# Patient Record
Sex: FEMALE | Race: WHITE | NOT HISPANIC OR LATINO | Employment: OTHER | URBAN - METROPOLITAN AREA
[De-identification: names, ages, dates, MRNs, and addresses within clinical notes are randomized per-mention and may not be internally consistent; named-entity substitution may affect disease eponyms.]

---

## 2019-07-03 ENCOUNTER — LAB (OUTPATIENT)
Dept: LAB | Facility: HOSPITAL | Age: 44
End: 2019-07-03

## 2019-07-03 ENCOUNTER — OFFICE VISIT (OUTPATIENT)
Dept: GASTROENTEROLOGY | Facility: CLINIC | Age: 44
End: 2019-07-03

## 2019-07-03 VITALS
WEIGHT: 220.2 LBS | DIASTOLIC BLOOD PRESSURE: 81 MMHG | SYSTOLIC BLOOD PRESSURE: 142 MMHG | BODY MASS INDEX: 39.02 KG/M2 | HEIGHT: 63 IN | HEART RATE: 95 BPM

## 2019-07-03 DIAGNOSIS — R74.8 ABNORMAL LIVER ENZYMES: ICD-10-CM

## 2019-07-03 DIAGNOSIS — R60.9 PERIPHERAL EDEMA: ICD-10-CM

## 2019-07-03 DIAGNOSIS — F10.10 ALCOHOL ABUSE: ICD-10-CM

## 2019-07-03 DIAGNOSIS — R74.8 ABNORMAL LIVER ENZYMES: Primary | ICD-10-CM

## 2019-07-03 LAB
ALBUMIN SERPL-MCNC: 3.7 G/DL (ref 3.5–5.2)
ALBUMIN/GLOB SERPL: 1.3 G/DL
ALP SERPL-CCNC: 96 U/L (ref 39–117)
ALT SERPL W P-5'-P-CCNC: 17 U/L (ref 1–33)
ANION GAP SERPL CALCULATED.3IONS-SCNC: 12.4 MMOL/L (ref 5–15)
AST SERPL-CCNC: 28 U/L (ref 1–32)
BASOPHILS # BLD AUTO: 0.04 10*3/MM3 (ref 0–0.2)
BASOPHILS NFR BLD AUTO: 0.5 % (ref 0–1.5)
BILIRUB SERPL-MCNC: 0.5 MG/DL (ref 0.2–1.2)
BUN BLD-MCNC: 14 MG/DL (ref 6–20)
BUN/CREAT SERPL: 14.1 (ref 7–25)
CALCIUM SPEC-SCNC: 9.6 MG/DL (ref 8.6–10.5)
CHLORIDE SERPL-SCNC: 102 MMOL/L (ref 98–107)
CO2 SERPL-SCNC: 24.6 MMOL/L (ref 22–29)
CREAT BLD-MCNC: 0.99 MG/DL (ref 0.57–1)
DEPRECATED RDW RBC AUTO: 41.8 FL (ref 37–54)
EOSINOPHIL # BLD AUTO: 0.27 10*3/MM3 (ref 0–0.4)
EOSINOPHIL NFR BLD AUTO: 3.7 % (ref 0.3–6.2)
ERYTHROCYTE [DISTWIDTH] IN BLOOD BY AUTOMATED COUNT: 13.3 % (ref 12.3–15.4)
GFR SERPL CREATININE-BSD FRML MDRD: 61 ML/MIN/1.73
GLOBULIN UR ELPH-MCNC: 2.9 GM/DL
GLUCOSE BLD-MCNC: 85 MG/DL (ref 65–99)
HCT VFR BLD AUTO: 37.2 % (ref 34–46.6)
HGB BLD-MCNC: 11.9 G/DL (ref 12–15.9)
IMM GRANULOCYTES # BLD AUTO: 0.02 10*3/MM3 (ref 0–0.05)
IMM GRANULOCYTES NFR BLD AUTO: 0.3 % (ref 0–0.5)
INR PPP: 1.16 (ref 0.85–1.16)
LYMPHOCYTES # BLD AUTO: 1.82 10*3/MM3 (ref 0.7–3.1)
LYMPHOCYTES NFR BLD AUTO: 24.9 % (ref 19.6–45.3)
MCH RBC QN AUTO: 27.2 PG (ref 26.6–33)
MCHC RBC AUTO-ENTMCNC: 32 G/DL (ref 31.5–35.7)
MCV RBC AUTO: 84.9 FL (ref 79–97)
MONOCYTES # BLD AUTO: 1.13 10*3/MM3 (ref 0.1–0.9)
MONOCYTES NFR BLD AUTO: 15.5 % (ref 5–12)
NEUTROPHILS # BLD AUTO: 4.03 10*3/MM3 (ref 1.7–7)
NEUTROPHILS NFR BLD AUTO: 55.1 % (ref 42.7–76)
NRBC BLD AUTO-RTO: 0 /100 WBC (ref 0–0.2)
PLATELET # BLD AUTO: 183 10*3/MM3 (ref 140–450)
PMV BLD AUTO: 10.6 FL (ref 6–12)
POTASSIUM BLD-SCNC: 4 MMOL/L (ref 3.5–5.2)
PROT SERPL-MCNC: 6.6 G/DL (ref 6–8.5)
PROTHROMBIN TIME: 14.2 SECONDS (ref 11.2–14.3)
RBC # BLD AUTO: 4.38 10*6/MM3 (ref 3.77–5.28)
SODIUM BLD-SCNC: 139 MMOL/L (ref 136–145)
WBC NRBC COR # BLD: 7.31 10*3/MM3 (ref 3.4–10.8)

## 2019-07-03 PROCEDURE — 80053 COMPREHEN METABOLIC PANEL: CPT | Performed by: NURSE PRACTITIONER

## 2019-07-03 PROCEDURE — 99204 OFFICE O/P NEW MOD 45 MIN: CPT | Performed by: NURSE PRACTITIONER

## 2019-07-03 PROCEDURE — 85025 COMPLETE CBC W/AUTO DIFF WBC: CPT | Performed by: NURSE PRACTITIONER

## 2019-07-03 PROCEDURE — 85610 PROTHROMBIN TIME: CPT

## 2019-07-03 PROCEDURE — 36415 COLL VENOUS BLD VENIPUNCTURE: CPT | Performed by: NURSE PRACTITIONER

## 2019-07-03 RX ORDER — BUPRENORPHINE HYDROCHLORIDE AND NALOXONE HYDROCHLORIDE DIHYDRATE 8; 2 MG/1; MG/1
TABLET SUBLINGUAL
Refills: 0 | Status: ON HOLD | COMMUNITY
Start: 2019-06-25 | End: 2020-04-02

## 2019-07-03 RX ORDER — OMEPRAZOLE 20 MG/1
20 CAPSULE, DELAYED RELEASE ORAL DAILY
Refills: 0 | Status: ON HOLD | COMMUNITY
Start: 2019-05-24 | End: 2021-06-24

## 2019-07-03 RX ORDER — CITALOPRAM 20 MG/1
20 TABLET ORAL DAILY
Refills: 0 | Status: ON HOLD | COMMUNITY
Start: 2019-05-10 | End: 2021-06-24

## 2019-07-03 RX ORDER — PROPRANOLOL HYDROCHLORIDE 10 MG/1
10 TABLET ORAL
Refills: 0 | Status: ON HOLD | COMMUNITY
Start: 2019-04-30 | End: 2021-06-24

## 2019-07-03 RX ORDER — HYDROXYZINE PAMOATE 25 MG/1
25 CAPSULE ORAL 3 TIMES DAILY PRN
Status: ON HOLD | COMMUNITY
End: 2020-04-02

## 2019-07-03 RX ORDER — BUMETANIDE 1 MG/1
1 TABLET ORAL DAILY
COMMUNITY
End: 2019-07-03 | Stop reason: SDUPTHER

## 2019-07-03 RX ORDER — BUMETANIDE 1 MG/1
2 TABLET ORAL DAILY
Qty: 60 TABLET | Refills: 1 | Status: SHIPPED | OUTPATIENT
Start: 2019-07-03 | End: 2019-08-19

## 2019-07-03 RX ORDER — METHOCARBAMOL 750 MG/1
TABLET, FILM COATED ORAL
Refills: 0 | Status: ON HOLD | COMMUNITY
Start: 2019-04-30 | End: 2020-04-02

## 2019-07-03 NOTE — PROGRESS NOTES
GASTROENTEROLOGY OFFICE NOTE  Timothy Guzman  [id number]  1975    CARE TEAM  Patient Care Team:  Toshia Mitchell APRN as PCP - General (Family Medicine)    Referring Provider: Toshia Mitchell APRN    Chief Complaint   Patient presents with   • Edema     all over        HISTORY OF PRESENT ILLNESS:  Ms. Guzman is a 43-year-old female with a history of alcohol abuse.  She reports that she has been drinking since her early 20s described as binge drinking for months at a time drinking 2-3/5 daily.  Her last drink was 2019.  She is doing very well with her sobriety and is committed to remaining sober.  She was noted to have elevated liver enzymes in 2018: AST 76/ALT 45/.  Repeat enzymes in 2019 after complete abstinence from alcohol for 5 months showed normalization of her enzymes: AST 21/ ALT 17/ ALP 88. Serologic workup for hepatitides was done by her primary care provider that was significant for a positive mitochondrial antibody and positive MAGDALENO that showed the homogeneous pattern, not consistent with primary biliary cirrhosis.  Serologic work-up from 2019 also shows a normal bilirubin 0.2, and slightly low platelets 147.    She presents today with complaints of lower extremity edema.  She has been treated with Lasix that caused hypokalemia and thus has been switched to Bumex 1 mg daily and most recently added Spironolactone 100 mg daily.  Her edema continues and is worsening.  She denies signs of hepatic encephalopathy, such as confusion or increased sleepiness, denies GI bleeding.  She denies symptoms of PBC; fatigue, pruritus, or abdominal pain.    PAST MEDICAL HISTORY  Past Medical History:   Diagnosis Date   • Alcoholism (CMS/HCC)         PAST SURGICAL HISTORY  Past Surgical History:   Procedure Laterality Date   • APPENDECTOMY     •  SECTION     • GALLBLADDER SURGERY     • REPLACEMENT TOTAL HIP LATERAL POSITION Left    • TOTAL KNEE ARTHROPLASTY  "Left         MEDICATIONS:    Current Outpatient Medications:   •  bumetanide (BUMEX) 1 MG tablet, Take 2 tablets by mouth Daily., Disp: 60 tablet, Rfl: 1  •  hydrOXYzine pamoate (VISTARIL) 25 MG capsule, Take 25 mg by mouth 3 (Three) Times a Day As Needed for Itching., Disp: , Rfl:   •  buprenorphine-naloxone (SUBOXONE) 8-2 MG per SL tablet, , Disp: , Rfl: 0  •  citalopram (CeleXA) 10 MG tablet, , Disp: , Rfl: 0  •  methocarbamol (ROBAXIN) 750 MG tablet, , Disp: , Rfl: 0  •  omeprazole (priLOSEC) 20 MG capsule, Take 20 mg by mouth Daily., Disp: , Rfl: 0  •  propranolol (INDERAL) 10 MG tablet, , Disp: , Rfl: 0    ALLERGIES  No Known Allergies    FAMILY HISTORY:  Family History   Problem Relation Age of Onset   • Colon cancer Neg Hx    • Colon polyps Neg Hx        SOCIAL HISTORY  Social History     Socioeconomic History   • Marital status:      Spouse name: Not on file   • Number of children: Not on file   • Years of education: Not on file   • Highest education level: Not on file   Tobacco Use   • Smoking status: Current Every Day Smoker   • Smokeless tobacco: Never Used   • Tobacco comment: 2 cigarettes a day    Substance and Sexual Activity   • Alcohol use: No     Frequency: Never   • Drug use: No   • Sexual activity: Defer       REVIEW OF SYSTEMS  Review of Systems   Constitutional: Positive for unexpected weight gain.   HENT: Negative.    Eyes: Negative.    Respiratory: Negative.  Negative for shortness of breath.    Cardiovascular: Positive for leg swelling.   Gastrointestinal: Negative.    Genitourinary: Negative.    Musculoskeletal: Positive for arthralgias.   Skin: Negative.    Neurological: Negative.    Hematological: Negative.    Psychiatric/Behavioral: Negative.          PHYSICAL EXAM   /81   Pulse 95   Ht 160 cm (63\")   Wt 99.9 kg (220 lb 3.2 oz)   BMI 39.01 kg/m²   Physical Exam   Constitutional: She is oriented to person, place, and time. She appears well-developed and well-nourished. "   HENT:   Head: Normocephalic.   Mouth/Throat: Oropharynx is clear and moist.   Eyes: EOM are normal. Pupils are equal, round, and reactive to light.   Neck: Normal range of motion. Neck supple.   Cardiovascular: Normal rate and regular rhythm.   Pulmonary/Chest: Effort normal and breath sounds normal. She has no wheezes. She has no rales.   Abdominal: Soft. Bowel sounds are normal. She exhibits no mass. There is no tenderness. There is no rebound and no guarding. No hernia.   Musculoskeletal: Normal range of motion.        Right ankle: She exhibits swelling.        Left ankle: She exhibits swelling.        Right foot: There is swelling.        Left foot: There is swelling.   Neurological: She is alert and oriented to person, place, and time. No cranial nerve deficit.   Skin: Skin is warm and dry.   Erythema to BLE   Psychiatric: She has a normal mood and affect. Her behavior is normal. Judgment normal.   Nursing note and vitals reviewed.    Results Review:  [facility] records reviewed and discussed with patient.     ASSESSMENT / PLAN  1.  Elevated liver enzymes-now normalized  - AMA (+)  The diagnosis of PBC is generally based on the presence of at least two of the following criteria:  a) Biochemical evidence of cholestasis with elevation of ALP activity.  b) Presence of AMA.  c) Histopathologic evidence of nonsuppurative cholangitis and destruction of small or medium-sized bile ducts if a biopsy is performed  -AST 21/ALP17/ALP 88  With the high disease specificity of a positive AMA test, the role of liver biopsy in diagnosing PBC is questionable when ALP activity is at least 1.5 times the normal and aspartate aminotransferase values are less than 5 times the normal.   Consider liver biopsy as diagnosis of PBC is uncertain  2. ETOH abuse  -Given her history of alcohol abuse and current complaint of peripheral edema as well as thrombocytopenia I have suspicion for cirrhosis  - EGD to assess for varices and portal  hypertensive gastropathy  - Ultrasound liver with elastography  3. Lower extremity edema  -Increase Bumex to 2 mg daily  -CMP, CBC, PT/INR, Ig profile today  -BMP in 5 days    Return in about 1 month (around 8/3/2019).    I discussed the patients findings and my recommendations with patient    Nelson Yip, APRN

## 2019-07-13 ENCOUNTER — APPOINTMENT (OUTPATIENT)
Dept: ULTRASOUND IMAGING | Facility: HOSPITAL | Age: 44
End: 2019-07-13

## 2019-07-20 ENCOUNTER — HOSPITAL ENCOUNTER (OUTPATIENT)
Dept: ULTRASOUND IMAGING | Facility: HOSPITAL | Age: 44
Discharge: HOME | End: 2019-07-20
Admitting: NURSE PRACTITIONER

## 2019-07-20 DIAGNOSIS — R74.8 ABNORMAL LIVER ENZYMES: ICD-10-CM

## 2019-07-20 PROCEDURE — 76981 USE PARENCHYMA: CPT

## 2019-07-20 PROCEDURE — 76705 ECHO EXAM OF ABDOMEN: CPT

## 2019-07-31 ENCOUNTER — OFFICE VISIT (OUTPATIENT)
Dept: GASTROENTEROLOGY | Facility: CLINIC | Age: 44
End: 2019-07-31

## 2019-07-31 ENCOUNTER — LAB (OUTPATIENT)
Dept: LAB | Facility: HOSPITAL | Age: 44
End: 2019-07-31

## 2019-07-31 VITALS
DIASTOLIC BLOOD PRESSURE: 89 MMHG | WEIGHT: 212.2 LBS | SYSTOLIC BLOOD PRESSURE: 141 MMHG | HEART RATE: 103 BPM | HEIGHT: 63 IN | BODY MASS INDEX: 37.6 KG/M2

## 2019-07-31 DIAGNOSIS — R74.8 ABNORMAL LIVER ENZYMES: ICD-10-CM

## 2019-07-31 DIAGNOSIS — K31.84 GASTROPARESIS: ICD-10-CM

## 2019-07-31 DIAGNOSIS — K59.00 CONSTIPATION, UNSPECIFIED CONSTIPATION TYPE: ICD-10-CM

## 2019-07-31 DIAGNOSIS — K70.30 ALCOHOLIC CIRRHOSIS OF LIVER WITHOUT ASCITES (HCC): Primary | ICD-10-CM

## 2019-07-31 DIAGNOSIS — R60.0 PEDAL EDEMA: ICD-10-CM

## 2019-07-31 DIAGNOSIS — K70.30 ALCOHOLIC CIRRHOSIS OF LIVER WITHOUT ASCITES (HCC): ICD-10-CM

## 2019-07-31 DIAGNOSIS — F10.10 ALCOHOL ABUSE: ICD-10-CM

## 2019-07-31 LAB
AMMONIA BLD-SCNC: 47 UMOL/L (ref 11–51)
ANION GAP SERPL CALCULATED.3IONS-SCNC: 17.2 MMOL/L (ref 5–15)
BUN BLD-MCNC: 14 MG/DL (ref 6–20)
BUN/CREAT SERPL: 9.7 (ref 7–25)
CALCIUM SPEC-SCNC: 10 MG/DL (ref 8.6–10.5)
CHLORIDE SERPL-SCNC: 90 MMOL/L (ref 98–107)
CO2 SERPL-SCNC: 28.8 MMOL/L (ref 22–29)
CREAT BLD-MCNC: 1.44 MG/DL (ref 0.57–1)
GFR SERPL CREATININE-BSD FRML MDRD: 40 ML/MIN/1.73
GLUCOSE BLD-MCNC: 96 MG/DL (ref 65–99)
INR PPP: 1.09 (ref 0.85–1.16)
POTASSIUM BLD-SCNC: 2.9 MMOL/L (ref 3.5–5.2)
PROTHROMBIN TIME: 13.5 SECONDS (ref 11.2–14.3)
SODIUM BLD-SCNC: 136 MMOL/L (ref 136–145)

## 2019-07-31 PROCEDURE — 99214 OFFICE O/P EST MOD 30 MIN: CPT | Performed by: NURSE PRACTITIONER

## 2019-07-31 PROCEDURE — 82140 ASSAY OF AMMONIA: CPT

## 2019-07-31 PROCEDURE — 85610 PROTHROMBIN TIME: CPT

## 2019-07-31 PROCEDURE — 80048 BASIC METABOLIC PNL TOTAL CA: CPT

## 2019-07-31 PROCEDURE — 36415 COLL VENOUS BLD VENIPUNCTURE: CPT

## 2019-07-31 RX ORDER — DULOXETIN HYDROCHLORIDE 30 MG/1
30 CAPSULE, DELAYED RELEASE ORAL DAILY
Refills: 0 | Status: ON HOLD | COMMUNITY
Start: 2019-04-30 | End: 2021-06-24

## 2019-07-31 RX ORDER — MIRTAZAPINE 15 MG/1
TABLET, FILM COATED ORAL
Refills: 0 | COMMUNITY
Start: 2019-04-30 | End: 2019-10-09

## 2019-07-31 RX ORDER — DOCUSATE SODIUM 250 MG/1
500 CAPSULE, LIQUID FILLED ORAL DAILY PRN
Refills: 0 | Status: ON HOLD | COMMUNITY
Start: 2019-05-01 | End: 2021-06-24

## 2019-07-31 RX ORDER — SPIRONOLACTONE 50 MG/1
TABLET, FILM COATED ORAL
Qty: 90 TABLET | Refills: 1 | Status: SHIPPED | OUTPATIENT
Start: 2019-07-31 | End: 2019-10-09 | Stop reason: SDUPTHER

## 2019-07-31 RX ORDER — LACTULOSE 10 G/15ML
SOLUTION ORAL
Refills: 1 | COMMUNITY
Start: 2019-05-08 | End: 2019-12-30 | Stop reason: SDUPTHER

## 2019-08-01 ENCOUNTER — TELEPHONE (OUTPATIENT)
Dept: GASTROENTEROLOGY | Facility: CLINIC | Age: 44
End: 2019-08-01

## 2019-08-01 DIAGNOSIS — Z86.39 HISTORY OF LOW POTASSIUM: ICD-10-CM

## 2019-08-01 DIAGNOSIS — R74.8 ABNORMAL LIVER ENZYMES: ICD-10-CM

## 2019-08-01 DIAGNOSIS — K70.30 ALCOHOLIC CIRRHOSIS OF LIVER WITHOUT ASCITES (HCC): Primary | ICD-10-CM

## 2019-08-19 RX ORDER — FUROSEMIDE 40 MG/1
40 TABLET ORAL DAILY
Qty: 30 TABLET | Refills: 5 | Status: SHIPPED | OUTPATIENT
Start: 2019-08-19 | End: 2019-09-06 | Stop reason: DRUGHIGH

## 2019-09-02 ENCOUNTER — APPOINTMENT (OUTPATIENT)
Dept: GENERAL RADIOLOGY | Facility: HOSPITAL | Age: 44
End: 2019-09-02

## 2019-09-02 ENCOUNTER — HOSPITAL ENCOUNTER (EMERGENCY)
Facility: HOSPITAL | Age: 44
Discharge: HOME | End: 2019-09-02
Attending: EMERGENCY MEDICINE | Admitting: EMERGENCY MEDICINE

## 2019-09-02 VITALS
WEIGHT: 220 LBS | DIASTOLIC BLOOD PRESSURE: 64 MMHG | HEART RATE: 84 BPM | TEMPERATURE: 97.4 F | HEIGHT: 63 IN | BODY MASS INDEX: 38.98 KG/M2 | RESPIRATION RATE: 16 BRPM | OXYGEN SATURATION: 97 % | SYSTOLIC BLOOD PRESSURE: 121 MMHG

## 2019-09-02 DIAGNOSIS — I87.2 VENOUS INSUFFICIENCY: ICD-10-CM

## 2019-09-02 DIAGNOSIS — R60.9 PERIPHERAL EDEMA: Primary | ICD-10-CM

## 2019-09-02 LAB
ALBUMIN SERPL-MCNC: 4.1 G/DL (ref 3.5–5.2)
ALBUMIN/GLOB SERPL: 1.3 G/DL
ALP SERPL-CCNC: 114 U/L (ref 39–117)
ALT SERPL W P-5'-P-CCNC: 12 U/L (ref 1–33)
ANION GAP SERPL CALCULATED.3IONS-SCNC: 10 MMOL/L (ref 5–15)
AST SERPL-CCNC: 23 U/L (ref 1–32)
BASOPHILS # BLD AUTO: 0.03 10*3/MM3 (ref 0–0.2)
BASOPHILS NFR BLD AUTO: 0.6 % (ref 0–1.5)
BILIRUB SERPL-MCNC: 0.2 MG/DL (ref 0.2–1.2)
BUN BLD-MCNC: 18 MG/DL (ref 6–20)
BUN/CREAT SERPL: 15.3 (ref 7–25)
CALCIUM SPEC-SCNC: 9.4 MG/DL (ref 8.6–10.5)
CHLORIDE SERPL-SCNC: 99 MMOL/L (ref 98–107)
CO2 SERPL-SCNC: 28 MMOL/L (ref 22–29)
CREAT BLD-MCNC: 1.18 MG/DL (ref 0.57–1)
DEPRECATED RDW RBC AUTO: 43 FL (ref 37–54)
EOSINOPHIL # BLD AUTO: 0.29 10*3/MM3 (ref 0–0.4)
EOSINOPHIL NFR BLD AUTO: 5.7 % (ref 0.3–6.2)
ERYTHROCYTE [DISTWIDTH] IN BLOOD BY AUTOMATED COUNT: 14.2 % (ref 12.3–15.4)
GFR SERPL CREATININE-BSD FRML MDRD: 50 ML/MIN/1.73
GLOBULIN UR ELPH-MCNC: 3.1 GM/DL
GLUCOSE BLD-MCNC: 95 MG/DL (ref 65–99)
HCT VFR BLD AUTO: 35.5 % (ref 34–46.6)
HGB BLD-MCNC: 11.4 G/DL (ref 12–15.9)
IMM GRANULOCYTES # BLD AUTO: 0.01 10*3/MM3 (ref 0–0.05)
IMM GRANULOCYTES NFR BLD AUTO: 0.2 % (ref 0–0.5)
INR PPP: 1.14 (ref 0.85–1.16)
LYMPHOCYTES # BLD AUTO: 1.65 10*3/MM3 (ref 0.7–3.1)
LYMPHOCYTES NFR BLD AUTO: 32.4 % (ref 19.6–45.3)
MCH RBC QN AUTO: 26.6 PG (ref 26.6–33)
MCHC RBC AUTO-ENTMCNC: 32.1 G/DL (ref 31.5–35.7)
MCV RBC AUTO: 82.9 FL (ref 79–97)
MONOCYTES # BLD AUTO: 0.65 10*3/MM3 (ref 0.1–0.9)
MONOCYTES NFR BLD AUTO: 12.7 % (ref 5–12)
NEUTROPHILS # BLD AUTO: 2.47 10*3/MM3 (ref 1.7–7)
NEUTROPHILS NFR BLD AUTO: 48.4 % (ref 42.7–76)
NRBC BLD AUTO-RTO: 0 /100 WBC (ref 0–0.2)
NT-PROBNP SERPL-MCNC: 25.8 PG/ML (ref 5–450)
PLATELET # BLD AUTO: 142 10*3/MM3 (ref 140–450)
PMV BLD AUTO: 9.8 FL (ref 6–12)
POTASSIUM BLD-SCNC: 3.9 MMOL/L (ref 3.5–5.2)
PROT SERPL-MCNC: 7.2 G/DL (ref 6–8.5)
PROTHROMBIN TIME: 14.1 SECONDS (ref 11.2–14.3)
RBC # BLD AUTO: 4.28 10*6/MM3 (ref 3.77–5.28)
SODIUM BLD-SCNC: 137 MMOL/L (ref 136–145)
WBC NRBC COR # BLD: 5.1 10*3/MM3 (ref 3.4–10.8)

## 2019-09-02 PROCEDURE — 80053 COMPREHEN METABOLIC PANEL: CPT | Performed by: PHYSICIAN ASSISTANT

## 2019-09-02 PROCEDURE — 93005 ELECTROCARDIOGRAM TRACING: CPT | Performed by: PHYSICIAN ASSISTANT

## 2019-09-02 PROCEDURE — 85025 COMPLETE CBC W/AUTO DIFF WBC: CPT | Performed by: PHYSICIAN ASSISTANT

## 2019-09-02 PROCEDURE — 83880 ASSAY OF NATRIURETIC PEPTIDE: CPT | Performed by: PHYSICIAN ASSISTANT

## 2019-09-02 PROCEDURE — 71046 X-RAY EXAM CHEST 2 VIEWS: CPT

## 2019-09-02 PROCEDURE — 99284 EMERGENCY DEPT VISIT MOD MDM: CPT

## 2019-09-02 PROCEDURE — 85610 PROTHROMBIN TIME: CPT | Performed by: PHYSICIAN ASSISTANT

## 2019-09-02 RX ORDER — SODIUM CHLORIDE 0.9 % (FLUSH) 0.9 %
10 SYRINGE (ML) INJECTION AS NEEDED
Status: DISCONTINUED | OUTPATIENT
Start: 2019-09-02 | End: 2019-09-02 | Stop reason: HOSPADM

## 2019-09-02 RX ORDER — ONDANSETRON 4 MG/1
4 TABLET, FILM COATED ORAL ONCE
Status: COMPLETED | OUTPATIENT
Start: 2019-09-02 | End: 2019-09-02

## 2019-09-02 RX ORDER — HYDROCODONE BITARTRATE AND ACETAMINOPHEN 5; 325 MG/1; MG/1
1 TABLET ORAL ONCE
Status: COMPLETED | OUTPATIENT
Start: 2019-09-02 | End: 2019-09-02

## 2019-09-02 RX ADMIN — HYDROCODONE BITARTRATE AND ACETAMINOPHEN 1 TABLET: 5; 325 TABLET ORAL at 17:57

## 2019-09-02 RX ADMIN — ONDANSETRON HYDROCHLORIDE 4 MG: 4 TABLET, FILM COATED ORAL at 17:57

## 2019-09-02 NOTE — ED PROVIDER NOTES
Subjective   Ms. Guzman is a 43-year-old female that comes to the emergency today with bilateral lower leg edema.  This been going on for several months.  She is recent been diagnosed with alcoholic cirrhosis.  States her liver functions have been maintained normal.  Patient reports that she also has had recurrent cellulitis.  Patient reports she just finished antibiotics.  She is mainly just because the legs are so edematous.  Patient reports that that she has a primary care doctor and a gastroenterologist.  Leg pain down seem to exacerbate the swelling and pain.  She denies any numbness or tingling.  She had no trauma.  Patient reports that this recurrent cellulitis usually is accompanied with a large blisters but she does not have at this point.        History provided by:  Patient   used: No    Leg Swelling   Location:  Bilateral lower extremities  Quality:  Burning pressure  Severity:  Moderate  Onset quality:  Gradual  Timing:  Constant  Progression:  Waxing and waning  Chronicity:  New  Context:  Recent diagnosis of alcoholic cirrhosis.  Relieved by:  Elevation  Worsened by:  Standing walking  Associated symptoms: no abdominal pain, no congestion, no cough, no diarrhea, no fever, no headaches, no rash, no rhinorrhea, no sore throat, no vomiting and no wheezing        Review of Systems   Constitutional: Negative for chills and fever.   HENT: Negative for congestion, rhinorrhea and sore throat.    Respiratory: Negative for cough, chest tightness and wheezing.    Gastrointestinal: Negative for abdominal pain, diarrhea and vomiting.   Genitourinary: Negative for dysuria, frequency and urgency.   Musculoskeletal: Negative for back pain and neck pain.   Skin: Negative for pallor and rash.   Neurological: Negative for headaches.   Psychiatric/Behavioral: Negative.    All other systems reviewed and are negative.      Past Medical History:   Diagnosis Date   • Alcoholism (CMS/HCC)    • Cirrhosis  (CMS/HCC)        No Known Allergies    Past Surgical History:   Procedure Laterality Date   • APPENDECTOMY     •  SECTION     • ENDOSCOPY     • GALLBLADDER SURGERY     • REPLACEMENT TOTAL HIP LATERAL POSITION Left    • TOTAL KNEE ARTHROPLASTY Left        Family History   Problem Relation Age of Onset   • Colon cancer Neg Hx    • Colon polyps Neg Hx        Social History     Socioeconomic History   • Marital status:      Spouse name: Not on file   • Number of children: Not on file   • Years of education: Not on file   • Highest education level: Not on file   Tobacco Use   • Smoking status: Current Every Day Smoker     Types: Electronic Cigarette, Cigarettes   • Smokeless tobacco: Never Used   • Tobacco comment: 2 cigarettes a day    Substance and Sexual Activity   • Alcohol use: No     Frequency: Never   • Drug use: No   • Sexual activity: Defer           Objective   Physical Exam   Constitutional: She is oriented to person, place, and time. She appears well-developed and well-nourished.   HENT:   Head: Normocephalic and atraumatic.   Right Ear: External ear normal.   Left Ear: External ear normal.   Nose: Nose normal.   Mouth/Throat: Oropharynx is clear and moist.   Eyes: Conjunctivae and EOM are normal. Pupils are equal, round, and reactive to light. No scleral icterus.   Neck: Normal range of motion. No thyromegaly present.   Cardiovascular: Normal rate, regular rhythm and normal heart sounds.   Bilateral lower extremity edema +2 pitting.  No redness no induration.  No open wounds no blistering.  Peripheral pulses are strong and equal   Pulmonary/Chest: Effort normal and breath sounds normal. No respiratory distress. She has no wheezes. She has no rales. She exhibits no tenderness.   Abdominal: Soft. Bowel sounds are normal. She exhibits no distension. There is no tenderness.   Musculoskeletal: Normal range of motion.   Lymphadenopathy:     She has no cervical adenopathy.   Neurological: She is  alert and oriented to person, place, and time. She has normal reflexes. She displays normal reflexes. No cranial nerve deficit. Coordination normal.   Skin: Skin is warm and dry. Capillary refill takes less than 2 seconds.   Psychiatric: She has a normal mood and affect. Her behavior is normal. Judgment and thought content normal.   Nursing note and vitals reviewed.      Procedures           ED Course  Final Diagnosis: as of Sep 05 1316   Peripheral edema   Venous insufficiency                  MDM  Number of Diagnoses or Management Options  Peripheral edema: new and requires workup  Venous insufficiency: new and requires workup     Amount and/or Complexity of Data Reviewed  Clinical lab tests: reviewed and ordered  Tests in the radiology section of CPT®: reviewed and ordered  Discuss the patient with other providers: yes    Patient Progress  Patient progress: stable        Final diagnoses:   Peripheral edema   Venous insufficiency            Nitesh Trivedi PA  09/05/19 [id number]

## 2019-09-06 ENCOUNTER — TELEPHONE (OUTPATIENT)
Dept: GASTROENTEROLOGY | Facility: CLINIC | Age: 44
End: 2019-09-06

## 2019-09-06 RX ORDER — FUROSEMIDE 20 MG/1
TABLET ORAL
Qty: 90 TABLET | Refills: 2 | Status: ON HOLD | OUTPATIENT
Start: 2019-09-06 | End: 2020-04-02 | Stop reason: ALTCHOICE

## 2019-09-06 NOTE — TELEPHONE ENCOUNTER
Return pt call in regards to Lasix. Pt says that she was told that she would have additional medication called in to pharmacy at 60mg for the Lasix. Was told to call pharmacy to check for refills. If not then to call office back to have rx sent to pharmacy. Rescheduled 9/30 appt for next week due to pain and fluid concerns.

## 2019-09-11 ENCOUNTER — OFFICE VISIT (OUTPATIENT)
Dept: GASTROENTEROLOGY | Facility: CLINIC | Age: 44
End: 2019-09-11

## 2019-09-11 ENCOUNTER — LAB (OUTPATIENT)
Dept: LAB | Facility: HOSPITAL | Age: 44
End: 2019-09-11

## 2019-09-11 VITALS
WEIGHT: 225.8 LBS | BODY MASS INDEX: 40.01 KG/M2 | DIASTOLIC BLOOD PRESSURE: 76 MMHG | SYSTOLIC BLOOD PRESSURE: 127 MMHG | HEART RATE: 97 BPM | HEIGHT: 63 IN

## 2019-09-11 DIAGNOSIS — R60.9 PERIPHERAL EDEMA: ICD-10-CM

## 2019-09-11 DIAGNOSIS — K70.31 ALCOHOLIC CIRRHOSIS OF LIVER WITH ASCITES (HCC): ICD-10-CM

## 2019-09-11 DIAGNOSIS — K59.00 CONSTIPATION, UNSPECIFIED CONSTIPATION TYPE: ICD-10-CM

## 2019-09-11 DIAGNOSIS — K31.84 GASTROPARESIS: ICD-10-CM

## 2019-09-11 DIAGNOSIS — K70.31 ALCOHOLIC CIRRHOSIS OF LIVER WITH ASCITES (HCC): Primary | ICD-10-CM

## 2019-09-11 LAB
ALBUMIN SERPL-MCNC: 4.3 G/DL (ref 3.5–5.2)
ALBUMIN/GLOB SERPL: 1.2 G/DL
ALP SERPL-CCNC: 131 U/L (ref 39–117)
ALT SERPL W P-5'-P-CCNC: 11 U/L (ref 1–33)
ANION GAP SERPL CALCULATED.3IONS-SCNC: 14.5 MMOL/L (ref 5–15)
AST SERPL-CCNC: 23 U/L (ref 1–32)
BASOPHILS # BLD AUTO: 0.04 10*3/MM3 (ref 0–0.2)
BASOPHILS NFR BLD AUTO: 0.5 % (ref 0–1.5)
BILIRUB SERPL-MCNC: 0.4 MG/DL (ref 0.2–1.2)
BUN BLD-MCNC: 10 MG/DL (ref 6–20)
BUN/CREAT SERPL: 9.7 (ref 7–25)
CALCIUM SPEC-SCNC: 10 MG/DL (ref 8.6–10.5)
CHLORIDE SERPL-SCNC: 94 MMOL/L (ref 98–107)
CO2 SERPL-SCNC: 28.5 MMOL/L (ref 22–29)
CREAT BLD-MCNC: 1.03 MG/DL (ref 0.57–1)
DEPRECATED RDW RBC AUTO: 47 FL (ref 37–54)
EOSINOPHIL # BLD AUTO: 0.39 10*3/MM3 (ref 0–0.4)
EOSINOPHIL NFR BLD AUTO: 4.7 % (ref 0.3–6.2)
ERYTHROCYTE [DISTWIDTH] IN BLOOD BY AUTOMATED COUNT: 15.1 % (ref 12.3–15.4)
GFR SERPL CREATININE-BSD FRML MDRD: 58 ML/MIN/1.73
GLOBULIN UR ELPH-MCNC: 3.5 GM/DL
GLUCOSE BLD-MCNC: 90 MG/DL (ref 65–99)
HCT VFR BLD AUTO: 39.9 % (ref 34–46.6)
HGB BLD-MCNC: 12.3 G/DL (ref 12–15.9)
IMM GRANULOCYTES # BLD AUTO: 0.04 10*3/MM3 (ref 0–0.05)
IMM GRANULOCYTES NFR BLD AUTO: 0.5 % (ref 0–0.5)
INR PPP: 1.07 (ref 0.85–1.16)
LYMPHOCYTES # BLD AUTO: 1.86 10*3/MM3 (ref 0.7–3.1)
LYMPHOCYTES NFR BLD AUTO: 22.3 % (ref 19.6–45.3)
MCH RBC QN AUTO: 26.6 PG (ref 26.6–33)
MCHC RBC AUTO-ENTMCNC: 30.8 G/DL (ref 31.5–35.7)
MCV RBC AUTO: 86.4 FL (ref 79–97)
MONOCYTES # BLD AUTO: 1.07 10*3/MM3 (ref 0.1–0.9)
MONOCYTES NFR BLD AUTO: 12.8 % (ref 5–12)
NEUTROPHILS # BLD AUTO: 4.93 10*3/MM3 (ref 1.7–7)
NEUTROPHILS NFR BLD AUTO: 59.2 % (ref 42.7–76)
NRBC BLD AUTO-RTO: 0 /100 WBC (ref 0–0.2)
PLATELET # BLD AUTO: 187 10*3/MM3 (ref 140–450)
PMV BLD AUTO: 10.6 FL (ref 6–12)
POTASSIUM BLD-SCNC: 4.6 MMOL/L (ref 3.5–5.2)
PROT SERPL-MCNC: 7.8 G/DL (ref 6–8.5)
PROTHROMBIN TIME: 13.4 SECONDS (ref 11.2–14.3)
RBC # BLD AUTO: 4.62 10*6/MM3 (ref 3.77–5.28)
SODIUM BLD-SCNC: 137 MMOL/L (ref 136–145)
WBC NRBC COR # BLD: 8.33 10*3/MM3 (ref 3.4–10.8)

## 2019-09-11 PROCEDURE — 36415 COLL VENOUS BLD VENIPUNCTURE: CPT | Performed by: NURSE PRACTITIONER

## 2019-09-11 PROCEDURE — 85025 COMPLETE CBC W/AUTO DIFF WBC: CPT | Performed by: NURSE PRACTITIONER

## 2019-09-11 PROCEDURE — 80053 COMPREHEN METABOLIC PANEL: CPT | Performed by: NURSE PRACTITIONER

## 2019-09-11 PROCEDURE — 99215 OFFICE O/P EST HI 40 MIN: CPT | Performed by: NURSE PRACTITIONER

## 2019-09-11 PROCEDURE — 85610 PROTHROMBIN TIME: CPT

## 2019-09-11 RX ORDER — METOCLOPRAMIDE 10 MG/1
10 TABLET ORAL 4 TIMES DAILY
Qty: 120 TABLET | Refills: 1 | Status: ON HOLD | OUTPATIENT
Start: 2019-09-11 | End: 2021-06-24

## 2019-09-11 RX ORDER — SPIRONOLACTONE 100 MG/1
100 TABLET, FILM COATED ORAL 2 TIMES DAILY
COMMUNITY
Start: 2019-07-18 | End: 2019-11-08

## 2019-09-11 RX ORDER — HYDROXYZINE 50 MG/1
1 TABLET, FILM COATED ORAL AS NEEDED
COMMUNITY
Start: 2019-07-29 | End: 2019-10-09

## 2019-09-11 RX ORDER — POTASSIUM CHLORIDE 20 MEQ/1
1 TABLET, EXTENDED RELEASE ORAL DAILY
COMMUNITY
Start: 2019-06-21

## 2019-09-11 NOTE — PROGRESS NOTES
GASTROENTEROLOGY OFFICE NOTE  Timothy Guzman  [id number]  1975    CARE TEAM  Patient Care Team:  Toshia Mitchell APRN as PCP - General (Family Medicine)    Referring Provider: Toshia Mitchell APRN    Chief Complaint   Patient presents with   • Edema        HISTORY OF PRESENT ILLNESS:  Ms. Guzman presents in follow-up with alcoholic cirrhosis.  She antonio abstinent from any alcohol since January 2019.  Ultrasound of liver in July confirms cirrhosis, with increased echogenicity and coarsened echotexture of the liver, no lesions noted.  EGD in July showed retained food in the stomach suggestive of gastroparesis, no esophageal varices or gastric varices.  She has had no symptoms of hepatic encephalopathy although she is taking lactulose 30 ml daily that was initiated at one of her hospital stays, denies any evidence of GI bleeding, and no history of ascites.    She continues to have troubles with peripheral edema.  She has tense edema from her feet to her hips bilaterally.  She has been on Bumex 2 mg daily and spironolactone 100 mg daily but Bumex was denied coverage for refills and thus she was changed to Lasix 80 mg daily and Spironolactone was increased to 150 mg daily at her last visit in July.  She had elevated creatinine after the change to Lasix 80 mg and thus it was decreased to 60 mg.  Most recently she was seen in the emergency department on 9/6/2019 with complaints of worsening peripheral edema.  Her legs are very painful and she has little range of motion due to the tense edema.  She has been very vigilant in following a 2 g or less sodium diet daily.    She is having new troubles now with constipation.  She is going multiple days without a bowel movement and has associated abdominal cramping and pain.  Recently diagnosed with gastroparesis and continues to have very frequent nausea.  She has been taking lactulose as prescribed, twice daily and still is not having bowel movements  regularly.      PAST MEDICAL HISTORY  Past Medical History:   Diagnosis Date   • Alcoholism (CMS/HCC)    • Cirrhosis (CMS/HCC)         PAST SURGICAL HISTORY  Past Surgical History:   Procedure Laterality Date   • APPENDECTOMY     •  SECTION     • COLONOSCOPY     • ENDOSCOPY     • GALLBLADDER SURGERY     • REPLACEMENT TOTAL HIP LATERAL POSITION Left    • TOTAL KNEE ARTHROPLASTY Left         MEDICATIONS:    Current Outpatient Medications:   •  buprenorphine-naloxone (SUBOXONE) 8-2 MG per SL tablet, , Disp: , Rfl: 0  •  citalopram (CeleXA) 10 MG tablet, , Disp: , Rfl: 0  •  CONSTULOSE 10 GM/15ML solution, , Disp: , Rfl: 1  •   MG capsule, , Disp: , Rfl: 0  •  DULoxetine (CYMBALTA) 30 MG capsule, , Disp: , Rfl: 0  •  furosemide (LASIX) 20 MG tablet, Take 3 tablets by mouth daily, Disp: 90 tablet, Rfl: 2  •  hydrOXYzine (ATARAX) 50 MG tablet, Take 1 mg by mouth As Needed., Disp: , Rfl:   •  hydrOXYzine pamoate (VISTARIL) 25 MG capsule, Take 25 mg by mouth 3 (Three) Times a Day As Needed for Itching., Disp: , Rfl:   •  magnesium oxide (MAGOX) 400 (241.3 Mg) MG tablet tablet, Take 1 mg by mouth 2 (Two) Times a Day., Disp: , Rfl:   •  methocarbamol (ROBAXIN) 750 MG tablet, , Disp: , Rfl: 0  •  metoclopramide (REGLAN) 10 MG tablet, Take 1 tablet by mouth 4 (Four) Times a Day., Disp: 120 tablet, Rfl: 1  •  mirtazapine (REMERON) 15 MG tablet, , Disp: , Rfl: 0  •  omeprazole (priLOSEC) 20 MG capsule, Take 20 mg by mouth Daily., Disp: , Rfl: 0  •  potassium chloride (K-DUR,KLOR-CON) 20 MEQ CR tablet, Take 1 tablet by mouth Daily., Disp: , Rfl:   •  potassium chloride (KLOR-CON) 20 MEQ packet, Take 20 mEq by mouth 2 (Two) Times a Day., Disp: , Rfl:   •  propranolol (INDERAL) 10 MG tablet, , Disp: , Rfl: 0  •  spironolactone (ALDACTONE) 100 MG tablet, Take 3.5 mg by mouth Daily. 3 tablets every morning., Disp: , Rfl:   •  spironolactone (ALDACTONE) 50 MG tablet, Take 3 tabs daily, Disp: 90 tablet, Rfl:  "1    ALLERGIES  No Known Allergies    FAMILY HISTORY:  Family History   Problem Relation Age of Onset   • Colon cancer Neg Hx    • Colon polyps Neg Hx        SOCIAL HISTORY  Social History     Socioeconomic History   • Marital status:      Spouse name: Not on file   • Number of children: Not on file   • Years of education: Not on file   • Highest education level: Not on file   Tobacco Use   • Smoking status: Current Every Day Smoker     Types: Electronic Cigarette, Cigarettes   • Smokeless tobacco: Never Used   • Tobacco comment: 2 cigarettes a day    Substance and Sexual Activity   • Alcohol use: No     Frequency: Never   • Drug use: No   • Sexual activity: Defer       REVIEW OF SYSTEMS  Review of Systems   Constitutional: Positive for unexpected weight loss. Negative for activity change, appetite change, chills, diaphoresis, fatigue, fever and unexpected weight gain.   HENT: Negative for trouble swallowing and voice change.    Cardiovascular: Positive for leg swelling.   Gastrointestinal: Positive for abdominal distention, abdominal pain, constipation and nausea. Negative for anal bleeding, blood in stool, diarrhea, rectal pain, vomiting, GERD and indigestion.         PHYSICAL EXAM   /76 (BP Location: Left arm, Patient Position: Sitting, Cuff Size: Large Adult)   Pulse 97   Ht 160 cm (62.99\")   Wt 102 kg (225 lb 12.8 oz)   BMI 40.01 kg/m²   Physical Exam   Constitutional: She is oriented to person, place, and time. She appears well-developed and well-nourished.   HENT:   Head: Normocephalic.   Mouth/Throat: Oropharynx is clear and moist.   Eyes: EOM are normal. Pupils are equal, round, and reactive to light.   Neck: Normal range of motion. Neck supple.   Cardiovascular: Normal rate and regular rhythm.   Pulmonary/Chest: Effort normal and breath sounds normal. She has no wheezes. She has no rales.   Abdominal: Soft. Bowel sounds are normal. She exhibits distension. She exhibits no mass. There is no " tenderness. There is no rebound and no guarding. No hernia.   Musculoskeletal: Normal range of motion.   Tense edema noted bilaterally from hips to feet   Neurological: She is alert and oriented to person, place, and time. No cranial nerve deficit.   Skin: Skin is warm and dry.   Psychiatric: She has a normal mood and affect. Her behavior is normal. Judgment normal.   Nursing note and vitals reviewed.    Results Review:  I have reviewed the patient's new clinical results.    ASSESSMENT / PLAN  1) Cirrhosis of Liver, compensated  -cirrhosis secondary to ETOH  -MELDNa 10  -No hepatic encephalopathy, no ascites, no GI bleeding  - CMP, CBC, PT/INR today     # Surveillance for varices  -Repeat EGD every 2-3 years if not on prophylaxis  -Last EGD July 2019 - Result: No varices, no PHG, retained food in stomach  -Next EGD due Jan 2022     # Hepatocellular carcinoma surveillance  -Abdominal imaging every six months  -Last US abdomen July 2019 - Results: increased echogenicity and coarsened echotexture of the liver, no lesions noted.  -Next imaging due Jan 2020     # Nutrition  -High protein diet  -Low sodium diet, 2 gm/day  -Avoid alcohol, NSAIDS     2) Pedal edema secondary to cirrhosis  - Restart Bumex 2 mg daily (ins. approval received)  - Spironolactone to 150 mg daily, BMP in 5 days--If Cr, K+, and Na stable will plan to increase Bumex to 4 mg daily  - Strict low sodium diet (as above)     3) Gastroparesis  - Reglan 10 mg 30 minutes before meals and at bedtime as needed.  Patient educated on side effects of this medication and to take as little of this medication for a short of time as needed.  This is meant for short-term use.    4) Constipation  -Continue lactulose to 30 mL twice daily  -Begin Motegrity 2 mg daily    Return in about 4 weeks (around 10/9/2019).    I discussed the patients findings and my recommendations with patient and family    ISSAC Garcia

## 2019-10-09 ENCOUNTER — OFFICE VISIT (OUTPATIENT)
Dept: GASTROENTEROLOGY | Facility: CLINIC | Age: 44
End: 2019-10-09

## 2019-10-09 VITALS
DIASTOLIC BLOOD PRESSURE: 80 MMHG | BODY MASS INDEX: 39.51 KG/M2 | WEIGHT: 223 LBS | HEART RATE: 92 BPM | HEIGHT: 63 IN | SYSTOLIC BLOOD PRESSURE: 142 MMHG

## 2019-10-09 DIAGNOSIS — K59.00 CONSTIPATION, UNSPECIFIED CONSTIPATION TYPE: ICD-10-CM

## 2019-10-09 DIAGNOSIS — K70.30 ALCOHOLIC CIRRHOSIS OF LIVER WITHOUT ASCITES (HCC): Primary | ICD-10-CM

## 2019-10-09 DIAGNOSIS — K31.84 GASTROPARESIS: ICD-10-CM

## 2019-10-09 PROCEDURE — 99214 OFFICE O/P EST MOD 30 MIN: CPT | Performed by: NURSE PRACTITIONER

## 2019-10-09 RX ORDER — SPIRONOLACTONE 50 MG/1
50 TABLET, FILM COATED ORAL DAILY
Qty: 30 TABLET | Refills: 3 | Status: SHIPPED | OUTPATIENT
Start: 2019-10-09 | End: 2019-11-08

## 2019-10-09 NOTE — PROGRESS NOTES
GASTROENTEROLOGY OFFICE NOTE  Timothy Guzman  [id number]  1975    CARE TEAM  Patient Care Team:  Toshia Mitchell APRN as PCP - General (Family Medicine)    Referring Provider: Toshia Mitchell APRN    Chief Complaint   Patient presents with   • Results     bloodwork        HISTORY OF PRESENT ILLNESS:  Ms. Guzman presents in follow-up with alcoholic cirrhosis.  She antonio abstinent from any alcohol since January 2019.  Ultrasound of liver in July confirms cirrhosis, with increased echogenicity and coarsened echotexture of the liver, no lesions noted.  EGD in July showed retained food in the stomach suggestive of gastroparesis, no esophageal varices or gastric varices.  She has had no symptoms of hepatic encephalopathy although she is taking lactulose 30 ml daily that was initiated at one of her hospital stays, denies any evidence of GI bleeding, and no history of ascites.    She continues to have problems with peripheral edema.  She is currently taking Spironolactone 200 mg + Bumex 4 mg.  She has had some problems with her kidney function however her creatinine currently is within normal limits.    She has been having problems with constipation. Currently taking lactulose 30 mL twice daily, Linzess 290 mcg daily and recently added Motegrity 2 mg daily.  With the addition of Motegrity she has had good results, having a bowel movement most every day sometimes every other day.  Unfortunately, Motegrity is not covered under her insurance plan.    She continues to have pain in her legs that she attributes to the edema.  However, she has chronic hip problems and admits that her pain may be related to this rather than the edema.  She is requesting pain medications for her legs.  She is currently on Suboxone for pain as well as tramadol.      PAST MEDICAL HISTORY  Past Medical History:   Diagnosis Date   • Alcoholism (CMS/HCC)    • Cirrhosis (CMS/HCC)         PAST SURGICAL HISTORY  Past Surgical History:    Procedure Laterality Date   • APPENDECTOMY     •  SECTION     • COLONOSCOPY     • ENDOSCOPY     • GALLBLADDER SURGERY     • REPLACEMENT TOTAL HIP LATERAL POSITION Left    • TOTAL KNEE ARTHROPLASTY Left         MEDICATIONS:    Current Outpatient Medications:   •  buprenorphine-naloxone (SUBOXONE) 8-2 MG per SL tablet, , Disp: , Rfl: 0  •  citalopram (CeleXA) 10 MG tablet, , Disp: , Rfl: 0  •  CONSTULOSE 10 GM/15ML solution, , Disp: , Rfl: 1  •   MG capsule, , Disp: , Rfl: 0  •  DULoxetine (CYMBALTA) 30 MG capsule, , Disp: , Rfl: 0  •  furosemide (LASIX) 20 MG tablet, Take 3 tablets by mouth daily, Disp: 90 tablet, Rfl: 2  •  hydrOXYzine pamoate (VISTARIL) 25 MG capsule, Take 25 mg by mouth 3 (Three) Times a Day As Needed for Itching., Disp: , Rfl:   •  linaclotide (LINZESS) 290 MCG capsule capsule, Take 1 capsule by mouth Every Morning Before Breakfast., Disp: 30 capsule, Rfl: 6  •  magnesium oxide (MAGOX) 400 (241.3 Mg) MG tablet tablet, Take 1 mg by mouth 2 (Two) Times a Day., Disp: , Rfl:   •  methocarbamol (ROBAXIN) 750 MG tablet, , Disp: , Rfl: 0  •  metoclopramide (REGLAN) 10 MG tablet, Take 1 tablet by mouth 4 (Four) Times a Day., Disp: 120 tablet, Rfl: 1  •  omeprazole (priLOSEC) 20 MG capsule, Take 20 mg by mouth Daily., Disp: , Rfl: 0  •  potassium chloride (K-DUR,KLOR-CON) 20 MEQ CR tablet, Take 1 tablet by mouth Daily., Disp: , Rfl:   •  potassium chloride (KLOR-CON) 20 MEQ packet, Take 20 mEq by mouth 2 (Two) Times a Day., Disp: , Rfl:   •  propranolol (INDERAL) 10 MG tablet, , Disp: , Rfl: 0  •  Prucalopride Succinate (MOTEGRITY) 2 MG tablet, Take 1 tablet by mouth Daily., Disp: 30 tablet, Rfl: 6  •  spironolactone (ALDACTONE) 100 MG tablet, Take 100 mg by mouth 2 (Two) Times a Day., Disp: , Rfl:   •  spironolactone (ALDACTONE) 50 MG tablet, Take 1 tablet by mouth Daily. (+2 100 mg tabs for total of 250 mg daily), Disp: 30 tablet, Rfl: 3    ALLERGIES  No Known Allergies    FAMILY  "HISTORY:  Family History   Problem Relation Age of Onset   • Colon cancer Neg Hx    • Colon polyps Neg Hx        SOCIAL HISTORY  Social History     Socioeconomic History   • Marital status:      Spouse name: Not on file   • Number of children: Not on file   • Years of education: Not on file   • Highest education level: Not on file   Tobacco Use   • Smoking status: Current Every Day Smoker     Types: Electronic Cigarette, Cigarettes   • Smokeless tobacco: Never Used   • Tobacco comment: 2 cigarettes a day    Substance and Sexual Activity   • Alcohol use: No     Frequency: Never   • Drug use: No   • Sexual activity: Defer       REVIEW OF SYSTEMS  Review of Systems   Constitutional: Negative for activity change, appetite change, chills, diaphoresis, fatigue, fever, unexpected weight gain and unexpected weight loss.   HENT: Negative for trouble swallowing and voice change.    Gastrointestinal: Positive for constipation and nausea. Negative for abdominal distention, abdominal pain, anal bleeding, blood in stool, diarrhea, rectal pain, vomiting, GERD and indigestion.   Musculoskeletal:        Leg and hip pain     PHYSICAL EXAM   /80 (BP Location: Right arm, Patient Position: Sitting, Cuff Size: Adult)   Pulse 92   Ht 160 cm (62.99\")   Wt 101 kg (223 lb)   BMI 39.51 kg/m²   Physical Exam   Constitutional: She is oriented to person, place, and time. She appears well-developed and well-nourished.   HENT:   Head: Normocephalic.   Mouth/Throat: Oropharynx is clear and moist.   Eyes: EOM are normal. Pupils are equal, round, and reactive to light.   Neck: Normal range of motion. Neck supple.   Cardiovascular: Normal rate and regular rhythm.   Pulmonary/Chest: Effort normal and breath sounds normal. She has no wheezes. She has no rales.   Abdominal: Soft. Bowel sounds are normal. She exhibits no mass. There is no tenderness. There is no rebound and no guarding. No hernia.   Musculoskeletal: Normal range of " motion.   1+ edema bilaterally from hips to ankles   Neurological: She is alert and oriented to person, place, and time. No cranial nerve deficit.   Skin: Skin is warm and dry.   Psychiatric: She has a normal mood and affect. Her behavior is normal. Judgment normal.   Nursing note and vitals reviewed.    Results Review:  I have reviewed the patient's new clinical results.    ASSESSMENT / PLAN  1) Cirrhosis of Liver, compensated  -cirrhosis secondary to ETOH  -MELDNa 10 (Sept labs)  -No hepatic encephalopathy, no ascites, no GI bleeding  - CMP, CBC, PT/INR today     # Surveillance for varices  -Repeat EGD every 2-3 years if not on prophylaxis  -Last EGD July 2019 - Result: No varices, no PHG, retained food in stomach  -Next EGD due Jan 2022     # Hepatocellular carcinoma surveillance  -Abdominal imaging every six months  -Last US abdomen July 2019 - Results: increased echogenicity and coarsened echotexture of the liver, no lesions noted.  -Next imaging due Jan 2020     # Nutrition  -High protein diet  -Low sodium diet, 2 gm/day  -Avoid alcohol, NSAIDS     2) Pedal edema secondary to cirrhosis  - Incrrease Bumex 6 mg daily and Spironolactone to 200 mg daily, BMP in 5 days  - Strict low sodium diet (as above)     3) Gastroparesis  - Continue Reglan 10 mg 30 minutes before meals and at bedtime as needed.  Patient educated on side effects of this medication and to take as little of this medication for a short of time as needed.  This is meant for short-term use.     4) Constipation  -Continue lactulose to 30 mL twice daily  -Continue Motegrity 2 mg daily, samples given    5) Leg pain  - Etiology likely from chronic hip problems.  Less likely from edema.  Follow-up with primary care doctor for pain control.    Return in about 4 weeks (around 11/6/2019).    I discussed the patients findings and my recommendations with patient    ISSAC Garcia

## 2019-10-25 ENCOUNTER — TELEPHONE (OUTPATIENT)
Dept: GASTROENTEROLOGY | Facility: CLINIC | Age: 44
End: 2019-10-25

## 2019-10-25 RX ORDER — ONDANSETRON 8 MG/1
8 TABLET, ORALLY DISINTEGRATING ORAL EVERY 8 HOURS PRN
Qty: 60 TABLET | Refills: 0 | Status: ON HOLD | OUTPATIENT
Start: 2019-10-25 | End: 2021-06-24

## 2019-10-25 RX ORDER — BUMETANIDE 2 MG/1
2 TABLET ORAL DAILY
Qty: 180 TABLET | Refills: 1 | Status: ON HOLD | OUTPATIENT
Start: 2019-10-25 | End: 2021-06-23

## 2019-10-25 NOTE — TELEPHONE ENCOUNTER
Was unable to reach patient left voicemail in regards to lab work being normal and medication staying the same.    negative...

## 2019-11-08 ENCOUNTER — OFFICE VISIT (OUTPATIENT)
Dept: GASTROENTEROLOGY | Facility: CLINIC | Age: 44
End: 2019-11-08

## 2019-11-08 ENCOUNTER — LAB (OUTPATIENT)
Dept: LAB | Facility: HOSPITAL | Age: 44
End: 2019-11-08

## 2019-11-08 VITALS
DIASTOLIC BLOOD PRESSURE: 88 MMHG | SYSTOLIC BLOOD PRESSURE: 153 MMHG | WEIGHT: 220.6 LBS | HEART RATE: 105 BPM | HEIGHT: 63 IN | BODY MASS INDEX: 39.09 KG/M2

## 2019-11-08 DIAGNOSIS — K70.30 ALCOHOLIC CIRRHOSIS OF LIVER WITHOUT ASCITES (HCC): ICD-10-CM

## 2019-11-08 DIAGNOSIS — M25.559 CHRONIC HIP PAIN, UNSPECIFIED LATERALITY: ICD-10-CM

## 2019-11-08 DIAGNOSIS — K76.82 HEPATIC ENCEPHALOPATHY (HCC): ICD-10-CM

## 2019-11-08 DIAGNOSIS — K70.30 ALCOHOLIC CIRRHOSIS OF LIVER WITHOUT ASCITES (HCC): Primary | ICD-10-CM

## 2019-11-08 DIAGNOSIS — G89.29 CHRONIC HIP PAIN, UNSPECIFIED LATERALITY: ICD-10-CM

## 2019-11-08 LAB
ALBUMIN SERPL-MCNC: 4.2 G/DL (ref 3.5–5.2)
ALBUMIN/GLOB SERPL: 1.2 G/DL
ALP SERPL-CCNC: 165 U/L (ref 39–117)
ALT SERPL W P-5'-P-CCNC: 16 U/L (ref 1–33)
ANION GAP SERPL CALCULATED.3IONS-SCNC: 12.4 MMOL/L (ref 5–15)
AST SERPL-CCNC: 19 U/L (ref 1–32)
BASOPHILS # BLD AUTO: 0.06 10*3/MM3 (ref 0–0.2)
BASOPHILS NFR BLD AUTO: 0.7 % (ref 0–1.5)
BILIRUB SERPL-MCNC: 0.4 MG/DL (ref 0.2–1.2)
BUN BLD-MCNC: 14 MG/DL (ref 6–20)
BUN/CREAT SERPL: 14 (ref 7–25)
CALCIUM SPEC-SCNC: 9.9 MG/DL (ref 8.6–10.5)
CHLORIDE SERPL-SCNC: 100 MMOL/L (ref 98–107)
CO2 SERPL-SCNC: 24.6 MMOL/L (ref 22–29)
CREAT BLD-MCNC: 1 MG/DL (ref 0.57–1)
DEPRECATED RDW RBC AUTO: 44.6 FL (ref 37–54)
EOSINOPHIL # BLD AUTO: 0.32 10*3/MM3 (ref 0–0.4)
EOSINOPHIL NFR BLD AUTO: 3.9 % (ref 0.3–6.2)
ERYTHROCYTE [DISTWIDTH] IN BLOOD BY AUTOMATED COUNT: 14.9 % (ref 12.3–15.4)
GFR SERPL CREATININE-BSD FRML MDRD: 60 ML/MIN/1.73
GLOBULIN UR ELPH-MCNC: 3.4 GM/DL
GLUCOSE BLD-MCNC: 96 MG/DL (ref 65–99)
HCT VFR BLD AUTO: 38.9 % (ref 34–46.6)
HGB BLD-MCNC: 12.8 G/DL (ref 12–15.9)
IMM GRANULOCYTES # BLD AUTO: 0.02 10*3/MM3 (ref 0–0.05)
IMM GRANULOCYTES NFR BLD AUTO: 0.2 % (ref 0–0.5)
INR PPP: 1.17 (ref 0.85–1.16)
LYMPHOCYTES # BLD AUTO: 2.31 10*3/MM3 (ref 0.7–3.1)
LYMPHOCYTES NFR BLD AUTO: 27.8 % (ref 19.6–45.3)
MCH RBC QN AUTO: 27.2 PG (ref 26.6–33)
MCHC RBC AUTO-ENTMCNC: 32.9 G/DL (ref 31.5–35.7)
MCV RBC AUTO: 82.8 FL (ref 79–97)
MONOCYTES # BLD AUTO: 0.86 10*3/MM3 (ref 0.1–0.9)
MONOCYTES NFR BLD AUTO: 10.4 % (ref 5–12)
NEUTROPHILS # BLD AUTO: 4.73 10*3/MM3 (ref 1.7–7)
NEUTROPHILS NFR BLD AUTO: 57 % (ref 42.7–76)
NRBC BLD AUTO-RTO: 0 /100 WBC (ref 0–0.2)
PLATELET # BLD AUTO: 166 10*3/MM3 (ref 140–450)
PMV BLD AUTO: 10.6 FL (ref 6–12)
POTASSIUM BLD-SCNC: 4.3 MMOL/L (ref 3.5–5.2)
PROT SERPL-MCNC: 7.6 G/DL (ref 6–8.5)
PROTHROMBIN TIME: 14.3 SECONDS (ref 11.2–14.3)
RBC # BLD AUTO: 4.7 10*6/MM3 (ref 3.77–5.28)
SODIUM BLD-SCNC: 137 MMOL/L (ref 136–145)
WBC NRBC COR # BLD: 8.3 10*3/MM3 (ref 3.4–10.8)

## 2019-11-08 PROCEDURE — 85610 PROTHROMBIN TIME: CPT

## 2019-11-08 PROCEDURE — 36415 COLL VENOUS BLD VENIPUNCTURE: CPT | Performed by: NURSE PRACTITIONER

## 2019-11-08 PROCEDURE — 85025 COMPLETE CBC W/AUTO DIFF WBC: CPT | Performed by: NURSE PRACTITIONER

## 2019-11-08 PROCEDURE — 80053 COMPREHEN METABOLIC PANEL: CPT | Performed by: NURSE PRACTITIONER

## 2019-11-08 PROCEDURE — 99214 OFFICE O/P EST MOD 30 MIN: CPT | Performed by: NURSE PRACTITIONER

## 2019-11-08 RX ORDER — SPIRONOLACTONE 100 MG/1
TABLET, FILM COATED ORAL
Qty: 90 TABLET | Refills: 5 | Status: SHIPPED | OUTPATIENT
Start: 2019-11-08 | End: 2021-07-02 | Stop reason: HOSPADM

## 2019-11-08 NOTE — PROGRESS NOTES
GASTROENTEROLOGY OFFICE NOTE  Timothy Guzman  [id number]  1975    CARE TEAM  Patient Care Team:  Toshia Mitchell APRN as PCP - General (Family Medicine)  Nelson Yip APRN as Nurse Practitioner (Nurse Practitioner)    Referring Provider: Toshia Mitchell APRN    Chief Complaint   Patient presents with   • Fluid Retention        HISTORY OF PRESENT ILLNESS:  The patient returns in follow-up with alcohol cirrhosis.  She remains abstinent from alcohol since January 22, 2019.    She is doing much better in regards to her pedal edema.  Currently taking spironolactone 300 mg daily and Bumex 6 mg daily, her swelling has resolved.    Over the past months she reports that she has had several episodes of confusion.  She is currently taking lactulose 10 g twice daily, this is more for her complaint of constipation rather than any previous concerns for hepatic encephalopathy.    She has had no evidence of GI bleeding.  Her last EGD was in July 2019 and no varices were noted.  She should have a surveillance EGD again in January 2020.    She has complaints of fatigue and and overall, generally feeling bad.  She wonders if there is anything more that should be being done for her cirrhosis that might make her overall state of health better.  She requests to be evaluated by liver transplant.    She continues to have problems with constipation despite taking lactulose 10 g twice daily, Linzess 290 mcg daily and Motegrity 2 mg daily.  She does report that adding Motegrity has helped significantly but she still goes several days without having a bowel movement.    She has chronic complaints of hip pain and now with radiating leg pain apparently associated with her hip pain.  She is taking Suboxone as prescribed by her alcohol addiction specialist for the pain but reports that it is not helping and requests a referral to pain management for evaluation.    PAST MEDICAL HISTORY  Past Medical History:   Diagnosis  Date   • Alcoholism (CMS/HCC)    • Cirrhosis (CMS/HCC)         PAST SURGICAL HISTORY  Past Surgical History:   Procedure Laterality Date   • APPENDECTOMY     •  SECTION     • COLONOSCOPY     • ENDOSCOPY     • GALLBLADDER SURGERY     • REPLACEMENT TOTAL HIP LATERAL POSITION Left    • TOTAL KNEE ARTHROPLASTY Left         MEDICATIONS:    Current Outpatient Medications:   •  bumetanide (BUMEX) 2 MG tablet, Take 1 tablet by mouth Daily. Take 3 tablets by mouth once daily., Disp: 180 tablet, Rfl: 1  •  buprenorphine-naloxone (SUBOXONE) 8-2 MG per SL tablet, , Disp: , Rfl: 0  •  citalopram (CeleXA) 10 MG tablet, , Disp: , Rfl: 0  •  CONSTULOSE 10 GM/15ML solution, , Disp: , Rfl: 1  •   MG capsule, , Disp: , Rfl: 0  •  DULoxetine (CYMBALTA) 30 MG capsule, , Disp: , Rfl: 0  •  furosemide (LASIX) 20 MG tablet, Take 3 tablets by mouth daily, Disp: 90 tablet, Rfl: 2  •  hydrOXYzine pamoate (VISTARIL) 25 MG capsule, Take 25 mg by mouth 3 (Three) Times a Day As Needed for Itching., Disp: , Rfl:   •  linaclotide (LINZESS) 290 MCG capsule capsule, Take 1 capsule by mouth Every Morning Before Breakfast., Disp: 30 capsule, Rfl: 6  •  magnesium oxide (MAGOX) 400 (241.3 Mg) MG tablet tablet, Take 1 mg by mouth 2 (Two) Times a Day., Disp: , Rfl:   •  methocarbamol (ROBAXIN) 750 MG tablet, , Disp: , Rfl: 0  •  metoclopramide (REGLAN) 10 MG tablet, Take 1 tablet by mouth 4 (Four) Times a Day., Disp: 120 tablet, Rfl: 1  •  omeprazole (priLOSEC) 20 MG capsule, Take 20 mg by mouth Daily., Disp: , Rfl: 0  •  ondansetron ODT (ZOFRAN ODT) 8 MG disintegrating tablet, Take 1 tablet by mouth Every 8 (Eight) Hours As Needed for Nausea or Vomiting., Disp: 60 tablet, Rfl: 0  •  potassium chloride (K-DUR,KLOR-CON) 20 MEQ CR tablet, Take 1 tablet by mouth Daily., Disp: , Rfl:   •  potassium chloride (KLOR-CON) 20 MEQ packet, Take 20 mEq by mouth 2 (Two) Times a Day., Disp: , Rfl:   •  propranolol (INDERAL) 10 MG tablet, , Disp: , Rfl:  "0  •  Prucalopride Succinate (MOTEGRITY) 2 MG tablet, Take 1 tablet by mouth Daily., Disp: 30 tablet, Rfl: 6  •  rifaximin (XIFAXAN) 550 MG tablet, Take 1 tablet by mouth Every 12 (Twelve) Hours., Disp: 60 tablet, Rfl: 11  •  spironolactone (ALDACTONE) 100 MG tablet, TAKE 3 TABS DAILY, Disp: 90 tablet, Rfl: 5    ALLERGIES  No Known Allergies    FAMILY HISTORY:  Family History   Problem Relation Age of Onset   • Colon cancer Neg Hx    • Colon polyps Neg Hx        SOCIAL HISTORY  Social History     Socioeconomic History   • Marital status:      Spouse name: Not on file   • Number of children: Not on file   • Years of education: Not on file   • Highest education level: Not on file   Tobacco Use   • Smoking status: Current Every Day Smoker     Types: Electronic Cigarette, Cigarettes   • Smokeless tobacco: Never Used   • Tobacco comment: 2 cigarettes a day    Substance and Sexual Activity   • Alcohol use: No     Frequency: Never   • Drug use: No   • Sexual activity: Defer       REVIEW OF SYSTEMS  Review of Systems   Constitutional: Positive for fatigue. Negative for activity change, appetite change, chills, diaphoresis, fever, unexpected weight gain and unexpected weight loss.   HENT: Negative for trouble swallowing and voice change.    Gastrointestinal: Positive for constipation. Negative for abdominal distention, abdominal pain, anal bleeding, blood in stool, diarrhea, nausea, rectal pain, vomiting, GERD and indigestion.   Musculoskeletal: Positive for arthralgias.   Neurological: Positive for confusion.     PHYSICAL EXAM   /88 (BP Location: Right arm, Patient Position: Sitting, Cuff Size: Adult)   Pulse 105   Ht 160 cm (62.99\")   Wt 100 kg (220 lb 9.6 oz)   BMI 39.09 kg/m²   Physical Exam   Constitutional: She is oriented to person, place, and time. She appears well-developed and well-nourished.   HENT:   Head: Normocephalic.   Mouth/Throat: Oropharynx is clear and moist.   Eyes: EOM are normal. " Pupils are equal, round, and reactive to light.   Neck: Normal range of motion. Neck supple.   Cardiovascular: Normal rate and regular rhythm.   Pulmonary/Chest: Effort normal and breath sounds normal. She has no wheezes. She has no rales.   Abdominal: Soft. Bowel sounds are normal. She exhibits no mass. There is no tenderness. There is no rebound and no guarding. No hernia.   Musculoskeletal: Normal range of motion.   Neurological: She is alert and oriented to person, place, and time. No cranial nerve deficit.   Skin: Skin is warm and dry.   Psychiatric: She has a normal mood and affect. Her behavior is normal. Judgment normal.   Nursing note and vitals reviewed.    Results Review:  I have reviewed the patient's new clinical results.    ASSESSMENT / PLAN  1) Cirrhosis of Liver, compensated  -cirrhosis secondary to ETOH  -MELDNa 10 (Sept labs)  -No ascites, no GI bleeding  -? Hepatic encephalopathy, recent confusion - begin Xifaxin 550 mg BID  - CMP, CBC, PT/INR today  - Referral to [facility] liver transplant per patient request.     # Surveillance for varices  -Repeat EGD every 2-3 years if not on prophylaxis  -Last EGD July 2019 - Result: No varices, no PHG, retained food in stomach  -Next EGD due Jan 2022     # Hepatocellular carcinoma surveillance  -Abdominal imaging every six months  -Last US abdomen July 2019 - Results: increased echogenicity and coarsened echotexture of the liver, no lesions noted.  -Next imaging due Jan 2020     # Nutrition  -High protein diet  -Low sodium diet, 2 gm/day  -Avoid alcohol, NSAIDS     2) Pedal edema secondary to cirrhosis  - Continue Bumex 6 mg daily and Spironolactone to 300 mg daily  - Strict low sodium diet (as above)     3) Constipation  -Continue lactulose to 30 mL twice daily  -Continue Motegrity 2 mg daily, samples given     4) Chronic Hip/Leg pain  - Pain management referral     No Follow-up on file.    I discussed the patients findings and my recommendations with  patient    Nelson Yip, APRN

## 2019-11-19 DIAGNOSIS — M25.559 CHRONIC HIP PAIN, UNSPECIFIED LATERALITY: Primary | ICD-10-CM

## 2019-11-19 DIAGNOSIS — G89.29 CHRONIC HIP PAIN, UNSPECIFIED LATERALITY: Primary | ICD-10-CM

## 2019-12-17 DIAGNOSIS — K70.30 ALCOHOLIC CIRRHOSIS OF LIVER WITHOUT ASCITES (HCC): Primary | ICD-10-CM

## 2019-12-30 ENCOUNTER — TELEPHONE (OUTPATIENT)
Dept: GASTROENTEROLOGY | Facility: CLINIC | Age: 44
End: 2019-12-30

## 2019-12-30 RX ORDER — LACTULOSE 10 G/15ML
10 SOLUTION ORAL 2 TIMES DAILY
Qty: 900 ML | Refills: 5 | Status: ON HOLD | OUTPATIENT
Start: 2019-12-30 | End: 2021-06-24

## 2019-12-30 NOTE — TELEPHONE ENCOUNTER
Pt states that she needs more lactulose called in to pharmacy. Can we call it in for her at [facility] on file?

## 2020-01-17 ENCOUNTER — TELEPHONE (OUTPATIENT)
Dept: GASTROENTEROLOGY | Facility: CLINIC | Age: 45
End: 2020-01-17

## 2020-01-17 NOTE — TELEPHONE ENCOUNTER
Pt called in and states that she saw transplant team at . Was told that they wanted to do another scope due to gastroparesis and check on ascities but they could not get her in until march. Wanted to know if we would be able to see her sooner than that so they could move forward. Can we go forward with scheduling her or would you like to see her first?

## 2020-01-29 ENCOUNTER — OUTSIDE FACILITY SERVICE (OUTPATIENT)
Dept: GASTROENTEROLOGY | Facility: CLINIC | Age: 45
End: 2020-01-29

## 2020-01-29 ENCOUNTER — LAB REQUISITION (OUTPATIENT)
Dept: LAB | Facility: HOSPITAL | Age: 45
End: 2020-01-29

## 2020-01-29 DIAGNOSIS — K74.60 UNSPECIFIED CIRRHOSIS OF LIVER (HCC): ICD-10-CM

## 2020-01-29 PROCEDURE — 43239 EGD BIOPSY SINGLE/MULTIPLE: CPT | Performed by: INTERNAL MEDICINE

## 2020-01-29 PROCEDURE — 88305 TISSUE EXAM BY PATHOLOGIST: CPT | Performed by: INTERNAL MEDICINE

## 2020-01-30 LAB
CYTO UR: NORMAL
LAB AP CASE REPORT: NORMAL
LAB AP CLINICAL INFORMATION: NORMAL
PATH REPORT.FINAL DX SPEC: NORMAL
PATH REPORT.GROSS SPEC: NORMAL

## 2020-03-30 ENCOUNTER — HOSPITAL ENCOUNTER (OUTPATIENT)
Facility: HOSPITAL | Age: 45
Setting detail: HOSPITAL OUTPATIENT SURGERY
End: 2020-03-30
Attending: INTERNAL MEDICINE | Admitting: INTERNAL MEDICINE

## 2020-03-30 ENCOUNTER — PREP FOR SURGERY (OUTPATIENT)
Dept: OTHER | Facility: HOSPITAL | Age: 45
End: 2020-03-30

## 2020-03-30 ENCOUNTER — HOSPITAL ENCOUNTER (OUTPATIENT)
Facility: HOSPITAL | Age: 45
Discharge: HOME | End: 2020-04-02
Attending: FAMILY MEDICINE | Admitting: INTERNAL MEDICINE

## 2020-03-30 ENCOUNTER — APPOINTMENT (OUTPATIENT)
Dept: CT IMAGING | Facility: HOSPITAL | Age: 45
End: 2020-03-30

## 2020-03-30 ENCOUNTER — TELEPHONE (OUTPATIENT)
Dept: GASTROENTEROLOGY | Facility: CLINIC | Age: 45
End: 2020-03-30

## 2020-03-30 ENCOUNTER — DOCUMENTATION (OUTPATIENT)
Dept: GASTROENTEROLOGY | Facility: CLINIC | Age: 45
End: 2020-03-30

## 2020-03-30 DIAGNOSIS — K62.5 RECTAL BLEEDING: Primary | ICD-10-CM

## 2020-03-30 DIAGNOSIS — R10.84 GENERALIZED ABDOMINAL PAIN: Primary | ICD-10-CM

## 2020-03-30 DIAGNOSIS — K62.5 RECTAL BLEEDING: ICD-10-CM

## 2020-03-30 PROBLEM — E87.6 HYPOKALEMIA: Status: ACTIVE | Noted: 2020-03-30

## 2020-03-30 PROBLEM — K92.2 GI BLEED: Status: ACTIVE | Noted: 2020-03-30

## 2020-03-30 PROBLEM — N39.0 ACUTE UTI (URINARY TRACT INFECTION): Status: ACTIVE | Noted: 2020-03-30

## 2020-03-30 PROBLEM — A41.9 SEPSIS (HCC): Status: ACTIVE | Noted: 2020-03-30

## 2020-03-30 LAB
ALBUMIN SERPL-MCNC: 3.8 G/DL (ref 3.5–5.2)
ALBUMIN/GLOB SERPL: 1.7 G/DL
ALP SERPL-CCNC: 171 U/L (ref 39–117)
ALT SERPL W P-5'-P-CCNC: 52 U/L (ref 1–33)
ANION GAP SERPL CALCULATED.3IONS-SCNC: 12 MMOL/L (ref 5–15)
AST SERPL-CCNC: 84 U/L (ref 1–32)
BILIRUB SERPL-MCNC: 0.6 MG/DL (ref 0.2–1.2)
BUN BLD-MCNC: 9 MG/DL (ref 6–20)
BUN/CREAT SERPL: 11 (ref 7–25)
CALCIUM SPEC-SCNC: 8.5 MG/DL (ref 8.6–10.5)
CHLORIDE SERPL-SCNC: 106 MMOL/L (ref 98–107)
CO2 SERPL-SCNC: 20 MMOL/L (ref 22–29)
CREAT BLD-MCNC: 0.82 MG/DL (ref 0.57–1)
D-LACTATE SERPL-SCNC: 3 MMOL/L (ref 0.5–2)
GFR SERPL CREATININE-BSD FRML MDRD: 75 ML/MIN/1.73
GLOBULIN UR ELPH-MCNC: 2.2 GM/DL
GLUCOSE BLD-MCNC: 184 MG/DL (ref 65–99)
HCT VFR BLD AUTO: 38.3 % (ref 34–46.6)
HGB BLD-MCNC: 12.1 G/DL (ref 12–15.9)
LIPASE SERPL-CCNC: 333 U/L (ref 13–60)
MAGNESIUM SERPL-MCNC: 1.7 MG/DL (ref 1.6–2.6)
POTASSIUM BLD-SCNC: 3.3 MMOL/L (ref 3.5–5.2)
PROT SERPL-MCNC: 6 G/DL (ref 6–8.5)
SODIUM BLD-SCNC: 138 MMOL/L (ref 136–145)

## 2020-03-30 PROCEDURE — G0378 HOSPITAL OBSERVATION PER HR: HCPCS

## 2020-03-30 PROCEDURE — 85018 HEMOGLOBIN: CPT | Performed by: FAMILY MEDICINE

## 2020-03-30 PROCEDURE — 99220 PR INITIAL OBSERVATION CARE/DAY 70 MINUTES: CPT | Performed by: FAMILY MEDICINE

## 2020-03-30 PROCEDURE — 83690 ASSAY OF LIPASE: CPT | Performed by: FAMILY MEDICINE

## 2020-03-30 PROCEDURE — 25010000002 HYDROMORPHONE PER 4 MG: Performed by: NURSE PRACTITIONER

## 2020-03-30 PROCEDURE — 87040 BLOOD CULTURE FOR BACTERIA: CPT | Performed by: FAMILY MEDICINE

## 2020-03-30 PROCEDURE — 96375 TX/PRO/DX INJ NEW DRUG ADDON: CPT

## 2020-03-30 PROCEDURE — 25010000002 ONDANSETRON PER 1 MG: Performed by: NURSE PRACTITIONER

## 2020-03-30 PROCEDURE — 25010000002 MORPHINE PER 10 MG: Performed by: NURSE PRACTITIONER

## 2020-03-30 PROCEDURE — 96374 THER/PROPH/DIAG INJ IV PUSH: CPT

## 2020-03-30 PROCEDURE — 80053 COMPREHEN METABOLIC PANEL: CPT | Performed by: FAMILY MEDICINE

## 2020-03-30 PROCEDURE — 83735 ASSAY OF MAGNESIUM: CPT | Performed by: FAMILY MEDICINE

## 2020-03-30 PROCEDURE — 85014 HEMATOCRIT: CPT | Performed by: FAMILY MEDICINE

## 2020-03-30 PROCEDURE — 83605 ASSAY OF LACTIC ACID: CPT | Performed by: FAMILY MEDICINE

## 2020-03-30 PROCEDURE — G0379 DIRECT REFER HOSPITAL OBSERV: HCPCS

## 2020-03-30 PROCEDURE — 74176 CT ABD & PELVIS W/O CONTRAST: CPT

## 2020-03-30 RX ORDER — ONDANSETRON 2 MG/ML
4 INJECTION INTRAMUSCULAR; INTRAVENOUS EVERY 6 HOURS PRN
Status: DISCONTINUED | OUTPATIENT
Start: 2020-03-30 | End: 2020-04-02 | Stop reason: HOSPADM

## 2020-03-30 RX ORDER — SPIRONOLACTONE 50 MG/1
300 TABLET, FILM COATED ORAL DAILY
Status: DISCONTINUED | OUTPATIENT
Start: 2020-03-31 | End: 2020-04-02 | Stop reason: HOSPADM

## 2020-03-30 RX ORDER — DEXTROSE, SODIUM CHLORIDE, SODIUM LACTATE, POTASSIUM CHLORIDE, AND CALCIUM CHLORIDE 5; .6; .31; .03; .02 G/100ML; G/100ML; G/100ML; G/100ML; G/100ML
100 INJECTION, SOLUTION INTRAVENOUS CONTINUOUS
Status: CANCELLED | OUTPATIENT
Start: 2020-03-30

## 2020-03-30 RX ORDER — MORPHINE SULFATE 2 MG/ML
1 INJECTION, SOLUTION INTRAMUSCULAR; INTRAVENOUS
Status: DISCONTINUED | OUTPATIENT
Start: 2020-03-30 | End: 2020-03-30

## 2020-03-30 RX ORDER — BUMETANIDE 2 MG/1
6 TABLET ORAL DAILY
Status: DISCONTINUED | OUTPATIENT
Start: 2020-03-31 | End: 2020-04-02 | Stop reason: HOSPADM

## 2020-03-30 RX ORDER — METOCLOPRAMIDE 10 MG/1
10 TABLET ORAL 4 TIMES DAILY
Status: DISCONTINUED | OUTPATIENT
Start: 2020-03-30 | End: 2020-04-02 | Stop reason: HOSPADM

## 2020-03-30 RX ORDER — CITALOPRAM 10 MG/1
10 TABLET ORAL DAILY
Status: DISCONTINUED | OUTPATIENT
Start: 2020-03-31 | End: 2020-04-02 | Stop reason: HOSPADM

## 2020-03-30 RX ORDER — SODIUM CHLORIDE 0.9 % (FLUSH) 0.9 %
10 SYRINGE (ML) INJECTION AS NEEDED
Status: DISCONTINUED | OUTPATIENT
Start: 2020-03-30 | End: 2020-04-02 | Stop reason: HOSPADM

## 2020-03-30 RX ORDER — HYDROMORPHONE HYDROCHLORIDE 1 MG/ML
0.5 INJECTION, SOLUTION INTRAMUSCULAR; INTRAVENOUS; SUBCUTANEOUS
Status: DISCONTINUED | OUTPATIENT
Start: 2020-03-30 | End: 2020-04-02

## 2020-03-30 RX ORDER — DULOXETIN HYDROCHLORIDE 30 MG/1
30 CAPSULE, DELAYED RELEASE ORAL DAILY
Status: DISCONTINUED | OUTPATIENT
Start: 2020-03-31 | End: 2020-04-02 | Stop reason: HOSPADM

## 2020-03-30 RX ORDER — LACTULOSE 10 G/15ML
10 SOLUTION ORAL 2 TIMES DAILY
Status: DISCONTINUED | OUTPATIENT
Start: 2020-03-30 | End: 2020-04-02 | Stop reason: HOSPADM

## 2020-03-30 RX ORDER — SODIUM CHLORIDE 0.9 % (FLUSH) 0.9 %
10 SYRINGE (ML) INJECTION EVERY 12 HOURS SCHEDULED
Status: DISCONTINUED | OUTPATIENT
Start: 2020-03-30 | End: 2020-04-02 | Stop reason: HOSPADM

## 2020-03-30 RX ORDER — PANTOPRAZOLE SODIUM 40 MG/10ML
40 INJECTION, POWDER, LYOPHILIZED, FOR SOLUTION INTRAVENOUS EVERY 12 HOURS SCHEDULED
Status: DISCONTINUED | OUTPATIENT
Start: 2020-03-30 | End: 2020-04-01

## 2020-03-30 RX ORDER — NALOXONE HCL 0.4 MG/ML
0.4 VIAL (ML) INJECTION
Status: DISCONTINUED | OUTPATIENT
Start: 2020-03-30 | End: 2020-04-02 | Stop reason: HOSPADM

## 2020-03-30 RX ORDER — SODIUM CHLORIDE 0.9 % (FLUSH) 0.9 %
3 SYRINGE (ML) INJECTION EVERY 12 HOURS SCHEDULED
Status: CANCELLED | OUTPATIENT
Start: 2020-03-30

## 2020-03-30 RX ORDER — SODIUM CHLORIDE 0.9 % (FLUSH) 0.9 %
10 SYRINGE (ML) INJECTION AS NEEDED
Status: CANCELLED | OUTPATIENT
Start: 2020-03-30

## 2020-03-30 RX ORDER — NICOTINE 21 MG/24HR
1 PATCH, TRANSDERMAL 24 HOURS TRANSDERMAL
Status: DISCONTINUED | OUTPATIENT
Start: 2020-03-30 | End: 2020-04-02 | Stop reason: HOSPADM

## 2020-03-30 RX ORDER — ONDANSETRON 4 MG/1
4 TABLET, FILM COATED ORAL EVERY 6 HOURS PRN
Status: DISCONTINUED | OUTPATIENT
Start: 2020-03-30 | End: 2020-04-02 | Stop reason: HOSPADM

## 2020-03-30 RX ADMIN — ONDANSETRON 4 MG: 2 INJECTION INTRAMUSCULAR; INTRAVENOUS at 20:43

## 2020-03-30 RX ADMIN — NICOTINE 1 PATCH: 14 PATCH, EXTENDED RELEASE TRANSDERMAL at 22:06

## 2020-03-30 RX ADMIN — PANTOPRAZOLE SODIUM 40 MG: 40 INJECTION, POWDER, FOR SOLUTION INTRAVENOUS at 20:43

## 2020-03-30 RX ADMIN — LACTULOSE 10 G: 10 SOLUTION ORAL at 22:06

## 2020-03-30 RX ADMIN — METOCLOPRAMIDE 10 MG: 10 TABLET ORAL at 20:43

## 2020-03-30 RX ADMIN — MORPHINE SULFATE 1 MG: 2 INJECTION, SOLUTION INTRAMUSCULAR; INTRAVENOUS at 20:11

## 2020-03-30 RX ADMIN — HYDROMORPHONE HYDROCHLORIDE 0.5 MG: 1 INJECTION, SOLUTION INTRAMUSCULAR; INTRAVENOUS; SUBCUTANEOUS at 22:08

## 2020-03-30 RX ADMIN — RIFAXIMIN 550 MG: 550 TABLET ORAL at 22:08

## 2020-03-30 RX ADMIN — SODIUM CHLORIDE, PRESERVATIVE FREE 10 ML: 5 INJECTION INTRAVENOUS at 20:43

## 2020-03-30 NOTE — TELEPHONE ENCOUNTER
Patient states that she went to the bathroom and had a toilet bowel full of bright red blood. Patient states that she does have cirrhosis and history of hemorrhoids. When she wiped seemed like a menstrual period but she is not currently on her menstrual. What would your recommendations be?

## 2020-03-31 ENCOUNTER — ANESTHESIA EVENT (OUTPATIENT)
Dept: GASTROENTEROLOGY | Facility: HOSPITAL | Age: 45
End: 2020-03-31

## 2020-03-31 ENCOUNTER — ANESTHESIA (OUTPATIENT)
Dept: GASTROENTEROLOGY | Facility: HOSPITAL | Age: 45
End: 2020-03-31

## 2020-03-31 PROBLEM — R10.84 GENERALIZED ABDOMINAL PAIN: Status: ACTIVE | Noted: 2020-03-30

## 2020-03-31 LAB
BILIRUB UR QL STRIP: ABNORMAL
CLARITY UR: CLEAR
COLOR UR: ABNORMAL
D-LACTATE SERPL-SCNC: 1.1 MMOL/L (ref 0.5–2)
GLUCOSE UR STRIP-MCNC: NEGATIVE MG/DL
HCT VFR BLD AUTO: 35.8 % (ref 34–46.6)
HCT VFR BLD AUTO: 38.9 % (ref 34–46.6)
HGB BLD-MCNC: 11.5 G/DL (ref 12–15.9)
HGB BLD-MCNC: 12.3 G/DL (ref 12–15.9)
HGB UR QL STRIP.AUTO: NEGATIVE
KETONES UR QL STRIP: NEGATIVE
LACTATE HOLD SPECIMEN: NORMAL
LEUKOCYTE ESTERASE UR QL STRIP.AUTO: NEGATIVE
NITRITE UR QL STRIP: NEGATIVE
PH UR STRIP.AUTO: <=5 [PH] (ref 5–8)
PROT UR QL STRIP: NEGATIVE
SP GR UR STRIP: 1.03 (ref 1–1.03)
UROBILINOGEN UR QL STRIP: ABNORMAL

## 2020-03-31 PROCEDURE — 88305 TISSUE EXAM BY PATHOLOGIST: CPT | Performed by: INTERNAL MEDICINE

## 2020-03-31 PROCEDURE — 25010000003 POTASSIUM CHLORIDE 10 MEQ/100ML SOLUTION: Performed by: PHYSICIAN ASSISTANT

## 2020-03-31 PROCEDURE — 25010000002 HYDROMORPHONE PER 4 MG: Performed by: NURSE PRACTITIONER

## 2020-03-31 PROCEDURE — P9047 ALBUMIN (HUMAN), 25%, 50ML: HCPCS | Performed by: PHYSICIAN ASSISTANT

## 2020-03-31 PROCEDURE — 85018 HEMOGLOBIN: CPT | Performed by: FAMILY MEDICINE

## 2020-03-31 PROCEDURE — 45385 COLONOSCOPY W/LESION REMOVAL: CPT | Performed by: INTERNAL MEDICINE

## 2020-03-31 PROCEDURE — G0378 HOSPITAL OBSERVATION PER HR: HCPCS

## 2020-03-31 PROCEDURE — 83605 ASSAY OF LACTIC ACID: CPT | Performed by: FAMILY MEDICINE

## 2020-03-31 PROCEDURE — 96376 TX/PRO/DX INJ SAME DRUG ADON: CPT

## 2020-03-31 PROCEDURE — 85014 HEMATOCRIT: CPT | Performed by: FAMILY MEDICINE

## 2020-03-31 PROCEDURE — 25010000002 ONDANSETRON PER 1 MG: Performed by: NURSE PRACTITIONER

## 2020-03-31 PROCEDURE — 25010000002 PROPOFOL 10 MG/ML EMULSION: Performed by: NURSE ANESTHETIST, CERTIFIED REGISTERED

## 2020-03-31 PROCEDURE — 81003 URINALYSIS AUTO W/O SCOPE: CPT | Performed by: NURSE PRACTITIONER

## 2020-03-31 PROCEDURE — 25010000002 ALBUMIN HUMAN 25% PER 50 ML: Performed by: PHYSICIAN ASSISTANT

## 2020-03-31 PROCEDURE — 99225 PR SBSQ OBSERVATION CARE/DAY 25 MINUTES: CPT | Performed by: INTERNAL MEDICINE

## 2020-03-31 PROCEDURE — 99254 IP/OBS CNSLTJ NEW/EST MOD 60: CPT | Performed by: INTERNAL MEDICINE

## 2020-03-31 RX ORDER — IPRATROPIUM BROMIDE AND ALBUTEROL SULFATE 2.5; .5 MG/3ML; MG/3ML
3 SOLUTION RESPIRATORY (INHALATION) ONCE AS NEEDED
Status: DISCONTINUED | OUTPATIENT
Start: 2020-03-31 | End: 2020-03-31 | Stop reason: HOSPADM

## 2020-03-31 RX ORDER — DEXTROSE, SODIUM CHLORIDE, SODIUM LACTATE, POTASSIUM CHLORIDE, AND CALCIUM CHLORIDE 5; .6; .31; .03; .02 G/100ML; G/100ML; G/100ML; G/100ML; G/100ML
100 INJECTION, SOLUTION INTRAVENOUS CONTINUOUS
Status: DISCONTINUED | OUTPATIENT
Start: 2020-03-31 | End: 2020-04-02 | Stop reason: HOSPADM

## 2020-03-31 RX ORDER — PEG-3350, SODIUM SULFATE, SODIUM CHLORIDE, POTASSIUM CHLORIDE, SODIUM ASCORBATE AND ASCORBIC ACID 7.5-2.691G
2000 KIT ORAL
Status: COMPLETED | OUTPATIENT
Start: 2020-03-31 | End: 2020-03-31

## 2020-03-31 RX ORDER — SODIUM CHLORIDE, SODIUM LACTATE, POTASSIUM CHLORIDE, CALCIUM CHLORIDE 600; 310; 30; 20 MG/100ML; MG/100ML; MG/100ML; MG/100ML
20 INJECTION, SOLUTION INTRAVENOUS CONTINUOUS
Status: CANCELLED | OUTPATIENT
Start: 2020-03-31

## 2020-03-31 RX ORDER — POTASSIUM CHLORIDE 750 MG/1
40 CAPSULE, EXTENDED RELEASE ORAL AS NEEDED
Status: DISCONTINUED | OUTPATIENT
Start: 2020-03-31 | End: 2020-04-02 | Stop reason: HOSPADM

## 2020-03-31 RX ORDER — ALBUMIN (HUMAN) 12.5 G/50ML
25 SOLUTION INTRAVENOUS ONCE
Status: COMPLETED | OUTPATIENT
Start: 2020-03-31 | End: 2020-03-31

## 2020-03-31 RX ORDER — PROMETHAZINE HYDROCHLORIDE 25 MG/1
25 SUPPOSITORY RECTAL ONCE AS NEEDED
Status: DISCONTINUED | OUTPATIENT
Start: 2020-03-31 | End: 2020-03-31 | Stop reason: HOSPADM

## 2020-03-31 RX ORDER — MAGNESIUM SULFATE HEPTAHYDRATE 40 MG/ML
2 INJECTION, SOLUTION INTRAVENOUS AS NEEDED
Status: DISCONTINUED | OUTPATIENT
Start: 2020-03-31 | End: 2020-04-02 | Stop reason: HOSPADM

## 2020-03-31 RX ORDER — PROMETHAZINE HYDROCHLORIDE 25 MG/1
25 TABLET ORAL ONCE AS NEEDED
Status: DISCONTINUED | OUTPATIENT
Start: 2020-03-31 | End: 2020-03-31 | Stop reason: HOSPADM

## 2020-03-31 RX ORDER — MAGNESIUM SULFATE HEPTAHYDRATE 40 MG/ML
4 INJECTION, SOLUTION INTRAVENOUS AS NEEDED
Status: DISCONTINUED | OUTPATIENT
Start: 2020-03-31 | End: 2020-04-02 | Stop reason: HOSPADM

## 2020-03-31 RX ORDER — ONDANSETRON 2 MG/ML
4 INJECTION INTRAMUSCULAR; INTRAVENOUS ONCE AS NEEDED
Status: DISCONTINUED | OUTPATIENT
Start: 2020-03-31 | End: 2020-03-31 | Stop reason: HOSPADM

## 2020-03-31 RX ORDER — OXYCODONE AND ACETAMINOPHEN 7.5; 325 MG/1; MG/1
1 TABLET ORAL EVERY 4 HOURS PRN
Status: DISCONTINUED | OUTPATIENT
Start: 2020-03-31 | End: 2020-04-02

## 2020-03-31 RX ORDER — PROMETHAZINE HYDROCHLORIDE 25 MG/ML
6.25 INJECTION, SOLUTION INTRAMUSCULAR; INTRAVENOUS ONCE AS NEEDED
Status: DISCONTINUED | OUTPATIENT
Start: 2020-03-31 | End: 2020-03-31 | Stop reason: HOSPADM

## 2020-03-31 RX ORDER — POTASSIUM CHLORIDE 7.45 MG/ML
10 INJECTION INTRAVENOUS
Status: DISCONTINUED | OUTPATIENT
Start: 2020-03-31 | End: 2020-04-02 | Stop reason: HOSPADM

## 2020-03-31 RX ORDER — SODIUM CHLORIDE 0.9 % (FLUSH) 0.9 %
10 SYRINGE (ML) INJECTION AS NEEDED
Status: DISCONTINUED | OUTPATIENT
Start: 2020-03-31 | End: 2020-03-31 | Stop reason: HOSPADM

## 2020-03-31 RX ORDER — PROPOFOL 10 MG/ML
VIAL (ML) INTRAVENOUS AS NEEDED
Status: DISCONTINUED | OUTPATIENT
Start: 2020-03-31 | End: 2020-03-31 | Stop reason: SURG

## 2020-03-31 RX ORDER — LIDOCAINE HYDROCHLORIDE 10 MG/ML
INJECTION, SOLUTION EPIDURAL; INFILTRATION; INTRACAUDAL; PERINEURAL AS NEEDED
Status: DISCONTINUED | OUTPATIENT
Start: 2020-03-31 | End: 2020-03-31 | Stop reason: SURG

## 2020-03-31 RX ORDER — FAMOTIDINE 20 MG/1
20 TABLET, FILM COATED ORAL EVERY 12 HOURS SCHEDULED
Status: DISCONTINUED | OUTPATIENT
Start: 2020-03-31 | End: 2020-03-31 | Stop reason: HOSPADM

## 2020-03-31 RX ORDER — POTASSIUM CHLORIDE 1.5 G/1.77G
40 POWDER, FOR SOLUTION ORAL AS NEEDED
Status: DISCONTINUED | OUTPATIENT
Start: 2020-03-31 | End: 2020-04-02 | Stop reason: HOSPADM

## 2020-03-31 RX ORDER — SODIUM CHLORIDE 0.9 % (FLUSH) 0.9 %
3 SYRINGE (ML) INJECTION EVERY 12 HOURS SCHEDULED
Status: DISCONTINUED | OUTPATIENT
Start: 2020-03-31 | End: 2020-03-31 | Stop reason: HOSPADM

## 2020-03-31 RX ORDER — AMITRIPTYLINE HYDROCHLORIDE 150 MG/1
150 TABLET, FILM COATED ORAL NIGHTLY
Status: ON HOLD | COMMUNITY
End: 2021-06-24

## 2020-03-31 RX ORDER — SODIUM CHLORIDE, SODIUM LACTATE, POTASSIUM CHLORIDE, AND CALCIUM CHLORIDE .6; .31; .03; .02 G/100ML; G/100ML; G/100ML; G/100ML
20 INJECTION, SOLUTION INTRAVENOUS CONTINUOUS
Status: DISCONTINUED | OUTPATIENT
Start: 2020-03-31 | End: 2020-03-31

## 2020-03-31 RX ORDER — ACETAMINOPHEN 650 MG/1
650 SUPPOSITORY RECTAL ONCE AS NEEDED
Status: DISCONTINUED | OUTPATIENT
Start: 2020-03-31 | End: 2020-03-31 | Stop reason: HOSPADM

## 2020-03-31 RX ORDER — FAMOTIDINE 10 MG/ML
20 INJECTION, SOLUTION INTRAVENOUS EVERY 12 HOURS SCHEDULED
Status: DISCONTINUED | OUTPATIENT
Start: 2020-03-31 | End: 2020-03-31 | Stop reason: HOSPADM

## 2020-03-31 RX ORDER — ACETAMINOPHEN 325 MG/1
650 TABLET ORAL ONCE AS NEEDED
Status: DISCONTINUED | OUTPATIENT
Start: 2020-03-31 | End: 2020-03-31 | Stop reason: HOSPADM

## 2020-03-31 RX ADMIN — HYDROMORPHONE HYDROCHLORIDE 0.5 MG: 1 INJECTION, SOLUTION INTRAMUSCULAR; INTRAVENOUS; SUBCUTANEOUS at 01:09

## 2020-03-31 RX ADMIN — LIDOCAINE HYDROCHLORIDE 50 MG: 10 INJECTION, SOLUTION EPIDURAL; INFILTRATION; INTRACAUDAL; PERINEURAL at 15:24

## 2020-03-31 RX ADMIN — PROPOFOL 100 MG: 10 INJECTION, EMULSION INTRAVENOUS at 15:24

## 2020-03-31 RX ADMIN — PROPOFOL 160 MCG/KG/MIN: 10 INJECTION, EMULSION INTRAVENOUS at 15:24

## 2020-03-31 RX ADMIN — POLYETHYLENE GLYCOL 3350, SODIUM SULFATE, SODIUM CHLORIDE, POTASSIUM CHLORIDE, ASCORBIC ACID, SODIUM ASCORBATE 2000 ML: KIT at 09:46

## 2020-03-31 RX ADMIN — HYDROMORPHONE HYDROCHLORIDE 0.5 MG: 1 INJECTION, SOLUTION INTRAMUSCULAR; INTRAVENOUS; SUBCUTANEOUS at 06:48

## 2020-03-31 RX ADMIN — CITALOPRAM HYDROBROMIDE 10 MG: 10 TABLET ORAL at 16:34

## 2020-03-31 RX ADMIN — DULOXETINE HYDROCHLORIDE 30 MG: 30 CAPSULE, DELAYED RELEASE ORAL at 16:34

## 2020-03-31 RX ADMIN — HYDROMORPHONE HYDROCHLORIDE 0.5 MG: 1 INJECTION, SOLUTION INTRAMUSCULAR; INTRAVENOUS; SUBCUTANEOUS at 09:46

## 2020-03-31 RX ADMIN — SODIUM CHLORIDE, SODIUM LACTATE, POTASSIUM CHLORIDE, CALCIUM CHLORIDE AND DEXTROSE MONOHYDRATE 100 ML/HR: 5; 600; 310; 30; 20 INJECTION, SOLUTION INTRAVENOUS at 16:33

## 2020-03-31 RX ADMIN — METOCLOPRAMIDE 10 MG: 10 TABLET ORAL at 20:33

## 2020-03-31 RX ADMIN — SODIUM CHLORIDE, POTASSIUM CHLORIDE, SODIUM LACTATE AND CALCIUM CHLORIDE 20 ML/HR: 600; 310; 30; 20 INJECTION, SOLUTION INTRAVENOUS at 15:14

## 2020-03-31 RX ADMIN — SPIRONOLACTONE 300 MG: 50 TABLET ORAL at 16:33

## 2020-03-31 RX ADMIN — HYDROMORPHONE HYDROCHLORIDE 0.5 MG: 1 INJECTION, SOLUTION INTRAMUSCULAR; INTRAVENOUS; SUBCUTANEOUS at 13:41

## 2020-03-31 RX ADMIN — ONDANSETRON 4 MG: 2 INJECTION INTRAMUSCULAR; INTRAVENOUS at 09:46

## 2020-03-31 RX ADMIN — ALBUMIN HUMAN 25 G: 0.25 SOLUTION INTRAVENOUS at 02:15

## 2020-03-31 RX ADMIN — NICOTINE 1 PATCH: 14 PATCH, EXTENDED RELEASE TRANSDERMAL at 16:35

## 2020-03-31 RX ADMIN — SODIUM CHLORIDE, PRESERVATIVE FREE 10 ML: 5 INJECTION INTRAVENOUS at 20:34

## 2020-03-31 RX ADMIN — HYDROMORPHONE HYDROCHLORIDE 0.5 MG: 1 INJECTION, SOLUTION INTRAMUSCULAR; INTRAVENOUS; SUBCUTANEOUS at 03:47

## 2020-03-31 RX ADMIN — ONDANSETRON 4 MG: 2 INJECTION INTRAMUSCULAR; INTRAVENOUS at 03:13

## 2020-03-31 RX ADMIN — OXYCODONE HYDROCHLORIDE AND ACETAMINOPHEN 1 TABLET: 7.5; 325 TABLET ORAL at 20:34

## 2020-03-31 RX ADMIN — PANTOPRAZOLE SODIUM 40 MG: 40 INJECTION, POWDER, FOR SOLUTION INTRAVENOUS at 09:00

## 2020-03-31 RX ADMIN — POTASSIUM CHLORIDE 10 MEQ: 7.46 INJECTION, SOLUTION INTRAVENOUS at 03:07

## 2020-03-31 RX ADMIN — PANTOPRAZOLE SODIUM 40 MG: 40 INJECTION, POWDER, FOR SOLUTION INTRAVENOUS at 20:35

## 2020-03-31 RX ADMIN — POTASSIUM CHLORIDE 40 MEQ: 750 CAPSULE, EXTENDED RELEASE ORAL at 16:55

## 2020-03-31 RX ADMIN — RIFAXIMIN 550 MG: 550 TABLET ORAL at 16:54

## 2020-03-31 RX ADMIN — OXYCODONE HYDROCHLORIDE AND ACETAMINOPHEN 1 TABLET: 7.5; 325 TABLET ORAL at 16:34

## 2020-03-31 RX ADMIN — POTASSIUM CHLORIDE 10 MEQ: 7.46 INJECTION, SOLUTION INTRAVENOUS at 06:52

## 2020-03-31 RX ADMIN — BUMETANIDE 6 MG: 2 TABLET ORAL at 16:35

## 2020-03-31 RX ADMIN — RIFAXIMIN 550 MG: 550 TABLET ORAL at 20:33

## 2020-04-01 LAB
ALBUMIN SERPL-MCNC: 4.6 G/DL (ref 3.5–5.2)
ALBUMIN/GLOB SERPL: 1.7 G/DL
ALP SERPL-CCNC: 185 U/L (ref 39–117)
ALT SERPL W P-5'-P-CCNC: 45 U/L (ref 1–33)
ANION GAP SERPL CALCULATED.3IONS-SCNC: 14 MMOL/L (ref 5–15)
AST SERPL-CCNC: 30 U/L (ref 1–32)
BASOPHILS # BLD AUTO: 0.03 10*3/MM3 (ref 0–0.2)
BASOPHILS NFR BLD AUTO: 0.4 % (ref 0–1.5)
BILIRUB SERPL-MCNC: 0.3 MG/DL (ref 0.2–1.2)
BUN BLD-MCNC: 12 MG/DL (ref 6–20)
BUN/CREAT SERPL: 13.2 (ref 7–25)
CALCIUM SPEC-SCNC: 9.2 MG/DL (ref 8.6–10.5)
CHLORIDE SERPL-SCNC: 99 MMOL/L (ref 98–107)
CO2 SERPL-SCNC: 27 MMOL/L (ref 22–29)
CREAT BLD-MCNC: 0.91 MG/DL (ref 0.57–1)
DEPRECATED RDW RBC AUTO: 47.8 FL (ref 37–54)
EOSINOPHIL # BLD AUTO: 0.21 10*3/MM3 (ref 0–0.4)
EOSINOPHIL NFR BLD AUTO: 2.9 % (ref 0.3–6.2)
ERYTHROCYTE [DISTWIDTH] IN BLOOD BY AUTOMATED COUNT: 15 % (ref 12.3–15.4)
GFR SERPL CREATININE-BSD FRML MDRD: 67 ML/MIN/1.73
GLOBULIN UR ELPH-MCNC: 2.7 GM/DL
GLUCOSE BLD-MCNC: 108 MG/DL (ref 65–99)
HCT VFR BLD AUTO: 41.3 % (ref 34–46.6)
HCT VFR BLD AUTO: 41.3 % (ref 34–46.6)
HCT VFR BLD AUTO: 42 % (ref 34–46.6)
HCT VFR BLD AUTO: 47.1 % (ref 34–46.6)
HGB BLD-MCNC: 13.4 G/DL (ref 12–15.9)
HGB BLD-MCNC: 13.5 G/DL (ref 12–15.9)
HGB BLD-MCNC: 13.5 G/DL (ref 12–15.9)
HGB BLD-MCNC: 15.2 G/DL (ref 12–15.9)
IMM GRANULOCYTES # BLD AUTO: 0.02 10*3/MM3 (ref 0–0.05)
IMM GRANULOCYTES NFR BLD AUTO: 0.3 % (ref 0–0.5)
LYMPHOCYTES # BLD AUTO: 2.34 10*3/MM3 (ref 0.7–3.1)
LYMPHOCYTES NFR BLD AUTO: 32.8 % (ref 19.6–45.3)
MCH RBC QN AUTO: 28.2 PG (ref 26.6–33)
MCHC RBC AUTO-ENTMCNC: 32.7 G/DL (ref 31.5–35.7)
MCV RBC AUTO: 86.4 FL (ref 79–97)
MONOCYTES # BLD AUTO: 0.72 10*3/MM3 (ref 0.1–0.9)
MONOCYTES NFR BLD AUTO: 10.1 % (ref 5–12)
NEUTROPHILS # BLD AUTO: 3.82 10*3/MM3 (ref 1.7–7)
NEUTROPHILS NFR BLD AUTO: 53.5 % (ref 42.7–76)
NRBC BLD AUTO-RTO: 0 /100 WBC (ref 0–0.2)
PLATELET # BLD AUTO: 150 10*3/MM3 (ref 140–450)
PMV BLD AUTO: 11.6 FL (ref 6–12)
POTASSIUM BLD-SCNC: 4 MMOL/L (ref 3.5–5.2)
PROT SERPL-MCNC: 7.3 G/DL (ref 6–8.5)
RBC # BLD AUTO: 4.78 10*6/MM3 (ref 3.77–5.28)
SODIUM BLD-SCNC: 140 MMOL/L (ref 136–145)
WBC NRBC COR # BLD: 7.14 10*3/MM3 (ref 3.4–10.8)

## 2020-04-01 PROCEDURE — 63710000001 ONDANSETRON PER 8 MG: Performed by: NURSE PRACTITIONER

## 2020-04-01 PROCEDURE — 85025 COMPLETE CBC W/AUTO DIFF WBC: CPT | Performed by: INTERNAL MEDICINE

## 2020-04-01 PROCEDURE — 85014 HEMATOCRIT: CPT | Performed by: FAMILY MEDICINE

## 2020-04-01 PROCEDURE — 80053 COMPREHEN METABOLIC PANEL: CPT | Performed by: INTERNAL MEDICINE

## 2020-04-01 PROCEDURE — 99212 OFFICE O/P EST SF 10 MIN: CPT | Performed by: INTERNAL MEDICINE

## 2020-04-01 PROCEDURE — 85018 HEMOGLOBIN: CPT | Performed by: FAMILY MEDICINE

## 2020-04-01 PROCEDURE — 99225 PR SBSQ OBSERVATION CARE/DAY 25 MINUTES: CPT | Performed by: INTERNAL MEDICINE

## 2020-04-01 PROCEDURE — G0378 HOSPITAL OBSERVATION PER HR: HCPCS

## 2020-04-01 RX ORDER — SODIUM, POTASSIUM,MAG SULFATES 17.5-3.13G
2 SOLUTION, RECONSTITUTED, ORAL ORAL TAKE AS DIRECTED
Qty: 354 ML | Refills: 0 | Status: SHIPPED | OUTPATIENT
Start: 2020-04-01 | End: 2020-04-02

## 2020-04-01 RX ORDER — HYDROCORTISONE ACETATE 25 MG/1
25 SUPPOSITORY RECTAL 2 TIMES DAILY
Status: DISCONTINUED | OUTPATIENT
Start: 2020-04-01 | End: 2020-04-02 | Stop reason: HOSPADM

## 2020-04-01 RX ORDER — PANTOPRAZOLE SODIUM 40 MG/1
40 TABLET, DELAYED RELEASE ORAL
Status: DISCONTINUED | OUTPATIENT
Start: 2020-04-02 | End: 2020-04-02 | Stop reason: HOSPADM

## 2020-04-01 RX ORDER — SUCRALFATE 1 G/1
1 TABLET ORAL
Status: DISCONTINUED | OUTPATIENT
Start: 2020-04-01 | End: 2020-04-02 | Stop reason: HOSPADM

## 2020-04-01 RX ADMIN — BUMETANIDE 6 MG: 2 TABLET ORAL at 09:06

## 2020-04-01 RX ADMIN — NICOTINE 1 PATCH: 14 PATCH, EXTENDED RELEASE TRANSDERMAL at 09:07

## 2020-04-01 RX ADMIN — METOCLOPRAMIDE 10 MG: 10 TABLET ORAL at 21:11

## 2020-04-01 RX ADMIN — ONDANSETRON HYDROCHLORIDE 4 MG: 4 TABLET, FILM COATED ORAL at 09:18

## 2020-04-01 RX ADMIN — LACTULOSE 10 G: 10 SOLUTION ORAL at 10:45

## 2020-04-01 RX ADMIN — OXYCODONE HYDROCHLORIDE AND ACETAMINOPHEN 1 TABLET: 7.5; 325 TABLET ORAL at 01:48

## 2020-04-01 RX ADMIN — LACTULOSE 10 G: 10 SOLUTION ORAL at 21:12

## 2020-04-01 RX ADMIN — SODIUM CHLORIDE, SODIUM LACTATE, POTASSIUM CHLORIDE, CALCIUM CHLORIDE AND DEXTROSE MONOHYDRATE 100 ML/HR: 5; 600; 310; 30; 20 INJECTION, SOLUTION INTRAVENOUS at 21:19

## 2020-04-01 RX ADMIN — SODIUM CHLORIDE, SODIUM LACTATE, POTASSIUM CHLORIDE, CALCIUM CHLORIDE AND DEXTROSE MONOHYDRATE 100 ML/HR: 5; 600; 310; 30; 20 INJECTION, SOLUTION INTRAVENOUS at 01:50

## 2020-04-01 RX ADMIN — CITALOPRAM HYDROBROMIDE 10 MG: 10 TABLET ORAL at 09:07

## 2020-04-01 RX ADMIN — SUCRALFATE 1 G: 1 TABLET ORAL at 21:11

## 2020-04-01 RX ADMIN — OXYCODONE HYDROCHLORIDE AND ACETAMINOPHEN 1 TABLET: 7.5; 325 TABLET ORAL at 06:46

## 2020-04-01 RX ADMIN — OXYCODONE HYDROCHLORIDE AND ACETAMINOPHEN 1 TABLET: 7.5; 325 TABLET ORAL at 11:42

## 2020-04-01 RX ADMIN — METOCLOPRAMIDE 10 MG: 10 TABLET ORAL at 11:42

## 2020-04-01 RX ADMIN — DULOXETINE HYDROCHLORIDE 30 MG: 30 CAPSULE, DELAYED RELEASE ORAL at 09:07

## 2020-04-01 RX ADMIN — PANTOPRAZOLE SODIUM 40 MG: 40 INJECTION, POWDER, FOR SOLUTION INTRAVENOUS at 09:11

## 2020-04-01 RX ADMIN — OXYCODONE HYDROCHLORIDE AND ACETAMINOPHEN 1 TABLET: 7.5; 325 TABLET ORAL at 21:13

## 2020-04-01 RX ADMIN — METOCLOPRAMIDE 10 MG: 10 TABLET ORAL at 17:00

## 2020-04-01 RX ADMIN — RIFAXIMIN 550 MG: 550 TABLET ORAL at 21:11

## 2020-04-01 RX ADMIN — OXYCODONE HYDROCHLORIDE AND ACETAMINOPHEN 1 TABLET: 7.5; 325 TABLET ORAL at 16:59

## 2020-04-01 RX ADMIN — SUCRALFATE 1 G: 1 TABLET ORAL at 17:00

## 2020-04-01 RX ADMIN — Medication: at 15:01

## 2020-04-01 RX ADMIN — SPIRONOLACTONE 300 MG: 50 TABLET ORAL at 09:07

## 2020-04-01 RX ADMIN — RIFAXIMIN 550 MG: 550 TABLET ORAL at 09:07

## 2020-04-01 RX ADMIN — METOCLOPRAMIDE 10 MG: 10 TABLET ORAL at 09:06

## 2020-04-01 NOTE — PROGRESS NOTES
[facility] Medicine Services  PROGRESS NOTE    Patient Name: Timothy Guzman  : 1975  MRN: [id number]    Date of Admission: 3/30/2020  Primary Care Physician: Diony Rocha III, MD    Subjective   Subjective     CC:  abd pain, rectal bleeding     HPI:  No acute events. Still having some pain in left upper quadrant. Tolerating some food.     Review of Systems  Gen- No fevers, chills  CV- No chest pain, palpitations  Resp- No cough, dyspnea  GI- No N/V/D, + abd pain    Objective   Objective     Vital Signs:   Temp:  [98.2 °F (36.8 °C)-98.6 °F (37 °C)] 98.6 °F (37 °C)  Heart Rate:  [80-88] 88  Resp:  [16-18] 18  BP: (109-124)/(78-80) 109/78        Physical Exam:  Constitutional: No acute distress, awake, alert  HENT: NCAT, mucous membranes moist  Respiratory: Clear to auscultation bilaterally, respiratory effort normal   Cardiovascular: RRR, no murmurs, rubs, or gallops, palpable pedal pulses bilaterally  Gastrointestinal: Positive bowel sounds, soft, LUQ tenderness   Musculoskeletal: No bilateral ankle edema  Psychiatric: Appropriate affect, cooperative  Neurologic: Oriented x 3, strength symmetric in all extremities, Cranial Nerves grossly intact to confrontation, speech clear  Skin: No rashes    Results Reviewed:  Results from last 7 days   Lab Units 20  2359 20  1633   WBC 10*3/mm3 7.14  --   --    HEMOGLOBIN g/dL 13.5  13.5 13.4 12.3   HEMATOCRIT % 41.3  41.3 42.0 38.9   PLATELETS 10*3/mm3 150  --   --      Results from last 7 days   Lab Units 20  04220  2215   SODIUM mmol/L 140 138   POTASSIUM mmol/L 4.0 3.3*   CHLORIDE mmol/L 99 106   CO2 mmol/L 27.0 20.0*   BUN mg/dL 12 9   CREATININE mg/dL 0.91 0.82   GLUCOSE mg/dL 108* 184*   CALCIUM mg/dL 9.2 8.5*   ALT (SGPT) U/L 45* 52*   AST (SGOT) U/L 30 84*     Estimated Creatinine Clearance: 83.3 mL/min (by C-G formula based on SCr of 0.91 mg/dL).    Microbiology Results Abnormal      Procedure Component Value - Date/Time    Blood Culture - Blood, Hand, Right [[id number]] Collected:  03/30/20 2215    Lab Status:  Preliminary result Specimen:  Blood from Hand, Right Updated:  04/01/20 0045     Blood Culture No growth at 24 hours    Blood Culture - Blood, Arm, Right [[id number]] Collected:  03/30/20 2215    Lab Status:  Preliminary result Specimen:  Blood from Arm, Right Updated:  04/01/20 0045     Blood Culture No growth at 24 hours          Imaging Results (Last 24 Hours)     ** No results found for the last 24 hours. **               I have reviewed the medications:  Scheduled Meds:  bumetanide 6 mg Oral Daily   citalopram 10 mg Oral Daily   DULoxetine 30 mg Oral Daily   lactulose 10 g Oral BID   metoclopramide 10 mg Oral 4x Daily   nicotine 1 patch Transdermal Q24H   pantoprazole 40 mg Intravenous Q12H   riFAXIMin 550 mg Oral Q12H   sodium chloride 10 mL Intravenous Q12H   spironolactone 300 mg Oral Daily   sucralfate 1 g Oral 4x Daily AC & at Bedtime     Continuous Infusions:  Pharmacy Consult     dextrose 5 % and lactated Ringer's 100 mL/hr Last Rate: 100 mL/hr (04/01/20 1501)     PRN Meds:.HYDROmorphone  •  magnesium sulfate **OR** magnesium sulfate **OR** magnesium sulfate  •  [DISCONTINUED] Morphine **AND** naloxone  •  ondansetron **OR** ondansetron  •  oxyCODONE-acetaminophen  •  potassium chloride **OR** potassium chloride **OR** potassium chloride  •  sodium chloride    Assessment/Plan   Assessment & Plan     Active Hospital Problems    Diagnosis  POA   • **GI bleed [K92.2]  Yes   • Acute UTI (urinary tract infection) [N39.0]  Yes   • Sepsis (CMS/HCC) [A41.9]  Yes   • Hypokalemia [E87.6]  Yes   • Rectal bleeding [K62.5]  Unknown   • Generalized abdominal pain [R10.84]  Unknown   • Alcoholic cirrhosis of liver without ascites (CMS/HCC) [K70.30]  Yes      Resolved Hospital Problems   No resolved problems to display.        Brief Hospital Course to date:  Acute GI bleed  --consult GI;  c-scope 3/31 with polyp removed and internal hemorrhoids   - start anusol suppositories   --Protonix and carafate   --hold routine Linzess and Motegrity   --ct abdomen pelvis reviewed      Sepsis - resolved   - Likely related to dehydration/bleeding   --pt met sepsis criteria based on tachycardia (- sinus tach @ OSH), lactic acidosis (2.1 @ OSH) and UA results   - Repeat UA with no evidence of infection; BCx NGTD   - Received 1 dose of antibiotics at OSH - will hold on additional     Dysuria   - Obtain UA      Hypokalemia   --replacement protocol ordered     History of alcoholic cirrhosis of the liver  -pt has been evaluated by [facility] transplant   -ammonia level @ OSH WNL; <10  -pt on routine Bumex (6mg daily), lactulose, aldactone     GERD  --routine Reglan and Prilosec continued      Tobacco abuse  --cessation education  --nicotine patch      3 mm left lower lobe lung nodule  - outpatient follow up with PCP -- tobacco risk factor         DVT prophylaxis:  Mechanical      Disposition: I expect the patient to be discharged home in 1-3 days .    CODE STATUS:   Code Status and Medical Interventions:   Ordered at: 03/30/20 1952     Level Of Support Discussed With:    Patient     Code Status:    CPR     Medical Interventions (Level of Support Prior to Arrest):    Full         Electronically signed by Liliana Cueva DO, 04/01/20, 16:08.

## 2020-04-01 NOTE — PROGRESS NOTES
"GI Daily Progress Note  Subjective:    Chief Complaint: Follow-up rectal bleeding    Nausea and vomiting have resolved.  Patient is eating well.  Complains of persistent abdominal pain, which seems to wander throughout the abdomen.  No further blood per rectum.    Objective:    /78 (BP Location: Right arm, Patient Position: Lying)   Pulse 88   Temp 98.6 °F (37 °C) (Oral)   Resp 18   Ht 160 cm (63\")   Wt 90.3 kg (199 lb)   SpO2 (!) 85%   BMI 35.25 kg/m²     Physical Exam   Constitutional: She is oriented to person, place, and time. She appears well-developed and well-nourished. No distress.   HENT:   Head: Normocephalic.   Mouth/Throat: No oropharyngeal exudate.   Eyes: Conjunctivae are normal. No scleral icterus.   Neck: Normal range of motion.   Cardiovascular: Normal rate and regular rhythm.   Pulmonary/Chest: Effort normal and breath sounds normal. No stridor. No respiratory distress. She has no wheezes. She has no rales.   Abdominal: Soft. Bowel sounds are normal. She exhibits no distension and no mass. There is no tenderness. There is no guarding.   Genitourinary:   Genitourinary Comments: Deferred   Musculoskeletal: Normal range of motion. She exhibits no edema.   Neurological: She is alert and oriented to person, place, and time.   Skin: Skin is warm and dry. Capillary refill takes less than 2 seconds. No rash noted. No pallor.   Psychiatric: She has a normal mood and affect. Her behavior is normal.   Nursing note and vitals reviewed.      Lab  Lab Results   Component Value Date    WBC 7.14 04/01/2020    HGB 15.2 04/01/2020    HGB 13.5 04/01/2020    HGB 13.5 04/01/2020    MCV 86.4 04/01/2020     04/01/2020    INR 1.17 (H) 11/08/2019    INR 1.07 09/11/2019    INR 1.14 09/02/2019    INR 1.09 07/31/2019    INR 1.16 07/03/2019       Lab Results   Component Value Date    GLUCOSE 108 (H) 04/01/2020    BUN 12 04/01/2020    CREATININE 0.91 04/01/2020    EGFRIFNONA 67 04/01/2020    BCR 13.2 " 04/01/2020     04/01/2020    K 4.0 04/01/2020    CO2 27.0 04/01/2020    CALCIUM 9.2 04/01/2020    ALBUMIN 4.60 04/01/2020    ALKPHOS 185 (H) 04/01/2020    BILITOT 0.3 04/01/2020    ALT 45 (H) 04/01/2020    AST 30 04/01/2020       Assessment:    Rectal bleeding, resolved.  Apparent hemorrhoid source.  Nausea, resolved.  Has history of gastroparesis.  Abdominal pain.  Etiology not clear.  Suspect element of visceral hypersensitivity.  Alcohol cirrhosis without decompensation.    Plan:    Recommend Anusol HC suppositories twice daily for 1 week.    The patient appears stable for discharge from GI standpoint.    Follow-up with ISSAC Leonardo as previously scheduled on May 11, 2020.    Will sign off.  Please call with questions or concerns.    Mark I. Brunner, MD  04/01/20  16:53

## 2020-04-01 NOTE — PROGRESS NOTES
Discharge Planning Assessment  [facility]     Patient Name: Timothy Guzman  MRN: [id number]  Today's Date: 4/1/2020    Admit Date: 3/30/2020    Discharge Needs Assessment     Row Name 04/01/20 1058       Living Environment    Lives With  parent(s)    Name(s) of Who Lives With Patient  Alana Guzman (Mother) [phone]    Current Living Arrangements  home/apartment/condo    Primary Care Provided by  self    Provides Primary Care For  no one    Family Caregiver if Needed  parent(s)    Family Caregiver Names  Alana Guzman (Mother) [phone]    Quality of Family Relationships  helpful;involved    Able to Return to Prior Arrangements  yes       Resource/Environmental Concerns    Transportation Concerns  car, none       Transition Planning    Patient/Family Anticipates Transition to  home with family    Transportation Anticipated  family or friend will provide Alana Guzman (Mother) [phone]       Discharge Needs Assessment    Readmission Within the Last 30 Days  no previous admission in last 30 days    Equipment Currently Used at Home  none    Provided Post Acute Provider List?  N/A    N/A Provider List Comment  as needed        Discharge Plan     Row Name 04/01/20 1101       Plan    Plan  Home     Plan Comments  Met with patient at bedside to discuss discharge planning. She live in [facility] with her parents. Reports functioning independently with ADL's. Reports no DME or HH. Reports her parents will transport her home Alana Guzman (Mother) [phone].     Final Discharge Disposition Code  01 - home or self-care        Destination      Coordination has not been started for this encounter.      Durable Medical Equipment      Coordination has not been started for this encounter.      Dialysis/Infusion      Coordination has not been started for this encounter.      Home Medical Care      Coordination has not been started for this encounter.      Therapy      Coordination has not been started for  this encounter.      Community Resources      Coordination has not been started for this encounter.          Demographic Summary     Row Name 04/01/20 1054       General Information    Admission Type  observation    Arrived From  emergency department    Referral Source  admission list    Reason for Consult  discharge planning        Functional Status     Row Name 04/01/20 1051       Functional Status    Usual Activity Tolerance  good    Current Activity Tolerance  good       Functional Status, IADL    Medications  independent    Meal Preparation  independent    Housekeeping  independent    Laundry  independent    Shopping  independent       Employment/    Employment/ Comments  Patient has Medicaid insurance and can afford her medications.        Psychosocial    No documentation.       Abuse/Neglect    No documentation.       Legal    No documentation.       Substance Abuse    No documentation.       Patient Forms    No documentation.           Lupe Shen) ELISE Jean

## 2020-04-02 VITALS
HEART RATE: 84 BPM | TEMPERATURE: 98.1 F | DIASTOLIC BLOOD PRESSURE: 74 MMHG | HEIGHT: 63 IN | SYSTOLIC BLOOD PRESSURE: 120 MMHG | RESPIRATION RATE: 18 BRPM | OXYGEN SATURATION: 95 % | WEIGHT: 199 LBS | BODY MASS INDEX: 35.26 KG/M2

## 2020-04-02 PROBLEM — E87.6 HYPOKALEMIA: Status: RESOLVED | Noted: 2020-03-30 | Resolved: 2020-04-02

## 2020-04-02 PROBLEM — K62.5 RECTAL BLEEDING: Status: RESOLVED | Noted: 2020-03-30 | Resolved: 2020-04-02

## 2020-04-02 PROBLEM — A41.9 SEPSIS (HCC): Status: RESOLVED | Noted: 2020-03-30 | Resolved: 2020-04-02

## 2020-04-02 PROBLEM — N39.0 ACUTE UTI (URINARY TRACT INFECTION): Status: RESOLVED | Noted: 2020-03-30 | Resolved: 2020-04-02

## 2020-04-02 LAB
ANION GAP SERPL CALCULATED.3IONS-SCNC: 10 MMOL/L (ref 5–15)
BUN BLD-MCNC: 11 MG/DL (ref 6–20)
BUN/CREAT SERPL: 11.2 (ref 7–25)
CALCIUM SPEC-SCNC: 9.1 MG/DL (ref 8.6–10.5)
CHLORIDE SERPL-SCNC: 97 MMOL/L (ref 98–107)
CO2 SERPL-SCNC: 30 MMOL/L (ref 22–29)
CREAT BLD-MCNC: 0.98 MG/DL (ref 0.57–1)
CYTO UR: NORMAL
GFR SERPL CREATININE-BSD FRML MDRD: 61 ML/MIN/1.73
GLUCOSE BLD-MCNC: 114 MG/DL (ref 65–99)
HCT VFR BLD AUTO: 43.1 % (ref 34–46.6)
HCT VFR BLD AUTO: 44.1 % (ref 34–46.6)
HGB BLD-MCNC: 14.1 G/DL (ref 12–15.9)
HGB BLD-MCNC: 14.4 G/DL (ref 12–15.9)
LAB AP CASE REPORT: NORMAL
LAB AP CLINICAL INFORMATION: NORMAL
LIPASE SERPL-CCNC: 42 U/L (ref 13–60)
PATH REPORT.FINAL DX SPEC: NORMAL
PATH REPORT.GROSS SPEC: NORMAL
POTASSIUM BLD-SCNC: 3.9 MMOL/L (ref 3.5–5.2)
SODIUM BLD-SCNC: 137 MMOL/L (ref 136–145)

## 2020-04-02 PROCEDURE — 99217 PR OBSERVATION CARE DISCHARGE MANAGEMENT: CPT | Performed by: NURSE PRACTITIONER

## 2020-04-02 PROCEDURE — 85014 HEMATOCRIT: CPT | Performed by: FAMILY MEDICINE

## 2020-04-02 PROCEDURE — G0378 HOSPITAL OBSERVATION PER HR: HCPCS

## 2020-04-02 PROCEDURE — 83690 ASSAY OF LIPASE: CPT | Performed by: INTERNAL MEDICINE

## 2020-04-02 PROCEDURE — 85018 HEMOGLOBIN: CPT | Performed by: FAMILY MEDICINE

## 2020-04-02 PROCEDURE — 80048 BASIC METABOLIC PNL TOTAL CA: CPT | Performed by: INTERNAL MEDICINE

## 2020-04-02 RX ORDER — SUCRALFATE 1 G/1
1 TABLET ORAL
Qty: 28 TABLET | Refills: 0 | Status: SHIPPED | OUTPATIENT
Start: 2020-04-02 | End: 2020-04-09

## 2020-04-02 RX ORDER — ALBUTEROL SULFATE 90 UG/1
2 AEROSOL, METERED RESPIRATORY (INHALATION) EVERY 4 HOURS PRN
Status: ON HOLD | COMMUNITY
End: 2021-06-24

## 2020-04-02 RX ORDER — HYDROCORTISONE ACETATE 25 MG/1
25 SUPPOSITORY RECTAL 2 TIMES DAILY
Qty: 14 SUPPOSITORY | Refills: 0 | Status: SHIPPED | OUTPATIENT
Start: 2020-04-02 | End: 2020-04-09

## 2020-04-02 RX ORDER — POTASSIUM CHLORIDE 1500 MG/1
1 TABLET, FILM COATED, EXTENDED RELEASE ORAL DAILY
Status: ON HOLD | COMMUNITY
Start: 2020-01-02 | End: 2020-04-02 | Stop reason: SDUPTHER

## 2020-04-02 RX ADMIN — CITALOPRAM HYDROBROMIDE 10 MG: 10 TABLET ORAL at 08:34

## 2020-04-02 RX ADMIN — BUMETANIDE 6 MG: 2 TABLET ORAL at 08:33

## 2020-04-02 RX ADMIN — METOCLOPRAMIDE 10 MG: 10 TABLET ORAL at 12:01

## 2020-04-02 RX ADMIN — SUCRALFATE 1 G: 1 TABLET ORAL at 12:01

## 2020-04-02 RX ADMIN — OXYCODONE HYDROCHLORIDE AND ACETAMINOPHEN 1 TABLET: 7.5; 325 TABLET ORAL at 08:34

## 2020-04-02 RX ADMIN — SPIRONOLACTONE 300 MG: 50 TABLET ORAL at 08:33

## 2020-04-02 RX ADMIN — PANTOPRAZOLE SODIUM 40 MG: 40 TABLET, DELAYED RELEASE ORAL at 05:45

## 2020-04-02 RX ADMIN — LACTULOSE 10 G: 10 SOLUTION ORAL at 08:34

## 2020-04-02 RX ADMIN — RIFAXIMIN 550 MG: 550 TABLET ORAL at 08:38

## 2020-04-02 RX ADMIN — METOCLOPRAMIDE 10 MG: 10 TABLET ORAL at 08:33

## 2020-04-02 RX ADMIN — NICOTINE 1 PATCH: 14 PATCH, EXTENDED RELEASE TRANSDERMAL at 08:37

## 2020-04-02 RX ADMIN — HYDROCORTISONE ACETATE 25 MG: 25 SUPPOSITORY RECTAL at 08:34

## 2020-04-02 RX ADMIN — OXYCODONE HYDROCHLORIDE AND ACETAMINOPHEN 1 TABLET: 7.5; 325 TABLET ORAL at 02:10

## 2020-04-02 RX ADMIN — SODIUM CHLORIDE, PRESERVATIVE FREE 10 ML: 5 INJECTION INTRAVENOUS at 08:38

## 2020-04-02 RX ADMIN — DULOXETINE HYDROCHLORIDE 30 MG: 30 CAPSULE, DELAYED RELEASE ORAL at 08:34

## 2020-04-02 RX ADMIN — SUCRALFATE 1 G: 1 TABLET ORAL at 08:34

## 2020-04-02 NOTE — DISCHARGE SUMMARY
"    [facility] Medicine Services  DISCHARGE SUMMARY    Patient Name: Timothy Guzman  : 1975  MRN: [id number]    Date of Admission: 3/30/2020  7:14 PM  Date of Discharge:  2020  Primary Care Physician: Diony Rocha III, MD    Consults     Date and Time Order Name Status Description    3/31/2020 0030 Inpatient Gastroenterology Consult Completed           Hospital Course     Presenting Problem:   GI bleed [K92.2]  GI bleed [K92.2]  Generalized abdominal pain [R10.84]    Active Hospital Problems    Diagnosis  POA   • **GI bleed [K92.2]  Yes   • Generalized abdominal pain [R10.84]  Unknown   • Alcoholic cirrhosis of liver without ascites (CMS/HCC) [K70.30]  Yes      Resolved Hospital Problems    Diagnosis Date Resolved POA   • Acute UTI (urinary tract infection) [N39.0] 2020 Yes   • Sepsis (CMS/HCC) [A41.9] 2020 Yes   • Hypokalemia [E87.6] 2020 Yes   • Rectal bleeding [K62.5] 2020 Unknown          Hospital Course:  Timothy Guzman is a 44 y.o. female w/ a hx of cirrhosis (on liver transplant list @ [facility]), gastroparesis, GERD and ongoing tobacco abuse who was transferred from [facility] for rectal bleeding.   Pt presented to the OSH ED w/ c/o rectal bleeding. Pt reports that she developed abdominal pain yesterday and today she noted rectal bleeding.   Pt describes her abdominal pain as \"sharp and stabbing\" w/ radiation to her LUQ to her left back.Pt reports that  she has had 3 bright red blood BMS w/ \"clots\" prompting her ED visit. Today her abdominal pain became significantly worse. Patient was admitted to hospital medicine for further evaluation and GI was consulted.     Acute GI bleed  Abd pain   --consult GI; c-scope 3/31 with polyp removed and internal hemorrhoids apparent source of bleed  --  HGB stable and has not required any transfusions   - start anusol suppositories bid for one week   --cont PPI and will send home with week of " Carafate    --ct abdomen pelvis reviewed   -- abd pain etiology unclear suspect element of visceral hypersensitivity, narcotics stopped      Sepsis - resolved   - Likely related to dehydration/bleeding   --pt met sepsis criteria based on tachycardia (- sinus tach @ OSH), lactic acidosis (2.1 @ OSH) and UA results   - Repeat UA with no evidence of infection; BCx NGTD   - Received 1 dose of antibiotics at OSH - will hold on additional   -- blood cultures NGTD     Dysuria   -  Repeat UA was neg      Hypokalemia   --resolved      History of alcoholic cirrhosis of the liver  -pt has been evaluated by [facility] transplant   -ammonia level @ OSH WNL; <10  -pt on routine Bumex (6mg daily), lactulose, aldactone     GERD  --routine Reglan and Prilosec continued      Tobacco abuse  --cessation education  --nicotine patch      3 mm left lower lobe lung nodule  - outpatient follow up with PCP -- tobacco risk factor     Patient has remained clinically stable and will be discharged home today. Patient was instructed to slowly increase her diet. Patient will need to follow up with PCP one week. Follow up with GI on May 11th.          Discharge Follow Up Recommendations for outpatient labs/diagnostics:   PCP one week   Follow up with Nelson DAVENPORT on may 11th     Day of Discharge     HPI:   Patient sitting up in bed in NAD. Moving around in room. Per patient eating small amounts but 100% charted for lunch and dinner on 4/1. Still having some mild abd pain but no worse. No acute events overnight per nursing     Review of Systems  Gen- No fevers, chills  CV- No chest pain, palpitations  Resp- No cough, dyspnea  GI- No V/D, mild  abd pain, mild nausea         Vital Signs:   Temp:  [98.1 °F (36.7 °C)-98.5 °F (36.9 °C)] 98.1 °F (36.7 °C)  Heart Rate:  [] 84  Resp:  [18] 18  BP: (120-121)/(73-74) 120/74     Physical Exam:  Constitutional: No acute distress, awake, alert  HENT: NCAT, mucous membranes moist  Respiratory: Clear  to auscultation bilaterally, respiratory effort normal room air   Cardiovascular: RRR, no murmurs, rubs, or gallops, palpable pedal pulses bilaterally  Gastrointestinal: Positive bowel sounds, soft, mild LUQ tenderness, nondistended  Musculoskeletal: No bilateral ankle edema  Psychiatric: Appropriate affect, cooperative  Neurologic: Oriented x 3, strength symmetric in all extremities, Cranial Nerves grossly intact to confrontation, speech clear  Skin: No rashes      Pertinent  and/or Most Recent Results     Results from last 7 days   Lab Units 04/02/20  0741 04/01/20  2342 04/01/20  1542 04/01/20  0429 03/31/20  2359 03/31/20  1633 03/31/20  0737 03/30/20  2215   WBC 10*3/mm3  --   --   --  7.14  --   --   --   --    HEMOGLOBIN g/dL 14.1 14.4 15.2 13.5  13.5 13.4 12.3 11.5* 12.1   HEMATOCRIT % 43.1 44.1 47.1* 41.3  41.3 42.0 38.9 35.8 38.3   PLATELETS 10*3/mm3  --   --   --  150  --   --   --   --    SODIUM mmol/L 137  --   --  140  --   --   --  138   POTASSIUM mmol/L 3.9  --   --  4.0  --   --   --  3.3*   CHLORIDE mmol/L 97*  --   --  99  --   --   --  106   CO2 mmol/L 30.0*  --   --  27.0  --   --   --  20.0*   BUN mg/dL 11  --   --  12  --   --   --  9   CREATININE mg/dL 0.98  --   --  0.91  --   --   --  0.82   GLUCOSE mg/dL 114*  --   --  108*  --   --   --  184*   CALCIUM mg/dL 9.1  --   --  9.2  --   --   --  8.5*     Results from last 7 days   Lab Units 04/01/20  0429 03/30/20  2215   BILIRUBIN mg/dL 0.3 0.6   ALK PHOS U/L 185* 171*   ALT (SGPT) U/L 45* 52*   AST (SGOT) U/L 30 84*           Invalid input(s): TG, LDLCALC, LDLREALC  Results from last 7 days   Lab Units 03/31/20  0737 03/30/20  2215   LACTATE mmol/L 1.1 3.0*       Brief Urine Lab Results  (Last result in the past 365 days)      Color   Clarity   Blood   Leuk Est   Nitrite   Protein   CREAT   Urine HCG        03/31/20 0944 Dark Yellow Clear Negative Negative Negative Negative               Microbiology Results Abnormal     Procedure  Component Value - Date/Time    Blood Culture - Blood, Arm, Right [[id number]] Collected:  03/30/20 2215    Lab Status:  Preliminary result Specimen:  Blood from Arm, Right Updated:  04/02/20 0045     Blood Culture No growth at 2 days    Blood Culture - Blood, Hand, Right [[id number]] Collected:  03/30/20 2215    Lab Status:  Preliminary result Specimen:  Blood from Hand, Right Updated:  04/02/20 0045     Blood Culture No growth at 2 days          Imaging Results (All)     Procedure Component Value Units Date/Time    CT Abdomen Pelvis Without Contrast [[id number]] Collected:  03/30/20 2109     Updated:  03/30/20 2111    Narrative:       CT Abdomen Pelvis WO 3/30/2020    INDICATION:   Left upper quadrant abdominal pain with nausea and rectal bleeding today. History of pancreatitis and cirrhosis.    TECHNIQUE:   CT of the abdomen and pelvis without IV contrast. Coronal and sagittal reconstructions were obtained.  Radiation dose reduction techniques included automated exposure control or exposure modulation based on body size. Count of known CT and cardiac nuc  med studies performed in previous 12 months: 0.     COMPARISON:   None available.    FINDINGS:  Abdomen: Liver demonstrates subtle nodular contour which could reflect cirrhosis. Clinical correlation is recommended. The spleen, pancreas and adrenal glands are normal. The gallbladder is surgically absent. The kidneys are normal.    Pelvis: The gut, mesenteric and rashi structures are normal. There is no free fluid in the abdomen or pelvis. Images of the pelvis are somewhat degraded by artifact from the patient's left hip prosthesis. There is a 5 mm noncalcified nodule in the  peripheral left lung base.      Impression:         1. Subtle surface nodularity to the liver which could reflect hepatic cirrhosis. Correlate clinically.  2. Surgical absence of the gallbladder.  3. 3 mm nodule left lower lobe. Management recommendation: Based on current published guidelines,  uncomplicated pulmonary nodules less than 6 mm do not require CT follow-up in low risk patients (Fleischner Society guidelines, 2017).          Signer Name: Teodoro Marquez MD   Signed: 3/30/2020 9:09 PM   Workstation Name: [email or web address]    [facility]                         Plan for Follow-up of Pending Labs/Results:    Order Current Status    Blood Culture - Blood, Arm, Right Preliminary result    Blood Culture - Blood, Hand, Right Preliminary result        Discharge Details        Discharge Medications      New Medications      Instructions Start Date   hydrocortisone 25 MG suppository  Commonly known as:  ANUSOL-HC   25 mg, Rectal, 2 Times Daily      sucralfate 1 g tablet  Commonly known as:  CARAFATE   1 g, Oral, 4 Times Daily Before Meals & Nightly         Continue These Medications      Instructions Start Date   albuterol sulfate  (90 Base) MCG/ACT inhaler  Commonly known as:  PROVENTIL HFA;VENTOLIN HFA;PROAIR HFA   2 puffs, Inhalation, Every 4 Hours PRN      amitriptyline 150 MG tablet  Commonly known as:  ELAVIL   150 mg, Oral, Nightly      bumetanide 2 MG tablet  Commonly known as:  BUMEX   2 mg, Oral, Daily, Take 3 tablets by mouth once daily.      citalopram 20 MG tablet  Commonly known as:  CeleXA   20 mg, Oral, Daily      Constulose 10 GM/15ML solution  Generic drug:  lactulose   10 g, Oral, 2 Times Daily       MG capsule  Generic drug:  docusate sodium   500 mg, Oral, Daily PRN      DULoxetine 30 MG capsule  Commonly known as:  CYMBALTA   30 mg, Oral, Daily      linaclotide 290 MCG capsule capsule  Commonly known as:  Linzess   290 mcg, Oral, Every Morning Before Breakfast      magnesium oxide 400 (241.3 Mg) MG tablet tablet  Commonly known as:  MAGOX   1 mg, Oral, 2 Times Daily      metoclopramide 10 MG tablet  Commonly known as:  REGLAN   10 mg, Oral, 4 Times Daily      omeprazole 20 MG capsule  Commonly known as:  priLOSEC   20 mg, Oral, Daily      ondansetron ODT 8 MG  disintegrating tablet  Commonly known as:  Zofran ODT   8 mg, Oral, Every 8 Hours PRN      Plecanatide 3 MG tablet   3 mg, Oral, Daily      potassium chloride 20 MEQ CR tablet  Commonly known as:  K-DUR,KLOR-CON   1 tablet, Oral, Daily      propranolol 10 MG tablet  Commonly known as:  INDERAL   10 mg, Oral, Every Night at Bedtime      Prucalopride Succinate 2 MG tablet  Commonly known as:  Motegrity   1 tablet, Oral, Daily      riFAXIMin 550 MG tablet  Commonly known as:  Xifaxan   550 mg, Oral, Every 12 Hours Scheduled      spironolactone 100 MG tablet  Commonly known as:  Aldactone   TAKE 3 TABS DAILY             No Known Allergies      Discharge Disposition:  Home or Self Care    Diet:  Hospital:  Diet Order   Procedures   • Diet Regular; GI Soft       Activity:  Activity Instructions     Activity as Tolerated      Measure Blood Pressure      Measure Weight            Restrictions or Other Recommendations:         CODE STATUS:    Code Status and Medical Interventions:   Ordered at: 03/30/20 1952     Level Of Support Discussed With:    Patient     Code Status:    CPR     Medical Interventions (Level of Support Prior to Arrest):    Full       Future Appointments   Date Time Provider Department Center   5/11/2020  9:00 AM Nelson Yip APRN [facility] None       Additional Instructions for the Follow-ups that You Need to Schedule     Discharge Follow-up with PCP   As directed       Currently Documented PCP:    Diony Rocha III, MD    PCP Phone Number:    [phone]     Follow Up Details:  pcp one week               Time Spent on Discharge:  35 minutes    Electronically signed by ISSAC Nunes, 04/02/20, 12:33 PM.

## 2020-04-05 LAB
BACTERIA SPEC AEROBE CULT: NORMAL
BACTERIA SPEC AEROBE CULT: NORMAL

## 2020-04-15 ENCOUNTER — TELEPHONE (OUTPATIENT)
Dept: GASTROENTEROLOGY | Facility: CLINIC | Age: 45
End: 2020-04-15

## 2020-04-15 NOTE — TELEPHONE ENCOUNTER
"Our office received a call from patients mother Vicki Guzman. Patients mother called and gave us patients name and . Vicki Guzman noted that she knows we are not allowed to share any patient information with her but just wanted to relay some information. We did not confirm if this was or was not a patient and did not share any patient information with the mother Vicki Guzman.     Vicki Guzman notified us that  her duaghter is still addicted to alcohol and some pain medications. She has quit taking her cirrhosis medications  but has not been taking them correctly for a while.   The patient has become \"Unbearable to live with .  She is Belligerent or in a fog\" They are going to find some place for the patient to go and have given her options . One of the options is they may take the patient to the Emergency Department.      "

## 2020-05-29 ENCOUNTER — OFFICE VISIT (OUTPATIENT)
Dept: GASTROENTEROLOGY | Facility: CLINIC | Age: 45
End: 2020-05-29

## 2020-05-29 VITALS
WEIGHT: 214.8 LBS | SYSTOLIC BLOOD PRESSURE: 130 MMHG | DIASTOLIC BLOOD PRESSURE: 85 MMHG | TEMPERATURE: 98.2 F | BODY MASS INDEX: 38.05 KG/M2 | HEART RATE: 98 BPM

## 2020-05-29 DIAGNOSIS — K70.30 ALCOHOLIC CIRRHOSIS OF LIVER WITHOUT ASCITES (HCC): Primary | ICD-10-CM

## 2020-05-29 DIAGNOSIS — K76.82 HEPATIC ENCEPHALOPATHY (HCC): ICD-10-CM

## 2020-05-29 DIAGNOSIS — F10.10 ETOH ABUSE: ICD-10-CM

## 2020-05-29 DIAGNOSIS — K74.3 PRIMARY BILIARY CHOLANGITIS (HCC): ICD-10-CM

## 2020-05-29 DIAGNOSIS — K59.04 CHRONIC IDIOPATHIC CONSTIPATION: ICD-10-CM

## 2020-05-29 DIAGNOSIS — R60.9 PERIPHERAL EDEMA: ICD-10-CM

## 2020-05-29 DIAGNOSIS — R91.1 LUNG NODULE: ICD-10-CM

## 2020-05-29 PROCEDURE — 99215 OFFICE O/P EST HI 40 MIN: CPT | Performed by: NURSE PRACTITIONER

## 2020-05-29 RX ORDER — FLUOXETINE HYDROCHLORIDE 40 MG/1
40 CAPSULE ORAL DAILY
Status: ON HOLD | COMMUNITY
End: 2021-07-20 | Stop reason: SDUPTHER

## 2020-05-29 RX ORDER — IBUPROFEN 800 MG/1
TABLET ORAL
COMMUNITY
Start: 2020-05-08 | End: 2021-05-30

## 2020-05-29 RX ORDER — NICOTINE 21 MG/24HR
PATCH, TRANSDERMAL 24 HOURS TRANSDERMAL
Status: ON HOLD | COMMUNITY
Start: 2020-05-01 | End: 2021-06-24

## 2020-05-29 RX ORDER — URSODIOL 250 MG/1
750 TABLET, FILM COATED ORAL 2 TIMES DAILY
Qty: 180 TABLET | Refills: 11 | Status: ON HOLD | OUTPATIENT
Start: 2020-05-29 | End: 2021-06-24

## 2020-05-29 RX ORDER — FOLIC ACID 1 MG/1
1 TABLET ORAL DAILY
Status: ON HOLD | COMMUNITY
Start: 2020-05-05 | End: 2021-06-24

## 2020-05-29 RX ORDER — OLANZAPINE 10 MG/1
10 TABLET ORAL NIGHTLY
COMMUNITY
End: 2021-07-02 | Stop reason: HOSPADM

## 2020-05-29 RX ORDER — ACETAMINOPHEN 325 MG/1
TABLET ORAL
Status: ON HOLD | COMMUNITY
Start: 2020-05-05 | End: 2021-06-24

## 2021-05-30 ENCOUNTER — APPOINTMENT (OUTPATIENT)
Dept: CT IMAGING | Facility: HOSPITAL | Age: 46
End: 2021-05-30

## 2021-05-30 ENCOUNTER — HOSPITAL ENCOUNTER (EMERGENCY)
Facility: HOSPITAL | Age: 46
Discharge: HOME | End: 2021-05-30
Attending: EMERGENCY MEDICINE | Admitting: EMERGENCY MEDICINE

## 2021-05-30 VITALS
TEMPERATURE: 98.3 F | HEIGHT: 63 IN | HEART RATE: 74 BPM | RESPIRATION RATE: 20 BRPM | WEIGHT: 198 LBS | DIASTOLIC BLOOD PRESSURE: 93 MMHG | SYSTOLIC BLOOD PRESSURE: 121 MMHG | BODY MASS INDEX: 35.08 KG/M2 | OXYGEN SATURATION: 99 %

## 2021-05-30 DIAGNOSIS — G89.4 CHRONIC PAIN SYNDROME: ICD-10-CM

## 2021-05-30 DIAGNOSIS — Z87.19 HISTORY OF CIRRHOSIS OF LIVER: ICD-10-CM

## 2021-05-30 DIAGNOSIS — Z87.19 HISTORY OF PANCREATITIS: ICD-10-CM

## 2021-05-30 DIAGNOSIS — R10.12 LEFT UPPER QUADRANT ABDOMINAL PAIN: Primary | ICD-10-CM

## 2021-05-30 LAB
ALBUMIN SERPL-MCNC: 4.7 G/DL (ref 3.5–5.2)
ALBUMIN/GLOB SERPL: 1.8 G/DL
ALP SERPL-CCNC: 167 U/L (ref 39–117)
ALT SERPL W P-5'-P-CCNC: 24 U/L (ref 1–33)
ANION GAP SERPL CALCULATED.3IONS-SCNC: 11 MMOL/L (ref 5–15)
AST SERPL-CCNC: 27 U/L (ref 1–32)
B-HCG UR QL: NEGATIVE
BACTERIA UR QL AUTO: ABNORMAL /HPF
BASOPHILS # BLD AUTO: 0.03 10*3/MM3 (ref 0–0.2)
BASOPHILS NFR BLD AUTO: 0.4 % (ref 0–1.5)
BILIRUB SERPL-MCNC: 0.4 MG/DL (ref 0–1.2)
BILIRUB UR QL STRIP: NEGATIVE
BUN SERPL-MCNC: 12 MG/DL (ref 6–20)
BUN/CREAT SERPL: 12.1 (ref 7–25)
CALCIUM SPEC-SCNC: 10 MG/DL (ref 8.6–10.5)
CHLORIDE SERPL-SCNC: 100 MMOL/L (ref 98–107)
CLARITY UR: CLEAR
CO2 SERPL-SCNC: 27 MMOL/L (ref 22–29)
COLOR UR: YELLOW
CREAT SERPL-MCNC: 0.99 MG/DL (ref 0.57–1)
DEPRECATED RDW RBC AUTO: 42.2 FL (ref 37–54)
EOSINOPHIL # BLD AUTO: 0.09 10*3/MM3 (ref 0–0.4)
EOSINOPHIL NFR BLD AUTO: 1.2 % (ref 0.3–6.2)
ERYTHROCYTE [DISTWIDTH] IN BLOOD BY AUTOMATED COUNT: 12.9 % (ref 12.3–15.4)
GFR SERPL CREATININE-BSD FRML MDRD: 60 ML/MIN/1.73
GLOBULIN UR ELPH-MCNC: 2.6 GM/DL
GLUCOSE SERPL-MCNC: 135 MG/DL (ref 65–99)
GLUCOSE UR STRIP-MCNC: NEGATIVE MG/DL
HCG INTACT+B SERPL-ACNC: 1.82 MIU/ML
HCT VFR BLD AUTO: 45.8 % (ref 34–46.6)
HGB BLD-MCNC: 15.4 G/DL (ref 12–15.9)
HGB UR QL STRIP.AUTO: NEGATIVE
HOLD SPECIMEN: NORMAL
HYALINE CASTS UR QL AUTO: ABNORMAL /LPF
IMM GRANULOCYTES # BLD AUTO: 0.01 10*3/MM3 (ref 0–0.05)
IMM GRANULOCYTES NFR BLD AUTO: 0.1 % (ref 0–0.5)
INTERNAL NEGATIVE CONTROL: NEGATIVE
INTERNAL POSITIVE CONTROL: POSITIVE
KETONES UR QL STRIP: NEGATIVE
LEUKOCYTE ESTERASE UR QL STRIP.AUTO: ABNORMAL
LIPASE SERPL-CCNC: 38 U/L (ref 13–60)
LYMPHOCYTES # BLD AUTO: 1.61 10*3/MM3 (ref 0.7–3.1)
LYMPHOCYTES NFR BLD AUTO: 21.8 % (ref 19.6–45.3)
Lab: 0
MCH RBC QN AUTO: 30.3 PG (ref 26.6–33)
MCHC RBC AUTO-ENTMCNC: 33.6 G/DL (ref 31.5–35.7)
MCV RBC AUTO: 90 FL (ref 79–97)
MONOCYTES # BLD AUTO: 0.53 10*3/MM3 (ref 0.1–0.9)
MONOCYTES NFR BLD AUTO: 7.2 % (ref 5–12)
NEUTROPHILS NFR BLD AUTO: 5.13 10*3/MM3 (ref 1.7–7)
NEUTROPHILS NFR BLD AUTO: 69.3 % (ref 42.7–76)
NITRITE UR QL STRIP: NEGATIVE
NRBC BLD AUTO-RTO: 0 /100 WBC (ref 0–0.2)
PH UR STRIP.AUTO: 6 [PH] (ref 5–8)
PLATELET # BLD AUTO: 153 10*3/MM3 (ref 140–450)
PMV BLD AUTO: 10.3 FL (ref 6–12)
POTASSIUM SERPL-SCNC: 3.4 MMOL/L (ref 3.5–5.2)
PROT SERPL-MCNC: 7.3 G/DL (ref 6–8.5)
PROT UR QL STRIP: NEGATIVE
RBC # BLD AUTO: 5.09 10*6/MM3 (ref 3.77–5.28)
RBC # UR: ABNORMAL /HPF
REF LAB TEST METHOD: ABNORMAL
SODIUM SERPL-SCNC: 138 MMOL/L (ref 136–145)
SP GR UR STRIP: 1.01 (ref 1–1.03)
SQUAMOUS #/AREA URNS HPF: ABNORMAL /HPF
UROBILINOGEN UR QL STRIP: ABNORMAL
WBC # BLD AUTO: 7.4 10*3/MM3 (ref 3.4–10.8)
WBC UR QL AUTO: ABNORMAL /HPF
WHOLE BLOOD HOLD SPECIMEN: NORMAL

## 2021-05-30 PROCEDURE — 96374 THER/PROPH/DIAG INJ IV PUSH: CPT

## 2021-05-30 PROCEDURE — 81025 URINE PREGNANCY TEST: CPT | Performed by: EMERGENCY MEDICINE

## 2021-05-30 PROCEDURE — 80053 COMPREHEN METABOLIC PANEL: CPT | Performed by: EMERGENCY MEDICINE

## 2021-05-30 PROCEDURE — 85025 COMPLETE CBC W/AUTO DIFF WBC: CPT | Performed by: EMERGENCY MEDICINE

## 2021-05-30 PROCEDURE — 25010000002 DICYCLOMINE PER 20 MG: Performed by: EMERGENCY MEDICINE

## 2021-05-30 PROCEDURE — 84702 CHORIONIC GONADOTROPIN TEST: CPT | Performed by: EMERGENCY MEDICINE

## 2021-05-30 PROCEDURE — 99283 EMERGENCY DEPT VISIT LOW MDM: CPT

## 2021-05-30 PROCEDURE — 96372 THER/PROPH/DIAG INJ SC/IM: CPT

## 2021-05-30 PROCEDURE — 83690 ASSAY OF LIPASE: CPT | Performed by: EMERGENCY MEDICINE

## 2021-05-30 PROCEDURE — 96376 TX/PRO/DX INJ SAME DRUG ADON: CPT

## 2021-05-30 PROCEDURE — 74176 CT ABD & PELVIS W/O CONTRAST: CPT

## 2021-05-30 PROCEDURE — 81001 URINALYSIS AUTO W/SCOPE: CPT | Performed by: EMERGENCY MEDICINE

## 2021-05-30 PROCEDURE — 96375 TX/PRO/DX INJ NEW DRUG ADDON: CPT

## 2021-05-30 PROCEDURE — 25010000002 DROPERIDOL PER 5 MG: Performed by: EMERGENCY MEDICINE

## 2021-05-30 PROCEDURE — 25010000002 FENTANYL CITRATE (PF) 50 MCG/ML SOLUTION: Performed by: EMERGENCY MEDICINE

## 2021-05-30 RX ORDER — DROPERIDOL 2.5 MG/ML
2.5 INJECTION, SOLUTION INTRAMUSCULAR; INTRAVENOUS ONCE
Status: COMPLETED | OUTPATIENT
Start: 2021-05-30 | End: 2021-05-30

## 2021-05-30 RX ORDER — FAMOTIDINE 10 MG/ML
20 INJECTION, SOLUTION INTRAVENOUS ONCE
Status: COMPLETED | OUTPATIENT
Start: 2021-05-30 | End: 2021-05-30

## 2021-05-30 RX ORDER — DICYCLOMINE HYDROCHLORIDE 10 MG/ML
20 INJECTION INTRAMUSCULAR ONCE
Status: COMPLETED | OUTPATIENT
Start: 2021-05-30 | End: 2021-05-30

## 2021-05-30 RX ORDER — SODIUM CHLORIDE 9 MG/ML
10 INJECTION INTRAVENOUS AS NEEDED
Status: DISCONTINUED | OUTPATIENT
Start: 2021-05-30 | End: 2021-05-30 | Stop reason: HOSPADM

## 2021-05-30 RX ORDER — FENTANYL CITRATE 50 UG/ML
50 INJECTION, SOLUTION INTRAMUSCULAR; INTRAVENOUS
Status: DISCONTINUED | OUTPATIENT
Start: 2021-05-30 | End: 2021-05-30 | Stop reason: HOSPADM

## 2021-05-30 RX ORDER — FAMOTIDINE 20 MG/1
20 TABLET, FILM COATED ORAL 2 TIMES DAILY
Qty: 60 TABLET | Refills: 0 | Status: SHIPPED | OUTPATIENT
Start: 2021-05-30 | End: 2021-07-20 | Stop reason: HOSPADM

## 2021-05-30 RX ADMIN — FENTANYL CITRATE 50 MCG: 0.05 INJECTION, SOLUTION INTRAMUSCULAR; INTRAVENOUS at 16:59

## 2021-05-30 RX ADMIN — FAMOTIDINE 20 MG: 10 INJECTION, SOLUTION INTRAVENOUS at 16:01

## 2021-05-30 RX ADMIN — DROPERIDOL 2.5 MG: 2.5 INJECTION, SOLUTION INTRAMUSCULAR; INTRAVENOUS at 15:46

## 2021-05-30 RX ADMIN — FENTANYL CITRATE 50 MCG: 0.05 INJECTION, SOLUTION INTRAMUSCULAR; INTRAVENOUS at 15:46

## 2021-05-30 RX ADMIN — DICYCLOMINE HYDROCHLORIDE 20 MG: 20 INJECTION INTRAMUSCULAR at 16:01

## 2021-05-30 RX ADMIN — SODIUM CHLORIDE, POTASSIUM CHLORIDE, SODIUM LACTATE AND CALCIUM CHLORIDE 500 ML: 600; 310; 30; 20 INJECTION, SOLUTION INTRAVENOUS at 15:45

## 2021-06-18 ENCOUNTER — APPOINTMENT (OUTPATIENT)
Dept: CT IMAGING | Facility: HOSPITAL | Age: 46
End: 2021-06-18

## 2021-06-18 ENCOUNTER — APPOINTMENT (OUTPATIENT)
Dept: GENERAL RADIOLOGY | Facility: HOSPITAL | Age: 46
End: 2021-06-18

## 2021-06-18 ENCOUNTER — HOSPITAL ENCOUNTER (INPATIENT)
Facility: HOSPITAL | Age: 46
LOS: 14 days | Discharge: NURSING, REHAB OR LONG-TERM CARE | End: 2021-07-02
Attending: EMERGENCY MEDICINE | Admitting: INTERNAL MEDICINE

## 2021-06-18 DIAGNOSIS — K29.70 GASTRITIS: ICD-10-CM

## 2021-06-18 DIAGNOSIS — I21.02 STEMI INVOLVING LEFT ANTERIOR DESCENDING CORONARY ARTERY (HCC): ICD-10-CM

## 2021-06-18 DIAGNOSIS — F41.9 ANXIETY DISORDER, UNSPECIFIED TYPE: ICD-10-CM

## 2021-06-18 DIAGNOSIS — I46.9 CARDIAC ARREST (HCC): Primary | ICD-10-CM

## 2021-06-18 DIAGNOSIS — G89.29 OTHER CHRONIC PAIN: ICD-10-CM

## 2021-06-18 DIAGNOSIS — R13.12 OROPHARYNGEAL DYSPHAGIA: ICD-10-CM

## 2021-06-18 PROBLEM — F32.A DEPRESSION: Status: ACTIVE | Noted: 2021-06-18

## 2021-06-18 PROBLEM — I47.10 SVT (SUPRAVENTRICULAR TACHYCARDIA): Status: ACTIVE | Noted: 2021-06-18

## 2021-06-18 PROBLEM — I47.1 SVT (SUPRAVENTRICULAR TACHYCARDIA): Status: ACTIVE | Noted: 2021-06-18

## 2021-06-18 LAB
ACT BLD: 313 SECONDS (ref 82–152)
ALBUMIN SERPL-MCNC: 4.4 G/DL (ref 3.5–5.2)
ALBUMIN/GLOB SERPL: 1.6 G/DL
ALP SERPL-CCNC: 158 U/L (ref 39–117)
ALT SERPL W P-5'-P-CCNC: 23 U/L (ref 1–33)
AMPHET+METHAMPHET UR QL: NEGATIVE
AMPHETAMINES UR QL: NEGATIVE
ANION GAP SERPL CALCULATED.3IONS-SCNC: 14 MMOL/L (ref 5–15)
ARTERIAL PATENCY WRIST A: ABNORMAL
AST SERPL-CCNC: 33 U/L (ref 1–32)
ATMOSPHERIC PRESS: ABNORMAL MM[HG]
ATMOSPHERIC PRESS: ABNORMAL MM[HG]
BARBITURATES UR QL SCN: NEGATIVE
BASE EXCESS BLDA CALC-SCNC: -9.6 MMOL/L (ref 0–2)
BASE EXCESS BLDC CALC-SCNC: -12.4 MMOL/L (ref 0–2)
BASOPHILS # BLD AUTO: 0.05 10*3/MM3 (ref 0–0.2)
BASOPHILS NFR BLD AUTO: 0.5 % (ref 0–1.5)
BDY SITE: ABNORMAL
BDY SITE: ABNORMAL
BENZODIAZ UR QL SCN: NEGATIVE
BILIRUB SERPL-MCNC: 0.3 MG/DL (ref 0–1.2)
BILIRUB UR QL STRIP: NEGATIVE
BODY TEMPERATURE: 37 C
BODY TEMPERATURE: 37 C
BUN SERPL-MCNC: 9 MG/DL (ref 6–20)
BUN/CREAT SERPL: 9.3 (ref 7–25)
BUPRENORPHINE SERPL-MCNC: NEGATIVE NG/ML
CALCIUM SPEC-SCNC: 9.8 MG/DL (ref 8.6–10.5)
CANNABINOIDS SERPL QL: NEGATIVE
CHLORIDE SERPL-SCNC: 99 MMOL/L (ref 98–107)
CLARITY UR: CLEAR
CO2 BLDA-SCNC: 19.6 MMOL/L (ref 22–33)
CO2 BLDA-SCNC: 20.7 MMOL/L (ref 22–33)
CO2 SERPL-SCNC: 27 MMOL/L (ref 22–29)
COCAINE UR QL: NEGATIVE
COHGB MFR BLD: 0.7 % (ref 0–2)
COLOR UR: YELLOW
CREAT SERPL-MCNC: 0.97 MG/DL (ref 0.57–1)
D-LACTATE SERPL-SCNC: 0.6 MMOL/L (ref 0.5–2)
D-LACTATE SERPL-SCNC: 2.3 MMOL/L (ref 0.5–2)
DEPRECATED RDW RBC AUTO: 43.3 FL (ref 37–54)
EOSINOPHIL # BLD AUTO: 0.17 10*3/MM3 (ref 0–0.4)
EOSINOPHIL NFR BLD AUTO: 1.6 % (ref 0.3–6.2)
EPAP: 0
ERYTHROCYTE [DISTWIDTH] IN BLOOD BY AUTOMATED COUNT: 13.2 % (ref 12.3–15.4)
ETHANOL BLD-MCNC: <10 MG/DL (ref 0–10)
GFR SERPL CREATININE-BSD FRML MDRD: 62 ML/MIN/1.73
GLOBULIN UR ELPH-MCNC: 2.8 GM/DL
GLUCOSE SERPL-MCNC: 112 MG/DL (ref 65–99)
GLUCOSE UR STRIP-MCNC: NEGATIVE MG/DL
HCO3 BLDA-SCNC: 18.2 MMOL/L (ref 20–26)
HCO3 BLDC-SCNC: 18.8 MMOL/L (ref 20–26)
HCT VFR BLD AUTO: 46.9 % (ref 34–46.6)
HCT VFR BLD CALC: 61.4 %
HGB BLD-MCNC: 15.9 G/DL (ref 12–15.9)
HGB BLDA-MCNC: 20 G/DL (ref 14–18)
HGB BLDA-MCNC: 20.1 G/DL (ref 14–18)
HGB UR QL STRIP.AUTO: NEGATIVE
HOLD SPECIMEN: NORMAL
IMM GRANULOCYTES # BLD AUTO: 0.04 10*3/MM3 (ref 0–0.05)
IMM GRANULOCYTES NFR BLD AUTO: 0.4 % (ref 0–0.5)
INHALED O2 CONCENTRATION: 100 %
INHALED O2 CONCENTRATION: 80 %
IPAP: 0
KETONES UR QL STRIP: ABNORMAL
LEUKOCYTE ESTERASE UR QL STRIP.AUTO: NEGATIVE
LIPASE SERPL-CCNC: 34 U/L (ref 13–60)
LYMPHOCYTES # BLD AUTO: 2.26 10*3/MM3 (ref 0.7–3.1)
LYMPHOCYTES NFR BLD AUTO: 21.6 % (ref 19.6–45.3)
Lab: ABNORMAL
Lab: ABNORMAL
MCH RBC QN AUTO: 30.3 PG (ref 26.6–33)
MCHC RBC AUTO-ENTMCNC: 33.9 G/DL (ref 31.5–35.7)
MCV RBC AUTO: 89.3 FL (ref 79–97)
METHADONE UR QL SCN: NEGATIVE
METHGB BLD QL: 0.3 % (ref 0–1.5)
MODALITY: ABNORMAL
MODALITY: ABNORMAL
MONOCYTES # BLD AUTO: 0.75 10*3/MM3 (ref 0.1–0.9)
MONOCYTES NFR BLD AUTO: 7.2 % (ref 5–12)
NEUTROPHILS NFR BLD AUTO: 68.7 % (ref 42.7–76)
NEUTROPHILS NFR BLD AUTO: 7.2 10*3/MM3 (ref 1.7–7)
NITRITE UR QL STRIP: NEGATIVE
NOTE: ABNORMAL
NOTE: ABNORMAL
NOTIFIED BY: ABNORMAL
NOTIFIED BY: ABNORMAL
NOTIFIED WHO: ABNORMAL
NOTIFIED WHO: ABNORMAL
NRBC BLD AUTO-RTO: 0 /100 WBC (ref 0–0.2)
OPIATES UR QL: NEGATIVE
OXYCODONE UR QL SCN: NEGATIVE
OXYHGB MFR BLDV: 97.6 % (ref 94–99)
PAW @ PEAK INSP FLOW SETTING VENT: 0 CMH2O
PCO2 BLDA: 45.2 MM HG (ref 35–45)
PCO2 BLDC: 61.7 MM HG (ref 35–45)
PCO2 TEMP ADJ BLD: 45.2 MM HG (ref 35–45)
PCP UR QL SCN: NEGATIVE
PEEP RESPIRATORY: 8 CM[H2O]
PH BLDA: 7.21 PH UNITS (ref 7.35–7.45)
PH BLDC: 7.09 PH UNITS (ref 7.35–7.45)
PH UR STRIP.AUTO: 5.5 [PH] (ref 5–8)
PH, TEMP CORRECTED: 7.21 PH UNITS
PLATELET # BLD AUTO: 136 10*3/MM3 (ref 140–450)
PMV BLD AUTO: 10.9 FL (ref 6–12)
PO2 BLDA: 162 MM HG (ref 83–108)
PO2 BLDC: 177 MM HG
PO2 TEMP ADJ BLD: 162 MM HG (ref 83–108)
POTASSIUM SERPL-SCNC: 3.7 MMOL/L (ref 3.5–5.2)
PROCALCITONIN SERPL-MCNC: 0.09 NG/ML (ref 0–0.25)
PROPOXYPH UR QL: NEGATIVE
PROT SERPL-MCNC: 7.2 G/DL (ref 6–8.5)
PROT UR QL STRIP: NEGATIVE
QT INTERVAL: 402 MS
QTC INTERVAL: 544 MS
RBC # BLD AUTO: 5.25 10*6/MM3 (ref 3.77–5.28)
SAO2 % BLDC FROM PO2: 98.3 % (ref 92–96)
SARS-COV-2 RDRP RESP QL NAA+PROBE: NORMAL
SODIUM SERPL-SCNC: 140 MMOL/L (ref 136–145)
SP GR UR STRIP: 1.02 (ref 1–1.03)
TOTAL RATE: 0 BREATHS/MINUTE
TRICYCLICS UR QL SCN: NEGATIVE
UROBILINOGEN UR QL STRIP: ABNORMAL
VENTILATOR MODE: ABNORMAL
VENTILATOR MODE: ABNORMAL
WBC # BLD AUTO: 10.47 10*3/MM3 (ref 3.4–10.8)
WHOLE BLOOD HOLD SPECIMEN: NORMAL

## 2021-06-18 PROCEDURE — 25010000002 HEPARIN (PORCINE) PER 1000 UNITS: Performed by: INTERNAL MEDICINE

## 2021-06-18 PROCEDURE — 83605 ASSAY OF LACTIC ACID: CPT | Performed by: INTERNAL MEDICINE

## 2021-06-18 PROCEDURE — 25010000002 HYDROMORPHONE PER 4 MG: Performed by: EMERGENCY MEDICINE

## 2021-06-18 PROCEDURE — 82805 BLOOD GASES W/O2 SATURATION: CPT

## 2021-06-18 PROCEDURE — 84145 PROCALCITONIN (PCT): CPT | Performed by: PHYSICIAN ASSISTANT

## 2021-06-18 PROCEDURE — 25010000002 MIDAZOLAM PER 1 MG: Performed by: EMERGENCY MEDICINE

## 2021-06-18 PROCEDURE — 71250 CT THORAX DX C-: CPT

## 2021-06-18 PROCEDURE — 94799 UNLISTED PULMONARY SVC/PX: CPT

## 2021-06-18 PROCEDURE — 92978 ENDOLUMINL IVUS OCT C 1ST: CPT | Performed by: INTERNAL MEDICINE

## 2021-06-18 PROCEDURE — 25010000002 ADENOSINE PER 6 MG: Performed by: EMERGENCY MEDICINE

## 2021-06-18 PROCEDURE — 80053 COMPREHEN METABOLIC PANEL: CPT | Performed by: PHYSICIAN ASSISTANT

## 2021-06-18 PROCEDURE — 74177 CT ABD & PELVIS W/CONTRAST: CPT

## 2021-06-18 PROCEDURE — 81003 URINALYSIS AUTO W/O SCOPE: CPT | Performed by: PHYSICIAN ASSISTANT

## 2021-06-18 PROCEDURE — 85018 HEMOGLOBIN: CPT | Performed by: INTERNAL MEDICINE

## 2021-06-18 PROCEDURE — 25010000003 LIDOCAINE 1 % SOLUTION: Performed by: INTERNAL MEDICINE

## 2021-06-18 PROCEDURE — C9460 INJECTION, CANGRELOR: HCPCS | Performed by: INTERNAL MEDICINE

## 2021-06-18 PROCEDURE — 25010000002 METHYLPREDNISOLONE PER 40 MG: Performed by: INTERNAL MEDICINE

## 2021-06-18 PROCEDURE — C1725 CATH, TRANSLUMIN NON-LASER: HCPCS | Performed by: INTERNAL MEDICINE

## 2021-06-18 PROCEDURE — C1894 INTRO/SHEATH, NON-LASER: HCPCS | Performed by: INTERNAL MEDICINE

## 2021-06-18 PROCEDURE — C1769 GUIDE WIRE: HCPCS | Performed by: INTERNAL MEDICINE

## 2021-06-18 PROCEDURE — 25010000002 IOPAMIDOL 61 % SOLUTION: Performed by: EMERGENCY MEDICINE

## 2021-06-18 PROCEDURE — C1753 CATH, INTRAVAS ULTRASOUND: HCPCS | Performed by: INTERNAL MEDICINE

## 2021-06-18 PROCEDURE — 99291 CRITICAL CARE FIRST HOUR: CPT | Performed by: INTERNAL MEDICINE

## 2021-06-18 PROCEDURE — B221Z2Z COMPUTERIZED TOMOGRAPHY (CT SCAN) OF MULTIPLE CORONARY ARTERIES USING INTRAVASCULAR OPTICAL COHERENCE: ICD-10-PCS | Performed by: INTERNAL MEDICINE

## 2021-06-18 PROCEDURE — 99255 IP/OBS CONSLTJ NEW/EST HI 80: CPT | Performed by: INTERNAL MEDICINE

## 2021-06-18 PROCEDURE — C1760 CLOSURE DEV, VASC: HCPCS | Performed by: INTERNAL MEDICINE

## 2021-06-18 PROCEDURE — 93458 L HRT ARTERY/VENTRICLE ANGIO: CPT | Performed by: INTERNAL MEDICINE

## 2021-06-18 PROCEDURE — C9606 PERC D-E COR REVASC W AMI S: HCPCS | Performed by: INTERNAL MEDICINE

## 2021-06-18 PROCEDURE — 25010000002 CANGRELOR TETRASODIUM 50 MG RECONSTITUTED SOLUTION 1 EACH VIAL: Performed by: INTERNAL MEDICINE

## 2021-06-18 PROCEDURE — 92941 PRQ TRLML REVSC TOT OCCL AMI: CPT | Performed by: INTERNAL MEDICINE

## 2021-06-18 PROCEDURE — B548ZZA ULTRASONOGRAPHY OF SUPERIOR VENA CAVA, GUIDANCE: ICD-10-PCS | Performed by: INTERNAL MEDICINE

## 2021-06-18 PROCEDURE — 25010000002 EPINEPHRINE 30 MG/30ML SOLUTION: Performed by: INTERNAL MEDICINE

## 2021-06-18 PROCEDURE — 80306 DRUG TEST PRSMV INSTRMNT: CPT | Performed by: PHYSICIAN ASSISTANT

## 2021-06-18 PROCEDURE — 25010000002 SUCCINYLCHOLINE PER 20 MG: Performed by: EMERGENCY MEDICINE

## 2021-06-18 PROCEDURE — 85347 COAGULATION TIME ACTIVATED: CPT

## 2021-06-18 PROCEDURE — 82077 ASSAY SPEC XCP UR&BREATH IA: CPT | Performed by: PHYSICIAN ASSISTANT

## 2021-06-18 PROCEDURE — 87635 SARS-COV-2 COVID-19 AMP PRB: CPT | Performed by: EMERGENCY MEDICINE

## 2021-06-18 PROCEDURE — 25010000002 METHYLPREDNISOLONE PER 40 MG: Performed by: PHYSICIAN ASSISTANT

## 2021-06-18 PROCEDURE — 92950 HEART/LUNG RESUSCITATION CPR: CPT

## 2021-06-18 PROCEDURE — 71045 X-RAY EXAM CHEST 1 VIEW: CPT

## 2021-06-18 PROCEDURE — 25010000002 EPINEPHRINE PER 0.1 MG: Performed by: EMERGENCY MEDICINE

## 2021-06-18 PROCEDURE — 99284 EMERGENCY DEPT VISIT MOD MDM: CPT

## 2021-06-18 PROCEDURE — 94640 AIRWAY INHALATION TREATMENT: CPT

## 2021-06-18 PROCEDURE — C1887 CATHETER, GUIDING: HCPCS | Performed by: INTERNAL MEDICINE

## 2021-06-18 PROCEDURE — 25010000002 EPINEPHRINE 1 MG/10ML SOLUTION PREFILLED SYRINGE: Performed by: EMERGENCY MEDICINE

## 2021-06-18 PROCEDURE — 82140 ASSAY OF AMMONIA: CPT | Performed by: INTERNAL MEDICINE

## 2021-06-18 PROCEDURE — 93005 ELECTROCARDIOGRAM TRACING: CPT | Performed by: EMERGENCY MEDICINE

## 2021-06-18 PROCEDURE — 36556 INSERT NON-TUNNEL CV CATH: CPT | Performed by: NURSE PRACTITIONER

## 2021-06-18 PROCEDURE — 5A1955Z RESPIRATORY VENTILATION, GREATER THAN 96 CONSECUTIVE HOURS: ICD-10-PCS | Performed by: INTERNAL MEDICINE

## 2021-06-18 PROCEDURE — 25010000002 ONDANSETRON PER 1 MG: Performed by: PHYSICIAN ASSISTANT

## 2021-06-18 PROCEDURE — 85014 HEMATOCRIT: CPT | Performed by: INTERNAL MEDICINE

## 2021-06-18 PROCEDURE — 4A023N7 MEASUREMENT OF CARDIAC SAMPLING AND PRESSURE, LEFT HEART, PERCUTANEOUS APPROACH: ICD-10-PCS | Performed by: INTERNAL MEDICINE

## 2021-06-18 PROCEDURE — B2151ZZ FLUOROSCOPY OF LEFT HEART USING LOW OSMOLAR CONTRAST: ICD-10-PCS | Performed by: INTERNAL MEDICINE

## 2021-06-18 PROCEDURE — 0BH17EZ INSERTION OF ENDOTRACHEAL AIRWAY INTO TRACHEA, VIA NATURAL OR ARTIFICIAL OPENING: ICD-10-PCS | Performed by: EMERGENCY MEDICINE

## 2021-06-18 PROCEDURE — 0 IOPAMIDOL PER 1 ML: Performed by: INTERNAL MEDICINE

## 2021-06-18 PROCEDURE — 83690 ASSAY OF LIPASE: CPT | Performed by: PHYSICIAN ASSISTANT

## 2021-06-18 PROCEDURE — 25010000002 OCTREOTIDE PER 25 MCG: Performed by: INTERNAL MEDICINE

## 2021-06-18 PROCEDURE — 02HV33Z INSERTION OF INFUSION DEVICE INTO SUPERIOR VENA CAVA, PERCUTANEOUS APPROACH: ICD-10-PCS | Performed by: INTERNAL MEDICINE

## 2021-06-18 PROCEDURE — 25010000002 DIPHENHYDRAMINE PER 50 MG: Performed by: PHYSICIAN ASSISTANT

## 2021-06-18 PROCEDURE — 83605 ASSAY OF LACTIC ACID: CPT | Performed by: PHYSICIAN ASSISTANT

## 2021-06-18 PROCEDURE — 85025 COMPLETE CBC W/AUTO DIFF WBC: CPT | Performed by: PHYSICIAN ASSISTANT

## 2021-06-18 PROCEDURE — 25010000002 AMIODARONE PER 30 MG: Performed by: EMERGENCY MEDICINE

## 2021-06-18 PROCEDURE — 31500 INSERT EMERGENCY AIRWAY: CPT

## 2021-06-18 PROCEDURE — B2111ZZ FLUOROSCOPY OF MULTIPLE CORONARY ARTERIES USING LOW OSMOLAR CONTRAST: ICD-10-PCS | Performed by: INTERNAL MEDICINE

## 2021-06-18 PROCEDURE — 83050 HGB METHEMOGLOBIN QUAN: CPT

## 2021-06-18 PROCEDURE — 027034Z DILATION OF CORONARY ARTERY, ONE ARTERY WITH DRUG-ELUTING INTRALUMINAL DEVICE, PERCUTANEOUS APPROACH: ICD-10-PCS | Performed by: INTERNAL MEDICINE

## 2021-06-18 PROCEDURE — 25010000002 MIDAZOLAM PER 1 MG: Performed by: INTERNAL MEDICINE

## 2021-06-18 PROCEDURE — 25010000002 AMIODARONE IN DEXTROSE 5% 360-4.14 MG/200ML-% SOLUTION: Performed by: EMERGENCY MEDICINE

## 2021-06-18 PROCEDURE — C1874 STENT, COATED/COV W/DEL SYS: HCPCS | Performed by: INTERNAL MEDICINE

## 2021-06-18 PROCEDURE — 25010000002 FENTANYL CITRATE (PF) 50 MCG/ML SOLUTION: Performed by: INTERNAL MEDICINE

## 2021-06-18 PROCEDURE — 82375 ASSAY CARBOXYHB QUANT: CPT

## 2021-06-18 DEVICE — XIENCE SIERRA™ EVEROLIMUS ELUTING CORONARY STENT SYSTEM 3.00 MM X 28 MM / RAPID-EXCHANGE
Type: IMPLANTABLE DEVICE | Status: FUNCTIONAL
Brand: XIENCE SIERRA™

## 2021-06-18 RX ORDER — FENTANYL CITRATE 50 UG/ML
INJECTION, SOLUTION INTRAMUSCULAR; INTRAVENOUS AS NEEDED
Status: DISCONTINUED | OUTPATIENT
Start: 2021-06-18 | End: 2021-06-18 | Stop reason: HOSPADM

## 2021-06-18 RX ORDER — METHYLPREDNISOLONE SODIUM SUCCINATE 40 MG/ML
40 INJECTION, POWDER, LYOPHILIZED, FOR SOLUTION INTRAMUSCULAR; INTRAVENOUS EVERY 24 HOURS
Status: COMPLETED | OUTPATIENT
Start: 2021-06-19 | End: 2021-06-23

## 2021-06-18 RX ORDER — SODIUM CHLORIDE 0.9 % (FLUSH) 0.9 %
10 SYRINGE (ML) INJECTION AS NEEDED
Status: DISCONTINUED | OUTPATIENT
Start: 2021-06-18 | End: 2021-06-18

## 2021-06-18 RX ORDER — MIDAZOLAM HYDROCHLORIDE 1 MG/ML
2 INJECTION INTRAMUSCULAR; INTRAVENOUS ONCE
Status: COMPLETED | OUTPATIENT
Start: 2021-06-18 | End: 2021-06-18

## 2021-06-18 RX ORDER — MIDAZOLAM IN NACL,ISO-OSMOT/PF 50 MG/50ML
INFUSION BOTTLE (ML) INTRAVENOUS
Status: COMPLETED
Start: 2021-06-18 | End: 2021-06-18

## 2021-06-18 RX ORDER — FOLIC ACID 1 MG/1
1 TABLET ORAL DAILY
Status: DISCONTINUED | OUTPATIENT
Start: 2021-06-19 | End: 2021-06-28

## 2021-06-18 RX ORDER — SODIUM CHLORIDE 0.9 % (FLUSH) 0.9 %
10 SYRINGE (ML) INJECTION EVERY 12 HOURS SCHEDULED
Status: DISCONTINUED | OUTPATIENT
Start: 2021-06-18 | End: 2021-07-02 | Stop reason: HOSPADM

## 2021-06-18 RX ORDER — MIDAZOLAM IN NACL,ISO-OSMOT/PF 50 MG/50ML
1-10 INFUSION BOTTLE (ML) INTRAVENOUS
Status: DISCONTINUED | OUTPATIENT
Start: 2021-06-18 | End: 2021-06-23

## 2021-06-18 RX ORDER — HEPARIN SODIUM 1000 [USP'U]/ML
INJECTION, SOLUTION INTRAVENOUS; SUBCUTANEOUS AS NEEDED
Status: DISCONTINUED | OUTPATIENT
Start: 2021-06-18 | End: 2021-06-18 | Stop reason: HOSPADM

## 2021-06-18 RX ORDER — EPINEPHRINE 1 MG/ML
1 INJECTION, SOLUTION, CONCENTRATE INTRAVENOUS ONCE
Status: COMPLETED | OUTPATIENT
Start: 2021-06-18 | End: 2021-06-18

## 2021-06-18 RX ORDER — METHYLPREDNISOLONE SODIUM SUCCINATE 40 MG/ML
40 INJECTION, POWDER, LYOPHILIZED, FOR SOLUTION INTRAMUSCULAR; INTRAVENOUS ONCE
Status: COMPLETED | OUTPATIENT
Start: 2021-06-18 | End: 2021-06-18

## 2021-06-18 RX ORDER — METHYLPREDNISOLONE SODIUM SUCCINATE 125 MG/2ML
125 INJECTION, POWDER, LYOPHILIZED, FOR SOLUTION INTRAMUSCULAR; INTRAVENOUS ONCE
Status: DISCONTINUED | OUTPATIENT
Start: 2021-06-18 | End: 2021-06-18

## 2021-06-18 RX ORDER — ASPIRIN 81 MG/1
81 TABLET, CHEWABLE ORAL DAILY
Status: DISCONTINUED | OUTPATIENT
Start: 2021-06-19 | End: 2021-06-28

## 2021-06-18 RX ORDER — IPRATROPIUM BROMIDE AND ALBUTEROL SULFATE 2.5; .5 MG/3ML; MG/3ML
3 SOLUTION RESPIRATORY (INHALATION) ONCE
Status: COMPLETED | OUTPATIENT
Start: 2021-06-19 | End: 2021-06-18

## 2021-06-18 RX ORDER — ROSUVASTATIN CALCIUM 20 MG/1
20 TABLET, COATED ORAL NIGHTLY
Status: DISCONTINUED | OUTPATIENT
Start: 2021-06-19 | End: 2021-06-21

## 2021-06-18 RX ORDER — PHENYLEPHRINE HCL IN 0.9% NACL 0.5 MG/5ML
.5-3 SYRINGE (ML) INTRAVENOUS
Status: DISCONTINUED | OUTPATIENT
Start: 2021-06-19 | End: 2021-06-24

## 2021-06-18 RX ORDER — ADENOSINE 3 MG/ML
12 INJECTION, SOLUTION INTRAVENOUS ONCE
Status: COMPLETED | OUTPATIENT
Start: 2021-06-18 | End: 2021-06-18

## 2021-06-18 RX ORDER — LACTULOSE 10 G/15ML
10 SOLUTION ORAL 2 TIMES DAILY
Status: DISCONTINUED | OUTPATIENT
Start: 2021-06-19 | End: 2021-06-28

## 2021-06-18 RX ORDER — MIDAZOLAM HYDROCHLORIDE 1 MG/ML
INJECTION INTRAMUSCULAR; INTRAVENOUS AS NEEDED
Status: DISCONTINUED | OUTPATIENT
Start: 2021-06-18 | End: 2021-06-18 | Stop reason: HOSPADM

## 2021-06-18 RX ORDER — SODIUM CHLORIDE 0.9 % (FLUSH) 0.9 %
10 SYRINGE (ML) INJECTION AS NEEDED
Status: DISCONTINUED | OUTPATIENT
Start: 2021-06-18 | End: 2021-07-02 | Stop reason: HOSPADM

## 2021-06-18 RX ORDER — FLUOXETINE HYDROCHLORIDE 20 MG/1
20 CAPSULE ORAL DAILY
Status: DISCONTINUED | OUTPATIENT
Start: 2021-06-19 | End: 2021-06-18

## 2021-06-18 RX ORDER — OCTREOTIDE ACETATE 50 UG/ML
50 INJECTION, SOLUTION INTRAVENOUS; SUBCUTANEOUS ONCE
Status: COMPLETED | OUTPATIENT
Start: 2021-06-18 | End: 2021-06-19

## 2021-06-18 RX ORDER — ALBUTEROL SULFATE 2.5 MG/3ML
2.5 SOLUTION RESPIRATORY (INHALATION) EVERY 6 HOURS PRN
Status: DISCONTINUED | OUTPATIENT
Start: 2021-06-18 | End: 2021-07-02 | Stop reason: HOSPADM

## 2021-06-18 RX ORDER — IPRATROPIUM BROMIDE AND ALBUTEROL SULFATE 2.5; .5 MG/3ML; MG/3ML
3 SOLUTION RESPIRATORY (INHALATION) EVERY 4 HOURS PRN
Status: DISCONTINUED | OUTPATIENT
Start: 2021-06-18 | End: 2021-06-29 | Stop reason: SDUPTHER

## 2021-06-18 RX ORDER — DIPHENHYDRAMINE HYDROCHLORIDE 50 MG/ML
50 INJECTION INTRAMUSCULAR; INTRAVENOUS ONCE
Status: COMPLETED | OUTPATIENT
Start: 2021-06-18 | End: 2021-06-18

## 2021-06-18 RX ORDER — CLOPIDOGREL BISULFATE 75 MG/1
75 TABLET ORAL DAILY
Status: DISCONTINUED | OUTPATIENT
Start: 2021-06-20 | End: 2021-06-19

## 2021-06-18 RX ORDER — IPRATROPIUM BROMIDE AND ALBUTEROL SULFATE 2.5; .5 MG/3ML; MG/3ML
3 SOLUTION RESPIRATORY (INHALATION)
Status: DISCONTINUED | OUTPATIENT
Start: 2021-06-19 | End: 2021-06-29

## 2021-06-18 RX ORDER — ROSUVASTATIN CALCIUM 20 MG/1
20 TABLET, COATED ORAL NIGHTLY
Status: DISCONTINUED | OUTPATIENT
Start: 2021-06-19 | End: 2021-06-18

## 2021-06-18 RX ORDER — FLUOXETINE HYDROCHLORIDE 20 MG/1
20 CAPSULE ORAL DAILY
Status: DISCONTINUED | OUTPATIENT
Start: 2021-06-19 | End: 2021-06-26

## 2021-06-18 RX ORDER — HYDROMORPHONE HYDROCHLORIDE 1 MG/ML
0.5 INJECTION, SOLUTION INTRAMUSCULAR; INTRAVENOUS; SUBCUTANEOUS
Status: DISCONTINUED | OUTPATIENT
Start: 2021-06-18 | End: 2021-06-18

## 2021-06-18 RX ORDER — FOLIC ACID 1 MG/1
1 TABLET ORAL DAILY
Status: DISCONTINUED | OUTPATIENT
Start: 2021-06-19 | End: 2021-06-18

## 2021-06-18 RX ORDER — PANTOPRAZOLE SODIUM 40 MG/10ML
80 INJECTION, POWDER, LYOPHILIZED, FOR SOLUTION INTRAVENOUS ONCE
Status: COMPLETED | OUTPATIENT
Start: 2021-06-18 | End: 2021-06-18

## 2021-06-18 RX ORDER — AMIODARONE HYDROCHLORIDE 50 MG/ML
INJECTION, SOLUTION INTRAVENOUS
Status: COMPLETED | OUTPATIENT
Start: 2021-06-18 | End: 2021-06-18

## 2021-06-18 RX ORDER — METHYLPREDNISOLONE SODIUM SUCCINATE 40 MG/ML
80 INJECTION, POWDER, LYOPHILIZED, FOR SOLUTION INTRAMUSCULAR; INTRAVENOUS ONCE
Status: COMPLETED | OUTPATIENT
Start: 2021-06-18 | End: 2021-06-18

## 2021-06-18 RX ORDER — SODIUM CHLORIDE, SODIUM LACTATE, POTASSIUM CHLORIDE, CALCIUM CHLORIDE 600; 310; 30; 20 MG/100ML; MG/100ML; MG/100ML; MG/100ML
75 INJECTION, SOLUTION INTRAVENOUS CONTINUOUS
Status: DISCONTINUED | OUTPATIENT
Start: 2021-06-18 | End: 2021-06-21

## 2021-06-18 RX ORDER — ONDANSETRON 2 MG/ML
4 INJECTION INTRAMUSCULAR; INTRAVENOUS ONCE
Status: COMPLETED | OUTPATIENT
Start: 2021-06-18 | End: 2021-06-18

## 2021-06-18 RX ORDER — CLOPIDOGREL BISULFATE 75 MG/1
600 TABLET ORAL ONCE
Status: DISCONTINUED | OUTPATIENT
Start: 2021-06-19 | End: 2021-06-19

## 2021-06-18 RX ORDER — IPRATROPIUM BROMIDE AND ALBUTEROL SULFATE 2.5; .5 MG/3ML; MG/3ML
3 SOLUTION RESPIRATORY (INHALATION)
Status: DISCONTINUED | OUTPATIENT
Start: 2021-06-19 | End: 2021-06-18

## 2021-06-18 RX ORDER — CHLORHEXIDINE GLUCONATE 0.12 MG/ML
15 RINSE ORAL EVERY 12 HOURS SCHEDULED
Status: DISCONTINUED | OUTPATIENT
Start: 2021-06-18 | End: 2021-06-26

## 2021-06-18 RX ORDER — ADENOSINE 3 MG/ML
6 INJECTION, SOLUTION INTRAVENOUS ONCE
Status: COMPLETED | OUTPATIENT
Start: 2021-06-18 | End: 2021-06-18

## 2021-06-18 RX ORDER — ASPIRIN 81 MG/1
81 TABLET ORAL DAILY
Status: DISCONTINUED | OUTPATIENT
Start: 2021-06-19 | End: 2021-06-18

## 2021-06-18 RX ORDER — LIDOCAINE HYDROCHLORIDE 10 MG/ML
INJECTION, SOLUTION INFILTRATION; PERINEURAL AS NEEDED
Status: DISCONTINUED | OUTPATIENT
Start: 2021-06-18 | End: 2021-06-18 | Stop reason: HOSPADM

## 2021-06-18 RX ORDER — SODIUM CHLORIDE 0.9 % (FLUSH) 0.9 %
10 SYRINGE (ML) INJECTION AS NEEDED
Status: DISCONTINUED | OUTPATIENT
Start: 2021-06-18 | End: 2021-06-20

## 2021-06-18 RX ORDER — SUCRALFATE 1 G/1
1 TABLET ORAL 4 TIMES DAILY
Status: ON HOLD | COMMUNITY
End: 2021-06-24

## 2021-06-18 RX ADMIN — MIDAZOLAM 2 MG: 1 INJECTION INTRAMUSCULAR; INTRAVENOUS at 21:05

## 2021-06-18 RX ADMIN — ADENOSINE 12 MG: 3 INJECTION INTRAVENOUS at 22:19

## 2021-06-18 RX ADMIN — Medication 100 MG: at 20:24

## 2021-06-18 RX ADMIN — METHYLPREDNISOLONE SODIUM SUCCINATE 80 MG: 40 INJECTION, POWDER, FOR SOLUTION INTRAMUSCULAR; INTRAVENOUS at 18:59

## 2021-06-18 RX ADMIN — IPRATROPIUM BROMIDE AND ALBUTEROL SULFATE 3 ML: 2.5; .5 SOLUTION RESPIRATORY (INHALATION) at 23:45

## 2021-06-18 RX ADMIN — Medication 1 MG/HR: at 21:09

## 2021-06-18 RX ADMIN — HYDROMORPHONE HYDROCHLORIDE 0.5 MG: 1 INJECTION, SOLUTION INTRAMUSCULAR; INTRAVENOUS; SUBCUTANEOUS at 19:00

## 2021-06-18 RX ADMIN — ADENOSINE 6 MG: 3 INJECTION INTRAVENOUS at 21:15

## 2021-06-18 RX ADMIN — EPINEPHRINE 1 MG: 0.1 INJECTION, SOLUTION ENDOTRACHEAL; INTRACARDIAC; INTRAVENOUS at 20:24

## 2021-06-18 RX ADMIN — SODIUM CHLORIDE, POTASSIUM CHLORIDE, SODIUM LACTATE AND CALCIUM CHLORIDE 75 ML/HR: 600; 310; 30; 20 INJECTION, SOLUTION INTRAVENOUS at 23:49

## 2021-06-18 RX ADMIN — SODIUM BICARBONATE 50 MEQ: 84 INJECTION, SOLUTION INTRAVENOUS at 21:17

## 2021-06-18 RX ADMIN — Medication 2 MG/HR: at 21:58

## 2021-06-18 RX ADMIN — SODIUM BICARBONATE 50 MEQ: 84 INJECTION, SOLUTION INTRAVENOUS at 20:53

## 2021-06-18 RX ADMIN — EPINEPHRINE 1 MG: 1 INJECTION, SOLUTION INTRAMUSCULAR; SUBCUTANEOUS at 21:11

## 2021-06-18 RX ADMIN — AMIODARONE HYDROCHLORIDE 1 MG/MIN: 1.8 INJECTION, SOLUTION INTRAVENOUS at 21:16

## 2021-06-18 RX ADMIN — DIPHENHYDRAMINE HYDROCHLORIDE 50 MG: 50 INJECTION INTRAMUSCULAR; INTRAVENOUS at 18:59

## 2021-06-18 RX ADMIN — EPINEPHRINE 1 MG: 0.1 INJECTION, SOLUTION ENDOTRACHEAL; INTRACARDIAC; INTRAVENOUS at 20:31

## 2021-06-18 RX ADMIN — SODIUM BICARBONATE 50 MEQ: 84 INJECTION, SOLUTION INTRAVENOUS at 20:31

## 2021-06-18 RX ADMIN — ETOMIDATE 20 MG: 2 INJECTION, SOLUTION INTRAVENOUS at 20:25

## 2021-06-18 RX ADMIN — MIDAZOLAM 2 MG: 1 INJECTION INTRAMUSCULAR; INTRAVENOUS at 21:21

## 2021-06-18 RX ADMIN — Medication 0.02 MCG/KG/MIN: at 21:11

## 2021-06-18 RX ADMIN — EPINEPHRINE 1 MG: 0.1 INJECTION, SOLUTION ENDOTRACHEAL; INTRACARDIAC; INTRAVENOUS at 20:26

## 2021-06-18 RX ADMIN — SODIUM CHLORIDE 1572 ML: 9 INJECTION, SOLUTION INTRAVENOUS at 19:02

## 2021-06-18 RX ADMIN — PANTOPRAZOLE SODIUM 80 MG: 40 INJECTION, POWDER, FOR SOLUTION INTRAVENOUS at 19:00

## 2021-06-18 RX ADMIN — OCTREOTIDE ACETATE 25 MCG/HR: 500 INJECTION, SOLUTION INTRAVENOUS; SUBCUTANEOUS at 23:57

## 2021-06-18 RX ADMIN — IOPAMIDOL 85 ML: 612 INJECTION, SOLUTION INTRAVENOUS at 20:07

## 2021-06-18 RX ADMIN — ONDANSETRON 4 MG: 2 INJECTION INTRAMUSCULAR; INTRAVENOUS at 18:58

## 2021-06-18 RX ADMIN — METHYLPREDNISOLONE SODIUM SUCCINATE 40 MG: 40 INJECTION, POWDER, FOR SOLUTION INTRAMUSCULAR; INTRAVENOUS at 23:47

## 2021-06-18 RX ADMIN — AMIODARONE HYDROCHLORIDE 150 MG: 50 INJECTION, SOLUTION INTRAVENOUS at 21:12

## 2021-06-18 RX ADMIN — EPINEPHRINE 1 MG: 1 INJECTION, SOLUTION INTRAMUSCULAR; SUBCUTANEOUS at 20:48

## 2021-06-19 ENCOUNTER — APPOINTMENT (OUTPATIENT)
Dept: GENERAL RADIOLOGY | Facility: HOSPITAL | Age: 46
End: 2021-06-19

## 2021-06-19 ENCOUNTER — APPOINTMENT (OUTPATIENT)
Dept: CARDIOLOGY | Facility: HOSPITAL | Age: 46
End: 2021-06-19

## 2021-06-19 LAB
ABO GROUP BLD: NORMAL
ALBUMIN SERPL-MCNC: 3 G/DL (ref 3.5–5.2)
ALBUMIN/GLOB SERPL: 1.4 G/DL
ALP SERPL-CCNC: 150 U/L (ref 39–117)
ALT SERPL W P-5'-P-CCNC: 195 U/L (ref 1–33)
AMMONIA BLD-SCNC: 64 UMOL/L (ref 11–51)
ANION GAP SERPL CALCULATED.3IONS-SCNC: 19 MMOL/L (ref 5–15)
ARTERIAL PATENCY WRIST A: ABNORMAL
AST SERPL-CCNC: 387 U/L (ref 1–32)
ATMOSPHERIC PRESS: ABNORMAL MM[HG]
BASE EXCESS BLDA CALC-SCNC: -9.1 MMOL/L (ref 0–2)
BASOPHILS # BLD MANUAL: 0 10*3/MM3 (ref 0–0.2)
BASOPHILS NFR BLD AUTO: 0 % (ref 0–1.5)
BDY SITE: ABNORMAL
BH CV ECHO MEAS - AO MAX PG (FULL): 1.6 MMHG
BH CV ECHO MEAS - AO MAX PG: 3.1 MMHG
BH CV ECHO MEAS - AO ROOT AREA (BSA CORRECTED): 1.4
BH CV ECHO MEAS - AO ROOT AREA: 5.8 CM^2
BH CV ECHO MEAS - AO ROOT DIAM: 2.7 CM
BH CV ECHO MEAS - AO V2 MAX: 88.4 CM/SEC
BH CV ECHO MEAS - AVA(V,A): 1.9 CM^2
BH CV ECHO MEAS - AVA(V,D): 1.9 CM^2
BH CV ECHO MEAS - BSA(HAYCOCK): 2.2 M^2
BH CV ECHO MEAS - BSA: 2 M^2
BH CV ECHO MEAS - BZI_BMI: 39 KILOGRAMS/M^2
BH CV ECHO MEAS - BZI_METRIC_HEIGHT: 160 CM
BH CV ECHO MEAS - BZI_METRIC_WEIGHT: 99.8 KG
BH CV ECHO MEAS - EDV(CUBED): 57.9 ML
BH CV ECHO MEAS - EDV(MOD-SP2): 28 ML
BH CV ECHO MEAS - EDV(MOD-SP4): 34 ML
BH CV ECHO MEAS - EDV(TEICH): 64.7 ML
BH CV ECHO MEAS - EF(CUBED): 52.2 %
BH CV ECHO MEAS - EF(MOD-BP): 53 %
BH CV ECHO MEAS - EF(MOD-SP2): 64.3 %
BH CV ECHO MEAS - EF(MOD-SP4): 44.1 %
BH CV ECHO MEAS - EF(TEICH): 44.8 %
BH CV ECHO MEAS - ESV(CUBED): 27.7 ML
BH CV ECHO MEAS - ESV(MOD-SP2): 10 ML
BH CV ECHO MEAS - ESV(MOD-SP4): 19 ML
BH CV ECHO MEAS - ESV(TEICH): 35.7 ML
BH CV ECHO MEAS - FS: 21.8 %
BH CV ECHO MEAS - IVS/LVPW: 0.99
BH CV ECHO MEAS - IVSD: 0.77 CM
BH CV ECHO MEAS - LA DIMENSION: 2.3 CM
BH CV ECHO MEAS - LA/AO: 0.84
BH CV ECHO MEAS - LAT PEAK E' VEL: 6.1 CM/SEC
BH CV ECHO MEAS - LATERAL E/E' RATIO: 7.3
BH CV ECHO MEAS - LV DIASTOLIC VOL/BSA (35-75): 16.9 ML/M^2
BH CV ECHO MEAS - LV MASS(C)D: 85.4 GRAMS
BH CV ECHO MEAS - LV MASS(C)DI: 42.4 GRAMS/M^2
BH CV ECHO MEAS - LV MAX PG: 1.5 MMHG
BH CV ECHO MEAS - LV MEAN PG: 1.2 MMHG
BH CV ECHO MEAS - LV SYSTOLIC VOL/BSA (12-30): 9.4 ML/M^2
BH CV ECHO MEAS - LV V1 MAX: 61.7 CM/SEC
BH CV ECHO MEAS - LV V1 MEAN: 54.9 CM/SEC
BH CV ECHO MEAS - LV V1 VTI: 9.9 CM
BH CV ECHO MEAS - LVIDD: 3.9 CM
BH CV ECHO MEAS - LVIDS: 3 CM
BH CV ECHO MEAS - LVLD AP2: 5.4 CM
BH CV ECHO MEAS - LVLD AP4: 5.7 CM
BH CV ECHO MEAS - LVLS AP2: 4.2 CM
BH CV ECHO MEAS - LVLS AP4: 4.9 CM
BH CV ECHO MEAS - LVOT AREA (M): 2.8 CM^2
BH CV ECHO MEAS - LVOT AREA: 2.7 CM^2
BH CV ECHO MEAS - LVOT DIAM: 1.9 CM
BH CV ECHO MEAS - LVPWD: 0.78 CM
BH CV ECHO MEAS - MED PEAK E' VEL: 3.4 CM/SEC
BH CV ECHO MEAS - MEDIAL E/E' RATIO: 13.1
BH CV ECHO MEAS - MV A MAX VEL: 49.4 CM/SEC
BH CV ECHO MEAS - MV DEC TIME: 0.15 SEC
BH CV ECHO MEAS - MV E MAX VEL: 46.2 CM/SEC
BH CV ECHO MEAS - MV E/A: 0.8
BH CV ECHO MEAS - SI(CUBED): 15 ML/M^2
BH CV ECHO MEAS - SI(LVOT): 13.4 ML/M^2
BH CV ECHO MEAS - SI(MOD-SP2): 8.9 ML/M^2
BH CV ECHO MEAS - SI(MOD-SP4): 7.4 ML/M^2
BH CV ECHO MEAS - SI(TEICH): 14.4 ML/M^2
BH CV ECHO MEAS - SV(CUBED): 30.3 ML
BH CV ECHO MEAS - SV(LVOT): 27 ML
BH CV ECHO MEAS - SV(MOD-SP2): 18 ML
BH CV ECHO MEAS - SV(MOD-SP4): 15 ML
BH CV ECHO MEAS - SV(TEICH): 29 ML
BH CV ECHO MEASUREMENTS AVERAGE E/E' RATIO: 9.73
BH CV VAS BP LEFT ARM: NORMAL MMHG
BH CV XLRA - RV BASE: 2.2 CM
BH CV XLRA - RV LENGTH: 6.5 CM
BH CV XLRA - RV MID: 2.3 CM
BILIRUB SERPL-MCNC: 0.4 MG/DL (ref 0–1.2)
BLD GP AB SCN SERPL QL: NEGATIVE
BODY TEMPERATURE: 37 C
BUN SERPL-MCNC: 20 MG/DL (ref 6–20)
BUN/CREAT SERPL: 12.9 (ref 7–25)
CA-I SERPL ISE-MCNC: 1.04 MMOL/L (ref 1.12–1.32)
CALCIUM SPEC-SCNC: 7.4 MG/DL (ref 8.6–10.5)
CHLORIDE SERPL-SCNC: 105 MMOL/L (ref 98–107)
CHOLEST SERPL-MCNC: 156 MG/DL (ref 0–200)
CO2 BLDA-SCNC: 19.3 MMOL/L (ref 22–33)
CO2 SERPL-SCNC: 17 MMOL/L (ref 22–29)
COHGB MFR BLD: ABNORMAL %
CREAT SERPL-MCNC: 1.55 MG/DL (ref 0.57–1)
D-LACTATE SERPL-SCNC: 3.7 MMOL/L (ref 0.5–2)
D-LACTATE SERPL-SCNC: 4.2 MMOL/L (ref 0.5–2)
D-LACTATE SERPL-SCNC: 4.9 MMOL/L (ref 0.5–2)
D-LACTATE SERPL-SCNC: 5.7 MMOL/L (ref 0.5–2)
D-LACTATE SERPL-SCNC: 5.8 MMOL/L (ref 0.5–2)
DEPRECATED RDW RBC AUTO: 47.2 FL (ref 37–54)
EOSINOPHIL # BLD MANUAL: 0 10*3/MM3 (ref 0–0.4)
EOSINOPHIL NFR BLD MANUAL: 0 % (ref 0.3–6.2)
EPAP: 0
ERYTHROCYTE [DISTWIDTH] IN BLOOD BY AUTOMATED COUNT: 14.6 % (ref 12.3–15.4)
GFR SERPL CREATININE-BSD FRML MDRD: 36 ML/MIN/1.73
GLOBULIN UR ELPH-MCNC: 2.1 GM/DL
GLUCOSE SERPL-MCNC: 189 MG/DL (ref 65–99)
HBA1C MFR BLD: 5.2 % (ref 4.8–5.6)
HCO3 BLDA-SCNC: 18.1 MMOL/L (ref 20–26)
HCT VFR BLD AUTO: 45.8 % (ref 34–46.6)
HCT VFR BLD AUTO: 49.9 % (ref 34–46.6)
HCT VFR BLD AUTO: 53.4 % (ref 34–46.6)
HCT VFR BLD AUTO: 55.2 % (ref 34–46.6)
HCT VFR BLD AUTO: 58.2 % (ref 34–46.6)
HCT VFR BLD AUTO: 59.5 % (ref 34–46.6)
HCT VFR BLD CALC: 60.4 %
HDLC SERPL-MCNC: 30 MG/DL (ref 40–60)
HGB BLD-MCNC: 14.8 G/DL (ref 12–15.9)
HGB BLD-MCNC: 16.2 G/DL (ref 12–15.9)
HGB BLD-MCNC: 17.1 G/DL (ref 12–15.9)
HGB BLD-MCNC: 18.1 G/DL (ref 12–15.9)
HGB BLD-MCNC: 19.1 G/DL (ref 12–15.9)
HGB BLD-MCNC: 19.3 G/DL (ref 12–15.9)
HGB BLDA-MCNC: 19.7 G/DL (ref 14–18)
INHALED O2 CONCENTRATION: 80 %
INR PPP: 1.52 (ref 0.85–1.16)
IPAP: 0
LDLC SERPL CALC-MCNC: 107 MG/DL (ref 0–100)
LDLC/HDLC SERPL: 3.52 {RATIO}
LYMPHOCYTES # BLD MANUAL: 0.48 10*3/MM3 (ref 0.7–3.1)
LYMPHOCYTES NFR BLD MANUAL: 1 % (ref 19.6–45.3)
LYMPHOCYTES NFR BLD MANUAL: 3 % (ref 5–12)
MAGNESIUM SERPL-MCNC: 1.9 MG/DL (ref 1.6–2.6)
MAXIMAL PREDICTED HEART RATE: 174 BPM
MCH RBC QN AUTO: 30.2 PG (ref 26.6–33)
MCHC RBC AUTO-ENTMCNC: 32.8 G/DL (ref 31.5–35.7)
MCV RBC AUTO: 92 FL (ref 79–97)
METHGB BLD QL: 0.5 % (ref 0–1.5)
MODALITY: ABNORMAL
MONOCYTES # BLD AUTO: 1.43 10*3/MM3 (ref 0.1–0.9)
MYELOCYTES NFR BLD MANUAL: 1 % (ref 0–0)
NEUTROPHILS # BLD AUTO: 45.18 10*3/MM3 (ref 1.7–7)
NEUTROPHILS NFR BLD MANUAL: 90 % (ref 42.7–76)
NEUTS BAND NFR BLD MANUAL: 5 % (ref 0–5)
NOTE: ABNORMAL
OXYHGB MFR BLDV: 98.9 % (ref 94–99)
PAW @ PEAK INSP FLOW SETTING VENT: 0 CMH2O
PCO2 BLDA: 42.4 MM HG (ref 35–45)
PCO2 TEMP ADJ BLD: 42.4 MM HG (ref 35–45)
PH BLDA: 7.24 PH UNITS (ref 7.35–7.45)
PH, TEMP CORRECTED: 7.24 PH UNITS
PHOSPHATE SERPL-MCNC: 2.7 MG/DL (ref 2.5–4.5)
PLAT MORPH BLD: NORMAL
PLATELET # BLD AUTO: 154 10*3/MM3 (ref 140–450)
PMV BLD AUTO: 11 FL (ref 6–12)
PO2 BLDA: 306 MM HG (ref 83–108)
PO2 TEMP ADJ BLD: 306 MM HG (ref 83–108)
POTASSIUM SERPL-SCNC: 3.6 MMOL/L (ref 3.5–5.2)
PROT SERPL-MCNC: 5.1 G/DL (ref 6–8.5)
PROTHROMBIN TIME: 17.7 SECONDS (ref 11.4–14.4)
QT INTERVAL: 350 MS
QTC INTERVAL: 476 MS
RBC # BLD AUTO: 6 10*6/MM3 (ref 3.77–5.28)
RBC MORPH BLD: NORMAL
RH BLD: POSITIVE
SODIUM SERPL-SCNC: 141 MMOL/L (ref 136–145)
STRESS TARGET HR: 148 BPM
T&S EXPIRATION DATE: NORMAL
TOTAL RATE: 0 BREATHS/MINUTE
TRIGL SERPL-MCNC: 102 MG/DL (ref 0–150)
TROPONIN T SERPL-MCNC: 5.13 NG/ML (ref 0–0.03)
TROPONIN T SERPL-MCNC: 5.99 NG/ML (ref 0–0.03)
VLDLC SERPL-MCNC: 19 MG/DL (ref 5–40)
WBC # BLD AUTO: 47.56 10*3/MM3 (ref 3.4–10.8)
WBC MORPH BLD: NORMAL

## 2021-06-19 PROCEDURE — 93005 ELECTROCARDIOGRAM TRACING: CPT | Performed by: INTERNAL MEDICINE

## 2021-06-19 PROCEDURE — 94003 VENT MGMT INPAT SUBQ DAY: CPT

## 2021-06-19 PROCEDURE — 94799 UNLISTED PULMONARY SVC/PX: CPT

## 2021-06-19 PROCEDURE — 86900 BLOOD TYPING SEROLOGIC ABO: CPT | Performed by: NURSE PRACTITIONER

## 2021-06-19 PROCEDURE — 25010000002 METHYLPREDNISOLONE PER 40 MG: Performed by: INTERNAL MEDICINE

## 2021-06-19 PROCEDURE — 82375 ASSAY CARBOXYHB QUANT: CPT

## 2021-06-19 PROCEDURE — 83735 ASSAY OF MAGNESIUM: CPT | Performed by: INTERNAL MEDICINE

## 2021-06-19 PROCEDURE — 43239 EGD BIOPSY SINGLE/MULTIPLE: CPT | Performed by: INTERNAL MEDICINE

## 2021-06-19 PROCEDURE — 88305 TISSUE EXAM BY PATHOLOGIST: CPT | Performed by: INTERNAL MEDICINE

## 2021-06-19 PROCEDURE — 93306 TTE W/DOPPLER COMPLETE: CPT | Performed by: INTERNAL MEDICINE

## 2021-06-19 PROCEDURE — 25010000002 SULFUR HEXAFLUORIDE MICROSPH 60.7-25 MG RECONSTITUTED SUSPENSION: Performed by: INTERNAL MEDICINE

## 2021-06-19 PROCEDURE — 99291 CRITICAL CARE FIRST HOUR: CPT | Performed by: INTERNAL MEDICINE

## 2021-06-19 PROCEDURE — 99222 1ST HOSP IP/OBS MODERATE 55: CPT | Performed by: NURSE PRACTITIONER

## 2021-06-19 PROCEDURE — 85014 HEMATOCRIT: CPT | Performed by: INTERNAL MEDICINE

## 2021-06-19 PROCEDURE — 25010000002 THIAMINE PER 100 MG: Performed by: NURSE PRACTITIONER

## 2021-06-19 PROCEDURE — 85025 COMPLETE CBC W/AUTO DIFF WBC: CPT | Performed by: INTERNAL MEDICINE

## 2021-06-19 PROCEDURE — 25010000002 AMIODARONE IN DEXTROSE 5% 360-4.14 MG/200ML-% SOLUTION: Performed by: INTERNAL MEDICINE

## 2021-06-19 PROCEDURE — 80053 COMPREHEN METABOLIC PANEL: CPT | Performed by: INTERNAL MEDICINE

## 2021-06-19 PROCEDURE — 74018 RADEX ABDOMEN 1 VIEW: CPT

## 2021-06-19 PROCEDURE — C9460 INJECTION, CANGRELOR: HCPCS | Performed by: INTERNAL MEDICINE

## 2021-06-19 PROCEDURE — 86850 RBC ANTIBODY SCREEN: CPT | Performed by: NURSE PRACTITIONER

## 2021-06-19 PROCEDURE — 85018 HEMOGLOBIN: CPT | Performed by: INTERNAL MEDICINE

## 2021-06-19 PROCEDURE — 25010000002 OCTREOTIDE PER 25 MCG: Performed by: INTERNAL MEDICINE

## 2021-06-19 PROCEDURE — 82805 BLOOD GASES W/O2 SATURATION: CPT

## 2021-06-19 PROCEDURE — 93306 TTE W/DOPPLER COMPLETE: CPT

## 2021-06-19 PROCEDURE — 84100 ASSAY OF PHOSPHORUS: CPT | Performed by: INTERNAL MEDICINE

## 2021-06-19 PROCEDURE — 86901 BLOOD TYPING SEROLOGIC RH(D): CPT | Performed by: NURSE PRACTITIONER

## 2021-06-19 PROCEDURE — 82330 ASSAY OF CALCIUM: CPT | Performed by: INTERNAL MEDICINE

## 2021-06-19 PROCEDURE — 99232 SBSQ HOSP IP/OBS MODERATE 35: CPT | Performed by: INTERNAL MEDICINE

## 2021-06-19 PROCEDURE — 83050 HGB METHEMOGLOBIN QUAN: CPT

## 2021-06-19 PROCEDURE — 80061 LIPID PANEL: CPT | Performed by: INTERNAL MEDICINE

## 2021-06-19 PROCEDURE — 25010000002 PHENYLEPHRINE 10 MG/ML SOLUTION: Performed by: INTERNAL MEDICINE

## 2021-06-19 PROCEDURE — 71045 X-RAY EXAM CHEST 1 VIEW: CPT

## 2021-06-19 PROCEDURE — 0DB68ZX EXCISION OF STOMACH, VIA NATURAL OR ARTIFICIAL OPENING ENDOSCOPIC, DIAGNOSTIC: ICD-10-PCS | Performed by: INTERNAL MEDICINE

## 2021-06-19 PROCEDURE — 85610 PROTHROMBIN TIME: CPT | Performed by: NURSE PRACTITIONER

## 2021-06-19 PROCEDURE — 83605 ASSAY OF LACTIC ACID: CPT | Performed by: INTERNAL MEDICINE

## 2021-06-19 PROCEDURE — 25010000002 PIPERACILLIN SOD-TAZOBACTAM PER 1 G: Performed by: NURSE PRACTITIONER

## 2021-06-19 PROCEDURE — 25010000002 FENTANYL CITRATE (PF) 2500 MCG/50ML SOLUTION: Performed by: INTERNAL MEDICINE

## 2021-06-19 PROCEDURE — 25010000002 CANGRELOR TETRASODIUM 50 MG RECONSTITUTED SOLUTION 1 EACH VIAL: Performed by: INTERNAL MEDICINE

## 2021-06-19 PROCEDURE — 93010 ELECTROCARDIOGRAM REPORT: CPT | Performed by: INTERNAL MEDICINE

## 2021-06-19 PROCEDURE — 84484 ASSAY OF TROPONIN QUANT: CPT | Performed by: INTERNAL MEDICINE

## 2021-06-19 PROCEDURE — 83036 HEMOGLOBIN GLYCOSYLATED A1C: CPT | Performed by: INTERNAL MEDICINE

## 2021-06-19 RX ORDER — SUCCINYLCHOLINE CHLORIDE 20 MG/ML
INJECTION INTRAMUSCULAR; INTRAVENOUS
Status: COMPLETED | OUTPATIENT
Start: 2021-06-18 | End: 2021-06-18

## 2021-06-19 RX ORDER — CLOPIDOGREL BISULFATE 75 MG/1
75 TABLET ORAL DAILY
Status: DISCONTINUED | OUTPATIENT
Start: 2021-06-20 | End: 2021-06-28

## 2021-06-19 RX ORDER — PANTOPRAZOLE SODIUM 40 MG/10ML
40 INJECTION, POWDER, LYOPHILIZED, FOR SOLUTION INTRAVENOUS EVERY 12 HOURS SCHEDULED
Status: DISCONTINUED | OUTPATIENT
Start: 2021-06-19 | End: 2021-06-28

## 2021-06-19 RX ORDER — EPINEPHRINE 0.1 MG/ML
SYRINGE (ML) INJECTION
Status: COMPLETED | OUTPATIENT
Start: 2021-06-18 | End: 2021-06-18

## 2021-06-19 RX ORDER — CLOPIDOGREL BISULFATE 75 MG/1
600 TABLET ORAL ONCE
Status: COMPLETED | OUTPATIENT
Start: 2021-06-19 | End: 2021-06-19

## 2021-06-19 RX ORDER — ETOMIDATE 2 MG/ML
INJECTION INTRAVENOUS
Status: COMPLETED | OUTPATIENT
Start: 2021-06-18 | End: 2021-06-18

## 2021-06-19 RX ADMIN — AMIODARONE HYDROCHLORIDE 1 MG/MIN: 1.8 INJECTION, SOLUTION INTRAVENOUS at 02:00

## 2021-06-19 RX ADMIN — AMIODARONE HYDROCHLORIDE 0.5 MG/MIN: 1.8 INJECTION, SOLUTION INTRAVENOUS at 10:09

## 2021-06-19 RX ADMIN — Medication 5 MG/HR: at 12:11

## 2021-06-19 RX ADMIN — PHENYLEPHRINE HYDROCHLORIDE 0.5 MCG/KG/MIN: 10 INJECTION INTRAVENOUS at 11:22

## 2021-06-19 RX ADMIN — SODIUM CHLORIDE, POTASSIUM CHLORIDE, SODIUM LACTATE AND CALCIUM CHLORIDE 1000 ML: 600; 310; 30; 20 INJECTION, SOLUTION INTRAVENOUS at 04:07

## 2021-06-19 RX ADMIN — IPRATROPIUM BROMIDE AND ALBUTEROL SULFATE 3 ML: 2.5; .5 SOLUTION RESPIRATORY (INHALATION) at 19:12

## 2021-06-19 RX ADMIN — RIFAXIMIN 550 MG: 550 TABLET ORAL at 14:48

## 2021-06-19 RX ADMIN — FOLIC ACID 1 MG: 1 TABLET ORAL at 14:48

## 2021-06-19 RX ADMIN — PANTOPRAZOLE SODIUM 8 MG/HR: 40 INJECTION, POWDER, FOR SOLUTION INTRAVENOUS at 12:08

## 2021-06-19 RX ADMIN — SODIUM CHLORIDE, PRESERVATIVE FREE 10 ML: 5 INJECTION INTRAVENOUS at 21:52

## 2021-06-19 RX ADMIN — FENTANYL CITRATE 50 MCG/HR: 50 INJECTION INTRAVENOUS at 02:01

## 2021-06-19 RX ADMIN — PANTOPRAZOLE SODIUM 8 MG/HR: 40 INJECTION, POWDER, FOR SOLUTION INTRAVENOUS at 07:27

## 2021-06-19 RX ADMIN — SODIUM CHLORIDE, PRESERVATIVE FREE 10 ML: 5 INJECTION INTRAVENOUS at 12:01

## 2021-06-19 RX ADMIN — SODIUM CHLORIDE, POTASSIUM CHLORIDE, SODIUM LACTATE AND CALCIUM CHLORIDE 75 ML/HR: 600; 310; 30; 20 INJECTION, SOLUTION INTRAVENOUS at 20:34

## 2021-06-19 RX ADMIN — TAZOBACTAM SODIUM AND PIPERACILLIN SODIUM 3.38 G: 375; 3 INJECTION, SOLUTION INTRAVENOUS at 14:46

## 2021-06-19 RX ADMIN — IPRATROPIUM BROMIDE AND ALBUTEROL SULFATE 3 ML: 2.5; .5 SOLUTION RESPIRATORY (INHALATION) at 08:07

## 2021-06-19 RX ADMIN — IPRATROPIUM BROMIDE AND ALBUTEROL SULFATE 3 ML: 2.5; .5 SOLUTION RESPIRATORY (INHALATION) at 13:36

## 2021-06-19 RX ADMIN — METHYLPREDNISOLONE SODIUM SUCCINATE 40 MG: 40 INJECTION, POWDER, FOR SOLUTION INTRAMUSCULAR; INTRAVENOUS at 21:52

## 2021-06-19 RX ADMIN — AMIODARONE HYDROCHLORIDE 0.5 MG/MIN: 1.8 INJECTION, SOLUTION INTRAVENOUS at 21:53

## 2021-06-19 RX ADMIN — THIAMINE HYDROCHLORIDE 500 MG: 100 INJECTION, SOLUTION INTRAMUSCULAR; INTRAVENOUS at 01:57

## 2021-06-19 RX ADMIN — ROSUVASTATIN CALCIUM 20 MG: 20 TABLET ORAL at 21:51

## 2021-06-19 RX ADMIN — OCTREOTIDE ACETATE 25 MCG/HR: 500 INJECTION, SOLUTION INTRAVENOUS; SUBCUTANEOUS at 12:08

## 2021-06-19 RX ADMIN — OLANZAPINE 7.5 MG: 5 TABLET, FILM COATED ORAL at 21:51

## 2021-06-19 RX ADMIN — AMIODARONE HYDROCHLORIDE 0.5 MG/MIN: 1.8 INJECTION, SOLUTION INTRAVENOUS at 05:47

## 2021-06-19 RX ADMIN — TAZOBACTAM SODIUM AND PIPERACILLIN SODIUM 3.38 G: 375; 3 INJECTION, SOLUTION INTRAVENOUS at 08:50

## 2021-06-19 RX ADMIN — FENTANYL CITRATE 300 MCG/HR: 50 INJECTION INTRAVENOUS at 18:25

## 2021-06-19 RX ADMIN — SODIUM CHLORIDE, POTASSIUM CHLORIDE, SODIUM LACTATE AND CALCIUM CHLORIDE 75 ML/HR: 600; 310; 30; 20 INJECTION, SOLUTION INTRAVENOUS at 05:48

## 2021-06-19 RX ADMIN — TAZOBACTAM SODIUM AND PIPERACILLIN SODIUM 3.38 G: 375; 3 INJECTION, SOLUTION INTRAVENOUS at 21:52

## 2021-06-19 RX ADMIN — CHLORHEXIDINE GLUCONATE 0.12% ORAL RINSE 15 ML: 1.2 LIQUID ORAL at 00:16

## 2021-06-19 RX ADMIN — CHLORHEXIDINE GLUCONATE 0.12% ORAL RINSE 15 ML: 1.2 LIQUID ORAL at 08:50

## 2021-06-19 RX ADMIN — PANTOPRAZOLE SODIUM 8 MG/HR: 40 INJECTION, POWDER, FOR SOLUTION INTRAVENOUS at 01:58

## 2021-06-19 RX ADMIN — LACTULOSE 10 G: 20 SOLUTION ORAL at 21:51

## 2021-06-19 RX ADMIN — CANGRELOR 0.75 MCG/KG/MIN: 50 INJECTION, POWDER, LYOPHILIZED, FOR SOLUTION INTRAVENOUS at 01:58

## 2021-06-19 RX ADMIN — ASPIRIN 81 MG CHEWABLE TABLET 81 MG: 81 TABLET CHEWABLE at 14:46

## 2021-06-19 RX ADMIN — CHLORHEXIDINE GLUCONATE 0.12% ORAL RINSE 15 ML: 1.2 LIQUID ORAL at 21:52

## 2021-06-19 RX ADMIN — FLUOXETINE HYDROCHLORIDE 20 MG: 20 CAPSULE ORAL at 14:46

## 2021-06-19 RX ADMIN — PANTOPRAZOLE SODIUM 40 MG: 40 INJECTION, POWDER, FOR SOLUTION INTRAVENOUS at 21:52

## 2021-06-19 RX ADMIN — CLOPIDOGREL BISULFATE 600 MG: 75 TABLET ORAL at 14:46

## 2021-06-19 RX ADMIN — SULFUR HEXAFLUORIDE 4 ML: KIT at 10:20

## 2021-06-19 RX ADMIN — OCTREOTIDE ACETATE 50 MCG: 50 INJECTION, SOLUTION INTRAVENOUS; SUBCUTANEOUS at 00:48

## 2021-06-19 RX ADMIN — RIFAXIMIN 550 MG: 550 TABLET ORAL at 21:51

## 2021-06-19 RX ADMIN — SODIUM CHLORIDE, POTASSIUM CHLORIDE, SODIUM LACTATE AND CALCIUM CHLORIDE 500 ML: 600; 310; 30; 20 INJECTION, SOLUTION INTRAVENOUS at 00:23

## 2021-06-19 RX ADMIN — SODIUM CHLORIDE, PRESERVATIVE FREE 10 ML: 5 INJECTION INTRAVENOUS at 00:22

## 2021-06-19 RX ADMIN — LACTULOSE 10 G: 20 SOLUTION ORAL at 14:46

## 2021-06-19 RX ADMIN — THIAMINE HYDROCHLORIDE 500 MG: 100 INJECTION, SOLUTION INTRAMUSCULAR; INTRAVENOUS at 12:08

## 2021-06-19 RX ADMIN — PHENYLEPHRINE HYDROCHLORIDE 0.8 MCG/KG/MIN: 10 INJECTION INTRAVENOUS at 18:28

## 2021-06-19 RX ADMIN — PANTOPRAZOLE SODIUM 8 MG/HR: 40 INJECTION, POWDER, FOR SOLUTION INTRAVENOUS at 00:26

## 2021-06-20 ENCOUNTER — APPOINTMENT (OUTPATIENT)
Dept: GENERAL RADIOLOGY | Facility: HOSPITAL | Age: 46
End: 2021-06-20

## 2021-06-20 LAB
ALBUMIN SERPL-MCNC: 2.7 G/DL (ref 3.5–5.2)
ALBUMIN/GLOB SERPL: 1.4 G/DL
ALP SERPL-CCNC: 84 U/L (ref 39–117)
ALT SERPL W P-5'-P-CCNC: 1361 U/L (ref 1–33)
AMMONIA BLD-SCNC: 50 UMOL/L (ref 11–51)
ANION GAP SERPL CALCULATED.3IONS-SCNC: 10 MMOL/L (ref 5–15)
ARTERIAL PATENCY WRIST A: ABNORMAL
AST SERPL-CCNC: 1493 U/L (ref 1–32)
ATMOSPHERIC PRESS: ABNORMAL MM[HG]
BASE EXCESS BLDA CALC-SCNC: -3 MMOL/L (ref 0–2)
BDY SITE: ABNORMAL
BILIRUB SERPL-MCNC: 0.3 MG/DL (ref 0–1.2)
BODY TEMPERATURE: 37 C
BUN SERPL-MCNC: 29 MG/DL (ref 6–20)
BUN/CREAT SERPL: 27.1 (ref 7–25)
CA-I SERPL ISE-MCNC: 1.03 MMOL/L (ref 1.12–1.32)
CALCIUM SPEC-SCNC: 7.3 MG/DL (ref 8.6–10.5)
CHLORIDE SERPL-SCNC: 111 MMOL/L (ref 98–107)
CO2 BLDA-SCNC: 24.2 MMOL/L (ref 22–33)
CO2 SERPL-SCNC: 21 MMOL/L (ref 22–29)
COHGB MFR BLD: 0.3 % (ref 0–2)
CREAT SERPL-MCNC: 1.07 MG/DL (ref 0.57–1)
D-LACTATE SERPL-SCNC: 2.1 MMOL/L (ref 0.5–2)
D-LACTATE SERPL-SCNC: 2.4 MMOL/L (ref 0.5–2)
D-LACTATE SERPL-SCNC: 3.1 MMOL/L (ref 0.5–2)
DEPRECATED RDW RBC AUTO: 50.6 FL (ref 37–54)
EPAP: 0
ERYTHROCYTE [DISTWIDTH] IN BLOOD BY AUTOMATED COUNT: 14.5 % (ref 12.3–15.4)
GFR SERPL CREATININE-BSD FRML MDRD: 55 ML/MIN/1.73
GLOBULIN UR ELPH-MCNC: 1.9 GM/DL
GLUCOSE SERPL-MCNC: 121 MG/DL (ref 65–99)
HCO3 BLDA-SCNC: 22.8 MMOL/L (ref 20–26)
HCT VFR BLD AUTO: 40.2 % (ref 34–46.6)
HCT VFR BLD AUTO: 44 % (ref 34–46.6)
HCT VFR BLD CALC: 43.5 %
HGB BLD-MCNC: 12.6 G/DL (ref 12–15.9)
HGB BLD-MCNC: 13.9 G/DL (ref 12–15.9)
HGB BLDA-MCNC: 14.2 G/DL (ref 14–18)
INHALED O2 CONCENTRATION: 35 %
INR PPP: 1.57 (ref 0.85–1.16)
IPAP: 0
MAGNESIUM SERPL-MCNC: 1.6 MG/DL (ref 1.6–2.6)
MAGNESIUM SERPL-MCNC: 1.7 MG/DL (ref 1.6–2.6)
MCH RBC QN AUTO: 30.3 PG (ref 26.6–33)
MCHC RBC AUTO-ENTMCNC: 31.6 G/DL (ref 31.5–35.7)
MCV RBC AUTO: 96.1 FL (ref 79–97)
METHGB BLD QL: 0.6 % (ref 0–1.5)
MODALITY: ABNORMAL
NOTE: ABNORMAL
OXYHGB MFR BLDV: 93.7 % (ref 94–99)
PAW @ PEAK INSP FLOW SETTING VENT: 0 CMH2O
PCO2 BLDA: 42.8 MM HG (ref 35–45)
PCO2 TEMP ADJ BLD: 42.8 MM HG (ref 35–45)
PH BLDA: 7.33 PH UNITS (ref 7.35–7.45)
PH, TEMP CORRECTED: 7.33 PH UNITS
PHOSPHATE SERPL-MCNC: 3.2 MG/DL (ref 2.5–4.5)
PLATELET # BLD AUTO: 87 10*3/MM3 (ref 140–450)
PMV BLD AUTO: 11.5 FL (ref 6–12)
PO2 BLDA: 75.7 MM HG (ref 83–108)
PO2 TEMP ADJ BLD: 75.7 MM HG (ref 83–108)
POTASSIUM SERPL-SCNC: 4.1 MMOL/L (ref 3.5–5.2)
PROT SERPL-MCNC: 4.6 G/DL (ref 6–8.5)
PROTHROMBIN TIME: 18.2 SECONDS (ref 11.4–14.4)
QT INTERVAL: 268 MS
QT INTERVAL: 454 MS
QTC INTERVAL: 397 MS
QTC INTERVAL: 497 MS
RBC # BLD AUTO: 4.58 10*6/MM3 (ref 3.77–5.28)
SODIUM SERPL-SCNC: 142 MMOL/L (ref 136–145)
TOTAL RATE: 0 BREATHS/MINUTE
TSH SERPL DL<=0.05 MIU/L-ACNC: 0.16 UIU/ML (ref 0.27–4.2)
WBC # BLD AUTO: 29.27 10*3/MM3 (ref 3.4–10.8)

## 2021-06-20 PROCEDURE — 80053 COMPREHEN METABOLIC PANEL: CPT | Performed by: INTERNAL MEDICINE

## 2021-06-20 PROCEDURE — 94799 UNLISTED PULMONARY SVC/PX: CPT

## 2021-06-20 PROCEDURE — 3E0G76Z INTRODUCTION OF NUTRITIONAL SUBSTANCE INTO UPPER GI, VIA NATURAL OR ARTIFICIAL OPENING: ICD-10-PCS | Performed by: INTERNAL MEDICINE

## 2021-06-20 PROCEDURE — 85610 PROTHROMBIN TIME: CPT | Performed by: NURSE PRACTITIONER

## 2021-06-20 PROCEDURE — 71045 X-RAY EXAM CHEST 1 VIEW: CPT

## 2021-06-20 PROCEDURE — 83050 HGB METHEMOGLOBIN QUAN: CPT

## 2021-06-20 PROCEDURE — 25010000002 METHYLPREDNISOLONE PER 40 MG: Performed by: INTERNAL MEDICINE

## 2021-06-20 PROCEDURE — 99232 SBSQ HOSP IP/OBS MODERATE 35: CPT | Performed by: INTERNAL MEDICINE

## 2021-06-20 PROCEDURE — 84100 ASSAY OF PHOSPHORUS: CPT | Performed by: INTERNAL MEDICINE

## 2021-06-20 PROCEDURE — 93010 ELECTROCARDIOGRAM REPORT: CPT | Performed by: INTERNAL MEDICINE

## 2021-06-20 PROCEDURE — 36600 WITHDRAWAL OF ARTERIAL BLOOD: CPT

## 2021-06-20 PROCEDURE — 99291 CRITICAL CARE FIRST HOUR: CPT | Performed by: INTERNAL MEDICINE

## 2021-06-20 PROCEDURE — 25010000002 PHENYLEPHRINE 10 MG/ML SOLUTION: Performed by: INTERNAL MEDICINE

## 2021-06-20 PROCEDURE — 25010000003 MAGNESIUM SULFATE 4 GM/100ML SOLUTION: Performed by: NURSE PRACTITIONER

## 2021-06-20 PROCEDURE — 85014 HEMATOCRIT: CPT | Performed by: INTERNAL MEDICINE

## 2021-06-20 PROCEDURE — 82140 ASSAY OF AMMONIA: CPT | Performed by: INTERNAL MEDICINE

## 2021-06-20 PROCEDURE — 25010000002 PIPERACILLIN SOD-TAZOBACTAM PER 1 G: Performed by: NURSE PRACTITIONER

## 2021-06-20 PROCEDURE — 83605 ASSAY OF LACTIC ACID: CPT | Performed by: INTERNAL MEDICINE

## 2021-06-20 PROCEDURE — 83735 ASSAY OF MAGNESIUM: CPT | Performed by: NURSE PRACTITIONER

## 2021-06-20 PROCEDURE — 85018 HEMOGLOBIN: CPT | Performed by: INTERNAL MEDICINE

## 2021-06-20 PROCEDURE — 94003 VENT MGMT INPAT SUBQ DAY: CPT

## 2021-06-20 PROCEDURE — 85027 COMPLETE CBC AUTOMATED: CPT | Performed by: INTERNAL MEDICINE

## 2021-06-20 PROCEDURE — 25010000002 THIAMINE PER 100 MG: Performed by: NURSE PRACTITIONER

## 2021-06-20 PROCEDURE — 82805 BLOOD GASES W/O2 SATURATION: CPT

## 2021-06-20 PROCEDURE — 25010000002 AMIODARONE IN DEXTROSE 5% 360-4.14 MG/200ML-% SOLUTION: Performed by: INTERNAL MEDICINE

## 2021-06-20 PROCEDURE — 84443 ASSAY THYROID STIM HORMONE: CPT | Performed by: INTERNAL MEDICINE

## 2021-06-20 PROCEDURE — 82330 ASSAY OF CALCIUM: CPT | Performed by: INTERNAL MEDICINE

## 2021-06-20 PROCEDURE — 25010000002 FENTANYL CITRATE (PF) 2500 MCG/50ML SOLUTION: Performed by: INTERNAL MEDICINE

## 2021-06-20 PROCEDURE — 82375 ASSAY CARBOXYHB QUANT: CPT

## 2021-06-20 PROCEDURE — 93005 ELECTROCARDIOGRAM TRACING: CPT | Performed by: INTERNAL MEDICINE

## 2021-06-20 PROCEDURE — 83735 ASSAY OF MAGNESIUM: CPT | Performed by: INTERNAL MEDICINE

## 2021-06-20 RX ORDER — METHOCARBAMOL 750 MG/1
750 TABLET, FILM COATED ORAL 3 TIMES DAILY PRN
COMMUNITY
End: 2021-07-20 | Stop reason: HOSPADM

## 2021-06-20 RX ORDER — POTASSIUM CHLORIDE 1500 MG/1
20 TABLET, FILM COATED, EXTENDED RELEASE ORAL DAILY
Status: ON HOLD | COMMUNITY
End: 2021-06-23

## 2021-06-20 RX ORDER — MAGNESIUM SULFATE HEPTAHYDRATE 40 MG/ML
2 INJECTION, SOLUTION INTRAVENOUS AS NEEDED
Status: DISCONTINUED | OUTPATIENT
Start: 2021-06-20 | End: 2021-07-02 | Stop reason: HOSPADM

## 2021-06-20 RX ORDER — GABAPENTIN 300 MG/1
300 CAPSULE ORAL DAILY
Status: ON HOLD | COMMUNITY
End: 2021-06-24

## 2021-06-20 RX ORDER — HYDROCODONE BITARTRATE AND ACETAMINOPHEN 5; 325 MG/1; MG/1
1 TABLET ORAL EVERY 6 HOURS PRN
Status: ON HOLD | COMMUNITY
End: 2021-06-24

## 2021-06-20 RX ORDER — AMINO ACIDS/PROTEIN HYDROLYS 15G-100/30
30 LIQUID (ML) ORAL DAILY
Status: DISCONTINUED | OUTPATIENT
Start: 2021-06-20 | End: 2021-06-22

## 2021-06-20 RX ORDER — DIAZEPAM 5 MG/1
10 TABLET ORAL EVERY 8 HOURS
Status: DISCONTINUED | OUTPATIENT
Start: 2021-06-20 | End: 2021-06-22

## 2021-06-20 RX ORDER — MAGNESIUM SULFATE HEPTAHYDRATE 40 MG/ML
4 INJECTION, SOLUTION INTRAVENOUS AS NEEDED
Status: DISCONTINUED | OUTPATIENT
Start: 2021-06-20 | End: 2021-07-02 | Stop reason: HOSPADM

## 2021-06-20 RX ADMIN — PANTOPRAZOLE SODIUM 40 MG: 40 INJECTION, POWDER, FOR SOLUTION INTRAVENOUS at 20:10

## 2021-06-20 RX ADMIN — IPRATROPIUM BROMIDE AND ALBUTEROL SULFATE 3 ML: 2.5; .5 SOLUTION RESPIRATORY (INHALATION) at 07:07

## 2021-06-20 RX ADMIN — Medication 6 MG/HR: at 06:04

## 2021-06-20 RX ADMIN — RIFAXIMIN 550 MG: 550 TABLET ORAL at 08:47

## 2021-06-20 RX ADMIN — IPRATROPIUM BROMIDE AND ALBUTEROL SULFATE 3 ML: 2.5; .5 SOLUTION RESPIRATORY (INHALATION) at 14:49

## 2021-06-20 RX ADMIN — CHLORHEXIDINE GLUCONATE 0.12% ORAL RINSE 15 ML: 1.2 LIQUID ORAL at 08:47

## 2021-06-20 RX ADMIN — LACTULOSE 10 G: 20 SOLUTION ORAL at 20:10

## 2021-06-20 RX ADMIN — FENTANYL CITRATE 250 MCG/HR: 50 INJECTION INTRAVENOUS at 02:04

## 2021-06-20 RX ADMIN — ROSUVASTATIN CALCIUM 20 MG: 20 TABLET ORAL at 20:10

## 2021-06-20 RX ADMIN — OLANZAPINE 7.5 MG: 5 TABLET, FILM COATED ORAL at 20:10

## 2021-06-20 RX ADMIN — SODIUM CHLORIDE, POTASSIUM CHLORIDE, SODIUM LACTATE AND CALCIUM CHLORIDE 75 ML/HR: 600; 310; 30; 20 INJECTION, SOLUTION INTRAVENOUS at 09:22

## 2021-06-20 RX ADMIN — MAGNESIUM SULFATE HEPTAHYDRATE 4 G: 40 INJECTION, SOLUTION INTRAVENOUS at 22:01

## 2021-06-20 RX ADMIN — ASPIRIN 81 MG CHEWABLE TABLET 81 MG: 81 TABLET CHEWABLE at 08:47

## 2021-06-20 RX ADMIN — SODIUM CHLORIDE, PRESERVATIVE FREE 10 ML: 5 INJECTION INTRAVENOUS at 08:48

## 2021-06-20 RX ADMIN — Medication 30 ML: at 17:43

## 2021-06-20 RX ADMIN — PHENYLEPHRINE HYDROCHLORIDE 0.6 MCG/KG/MIN: 10 INJECTION INTRAVENOUS at 09:10

## 2021-06-20 RX ADMIN — CLOPIDOGREL BISULFATE 75 MG: 75 TABLET ORAL at 08:47

## 2021-06-20 RX ADMIN — DIAZEPAM 10 MG: 5 TABLET ORAL at 11:58

## 2021-06-20 RX ADMIN — FOLIC ACID 1 MG: 1 TABLET ORAL at 08:47

## 2021-06-20 RX ADMIN — THIAMINE HYDROCHLORIDE 500 MG: 100 INJECTION, SOLUTION INTRAMUSCULAR; INTRAVENOUS at 02:04

## 2021-06-20 RX ADMIN — FENTANYL CITRATE 250 MCG/HR: 50 INJECTION INTRAVENOUS at 20:11

## 2021-06-20 RX ADMIN — SODIUM CHLORIDE, PRESERVATIVE FREE 10 ML: 5 INJECTION INTRAVENOUS at 20:10

## 2021-06-20 RX ADMIN — LACTULOSE 10 G: 20 SOLUTION ORAL at 08:48

## 2021-06-20 RX ADMIN — TAZOBACTAM SODIUM AND PIPERACILLIN SODIUM 3.38 G: 375; 3 INJECTION, SOLUTION INTRAVENOUS at 06:05

## 2021-06-20 RX ADMIN — THIAMINE HYDROCHLORIDE 500 MG: 100 INJECTION, SOLUTION INTRAMUSCULAR; INTRAVENOUS at 11:58

## 2021-06-20 RX ADMIN — METHYLPREDNISOLONE SODIUM SUCCINATE 40 MG: 40 INJECTION, POWDER, FOR SOLUTION INTRAMUSCULAR; INTRAVENOUS at 20:10

## 2021-06-20 RX ADMIN — IPRATROPIUM BROMIDE AND ALBUTEROL SULFATE 3 ML: 2.5; .5 SOLUTION RESPIRATORY (INHALATION) at 19:39

## 2021-06-20 RX ADMIN — RIFAXIMIN 550 MG: 550 TABLET ORAL at 20:10

## 2021-06-20 RX ADMIN — FLUOXETINE HYDROCHLORIDE 20 MG: 20 CAPSULE ORAL at 08:47

## 2021-06-20 RX ADMIN — IPRATROPIUM BROMIDE AND ALBUTEROL SULFATE 3 ML: 2.5; .5 SOLUTION RESPIRATORY (INHALATION) at 01:14

## 2021-06-20 RX ADMIN — SODIUM CHLORIDE, POTASSIUM CHLORIDE, SODIUM LACTATE AND CALCIUM CHLORIDE 75 ML/HR: 600; 310; 30; 20 INJECTION, SOLUTION INTRAVENOUS at 22:01

## 2021-06-20 RX ADMIN — PANTOPRAZOLE SODIUM 40 MG: 40 INJECTION, POWDER, FOR SOLUTION INTRAVENOUS at 08:48

## 2021-06-20 RX ADMIN — CHLORHEXIDINE GLUCONATE 0.12% ORAL RINSE 15 ML: 1.2 LIQUID ORAL at 20:10

## 2021-06-20 RX ADMIN — DIAZEPAM 10 MG: 5 TABLET ORAL at 20:10

## 2021-06-20 RX ADMIN — FENTANYL CITRATE 300 MCG/HR: 50 INJECTION INTRAVENOUS at 12:09

## 2021-06-20 RX ADMIN — AMIODARONE HYDROCHLORIDE 0.5 MG/MIN: 1.8 INJECTION, SOLUTION INTRAVENOUS at 09:10

## 2021-06-21 ENCOUNTER — APPOINTMENT (OUTPATIENT)
Dept: GENERAL RADIOLOGY | Facility: HOSPITAL | Age: 46
End: 2021-06-21

## 2021-06-21 LAB
ALBUMIN SERPL-MCNC: 2.7 G/DL (ref 3.5–5.2)
ALP SERPL-CCNC: 79 U/L (ref 39–117)
ALT SERPL W P-5'-P-CCNC: 1422 U/L (ref 1–33)
AMMONIA BLD-SCNC: 44 UMOL/L (ref 11–51)
ANION GAP SERPL CALCULATED.3IONS-SCNC: 7 MMOL/L (ref 5–15)
ARTERIAL PATENCY WRIST A: ABNORMAL
AST SERPL-CCNC: 1157 U/L (ref 1–32)
ATMOSPHERIC PRESS: ABNORMAL MM[HG]
BASE EXCESS BLDA CALC-SCNC: -0.9 MMOL/L (ref 0–2)
BDY SITE: ABNORMAL
BILIRUB SERPL-MCNC: 0.3 MG/DL (ref 0–1.2)
BODY TEMPERATURE: 37 C
BUN SERPL-MCNC: 26 MG/DL (ref 6–20)
CA-I SERPL ISE-MCNC: 1.19 MMOL/L (ref 1.12–1.32)
CALCIUM SPEC-SCNC: 8 MG/DL (ref 8.6–10.5)
CHLORIDE SERPL-SCNC: 113 MMOL/L (ref 98–107)
CHOLEST SERPL-MCNC: 136 MG/DL (ref 0–200)
CO2 BLDA-SCNC: 25.3 MMOL/L (ref 22–33)
CO2 SERPL-SCNC: 22 MMOL/L (ref 22–29)
COHGB MFR BLD: 0.6 % (ref 0–2)
CREAT SERPL-MCNC: 0.89 MG/DL (ref 0.57–1)
CRP SERPL-MCNC: 4.12 MG/DL (ref 0–0.5)
DEPRECATED RDW RBC AUTO: 57.4 FL (ref 37–54)
EPAP: 0
ERYTHROCYTE [DISTWIDTH] IN BLOOD BY AUTOMATED COUNT: 14.8 % (ref 12.3–15.4)
GLUCOSE SERPL-MCNC: 148 MG/DL (ref 65–99)
HCO3 BLDA-SCNC: 24.1 MMOL/L (ref 20–26)
HCT VFR BLD AUTO: 32 % (ref 34–46.6)
HCT VFR BLD AUTO: 39.8 % (ref 34–46.6)
HCT VFR BLD CALC: 35.1 %
HGB BLD-MCNC: 10.5 G/DL (ref 12–15.9)
HGB BLD-MCNC: 11.6 G/DL (ref 12–15.9)
HGB BLDA-MCNC: 11.5 G/DL (ref 14–18)
INHALED O2 CONCENTRATION: 40 %
IPAP: 0
MAGNESIUM SERPL-MCNC: 2.7 MG/DL (ref 1.6–2.6)
MCH RBC QN AUTO: 30.4 PG (ref 26.6–33)
MCHC RBC AUTO-ENTMCNC: 29.1 G/DL (ref 31.5–35.7)
MCV RBC AUTO: 104.2 FL (ref 79–97)
METHGB BLD QL: 0.2 % (ref 0–1.5)
MODALITY: ABNORMAL
NOTE: ABNORMAL
OXYHGB MFR BLDV: 93.2 % (ref 94–99)
PAW @ PEAK INSP FLOW SETTING VENT: 0 CMH2O
PCO2 BLDA: 40.6 MM HG (ref 35–45)
PCO2 TEMP ADJ BLD: 40.6 MM HG (ref 35–45)
PH BLDA: 7.38 PH UNITS (ref 7.35–7.45)
PH, TEMP CORRECTED: 7.38 PH UNITS
PHOSPHATE SERPL-MCNC: 1.8 MG/DL (ref 2.5–4.5)
PLATELET # BLD AUTO: 65 10*3/MM3 (ref 140–450)
PMV BLD AUTO: 11.9 FL (ref 6–12)
PO2 BLDA: 69.2 MM HG (ref 83–108)
PO2 TEMP ADJ BLD: 69.2 MM HG (ref 83–108)
POTASSIUM SERPL-SCNC: 3.7 MMOL/L (ref 3.5–5.2)
PREALB SERPL-MCNC: 13.6 MG/DL (ref 20–40)
PROT SERPL-MCNC: 4.6 G/DL (ref 6–8.5)
QT INTERVAL: 414 MS
QTC INTERVAL: 440 MS
RBC # BLD AUTO: 3.82 10*6/MM3 (ref 3.77–5.28)
SODIUM SERPL-SCNC: 142 MMOL/L (ref 136–145)
TOTAL RATE: 0 BREATHS/MINUTE
TRIGL SERPL-MCNC: 165 MG/DL (ref 0–150)
WBC # BLD AUTO: 12.8 10*3/MM3 (ref 3.4–10.8)

## 2021-06-21 PROCEDURE — 83735 ASSAY OF MAGNESIUM: CPT

## 2021-06-21 PROCEDURE — 99233 SBSQ HOSP IP/OBS HIGH 50: CPT | Performed by: INTERNAL MEDICINE

## 2021-06-21 PROCEDURE — 25010000002 FENTANYL CITRATE (PF) 2500 MCG/50ML SOLUTION: Performed by: INTERNAL MEDICINE

## 2021-06-21 PROCEDURE — 82375 ASSAY CARBOXYHB QUANT: CPT

## 2021-06-21 PROCEDURE — 84134 ASSAY OF PREALBUMIN: CPT

## 2021-06-21 PROCEDURE — 94003 VENT MGMT INPAT SUBQ DAY: CPT

## 2021-06-21 PROCEDURE — 94799 UNLISTED PULMONARY SVC/PX: CPT

## 2021-06-21 PROCEDURE — 85018 HEMOGLOBIN: CPT | Performed by: INTERNAL MEDICINE

## 2021-06-21 PROCEDURE — 25010000002 THIAMINE PER 100 MG: Performed by: NURSE PRACTITIONER

## 2021-06-21 PROCEDURE — 82140 ASSAY OF AMMONIA: CPT | Performed by: INTERNAL MEDICINE

## 2021-06-21 PROCEDURE — P9035 PLATELET PHERES LEUKOREDUCED: HCPCS

## 2021-06-21 PROCEDURE — 93005 ELECTROCARDIOGRAM TRACING: CPT | Performed by: INTERNAL MEDICINE

## 2021-06-21 PROCEDURE — 82465 ASSAY BLD/SERUM CHOLESTEROL: CPT

## 2021-06-21 PROCEDURE — 93010 ELECTROCARDIOGRAM REPORT: CPT | Performed by: INTERNAL MEDICINE

## 2021-06-21 PROCEDURE — 80053 COMPREHEN METABOLIC PANEL: CPT

## 2021-06-21 PROCEDURE — 25010000002 METHYLPREDNISOLONE PER 40 MG: Performed by: INTERNAL MEDICINE

## 2021-06-21 PROCEDURE — 84478 ASSAY OF TRIGLYCERIDES: CPT

## 2021-06-21 PROCEDURE — 99232 SBSQ HOSP IP/OBS MODERATE 35: CPT | Performed by: INTERNAL MEDICINE

## 2021-06-21 PROCEDURE — 82330 ASSAY OF CALCIUM: CPT | Performed by: INTERNAL MEDICINE

## 2021-06-21 PROCEDURE — 36600 WITHDRAWAL OF ARTERIAL BLOOD: CPT

## 2021-06-21 PROCEDURE — 86140 C-REACTIVE PROTEIN: CPT

## 2021-06-21 PROCEDURE — 85027 COMPLETE CBC AUTOMATED: CPT | Performed by: INTERNAL MEDICINE

## 2021-06-21 PROCEDURE — 71045 X-RAY EXAM CHEST 1 VIEW: CPT

## 2021-06-21 PROCEDURE — 83050 HGB METHEMOGLOBIN QUAN: CPT

## 2021-06-21 PROCEDURE — 85014 HEMATOCRIT: CPT | Performed by: INTERNAL MEDICINE

## 2021-06-21 PROCEDURE — 36430 TRANSFUSION BLD/BLD COMPNT: CPT

## 2021-06-21 PROCEDURE — 82805 BLOOD GASES W/O2 SATURATION: CPT

## 2021-06-21 PROCEDURE — 84100 ASSAY OF PHOSPHORUS: CPT

## 2021-06-21 RX ORDER — BUMETANIDE 0.25 MG/ML
1 INJECTION INTRAMUSCULAR; INTRAVENOUS ONCE
Status: COMPLETED | OUTPATIENT
Start: 2021-06-21 | End: 2021-06-21

## 2021-06-21 RX ORDER — ROSUVASTATIN CALCIUM 20 MG/1
20 TABLET, COATED ORAL NIGHTLY
Status: DISCONTINUED | OUTPATIENT
Start: 2021-06-22 | End: 2021-06-22

## 2021-06-21 RX ADMIN — LACTULOSE 10 G: 20 SOLUTION ORAL at 20:43

## 2021-06-21 RX ADMIN — IPRATROPIUM BROMIDE AND ALBUTEROL SULFATE 3 ML: 2.5; .5 SOLUTION RESPIRATORY (INHALATION) at 07:20

## 2021-06-21 RX ADMIN — FENTANYL CITRATE 250 MCG/HR: 50 INJECTION INTRAVENOUS at 07:17

## 2021-06-21 RX ADMIN — LACTULOSE 10 G: 20 SOLUTION ORAL at 08:35

## 2021-06-21 RX ADMIN — OLANZAPINE 7.5 MG: 5 TABLET, FILM COATED ORAL at 20:44

## 2021-06-21 RX ADMIN — PANTOPRAZOLE SODIUM 40 MG: 40 INJECTION, POWDER, FOR SOLUTION INTRAVENOUS at 20:43

## 2021-06-21 RX ADMIN — DIAZEPAM 10 MG: 5 TABLET ORAL at 12:22

## 2021-06-21 RX ADMIN — Medication 30 ML: at 08:35

## 2021-06-21 RX ADMIN — THIAMINE HYDROCHLORIDE 500 MG: 100 INJECTION, SOLUTION INTRAMUSCULAR; INTRAVENOUS at 02:17

## 2021-06-21 RX ADMIN — DIAZEPAM 10 MG: 5 TABLET ORAL at 20:43

## 2021-06-21 RX ADMIN — SODIUM CHLORIDE, PRESERVATIVE FREE 10 ML: 5 INJECTION INTRAVENOUS at 08:35

## 2021-06-21 RX ADMIN — PANTOPRAZOLE SODIUM 40 MG: 40 INJECTION, POWDER, FOR SOLUTION INTRAVENOUS at 08:35

## 2021-06-21 RX ADMIN — ASPIRIN 81 MG CHEWABLE TABLET 81 MG: 81 TABLET CHEWABLE at 10:12

## 2021-06-21 RX ADMIN — CLOPIDOGREL BISULFATE 75 MG: 75 TABLET ORAL at 10:13

## 2021-06-21 RX ADMIN — FLUOXETINE HYDROCHLORIDE 20 MG: 20 CAPSULE ORAL at 08:35

## 2021-06-21 RX ADMIN — BUMETANIDE 1 MG: 0.25 INJECTION, SOLUTION INTRAMUSCULAR; INTRAVENOUS at 10:18

## 2021-06-21 RX ADMIN — FENTANYL CITRATE 200 MCG/HR: 50 INJECTION INTRAVENOUS at 20:59

## 2021-06-21 RX ADMIN — CHLORHEXIDINE GLUCONATE 0.12% ORAL RINSE 15 ML: 1.2 LIQUID ORAL at 20:43

## 2021-06-21 RX ADMIN — RIFAXIMIN 550 MG: 550 TABLET ORAL at 20:43

## 2021-06-21 RX ADMIN — FOLIC ACID 1 MG: 1 TABLET ORAL at 08:35

## 2021-06-21 RX ADMIN — IPRATROPIUM BROMIDE AND ALBUTEROL SULFATE 3 ML: 2.5; .5 SOLUTION RESPIRATORY (INHALATION) at 01:08

## 2021-06-21 RX ADMIN — RIFAXIMIN 550 MG: 550 TABLET ORAL at 08:35

## 2021-06-21 RX ADMIN — CHLORHEXIDINE GLUCONATE 0.12% ORAL RINSE 15 ML: 1.2 LIQUID ORAL at 08:35

## 2021-06-21 RX ADMIN — METHYLPREDNISOLONE SODIUM SUCCINATE 40 MG: 40 INJECTION, POWDER, FOR SOLUTION INTRAMUSCULAR; INTRAVENOUS at 20:44

## 2021-06-21 RX ADMIN — POTASSIUM PHOSPHATE, MONOBASIC AND POTASSIUM PHOSPHATE, DIBASIC 15 MMOL: 224; 236 INJECTION, SOLUTION, CONCENTRATE INTRAVENOUS at 11:14

## 2021-06-21 RX ADMIN — DIAZEPAM 10 MG: 5 TABLET ORAL at 04:18

## 2021-06-21 RX ADMIN — THIAMINE HYDROCHLORIDE 500 MG: 100 INJECTION, SOLUTION INTRAMUSCULAR; INTRAVENOUS at 12:41

## 2021-06-21 RX ADMIN — IPRATROPIUM BROMIDE AND ALBUTEROL SULFATE 3 ML: 2.5; .5 SOLUTION RESPIRATORY (INHALATION) at 19:25

## 2021-06-21 RX ADMIN — SODIUM CHLORIDE, PRESERVATIVE FREE 10 ML: 5 INJECTION INTRAVENOUS at 20:46

## 2021-06-22 LAB
ACT BLD: 912 SECONDS (ref 82–152)
ALBUMIN SERPL-MCNC: 3.3 G/DL (ref 3.5–5.2)
ALP SERPL-CCNC: 81 U/L (ref 39–117)
ALT SERPL W P-5'-P-CCNC: 1022 U/L (ref 1–33)
ANION GAP SERPL CALCULATED.3IONS-SCNC: 8 MMOL/L (ref 5–15)
AST SERPL-CCNC: 516 U/L (ref 1–32)
BASOPHILS # BLD AUTO: 0 10*3/MM3 (ref 0–0.2)
BASOPHILS NFR BLD AUTO: 0 % (ref 0–1.5)
BH BB BLOOD EXPIRATION DATE: NORMAL
BH BB BLOOD EXPIRATION DATE: NORMAL
BH BB BLOOD TYPE BARCODE: 7300
BH BB BLOOD TYPE BARCODE: 8400
BH BB DISPENSE STATUS: NORMAL
BH BB DISPENSE STATUS: NORMAL
BH BB PRODUCT CODE: NORMAL
BH BB PRODUCT CODE: NORMAL
BH BB UNIT NUMBER: NORMAL
BH BB UNIT NUMBER: NORMAL
BILIRUB CONJ SERPL-MCNC: 0.2 MG/DL (ref 0–0.3)
BILIRUB INDIRECT SERPL-MCNC: 0.1 MG/DL
BILIRUB SERPL-MCNC: 0.3 MG/DL (ref 0–1.2)
BUN SERPL-MCNC: 25 MG/DL (ref 6–20)
BUN/CREAT SERPL: 28.1 (ref 7–25)
CALCIUM SPEC-SCNC: 8.5 MG/DL (ref 8.6–10.5)
CHLORIDE SERPL-SCNC: 114 MMOL/L (ref 98–107)
CO2 SERPL-SCNC: 23 MMOL/L (ref 22–29)
CREAT SERPL-MCNC: 0.89 MG/DL (ref 0.57–1)
CYTO UR: NORMAL
DEPRECATED RDW RBC AUTO: 51.3 FL (ref 37–54)
EOSINOPHIL # BLD AUTO: 0 10*3/MM3 (ref 0–0.4)
EOSINOPHIL NFR BLD AUTO: 0 % (ref 0.3–6.2)
ERYTHROCYTE [DISTWIDTH] IN BLOOD BY AUTOMATED COUNT: 14.6 % (ref 12.3–15.4)
GFR SERPL CREATININE-BSD FRML MDRD: 68 ML/MIN/1.73
GLUCOSE SERPL-MCNC: 155 MG/DL (ref 65–99)
HCT VFR BLD AUTO: 32.7 % (ref 34–46.6)
HGB BLD-MCNC: 10.3 G/DL (ref 12–15.9)
IMM GRANULOCYTES # BLD AUTO: 0.09 10*3/MM3 (ref 0–0.05)
IMM GRANULOCYTES NFR BLD AUTO: 1.4 % (ref 0–0.5)
LAB AP CASE REPORT: NORMAL
LAB AP CLINICAL INFORMATION: NORMAL
LYMPHOCYTES # BLD AUTO: 0.54 10*3/MM3 (ref 0.7–3.1)
LYMPHOCYTES NFR BLD AUTO: 8.5 % (ref 19.6–45.3)
MAGNESIUM SERPL-MCNC: 1.9 MG/DL (ref 1.6–2.6)
MCH RBC QN AUTO: 30.2 PG (ref 26.6–33)
MCHC RBC AUTO-ENTMCNC: 31.5 G/DL (ref 31.5–35.7)
MCV RBC AUTO: 95.9 FL (ref 79–97)
MONOCYTES # BLD AUTO: 0.23 10*3/MM3 (ref 0.1–0.9)
MONOCYTES NFR BLD AUTO: 3.6 % (ref 5–12)
NEUTROPHILS NFR BLD AUTO: 5.53 10*3/MM3 (ref 1.7–7)
NEUTROPHILS NFR BLD AUTO: 86.5 % (ref 42.7–76)
NRBC BLD AUTO-RTO: 0 /100 WBC (ref 0–0.2)
NT-PROBNP SERPL-MCNC: 1668 PG/ML (ref 0–450)
PATH REPORT.FINAL DX SPEC: NORMAL
PATH REPORT.GROSS SPEC: NORMAL
PHOSPHATE SERPL-MCNC: 1.9 MG/DL (ref 2.5–4.5)
PHOSPHATE SERPL-MCNC: 2 MG/DL (ref 2.5–4.5)
PLAT MORPH BLD: NORMAL
PLATELET # BLD AUTO: 88 10*3/MM3 (ref 140–450)
PMV BLD AUTO: 10.6 FL (ref 6–12)
POTASSIUM SERPL-SCNC: 3.5 MMOL/L (ref 3.5–5.2)
POTASSIUM SERPL-SCNC: 3.9 MMOL/L (ref 3.5–5.2)
PROT SERPL-MCNC: 5.1 G/DL (ref 6–8.5)
RBC # BLD AUTO: 3.41 10*6/MM3 (ref 3.77–5.28)
RBC MORPH BLD: NORMAL
SODIUM SERPL-SCNC: 145 MMOL/L (ref 136–145)
UNIT  ABO: NORMAL
UNIT  ABO: NORMAL
UNIT  RH: NORMAL
UNIT  RH: NORMAL
WBC # BLD AUTO: 6.39 10*3/MM3 (ref 3.4–10.8)
WBC MORPH BLD: NORMAL

## 2021-06-22 PROCEDURE — 84132 ASSAY OF SERUM POTASSIUM: CPT | Performed by: NURSE PRACTITIONER

## 2021-06-22 PROCEDURE — 99231 SBSQ HOSP IP/OBS SF/LOW 25: CPT | Performed by: INTERNAL MEDICINE

## 2021-06-22 PROCEDURE — 25010000002 FENTANYL CITRATE (PF) 2500 MCG/50ML SOLUTION: Performed by: INTERNAL MEDICINE

## 2021-06-22 PROCEDURE — 94799 UNLISTED PULMONARY SVC/PX: CPT

## 2021-06-22 PROCEDURE — 83880 ASSAY OF NATRIURETIC PEPTIDE: CPT | Performed by: INTERNAL MEDICINE

## 2021-06-22 PROCEDURE — 94003 VENT MGMT INPAT SUBQ DAY: CPT

## 2021-06-22 PROCEDURE — 85025 COMPLETE CBC W/AUTO DIFF WBC: CPT | Performed by: INTERNAL MEDICINE

## 2021-06-22 PROCEDURE — 80076 HEPATIC FUNCTION PANEL: CPT

## 2021-06-22 PROCEDURE — 25010000002 METHYLPREDNISOLONE PER 40 MG: Performed by: INTERNAL MEDICINE

## 2021-06-22 PROCEDURE — 84100 ASSAY OF PHOSPHORUS: CPT | Performed by: NURSE PRACTITIONER

## 2021-06-22 PROCEDURE — 85007 BL SMEAR W/DIFF WBC COUNT: CPT | Performed by: INTERNAL MEDICINE

## 2021-06-22 PROCEDURE — 80048 BASIC METABOLIC PNL TOTAL CA: CPT | Performed by: INTERNAL MEDICINE

## 2021-06-22 PROCEDURE — 83735 ASSAY OF MAGNESIUM: CPT | Performed by: INTERNAL MEDICINE

## 2021-06-22 PROCEDURE — 84100 ASSAY OF PHOSPHORUS: CPT

## 2021-06-22 PROCEDURE — 99233 SBSQ HOSP IP/OBS HIGH 50: CPT | Performed by: INTERNAL MEDICINE

## 2021-06-22 PROCEDURE — 25010000003 MAGNESIUM SULFATE 4 GM/100ML SOLUTION: Performed by: NURSE PRACTITIONER

## 2021-06-22 RX ORDER — DIAZEPAM 5 MG/1
15 TABLET ORAL EVERY 8 HOURS
Status: DISCONTINUED | OUTPATIENT
Start: 2021-06-22 | End: 2021-06-23

## 2021-06-22 RX ORDER — POTASSIUM CHLORIDE 1.5 G/1.77G
40 POWDER, FOR SOLUTION ORAL AS NEEDED
Status: DISCONTINUED | OUTPATIENT
Start: 2021-06-22 | End: 2021-06-26

## 2021-06-22 RX ORDER — AMINO ACIDS/PROTEIN HYDROLYS 15G-100/30
30 LIQUID (ML) ORAL 2 TIMES DAILY
Status: DISCONTINUED | OUTPATIENT
Start: 2021-06-22 | End: 2021-06-27

## 2021-06-22 RX ORDER — BUMETANIDE 0.25 MG/ML
2 INJECTION INTRAMUSCULAR; INTRAVENOUS ONCE
Status: COMPLETED | OUTPATIENT
Start: 2021-06-22 | End: 2021-06-22

## 2021-06-22 RX ORDER — POTASSIUM CHLORIDE 750 MG/1
40 CAPSULE, EXTENDED RELEASE ORAL AS NEEDED
Status: DISCONTINUED | OUTPATIENT
Start: 2021-06-22 | End: 2021-06-26

## 2021-06-22 RX ORDER — ROSUVASTATIN CALCIUM 20 MG/1
20 TABLET, COATED ORAL NIGHTLY
Status: DISCONTINUED | OUTPATIENT
Start: 2021-06-23 | End: 2021-06-28

## 2021-06-22 RX ADMIN — MAGNESIUM SULFATE HEPTAHYDRATE 4 G: 40 INJECTION, SOLUTION INTRAVENOUS at 05:43

## 2021-06-22 RX ADMIN — LACTULOSE 10 G: 20 SOLUTION ORAL at 09:13

## 2021-06-22 RX ADMIN — PANTOPRAZOLE SODIUM 40 MG: 40 INJECTION, POWDER, FOR SOLUTION INTRAVENOUS at 20:03

## 2021-06-22 RX ADMIN — IPRATROPIUM BROMIDE AND ALBUTEROL SULFATE 3 ML: 2.5; .5 SOLUTION RESPIRATORY (INHALATION) at 07:02

## 2021-06-22 RX ADMIN — Medication 30 ML: at 10:52

## 2021-06-22 RX ADMIN — LACTULOSE 10 G: 20 SOLUTION ORAL at 20:02

## 2021-06-22 RX ADMIN — PANTOPRAZOLE SODIUM 40 MG: 40 INJECTION, POWDER, FOR SOLUTION INTRAVENOUS at 09:14

## 2021-06-22 RX ADMIN — METHYLPREDNISOLONE SODIUM SUCCINATE 40 MG: 40 INJECTION, POWDER, FOR SOLUTION INTRAMUSCULAR; INTRAVENOUS at 20:03

## 2021-06-22 RX ADMIN — POTASSIUM PHOSPHATE, MONOBASIC AND POTASSIUM PHOSPHATE, DIBASIC 15 MMOL: 224; 236 INJECTION, SOLUTION, CONCENTRATE INTRAVENOUS at 09:13

## 2021-06-22 RX ADMIN — FOLIC ACID 1 MG: 1 TABLET ORAL at 09:13

## 2021-06-22 RX ADMIN — CHLORHEXIDINE GLUCONATE 0.12% ORAL RINSE 15 ML: 1.2 LIQUID ORAL at 09:12

## 2021-06-22 RX ADMIN — FLUOXETINE HYDROCHLORIDE 20 MG: 20 CAPSULE ORAL at 09:13

## 2021-06-22 RX ADMIN — Medication 30 ML: at 21:24

## 2021-06-22 RX ADMIN — BUMETANIDE 2 MG: 0.25 INJECTION, SOLUTION INTRAMUSCULAR; INTRAVENOUS at 10:50

## 2021-06-22 RX ADMIN — FENTANYL CITRATE 250 MCG/HR: 50 INJECTION INTRAVENOUS at 18:02

## 2021-06-22 RX ADMIN — SODIUM CHLORIDE, PRESERVATIVE FREE 10 ML: 5 INJECTION INTRAVENOUS at 09:13

## 2021-06-22 RX ADMIN — IPRATROPIUM BROMIDE AND ALBUTEROL SULFATE 3 ML: 2.5; .5 SOLUTION RESPIRATORY (INHALATION) at 13:17

## 2021-06-22 RX ADMIN — FENTANYL CITRATE 200 MCG/HR: 50 INJECTION INTRAVENOUS at 09:14

## 2021-06-22 RX ADMIN — ASPIRIN 81 MG CHEWABLE TABLET 81 MG: 81 TABLET CHEWABLE at 09:13

## 2021-06-22 RX ADMIN — CLOPIDOGREL BISULFATE 75 MG: 75 TABLET ORAL at 09:13

## 2021-06-22 RX ADMIN — RIFAXIMIN 550 MG: 550 TABLET ORAL at 09:13

## 2021-06-22 RX ADMIN — POTASSIUM CHLORIDE 40 MEQ: 1.5 POWDER, FOR SOLUTION ORAL at 05:43

## 2021-06-22 RX ADMIN — RIFAXIMIN 550 MG: 550 TABLET ORAL at 20:02

## 2021-06-22 RX ADMIN — DIAZEPAM 10 MG: 5 TABLET ORAL at 11:32

## 2021-06-22 RX ADMIN — SODIUM CHLORIDE, PRESERVATIVE FREE 10 ML: 5 INJECTION INTRAVENOUS at 20:04

## 2021-06-22 RX ADMIN — OLANZAPINE 7.5 MG: 5 TABLET, FILM COATED ORAL at 20:04

## 2021-06-22 RX ADMIN — IPRATROPIUM BROMIDE AND ALBUTEROL SULFATE 3 ML: 2.5; .5 SOLUTION RESPIRATORY (INHALATION) at 01:04

## 2021-06-22 RX ADMIN — POTASSIUM PHOSPHATE, MONOBASIC AND POTASSIUM PHOSPHATE, DIBASIC 15 MMOL: 224; 236 INJECTION, SOLUTION, CONCENTRATE INTRAVENOUS at 23:27

## 2021-06-22 RX ADMIN — IPRATROPIUM BROMIDE AND ALBUTEROL SULFATE 3 ML: 2.5; .5 SOLUTION RESPIRATORY (INHALATION) at 18:54

## 2021-06-22 RX ADMIN — DIAZEPAM 15 MG: 5 TABLET ORAL at 20:02

## 2021-06-22 RX ADMIN — CHLORHEXIDINE GLUCONATE 0.12% ORAL RINSE 15 ML: 1.2 LIQUID ORAL at 20:02

## 2021-06-22 RX ADMIN — DIAZEPAM 10 MG: 5 TABLET ORAL at 04:00

## 2021-06-23 LAB
ALBUMIN SERPL-MCNC: 3.5 G/DL (ref 3.5–5.2)
ALP SERPL-CCNC: 83 U/L (ref 39–117)
ALT SERPL W P-5'-P-CCNC: 674 U/L (ref 1–33)
ANION GAP SERPL CALCULATED.3IONS-SCNC: 9 MMOL/L (ref 5–15)
AST SERPL-CCNC: 202 U/L (ref 1–32)
BASOPHILS # BLD AUTO: 0.01 10*3/MM3 (ref 0–0.2)
BASOPHILS NFR BLD AUTO: 0.1 % (ref 0–1.5)
BILIRUB CONJ SERPL-MCNC: 0.2 MG/DL (ref 0–0.3)
BILIRUB INDIRECT SERPL-MCNC: 0.2 MG/DL
BILIRUB SERPL-MCNC: 0.4 MG/DL (ref 0–1.2)
BUN SERPL-MCNC: 24 MG/DL (ref 6–20)
BUN/CREAT SERPL: 30.4 (ref 7–25)
CALCIUM SPEC-SCNC: 8.8 MG/DL (ref 8.6–10.5)
CHLORIDE SERPL-SCNC: 110 MMOL/L (ref 98–107)
CO2 SERPL-SCNC: 23 MMOL/L (ref 22–29)
CREAT SERPL-MCNC: 0.79 MG/DL (ref 0.57–1)
DEPRECATED RDW RBC AUTO: 51.5 FL (ref 37–54)
EOSINOPHIL # BLD AUTO: 0 10*3/MM3 (ref 0–0.4)
EOSINOPHIL NFR BLD AUTO: 0 % (ref 0.3–6.2)
ERYTHROCYTE [DISTWIDTH] IN BLOOD BY AUTOMATED COUNT: 14.8 % (ref 12.3–15.4)
GFR SERPL CREATININE-BSD FRML MDRD: 78 ML/MIN/1.73
GLUCOSE BLDC GLUCOMTR-MCNC: 108 MG/DL (ref 70–130)
GLUCOSE BLDC GLUCOMTR-MCNC: 109 MG/DL (ref 70–130)
GLUCOSE BLDC GLUCOMTR-MCNC: 131 MG/DL (ref 70–130)
GLUCOSE BLDC GLUCOMTR-MCNC: 131 MG/DL (ref 70–130)
GLUCOSE SERPL-MCNC: 144 MG/DL (ref 65–99)
HCT VFR BLD AUTO: 32.7 % (ref 34–46.6)
HGB BLD-MCNC: 10.5 G/DL (ref 12–15.9)
IMM GRANULOCYTES # BLD AUTO: 0.11 10*3/MM3 (ref 0–0.05)
IMM GRANULOCYTES NFR BLD AUTO: 1.3 % (ref 0–0.5)
LYMPHOCYTES # BLD AUTO: 1.07 10*3/MM3 (ref 0.7–3.1)
LYMPHOCYTES NFR BLD AUTO: 13.1 % (ref 19.6–45.3)
MAGNESIUM SERPL-MCNC: 1.9 MG/DL (ref 1.6–2.6)
MCH RBC QN AUTO: 30.3 PG (ref 26.6–33)
MCHC RBC AUTO-ENTMCNC: 32.1 G/DL (ref 31.5–35.7)
MCV RBC AUTO: 94.2 FL (ref 79–97)
MONOCYTES # BLD AUTO: 0.67 10*3/MM3 (ref 0.1–0.9)
MONOCYTES NFR BLD AUTO: 8.2 % (ref 5–12)
NEUTROPHILS NFR BLD AUTO: 6.3 10*3/MM3 (ref 1.7–7)
NEUTROPHILS NFR BLD AUTO: 77.3 % (ref 42.7–76)
NRBC BLD AUTO-RTO: 0.4 /100 WBC (ref 0–0.2)
PHOSPHATE SERPL-MCNC: 2.7 MG/DL (ref 2.5–4.5)
PHOSPHATE SERPL-MCNC: 3.3 MG/DL (ref 2.5–4.5)
PLATELET # BLD AUTO: 88 10*3/MM3 (ref 140–450)
PMV BLD AUTO: 11.3 FL (ref 6–12)
POTASSIUM SERPL-SCNC: 3.8 MMOL/L (ref 3.5–5.2)
PROT SERPL-MCNC: 5.5 G/DL (ref 6–8.5)
RBC # BLD AUTO: 3.47 10*6/MM3 (ref 3.77–5.28)
SODIUM SERPL-SCNC: 142 MMOL/L (ref 136–145)
WBC # BLD AUTO: 8.16 10*3/MM3 (ref 3.4–10.8)

## 2021-06-23 PROCEDURE — 94799 UNLISTED PULMONARY SVC/PX: CPT

## 2021-06-23 PROCEDURE — 82962 GLUCOSE BLOOD TEST: CPT

## 2021-06-23 PROCEDURE — 25010000003 MAGNESIUM SULFATE 4 GM/100ML SOLUTION: Performed by: NURSE PRACTITIONER

## 2021-06-23 PROCEDURE — 25010000002 METHYLPREDNISOLONE PER 40 MG: Performed by: INTERNAL MEDICINE

## 2021-06-23 PROCEDURE — 80076 HEPATIC FUNCTION PANEL: CPT

## 2021-06-23 PROCEDURE — 99291 CRITICAL CARE FIRST HOUR: CPT | Performed by: INTERNAL MEDICINE

## 2021-06-23 PROCEDURE — 83735 ASSAY OF MAGNESIUM: CPT | Performed by: INTERNAL MEDICINE

## 2021-06-23 PROCEDURE — 85025 COMPLETE CBC W/AUTO DIFF WBC: CPT | Performed by: INTERNAL MEDICINE

## 2021-06-23 PROCEDURE — 80048 BASIC METABOLIC PNL TOTAL CA: CPT | Performed by: INTERNAL MEDICINE

## 2021-06-23 PROCEDURE — 94003 VENT MGMT INPAT SUBQ DAY: CPT

## 2021-06-23 PROCEDURE — 25010000002 FENTANYL CITRATE (PF) 2500 MCG/50ML SOLUTION: Performed by: INTERNAL MEDICINE

## 2021-06-23 PROCEDURE — 84100 ASSAY OF PHOSPHORUS: CPT | Performed by: INTERNAL MEDICINE

## 2021-06-23 RX ORDER — DIAZEPAM 5 MG/1
15 TABLET ORAL EVERY 8 HOURS
Status: DISCONTINUED | OUTPATIENT
Start: 2021-06-23 | End: 2021-06-24

## 2021-06-23 RX ORDER — BUMETANIDE 0.25 MG/ML
2 INJECTION INTRAMUSCULAR; INTRAVENOUS EVERY 12 HOURS
Status: COMPLETED | OUTPATIENT
Start: 2021-06-23 | End: 2021-06-23

## 2021-06-23 RX ADMIN — DIAZEPAM 15 MG: 5 TABLET ORAL at 21:23

## 2021-06-23 RX ADMIN — CLOPIDOGREL BISULFATE 75 MG: 75 TABLET ORAL at 09:16

## 2021-06-23 RX ADMIN — FLUOXETINE HYDROCHLORIDE 20 MG: 20 CAPSULE ORAL at 09:16

## 2021-06-23 RX ADMIN — LACTULOSE 10 G: 20 SOLUTION ORAL at 21:22

## 2021-06-23 RX ADMIN — ASPIRIN 81 MG CHEWABLE TABLET 81 MG: 81 TABLET CHEWABLE at 09:16

## 2021-06-23 RX ADMIN — IPRATROPIUM BROMIDE AND ALBUTEROL SULFATE 3 ML: 2.5; .5 SOLUTION RESPIRATORY (INHALATION) at 19:06

## 2021-06-23 RX ADMIN — MAGNESIUM SULFATE HEPTAHYDRATE 4 G: 40 INJECTION, SOLUTION INTRAVENOUS at 09:16

## 2021-06-23 RX ADMIN — RIFAXIMIN 550 MG: 550 TABLET ORAL at 21:23

## 2021-06-23 RX ADMIN — PANTOPRAZOLE SODIUM 40 MG: 40 INJECTION, POWDER, FOR SOLUTION INTRAVENOUS at 21:22

## 2021-06-23 RX ADMIN — IPRATROPIUM BROMIDE AND ALBUTEROL SULFATE 3 ML: 2.5; .5 SOLUTION RESPIRATORY (INHALATION) at 07:37

## 2021-06-23 RX ADMIN — FENTANYL CITRATE 250 MCG/HR: 50 INJECTION INTRAVENOUS at 03:24

## 2021-06-23 RX ADMIN — RIFAXIMIN 550 MG: 550 TABLET ORAL at 09:17

## 2021-06-23 RX ADMIN — FOLIC ACID 1 MG: 1 TABLET ORAL at 09:17

## 2021-06-23 RX ADMIN — Medication 30 ML: at 09:50

## 2021-06-23 RX ADMIN — SODIUM CHLORIDE, PRESERVATIVE FREE 10 ML: 5 INJECTION INTRAVENOUS at 09:18

## 2021-06-23 RX ADMIN — IPRATROPIUM BROMIDE AND ALBUTEROL SULFATE 3 ML: 2.5; .5 SOLUTION RESPIRATORY (INHALATION) at 14:18

## 2021-06-23 RX ADMIN — IPRATROPIUM BROMIDE AND ALBUTEROL SULFATE 3 ML: 2.5; .5 SOLUTION RESPIRATORY (INHALATION) at 01:13

## 2021-06-23 RX ADMIN — BUMETANIDE 2 MG: 0.25 INJECTION INTRAMUSCULAR; INTRAVENOUS at 11:22

## 2021-06-23 RX ADMIN — DIAZEPAM 15 MG: 5 TABLET ORAL at 11:41

## 2021-06-23 RX ADMIN — Medication 30 ML: at 21:27

## 2021-06-23 RX ADMIN — BUMETANIDE 2 MG: 0.25 INJECTION INTRAMUSCULAR; INTRAVENOUS at 21:35

## 2021-06-23 RX ADMIN — DIAZEPAM 15 MG: 5 TABLET ORAL at 03:23

## 2021-06-23 RX ADMIN — LACTULOSE 10 G: 20 SOLUTION ORAL at 09:16

## 2021-06-23 RX ADMIN — OLANZAPINE 7.5 MG: 5 TABLET, FILM COATED ORAL at 21:23

## 2021-06-23 RX ADMIN — CHLORHEXIDINE GLUCONATE 0.12% ORAL RINSE 15 ML: 1.2 LIQUID ORAL at 09:16

## 2021-06-23 RX ADMIN — ROSUVASTATIN CALCIUM 20 MG: 20 TABLET, COATED ORAL at 21:23

## 2021-06-23 RX ADMIN — CHLORHEXIDINE GLUCONATE 0.12% ORAL RINSE 15 ML: 1.2 LIQUID ORAL at 21:22

## 2021-06-23 RX ADMIN — SODIUM CHLORIDE, PRESERVATIVE FREE 10 ML: 5 INJECTION INTRAVENOUS at 21:24

## 2021-06-23 RX ADMIN — PANTOPRAZOLE SODIUM 40 MG: 40 INJECTION, POWDER, FOR SOLUTION INTRAVENOUS at 09:16

## 2021-06-23 RX ADMIN — METHYLPREDNISOLONE SODIUM SUCCINATE 40 MG: 40 INJECTION, POWDER, FOR SOLUTION INTRAMUSCULAR; INTRAVENOUS at 21:22

## 2021-06-24 ENCOUNTER — APPOINTMENT (OUTPATIENT)
Dept: GENERAL RADIOLOGY | Facility: HOSPITAL | Age: 46
End: 2021-06-24

## 2021-06-24 LAB
ANION GAP SERPL CALCULATED.3IONS-SCNC: 9 MMOL/L (ref 5–15)
BASOPHILS # BLD AUTO: 0.01 10*3/MM3 (ref 0–0.2)
BASOPHILS NFR BLD AUTO: 0.1 % (ref 0–1.5)
BUN SERPL-MCNC: 28 MG/DL (ref 6–20)
BUN/CREAT SERPL: 35.9 (ref 7–25)
CALCIUM SPEC-SCNC: 9.1 MG/DL (ref 8.6–10.5)
CHLORIDE SERPL-SCNC: 105 MMOL/L (ref 98–107)
CO2 SERPL-SCNC: 29 MMOL/L (ref 22–29)
CREAT SERPL-MCNC: 0.78 MG/DL (ref 0.57–1)
DEPRECATED RDW RBC AUTO: 48.6 FL (ref 37–54)
EOSINOPHIL # BLD AUTO: 0.01 10*3/MM3 (ref 0–0.4)
EOSINOPHIL NFR BLD AUTO: 0.1 % (ref 0.3–6.2)
ERYTHROCYTE [DISTWIDTH] IN BLOOD BY AUTOMATED COUNT: 14.6 % (ref 12.3–15.4)
GFR SERPL CREATININE-BSD FRML MDRD: 80 ML/MIN/1.73
GLUCOSE BLDC GLUCOMTR-MCNC: 101 MG/DL (ref 70–130)
GLUCOSE BLDC GLUCOMTR-MCNC: 108 MG/DL (ref 70–130)
GLUCOSE BLDC GLUCOMTR-MCNC: 136 MG/DL (ref 70–130)
GLUCOSE BLDC GLUCOMTR-MCNC: 165 MG/DL (ref 70–130)
GLUCOSE BLDC GLUCOMTR-MCNC: 86 MG/DL (ref 70–130)
GLUCOSE SERPL-MCNC: 183 MG/DL (ref 65–99)
HCT VFR BLD AUTO: 33.4 % (ref 34–46.6)
HGB BLD-MCNC: 10.8 G/DL (ref 12–15.9)
IMM GRANULOCYTES # BLD AUTO: 0.12 10*3/MM3 (ref 0–0.05)
IMM GRANULOCYTES NFR BLD AUTO: 1.5 % (ref 0–0.5)
LYMPHOCYTES # BLD AUTO: 0.65 10*3/MM3 (ref 0.7–3.1)
LYMPHOCYTES NFR BLD AUTO: 7.9 % (ref 19.6–45.3)
MAGNESIUM SERPL-MCNC: 1.8 MG/DL (ref 1.6–2.6)
MCH RBC QN AUTO: 30 PG (ref 26.6–33)
MCHC RBC AUTO-ENTMCNC: 32.3 G/DL (ref 31.5–35.7)
MCV RBC AUTO: 92.8 FL (ref 79–97)
MONOCYTES # BLD AUTO: 0.39 10*3/MM3 (ref 0.1–0.9)
MONOCYTES NFR BLD AUTO: 4.8 % (ref 5–12)
NEUTROPHILS NFR BLD AUTO: 7.02 10*3/MM3 (ref 1.7–7)
NEUTROPHILS NFR BLD AUTO: 85.6 % (ref 42.7–76)
NRBC BLD AUTO-RTO: 0 /100 WBC (ref 0–0.2)
PLATELET # BLD AUTO: 91 10*3/MM3 (ref 140–450)
PMV BLD AUTO: 12 FL (ref 6–12)
POTASSIUM SERPL-SCNC: 4.6 MMOL/L (ref 3.5–5.2)
RBC # BLD AUTO: 3.6 10*6/MM3 (ref 3.77–5.28)
SODIUM SERPL-SCNC: 143 MMOL/L (ref 136–145)
WBC # BLD AUTO: 8.2 10*3/MM3 (ref 3.4–10.8)

## 2021-06-24 PROCEDURE — 82962 GLUCOSE BLOOD TEST: CPT

## 2021-06-24 PROCEDURE — 80048 BASIC METABOLIC PNL TOTAL CA: CPT | Performed by: INTERNAL MEDICINE

## 2021-06-24 PROCEDURE — 71045 X-RAY EXAM CHEST 1 VIEW: CPT

## 2021-06-24 PROCEDURE — 85025 COMPLETE CBC W/AUTO DIFF WBC: CPT | Performed by: INTERNAL MEDICINE

## 2021-06-24 PROCEDURE — 25010000002 FENTANYL CITRATE (PF) 2500 MCG/50ML SOLUTION: Performed by: INTERNAL MEDICINE

## 2021-06-24 PROCEDURE — 94799 UNLISTED PULMONARY SVC/PX: CPT

## 2021-06-24 PROCEDURE — 99233 SBSQ HOSP IP/OBS HIGH 50: CPT | Performed by: INTERNAL MEDICINE

## 2021-06-24 PROCEDURE — 25010000003 MAGNESIUM SULFATE 4 GM/100ML SOLUTION: Performed by: NURSE PRACTITIONER

## 2021-06-24 PROCEDURE — 99232 SBSQ HOSP IP/OBS MODERATE 35: CPT | Performed by: INTERNAL MEDICINE

## 2021-06-24 PROCEDURE — 83735 ASSAY OF MAGNESIUM: CPT | Performed by: INTERNAL MEDICINE

## 2021-06-24 RX ORDER — BISACODYL 5 MG/1
10 TABLET, DELAYED RELEASE ORAL DAILY PRN
Status: DISCONTINUED | OUTPATIENT
Start: 2021-06-24 | End: 2021-06-26

## 2021-06-24 RX ORDER — RANITIDINE 150 MG/1
150 TABLET ORAL 2 TIMES DAILY PRN
COMMUNITY
End: 2021-07-02 | Stop reason: HOSPADM

## 2021-06-24 RX ORDER — VITAMIN B COMPLEX
1 CAPSULE ORAL DAILY
COMMUNITY

## 2021-06-24 RX ORDER — DIPHENHYDRAMINE HYDROCHLORIDE 25 MG/1
25 CAPSULE ORAL DAILY
COMMUNITY

## 2021-06-24 RX ORDER — ASPIRIN 81 MG/1
81 TABLET ORAL DAILY
COMMUNITY

## 2021-06-24 RX ORDER — ROPINIROLE 0.5 MG/1
0.5 TABLET, FILM COATED ORAL NIGHTLY
COMMUNITY
End: 2021-07-20 | Stop reason: HOSPADM

## 2021-06-24 RX ORDER — TRAZODONE HYDROCHLORIDE 50 MG/1
50 TABLET ORAL NIGHTLY
Status: ON HOLD | COMMUNITY
End: 2021-07-20 | Stop reason: SDUPTHER

## 2021-06-24 RX ORDER — DOCUSATE SODIUM 100 MG/1
100 CAPSULE, LIQUID FILLED ORAL 2 TIMES DAILY PRN
COMMUNITY
End: 2022-01-12

## 2021-06-24 RX ORDER — MULTIPLE VITAMINS W/ MINERALS TAB 9MG-400MCG
1 TAB ORAL DAILY
COMMUNITY
End: 2021-08-16

## 2021-06-24 RX ORDER — BUMETANIDE 2 MG/1
6 TABLET ORAL DAILY PRN
COMMUNITY
End: 2021-07-02 | Stop reason: HOSPADM

## 2021-06-24 RX ORDER — HYDROCORTISONE 1 %
1 OINTMENT (GRAM) TOPICAL DAILY
COMMUNITY
End: 2021-08-16

## 2021-06-24 RX ORDER — PHENOL 1.4 %
1 AEROSOL, SPRAY (ML) MUCOUS MEMBRANE NIGHTLY
COMMUNITY

## 2021-06-24 RX ORDER — TAMSULOSIN HYDROCHLORIDE 0.4 MG/1
1 CAPSULE ORAL DAILY
Status: ON HOLD | COMMUNITY
End: 2021-07-20 | Stop reason: SDUPTHER

## 2021-06-24 RX ORDER — DIAZEPAM 2 MG/1
8 TABLET ORAL EVERY 8 HOURS SCHEDULED
Status: DISCONTINUED | OUTPATIENT
Start: 2021-06-24 | End: 2021-06-25

## 2021-06-24 RX ORDER — MULTIVIT WITH MINERALS/LUTEIN
1000 TABLET ORAL DAILY
COMMUNITY
End: 2021-08-16

## 2021-06-24 RX ADMIN — FENTANYL CITRATE 25 MCG/HR: 50 INJECTION INTRAVENOUS at 21:20

## 2021-06-24 RX ADMIN — SODIUM CHLORIDE, PRESERVATIVE FREE 10 ML: 5 INJECTION INTRAVENOUS at 08:21

## 2021-06-24 RX ADMIN — FOLIC ACID 1 MG: 1 TABLET ORAL at 08:21

## 2021-06-24 RX ADMIN — IPRATROPIUM BROMIDE AND ALBUTEROL SULFATE 3 ML: 2.5; .5 SOLUTION RESPIRATORY (INHALATION) at 18:56

## 2021-06-24 RX ADMIN — FLUOXETINE HYDROCHLORIDE 20 MG: 20 CAPSULE ORAL at 08:20

## 2021-06-24 RX ADMIN — IPRATROPIUM BROMIDE AND ALBUTEROL SULFATE 3 ML: 2.5; .5 SOLUTION RESPIRATORY (INHALATION) at 13:50

## 2021-06-24 RX ADMIN — DIAZEPAM 8 MG: 2 TABLET ORAL at 21:17

## 2021-06-24 RX ADMIN — CLOPIDOGREL BISULFATE 75 MG: 75 TABLET ORAL at 08:21

## 2021-06-24 RX ADMIN — CHLORHEXIDINE GLUCONATE 0.12% ORAL RINSE 15 ML: 1.2 LIQUID ORAL at 21:08

## 2021-06-24 RX ADMIN — DIAZEPAM 8 MG: 2 TABLET ORAL at 15:49

## 2021-06-24 RX ADMIN — LACTULOSE 10 G: 20 SOLUTION ORAL at 08:20

## 2021-06-24 RX ADMIN — RIFAXIMIN 550 MG: 550 TABLET ORAL at 08:20

## 2021-06-24 RX ADMIN — CHLORHEXIDINE GLUCONATE 0.12% ORAL RINSE 15 ML: 1.2 LIQUID ORAL at 08:20

## 2021-06-24 RX ADMIN — SODIUM CHLORIDE, PRESERVATIVE FREE 10 ML: 5 INJECTION INTRAVENOUS at 21:08

## 2021-06-24 RX ADMIN — RIFAXIMIN 550 MG: 550 TABLET ORAL at 21:08

## 2021-06-24 RX ADMIN — DIAZEPAM 15 MG: 5 TABLET ORAL at 05:01

## 2021-06-24 RX ADMIN — LACTULOSE 10 G: 20 SOLUTION ORAL at 21:09

## 2021-06-24 RX ADMIN — MAGNESIUM SULFATE HEPTAHYDRATE 4 G: 40 INJECTION, SOLUTION INTRAVENOUS at 11:10

## 2021-06-24 RX ADMIN — ROSUVASTATIN CALCIUM 20 MG: 20 TABLET, COATED ORAL at 21:09

## 2021-06-24 RX ADMIN — IPRATROPIUM BROMIDE AND ALBUTEROL SULFATE 3 ML: 2.5; .5 SOLUTION RESPIRATORY (INHALATION) at 01:37

## 2021-06-24 RX ADMIN — IPRATROPIUM BROMIDE AND ALBUTEROL SULFATE 3 ML: 2.5; .5 SOLUTION RESPIRATORY (INHALATION) at 07:34

## 2021-06-24 RX ADMIN — PANTOPRAZOLE SODIUM 40 MG: 40 INJECTION, POWDER, FOR SOLUTION INTRAVENOUS at 21:09

## 2021-06-24 RX ADMIN — PANTOPRAZOLE SODIUM 40 MG: 40 INJECTION, POWDER, FOR SOLUTION INTRAVENOUS at 08:20

## 2021-06-24 RX ADMIN — Medication 30 ML: at 08:22

## 2021-06-24 RX ADMIN — ASPIRIN 81 MG CHEWABLE TABLET 81 MG: 81 TABLET CHEWABLE at 08:20

## 2021-06-24 RX ADMIN — Medication 30 ML: at 21:08

## 2021-06-24 RX ADMIN — OLANZAPINE 7.5 MG: 5 TABLET, FILM COATED ORAL at 21:09

## 2021-06-25 LAB
ARTERIAL PATENCY WRIST A: ABNORMAL
ATMOSPHERIC PRESS: ABNORMAL MM[HG]
BASE EXCESS BLDA CALC-SCNC: 6.1 MMOL/L (ref 0–2)
BDY SITE: ABNORMAL
BODY TEMPERATURE: 37 C
CO2 BLDA-SCNC: 31.6 MMOL/L (ref 22–33)
COHGB MFR BLD: 0.7 % (ref 0–2)
EPAP: 0
GLUCOSE BLDC GLUCOMTR-MCNC: 111 MG/DL (ref 70–130)
GLUCOSE BLDC GLUCOMTR-MCNC: 80 MG/DL (ref 70–130)
GLUCOSE BLDC GLUCOMTR-MCNC: 86 MG/DL (ref 70–130)
GLUCOSE BLDC GLUCOMTR-MCNC: 86 MG/DL (ref 70–130)
HCO3 BLDA-SCNC: 30.3 MMOL/L (ref 20–26)
HCT VFR BLD CALC: 35 %
HGB BLDA-MCNC: 11.4 G/DL (ref 14–18)
INHALED O2 CONCENTRATION: 35 %
IPAP: 0
METHGB BLD QL: 0.7 % (ref 0–1.5)
MODALITY: ABNORMAL
NOTE: ABNORMAL
OXYHGB MFR BLDV: 95.1 % (ref 94–99)
PAW @ PEAK INSP FLOW SETTING VENT: 0 CMH2O
PCO2 BLDA: 41.5 MM HG (ref 35–45)
PCO2 TEMP ADJ BLD: 41.5 MM HG (ref 35–45)
PEEP RESPIRATORY: 5 CM[H2O]
PH BLDA: 7.47 PH UNITS (ref 7.35–7.45)
PH, TEMP CORRECTED: 7.47 PH UNITS
PHOSPHATE SERPL-MCNC: 2.9 MG/DL (ref 2.5–4.5)
PO2 BLDA: 81.9 MM HG (ref 83–108)
PO2 TEMP ADJ BLD: 81.9 MM HG (ref 83–108)
PSV: 10 CMH2O
TOTAL RATE: 0 BREATHS/MINUTE
VENTILATOR MODE: ABNORMAL

## 2021-06-25 PROCEDURE — 25010000002 HYDROMORPHONE PER 4 MG: Performed by: NURSE PRACTITIONER

## 2021-06-25 PROCEDURE — 82962 GLUCOSE BLOOD TEST: CPT

## 2021-06-25 PROCEDURE — 94003 VENT MGMT INPAT SUBQ DAY: CPT

## 2021-06-25 PROCEDURE — 99233 SBSQ HOSP IP/OBS HIGH 50: CPT | Performed by: INTERNAL MEDICINE

## 2021-06-25 PROCEDURE — 25010000002 KETOROLAC TROMETHAMINE PER 15 MG: Performed by: NURSE PRACTITIONER

## 2021-06-25 PROCEDURE — 25010000002 METHYLPREDNISOLONE PER 40 MG: Performed by: NURSE PRACTITIONER

## 2021-06-25 PROCEDURE — 94799 UNLISTED PULMONARY SVC/PX: CPT

## 2021-06-25 PROCEDURE — 82375 ASSAY CARBOXYHB QUANT: CPT

## 2021-06-25 PROCEDURE — 83050 HGB METHEMOGLOBIN QUAN: CPT

## 2021-06-25 PROCEDURE — 25010000002 KETOROLAC TROMETHAMINE PER 15 MG: Performed by: INTERNAL MEDICINE

## 2021-06-25 PROCEDURE — 84100 ASSAY OF PHOSPHORUS: CPT

## 2021-06-25 PROCEDURE — 25010000002 HYDROMORPHONE PER 4 MG: Performed by: INTERNAL MEDICINE

## 2021-06-25 PROCEDURE — 82805 BLOOD GASES W/O2 SATURATION: CPT

## 2021-06-25 PROCEDURE — 36600 WITHDRAWAL OF ARTERIAL BLOOD: CPT

## 2021-06-25 RX ORDER — OXYCODONE HYDROCHLORIDE 5 MG/1
5 TABLET ORAL EVERY 8 HOURS PRN
Status: DISCONTINUED | OUTPATIENT
Start: 2021-06-25 | End: 2021-06-27

## 2021-06-25 RX ORDER — OXYCODONE HYDROCHLORIDE 5 MG/1
5 TABLET ORAL EVERY 8 HOURS
Status: DISCONTINUED | OUTPATIENT
Start: 2021-06-25 | End: 2021-06-25

## 2021-06-25 RX ORDER — HYDROMORPHONE HYDROCHLORIDE 1 MG/ML
0.5 INJECTION, SOLUTION INTRAMUSCULAR; INTRAVENOUS; SUBCUTANEOUS ONCE
Status: COMPLETED | OUTPATIENT
Start: 2021-06-25 | End: 2021-06-25

## 2021-06-25 RX ORDER — OXYCODONE HYDROCHLORIDE 5 MG/1
5 TABLET ORAL EVERY 8 HOURS SCHEDULED
Status: DISCONTINUED | OUTPATIENT
Start: 2021-06-25 | End: 2021-06-25

## 2021-06-25 RX ORDER — DIAZEPAM 5 MG/1
5 TABLET ORAL EVERY 8 HOURS SCHEDULED
Status: DISPENSED | OUTPATIENT
Start: 2021-06-25 | End: 2021-06-27

## 2021-06-25 RX ORDER — METHYLPREDNISOLONE SODIUM SUCCINATE 40 MG/ML
20 INJECTION, POWDER, LYOPHILIZED, FOR SOLUTION INTRAMUSCULAR; INTRAVENOUS EVERY 4 HOURS
Status: COMPLETED | OUTPATIENT
Start: 2021-06-25 | End: 2021-06-26

## 2021-06-25 RX ORDER — KETOROLAC TROMETHAMINE 30 MG/ML
30 INJECTION, SOLUTION INTRAMUSCULAR; INTRAVENOUS ONCE
Status: COMPLETED | OUTPATIENT
Start: 2021-06-25 | End: 2021-06-25

## 2021-06-25 RX ORDER — KETOROLAC TROMETHAMINE 30 MG/ML
30 INJECTION, SOLUTION INTRAMUSCULAR; INTRAVENOUS EVERY 6 HOURS PRN
Status: DISPENSED | OUTPATIENT
Start: 2021-06-25 | End: 2021-06-30

## 2021-06-25 RX ADMIN — HYDROMORPHONE HYDROCHLORIDE 0.5 MG: 1 INJECTION, SOLUTION INTRAMUSCULAR; INTRAVENOUS; SUBCUTANEOUS at 04:19

## 2021-06-25 RX ADMIN — CHLORHEXIDINE GLUCONATE 0.12% ORAL RINSE 15 ML: 1.2 LIQUID ORAL at 08:12

## 2021-06-25 RX ADMIN — RACEPINEPHRINE HYDROCHLORIDE 0.5 ML: 11.25 SOLUTION RESPIRATORY (INHALATION) at 19:47

## 2021-06-25 RX ADMIN — RIFAXIMIN 550 MG: 550 TABLET ORAL at 08:11

## 2021-06-25 RX ADMIN — KETOROLAC TROMETHAMINE 30 MG: 30 INJECTION, SOLUTION INTRAMUSCULAR; INTRAVENOUS at 10:31

## 2021-06-25 RX ADMIN — DIAZEPAM 8 MG: 2 TABLET ORAL at 05:55

## 2021-06-25 RX ADMIN — CLOPIDOGREL BISULFATE 75 MG: 75 TABLET ORAL at 08:11

## 2021-06-25 RX ADMIN — SODIUM CHLORIDE, PRESERVATIVE FREE 10 ML: 5 INJECTION INTRAVENOUS at 20:21

## 2021-06-25 RX ADMIN — FLUOXETINE HYDROCHLORIDE 20 MG: 20 CAPSULE ORAL at 08:11

## 2021-06-25 RX ADMIN — CHLORHEXIDINE GLUCONATE 0.12% ORAL RINSE 15 ML: 1.2 LIQUID ORAL at 22:35

## 2021-06-25 RX ADMIN — RACEPINEPHRINE HYDROCHLORIDE 0.5 ML: 11.25 SOLUTION RESPIRATORY (INHALATION) at 22:15

## 2021-06-25 RX ADMIN — ASPIRIN 81 MG CHEWABLE TABLET 81 MG: 81 TABLET CHEWABLE at 08:11

## 2021-06-25 RX ADMIN — IPRATROPIUM BROMIDE AND ALBUTEROL SULFATE 3 ML: 2.5; .5 SOLUTION RESPIRATORY (INHALATION) at 07:41

## 2021-06-25 RX ADMIN — PANTOPRAZOLE SODIUM 40 MG: 40 INJECTION, POWDER, FOR SOLUTION INTRAVENOUS at 08:11

## 2021-06-25 RX ADMIN — Medication 30 ML: at 08:12

## 2021-06-25 RX ADMIN — HYDROMORPHONE HYDROCHLORIDE 0.5 MG: 1 INJECTION, SOLUTION INTRAMUSCULAR; INTRAVENOUS; SUBCUTANEOUS at 16:21

## 2021-06-25 RX ADMIN — IPRATROPIUM BROMIDE AND ALBUTEROL SULFATE 3 ML: 2.5; .5 SOLUTION RESPIRATORY (INHALATION) at 01:21

## 2021-06-25 RX ADMIN — LACTULOSE 10 G: 20 SOLUTION ORAL at 08:11

## 2021-06-25 RX ADMIN — KETOROLAC TROMETHAMINE 30 MG: 30 INJECTION, SOLUTION INTRAMUSCULAR; INTRAVENOUS at 23:01

## 2021-06-25 RX ADMIN — IPRATROPIUM BROMIDE AND ALBUTEROL SULFATE 3 ML: 2.5; .5 SOLUTION RESPIRATORY (INHALATION) at 13:47

## 2021-06-25 RX ADMIN — SODIUM CHLORIDE, PRESERVATIVE FREE 10 ML: 5 INJECTION INTRAVENOUS at 08:11

## 2021-06-25 RX ADMIN — OXYCODONE 5 MG: 5 TABLET ORAL at 10:32

## 2021-06-25 RX ADMIN — FOLIC ACID 1 MG: 1 TABLET ORAL at 08:11

## 2021-06-25 RX ADMIN — METHYLPREDNISOLONE SODIUM SUCCINATE 20 MG: 40 INJECTION, POWDER, FOR SOLUTION INTRAMUSCULAR; INTRAVENOUS at 22:39

## 2021-06-25 RX ADMIN — PANTOPRAZOLE SODIUM 40 MG: 40 INJECTION, POWDER, FOR SOLUTION INTRAVENOUS at 20:21

## 2021-06-25 RX ADMIN — IPRATROPIUM BROMIDE AND ALBUTEROL SULFATE 3 ML: 2.5; .5 SOLUTION RESPIRATORY (INHALATION) at 19:04

## 2021-06-26 ENCOUNTER — APPOINTMENT (OUTPATIENT)
Dept: GENERAL RADIOLOGY | Facility: HOSPITAL | Age: 46
End: 2021-06-26

## 2021-06-26 LAB
ANION GAP SERPL CALCULATED.3IONS-SCNC: 10 MMOL/L (ref 5–15)
BASOPHILS # BLD AUTO: 0.01 10*3/MM3 (ref 0–0.2)
BASOPHILS NFR BLD AUTO: 0.1 % (ref 0–1.5)
BUN SERPL-MCNC: 23 MG/DL (ref 6–20)
BUN/CREAT SERPL: 32.4 (ref 7–25)
CALCIUM SPEC-SCNC: 8.4 MG/DL (ref 8.6–10.5)
CHLORIDE SERPL-SCNC: 103 MMOL/L (ref 98–107)
CO2 SERPL-SCNC: 25 MMOL/L (ref 22–29)
CREAT SERPL-MCNC: 0.71 MG/DL (ref 0.57–1)
DEPRECATED RDW RBC AUTO: 48.4 FL (ref 37–54)
EOSINOPHIL # BLD AUTO: 0.02 10*3/MM3 (ref 0–0.4)
EOSINOPHIL NFR BLD AUTO: 0.2 % (ref 0.3–6.2)
ERYTHROCYTE [DISTWIDTH] IN BLOOD BY AUTOMATED COUNT: 14.4 % (ref 12.3–15.4)
GFR SERPL CREATININE-BSD FRML MDRD: 89 ML/MIN/1.73
GLUCOSE BLDC GLUCOMTR-MCNC: 108 MG/DL (ref 70–130)
GLUCOSE BLDC GLUCOMTR-MCNC: 123 MG/DL (ref 70–130)
GLUCOSE BLDC GLUCOMTR-MCNC: 127 MG/DL (ref 70–130)
GLUCOSE BLDC GLUCOMTR-MCNC: 150 MG/DL (ref 70–130)
GLUCOSE SERPL-MCNC: 107 MG/DL (ref 65–99)
HCT VFR BLD AUTO: 34.8 % (ref 34–46.6)
HGB BLD-MCNC: 11.2 G/DL (ref 12–15.9)
IMM GRANULOCYTES # BLD AUTO: 0.15 10*3/MM3 (ref 0–0.05)
IMM GRANULOCYTES NFR BLD AUTO: 1.7 % (ref 0–0.5)
LYMPHOCYTES # BLD AUTO: 0.53 10*3/MM3 (ref 0.7–3.1)
LYMPHOCYTES NFR BLD AUTO: 6 % (ref 19.6–45.3)
MAGNESIUM SERPL-MCNC: 2 MG/DL (ref 1.6–2.6)
MCH RBC QN AUTO: 29.7 PG (ref 26.6–33)
MCHC RBC AUTO-ENTMCNC: 32.2 G/DL (ref 31.5–35.7)
MCV RBC AUTO: 92.3 FL (ref 79–97)
MONOCYTES # BLD AUTO: 0.19 10*3/MM3 (ref 0.1–0.9)
MONOCYTES NFR BLD AUTO: 2.2 % (ref 5–12)
NEUTROPHILS NFR BLD AUTO: 7.93 10*3/MM3 (ref 1.7–7)
NEUTROPHILS NFR BLD AUTO: 89.8 % (ref 42.7–76)
NRBC BLD AUTO-RTO: 0 /100 WBC (ref 0–0.2)
PLATELET # BLD AUTO: 88 10*3/MM3 (ref 140–450)
PMV BLD AUTO: 12 FL (ref 6–12)
POTASSIUM SERPL-SCNC: 4.6 MMOL/L (ref 3.5–5.2)
RBC # BLD AUTO: 3.77 10*6/MM3 (ref 3.77–5.28)
SODIUM SERPL-SCNC: 138 MMOL/L (ref 136–145)
WBC # BLD AUTO: 8.83 10*3/MM3 (ref 3.4–10.8)

## 2021-06-26 PROCEDURE — 92610 EVALUATE SWALLOWING FUNCTION: CPT

## 2021-06-26 PROCEDURE — 93005 ELECTROCARDIOGRAM TRACING: CPT | Performed by: NURSE PRACTITIONER

## 2021-06-26 PROCEDURE — 25010000002 MORPHINE PER 10 MG: Performed by: NURSE PRACTITIONER

## 2021-06-26 PROCEDURE — 85025 COMPLETE CBC W/AUTO DIFF WBC: CPT | Performed by: INTERNAL MEDICINE

## 2021-06-26 PROCEDURE — 83735 ASSAY OF MAGNESIUM: CPT | Performed by: INTERNAL MEDICINE

## 2021-06-26 PROCEDURE — 25010000002 KETOROLAC TROMETHAMINE PER 15 MG: Performed by: NURSE PRACTITIONER

## 2021-06-26 PROCEDURE — 94799 UNLISTED PULMONARY SVC/PX: CPT

## 2021-06-26 PROCEDURE — 99232 SBSQ HOSP IP/OBS MODERATE 35: CPT | Performed by: INTERNAL MEDICINE

## 2021-06-26 PROCEDURE — 80048 BASIC METABOLIC PNL TOTAL CA: CPT | Performed by: INTERNAL MEDICINE

## 2021-06-26 PROCEDURE — 25010000002 METHYLPREDNISOLONE PER 40 MG: Performed by: NURSE PRACTITIONER

## 2021-06-26 PROCEDURE — 92612 ENDOSCOPY SWALLOW (FEES) VID: CPT

## 2021-06-26 PROCEDURE — 82962 GLUCOSE BLOOD TEST: CPT

## 2021-06-26 PROCEDURE — 25010000002 DIAZEPAM PER 5 MG: Performed by: NURSE PRACTITIONER

## 2021-06-26 PROCEDURE — 71045 X-RAY EXAM CHEST 1 VIEW: CPT

## 2021-06-26 PROCEDURE — 93010 ELECTROCARDIOGRAM REPORT: CPT | Performed by: INTERNAL MEDICINE

## 2021-06-26 RX ORDER — MORPHINE SULFATE 2 MG/ML
2 INJECTION, SOLUTION INTRAMUSCULAR; INTRAVENOUS EVERY 4 HOURS PRN
Status: DISPENSED | OUTPATIENT
Start: 2021-06-26 | End: 2021-06-27

## 2021-06-26 RX ORDER — FLUOXETINE HYDROCHLORIDE 20 MG/1
40 CAPSULE ORAL DAILY
Status: DISCONTINUED | OUTPATIENT
Start: 2021-06-27 | End: 2021-06-28

## 2021-06-26 RX ORDER — POTASSIUM CHLORIDE 1.5 G/1.77G
40 POWDER, FOR SOLUTION ORAL AS NEEDED
Status: DISCONTINUED | OUTPATIENT
Start: 2021-06-26 | End: 2021-07-02 | Stop reason: HOSPADM

## 2021-06-26 RX ORDER — DIAZEPAM 5 MG/ML
5 INJECTION, SOLUTION INTRAMUSCULAR; INTRAVENOUS EVERY 8 HOURS PRN
Status: DISPENSED | OUTPATIENT
Start: 2021-06-26 | End: 2021-06-27

## 2021-06-26 RX ADMIN — SODIUM CHLORIDE, PRESERVATIVE FREE 10 ML: 5 INJECTION INTRAVENOUS at 09:58

## 2021-06-26 RX ADMIN — METHYLPREDNISOLONE SODIUM SUCCINATE 20 MG: 40 INJECTION, POWDER, FOR SOLUTION INTRAMUSCULAR; INTRAVENOUS at 09:44

## 2021-06-26 RX ADMIN — OXYCODONE 5 MG: 5 TABLET ORAL at 19:51

## 2021-06-26 RX ADMIN — FLUOXETINE HYDROCHLORIDE 20 MG: 20 CAPSULE ORAL at 09:40

## 2021-06-26 RX ADMIN — FOLIC ACID 1 MG: 1 TABLET ORAL at 09:40

## 2021-06-26 RX ADMIN — IPRATROPIUM BROMIDE AND ALBUTEROL SULFATE 3 ML: 2.5; .5 SOLUTION RESPIRATORY (INHALATION) at 07:03

## 2021-06-26 RX ADMIN — KETOROLAC TROMETHAMINE 30 MG: 30 INJECTION, SOLUTION INTRAMUSCULAR; INTRAVENOUS at 05:20

## 2021-06-26 RX ADMIN — KETOROLAC TROMETHAMINE 30 MG: 30 INJECTION, SOLUTION INTRAMUSCULAR; INTRAVENOUS at 11:33

## 2021-06-26 RX ADMIN — OXYCODONE 5 MG: 5 TABLET ORAL at 10:07

## 2021-06-26 RX ADMIN — SODIUM CHLORIDE, PRESERVATIVE FREE 10 ML: 5 INJECTION INTRAVENOUS at 21:05

## 2021-06-26 RX ADMIN — MORPHINE SULFATE 2 MG: 2 INJECTION, SOLUTION INTRAMUSCULAR; INTRAVENOUS at 22:40

## 2021-06-26 RX ADMIN — ASPIRIN 81 MG CHEWABLE TABLET 81 MG: 81 TABLET CHEWABLE at 09:40

## 2021-06-26 RX ADMIN — RIFAXIMIN 550 MG: 550 TABLET ORAL at 09:40

## 2021-06-26 RX ADMIN — IPRATROPIUM BROMIDE AND ALBUTEROL SULFATE 3 ML: 2.5; .5 SOLUTION RESPIRATORY (INHALATION) at 19:05

## 2021-06-26 RX ADMIN — DIAZEPAM 5 MG: 5 INJECTION, SOLUTION INTRAMUSCULAR; INTRAVENOUS at 23:21

## 2021-06-26 RX ADMIN — PANTOPRAZOLE SODIUM 40 MG: 40 INJECTION, POWDER, FOR SOLUTION INTRAVENOUS at 20:50

## 2021-06-26 RX ADMIN — IPRATROPIUM BROMIDE AND ALBUTEROL SULFATE 3 ML: 2.5; .5 SOLUTION RESPIRATORY (INHALATION) at 00:58

## 2021-06-26 RX ADMIN — METHYLPREDNISOLONE SODIUM SUCCINATE 20 MG: 40 INJECTION, POWDER, FOR SOLUTION INTRAMUSCULAR; INTRAVENOUS at 03:58

## 2021-06-26 RX ADMIN — CLOPIDOGREL BISULFATE 75 MG: 75 TABLET ORAL at 09:40

## 2021-06-26 RX ADMIN — IPRATROPIUM BROMIDE AND ALBUTEROL SULFATE 3 ML: 2.5; .5 SOLUTION RESPIRATORY (INHALATION) at 13:31

## 2021-06-26 RX ADMIN — METHYLPREDNISOLONE SODIUM SUCCINATE 20 MG: 40 INJECTION, POWDER, FOR SOLUTION INTRAMUSCULAR; INTRAVENOUS at 14:38

## 2021-06-26 RX ADMIN — KETOROLAC TROMETHAMINE 30 MG: 30 INJECTION, SOLUTION INTRAMUSCULAR; INTRAVENOUS at 19:51

## 2021-06-26 RX ADMIN — PANTOPRAZOLE SODIUM 40 MG: 40 INJECTION, POWDER, FOR SOLUTION INTRAVENOUS at 09:44

## 2021-06-26 RX ADMIN — DIAZEPAM 5 MG: 5 TABLET ORAL at 14:37

## 2021-06-27 LAB
ANION GAP SERPL CALCULATED.3IONS-SCNC: 7 MMOL/L (ref 5–15)
BUN SERPL-MCNC: 25 MG/DL (ref 6–20)
BUN/CREAT SERPL: 40.3 (ref 7–25)
CALCIUM SPEC-SCNC: 8.7 MG/DL (ref 8.6–10.5)
CHLORIDE SERPL-SCNC: 104 MMOL/L (ref 98–107)
CO2 SERPL-SCNC: 26 MMOL/L (ref 22–29)
CREAT SERPL-MCNC: 0.62 MG/DL (ref 0.57–1)
DEPRECATED RDW RBC AUTO: 46.4 FL (ref 37–54)
ERYTHROCYTE [DISTWIDTH] IN BLOOD BY AUTOMATED COUNT: 13.9 % (ref 12.3–15.4)
GFR SERPL CREATININE-BSD FRML MDRD: 104 ML/MIN/1.73
GLUCOSE BLDC GLUCOMTR-MCNC: 131 MG/DL (ref 70–130)
GLUCOSE BLDC GLUCOMTR-MCNC: 134 MG/DL (ref 70–130)
GLUCOSE BLDC GLUCOMTR-MCNC: 90 MG/DL (ref 70–130)
GLUCOSE SERPL-MCNC: 119 MG/DL (ref 65–99)
HCT VFR BLD AUTO: 33.5 % (ref 34–46.6)
HGB BLD-MCNC: 10.7 G/DL (ref 12–15.9)
MAGNESIUM SERPL-MCNC: 2.7 MG/DL (ref 1.6–2.6)
MCH RBC QN AUTO: 29.8 PG (ref 26.6–33)
MCHC RBC AUTO-ENTMCNC: 31.9 G/DL (ref 31.5–35.7)
MCV RBC AUTO: 93.3 FL (ref 79–97)
PHOSPHATE SERPL-MCNC: 3 MG/DL (ref 2.5–4.5)
PLATELET # BLD AUTO: 105 10*3/MM3 (ref 140–450)
PMV BLD AUTO: 11.9 FL (ref 6–12)
POTASSIUM SERPL-SCNC: 4.1 MMOL/L (ref 3.5–5.2)
RBC # BLD AUTO: 3.59 10*6/MM3 (ref 3.77–5.28)
SODIUM SERPL-SCNC: 137 MMOL/L (ref 136–145)
WBC # BLD AUTO: 12.39 10*3/MM3 (ref 3.4–10.8)

## 2021-06-27 PROCEDURE — 82962 GLUCOSE BLOOD TEST: CPT

## 2021-06-27 PROCEDURE — 99232 SBSQ HOSP IP/OBS MODERATE 35: CPT | Performed by: INTERNAL MEDICINE

## 2021-06-27 PROCEDURE — 83735 ASSAY OF MAGNESIUM: CPT | Performed by: INTERNAL MEDICINE

## 2021-06-27 PROCEDURE — 25010000002 MORPHINE PER 10 MG: Performed by: NURSE PRACTITIONER

## 2021-06-27 PROCEDURE — 94799 UNLISTED PULMONARY SVC/PX: CPT

## 2021-06-27 PROCEDURE — 97165 OT EVAL LOW COMPLEX 30 MIN: CPT

## 2021-06-27 PROCEDURE — 97161 PT EVAL LOW COMPLEX 20 MIN: CPT

## 2021-06-27 PROCEDURE — 92612 ENDOSCOPY SWALLOW (FEES) VID: CPT

## 2021-06-27 PROCEDURE — 85027 COMPLETE CBC AUTOMATED: CPT | Performed by: INTERNAL MEDICINE

## 2021-06-27 PROCEDURE — 97116 GAIT TRAINING THERAPY: CPT

## 2021-06-27 PROCEDURE — 80048 BASIC METABOLIC PNL TOTAL CA: CPT | Performed by: INTERNAL MEDICINE

## 2021-06-27 PROCEDURE — 25010000002 KETOROLAC TROMETHAMINE PER 15 MG: Performed by: NURSE PRACTITIONER

## 2021-06-27 PROCEDURE — 97530 THERAPEUTIC ACTIVITIES: CPT

## 2021-06-27 PROCEDURE — 84100 ASSAY OF PHOSPHORUS: CPT | Performed by: INTERNAL MEDICINE

## 2021-06-27 RX ORDER — OXYCODONE HYDROCHLORIDE 5 MG/1
5 TABLET ORAL EVERY 6 HOURS PRN
Status: DISCONTINUED | OUTPATIENT
Start: 2021-06-27 | End: 2021-06-28

## 2021-06-27 RX ORDER — DIAZEPAM 5 MG/1
5 TABLET ORAL EVERY 8 HOURS PRN
Status: DISCONTINUED | OUTPATIENT
Start: 2021-06-27 | End: 2021-06-30

## 2021-06-27 RX ADMIN — SODIUM CHLORIDE, PRESERVATIVE FREE 10 ML: 5 INJECTION INTRAVENOUS at 21:28

## 2021-06-27 RX ADMIN — OLANZAPINE 7.5 MG: 5 TABLET, FILM COATED ORAL at 21:27

## 2021-06-27 RX ADMIN — KETOROLAC TROMETHAMINE 30 MG: 30 INJECTION, SOLUTION INTRAMUSCULAR; INTRAVENOUS at 06:33

## 2021-06-27 RX ADMIN — IPRATROPIUM BROMIDE AND ALBUTEROL SULFATE 3 ML: 2.5; .5 SOLUTION RESPIRATORY (INHALATION) at 13:06

## 2021-06-27 RX ADMIN — IPRATROPIUM BROMIDE AND ALBUTEROL SULFATE 3 ML: 2.5; .5 SOLUTION RESPIRATORY (INHALATION) at 19:27

## 2021-06-27 RX ADMIN — IPRATROPIUM BROMIDE AND ALBUTEROL SULFATE 3 ML: 2.5; .5 SOLUTION RESPIRATORY (INHALATION) at 01:16

## 2021-06-27 RX ADMIN — SODIUM CHLORIDE, PRESERVATIVE FREE 10 ML: 5 INJECTION INTRAVENOUS at 08:18

## 2021-06-27 RX ADMIN — IPRATROPIUM BROMIDE AND ALBUTEROL SULFATE 3 ML: 2.5; .5 SOLUTION RESPIRATORY (INHALATION) at 07:56

## 2021-06-27 RX ADMIN — PANTOPRAZOLE SODIUM 40 MG: 40 INJECTION, POWDER, FOR SOLUTION INTRAVENOUS at 08:18

## 2021-06-27 RX ADMIN — LACTULOSE 10 G: 20 SOLUTION ORAL at 21:26

## 2021-06-27 RX ADMIN — FOLIC ACID 1 MG: 1 TABLET ORAL at 12:46

## 2021-06-27 RX ADMIN — OXYCODONE 5 MG: 5 TABLET ORAL at 12:47

## 2021-06-27 RX ADMIN — MORPHINE SULFATE 2 MG: 2 INJECTION, SOLUTION INTRAMUSCULAR; INTRAVENOUS at 03:17

## 2021-06-27 RX ADMIN — ASPIRIN 81 MG CHEWABLE TABLET 81 MG: 81 TABLET CHEWABLE at 12:46

## 2021-06-27 RX ADMIN — OXYCODONE 5 MG: 5 TABLET ORAL at 18:11

## 2021-06-27 RX ADMIN — FLUOXETINE HYDROCHLORIDE 40 MG: 20 CAPSULE ORAL at 12:46

## 2021-06-27 RX ADMIN — PANTOPRAZOLE SODIUM 40 MG: 40 INJECTION, POWDER, FOR SOLUTION INTRAVENOUS at 21:26

## 2021-06-27 RX ADMIN — KETOROLAC TROMETHAMINE 30 MG: 30 INJECTION, SOLUTION INTRAMUSCULAR; INTRAVENOUS at 21:26

## 2021-06-27 RX ADMIN — RIFAXIMIN 550 MG: 550 TABLET ORAL at 21:27

## 2021-06-27 RX ADMIN — CLOPIDOGREL BISULFATE 75 MG: 75 TABLET ORAL at 12:47

## 2021-06-27 RX ADMIN — MORPHINE SULFATE 2 MG: 2 INJECTION, SOLUTION INTRAMUSCULAR; INTRAVENOUS at 08:19

## 2021-06-27 RX ADMIN — RIFAXIMIN 550 MG: 550 TABLET ORAL at 12:46

## 2021-06-27 RX ADMIN — OXYCODONE 5 MG: 5 TABLET ORAL at 23:53

## 2021-06-27 RX ADMIN — DIAZEPAM 5 MG: 5 TABLET ORAL at 23:53

## 2021-06-27 RX ADMIN — ROSUVASTATIN CALCIUM 20 MG: 20 TABLET, COATED ORAL at 21:27

## 2021-06-27 RX ADMIN — DIAZEPAM 5 MG: 5 TABLET ORAL at 16:19

## 2021-06-28 LAB
ALBUMIN SERPL-MCNC: 2.8 G/DL (ref 3.5–5.2)
ALP SERPL-CCNC: 79 U/L (ref 39–117)
ALT SERPL W P-5'-P-CCNC: 96 U/L (ref 1–33)
ANION GAP SERPL CALCULATED.3IONS-SCNC: 7 MMOL/L (ref 5–15)
AST SERPL-CCNC: 26 U/L (ref 1–32)
BILIRUB SERPL-MCNC: 0.5 MG/DL (ref 0–1.2)
BUN SERPL-MCNC: 21 MG/DL (ref 6–20)
CALCIUM SPEC-SCNC: 8.5 MG/DL (ref 8.6–10.5)
CHLORIDE SERPL-SCNC: 104 MMOL/L (ref 98–107)
CHOLEST SERPL-MCNC: 136 MG/DL (ref 0–200)
CO2 SERPL-SCNC: 24 MMOL/L (ref 22–29)
CREAT SERPL-MCNC: 0.94 MG/DL (ref 0.57–1)
CRP SERPL-MCNC: 1.9 MG/DL (ref 0–0.5)
DEPRECATED RDW RBC AUTO: 49.4 FL (ref 37–54)
ERYTHROCYTE [DISTWIDTH] IN BLOOD BY AUTOMATED COUNT: 14.6 % (ref 12.3–15.4)
GLUCOSE SERPL-MCNC: 131 MG/DL (ref 65–99)
HCT VFR BLD AUTO: 33.4 % (ref 34–46.6)
HGB BLD-MCNC: 10.7 G/DL (ref 12–15.9)
MAGNESIUM SERPL-MCNC: 1.8 MG/DL (ref 1.6–2.6)
MAGNESIUM SERPL-MCNC: 1.8 MG/DL (ref 1.6–2.6)
MCH RBC QN AUTO: 30.3 PG (ref 26.6–33)
MCHC RBC AUTO-ENTMCNC: 32 G/DL (ref 31.5–35.7)
MCV RBC AUTO: 94.6 FL (ref 79–97)
PHOSPHATE SERPL-MCNC: 3.3 MG/DL (ref 2.5–4.5)
PHOSPHATE SERPL-MCNC: 3.3 MG/DL (ref 2.5–4.5)
PLATELET # BLD AUTO: 96 10*3/MM3 (ref 140–450)
PMV BLD AUTO: 11.4 FL (ref 6–12)
POTASSIUM SERPL-SCNC: 3.7 MMOL/L (ref 3.5–5.2)
PREALB SERPL-MCNC: 15.8 MG/DL (ref 20–40)
PROT SERPL-MCNC: 5.1 G/DL (ref 6–8.5)
QT INTERVAL: 426 MS
QTC INTERVAL: 450 MS
RBC # BLD AUTO: 3.53 10*6/MM3 (ref 3.77–5.28)
SODIUM SERPL-SCNC: 135 MMOL/L (ref 136–145)
TRIGL SERPL-MCNC: 130 MG/DL (ref 0–150)
WBC # BLD AUTO: 5.35 10*3/MM3 (ref 3.4–10.8)

## 2021-06-28 PROCEDURE — 25010000003 MAGNESIUM SULFATE 4 GM/100ML SOLUTION: Performed by: NURSE PRACTITIONER

## 2021-06-28 PROCEDURE — 86140 C-REACTIVE PROTEIN: CPT

## 2021-06-28 PROCEDURE — 84100 ASSAY OF PHOSPHORUS: CPT | Performed by: INTERNAL MEDICINE

## 2021-06-28 PROCEDURE — 82465 ASSAY BLD/SERUM CHOLESTEROL: CPT

## 2021-06-28 PROCEDURE — 94799 UNLISTED PULMONARY SVC/PX: CPT

## 2021-06-28 PROCEDURE — 25010000002 KETOROLAC TROMETHAMINE PER 15 MG: Performed by: NURSE PRACTITIONER

## 2021-06-28 PROCEDURE — 83735 ASSAY OF MAGNESIUM: CPT

## 2021-06-28 PROCEDURE — 97530 THERAPEUTIC ACTIVITIES: CPT

## 2021-06-28 PROCEDURE — 99232 SBSQ HOSP IP/OBS MODERATE 35: CPT | Performed by: INTERNAL MEDICINE

## 2021-06-28 PROCEDURE — 25010000002 ONDANSETRON PER 1 MG: Performed by: NURSE PRACTITIONER

## 2021-06-28 PROCEDURE — 84134 ASSAY OF PREALBUMIN: CPT

## 2021-06-28 PROCEDURE — 84478 ASSAY OF TRIGLYCERIDES: CPT

## 2021-06-28 PROCEDURE — 80053 COMPREHEN METABOLIC PANEL: CPT

## 2021-06-28 PROCEDURE — 97116 GAIT TRAINING THERAPY: CPT

## 2021-06-28 PROCEDURE — 83735 ASSAY OF MAGNESIUM: CPT | Performed by: INTERNAL MEDICINE

## 2021-06-28 PROCEDURE — 84100 ASSAY OF PHOSPHORUS: CPT

## 2021-06-28 PROCEDURE — 85027 COMPLETE CBC AUTOMATED: CPT | Performed by: INTERNAL MEDICINE

## 2021-06-28 RX ORDER — CLOPIDOGREL BISULFATE 75 MG/1
75 TABLET ORAL DAILY
Status: DISCONTINUED | OUTPATIENT
Start: 2021-06-29 | End: 2021-07-02 | Stop reason: HOSPADM

## 2021-06-28 RX ORDER — PANTOPRAZOLE SODIUM 40 MG/1
40 TABLET, DELAYED RELEASE ORAL
Status: DISCONTINUED | OUTPATIENT
Start: 2021-06-28 | End: 2021-07-02 | Stop reason: HOSPADM

## 2021-06-28 RX ORDER — FOLIC ACID 1 MG/1
1 TABLET ORAL DAILY
Status: DISCONTINUED | OUTPATIENT
Start: 2021-06-29 | End: 2021-07-02 | Stop reason: HOSPADM

## 2021-06-28 RX ORDER — OXYCODONE HYDROCHLORIDE 5 MG/1
5 TABLET ORAL EVERY 4 HOURS PRN
Status: DISCONTINUED | OUTPATIENT
Start: 2021-06-28 | End: 2021-07-02 | Stop reason: HOSPADM

## 2021-06-28 RX ORDER — ASPIRIN 81 MG/1
81 TABLET, CHEWABLE ORAL DAILY
Status: DISCONTINUED | OUTPATIENT
Start: 2021-06-29 | End: 2021-07-02 | Stop reason: HOSPADM

## 2021-06-28 RX ORDER — LACTULOSE 10 G/15ML
10 SOLUTION ORAL 2 TIMES DAILY
Status: DISCONTINUED | OUTPATIENT
Start: 2021-06-28 | End: 2021-07-02 | Stop reason: HOSPADM

## 2021-06-28 RX ORDER — FLUOXETINE HYDROCHLORIDE 20 MG/1
40 CAPSULE ORAL DAILY
Status: DISCONTINUED | OUTPATIENT
Start: 2021-06-29 | End: 2021-07-02 | Stop reason: HOSPADM

## 2021-06-28 RX ORDER — OLANZAPINE 5 MG/1
7.5 TABLET ORAL NIGHTLY
Status: DISCONTINUED | OUTPATIENT
Start: 2021-06-28 | End: 2021-07-02 | Stop reason: HOSPADM

## 2021-06-28 RX ORDER — ROSUVASTATIN CALCIUM 20 MG/1
20 TABLET, COATED ORAL NIGHTLY
Status: DISCONTINUED | OUTPATIENT
Start: 2021-06-28 | End: 2021-07-02 | Stop reason: HOSPADM

## 2021-06-28 RX ORDER — ONDANSETRON 2 MG/ML
4 INJECTION INTRAMUSCULAR; INTRAVENOUS EVERY 6 HOURS PRN
Status: DISCONTINUED | OUTPATIENT
Start: 2021-06-28 | End: 2021-07-02 | Stop reason: HOSPADM

## 2021-06-28 RX ADMIN — SODIUM CHLORIDE, PRESERVATIVE FREE 10 ML: 5 INJECTION INTRAVENOUS at 08:58

## 2021-06-28 RX ADMIN — PANTOPRAZOLE SODIUM 40 MG: 40 INJECTION, POWDER, FOR SOLUTION INTRAVENOUS at 08:59

## 2021-06-28 RX ADMIN — DIAZEPAM 5 MG: 5 TABLET ORAL at 16:03

## 2021-06-28 RX ADMIN — OXYCODONE 5 MG: 5 TABLET ORAL at 13:11

## 2021-06-28 RX ADMIN — OLANZAPINE 7.5 MG: 5 TABLET, FILM COATED ORAL at 20:49

## 2021-06-28 RX ADMIN — FOLIC ACID 1 MG: 1 TABLET ORAL at 08:58

## 2021-06-28 RX ADMIN — RIFAXIMIN 550 MG: 550 TABLET ORAL at 20:49

## 2021-06-28 RX ADMIN — IPRATROPIUM BROMIDE AND ALBUTEROL SULFATE 3 ML: 2.5; .5 SOLUTION RESPIRATORY (INHALATION) at 18:46

## 2021-06-28 RX ADMIN — RIFAXIMIN 550 MG: 550 TABLET ORAL at 08:58

## 2021-06-28 RX ADMIN — MAGNESIUM SULFATE HEPTAHYDRATE 4 G: 40 INJECTION, SOLUTION INTRAVENOUS at 05:05

## 2021-06-28 RX ADMIN — ASPIRIN 81 MG CHEWABLE TABLET 81 MG: 81 TABLET CHEWABLE at 08:58

## 2021-06-28 RX ADMIN — IPRATROPIUM BROMIDE AND ALBUTEROL SULFATE 3 ML: 2.5; .5 SOLUTION RESPIRATORY (INHALATION) at 01:38

## 2021-06-28 RX ADMIN — CLOPIDOGREL BISULFATE 75 MG: 75 TABLET ORAL at 08:58

## 2021-06-28 RX ADMIN — OXYCODONE 5 MG: 5 TABLET ORAL at 19:34

## 2021-06-28 RX ADMIN — IPRATROPIUM BROMIDE AND ALBUTEROL SULFATE 3 ML: 2.5; .5 SOLUTION RESPIRATORY (INHALATION) at 12:53

## 2021-06-28 RX ADMIN — ROSUVASTATIN CALCIUM 20 MG: 20 TABLET, COATED ORAL at 20:49

## 2021-06-28 RX ADMIN — KETOROLAC TROMETHAMINE 30 MG: 30 INJECTION, SOLUTION INTRAMUSCULAR; INTRAVENOUS at 09:59

## 2021-06-28 RX ADMIN — IPRATROPIUM BROMIDE AND ALBUTEROL SULFATE 3 ML: 2.5; .5 SOLUTION RESPIRATORY (INHALATION) at 08:30

## 2021-06-28 RX ADMIN — ONDANSETRON 4 MG: 2 INJECTION INTRAMUSCULAR; INTRAVENOUS at 09:59

## 2021-06-28 RX ADMIN — ONDANSETRON 4 MG: 2 INJECTION INTRAMUSCULAR; INTRAVENOUS at 17:17

## 2021-06-28 RX ADMIN — LACTULOSE 10 G: 20 SOLUTION ORAL at 08:59

## 2021-06-28 RX ADMIN — FLUOXETINE HYDROCHLORIDE 40 MG: 20 CAPSULE ORAL at 08:58

## 2021-06-28 RX ADMIN — KETOROLAC TROMETHAMINE 30 MG: 30 INJECTION, SOLUTION INTRAMUSCULAR; INTRAVENOUS at 17:38

## 2021-06-28 RX ADMIN — DIAZEPAM 5 MG: 5 TABLET ORAL at 23:29

## 2021-06-28 RX ADMIN — KETOROLAC TROMETHAMINE 30 MG: 30 INJECTION, SOLUTION INTRAMUSCULAR; INTRAVENOUS at 23:29

## 2021-06-28 RX ADMIN — SODIUM CHLORIDE, PRESERVATIVE FREE 10 ML: 5 INJECTION INTRAVENOUS at 20:50

## 2021-06-28 RX ADMIN — LACTULOSE 10 G: 10 SOLUTION ORAL at 20:49

## 2021-06-28 RX ADMIN — OXYCODONE 5 MG: 5 TABLET ORAL at 05:46

## 2021-06-28 RX ADMIN — OXYCODONE 5 MG: 5 TABLET ORAL at 23:29

## 2021-06-29 LAB
ANION GAP SERPL CALCULATED.3IONS-SCNC: 8 MMOL/L (ref 5–15)
BUN SERPL-MCNC: 17 MG/DL (ref 6–20)
BUN/CREAT SERPL: 20.7 (ref 7–25)
CALCIUM SPEC-SCNC: 8.1 MG/DL (ref 8.6–10.5)
CHLORIDE SERPL-SCNC: 109 MMOL/L (ref 98–107)
CO2 SERPL-SCNC: 24 MMOL/L (ref 22–29)
CREAT SERPL-MCNC: 0.82 MG/DL (ref 0.57–1)
DEPRECATED RDW RBC AUTO: 49.2 FL (ref 37–54)
ERYTHROCYTE [DISTWIDTH] IN BLOOD BY AUTOMATED COUNT: 14.5 % (ref 12.3–15.4)
GFR SERPL CREATININE-BSD FRML MDRD: 75 ML/MIN/1.73
GLUCOSE SERPL-MCNC: 98 MG/DL (ref 65–99)
HCT VFR BLD AUTO: 33.1 % (ref 34–46.6)
HGB BLD-MCNC: 10.5 G/DL (ref 12–15.9)
MAGNESIUM SERPL-MCNC: 1.9 MG/DL (ref 1.6–2.6)
MCH RBC QN AUTO: 29.9 PG (ref 26.6–33)
MCHC RBC AUTO-ENTMCNC: 31.7 G/DL (ref 31.5–35.7)
MCV RBC AUTO: 94.3 FL (ref 79–97)
PHOSPHATE SERPL-MCNC: 4 MG/DL (ref 2.5–4.5)
PLATELET # BLD AUTO: 96 10*3/MM3 (ref 140–450)
PMV BLD AUTO: 11.2 FL (ref 6–12)
POTASSIUM SERPL-SCNC: 3.9 MMOL/L (ref 3.5–5.2)
RBC # BLD AUTO: 3.51 10*6/MM3 (ref 3.77–5.28)
SODIUM SERPL-SCNC: 141 MMOL/L (ref 136–145)
WBC # BLD AUTO: 5.99 10*3/MM3 (ref 3.4–10.8)

## 2021-06-29 PROCEDURE — 25010000003 MAGNESIUM SULFATE 4 GM/100ML SOLUTION: Performed by: NURSE PRACTITIONER

## 2021-06-29 PROCEDURE — 84100 ASSAY OF PHOSPHORUS: CPT | Performed by: INTERNAL MEDICINE

## 2021-06-29 PROCEDURE — 97116 GAIT TRAINING THERAPY: CPT

## 2021-06-29 PROCEDURE — 25010000002 KETOROLAC TROMETHAMINE PER 15 MG: Performed by: INTERNAL MEDICINE

## 2021-06-29 PROCEDURE — 85027 COMPLETE CBC AUTOMATED: CPT | Performed by: INTERNAL MEDICINE

## 2021-06-29 PROCEDURE — 99232 SBSQ HOSP IP/OBS MODERATE 35: CPT | Performed by: INTERNAL MEDICINE

## 2021-06-29 PROCEDURE — 83735 ASSAY OF MAGNESIUM: CPT | Performed by: INTERNAL MEDICINE

## 2021-06-29 PROCEDURE — 80048 BASIC METABOLIC PNL TOTAL CA: CPT | Performed by: INTERNAL MEDICINE

## 2021-06-29 PROCEDURE — 92526 ORAL FUNCTION THERAPY: CPT

## 2021-06-29 PROCEDURE — 94799 UNLISTED PULMONARY SVC/PX: CPT

## 2021-06-29 PROCEDURE — 97530 THERAPEUTIC ACTIVITIES: CPT

## 2021-06-29 RX ORDER — IPRATROPIUM BROMIDE AND ALBUTEROL SULFATE 2.5; .5 MG/3ML; MG/3ML
3 SOLUTION RESPIRATORY (INHALATION) EVERY 6 HOURS PRN
Status: DISCONTINUED | OUTPATIENT
Start: 2021-06-29 | End: 2021-07-02 | Stop reason: HOSPADM

## 2021-06-29 RX ADMIN — CLOPIDOGREL BISULFATE 75 MG: 75 TABLET ORAL at 08:43

## 2021-06-29 RX ADMIN — FOLIC ACID 1 MG: 1 TABLET ORAL at 08:43

## 2021-06-29 RX ADMIN — SODIUM CHLORIDE, PRESERVATIVE FREE 10 ML: 5 INJECTION INTRAVENOUS at 20:09

## 2021-06-29 RX ADMIN — IPRATROPIUM BROMIDE AND ALBUTEROL SULFATE 3 ML: 2.5; .5 SOLUTION RESPIRATORY (INHALATION) at 07:42

## 2021-06-29 RX ADMIN — ASPIRIN 81 MG: 81 TABLET, CHEWABLE ORAL at 08:43

## 2021-06-29 RX ADMIN — RIFAXIMIN 550 MG: 550 TABLET ORAL at 08:43

## 2021-06-29 RX ADMIN — IPRATROPIUM BROMIDE AND ALBUTEROL SULFATE 3 ML: 2.5; .5 SOLUTION RESPIRATORY (INHALATION) at 01:27

## 2021-06-29 RX ADMIN — OXYCODONE 5 MG: 5 TABLET ORAL at 16:03

## 2021-06-29 RX ADMIN — LACTULOSE 10 G: 10 SOLUTION ORAL at 20:07

## 2021-06-29 RX ADMIN — MAGNESIUM SULFATE HEPTAHYDRATE 4 G: 40 INJECTION, SOLUTION INTRAVENOUS at 06:58

## 2021-06-29 RX ADMIN — PANTOPRAZOLE SODIUM 40 MG: 40 TABLET, DELAYED RELEASE ORAL at 06:59

## 2021-06-29 RX ADMIN — OXYCODONE 5 MG: 5 TABLET ORAL at 06:11

## 2021-06-29 RX ADMIN — KETOROLAC TROMETHAMINE 30 MG: 30 INJECTION, SOLUTION INTRAMUSCULAR; INTRAVENOUS at 20:16

## 2021-06-29 RX ADMIN — DIAZEPAM 5 MG: 5 TABLET ORAL at 08:43

## 2021-06-29 RX ADMIN — OXYCODONE 5 MG: 5 TABLET ORAL at 20:07

## 2021-06-29 RX ADMIN — LACTULOSE 10 G: 10 SOLUTION ORAL at 08:45

## 2021-06-29 RX ADMIN — OLANZAPINE 7.5 MG: 5 TABLET, FILM COATED ORAL at 20:09

## 2021-06-29 RX ADMIN — DIAZEPAM 5 MG: 5 TABLET ORAL at 16:03

## 2021-06-29 RX ADMIN — PANTOPRAZOLE SODIUM 40 MG: 40 TABLET, DELAYED RELEASE ORAL at 17:55

## 2021-06-29 RX ADMIN — OXYCODONE 5 MG: 5 TABLET ORAL at 10:48

## 2021-06-29 RX ADMIN — FLUOXETINE HYDROCHLORIDE 40 MG: 20 CAPSULE ORAL at 08:43

## 2021-06-29 RX ADMIN — SODIUM CHLORIDE, PRESERVATIVE FREE 10 ML: 5 INJECTION INTRAVENOUS at 08:15

## 2021-06-29 RX ADMIN — ROSUVASTATIN CALCIUM 20 MG: 20 TABLET, COATED ORAL at 20:08

## 2021-06-29 RX ADMIN — RIFAXIMIN 550 MG: 550 TABLET ORAL at 20:08

## 2021-06-30 LAB
ANION GAP SERPL CALCULATED.3IONS-SCNC: 9 MMOL/L (ref 5–15)
BUN SERPL-MCNC: 9 MG/DL (ref 6–20)
BUN/CREAT SERPL: 11.8 (ref 7–25)
CALCIUM SPEC-SCNC: 8.1 MG/DL (ref 8.6–10.5)
CHLORIDE SERPL-SCNC: 106 MMOL/L (ref 98–107)
CO2 SERPL-SCNC: 22 MMOL/L (ref 22–29)
CREAT SERPL-MCNC: 0.76 MG/DL (ref 0.57–1)
GFR SERPL CREATININE-BSD FRML MDRD: 82 ML/MIN/1.73
GLUCOSE SERPL-MCNC: 111 MG/DL (ref 65–99)
MAGNESIUM SERPL-MCNC: 2.4 MG/DL (ref 1.6–2.6)
PHOSPHATE SERPL-MCNC: 3 MG/DL (ref 2.5–4.5)
POTASSIUM SERPL-SCNC: 4 MMOL/L (ref 3.5–5.2)
SODIUM SERPL-SCNC: 137 MMOL/L (ref 136–145)

## 2021-06-30 PROCEDURE — 97535 SELF CARE MNGMENT TRAINING: CPT

## 2021-06-30 PROCEDURE — 97530 THERAPEUTIC ACTIVITIES: CPT

## 2021-06-30 PROCEDURE — 25010000002 KETOROLAC TROMETHAMINE PER 15 MG: Performed by: INTERNAL MEDICINE

## 2021-06-30 PROCEDURE — 80048 BASIC METABOLIC PNL TOTAL CA: CPT | Performed by: INTERNAL MEDICINE

## 2021-06-30 PROCEDURE — 83735 ASSAY OF MAGNESIUM: CPT | Performed by: INTERNAL MEDICINE

## 2021-06-30 PROCEDURE — 84100 ASSAY OF PHOSPHORUS: CPT | Performed by: INTERNAL MEDICINE

## 2021-06-30 PROCEDURE — 99233 SBSQ HOSP IP/OBS HIGH 50: CPT | Performed by: INTERNAL MEDICINE

## 2021-06-30 RX ORDER — BUMETANIDE 1 MG/1
1 TABLET ORAL DAILY
Status: DISCONTINUED | OUTPATIENT
Start: 2021-06-30 | End: 2021-07-02 | Stop reason: HOSPADM

## 2021-06-30 RX ORDER — DIAZEPAM 5 MG/1
2.5 TABLET ORAL EVERY 8 HOURS PRN
Status: DISCONTINUED | OUTPATIENT
Start: 2021-06-30 | End: 2021-07-02 | Stop reason: HOSPADM

## 2021-06-30 RX ORDER — SPIRONOLACTONE 25 MG/1
50 TABLET ORAL DAILY
Status: DISCONTINUED | OUTPATIENT
Start: 2021-07-01 | End: 2021-07-02 | Stop reason: HOSPADM

## 2021-06-30 RX ORDER — LIDOCAINE 50 MG/G
1 PATCH TOPICAL
Status: DISCONTINUED | OUTPATIENT
Start: 2021-06-30 | End: 2021-07-02 | Stop reason: HOSPADM

## 2021-06-30 RX ADMIN — ROSUVASTATIN CALCIUM 20 MG: 20 TABLET, COATED ORAL at 21:38

## 2021-06-30 RX ADMIN — RIFAXIMIN 550 MG: 550 TABLET ORAL at 21:38

## 2021-06-30 RX ADMIN — FOLIC ACID 1 MG: 1 TABLET ORAL at 09:05

## 2021-06-30 RX ADMIN — OLANZAPINE 7.5 MG: 5 TABLET, FILM COATED ORAL at 21:38

## 2021-06-30 RX ADMIN — SODIUM CHLORIDE, PRESERVATIVE FREE 10 ML: 5 INJECTION INTRAVENOUS at 09:11

## 2021-06-30 RX ADMIN — PANTOPRAZOLE SODIUM 40 MG: 40 TABLET, DELAYED RELEASE ORAL at 16:36

## 2021-06-30 RX ADMIN — LIDOCAINE 1 PATCH: 50 PATCH CUTANEOUS at 16:36

## 2021-06-30 RX ADMIN — OXYCODONE 5 MG: 5 TABLET ORAL at 00:11

## 2021-06-30 RX ADMIN — DIAZEPAM 5 MG: 5 TABLET ORAL at 09:43

## 2021-06-30 RX ADMIN — OXYCODONE 5 MG: 5 TABLET ORAL at 04:46

## 2021-06-30 RX ADMIN — OXYCODONE 5 MG: 5 TABLET ORAL at 18:36

## 2021-06-30 RX ADMIN — RIFAXIMIN 550 MG: 550 TABLET ORAL at 09:05

## 2021-06-30 RX ADMIN — BUMETANIDE 1 MG: 1 TABLET ORAL at 16:36

## 2021-06-30 RX ADMIN — KETOROLAC TROMETHAMINE 30 MG: 30 INJECTION, SOLUTION INTRAMUSCULAR; INTRAVENOUS at 18:30

## 2021-06-30 RX ADMIN — ASPIRIN 81 MG: 81 TABLET, CHEWABLE ORAL at 09:05

## 2021-06-30 RX ADMIN — KETOROLAC TROMETHAMINE 30 MG: 30 INJECTION, SOLUTION INTRAMUSCULAR; INTRAVENOUS at 12:33

## 2021-06-30 RX ADMIN — PANTOPRAZOLE SODIUM 40 MG: 40 TABLET, DELAYED RELEASE ORAL at 09:05

## 2021-06-30 RX ADMIN — DIAZEPAM 5 MG: 5 TABLET ORAL at 00:11

## 2021-06-30 RX ADMIN — LACTULOSE 10 G: 10 SOLUTION ORAL at 09:11

## 2021-06-30 RX ADMIN — DIAZEPAM 2.5 MG: 5 TABLET ORAL at 17:51

## 2021-06-30 RX ADMIN — FLUOXETINE HYDROCHLORIDE 40 MG: 20 CAPSULE ORAL at 09:05

## 2021-06-30 RX ADMIN — OXYCODONE 5 MG: 5 TABLET ORAL at 14:38

## 2021-06-30 RX ADMIN — OXYCODONE 5 MG: 5 TABLET ORAL at 22:39

## 2021-06-30 RX ADMIN — LACTULOSE 10 G: 10 SOLUTION ORAL at 21:38

## 2021-06-30 RX ADMIN — OXYCODONE 5 MG: 5 TABLET ORAL at 09:05

## 2021-06-30 RX ADMIN — CLOPIDOGREL BISULFATE 75 MG: 75 TABLET ORAL at 09:05

## 2021-07-01 LAB
ANION GAP SERPL CALCULATED.3IONS-SCNC: 10 MMOL/L (ref 5–15)
BUN SERPL-MCNC: 8 MG/DL (ref 6–20)
BUN/CREAT SERPL: 10.1 (ref 7–25)
CALCIUM SPEC-SCNC: 8.3 MG/DL (ref 8.6–10.5)
CHLORIDE SERPL-SCNC: 110 MMOL/L (ref 98–107)
CO2 SERPL-SCNC: 25 MMOL/L (ref 22–29)
CREAT SERPL-MCNC: 0.79 MG/DL (ref 0.57–1)
DEPRECATED RDW RBC AUTO: 46.8 FL (ref 37–54)
ERYTHROCYTE [DISTWIDTH] IN BLOOD BY AUTOMATED COUNT: 14.1 % (ref 12.3–15.4)
GFR SERPL CREATININE-BSD FRML MDRD: 78 ML/MIN/1.73
GLUCOSE SERPL-MCNC: 82 MG/DL (ref 65–99)
HCT VFR BLD AUTO: 33.8 % (ref 34–46.6)
HGB BLD-MCNC: 11 G/DL (ref 12–15.9)
MAGNESIUM SERPL-MCNC: 1.5 MG/DL (ref 1.6–2.6)
MCH RBC QN AUTO: 30.1 PG (ref 26.6–33)
MCHC RBC AUTO-ENTMCNC: 32.5 G/DL (ref 31.5–35.7)
MCV RBC AUTO: 92.3 FL (ref 79–97)
PLATELET # BLD AUTO: 116 10*3/MM3 (ref 140–450)
PMV BLD AUTO: 10.8 FL (ref 6–12)
POTASSIUM SERPL-SCNC: 4.2 MMOL/L (ref 3.5–5.2)
RBC # BLD AUTO: 3.66 10*6/MM3 (ref 3.77–5.28)
SODIUM SERPL-SCNC: 145 MMOL/L (ref 136–145)
WBC # BLD AUTO: 6.25 10*3/MM3 (ref 3.4–10.8)

## 2021-07-01 PROCEDURE — 97116 GAIT TRAINING THERAPY: CPT

## 2021-07-01 PROCEDURE — 85027 COMPLETE CBC AUTOMATED: CPT | Performed by: INTERNAL MEDICINE

## 2021-07-01 PROCEDURE — 83735 ASSAY OF MAGNESIUM: CPT | Performed by: INTERNAL MEDICINE

## 2021-07-01 PROCEDURE — 80048 BASIC METABOLIC PNL TOTAL CA: CPT | Performed by: INTERNAL MEDICINE

## 2021-07-01 PROCEDURE — 99232 SBSQ HOSP IP/OBS MODERATE 35: CPT | Performed by: INTERNAL MEDICINE

## 2021-07-01 PROCEDURE — 25010000002 MAGNESIUM SULFATE 2 GM/50ML SOLUTION: Performed by: INTERNAL MEDICINE

## 2021-07-01 PROCEDURE — 92526 ORAL FUNCTION THERAPY: CPT

## 2021-07-01 PROCEDURE — 97530 THERAPEUTIC ACTIVITIES: CPT

## 2021-07-01 RX ADMIN — OXYCODONE 5 MG: 5 TABLET ORAL at 17:34

## 2021-07-01 RX ADMIN — MAGNESIUM SULFATE HEPTAHYDRATE 2 G: 2 INJECTION, SOLUTION INTRAVENOUS at 17:34

## 2021-07-01 RX ADMIN — OLANZAPINE 7.5 MG: 5 TABLET, FILM COATED ORAL at 21:55

## 2021-07-01 RX ADMIN — RIFAXIMIN 550 MG: 550 TABLET ORAL at 21:55

## 2021-07-01 RX ADMIN — PANTOPRAZOLE SODIUM 40 MG: 40 TABLET, DELAYED RELEASE ORAL at 07:42

## 2021-07-01 RX ADMIN — OXYCODONE 5 MG: 5 TABLET ORAL at 12:57

## 2021-07-01 RX ADMIN — MAGNESIUM SULFATE HEPTAHYDRATE 2 G: 2 INJECTION, SOLUTION INTRAVENOUS at 09:42

## 2021-07-01 RX ADMIN — DIAZEPAM 2.5 MG: 5 TABLET ORAL at 12:56

## 2021-07-01 RX ADMIN — OXYCODONE 5 MG: 5 TABLET ORAL at 07:42

## 2021-07-01 RX ADMIN — BUMETANIDE 1 MG: 1 TABLET ORAL at 09:36

## 2021-07-01 RX ADMIN — SPIRONOLACTONE 50 MG: 25 TABLET ORAL at 09:36

## 2021-07-01 RX ADMIN — CLOPIDOGREL BISULFATE 75 MG: 75 TABLET ORAL at 09:36

## 2021-07-01 RX ADMIN — DIAZEPAM 2.5 MG: 5 TABLET ORAL at 21:55

## 2021-07-01 RX ADMIN — OXYCODONE 5 MG: 5 TABLET ORAL at 21:57

## 2021-07-01 RX ADMIN — LACTULOSE 10 G: 10 SOLUTION ORAL at 09:36

## 2021-07-01 RX ADMIN — MAGNESIUM SULFATE HEPTAHYDRATE 2 G: 2 INJECTION, SOLUTION INTRAVENOUS at 12:58

## 2021-07-01 RX ADMIN — PANTOPRAZOLE SODIUM 40 MG: 40 TABLET, DELAYED RELEASE ORAL at 17:34

## 2021-07-01 RX ADMIN — OXYCODONE 5 MG: 5 TABLET ORAL at 02:43

## 2021-07-01 RX ADMIN — DIAZEPAM 2.5 MG: 5 TABLET ORAL at 02:43

## 2021-07-01 RX ADMIN — SODIUM CHLORIDE, PRESERVATIVE FREE 10 ML: 5 INJECTION INTRAVENOUS at 21:55

## 2021-07-01 RX ADMIN — LIDOCAINE 1 PATCH: 50 PATCH CUTANEOUS at 09:37

## 2021-07-01 RX ADMIN — FLUOXETINE HYDROCHLORIDE 40 MG: 20 CAPSULE ORAL at 09:36

## 2021-07-01 RX ADMIN — ASPIRIN 81 MG: 81 TABLET, CHEWABLE ORAL at 09:36

## 2021-07-01 RX ADMIN — FOLIC ACID 1 MG: 1 TABLET ORAL at 09:36

## 2021-07-01 RX ADMIN — RIFAXIMIN 550 MG: 550 TABLET ORAL at 09:36

## 2021-07-01 RX ADMIN — LACTULOSE 10 G: 10 SOLUTION ORAL at 21:59

## 2021-07-01 RX ADMIN — SODIUM CHLORIDE, PRESERVATIVE FREE 10 ML: 5 INJECTION INTRAVENOUS at 09:42

## 2021-07-01 RX ADMIN — ROSUVASTATIN CALCIUM 20 MG: 20 TABLET, COATED ORAL at 21:57

## 2021-07-01 NOTE — CASE MANAGEMENT/SOCIAL WORK
Continued Stay Note  [facility]     Patient Name: Timothy Guzman  MRN: [id number]  Today's Date: 7/1/2021    Admit Date: 6/18/2021    Discharge Plan     Row Name 07/01/21 1517       Plan    Plan  [facility]    Patient/Family in Agreement with Plan  yes    Plan Comments  Spoke with Vikki at [facility] and they have received insurance approval from [email or web address] van is scheduled for Friday at 14:30. Patient will probably go to the MED unit. Dr. Ly notified by [employer].  will continue to follow.    Final Discharge Disposition Code  62 - inpatient rehab facility        Discharge Codes    No documentation.       Expected Discharge Date and Time     Expected Discharge Date Expected Discharge Time    Jul 3, 2021             Keshawn Harepr RN

## 2021-07-01 NOTE — PLAN OF CARE
Goal Outcome Evaluation:  Plan of Care Reviewed With: patient        Progress: improving   SLP treatment completed. Will continue to address dysphagia. Please see note for further details and recommendations.

## 2021-07-01 NOTE — PLAN OF CARE
Goal Outcome Evaluation:         Pt pleasant and cooperative. Pain and anxiety meds given as ordered per pt req. Plan to d/c to rehab when medically ready. A&O x4. VSS on RA, will cont to monitor.

## 2021-07-01 NOTE — PROGRESS NOTES
[facility] Medicine Services  PROGRESS NOTE    Patient Name: Timothy Guzman  : 1975  MRN: [id number]    Date of Admission: 2021  Primary Care Physician: Diony Rocha III, MD    Subjective   Subjective     CC:  Pea code, stemi, gi bleed    HPI:  Pain persists chest wall but no worse. No fever. No dyspnea. No n/v    ROS:  Gen- No fevers, chills  CV- +chest wall pain  Resp- No cough, dyspnea  GI- No N/V/D, abd pain      Objective   Objective     Vital Signs:   Temp:  [97.8 °F (36.6 °C)-98.2 °F (36.8 °C)] 98.1 °F (36.7 °C)  Heart Rate:  [68-92] 92  Resp:  [18] 18  BP: (112-130)/(60-78) 130/78        Physical Exam:  Constitutional:Alert, oriented x 3, nontoxic appearing. Normal work of breathing. Sitting up in bed smiling  Psych:Normal/appropriate affect  HEENT:[place], oroph clear  Neck: neck supple, full range of motion  Neuro: Face symmetric, speech clear, equal , moves all extremities  Cardiac: Rrr; No pretibial pitting edema  Resp: Ctab, normal effort  GI: abd soft, nontender  Skin:diffuse bruising right arm  Musculoskeletal/extremities: chest wall tenderness to palpation;no cyanosis extremities; no significant ankle edema  Central line neck noted      Results Reviewed:  Results from last 7 days   Lab Units 21  0538 21  0239 21  0719   WBC 10*3/mm3 6.25 5.99 5.35   HEMOGLOBIN g/dL 11.0* 10.5* 10.7*   HEMATOCRIT % 33.8* 33.1* 33.4*   PLATELETS 10*3/mm3 116* 96* 96*     Results from last 7 days   Lab Units 21  0538 21  0611 21  0239 21  0216   SODIUM mmol/L 145 137 141 135*   POTASSIUM mmol/L 4.2 4.0 3.9 3.7   CHLORIDE mmol/L 110* 106 109* 104   CO2 mmol/L 25.0 22.0 24.0 24.0   BUN mg/dL 8 9 17 21*   CREATININE mg/dL 0.79 0.76 0.82 0.94   GLUCOSE mg/dL 82 111* 98 131*   CALCIUM mg/dL 8.3* 8.1* 8.1* 8.5*   ALT (SGPT) U/L  --   --   --  96*   AST (SGOT) U/L  --   --   --  26     Estimated Creatinine Clearance: 99.6 mL/min (by  C-G formula based on SCr of 0.79 mg/dL).    Microbiology Results Abnormal     Procedure Component Value - Date/Time    COVID PRE-OP / PRE-PROCEDURE SCREENING ORDER (NO ISOLATION) - Swab, Nasopharynx [[id number]]  (Normal) Collected: 06/18/21 2058    Lab Status: Final result Specimen: Swab from Nasopharynx Updated: 06/18/21 2124    Narrative:      The following orders were created for panel order COVID PRE-OP / PRE-PROCEDURE SCREENING ORDER (NO ISOLATION) - Swab, Nasopharynx.  Procedure                               Abnormality         Status                     ---------                               -----------         ------                     COVID-19, ABBOTT IN-HOUS...[[id number]]  Normal              Final result                 Please view results for these tests on the individual orders.    COVID-19, ABBOTT IN-HOUSE,NASAL Swab (NO TRANSPORT MEDIA) 2 HR TAT - Swab, Nasopharynx [[id number]]  (Normal) Collected: 06/18/21 2058    Lab Status: Final result Specimen: Swab from Nasopharynx Updated: 06/18/21 2124     COVID19 Presumptive Negative    Narrative:      Fact sheet for providers: https://www.fda.gov/media/867787/download     Fact sheet for patients: https://www.fda.gov/media/749529/download    Test performed by PCR.  If inconsistent with clinical signs and symptoms patient should be tested with different authorized molecular test.          Imaging Results (Last 24 Hours)     ** No results found for the last 24 hours. **          Results for orders placed during the hospital encounter of 06/18/21    Adult Transthoracic Echo Complete W/ Cont if Necessary Per Protocol    Interpretation Summary  · The quality of the study is limited due to patient positioning and patient being intubated.  · Left ventricular ejection fraction appears to be 51 - 55%. Left ventricular systolic function is normal.  · Left ventricular diastolic function is consistent with (grade Ia w/high LAP) impaired relaxation.  · Mildly reduced  right ventricular systolic function noted.      I have reviewed the medications:  Scheduled Meds:aspirin, 81 mg, Oral, Daily  bumetanide, 1 mg, Oral, Daily  clopidogrel, 75 mg, Oral, Daily  FLUoxetine, 40 mg, Oral, Daily  folic acid, 1 mg, Oral, Daily  lactulose, 10 g, Oral, BID  lidocaine, 1 patch, Transdermal, Q24H  OLANZapine, 7.5 mg, Oral, Nightly  pantoprazole, 40 mg, Oral, BID AC  pharmacy consult - MTM, , Does not apply, Daily  riFAXIMin, 550 mg, Oral, Q12H  rosuvastatin, 20 mg, Oral, Nightly  sodium chloride, 10 mL, Intravenous, Q12H  spironolactone, 50 mg, Oral, Daily      Continuous Infusions:   PRN Meds:.•  albuterol  •  diazePAM  •  ipratropium-albuterol  •  magnesium sulfate **OR** magnesium sulfate **OR** magnesium sulfate  •  ondansetron  •  oxyCODONE  •  phenol  •  potassium chloride  •  potassium phosphate infusion greater than 15 mMoles **OR** potassium phosphate infusion greater than 15 mMoles **OR** potassium phosphate infusion 15 mMol or less **OR** sodium phosphate IVPB **OR** sodium phosphate IVPB **OR** sodium phosphate IVPB  •  sodium chloride    Assessment/Plan   Assessment & Plan     Active Hospital Problems    Diagnosis  POA   • **STEMI (ST elevation myocardial infarction) (CMS/HCC) [I21.3]  Yes   • SVT (supraventricular tachycardia) (CMS/HCC) [I47.1]  Yes   • Depression [F32.9]  Yes   • History of esophageal varices [Z87.19]  Not Applicable   • GERD (gastroesophageal reflux disease) [K21.9]  Yes   • Acute respiratory failure with hypoxia (CMS/HCC) [J96.01]  Yes   • Anaphylaxis [T78.2XXA]  Yes   • Cardiac arrest (CMS/HCC) [I46.9]  Yes   • Possible GI bleed [K92.2]  Yes   • Alcoholic cirrhosis of liver without ascites (CMS/HCC) [K70.30]  Yes      Resolved Hospital Problems    Diagnosis Date Resolved POA   • STEMI involving left anterior descending coronary artery (CMS/HCC) [I21.02] 06/18/2021 Yes        Brief Hospital Course to date:  Timothy Guzman is a 45 y.o. female w/ hx ETOH induced  cirrhosis, pancreatitis, gastritis w/ varices currently on transplant list at [facility]. Patient presented to [facility] ED w/ coffee emesis on 6/18/21. During patient's initial workup patient had a PEA cardiac arrest while receiving iv contrast dye, requiring ACLS & intubation. She had a STEMI on EKG following this event and was taken for [facility] w/ Dr. Gonzales who placed stent to the circumflex. Patient did experience some SVT postprocedurally and was placed on amiodarone and was takent to icu on mechanical ventilation. Patient subsequentaly underwent EGD on 6/19/21 and was found to have grade 1 varices, and erosive gastritis and antiplatelet meds were restarted    *Inferior STEMI following PEA cardiac arrest (which occurred after given iv contrast prior to ct scan)  *s/pSVT  -s/p cards consult (Dr. Gonzales). s/p LHC & ANGELICA x 1 av groove stenosis; asa, plavix statin (holding off b-blocker due to relative bradycardia and low bp; follow up cards clinic 6 weeks (from 6/24/21 progress notenote)  -svt converted w/ adenosine 12mg  *Contrast dye allergy  -unclear if cardiac arrest was due to iv contrast given prior to ct scan), was premedicated prior to ct scan  *Upper GI bleed due to erosive gastritis; indicental grade 1 varices noted  -egd 6/19/21: grade 1 varices without stimata of bleeding; mild erosive gastritis but no ulcers (pathology negative for h.pylori). ppi therapy recommended and ok'd to take asa & plavix  -continue bid ppi x 4 total weeks, then once daily  *Cirrhosis  -on rifaxamin  -continue rifaxamin. Restarted bumex at 1mg and aldactone at 50mg daily (at lower doses than home dose). Renal function stable. At discharge would increase bumex to 2mg daily and aldactone 100mg (which is still lower than her previous baseline) w/ bmp in 2-3 days  *Hx opioid dependence/chronic pain  *Acute chest wall pain (s/p acls chest compressions this admission)  -weaned valiu-chronic oxycodone use, currently receiving prn. Recommend gradual wean  while at rehab facilitym to 2.5mg q8h prn    -lidoderm patch  Poor iv access  -keep deep line in place (unable to get peripheral access); d/c central line at prior to discharge    Am labs: cbc,bmp,mag    DVT prophylaxis:  Mechanical DVT prophylaxis orders are present.       Disposition: I expect the patient to be discharged to rehab at [facility] tomorrow, van at 2:30    CODE STATUS:   Code Status and Medical Interventions:   Ordered at: 06/18/21 8650     Level Of Support Discussed With:    Patient     Code Status:    CPR     Medical Interventions (Level of Support Prior to Arrest):    Full       Filiberto Ly MD  07/01/21

## 2021-07-01 NOTE — THERAPY TREATMENT NOTE
Patient Name: Timothy Guzman  : 1975    MRN: [id number]                              Today's Date: 2021       Admit Date: 2021    Visit Dx:     ICD-10-CM ICD-9-CM   1. Cardiac arrest (CMS/HCC)  I46.9 427.5   2. Gastritis  K29.70 535.50   3. Oropharyngeal dysphagia  R13.12 787.22     Patient Active Problem List   Diagnosis   • Hepatic encephalopathy (CMS/HCC)   • Alcoholic cirrhosis of liver without ascites (CMS/HCC)   • Possible GI bleed   • Generalized abdominal pain   • History of esophageal varices   • GERD (gastroesophageal reflux disease)   • STEMI (ST elevation myocardial infarction) (CMS/HCC)   • Acute respiratory failure with hypoxia (CMS/HCC)   • Anaphylaxis   • Cardiac arrest (CMS/HCC)   • SVT (supraventricular tachycardia) (CMS/HCC)   • Depression     Past Medical History:   Diagnosis Date   • Acute pancreatitis    • Alcoholism (CMS/HCC)    • Arthritis    • Bone necrosis (CMS/HCC)    • Cirrhosis (CMS/HCC)    • Gastroparesis    • GERD (gastroesophageal reflux disease)    • History of esophageal varices    • History of kidney stones    • Hypertension    • Pancreatitis      Past Surgical History:   Procedure Laterality Date   • APPENDECTOMY     • CARDIAC CATHETERIZATION N/A 2021    Procedure: Left Heart Cath;  Surgeon: Vikram Gonzales MD;  Location:  [facility];  Service: Cardiovascular;  Laterality: N/A;   •  SECTION     • CHOLECYSTECTOMY     • COLONOSCOPY     • COLONOSCOPY N/A 3/31/2020    Procedure: COLONOSCOPY;  Surgeon: Tirso Giles MD;  Location:  [facility];  Service: Gastroenterology;  Laterality: N/A;   • ENDOSCOPY     • ENDOSCOPY     • ENDOSCOPY N/A 2021    Procedure: ESOPHAGOGASTRODUODENOSCOPY AT BEDSIDE;  Surgeon: Tyrese Rasmussen MD;  Location:  [facility];  Service: Gastroenterology;  Laterality: N/A;   • GALLBLADDER SURGERY     • REPLACEMENT TOTAL HIP LATERAL POSITION Left    • TOTAL KNEE ARTHROPLASTY Left      General  Information     Row Name 07/01/21 1356          Physical Therapy Time and Intention    Document Type  therapy note (daily note)  -     Mode of Treatment  physical therapy  -     Row Name 07/01/21 1356          General Information    Patient Profile Reviewed  yes  -     Existing Precautions/Restrictions  fall  -     Row Name 07/01/21 1356          Cognition    Orientation Status (Cognition)  oriented x 4  -     Row Name 07/01/21 1356          Safety Issues, Functional Mobility    Impairments Affecting Function (Mobility)  balance;endurance/activity tolerance;pain;strength  -       User Key  (r) = Recorded By, (t) = Taken By, (c) = Cosigned By    Initials Name Provider Type     Elsie Braun, PT Physical Therapist        Mobility     Row Name 07/01/21 1356          Bed Mobility    Bed Mobility  supine-sit;sit-supine  -     Supine-Sit [place] (Bed Mobility)  minimum assist (75% patient effort);1 person assist;verbal cues  -     Sit-Supine [place] (Bed Mobility)  standby assist;1 person assist  -     Assistive Device (Bed Mobility)  head of bed elevated;bed rails  -     Comment (Bed Mobility)  Pt required Leila x1 for supine to sit transition with VC for sequencing and hand placement, pt reported pain in ribs with bed mobility.  -     Row Name 07/01/21 1356          Transfers    Comment (Transfers)  Pt amb with CGA for STS from EOB with FWW; VC for sequencing  -     Row Name 07/01/21 1356          Sit-Stand Transfer    Sit-Stand [place] (Transfers)  contact guard;verbal cues  -     Assistive Device (Sit-Stand Transfers)  walker, front-wheeled  -     Row Name 07/01/21 1355          Gait/Stairs (Locomotion)    [place] Level (Gait)  contact guard;1 person assist  -     Assistive Device (Gait)  walker, front-wheeled  -     Distance in Feet (Gait)  125'+20'  -     Deviations/Abnormal Patterns (Gait)  gait speed decreased;stride length decreased;kim decreased;base of  support, wide  -     Bilateral Gait Deviations  forward flexed posture;heel strike decreased  -     Comment (Gait/Stairs)  Pt amb 125' with FWW and CGA fro safety with decreased gait speed and one standing rest break. Activity limited by fatigue. Pt required VC for FWW management. No LOB noted but CGA due to mild instability.  -       User Key  (r) = Recorded By, (t) = Taken By, (c) = Cosigned By    Initials Name Provider Type    Elsie Cespedes PT Physical Therapist        Obj/Interventions     Row Name 07/01/21 1400          Motor Skills    Motor Skills  therapeutic exercise  -     Therapeutic Exercise  hip;knee;ankle  -     Row Name 07/01/21 1400          Hip (Therapeutic Exercise)    Hip (Therapeutic Exercise)  strengthening exercise  -     Hip Strengthening (Therapeutic Exercise)  bilateral;marching while seated;10 repetitions  -     Row Name 07/01/21 1400          Knee (Therapeutic Exercise)    Knee (Therapeutic Exercise)  strengthening exercise  -     Knee Strengthening (Therapeutic Exercise)  bilateral;LAQ (long arc quad);10 repetitions  -     Row Name 07/01/21 1400          Ankle (Therapeutic Exercise)    Ankle (Therapeutic Exercise)  AROM (active range of motion)  -     Ankle AROM (Therapeutic Exercise)  bilateral;dorsiflexion;plantarflexion;10 repetitions  -     Row Name 07/01/21 1400          Balance    Balance Assessment  sitting static balance;sitting dynamic balance;standing static balance;standing dynamic balance  -     Static Sitting Balance  WFL;unsupported  -     Dynamic Sitting Balance  WFL;unsupported;sitting, edge of bed  -     Static Standing Balance  WFL;supported;standing  -     Dynamic Standing Balance  mild impairment;supported;standing  -     Balance Interventions  occupation based/functional task  -       User Key  (r) = Recorded By, (t) = Taken By, (c) = Cosigned By    Initials Name Provider Type    Elsie Cespedes PT Physical Therapist         Goals/Plan    No documentation.       Clinical Impression     Row Name 07/01/21 1401          Pain    Additional Documentation  Pain Scale: Numbers Pre/Post-Treatment (Group)  -HP     Row Name 07/01/21 1401          Pain Scale: Numbers Pre/Post-Treatment    Pretreatment Pain Rating  6/10  -HP     Posttreatment Pain Rating  6/10  -HP     Pain Location - Side  Right  -HP     Pre/Posttreatment Pain Comment  Right rib pain that increased with bed mobility  -     Pain Intervention(s)  Repositioned;Ambulation/increased activity;Splinting  -     Row Name 07/01/21 1401          Plan of Care Review    Plan of Care Reviewed With  patient  -HP     Progress  improving  -HP     Outcome Summary  Pt amb 125' with FWW and CGA fro safety with decreased gait speed and one standing rest break. Activity limited by fatigue. Pt required VC for FWW management. No LOB noted but CGA due to mild instability. Continue with PT POC at this time.  -     Row Name 07/01/21 1401          Vital Signs    Pre Systolic BP Rehab  114  -HP     Pre Treatment Diastolic BP  62  -HP     Pretreatment Heart Rate (beats/min)  83  -HP     Posttreatment Heart Rate (beats/min)  89  -HP     Pre Patient Position  Supine  -HP     Intra Patient Position  Standing  -HP     Post Patient Position  Supine  -HP     Row Name 07/01/21 1401          Positioning and Restraints    Pre-Treatment Position  in bed  -HP     Post Treatment Position  bed  -HP     In Bed  notified nsg;supine;fowlers;call light within reach;encouraged to call for assist;side rails up x2 alarm unchanged  -       User Key  (r) = Recorded By, (t) = Taken By, (c) = Cosigned By    Initials Name Provider Type    HP Elsie Braun, PT Physical Therapist        Outcome Measures     Row Name 07/01/21 1407          How much help from another person do you currently need...    Turning from your back to your side while in flat bed without using bedrails?  3  -HP     Moving from lying on back to sitting  on the side of a flat bed without bedrails?  3  -HP     Moving to and from a bed to a chair (including a wheelchair)?  3  -HP     Standing up from a chair using your arms (e.g., wheelchair, bedside chair)?  3  -HP     Climbing 3-5 steps with a railing?  2  -HP     To walk in hospital room?  3  -HP     AM-PAC 6 Clicks Score (PT)  17  -     Row Name 07/01/21 1404          Functional Assessment    Outcome Measure Options  AM-PAC 6 Clicks Basic Mobility (PT)  -       User Key  (r) = Recorded By, (t) = Taken By, (c) = Cosigned By    Initials Name Provider Type     Elsie Braun PT Physical Therapist        Physical Therapy Education                 Title: PT OT SLP Therapies (In Progress)     Topic: Physical Therapy (Done)     Point: Mobility training (Done)     Learning Progress Summary           Patient Acceptance, D,E, VU,NR by  at 7/1/2021 1405    Acceptance, E, VU,DU,NR by  at 6/29/2021 1019    Acceptance, E, VU,DU,NR by  at 6/28/2021 1029    Acceptance, E,D, VU,NR by  at 6/27/2021 1150   Family Acceptance, E,D, VU,NR by  at 6/27/2021 1150                   Point: Home exercise program (Done)     Learning Progress Summary           Patient Acceptance, D,E, VU,NR by  at 7/1/2021 1405    Acceptance, E, VU,DU,NR by  at 6/29/2021 1019    Acceptance, E, VU,DU,NR by  at 6/28/2021 1029    Acceptance, E,D, VU,NR by  at 6/27/2021 1150   Family Acceptance, E,D, VU,NR by  at 6/27/2021 1150                   Point: Body mechanics (Done)     Learning Progress Summary           Patient Acceptance, D,E, VU,NR by  at 7/1/2021 1405    Acceptance, E, VU,DU,NR by  at 6/29/2021 1019    Acceptance, E, VU,DU,NR by  at 6/28/2021 1029    Acceptance, E,D, VU,NR by  at 6/27/2021 1150   Family Acceptance, E,D, VU,NR by  at 6/27/2021 1150                   Point: Precautions (Done)     Learning Progress Summary           Patient Acceptance, JAXSON JOHNS VUNR by  at 7/1/2021 1405    Acceptance, ANDRY PURI DUNR by   at 6/29/2021 1019    Acceptance, E, VU,DU,NR by  at 6/28/2021 1029    Acceptance, E,D, VU,NR by  at 6/27/2021 1150   Family Acceptance, E,D, VU,NR by  at 6/27/2021 1150                               User Key     Initials Effective Dates Name Provider Type Discipline     06/16/21 -  Jono Alejandro, PT Physical Therapist PT     05/17/21 -  Rob Demarco, PT Student PT Student PT     06/01/21 -  Elsie Braun, CHEL Physical Therapist PT              PT Recommendation and Plan     Plan of Care Reviewed With: patient  Progress: improving  Outcome Summary: Pt amb 125' with FWW and CGA fro safety with decreased gait speed and one standing rest break. Activity limited by fatigue. Pt required VC for FWW management. No LOB noted but CGA due to mild instability. Continue with PT POC at this time.     Time Calculation:   PT Charges     Row Name 07/01/21 1324             Time Calculation    Start Time  1324  -HP      PT Received On  07/01/21  -HP         Timed Charges    84180 - PT Therapeutic Exercise Minutes  5  -HP      69301 - Gait Training Minutes   10  -HP      96673 - PT Therapeutic Activity Minutes  8  -HP         Total Minutes    Timed Charges Total Minutes  23  -HP       Total Minutes  23  -HP        User Key  (r) = Recorded By, (t) = Taken By, (c) = Cosigned By    Initials Name Provider Type     Elsie Braun, PT Physical Therapist        Therapy Charges for Today     Code Description Service Date Service Provider Modifiers Qty    03646887544 HC GAIT TRAINING EA 15 MIN 7/1/2021 Elsie Braun, PT GP 1    89569278056 HC PT THERAPEUTIC ACT EA 15 MIN 7/1/2021 Elsie Braun, PT GP 1          PT G-Codes  Outcome Measure Options: AM-PAC 6 Clicks Basic Mobility (PT)  AM-PAC 6 Clicks Score (PT): 17  AM-PAC 6 Clicks Score (OT): 17    Elsie Braun PT  7/1/2021

## 2021-07-01 NOTE — CASE MANAGEMENT/SOCIAL WORK
Continued Stay Note  [facility]     Patient Name: Timothy Guzman  MRN: [id number]  Today's Date: 7/1/2021    Admit Date: 6/18/2021    Discharge Plan     Row Name 07/01/21 1000       Plan    Plan  [facility]    Patient/Family in Agreement with Plan  yes    Plan Comments  Spoke with patient at bedside. Plan is [facility] pending insurance precert. Spoke with Vikki and they are still waiting to hear from [employer]. CM will continue to follow.    Final Discharge Disposition Code  62 - inpatient rehab facility        Discharge Codes    No documentation.       Expected Discharge Date and Time     Expected Discharge Date Expected Discharge Time    Jul 3, 2021             Keshawn Harper RN

## 2021-07-01 NOTE — THERAPY TREATMENT NOTE
Acute Care - Speech Language Pathology   Swallow Treatment Note [facility]      Patient Name: Timothy Guzman  : 1975  MRN: [id number]  Today's Date: 2021            Admit Date: 2021  Visit Dx:     ICD-10-CM ICD-9-CM   1. Cardiac arrest (CMS/HCC)  I46.9 427.5   2. Gastritis  K29.70 535.50   3. Oropharyngeal dysphagia  R13.12 787.22     Patient Active Problem List   Diagnosis   • Hepatic encephalopathy (CMS/HCC)   • Alcoholic cirrhosis of liver without ascites (CMS/HCC)   • Possible GI bleed   • Generalized abdominal pain   • History of esophageal varices   • GERD (gastroesophageal reflux disease)   • STEMI (ST elevation myocardial infarction) (CMS/HCC)   • Acute respiratory failure with hypoxia (CMS/HCC)   • Anaphylaxis   • Cardiac arrest (CMS/HCC)   • SVT (supraventricular tachycardia) (CMS/HCC)   • Depression     Past Medical History:   Diagnosis Date   • Acute pancreatitis    • Alcoholism (CMS/HCC)    • Arthritis    • Bone necrosis (CMS/HCC)    • Cirrhosis (CMS/HCC)    • Gastroparesis    • GERD (gastroesophageal reflux disease)    • History of esophageal varices    • History of kidney stones    • Hypertension    • Pancreatitis      Past Surgical History:   Procedure Laterality Date   • APPENDECTOMY     • CARDIAC CATHETERIZATION N/A 2021    Procedure: Left Heart Cath;  Surgeon: Vikram Gonzales MD;  Location: [facility] CATH INVASIVE LOCATION;  Service: Cardiovascular;  Laterality: N/A;   •  SECTION     • CHOLECYSTECTOMY     • COLONOSCOPY     • COLONOSCOPY N/A 3/31/2020    Procedure: COLONOSCOPY;  Surgeon: Tirso Giles MD;  Location: [facility] ENDOSCOPY;  Service: Gastroenterology;  Laterality: N/A;   • ENDOSCOPY     • ENDOSCOPY     • ENDOSCOPY N/A 2021    Procedure: ESOPHAGOGASTRODUODENOSCOPY AT BEDSIDE;  Surgeon: Tyrese Rasmussen MD;  Location:  [facility];  Service: Gastroenterology;  Laterality: N/A;   • GALLBLADDER SURGERY     • REPLACEMENT TOTAL HIP LATERAL POSITION  Left    • TOTAL KNEE ARTHROPLASTY Left         SWALLOW EVALUATION (last 72 hours)      SLP Adult Swallow Evaluation     Row Name 07/01/21 0905 06/29/21 1017                Rehab Evaluation    Document Type  therapy note (daily note)  -EN  therapy note (daily note)  -KT       Subjective Information  no complaints  -EN  no complaints  -KT       Patient Observations  alert;cooperative;agree to therapy  -EN  alert;cooperative;agree to therapy  -KT       Patient/Family/Caregiver Comments/Observations  no family present   -EN  no family present  -KT       Care Plan Review  --  evaluation/treatment results reviewed;care plan/treatment goals reviewed;risks/benefits reviewed;patient/other agree to care plan  -KT       Patient Effort  good  -EN  good  -KT       Symptoms Noted During/After Treatment  none  -EN  --          General Information    Patient Profile Reviewed  yes  -EN  --          Pain    Additional Documentation  Pain Scale: FACES Pre/Post-Treatment (Group)  -EN  Pain Scale: FACES Pre/Post-Treatment (Group)  -KT          Pain Scale: FACES Pre/Post-Treatment    Pain: FACES Scale, Pretreatment  0-->no hurt  -EN  0-->no hurt  -KT       Posttreatment Pain Rating  0-->no hurt  -EN  0-->no hurt  -KT          Clinical Impression    Daily Summary of Progress (SLP)  progress toward functional goals is good  -EN  progress toward functional goals as expected  -KT       SLP Swallowing Diagnosis  mild;oral dysphagia;moderate;pharyngeal dysphagia  -EN  --       Functional Impact  risk of aspiration/pneumonia  -EN  --       Rehab Potential/Prognosis, Swallowing  good, to achieve stated therapy goals  -EN  --       Swallow Criteria for Skilled Therapeutic Interventions Met  demonstrates skilled criteria  -EN  --       Plan for Continued Treatment (SLP)  Pt w/ noted improvement. Completed exercises independently and showed no s/s of aspiration during all trials. Will order MBS for tomorrow.   -EN  Good participation with exercises  and handout left with patient. Continue with current diet of dysphagia level IV, no mixed consistencies and Honey thick liquids with the following compensatory strategies: no straws, small bites/sips. cough at the end of meals Continue dysphagia treatment.  -KT          Recommendations    Therapy Frequency (Swallow)  5 days per week  -EN  --       Predicted Duration Therapy Intervention (Days)  until discharge  -EN  --       SLP Diet Recommendation  mechanical soft with no mixed consistencies;honey thick liquids  -EN  --       Recommended Diagnostics  VFSS (MBS)  -EN  --       Recommended Precautions and Strategies  upright posture during/after eating;small bites of food and sips of liquid;no straw;other (see comments)  -EN  --       Oral Care Recommendations  Oral Care BID/PRN  -EN  --       SLP Rec. for Method of Medication Administration  meds whole;with pudding or applesauce  -EN  --       Monitor for Signs of Aspiration  yes;notify SLP if any concerns  -EN  --       Anticipated Discharge Disposition (SLP)  anticipate therapy at next level of care  -EN  anticipate therapy at next level of care  -KT         User Key  (r) = Recorded By, (t) = Taken By, (c) = Cosigned By    Initials Name Effective Dates    EN Sara Garcia MS, CFY-SLP 05/20/21 -     KT Raysa Trujillo MS CCC-SLP 06/15/21 -           EDUCATION  The patient has been educated in the following areas:   Home Exercise Program (HEP) Dysphagia (Swallowing Impairment) Modified Diet Instruction.    SLP Recommendation and Plan  SLP Swallowing Diagnosis: mild, oral dysphagia, moderate, pharyngeal dysphagia  SLP Diet Recommendation: mechanical soft with no mixed consistencies, honey thick liquids  Recommended Precautions and Strategies: upright posture during/after eating, small bites of food and sips of liquid, no straw, other (see comments)  SLP Rec. for Method of Medication Administration: meds whole, with pudding or applesauce     Monitor for Signs of  Aspiration: yes, notify SLP if any concerns  Recommended Diagnostics: VFSS (MBS)  Swallow Criteria for Skilled Therapeutic Interventions Met: demonstrates skilled criteria  Anticipated Discharge Disposition (SLP): anticipate therapy at next level of care  Rehab Potential/Prognosis, Swallowing: good, to achieve stated therapy goals  Therapy Frequency (Swallow): 5 days per week  Predicted Duration Therapy Intervention (Days): until discharge     Daily Summary of Progress (SLP): progress toward functional goals is good    Plan for Continued Treatment (SLP): Pt w/ noted improvement. Completed exercises independently and showed no s/s of aspiration during all trials. Will order MBS for tomorrow.     SLP GOALS     Row Name 07/01/21 0905 06/29/21 1017          Oral Nutrition/Hydration Goal 1 (SLP)    Oral Nutrition/Hydration Goal 1, SLP  LTG: Tolerate regular diet and thin liquids without s/s aspiration with 100% accuracy and no cues  -EN  LTG: Tolerate regular diet and thin liquids without s/s aspiration with 100% accuracy and no cues  -KT     Time Frame (Oral Nutrition/Hydration Goal 1, SLP)  by discharge  -EN  by discharge  -KT     Barriers (Oral Nutrition/Hydration Goal 1, SLP)  no s/s of aspiration in all trials of thin liquids.   -EN  --     Progress/Outcomes (Oral Nutrition/Hydration Goal 1, SLP)  continuing progress toward goal  -EN  continuing progress toward goal  -KT        Lingual Strengthening Goal 1 (SLP)    Activity (Lingual Strengthening Goal 1, SLP)  increase tongue back strength  -EN  increase tongue back strength  -KT     Increase Tongue Back Strength  lingual resistance exercises  -EN  lingual resistance exercises  -KT     [place]/Accuracy (Lingual Strengthening Goal 1, SLP)  independently (over 90% accuracy)  -EN  independently (over 90% accuracy)  -KT     Time Frame (Lingual Strengthening Goal 1, SLP)  short term goal (STG)  -EN  short term goal (STG)  -KT     Barriers (Lingual Strengthening Goal  1, SLP)  Completed x6.   -EN  Pt able to demonstrate lingual resistance exercises x20 with written handout and verbal cues.  -KT     Progress/Outcomes (Lingual Strengthening Goal 1, SLP)  continuing progress toward goal  -EN  continuing progress toward goal  -KT        Pharyngeal Strengthening Exercise Goal 1 (SLP)    Activity (Pharyngeal Strengthening Goal 1, SLP)  increase timing;increase superior movement of the hyolaryngeal complex;increase closure at entrance to airway/closure of airway at glottis  -EN  increase timing;increase superior movement of the hyolaryngeal complex;increase closure at entrance to airway/closure of airway at glottis  -KT     Increase Timing  prepping - 3 second prep or suck swallow or 3-step swallow  -EN  prepping - 3 second prep or suck swallow or 3-step swallow  -KT     Increase Superior Movement of the Hyolaryngeal Complex  Mendelsohn;hard effortful swallow  -EN  Mendelsohn;hard effortful swallow  -KT     Increase Anterior Movement of the Hyolaryngeal Complex  supraglottic swallow;hard effortful swallow;super-supraglottic swallow  -EN  supraglottic swallow;hard effortful swallow;super-supraglottic swallow  -KT     Increase Closure at Entrance to Airway/Closure of Airway at Glottis  super-supraglottic swallow  -EN  super-supraglottic swallow  -KT     Increase Squeeze/Positive Pressure Generation  supraglottic swallow;hard effortful swallow;super-supraglottic swallow  -EN  supraglottic swallow;hard effortful swallow;super-supraglottic swallow  -KT     [place]/Accuracy (Pharyngeal Strengthening Goal 1, SLP)  independently (over 90% accuracy)  -EN  independently (over 90% accuracy)  -KT     Time Frame (Pharyngeal Strengthening Goal 1, SLP)  short term goal (STG)  -EN  short term goal (STG)  -KT     Barriers (Pharyngeal Strengthening Goal 1, SLP)  Completed each exercise x3  -EN  Pt able to demonstrate 3 second hold with verbal cues, supraglottic swallow with verbal cues, and  mendelsohn with verbal cues x10. Handout provided and left in room with patient with instruction to complete exercises 3x daily, pt verbalized understanding.   -KT     Progress/Outcomes (Pharyngeal Strengthening Goal 1, SLP)  continuing progress toward goal  -EN  continuing progress toward goal  -KT       User Key  (r) = Recorded By, (t) = Taken By, (c) = Cosigned By    Initials Name Provider Type    EN Sara Garcia MS, CFY-SLP Speech and Language Pathologist    Raysa Tillman MS CCC-SLP Speech and Language Pathologist          Time Calculation:   Time Calculation- SLP     Row Name 07/01/21 1006             Time Calculation- SLP    SLP Start Time  0905  -EN      SLP Received On  07/01/21  -EN         Untimed Charges    22594-SL Treatment Swallow Minutes  38  -EN         Total Minutes    Untimed Charges Total Minutes  38  -EN       Total Minutes  38  -EN        User Key  (r) = Recorded By, (t) = Taken By, (c) = Cosigned By    Initials Name Provider Type    Sara Lanier MS, CFY-SLP Speech and Language Pathologist          Therapy Charges for Today     Code Description Service Date Service Provider Modifiers Qty    08609418489  ST TREATMENT SWALLOW 3 7/1/2021 Sara Garcia MS, CFY-SLP GN 1            Patient was not wearing a face mask and did not exhibit coughing during this therapy encounter.  Procedure performed was aerosolizing, involved close contact (within 6 feet for at least 15 minutes or longer), and did not involve contact with infectious secretions or specimens.  Therapist used appropriate personal protective equipment including gloves, standard procedure mask and eye protection.  Appropriate PPE was worn during the entire therapy session.  Hand hygiene was completed before and after therapy session.       Sara Garcia MS, CFVINCENT-SLP  7/1/2021

## 2021-07-02 ENCOUNTER — APPOINTMENT (OUTPATIENT)
Dept: GENERAL RADIOLOGY | Facility: HOSPITAL | Age: 46
End: 2021-07-02

## 2021-07-02 VITALS
SYSTOLIC BLOOD PRESSURE: 124 MMHG | OXYGEN SATURATION: 94 % | TEMPERATURE: 98.5 F | DIASTOLIC BLOOD PRESSURE: 79 MMHG | HEART RATE: 90 BPM | RESPIRATION RATE: 16 BRPM | BODY MASS INDEX: 37.37 KG/M2 | WEIGHT: 210.9 LBS | HEIGHT: 63 IN

## 2021-07-02 PROCEDURE — 92611 MOTION FLUOROSCOPY/SWALLOW: CPT

## 2021-07-02 PROCEDURE — 74230 X-RAY XM SWLNG FUNCJ C+: CPT

## 2021-07-02 PROCEDURE — 99239 HOSP IP/OBS DSCHRG MGMT >30: CPT | Performed by: NURSE PRACTITIONER

## 2021-07-02 RX ORDER — BUMETANIDE 1 MG/1
2 TABLET ORAL DAILY
Status: ON HOLD
Start: 2021-07-03 | End: 2021-07-20 | Stop reason: SDUPTHER

## 2021-07-02 RX ORDER — LACTULOSE 10 G/15ML
10 SOLUTION ORAL 2 TIMES DAILY
Start: 2021-07-02 | End: 2021-08-24 | Stop reason: DRUGHIGH

## 2021-07-02 RX ORDER — SPIRONOLACTONE 50 MG/1
100 TABLET, FILM COATED ORAL DAILY
Status: ON HOLD
Start: 2021-07-03 | End: 2021-07-20 | Stop reason: SDUPTHER

## 2021-07-02 RX ORDER — OLANZAPINE 7.5 MG/1
7.5 TABLET ORAL NIGHTLY
Start: 2021-07-02 | End: 2021-09-06 | Stop reason: HOSPADM

## 2021-07-02 RX ORDER — DIAZEPAM 5 MG/1
2.5 TABLET ORAL EVERY 12 HOURS PRN
Start: 2021-07-02 | End: 2021-07-04

## 2021-07-02 RX ORDER — OXYCODONE HYDROCHLORIDE 5 MG/1
5 TABLET ORAL EVERY 6 HOURS PRN
Start: 2021-07-02 | End: 2021-07-20 | Stop reason: HOSPADM

## 2021-07-02 RX ORDER — ROSUVASTATIN CALCIUM 20 MG/1
20 TABLET, COATED ORAL NIGHTLY
Start: 2021-07-02 | End: 2021-07-21 | Stop reason: SDUPTHER

## 2021-07-02 RX ORDER — IPRATROPIUM BROMIDE AND ALBUTEROL SULFATE 2.5; .5 MG/3ML; MG/3ML
3 SOLUTION RESPIRATORY (INHALATION) EVERY 6 HOURS PRN
Qty: 360 ML | Status: ON HOLD
Start: 2021-07-02 | End: 2021-07-20 | Stop reason: SDUPTHER

## 2021-07-02 RX ORDER — FOLIC ACID 1 MG/1
1 TABLET ORAL DAILY
Status: ON HOLD
Start: 2021-07-03 | End: 2021-07-20 | Stop reason: SDUPTHER

## 2021-07-02 RX ORDER — LIDOCAINE 50 MG/G
1 PATCH TOPICAL
Start: 2021-07-03 | End: 2021-08-16

## 2021-07-02 RX ORDER — PANTOPRAZOLE SODIUM 40 MG/1
40 TABLET, DELAYED RELEASE ORAL
Status: ON HOLD
Start: 2021-07-02 | End: 2021-07-20 | Stop reason: SDUPTHER

## 2021-07-02 RX ORDER — CLOPIDOGREL BISULFATE 75 MG/1
75 TABLET ORAL DAILY
Qty: 30 TABLET
Start: 2021-07-03 | End: 2021-07-21 | Stop reason: SDUPTHER

## 2021-07-02 RX ADMIN — CLOPIDOGREL BISULFATE 75 MG: 75 TABLET ORAL at 08:25

## 2021-07-02 RX ADMIN — BUMETANIDE 1 MG: 1 TABLET ORAL at 08:25

## 2021-07-02 RX ADMIN — SODIUM CHLORIDE, PRESERVATIVE FREE 10 ML: 5 INJECTION INTRAVENOUS at 08:24

## 2021-07-02 RX ADMIN — OXYCODONE 5 MG: 5 TABLET ORAL at 15:55

## 2021-07-02 RX ADMIN — FLUOXETINE HYDROCHLORIDE 40 MG: 20 CAPSULE ORAL at 08:24

## 2021-07-02 RX ADMIN — FOLIC ACID 1 MG: 1 TABLET ORAL at 08:25

## 2021-07-02 RX ADMIN — RIFAXIMIN 550 MG: 550 TABLET ORAL at 08:25

## 2021-07-02 RX ADMIN — DIAZEPAM 2.5 MG: 5 TABLET ORAL at 06:43

## 2021-07-02 RX ADMIN — SPIRONOLACTONE 50 MG: 25 TABLET ORAL at 08:25

## 2021-07-02 RX ADMIN — BARIUM SULFATE 100 ML: 0.81 POWDER, FOR SUSPENSION ORAL at 13:49

## 2021-07-02 RX ADMIN — LACTULOSE 10 G: 10 SOLUTION ORAL at 08:25

## 2021-07-02 RX ADMIN — OXYCODONE 5 MG: 5 TABLET ORAL at 12:07

## 2021-07-02 RX ADMIN — OXYCODONE 5 MG: 5 TABLET ORAL at 06:39

## 2021-07-02 RX ADMIN — LIDOCAINE 1 PATCH: 50 PATCH CUTANEOUS at 08:25

## 2021-07-02 RX ADMIN — BARIUM SULFATE 20 ML: 400 PASTE ORAL at 13:49

## 2021-07-02 RX ADMIN — OXYCODONE 5 MG: 5 TABLET ORAL at 01:59

## 2021-07-02 RX ADMIN — ASPIRIN 81 MG: 81 TABLET, CHEWABLE ORAL at 08:25

## 2021-07-02 RX ADMIN — PANTOPRAZOLE SODIUM 40 MG: 40 TABLET, DELAYED RELEASE ORAL at 08:25

## 2021-07-02 RX ADMIN — DIAZEPAM 2.5 MG: 5 TABLET ORAL at 15:06

## 2021-07-02 NOTE — PLAN OF CARE
Goal Outcome Evaluation:   VSS, NS on the monitor, RA. Pt requests frequent PRN pain medications. Pt up ad ken, call light within reach. Pt to be discharged to [facility] this afternoon.

## 2021-07-02 NOTE — MBS/VFSS/FEES
Acute Care - Speech Language Pathology   Swallow Re-Evaluation  Abdelrahman   Modified Barium Swallow Study (MBS)     Patient Name: Timothy Guzman  : 1975  MRN: [id number]  Today's Date: 2021               Admit Date: 2021    Visit Dx:     ICD-10-CM ICD-9-CM   1. Cardiac arrest (CMS/HCC)  I46.9 427.5   2. Gastritis  K29.70 535.50   3. Oropharyngeal dysphagia  R13.12 787.22   4. Anxiety disorder, unspecified type  F41.9 300.00   5. Other chronic pain  G89.29 338.29   6. STEMI involving left anterior descending coronary artery (CMS/HCC)  I21.02 410.10     Patient Active Problem List   Diagnosis   • Hepatic encephalopathy (CMS/HCC)   • Alcoholic cirrhosis of liver without ascites (CMS/HCC)   • Possible GI bleed   • Generalized abdominal pain   • History of esophageal varices   • GERD (gastroesophageal reflux disease)   • STEMI (ST elevation myocardial infarction) (CMS/HCC)   • Acute respiratory failure with hypoxia (CMS/HCC)   • Anaphylaxis   • Cardiac arrest (CMS/HCC)   • SVT (supraventricular tachycardia) (CMS/HCC)   • Depression     Past Medical History:   Diagnosis Date   • Acute pancreatitis    • Alcoholism (CMS/HCC)    • Arthritis    • Bone necrosis (CMS/HCC)    • Cirrhosis (CMS/HCC)    • Gastroparesis    • GERD (gastroesophageal reflux disease)    • History of esophageal varices    • History of kidney stones    • Hypertension    • Pancreatitis      Past Surgical History:   Procedure Laterality Date   • APPENDECTOMY     • CARDIAC CATHETERIZATION N/A 2021    Procedure: Left Heart Cath;  Surgeon: Vikram Gonzales MD;  Location:  [facility];  Service: Cardiovascular;  Laterality: N/A;   •  SECTION     • CHOLECYSTECTOMY     • COLONOSCOPY     • COLONOSCOPY N/A 3/31/2020    Procedure: COLONOSCOPY;  Surgeon: Tirso Giles MD;  Location:  [facility];  Service: Gastroenterology;  Laterality: N/A;   • ENDOSCOPY     • ENDOSCOPY     • ENDOSCOPY N/A 2021     Procedure: ESOPHAGOGASTRODUODENOSCOPY AT BEDSIDE;  Surgeon: Tyrese Rasmussen MD;  Location: [facility];  Service: Gastroenterology;  Laterality: N/A;   • GALLBLADDER SURGERY     • REPLACEMENT TOTAL HIP LATERAL POSITION Left    • TOTAL KNEE ARTHROPLASTY Left         SWALLOW EVALUATION (last 72 hours)      SLP Adult Swallow Evaluation     Row Name 07/02/21 1335 07/01/21 0905                Rehab Evaluation    Document Type  re-evaluation  -AC  therapy note (daily note)  -EN       Subjective Information  no complaints  -AC  no complaints  -EN       Patient Observations  alert;cooperative  -AC  alert;cooperative;agree to therapy  -EN       Patient/Family/Caregiver Comments/Observations  No family present.  -AC  no family present   -EN       Patient Effort  good  -AC  good  -EN       Symptoms Noted During/After Treatment  --  none  -EN          General Information    Patient Profile Reviewed  yes  -AC  yes  -EN       Pertinent History Of Current Problem  See previous eval.  -AC  --       Current Method of Nutrition  mechanical soft, no mixed consistencies;honey-thick liquids  -AC  --       Plans/Goals Discussed with  patient;agreed upon  -AC  --       Barriers to Rehab  medically complex  -AC  --       Patient's Goals for Discharge  return to regular diet  -AC  --          Pain    Additional Documentation  --  Pain Scale: FACES Pre/Post-Treatment (Group)  -EN          Pain Scale: FACES Pre/Post-Treatment    Pain: FACES Scale, Pretreatment  0-->no hurt  -AC  0-->no hurt  -EN       Posttreatment Pain Rating  0-->no hurt  -AC  0-->no hurt  -EN          General Eating/Swallowing Observations    Respiratory Support Currently in Use  room air  -AC  --       Eating/Swallowing Skills  fed by SLP;self-fed;appropriate self-feeding skills observed  -AC  --       Positioning During Eating  upright 90 degree;upright in chair  -AC  --          MBS/VFSS    Utensils Used  spoon;cup;straw  -AC  --       Consistencies Trialed  thin  liquids;pudding thick;regular textures  -AC  --          MBS/VFSS Interpretation    Oral Prep Phase  WFL  -AC  --       Oral Transit Phase  impaired  -AC  --       Oral Residue  WFL  -AC  --          Oral Transit Phase    Impaired Oral Transit Phase  premature spillage of liquids into pharynx  -AC  --       Premature Spillage of Liquids into Pharynx  thin liquids  -AC  --          Initiation of Pharyngeal Swallow    Initiation of Pharyngeal Swallow  bolus in pyriform sinuses  -AC  --       Pharyngeal Phase  impaired pharyngeal phase of swallowing  -AC  --       Penetration Before the Swallow  thin liquids;secondary to reduced back of tongue control;secondary to delayed swallow initiation or mistiming  -AC  --       Response to Penetration  inconsistent response  -AC  --       Rosenbek's Scale  thin:;5--->level 5;pudding/puree:;regular textures:;1--->level 1  -AC  --       Pharyngeal Residue  all consistencies tested;valleculae;secondary to reduced laryngeal elevation;secondary to reduced hyolaryngeal excursion;other (see comments) mild  -AC  --       Response to Residue  cleared residue with spontaneous subsequent swallow  -AC  --       Attempted Compensatory Maneuvers  bolus size;bolus presentation style;chin tuck;other (see comments) 3 sec prep  -AC  --       Response to Attempted Compensatory Maneuvers  reduced residue;other (see comments) amount penetrated/vestibular residue  -AC  --       Pharyngeal Phase, Comment  Improved. There was penetration to the level of the TVFs w/ initial tsp of thin liquid, as well as penetration above the level of the TVFs w/ thin via cup/straw drinks. Penetrated material did not spontaneously expel from laryngeal vestibule, putting pt @ risk for eventual aspiration. Amount penetrated was reduced and vestibular residue eliminated when pt instructed to take small cup/straw sip, tuck chin, and hold liquid in anterior portion of mouth prior to swallow. No penetration/aspiration w/  pudding or solid. Pt able to demonstrate and teach back compensation strategies following study.  -AC  --          Clinical Impression    Daily Summary of Progress (SLP)  --  progress toward functional goals is good  -EN       SLP Swallowing Diagnosis  mild;oral dysphagia;pharyngeal dysphagia  -AC  mild;oral dysphagia;moderate;pharyngeal dysphagia  -EN       Functional Impact  risk of aspiration/pneumonia  -AC  risk of aspiration/pneumonia  -EN       Rehab Potential/Prognosis, Swallowing  good, to achieve stated therapy goals  -AC  good, to achieve stated therapy goals  -EN       Swallow Criteria for Skilled Therapeutic Interventions Met  demonstrates skilled criteria  -AC  demonstrates skilled criteria  -EN       Plan for Continued Treatment (SLP)  --  Pt w/ noted improvement. Completed exercises independently and showed no s/s of aspiration during all trials. Will order MBS for tomorrow.   -EN          Recommendations    Therapy Frequency (Swallow)  5 days per week  -AC  5 days per week  -EN       Predicted Duration Therapy Intervention (Days)  until discharge  -AC  until discharge  -EN       SLP Diet Recommendation  regular textures;thin liquids;other (see comments) avoid/drain mixed consistencies  -AC  mechanical soft with no mixed consistencies;honey thick liquids  -EN       Recommended Diagnostics  --  VFSS (MBS)  -EN       Recommended Precautions and Strategies  upright posture during/after eating;small bites of food and sips of liquid;chin tuck;3 second prep;volitional throat clear;other (see comments) straws ok  -AC  upright posture during/after eating;small bites of food and sips of liquid;no straw;other (see comments)  -EN       Oral Care Recommendations  Oral Care BID/PRN  -AC  Oral Care BID/PRN  -EN       SLP Rec. for Method of Medication Administration  meds whole;with pudding or applesauce;as tolerated  -AC  meds whole;with pudding or applesauce  -EN       Monitor for Signs of Aspiration  yes;notify  SLP if any concerns  -AC  yes;notify SLP if any concerns  -EN       Anticipated Discharge Disposition (SLP)  anticipate therapy at next level of care;inpatient rehabilitation facility  -AC  anticipate therapy at next level of care  -EN         User Key  (r) = Recorded By, (t) = Taken By, (c) = Cosigned By    Initials Name Effective Dates    AC Julianne Ambriz, MS CCC-SLP 06/16/21 -     EN Sara Garcia MS, CFY-SLP 05/20/21 -           EDUCATION  The patient has been educated in the following areas:   Dysphagia (Swallowing Impairment) Oral Care/Hydration. Provided handout.    SLP Recommendation and Plan  SLP Swallowing Diagnosis: mild, oral dysphagia, pharyngeal dysphagia  SLP Diet Recommendation: regular textures, thin liquids, other (see comments) (avoid/drain mixed consistencies)  Recommended Precautions and Strategies: upright posture during/after eating, small bites of food and sips of liquid, chin tuck, 3 second prep, volitional throat clear, other (see comments) (straws ok)  SLP Rec. for Method of Medication Administration: meds whole, with pudding or applesauce, as tolerated     Monitor for Signs of Aspiration: yes, notify SLP if any concerns     Swallow Criteria for Skilled Therapeutic Interventions Met: demonstrates skilled criteria  Anticipated Discharge Disposition (SLP): anticipate therapy at next level of care, inpatient rehabilitation facility  Rehab Potential/Prognosis, Swallowing: good, to achieve stated therapy goals  Therapy Frequency (Swallow): 5 days per week  Predicted Duration Therapy Intervention (Days): until discharge         Plan of Care Reviewed With: patient  Progress: improving    SLP GOALS     Row Name 07/02/21 1335 07/01/21 0905          Oral Nutrition/Hydration Goal 1 (SLP)    Oral Nutrition/Hydration Goal 1, SLP  LTG: Tolerate regular diet and thin liquids without s/s aspiration with 100% accuracy and no cues  -AC  LTG: Tolerate regular diet and thin liquids without s/s aspiration  with 100% accuracy and no cues  -EN     Time Frame (Oral Nutrition/Hydration Goal 1, SLP)  short term goal (STG)  -AC  by discharge  -EN     Barriers (Oral Nutrition/Hydration Goal 1, SLP)  --  no s/s of aspiration in all trials of thin liquids.   -EN     Progress/Outcomes (Oral Nutrition/Hydration Goal 1, SLP)  goal ongoing  -AC  continuing progress toward goal  -EN        Oral Nutrition/Hydration Goal 2 (SLP)    Oral Nutrition/Hydration Goal 2, SLP  Pt will tolerate trials of regular solids and thin liquids using compensations of small sips/chin tuck/3 sec prep w/o s/sxs aspiration w/ 100% acc w/o cues.  -AC  --     Time Frame (Oral Nutrition/Hydration Goal 2, SLP)  short term goal (STG)  -AC  --        Lingual Strengthening Goal 1 (SLP)    Activity (Lingual Strengthening Goal 1, SLP)  --  increase tongue back strength  -EN     Increase Tongue Back Strength  lingual resistance exercises  -AC  lingual resistance exercises  -EN     [place]/Accuracy (Lingual Strengthening Goal 1, SLP)  independently (over 90% accuracy)  -AC  independently (over 90% accuracy)  -EN     Time Frame (Lingual Strengthening Goal 1, SLP)  short term goal (STG)  -AC  short term goal (STG)  -EN     Barriers (Lingual Strengthening Goal 1, SLP)  --  Completed x6.   -EN     Progress/Outcomes (Lingual Strengthening Goal 1, SLP)  goal ongoing  -AC  continuing progress toward goal  -EN        Pharyngeal Strengthening Exercise Goal 1 (SLP)    Activity (Pharyngeal Strengthening Goal 1, SLP)  --  increase timing;increase superior movement of the hyolaryngeal complex;increase closure at entrance to airway/closure of airway at glottis  -EN     Increase Timing  prepping - 3 second prep or suck swallow or 3-step swallow  -AC  prepping - 3 second prep or suck swallow or 3-step swallow  -EN     Increase Superior Movement of the Hyolaryngeal Complex  Mendelsohn;effortful pitch glide (falsetto + pharyngeal squeeze)  -AC  Mendelsohn;hard effortful swallow   -EN     Increase Anterior Movement of the Hyolaryngeal Complex  chin tuck against resistance (CTAR)  -AC  supraglottic swallow;hard effortful swallow;super-supraglottic swallow  -EN     Increase Closure at Entrance to Airway/Closure of Airway at Glottis  super-supraglottic swallow  -AC  super-supraglottic swallow  -EN     Increase Squeeze/Positive Pressure Generation  --  supraglottic swallow;hard effortful swallow;super-supraglottic swallow  -EN     [place]/Accuracy (Pharyngeal Strengthening Goal 1, SLP)  independently (over 90% accuracy)  -AC  independently (over 90% accuracy)  -EN     Time Frame (Pharyngeal Strengthening Goal 1, SLP)  short term goal (STG)  -AC  short term goal (STG)  -EN     Barriers (Pharyngeal Strengthening Goal 1, SLP)  --  Completed each exercise x3  -EN     Progress/Outcomes (Pharyngeal Strengthening Goal 1, SLP)  goal revised this date  -AC  continuing progress toward goal  -EN       User Key  (r) = Recorded By, (t) = Taken By, (c) = Cosigned By    Initials Name Provider Type    Julianne Levin MS CCC-SLP Speech and Language Pathologist    EN Sara Garcia MS, CFY-SLP Speech and Language Pathologist             Time Calculation:   Time Calculation- SLP     Row Name 07/02/21 1449             Time Calculation- SLP    SLP Start Time  1335  -      SLP Received On  07/02/21  -         Untimed Charges    52769-EA Motion Fluoro Eval Swallow Minutes  54  -AC         Total Minutes    Untimed Charges Total Minutes  54  -AC       Total Minutes  54  -AC        User Key  (r) = Recorded By, (t) = Taken By, (c) = Cosigned By    Initials Name Provider Type    Julianne Levin MS CCC-SLP Speech and Language Pathologist          Therapy Charges for Today     Code Description Service Date Service Provider Modifiers Qty    [id number] HC ST MOTION FLUORO EVAL SWALLOW 4 7/2/2021 Julianne Ambriz MS CCC-SLP GN 1        Patient was not wearing a face mask and did exhibit coughing during this  therapy encounter.  Procedure performed was aerosolizing, involved close contact (within 6 feet for at least 15 minutes or longer), and did not involve contact with infectious secretions or specimens.  Therapist used appropriate personal protective equipment including gloves, standard procedure mask and eye protection.  Appropriate PPE was worn during the entire therapy session.  Hand hygiene was completed before and after therapy session.          Julianne Ambriz MS CCC-SLP  7/2/2021

## 2021-07-02 NOTE — CASE MANAGEMENT/SOCIAL WORK
Case Management Discharge Note      Final Note: Plan is for pt. to dc to the MED Unit at [facility]. Nurse can call report to [phone]. The DC summary will be obtained from [facility]. Have arranged for transport with [facility] at 1430pm. Please have pt. at the maternity entrance by 1420pm.         Selected Continued Care - Admitted Since 6/18/2021     Destination Coordination complete    Service Provider Selected Services Address Phone Fax Patient Preferred    [facility]  Inpatient Rehabilitation [address] [address] [phone] --          Durable Medical Equipment    No services have been selected for the patient.              Dialysis/Infusion    No services have been selected for the patient.              Home Medical Care    No services have been selected for the patient.              Therapy    No services have been selected for the patient.              Community Resources    No services have been selected for the patient.              Community & DME    No services have been selected for the patient.                       Final Discharge Disposition Code: 62 - inpatient rehab facility

## 2021-07-02 NOTE — PAYOR COMM NOTE
"Ref # [id number]  So Ortega RN, BSN  Phone # [phone]  Fax # [phone]  Bipin Guzman (45 y.o. Female)     Date of Birth Social Security Number Address Home Phone MRN    1975  [id number]  [facility] [address] [phone] [id number]    Tenriism Marital Status          Yazdanism        Admission Date Admission Type Admitting Provider Attending Provider Department, Room/Bed    6/18/21 Emergency Filiberto Ly MD West, Christopher R, MD [facility] 6A, N613/1    Discharge Date Discharge Disposition Discharge Destination                       Attending Provider: Filiberto Ly MD    Allergies: Contrast Dye, Nsaids, Tylenol [Acetaminophen]    Isolation: None   Infection: None   Code Status: CPR    Ht: 160 cm (62.99\")   Wt: 95.7 kg (210 lb 14.4 oz)    Admission Cmt: None   Principal Problem: STEMI (ST elevation myocardial infarction) (CMS/[facility]) [I21.3]                 Active Insurance as of 6/18/2021     Primary Coverage     Payor Plan Insurance Group Employer/Plan Group    [facility] BY [facility]IE [id number]     Payor Plan Address Payor Plan Phone Number Payor Plan Fax Number Effective Dates    [address]   1/1/2021 - None Entered    [facility] [address]       Subscriber Name Subscriber Birth Date Member ID       BIPIN GUZMAN 1975 [id number]                   Current Facility-Administered Medications   Medication Dose Route Frequency Provider Last Rate Last Admin   • albuterol (PROVENTIL) nebulizer solution 0.083% 2.5 mg/3mL  2.5 mg Nebulization Q6H PRN Case, Corie V., DO       • aspirin chewable tablet 81 mg  81 mg Oral Daily Case, Corie V., DO   81 mg at 07/02/21 0825   • bumetanide (BUMEX) tablet 1 mg  1 mg Oral Daily Filiberto Ly MD   1 mg at 07/02/21 0825   • clopidogrel (PLAVIX) tablet 75 mg  75 mg Oral Daily Case, Corie V., DO   75 mg at 07/02/21 0825   • diazePAM (VALIUM) tablet 2.5 mg  2.5 mg Oral Q8H PRN " Filiberto Ly MD   2.5 mg at 07/02/21 0643   • FLUoxetine (PROzac) capsule 40 mg  40 mg Oral Daily Case, Corie V., DO   40 mg at 07/02/21 0824   • folic acid (FOLVITE) tablet 1 mg  1 mg Oral Daily Case, Corie V., DO   1 mg at 07/02/21 0825   • ipratropium-albuterol (DUO-NEB) nebulizer solution 3 mL  3 mL Nebulization Q6H PRN Filiberto Quinn, DO       • lactulose (CHRONULAC) 10 GM/15ML solution 10 g  10 g Oral BID Case, Corie V., DO   10 g at 07/02/21 0825   • lidocaine (LIDODERM) 5 % 1 patch  1 patch Transdermal Q24H Filiberto Ly MD   1 patch at 07/02/21 0825   • Magnesium Sulfate 2 gram Bolus, followed by 8 gram infusion (total Mg dose 10 grams)- Mg less than or equal to 1mg/dL  2 g Intravenous PRN Case, Corie V., DO        Or   • Magnesium Sulfate 2 gram / 50mL Infusion (GIVE X 3 BAGS TO EQUAL 6GM TOTAL DOSE) - Mg 1.1 - 1.5 mg/dl  2 g Intravenous PRN Case, Corie V., DO 25 mL/hr at 07/01/21 1734 2 g at 07/01/21 1734    Or   • Magnesium Sulfate 4 gram infusion- Mg 1.6-1.9 mg/dL  4 g Intravenous PRN Case, Corie V., DO 25 mL/hr at 06/29/21 0658 4 g at 06/29/21 0658   • OLANZapine (zyPREXA) tablet 7.5 mg  7.5 mg Oral Nightly Case, Corie V., DO   7.5 mg at 07/01/21 2155   • ondansetron (ZOFRAN) injection 4 mg  4 mg Intravenous Q6H PRN Case, Corie V., DO   4 mg at 06/28/21 1717   • oxyCODONE (ROXICODONE) immediate release tablet 5 mg  5 mg Oral Q4H PRN Case, Corie V., DO   5 mg at 07/02/21 0639   • pantoprazole (PROTONIX) EC tablet 40 mg  40 mg Oral BID AC Case, Corie V., DO   40 mg at 07/02/21 0825   • Pharmacy Consult - MTM   Does not apply Daily Case, Corie V., DO   Stopped at 06/19/21 1051   • phenol (CHLORASEPTIC) 1.4 % liquid 1 spray  1 spray Mouth/Throat Q2H PRN Case, Corie V., DO       • potassium chloride (KLOR-CON) packet 40 mEq  40 mEq Nasogastric PRN Case, Corie V., DO       • potassium phosphate 45 mmol in sodium chloride 0.9 % 250 mL infusion  45 mmol  Intravenous PRN Case, Corie V., DO        Or   • potassium phosphate 30 mmol in sodium chloride 0.9 % 100 mL infusion  30 mmol Intravenous PRN Case, Corie V., DO        Or   • potassium phosphate 15 mmol in sodium chloride 0.9 % 100 mL infusion  15 mmol Intravenous PRN Case, Corie V., DO 50 mL/hr at 21 0200 Rate Change at 21 0200    Or   • sodium phosphates 45 mmol in sodium chloride 0.9 % 250 mL IVPB  45 mmol Intravenous PRN Case, Corie V., DO        Or   • sodium phosphates 30 mmol in sodium chloride 0.9 % 100 mL IVPB  30 mmol Intravenous PRN Case, Corie V., DO        Or   • sodium phosphates 15 mmol in sodium chloride 0.9 % 100 mL IVPB  15 mmol Intravenous PRN Case, Corie V., DO       • riFAXIMin (XIFAXAN) tablet 550 mg  550 mg Oral Q12H Case, Corie V., DO   550 mg at 21 0825   • rosuvastatin (CRESTOR) tablet 20 mg  20 mg Oral Nightly Case, Corie V., DO   20 mg at 21 2157   • sodium chloride 0.9 % flush 10 mL  10 mL Intravenous Q12H Case, Corie V., DO   10 mL at 21 0824   • sodium chloride 0.9 % flush 10 mL  10 mL Intravenous PRN Case, Corie V., DO       • spironolactone (ALDACTONE) tablet 50 mg  50 mg Oral Daily Filiberto Ly MD   50 mg at 21 0825     Lab Results (last 24 hours)     ** No results found for the last 24 hours. **        Imaging Results (Last 24 Hours)     ** No results found for the last 24 hours. **        Operative/Procedure Notes (last 48 hours) (Notes from 21 1042 through 21 1042)    No notes of this type exist for this encounter.          Physician Progress Notes (last 48 hours) (Notes from 21 1043 through 21 1043)      Filiberto Ly MD at 21 1928              [facility] Medicine Services  PROGRESS NOTE    Patient Name: Timothy Guzman  : 1975  MRN: [id number]    Date of Admission: 2021  Primary Care Physician: Diony Rocha III, MD    Subjective      Subjective     CC:  Pea code, stemi, gi bleed    HPI:  Pain persists chest wall but no worse. No fever. No dyspnea. No n/v    ROS:  Gen- No fevers, chills  CV- +chest wall pain  Resp- No cough, dyspnea  GI- No N/V/D, abd pain      Objective   Objective     Vital Signs:   Temp:  [97.8 °F (36.6 °C)-98.2 °F (36.8 °C)] 98.1 °F (36.7 °C)  Heart Rate:  [68-92] 92  Resp:  [18] 18  BP: (112-130)/(60-78) 130/78        Physical Exam:  Constitutional:Alert, oriented x 3, nontoxic appearing. Normal work of breathing. Sitting up in bed smiling  Psych:Normal/appropriate affect  HEENT:Ncat, oroph clear  Neck: neck supple, full range of motion  Neuro: Face symmetric, speech clear, equal , moves all extremities  Cardiac: Rrr; No pretibial pitting edema  Resp: Ctab, normal effort  GI: abd soft, nontender  Skin:diffuse bruising right arm  Musculoskeletal/extremities: chest wall tenderness to palpation;no cyanosis extremities; no significant ankle edema  Central line neck noted      Results Reviewed:  Results from last 7 days   Lab Units 07/01/21  0538 06/29/21  0239 06/28/21  0719   WBC 10*3/mm3 6.25 5.99 5.35   HEMOGLOBIN g/dL 11.0* 10.5* 10.7*   HEMATOCRIT % 33.8* 33.1* 33.4*   PLATELETS 10*3/mm3 116* 96* 96*     Results from last 7 days   Lab Units 07/01/21  0538 06/30/21  0611 06/29/21  0239 06/28/21  0216   SODIUM mmol/L 145 137 141 135*   POTASSIUM mmol/L 4.2 4.0 3.9 3.7   CHLORIDE mmol/L 110* 106 109* 104   CO2 mmol/L 25.0 22.0 24.0 24.0   BUN mg/dL 8 9 17 21*   CREATININE mg/dL 0.79 0.76 0.82 0.94   GLUCOSE mg/dL 82 111* 98 131*   CALCIUM mg/dL 8.3* 8.1* 8.1* 8.5*   ALT (SGPT) U/L  --   --   --  96*   AST (SGOT) U/L  --   --   --  26     Estimated Creatinine Clearance: 99.6 mL/min (by C-G formula based on SCr of 0.79 mg/dL).    Microbiology Results Abnormal     Procedure Component Value - Date/Time    COVID PRE-OP / PRE-PROCEDURE SCREENING ORDER (NO ISOLATION) - Swab, Nasopharynx [[id number]]  (Normal) Collected:  06/18/21 2058    Lab Status: Final result Specimen: Swab from Nasopharynx Updated: 06/18/21 2124    Narrative:      The following orders were created for panel order COVID PRE-OP / PRE-PROCEDURE SCREENING ORDER (NO ISOLATION) - Swab, Nasopharynx.  Procedure                               Abnormality         Status                     ---------                               -----------         ------                     COVID-19, ABBOTT IN-HOUS...[[id number]]  Normal              Final result                 Please view results for these tests on the individual orders.    COVID-19, ABBOTT IN-HOUSE,NASAL Swab (NO TRANSPORT MEDIA) 2 HR TAT - Swab, Nasopharynx [[id number]]  (Normal) Collected: 06/18/21 2058    Lab Status: Final result Specimen: Swab from Nasopharynx Updated: 06/18/21 2124     COVID19 Presumptive Negative    Narrative:      Fact sheet for providers: https://www.fda.gov/media/571331/download     Fact sheet for patients: https://www.fda.gov/media/313118/download    Test performed by PCR.  If inconsistent with clinical signs and symptoms patient should be tested with different authorized molecular test.          Imaging Results (Last 24 Hours)     ** No results found for the last 24 hours. **          Results for orders placed during the hospital encounter of 06/18/21    Adult Transthoracic Echo Complete W/ Cont if Necessary Per Protocol    Interpretation Summary  · The quality of the study is limited due to patient positioning and patient being intubated.  · Left ventricular ejection fraction appears to be 51 - 55%. Left ventricular systolic function is normal.  · Left ventricular diastolic function is consistent with (grade Ia w/high LAP) impaired relaxation.  · Mildly reduced right ventricular systolic function noted.      I have reviewed the medications:  Scheduled Meds:aspirin, 81 mg, Oral, Daily  bumetanide, 1 mg, Oral, Daily  clopidogrel, 75 mg, Oral, Daily  FLUoxetine, 40 mg, Oral, Daily  folic acid, 1  mg, Oral, Daily  lactulose, 10 g, Oral, BID  lidocaine, 1 patch, Transdermal, Q24H  OLANZapine, 7.5 mg, Oral, Nightly  pantoprazole, 40 mg, Oral, BID AC  pharmacy consult - MTM, , Does not apply, Daily  riFAXIMin, 550 mg, Oral, Q12H  rosuvastatin, 20 mg, Oral, Nightly  sodium chloride, 10 mL, Intravenous, Q12H  spironolactone, 50 mg, Oral, Daily      Continuous Infusions:   PRN Meds:.•  albuterol  •  diazePAM  •  ipratropium-albuterol  •  magnesium sulfate **OR** magnesium sulfate **OR** magnesium sulfate  •  ondansetron  •  oxyCODONE  •  phenol  •  potassium chloride  •  potassium phosphate infusion greater than 15 mMoles **OR** potassium phosphate infusion greater than 15 mMoles **OR** potassium phosphate infusion 15 mMol or less **OR** sodium phosphate IVPB **OR** sodium phosphate IVPB **OR** sodium phosphate IVPB  •  sodium chloride    Assessment/Plan   Assessment & Plan     Active Hospital Problems    Diagnosis  POA   • **STEMI (ST elevation myocardial infarction) (CMS/HCC) [I21.3]  Yes   • SVT (supraventricular tachycardia) (CMS/HCC) [I47.1]  Yes   • Depression [F32.9]  Yes   • History of esophageal varices [Z87.19]  Not Applicable   • GERD (gastroesophageal reflux disease) [K21.9]  Yes   • Acute respiratory failure with hypoxia (CMS/HCC) [J96.01]  Yes   • Anaphylaxis [T78.2XXA]  Yes   • Cardiac arrest (CMS/HCC) [I46.9]  Yes   • Possible GI bleed [K92.2]  Yes   • Alcoholic cirrhosis of liver without ascites (CMS/HCC) [K70.30]  Yes      Resolved Hospital Problems    Diagnosis Date Resolved POA   • STEMI involving left anterior descending coronary artery (CMS/HCC) [I21.02] 06/18/2021 Yes        Brief Hospital Course to date:  Timothy Guzman is a 45 y.o. female w/ hx ETOH induced cirrhosis, pancreatitis, gastritis w/ varices currently on transplant list at [facility]. Patient presented to [facility] ED w/ coffee emesis on 6/18/21. During patient's initial workup patient had a PEA cardiac arrest while receiving iv contrast  dye, requiring ACLS & intubation. She had a STEMI on EKG following this event and was taken for [facility] w/ Dr. Gonzales who placed stent to the circumflex. Patient did experience some SVT postprocedurally and was placed on amiodarone and was takent to icu on mechanical ventilation. Patient subsequentaly underwent EGD on 6/19/21 and was found to have grade 1 varices, and erosive gastritis and antiplatelet meds were restarted    *Inferior STEMI following PEA cardiac arrest (which occurred after given iv contrast prior to ct scan)  *s/pSVT  -s/p cards consult (Dr. Gonzales). s/p LHC & ANGELICA x 1 av groove stenosis; asa, plavix statin (holding off b-blocker due to relative bradycardia and low bp; follow up cards clinic 6 weeks (from 6/24/21 progress notenote)  -svt converted w/ adenosine 12mg  *Contrast dye allergy  -unclear if cardiac arrest was due to iv contrast given prior to ct scan), was premedicated prior to ct scan  *Upper GI bleed due to erosive gastritis; indicental grade 1 varices noted  -egd 6/19/21: grade 1 varices without stimata of bleeding; mild erosive gastritis but no ulcers (pathology negative for h.pylori). ppi therapy recommended and ok'd to take asa & plavix  -continue bid ppi x 4 total weeks, then once daily  *Cirrhosis  -on rifaxamin  -continue rifaxamin. Restarted bumex at 1mg and aldactone at 50mg daily (at lower doses than home dose). Renal function stable. At discharge would increase bumex to 2mg daily and aldactone 100mg (which is still lower than her previous baseline) w/ bmp in 2-3 days  *Hx opioid dependence/chronic pain  *Acute chest wall pain (s/p acls chest compressions this admission)  -weaned valiu-chronic oxycodone use, currently receiving prn. Recommend gradual wean while at rehab facilitym to 2.5mg q8h prn    -lidoderm patch  Poor iv access  -keep deep line in place (unable to get peripheral access); d/c central line at prior to discharge    Am labs: cbc,bmp,mag    DVT prophylaxis:  Mechanical  DVT prophylaxis orders are present.       Disposition: I expect the patient to be discharged to rehab at [facility] tomorrow, van at 2:30    CODE STATUS:   Code Status and Medical Interventions:   Ordered at: 21 2310     Level Of Support Discussed With:    Patient     Code Status:    CPR     Medical Interventions (Level of Support Prior to Arrest):    Full       Filiberto Ly MD  21                Electronically signed by Filiberto Ly MD at 21 1935     Filiberto Ly MD at 21 1554              [facility] Medicine Services  PROGRESS NOTE    Patient Name: Timothy Guzman  : 1975  MRN: [id number]    Date of Admission: 2021  Primary Care Physician: Diony Rocha III, MD    Subjective   Subjective     CC:  Pea code, stemi, gi bleed    HPI:  Currently w/ chest wall pain. No dyspnea. Mild abd distension and leg edema    ROS:  Gen- No fevers, chills  CV- No chest pain, palpitations  Resp- No cough, dyspnea  GI- No N/V/D, abd pain      Objective   Objective     Vital Signs:   Temp:  [97.9 °F (36.6 °C)-98.6 °F (37 °C)] 98.3 °F (36.8 °C)  Heart Rate:  [70-85] 71  Resp:  [16-18] 18  BP: (113-128)/(69-77) 126/75        Physical Exam:  Constitutional:Alert, oriented x 3, nontoxic appearing, slightly drowsy easily arouses. Normal work of breathing  Psych:Normal/appropriate affect  HEENT:Ncat, oroph clear  Neck: neck supple, full range of motion  Neuro: Face symmetric, speech clear, equal , moves all extremities  Cardiac: Rrr; No pretibial pitting edema  Resp: Ctab, normal effort  GI: abd soft, nontender  Skin:diffuse bruising right arm  Musculoskeletal/extremities: chest wall tenderness to palpation;no cyanosis extremities; no significant ankle edema  Central line neck noted      Results Reviewed:  Results from last 7 days   Lab Units 21  0239 21  0719 21  0610   WBC 10*3/mm3 5.99 5.35 12.39*   HEMOGLOBIN g/dL 10.5* 10.7* 10.7*    HEMATOCRIT % 33.1* 33.4* 33.5*   PLATELETS 10*3/mm3 96* 96* 105*     Results from last 7 days   Lab Units 06/30/21  0611 06/29/21  0239 06/28/21  0216   SODIUM mmol/L 137 141 135*   POTASSIUM mmol/L 4.0 3.9 3.7   CHLORIDE mmol/L 106 109* 104   CO2 mmol/L 22.0 24.0 24.0   BUN mg/dL 9 17 21*   CREATININE mg/dL 0.76 0.82 0.94   GLUCOSE mg/dL 111* 98 131*   CALCIUM mg/dL 8.1* 8.1* 8.5*   ALT (SGPT) U/L  --   --  96*   AST (SGOT) U/L  --   --  26     Estimated Creatinine Clearance: 102.2 mL/min (by C-G formula based on SCr of 0.76 mg/dL).    Microbiology Results Abnormal     Procedure Component Value - Date/Time    COVID PRE-OP / PRE-PROCEDURE SCREENING ORDER (NO ISOLATION) - Swab, Nasopharynx [[id number]]  (Normal) Collected: 06/18/21 2058    Lab Status: Final result Specimen: Swab from Nasopharynx Updated: 06/18/21 2124    Narrative:      The following orders were created for panel order COVID PRE-OP / PRE-PROCEDURE SCREENING ORDER (NO ISOLATION) - Swab, Nasopharynx.  Procedure                               Abnormality         Status                     ---------                               -----------         ------                     COVID-19, ABBOTT IN-HOUS...[[id number]]  Normal              Final result                 Please view results for these tests on the individual orders.    COVID-19, ABBOTT IN-HOUSE,NASAL Swab (NO TRANSPORT MEDIA) 2 HR TAT - Swab, Nasopharynx [[id number]]  (Normal) Collected: 06/18/21 2058    Lab Status: Final result Specimen: Swab from Nasopharynx Updated: 06/18/21 2124     COVID19 Presumptive Negative    Narrative:      Fact sheet for providers: https://www.fda.gov/media/455753/download     Fact sheet for patients: https://www.fda.gov/media/578850/download    Test performed by PCR.  If inconsistent with clinical signs and symptoms patient should be tested with different authorized molecular test.          Imaging Results (Last 24 Hours)     ** No results found for the last 24 hours.  **          Results for orders placed during the hospital encounter of 06/18/21    Adult Transthoracic Echo Complete W/ Cont if Necessary Per Protocol    Interpretation Summary  · The quality of the study is limited due to patient positioning and patient being intubated.  · Left ventricular ejection fraction appears to be 51 - 55%. Left ventricular systolic function is normal.  · Left ventricular diastolic function is consistent with (grade Ia w/high LAP) impaired relaxation.  · Mildly reduced right ventricular systolic function noted.      I have reviewed the medications:  Scheduled Meds:aspirin, 81 mg, Oral, Daily  clopidogrel, 75 mg, Oral, Daily  FLUoxetine, 40 mg, Oral, Daily  folic acid, 1 mg, Oral, Daily  lactulose, 10 g, Oral, BID  OLANZapine, 7.5 mg, Oral, Nightly  pantoprazole, 40 mg, Oral, BID AC  pharmacy consult - MTM, , Does not apply, Daily  riFAXIMin, 550 mg, Oral, Q12H  rosuvastatin, 20 mg, Oral, Nightly  sodium chloride, 10 mL, Intravenous, Q12H      Continuous Infusions:   PRN Meds:.•  albuterol  •  diazePAM  •  ipratropium-albuterol  •  ketorolac  •  magnesium sulfate **OR** magnesium sulfate **OR** magnesium sulfate  •  ondansetron  •  oxyCODONE  •  phenol  •  potassium chloride  •  potassium phosphate infusion greater than 15 mMoles **OR** potassium phosphate infusion greater than 15 mMoles **OR** potassium phosphate infusion 15 mMol or less **OR** sodium phosphate IVPB **OR** sodium phosphate IVPB **OR** sodium phosphate IVPB  •  sodium chloride    Assessment/Plan   Assessment & Plan     Active Hospital Problems    Diagnosis  POA   • **STEMI (ST elevation myocardial infarction) (CMS/[facility]) [I21.3]  Yes   • SVT (supraventricular tachycardia) (CMS/[facility]) [I47.1]  Yes   • Depression [F32.9]  Yes   • History of esophageal varices [Z87.19]  Not Applicable   • GERD (gastroesophageal reflux disease) [K21.9]  Yes   • Acute respiratory failure with hypoxia (CMS/[facility]) [J96.01]  Yes   • Anaphylaxis [T78.2XXA]   Yes   • Cardiac arrest (CMS/HCC) [I46.9]  Yes   • Possible GI bleed [K92.2]  Yes   • Alcoholic cirrhosis of liver without ascites (CMS/HCC) [K70.30]  Yes      Resolved Hospital Problems    Diagnosis Date Resolved POA   • STEMI involving left anterior descending coronary artery (CMS/HCC) [I21.02] 06/18/2021 Yes        Brief Hospital Course to date:  Timothy Guzman is a 45 y.o. female w/ hx ETOH induced cirrhosis, pancreatitis, gastritis w/ varices currently on transplant list at [facility]. Patient presented to [facility] ED w/ coffee emesis on 6/18/21. During patient's initial workup patient had a PEA cardiac arrest while receiving iv contrast dye, requiring ACLS & intubation. She had a STEMI on EKG following this event and was taken for [facility] w/ Dr. Gonzales who placed stent to the circumflex. Patient did experience some SVT postprocedurally and was placed on amiodarone and was takent to icu on mechanical ventilation. Patient subsequentaly underwent EGD on 6/19/21 and was found to have grade 1 varices, and erosive gastritis and antiplatelet meds were restarted    *Inferior STEMI following PEA cardiac arrest (which occurred after given iv contrast prior to ct scan)  *s/pSVT  -s/p LHC & ANGELICA x 1 av groove stenosis; asa, plavix statin (holding off b-blocker due to relative bradycardia and low bp; follow up cards clinic 6 weeks (from 6/24/21 progress notenote)  -svt converted w/ adenosine 12mg  *Contrast dye allergy  -unclear if cardiac arrest was due to iv contrast given prior to ct scan), was premedicated prior to ct scan  *upper GI bleed due to erosive gastritis; indicental grade 1 varices noted  -egd 6/19/21: grade 1 varices without stimata of bleeding; mild erosive gastritis but no ulcers (pathology negative for h.pylori). ppi therapy recommended and ok'd to take asa & plavix  -continue bid ppi x 4 total weeks, then once daily  *Cirrhosis  -on rifaxamin  -restart bumex at 1mg and aldactone at 50mg daily (at lower doses) and monitor  renal function (was on aldactone 100mg and bumex 6mg daily at home per patient)  *Hx opioid dependence/chronic pain  *Acute chest wall pain (s/p acls chest compressions this admission)  -wean valium to 2.5mg q8h prn  -tomorrow consider weaning oxycodone as well (on chronically per patient)  -add lidoderm patch  Poor iv access  -keep deep line in place (unable to get peripheral access)    Am labs: cbc,bmp,mag    DVT prophylaxis:  Mechanical DVT prophylaxis orders are present.       Disposition: I expect the patient to be discharged to rehab, referrals sent     CODE STATUS:   Code Status and Medical Interventions:   Ordered at: 06/18/21 1139     Level Of Support Discussed With:    Patient     Code Status:    CPR     Medical Interventions (Level of Support Prior to Arrest):    Full       Filiberto Ly MD  06/30/21                Electronically signed by Filiberto Ly MD at 06/30/21 3882

## 2021-07-02 NOTE — DISCHARGE INSTR - DIET
MBS/VFSS Reason for Referral 7/2/2021  Patient was referred for a MBS to assess the efficiency of his/her swallow function, rule out aspiration and make recommendations regarding safe dietary consistencies, effective compensatory strategies, and safe eating environment.             Recommendations/Treatment  SLP Swallowing Diagnosis: mild, oral dysphagia, pharyngeal dysphagia  Functional Impact: risk of aspiration/pneumonia  Rehab Potential/Prognosis, Swallowing: good, to achieve stated therapy goals  Swallow Criteria for Skilled Therapeutic Interventions Met: demonstrates skilled criteria  Therapy Frequency (Swallow): 5 days per week  Predicted Duration Therapy Intervention (Days): until discharge  SLP Diet Recommendation: regular textures, thin liquids, other (see comments) (avoid/drain mixed consistencies)  Recommended Precautions and Strategies: upright posture during/after eating, small bites of food and sips of liquid, chin tuck, 3 second prep, volitional throat clear, other (see comments) (straws ok)  SLP Rec. for Method of Medication Administration: meds whole, with pudding or applesauce, as tolerated  Monitor for Signs of Aspiration: yes, notify SLP if any concerns  Anticipated Discharge Disposition (SLP): anticipate therapy at next level of care, inpatient rehabilitation facility    Instrumental Set-up  Utensils Used: spoon, cup, straw  Consistencies Trialed: thin liquids, pudding thick, regular textures    Oral Preparation/ Oral Phase  Oral Prep Phase: WFL  Oral Transit Phase: impaired  Oral Residue: WFL     Impaired Oral Transit Phase: premature spillage of liquids into pharynx  Premature Spillage of Liquids into Pharynx: thin liquids       Pharyngeal Phase  Initiation of Pharyngeal Swallow: bolus in pyriform sinuses  Pharyngeal Phase: impaired pharyngeal phase of swallowing  Penetration Before the Swallow: thin liquids, secondary to reduced back of tongue control, secondary to delayed swallow initiation  or mistiming  Response to Penetration: inconsistent response  Pharyngeal Residue: all consistencies tested, valleculae, secondary to reduced laryngeal elevation, secondary to reduced hyolaryngeal excursion, other (see comments) (mild)  Response to Residue: cleared residue with spontaneous subsequent swallow  Attempted Compensatory Maneuvers: bolus size, bolus presentation style, chin tuck, other (see comments) (3 sec prep)  Response to Attempted Compensatory Maneuvers: reduced residue, other (see comments) (amount penetrated/vestibular residue)  Pharyngeal Phase, Comment: Improved. There was penetration to the level of the TVFs w/ initial tsp of thin liquid, as well as penetration above the level of the TVFs w/ thin via cup/straw drinks. Penetrated material did not spontaneously expel from laryngeal vestibule, putting pt @ risk for eventual aspiration. Amount penetrated was reduced and vestibular residue eliminated when pt instructed to take small cup/straw sip, tuck chin, and hold liquid in anterior portion of mouth prior to swallow. No penetration/aspiration w/ pudding or solid. Pt able to demonstrate and teach back compensation strategies following study.    Cervical Esophageal Phase

## 2021-07-02 NOTE — CASE MANAGEMENT/SOCIAL WORK
Continued Stay Note  [facility]     Patient Name: Timothy Guzman  MRN: [id number]  Today's Date: 7/2/2021    Admit Date: 6/18/2021    Discharge Plan     Row Name 07/02/21 1120       Plan    Plan  AUD outreach services    Plan Comments  Pt 's dispo is [facility].  She has been provided our outreach services, her eventual plan is to return to [facility] to complete AUD treatment.    Row Name 07/02/21 0952       Plan    Plan  [facility]    Final Discharge Disposition Code  62 - inpatient rehab facility    Final Note  Plan is for pt. to dc to the MED Unit at [facility]. Nurse can call report to [phone]. The DC summary will be obtained from [facility]. Have arranged for transport with [facility] at 1430pm. Please have pt. at the maternity entrance by 1420pm.        Discharge Codes    No documentation.       Expected Discharge Date and Time     Expected Discharge Date Expected Discharge Time    Jul 2, 2021             Ines Mueller RN ,BSN   Addiction Coordinator

## 2021-07-02 NOTE — PLAN OF CARE
Goal Outcome Evaluation:  Plan of Care Reviewed With: patient        Progress: improving   SLP re-evaluation completed. Will continue to address dysphagia in tx. Please see note for further details and recommendations.

## 2021-07-02 NOTE — DISCHARGE SUMMARY
Psychiatric Medicine Services  TRANSFER SUMMARY    Patient Name: Timothy Guzman  : 1975  MRN: [id number]    Date of Admission: 2021  Date of Discharge:  21    Length of Stay: 14  Primary Care Physician: Diony Rocha III, MD    Consults     Date and Time Order Name Status Description    2021 12:35 AM Inpatient Gastroenterology Consult Completed           Hospital Course     Presenting Problem:   STEMI involving left anterior descending coronary artery (CMS/HCC) [I21.02]    Active Hospital Problems    Diagnosis  POA   • **STEMI (ST elevation myocardial infarction) (CMS/HCC) [I21.3]  Yes   • SVT (supraventricular tachycardia) (CMS/HCC) [I47.1]  Yes   • Depression [F32.9]  Yes   • History of esophageal varices [Z87.19]  Not Applicable   • GERD (gastroesophageal reflux disease) [K21.9]  Yes   • Acute respiratory failure with hypoxia (CMS/HCC) [J96.01]  Yes   • Anaphylaxis [T78.2XXA]  Yes   • Cardiac arrest (CMS/HCC) [I46.9]  Yes   • Possible GI bleed [K92.2]  Yes   • Alcoholic cirrhosis of liver without ascites (CMS/HCC) [K70.30]  Yes      Resolved Hospital Problems    Diagnosis Date Resolved POA   • STEMI involving left anterior descending coronary artery (CMS/HCC) [I21.02] 2021 Yes        Hospital Course:  Timothy Guzman is a 45 y.o. female w/ hx ETOH induced cirrhosis, pancreatitis, gastritis w/ varices currently on transplant list at [facility]. Patient presented to [facility] ED w/ coffee emesis on 21. During patient's initial workup patient had a PEA cardiac arrest while receiving iv contrast dye, requiring ACLS & intubation. She had a STEMI on EKG following this event and was taken for [facility] w/ Dr. Gonzales who placed stent to the circumflex. Patient did experience some SVT postprocedurally and was placed on amiodarone and was takent to icu on mechanical ventilation. Patient subsequentaly underwent EGD on 21 and was found to have grade 1 varices, and erosive  gastritis and antiplatelet meds were restarted. The patient was transferred to telemetry and [facility] medicine assumed care on 6/30/2021. The patient has remained stable since transferring out of ICU. Case management has arranged for inpatient rehab at [facility]. The patient will be transported today by [facility] medical transport van.     *Inferior STEMI following PEA cardiac arrest (which occurred after given iv contrast prior to ct scan)  *s/p SVT  -s/p cards consult (Dr. Gonzales). s/p LHC & ANGELICA x 1 av groove stenosis  -continue asa, plavix, statin (holding off b-blocker due to relative bradycardia and low bp; follow up cards clinic 6 weeks (from 6/24/21 progress notenote)  -svt converted w/ adenosine 12mg    *Contrast dye allergy  -unclear if cardiac arrest was due to iv contrast given prior to ct scan, was premedicated prior to ct scan    *Upper GI bleed due to erosive gastritis; indicental grade 1 varices noted  -egd 6/19/21: grade 1 varices without stimata of bleeding; mild erosive gastritis but no ulcers (pathology negative for h.pylori). ppi therapy recommended and ok'd to take asa & plavix  -continue bid ppi x 4 total weeks, then once daily    *Cirrhosis  -continue rifaxamin. Restarted bumex at 1mg and aldactone at 50mg daily on 6/30 (at lower doses than home dose). Renal function has been stable.   -At discharge will increase bumex to 2mg daily and aldactone 100mg (which is still lower than her previous baseline) w/ repeat bmp in 2-3 days    *Hx opioid dependence/chronic pain  *Acute chest wall pain (s/p acls chest compressions this admission)  -weaned valium to 2.5mg q12h prn   -chronic oxycodone use, currently receiving PRN. Recommend gradual wean while at rehab facility   -lidoderm patch    Poor iv access  -d/c central line prior to discharge    Discharge Follow Up Recommendations for outpatient labs/diagnostics:  Recheck BMP in 2-3 days d/t increase in diuretics   Follow up with  Cardiologist Dr. Gonzales in 5 weeks.     Day of Discharge     HPI:   Patient seen resting in bed in no apparent distress.  No acute events overnight per nursing.  She continues to have chest wall pain.  We discussed plan to continue to wean Valium and oxycodone to avoid tolerance and chemical dependency.  She feels ready to discharge to [facility] for rehabilitation today and is appreciative of the care she has received.  We discussed the importance of keeping all recommended follow-up appointments and taking all medications as prescribed.    Review of Systems  Gen- No fevers, chills  CV- + chest wall pain   Resp- No cough, dyspnea  GI- No N/V/D, abd pain      Vital Signs:   Temp:  [97.7 °F (36.5 °C)-98.5 °F (36.9 °C)] 98.5 °F (36.9 °C)  Heart Rate:  [89-92] 90  Resp:  [16-18] 16  BP: (114-130)/(69-79) 124/79     Physical Exam:  Constitutional: No acute distress, awake, alert, pleasant female   HENT: NCAT, mucous membranes moist  Respiratory: Clear to auscultation bilaterally, respiratory effort normal on RA   Cardiovascular: RRR, no murmurs, palpable pedal pulses bilaterally, cap refill brisk   Gastrointestinal: Positive bowel sounds, soft, nontender, nondistended  Musculoskeletal: No BLE edema, chest wall and right rib tenderness to palpation  Psychiatric: Appropriate affect, cooperative  Neurologic: Oriented x 3, moves all extremities, speech clear  Skin: warm, dry, right arm bruising d/t venipuncture    Pertinent Results     Results from last 7 days   Lab Units 07/01/21  0538 06/30/21  0611 06/29/21  0239 06/28/21  0719 06/28/21  0216 06/27/21  0610 06/26/21  0403   WBC 10*3/mm3 6.25  --  5.99 5.35  --  12.39* 8.83   HEMOGLOBIN g/dL 11.0*  --  10.5* 10.7*  --  10.7* 11.2*   HEMATOCRIT % 33.8*  --  33.1* 33.4*  --  33.5* 34.8   PLATELETS 10*3/mm3 116*  --  96* 96*  --  105* 88*   SODIUM mmol/L 145 137 141  --  135* 137 138   POTASSIUM mmol/L 4.2 4.0 3.9  --  3.7 4.1 4.6   CHLORIDE mmol/L 110* 106 109*  --  104  104 103   CO2 mmol/L 25.0 22.0 24.0  --  24.0 26.0 25.0   BUN mg/dL 8 9 17  --  21* 25* 23*   CREATININE mg/dL 0.79 0.76 0.82  --  0.94 0.62 0.71   GLUCOSE mg/dL 82 111* 98  --  131* 119* 107*   CALCIUM mg/dL 8.3* 8.1* 8.1*  --  8.5* 8.7 8.4*     Results from last 7 days   Lab Units 06/28/21  0216   BILIRUBIN mg/dL 0.5   ALK PHOS U/L 79   ALT (SGPT) U/L 96*   AST (SGOT) U/L 26     Results from last 7 days   Lab Units 06/28/21  0216   CHOLESTEROL mg/dL 136   TRIGLYCERIDES mg/dL 130         Brief Urine Lab Results  (Last result in the past 365 days)      Color   Clarity   Blood   Leuk Est   Nitrite   Protein   CREAT   Urine HCG        06/18/21 1924 Yellow Clear Negative Negative Negative Negative               Microbiology Results Abnormal     Procedure Component Value - Date/Time    COVID PRE-OP / PRE-PROCEDURE SCREENING ORDER (NO ISOLATION) - Swab, Nasopharynx [[id number]]  (Normal) Collected: 06/18/21 2058    Lab Status: Final result Specimen: Swab from Nasopharynx Updated: 06/18/21 2124    Narrative:      The following orders were created for panel order COVID PRE-OP / PRE-PROCEDURE SCREENING ORDER (NO ISOLATION) - Swab, Nasopharynx.  Procedure                               Abnormality         Status                     ---------                               -----------         ------                     COVID-19, ABBOTT IN-HOUS...[[id number]]  Normal              Final result                 Please view results for these tests on the individual orders.    COVID-19, ABBOTT IN-HOUSE,NASAL Swab (NO TRANSPORT MEDIA) 2 HR TAT - Swab, Nasopharynx [[id number]]  (Normal) Collected: 06/18/21 2058    Lab Status: Final result Specimen: Swab from Nasopharynx Updated: 06/18/21 2124     COVID19 Presumptive Negative    Narrative:      Fact sheet for providers: https://www.fda.gov/media/900876/download     Fact sheet for patients: https://www.fda.gov/media/148696/download    Test performed by PCR.  If inconsistent with clinical  signs and symptoms patient should be tested with different authorized molecular test.          Imaging Results (All)     Procedure Component Value Units Date/Time    XR Chest 1 View [[id number]] Collected: 06/26/21 0809     Updated: 06/26/21 1302    Narrative:         EXAMINATION: XR CHEST 1 VW - 06/26/2021     INDICATION: I46.9-Cardiac arrest, cause unspecified; K29.70-Gastritis,  unspecified, without bleeding. Respiratory failure.     COMPARISON: Chest x-ray 06/24/2021     FINDINGS: Interval extubation and removal of esophagogastric tube with  left internal jugular central venous catheter remaining in place.  Cardiac size borderline enlarged with perihilar pulmonary opacifications  increased in the interim. No pneumothorax or large effusion.       Impression:      Interval extubation and removal of esophagogastric tube with  left internal jugular central venous catheter remaining in place.  Cardiac size borderline enlarged with perihilar pulmonary opacifications  increased in the interim. No pneumothorax or large effusion.     DICTATED:   06/26/2021  EDITED/ls :   06/26/2021     This report was finalized on 6/26/2021 12:59 PM by Dr. Lee Valadez.       XR Chest 1 View [[id number]] Collected: 06/24/21 0847     Updated: 06/24/21 2250    Narrative:      EXAMINATION: XR CHEST 1 VW-     INDICATION: Respiratory failure with pulmonary edema; I46.9-Cardiac  arrest, cause unspecified; K29.70-Gastritis, unspecified, without  bleeding.      COMPARISON: 06/21/2021.     FINDINGS: ET tube, NG tube and left IJ catheter appear to be in good  position. The heart is normal in size. The vasculature is upper limits  of normal. There is mild remaining bibasilar atelectasis, improved on  the left. No pneumothorax is seen.       Impression:      Mild remaining bibasilar atelectasis and mild pulmonary  venous hypertension.     D:  06/24/2021  E:  06/24/2021         This report was finalized on 6/24/2021 10:47 PM by Dr. Bo Gardner MD.        XR Chest 1 View [[id number]] Collected: 06/21/21 0849     Updated: 06/21/21 0853    Narrative:      EXAMINATION: XR CHEST 1 VW-      INDICATION: Intubated Patient; I46.9-Cardiac arrest, cause unspecified;  K29.70-Gastritis, unspecified, without bleeding      COMPARISON: One day prior     FINDINGS: Support hardware projects unchanged. Minimally diminished lung  volumes with some subsegmental atelectasis and likely trace left  effusion. No new focal consolidation otherwise. No distinct  pneumothorax.       Impression:      Support hardware projects unchanged. Minimally diminished  lung volumes with some subsegmental atelectasis and likely trace left  effusion. No new focal consolidation otherwise. No distinct  pneumothorax.     This report was finalized on 6/21/2021 8:50 AM by Kannan Torres.       XR Chest 1 View [[id number]] Collected: 06/20/21 0819     Updated: 06/20/21 2229    Narrative:      EXAMINATION: XR CHEST 1 VW - 06/20/2021     INDICATION: I46.9-Cardiac arrest, cause unspecified; K29.70-Gastritis,  unspecified, without bleeding. Intubated patient.      COMPARISON: 06/19/2021     FINDINGS: ET tube, NG tube and left IJ catheter appear to be in good  position. Lungs appear normally inflated and grossly clear. No new  pulmonary parenchymal disease, edema or effusion is seen.       Impression:      No new chest disease.     DICTATED:   06/20/2021  EDITED/ls :   06/20/2021         This report was finalized on 6/20/2021 10:26 PM by Dr. Bo Gardner MD.       XR Abdomen KUB [[id number]] Collected: 06/19/21 1445     Updated: 06/19/21 2233    Narrative:      EXAMINATION: XR ABDOMEN KUB - 06/19/2021     INDICATION: I46.9-Cardiac arrest, cause unspecified; K29.70-Gastritis,  unspecified, without bleeding. Evaluate tube placement.      COMPARISON: None.     FINDINGS: NG tube is seen in the stomach. There is diffuse gaseous  distention of small bowel, whether distal obstruction or ileus. No bowel  wall edema or pneumatosis  is seen.       Impression:      1. NG tube coiled in the proximal stomach.   2. Diffuse small bowel distention is noted.     DICTATED:   06/19/2021  EDITED/ls :   06/19/2021         This report was finalized on 6/19/2021 10:30 PM by Dr. Bo Gardner MD.       XR Chest 1 View [[id number]] Collected: 06/19/21 0748     Updated: 06/19/21 2233    Narrative:      EXAMINATION: XR CHEST 1 VW - 06/19/2021     INDICATION: I46.9-Cardiac arrest, cause unspecified. Intubated patient.      COMPARISON: 06/18/2021     FINDINGS: ET tube remains above the lidia. Left IJ catheter tip is in  the distal SVC. Heart and vasculature appear normal in size. Lungs are  moderately well expanded and appear grossly clear. No pneumothorax or  effusion is seen.       Impression:      No new chest disease.     DICTATED:   06/19/2021  EDITED/ls :   06/19/2021         This report was finalized on 6/19/2021 10:30 PM by Dr. Bo Gardner MD.       XR Chest 1 View [[id number]] Collected: 06/18/21 2345     Updated: 06/18/21 2347    Narrative:      CR Chest 1 Vw    INDICATION:   Confirm endotracheal tube placement and line placement     COMPARISON:    2019    FINDINGS:  Single portable AP view(s) of the chest.    The endotracheal tube tip is 3 cm above the lidia. Left central venous catheter has its tip in the superior vena cava.    There is no pneumothorax. The heart size is normal. The lungs are clear. Bones are normal       Impression:      Endotracheal tube and central venous catheter are in good position    Signer Name: Diony Alexis MD   Signed: 6/18/2021 11:45 PM   Workstation Name: [email or web address]    [facility]    CT Abdomen Pelvis With Contrast [[id number]] Collected: 06/18/21 2107     Updated: 06/18/21 2109    Narrative:      INDICATION:   Generalized abdominal pain history of cirrhosis and pancreatitis. Postcholecystectomy and appendectomy.    TECHNIQUE:   CT of the abdomen and pelvis during intravenous contrast. contrast dose  recorded in the patient's chart. Coronal and sagittal reconstructions were obtained.  Radiation dose reduction techniques included automated exposure control or exposure modulation  based on body size. Radiation audit for number of CT and nuclear cardiology exams performed in the last year: 2.      COMPARISON:   Abdomen pelvis CT from 5/30/2021.    FINDINGS:  Lung bases: See accompanying CT chest     Abdomen: The contrast bolus is suboptimal.    The patient is postcholecystectomy. No focal liver lesion is appreciated. There is some enlargement of the left lobe and caudate lobe consistent with provided history of cirrhosis. There is dilatation of the common bile duct which is presumably  postoperative but please exclude any clinical concern for recurrent biliary obstruction given pancreatitis history. There is no CT evidence for pancreatitis at this time. The adrenal glands and spleen are unremarkable. There is no hydronephrosis or focal  renal mass is suspected.    There is dehiscence of the midline anterior abdominal wall. There our shotty nonspecific retroperitoneal lymph nodes not appreciably changed from previous. There are a few prominent loops of small bowel in the right lower quadrant but there is no  convincing evidence for bowel obstruction. There is no free air. Patient is post appendectomy by history.    Pelvis: There are bilateral hip arthroplasties that results in a large amount of metal artifact obscuring structures in the pelvis. Pelvis is grossly unremarkable. There is a transitional lumbosacral vertebral body.      Impression:        1. Subtle morphologic changes of liver consistent with provided diagnosis of cirrhosis. There is no evidence for portal hypertension or ascites. No focal liver lesion is seen.  2. Redemonstrated is nonspecific shotty retroperitoneal lymphadenopathy. This is stable There is no analy CT evidence for acute pancreatitis.  3. Post cholecystectomy with biliary ductal  dilatation likely postoperative. Please exclude any clinical concern for recurrent biliary obstruction given history.  4. There are a few prominent small bowel loops in the right lower quadrant but there is nothing to suggest bowel obstruction. The patient is post appendectomy.        Signer Name: Imelda Ferrer MD   Signed: 6/18/2021 9:07 PM   Workstation Name: [email or web address]    [facility]    CT Chest Without Contrast Diagnostic [[id number]] Collected: 06/18/21 2101     Updated: 06/18/21 2103    Narrative:      CT Chest WO    INDICATION:   Acute onset of hematemesis    TECHNIQUE:   CT of the thorax without IV contrast. Coronal and sagittal reconstructions were obtained.  Radiation dose reduction techniques included automated exposure control or exposure modulation based on body size. Count of known CT and cardiac nuc med studies  performed in previous 12 months: 1.     COMPARISON:   None available.    FINDINGS:  The lungs are free of acute infiltrates. No pleural fluid is identified. No significant pulmonary nodules. No pneumothorax.    The heart size is normal. No pericardial fluid. Minimal coronary artery calcifications. No acute aortic pathology. The great vessels have a normal CT appearance. No threshold mediastinal or hilar adenopathy. No axillary adenopathy.    Limited imaging of the soft tissues of the neck show no acute findings. Regional osseous structures demonstrate no acute or aggressive bone lesions.      Impression:      No clearly acute findings demonstrated on this study.    Signer Name: Tirso Guzman MD   Signed: 6/18/2021 9:01 PM   Workstation Name: [email or web address]    [facility]          Results for orders placed during the hospital encounter of 06/18/21    Adult Transthoracic Echo Complete W/ Cont if Necessary Per Protocol    Interpretation Summary  · The quality of the study is limited due to patient positioning and patient being intubated.  · Left  ventricular ejection fraction appears to be 51 - 55%. Left ventricular systolic function is normal.  · Left ventricular diastolic function is consistent with (grade Ia w/high LAP) impaired relaxation.  · Mildly reduced right ventricular systolic function noted.          Discharge Details        Discharge Medications      New Medications      Instructions Start Date   clopidogrel 75 MG tablet  Commonly known as: PLAVIX   75 mg, Oral, Daily   Start Date: July 3, 2021     diazePAM 5 MG tablet  Commonly known as: VALIUM   2.5 mg, Oral, Every 12 Hours PRN      ipratropium-albuterol 0.5-2.5 mg/3 ml nebulizer  Commonly known as: DUO-NEB   3 mL, Nebulization, Every 6 Hours PRN      lidocaine 5 %  Commonly known as: LIDODERM   1 patch, Transdermal, Every 24 Hours Scheduled, Remove & Discard patch within 12 hours or as directed by MD   Start Date: July 3, 2021     oxyCODONE 5 MG immediate release tablet  Commonly known as: ROXICODONE   5 mg, Oral, Every 6 Hours PRN      pantoprazole 40 MG EC tablet  Commonly known as: PROTONIX   40 mg, Oral, 2 Times Daily Before Meals      rosuvastatin 20 MG tablet  Commonly known as: CRESTOR   20 mg, Oral, Nightly         Changes to Medications      Instructions Start Date   bumetanide 1 MG tablet  Commonly known as: BUMEX  What changed:   medication strength  how much to take  when to take this  reasons to take this   2 mg, Oral, Daily   Start Date: July 3, 2021     folic acid 1 MG tablet  Commonly known as: FOLVITE  What changed: additional instructions   1 mg, Oral, Daily   Start Date: July 3, 2021     OLANZapine 7.5 MG tablet  Commonly known as: zyPREXA  What changed:   medication strength  how much to take   7.5 mg, Oral, Nightly      spironolactone 50 MG tablet  Commonly known as: ALDACTONE  What changed:   medication strength  how much to take  how to take this  when to take this  additional instructions   100 mg, Oral, Daily   Start Date: July 3, 2021        Continue These  Medications      Instructions Start Date   ascorbic acid 1000 MG tablet  Commonly known as: VITAMIN C   1,000 mg, Oral, Daily      aspirin 81 MG EC tablet   81 mg, Oral, Daily      docusate sodium 100 MG capsule  Commonly known as: COLACE   100 mg, Oral, 2 Times Daily PRN      famotidine 20 MG tablet  Commonly known as: PEPCID   20 mg, Oral, 2 Times Daily      FLUoxetine 40 MG capsule  Commonly known as: PROzac   40 mg, Oral, Daily      lactulose 10 GM/15ML solution  Commonly known as: Constulose   10 g, Oral, 2 Times Daily      linaclotide 72 MCG capsule capsule  Commonly known as: LINZESS   72 mcg, Oral, Every Morning Before Breakfast      magnesium oxide 400 (241.3 Mg) MG tablet tablet  Commonly known as: MAGOX   400 mg, Oral, Every Evening      Melatonin 10 MG tablet   1 tablet, Oral, Nightly      methocarbamol 750 MG tablet  Commonly known as: ROBAXIN   750 mg, Oral, 3 Times Daily PRN      multivitamin with minerals tablet tablet   1 tablet, Oral, Daily      potassium chloride 20 MEQ CR tablet  Commonly known as: K-DUR,KLOR-CON   1 tablet, Oral, Daily PRN      riFAXIMin 550 MG tablet  Commonly known as: Xifaxan   550 mg, Oral, Every 12 Hours Scheduled      rOPINIRole 0.5 MG tablet  Commonly known as: REQUIP   0.5 mg, Oral, Nightly, Take 1 hour before bedtime.       SM Vitamin D3 100 MCG (4000 UT) capsule  Generic drug: Cholecalciferol   1 capsule, Oral, Daily      tamsulosin 0.4 MG capsule 24 hr capsule  Commonly known as: FLOMAX   1 capsule, Oral, Daily      traZODone 50 MG tablet  Commonly known as: DESYREL   50 mg, Oral, Nightly      vitamin b complex capsule capsule   1 capsule, Oral, Daily      vitamin B-6 25 MG tablet  Commonly known as: PYRIDOXINE   25 mg, Oral, Daily         Stop These Medications    PRAZOSIN HCL PO     Prucalopride Succinate 2 MG tablet  Commonly known as: Motegrity     Zantac 150 MG tablet  Generic drug: raNITIdine            Allergies   Allergen Reactions   • Contrast Dye Anaphylaxis      Pt coded after receiving contrast for a CT scan. Was premedicated. Was having an MI at the same time. Immediately underwent heart cath with contrast without additional allergic reaction   • Nsaids GI Bleeding   • Tylenol [Acetaminophen] Other (See Comments)     CIRRHOSIS         Discharge Disposition:  Rehab Facility or Unit (DC - External)    Discharge Diet:  Diet Order   Procedures   • Diet Dysphagia; IV - Mechanical Soft No Mixed Consistencies; Honey Thick; No Straws       Discharge Activity:  Activity Instructions     Activity as Tolerated              CODE STATUS:    Code Status and Medical Interventions:   Ordered at: 06/18/21 7216     Level Of Support Discussed With:    Patient     Code Status:    CPR     Medical Interventions (Level of Support Prior to Arrest):    Full         No future appointments.    Additional Instructions for the Follow-ups that You Need to Schedule     Ambulatory Referral to Cardiac Rehab   As directed      Discharge Follow-up with PCP   As directed       Currently Documented PCP:    Diony Rocha III, MD    PCP Phone Number:    [phone]     Follow Up Details: Follow up with PCP within 1 week of discharge from rehab.         Discharge Follow-up with Specified Provider: Follow up with Dr. Gonzales in 4-6 weeks.   As directed      To: Follow up with Dr. Gonzales in 4-6 weeks.                 Time Spent on Discharge: I spent  36  minutes on this discharge activity which included: face-to-face encounter with the patient, reviewing the data in the system, coordination of the care with the nursing staff as well as consultants, documentation, and entering orders.

## 2021-07-12 ENCOUNTER — APPOINTMENT (OUTPATIENT)
Dept: GENERAL RADIOLOGY | Facility: HOSPITAL | Age: 46
End: 2021-07-12

## 2021-07-12 ENCOUNTER — HOSPITAL ENCOUNTER (INPATIENT)
Facility: HOSPITAL | Age: 46
LOS: 4 days | Discharge: HOME | End: 2021-07-20
Attending: EMERGENCY MEDICINE | Admitting: INTERNAL MEDICINE

## 2021-07-12 DIAGNOSIS — R07.9 CHEST PAIN, UNSPECIFIED TYPE: Primary | ICD-10-CM

## 2021-07-12 DIAGNOSIS — T78.2XXA ANAPHYLACTIC SHOCK: ICD-10-CM

## 2021-07-12 DIAGNOSIS — T78.2XXA ANAPHYLAXIS, INITIAL ENCOUNTER: ICD-10-CM

## 2021-07-12 LAB
ALBUMIN SERPL-MCNC: 3.7 G/DL (ref 3.5–5.2)
ALBUMIN/GLOB SERPL: 1.4 G/DL
ALP SERPL-CCNC: 200 U/L (ref 39–117)
ALT SERPL W P-5'-P-CCNC: 18 U/L (ref 1–33)
ANION GAP SERPL CALCULATED.3IONS-SCNC: 12 MMOL/L (ref 5–15)
AST SERPL-CCNC: 23 U/L (ref 1–32)
BASOPHILS # BLD AUTO: 0.03 10*3/MM3 (ref 0–0.2)
BASOPHILS NFR BLD AUTO: 0.5 % (ref 0–1.5)
BILIRUB SERPL-MCNC: 0.4 MG/DL (ref 0–1.2)
BUN SERPL-MCNC: 9 MG/DL (ref 6–20)
BUN/CREAT SERPL: 9.7 (ref 7–25)
CALCIUM SPEC-SCNC: 9.3 MG/DL (ref 8.6–10.5)
CHLORIDE SERPL-SCNC: 98 MMOL/L (ref 98–107)
CO2 SERPL-SCNC: 26 MMOL/L (ref 22–29)
CREAT SERPL-MCNC: 0.93 MG/DL (ref 0.57–1)
DEPRECATED RDW RBC AUTO: 47.9 FL (ref 37–54)
EOSINOPHIL # BLD AUTO: 0.17 10*3/MM3 (ref 0–0.4)
EOSINOPHIL NFR BLD AUTO: 3 % (ref 0.3–6.2)
ERYTHROCYTE [DISTWIDTH] IN BLOOD BY AUTOMATED COUNT: 14.5 % (ref 12.3–15.4)
GFR SERPL CREATININE-BSD FRML MDRD: 65 ML/MIN/1.73
GLOBULIN UR ELPH-MCNC: 2.7 GM/DL
GLUCOSE SERPL-MCNC: 123 MG/DL (ref 65–99)
HCT VFR BLD AUTO: 37.6 % (ref 34–46.6)
HGB BLD-MCNC: 12.6 G/DL (ref 12–15.9)
HOLD SPECIMEN: NORMAL
HOLD SPECIMEN: NORMAL
IMM GRANULOCYTES # BLD AUTO: 0.02 10*3/MM3 (ref 0–0.05)
IMM GRANULOCYTES NFR BLD AUTO: 0.4 % (ref 0–0.5)
LIPASE SERPL-CCNC: 16 U/L (ref 13–60)
LYMPHOCYTES # BLD AUTO: 1.81 10*3/MM3 (ref 0.7–3.1)
LYMPHOCYTES NFR BLD AUTO: 32 % (ref 19.6–45.3)
MCH RBC QN AUTO: 30.2 PG (ref 26.6–33)
MCHC RBC AUTO-ENTMCNC: 33.5 G/DL (ref 31.5–35.7)
MCV RBC AUTO: 90.2 FL (ref 79–97)
MONOCYTES # BLD AUTO: 0.81 10*3/MM3 (ref 0.1–0.9)
MONOCYTES NFR BLD AUTO: 14.3 % (ref 5–12)
NEUTROPHILS NFR BLD AUTO: 2.82 10*3/MM3 (ref 1.7–7)
NEUTROPHILS NFR BLD AUTO: 49.8 % (ref 42.7–76)
NRBC BLD AUTO-RTO: 0 /100 WBC (ref 0–0.2)
NT-PROBNP SERPL-MCNC: 152.1 PG/ML (ref 0–450)
PLAT MORPH BLD: NORMAL
PLATELET # BLD AUTO: 159 10*3/MM3 (ref 140–450)
PMV BLD AUTO: 10.4 FL (ref 6–12)
POTASSIUM SERPL-SCNC: 3.7 MMOL/L (ref 3.5–5.2)
PROT SERPL-MCNC: 6.4 G/DL (ref 6–8.5)
RBC # BLD AUTO: 4.17 10*6/MM3 (ref 3.77–5.28)
RBC MORPH BLD: NORMAL
SODIUM SERPL-SCNC: 136 MMOL/L (ref 136–145)
TROPONIN T SERPL-MCNC: <0.01 NG/ML (ref 0–0.03)
WBC # BLD AUTO: 5.66 10*3/MM3 (ref 3.4–10.8)
WBC MORPH BLD: NORMAL
WHOLE BLOOD HOLD SPECIMEN: NORMAL

## 2021-07-12 PROCEDURE — 25010000002 HYDROMORPHONE PER 4 MG: Performed by: EMERGENCY MEDICINE

## 2021-07-12 PROCEDURE — 99284 EMERGENCY DEPT VISIT MOD MDM: CPT

## 2021-07-12 PROCEDURE — 84484 ASSAY OF TROPONIN QUANT: CPT | Performed by: EMERGENCY MEDICINE

## 2021-07-12 PROCEDURE — 25010000002 MORPHINE PER 10 MG: Performed by: EMERGENCY MEDICINE

## 2021-07-12 PROCEDURE — 85025 COMPLETE CBC W/AUTO DIFF WBC: CPT | Performed by: EMERGENCY MEDICINE

## 2021-07-12 PROCEDURE — 80053 COMPREHEN METABOLIC PANEL: CPT | Performed by: EMERGENCY MEDICINE

## 2021-07-12 PROCEDURE — 85007 BL SMEAR W/DIFF WBC COUNT: CPT | Performed by: EMERGENCY MEDICINE

## 2021-07-12 PROCEDURE — 93005 ELECTROCARDIOGRAM TRACING: CPT | Performed by: EMERGENCY MEDICINE

## 2021-07-12 PROCEDURE — 83690 ASSAY OF LIPASE: CPT | Performed by: EMERGENCY MEDICINE

## 2021-07-12 PROCEDURE — 83880 ASSAY OF NATRIURETIC PEPTIDE: CPT | Performed by: EMERGENCY MEDICINE

## 2021-07-12 PROCEDURE — 71045 X-RAY EXAM CHEST 1 VIEW: CPT

## 2021-07-12 RX ORDER — HYDROMORPHONE HYDROCHLORIDE 1 MG/ML
0.5 INJECTION, SOLUTION INTRAMUSCULAR; INTRAVENOUS; SUBCUTANEOUS ONCE
Status: COMPLETED | OUTPATIENT
Start: 2021-07-12 | End: 2021-07-12

## 2021-07-12 RX ORDER — MORPHINE SULFATE 2 MG/ML
2 INJECTION, SOLUTION INTRAMUSCULAR; INTRAVENOUS ONCE
Status: COMPLETED | OUTPATIENT
Start: 2021-07-12 | End: 2021-07-12

## 2021-07-12 RX ORDER — SODIUM CHLORIDE 0.9 % (FLUSH) 0.9 %
10 SYRINGE (ML) INJECTION AS NEEDED
Status: DISCONTINUED | OUTPATIENT
Start: 2021-07-12 | End: 2021-07-20 | Stop reason: HOSPADM

## 2021-07-12 RX ADMIN — MORPHINE SULFATE 2 MG: 2 INJECTION, SOLUTION INTRAMUSCULAR; INTRAVENOUS at 21:53

## 2021-07-12 RX ADMIN — HYDROMORPHONE HYDROCHLORIDE 0.5 MG: 1 INJECTION, SOLUTION INTRAMUSCULAR; INTRAVENOUS; SUBCUTANEOUS at 23:28

## 2021-07-12 RX ADMIN — SODIUM CHLORIDE 500 ML: 9 INJECTION, SOLUTION INTRAVENOUS at 23:27

## 2021-07-13 ENCOUNTER — APPOINTMENT (OUTPATIENT)
Dept: CT IMAGING | Facility: HOSPITAL | Age: 46
End: 2021-07-13

## 2021-07-13 ENCOUNTER — TRANSCRIBE ORDERS (OUTPATIENT)
Dept: ADMINISTRATIVE | Facility: HOSPITAL | Age: 46
End: 2021-07-13

## 2021-07-13 DIAGNOSIS — R13.10 DYSPHAGIA, UNSPECIFIED TYPE: Primary | ICD-10-CM

## 2021-07-13 PROBLEM — R07.9 CHEST PAIN: Status: ACTIVE | Noted: 2021-07-13

## 2021-07-13 PROBLEM — I25.10 CAD (CORONARY ARTERY DISEASE): Status: ACTIVE | Noted: 2021-07-13

## 2021-07-13 PROBLEM — S22.49XA RIB FRACTURES: Status: ACTIVE | Noted: 2021-07-13

## 2021-07-13 PROBLEM — K74.60 CIRRHOSIS OF LIVER (HCC): Status: ACTIVE | Noted: 2021-07-13

## 2021-07-13 LAB
ANION GAP SERPL CALCULATED.3IONS-SCNC: 10 MMOL/L (ref 5–15)
BASOPHILS # BLD AUTO: 0.03 10*3/MM3 (ref 0–0.2)
BASOPHILS NFR BLD AUTO: 0.6 % (ref 0–1.5)
BUN SERPL-MCNC: 11 MG/DL (ref 6–20)
BUN/CREAT SERPL: 12.1 (ref 7–25)
CALCIUM SPEC-SCNC: 9.2 MG/DL (ref 8.6–10.5)
CHLORIDE SERPL-SCNC: 100 MMOL/L (ref 98–107)
CO2 SERPL-SCNC: 27 MMOL/L (ref 22–29)
CREAT SERPL-MCNC: 0.91 MG/DL (ref 0.57–1)
D DIMER PPP FEU-MCNC: 1.22 MCGFEU/ML (ref 0–0.56)
DEPRECATED RDW RBC AUTO: 50.3 FL (ref 37–54)
EOSINOPHIL # BLD AUTO: 0.23 10*3/MM3 (ref 0–0.4)
EOSINOPHIL NFR BLD AUTO: 4.8 % (ref 0.3–6.2)
ERYTHROCYTE [DISTWIDTH] IN BLOOD BY AUTOMATED COUNT: 15 % (ref 12.3–15.4)
GFR SERPL CREATININE-BSD FRML MDRD: 67 ML/MIN/1.73
GLUCOSE SERPL-MCNC: 105 MG/DL (ref 65–99)
HCT VFR BLD AUTO: 36.1 % (ref 34–46.6)
HGB BLD-MCNC: 11.8 G/DL (ref 12–15.9)
HOLD SPECIMEN: NORMAL
IMM GRANULOCYTES # BLD AUTO: 0.02 10*3/MM3 (ref 0–0.05)
IMM GRANULOCYTES NFR BLD AUTO: 0.4 % (ref 0–0.5)
LYMPHOCYTES # BLD AUTO: 1.74 10*3/MM3 (ref 0.7–3.1)
LYMPHOCYTES NFR BLD AUTO: 36.1 % (ref 19.6–45.3)
MAGNESIUM SERPL-MCNC: 1.9 MG/DL (ref 1.6–2.6)
MCH RBC QN AUTO: 29.9 PG (ref 26.6–33)
MCHC RBC AUTO-ENTMCNC: 32.7 G/DL (ref 31.5–35.7)
MCV RBC AUTO: 91.6 FL (ref 79–97)
MONOCYTES # BLD AUTO: 0.69 10*3/MM3 (ref 0.1–0.9)
MONOCYTES NFR BLD AUTO: 14.3 % (ref 5–12)
NEUTROPHILS NFR BLD AUTO: 2.11 10*3/MM3 (ref 1.7–7)
NEUTROPHILS NFR BLD AUTO: 43.8 % (ref 42.7–76)
NRBC BLD AUTO-RTO: 0 /100 WBC (ref 0–0.2)
PLAT MORPH BLD: NORMAL
PLATELET # BLD AUTO: 157 10*3/MM3 (ref 140–450)
PMV BLD AUTO: 10.6 FL (ref 6–12)
POTASSIUM SERPL-SCNC: 4 MMOL/L (ref 3.5–5.2)
RBC # BLD AUTO: 3.94 10*6/MM3 (ref 3.77–5.28)
RBC MORPH BLD: NORMAL
SARS-COV-2 RDRP RESP QL NAA+PROBE: NORMAL
SODIUM SERPL-SCNC: 137 MMOL/L (ref 136–145)
TROPONIN T SERPL-MCNC: <0.01 NG/ML (ref 0–0.03)
TROPONIN T SERPL-MCNC: <0.01 NG/ML (ref 0–0.03)
WBC # BLD AUTO: 4.82 10*3/MM3 (ref 3.4–10.8)
WBC MORPH BLD: NORMAL

## 2021-07-13 PROCEDURE — 99223 1ST HOSP IP/OBS HIGH 75: CPT | Performed by: FAMILY MEDICINE

## 2021-07-13 PROCEDURE — 25010000002 ENOXAPARIN PER 10 MG: Performed by: FAMILY MEDICINE

## 2021-07-13 PROCEDURE — 25010000002 MORPHINE PER 10 MG: Performed by: FAMILY MEDICINE

## 2021-07-13 PROCEDURE — 87635 SARS-COV-2 COVID-19 AMP PRB: CPT | Performed by: FAMILY MEDICINE

## 2021-07-13 PROCEDURE — 84484 ASSAY OF TROPONIN QUANT: CPT | Performed by: FAMILY MEDICINE

## 2021-07-13 PROCEDURE — 83735 ASSAY OF MAGNESIUM: CPT | Performed by: FAMILY MEDICINE

## 2021-07-13 PROCEDURE — 93010 ELECTROCARDIOGRAM REPORT: CPT | Performed by: INTERNAL MEDICINE

## 2021-07-13 PROCEDURE — 93005 ELECTROCARDIOGRAM TRACING: CPT | Performed by: FAMILY MEDICINE

## 2021-07-13 PROCEDURE — 85379 FIBRIN DEGRADATION QUANT: CPT | Performed by: NURSE PRACTITIONER

## 2021-07-13 PROCEDURE — G0378 HOSPITAL OBSERVATION PER HR: HCPCS

## 2021-07-13 PROCEDURE — 80048 BASIC METABOLIC PNL TOTAL CA: CPT | Performed by: FAMILY MEDICINE

## 2021-07-13 PROCEDURE — 85025 COMPLETE CBC W/AUTO DIFF WBC: CPT | Performed by: FAMILY MEDICINE

## 2021-07-13 PROCEDURE — 25010000002 HYDROMORPHONE 1 MG/ML SOLUTION: Performed by: EMERGENCY MEDICINE

## 2021-07-13 PROCEDURE — 25010000002 MORPHINE PER 10 MG: Performed by: NURSE PRACTITIONER

## 2021-07-13 PROCEDURE — 99232 SBSQ HOSP IP/OBS MODERATE 35: CPT | Performed by: INTERNAL MEDICINE

## 2021-07-13 PROCEDURE — 71250 CT THORAX DX C-: CPT

## 2021-07-13 PROCEDURE — 85007 BL SMEAR W/DIFF WBC COUNT: CPT | Performed by: FAMILY MEDICINE

## 2021-07-13 RX ORDER — CAPSAICIN 0.025 %
CREAM (GRAM) TOPICAL EVERY 12 HOURS SCHEDULED
Status: DISCONTINUED | OUTPATIENT
Start: 2021-07-13 | End: 2021-07-13

## 2021-07-13 RX ORDER — MORPHINE SULFATE 2 MG/ML
2 INJECTION, SOLUTION INTRAMUSCULAR; INTRAVENOUS EVERY 4 HOURS PRN
Status: DISCONTINUED | OUTPATIENT
Start: 2021-07-13 | End: 2021-07-14

## 2021-07-13 RX ORDER — SODIUM CHLORIDE 0.9 % (FLUSH) 0.9 %
10 SYRINGE (ML) INJECTION AS NEEDED
Status: DISCONTINUED | OUTPATIENT
Start: 2021-07-13 | End: 2021-07-20 | Stop reason: HOSPADM

## 2021-07-13 RX ORDER — DIAZEPAM 2 MG/1
2 TABLET ORAL 2 TIMES DAILY
Status: DISCONTINUED | OUTPATIENT
Start: 2021-07-13 | End: 2021-07-16

## 2021-07-13 RX ORDER — BUMETANIDE 1 MG/1
2 TABLET ORAL DAILY
Status: DISCONTINUED | OUTPATIENT
Start: 2021-07-13 | End: 2021-07-18

## 2021-07-13 RX ORDER — ASPIRIN 81 MG/1
81 TABLET ORAL DAILY
Status: DISCONTINUED | OUTPATIENT
Start: 2021-07-13 | End: 2021-07-20 | Stop reason: HOSPADM

## 2021-07-13 RX ORDER — ASCORBIC ACID 500 MG
1000 TABLET ORAL DAILY
Status: DISCONTINUED | OUTPATIENT
Start: 2021-07-13 | End: 2021-07-20 | Stop reason: HOSPADM

## 2021-07-13 RX ORDER — ROSUVASTATIN CALCIUM 20 MG/1
20 TABLET, COATED ORAL NIGHTLY
Status: DISCONTINUED | OUTPATIENT
Start: 2021-07-13 | End: 2021-07-20 | Stop reason: HOSPADM

## 2021-07-13 RX ORDER — OLANZAPINE 5 MG/1
7.5 TABLET ORAL NIGHTLY
Status: DISCONTINUED | OUTPATIENT
Start: 2021-07-13 | End: 2021-07-20 | Stop reason: HOSPADM

## 2021-07-13 RX ORDER — LACTULOSE 10 G/15ML
10 SOLUTION ORAL 2 TIMES DAILY
Status: DISCONTINUED | OUTPATIENT
Start: 2021-07-13 | End: 2021-07-20 | Stop reason: HOSPADM

## 2021-07-13 RX ORDER — FLUOXETINE HYDROCHLORIDE 20 MG/1
40 CAPSULE ORAL DAILY
Status: DISCONTINUED | OUTPATIENT
Start: 2021-07-13 | End: 2021-07-20 | Stop reason: HOSPADM

## 2021-07-13 RX ORDER — RANOLAZINE 500 MG/1
500 TABLET, EXTENDED RELEASE ORAL 2 TIMES DAILY
Status: DISCONTINUED | OUTPATIENT
Start: 2021-07-13 | End: 2021-07-17

## 2021-07-13 RX ORDER — PANTOPRAZOLE SODIUM 40 MG/1
40 TABLET, DELAYED RELEASE ORAL
Status: DISCONTINUED | OUTPATIENT
Start: 2021-07-13 | End: 2021-07-20 | Stop reason: HOSPADM

## 2021-07-13 RX ORDER — IPRATROPIUM BROMIDE AND ALBUTEROL SULFATE 2.5; .5 MG/3ML; MG/3ML
3 SOLUTION RESPIRATORY (INHALATION) EVERY 6 HOURS PRN
Status: DISCONTINUED | OUTPATIENT
Start: 2021-07-13 | End: 2021-07-20 | Stop reason: HOSPADM

## 2021-07-13 RX ORDER — CLOPIDOGREL BISULFATE 75 MG/1
75 TABLET ORAL DAILY
Status: DISCONTINUED | OUTPATIENT
Start: 2021-07-13 | End: 2021-07-20 | Stop reason: HOSPADM

## 2021-07-13 RX ORDER — TRAZODONE HYDROCHLORIDE 50 MG/1
50 TABLET ORAL NIGHTLY
Status: DISCONTINUED | OUTPATIENT
Start: 2021-07-13 | End: 2021-07-20 | Stop reason: HOSPADM

## 2021-07-13 RX ORDER — DOCUSATE SODIUM 100 MG/1
100 CAPSULE, LIQUID FILLED ORAL 2 TIMES DAILY PRN
Status: DISCONTINUED | OUTPATIENT
Start: 2021-07-13 | End: 2021-07-20 | Stop reason: HOSPADM

## 2021-07-13 RX ORDER — LIDOCAINE 50 MG/G
1 PATCH TOPICAL
Status: DISCONTINUED | OUTPATIENT
Start: 2021-07-13 | End: 2021-07-20 | Stop reason: HOSPADM

## 2021-07-13 RX ORDER — FOLIC ACID 1 MG/1
1 TABLET ORAL DAILY
Status: DISCONTINUED | OUTPATIENT
Start: 2021-07-13 | End: 2021-07-20 | Stop reason: HOSPADM

## 2021-07-13 RX ORDER — OXYCODONE HYDROCHLORIDE 5 MG/1
10 TABLET ORAL EVERY 6 HOURS PRN
Status: DISCONTINUED | OUTPATIENT
Start: 2021-07-13 | End: 2021-07-16

## 2021-07-13 RX ORDER — SODIUM CHLORIDE 0.9 % (FLUSH) 0.9 %
10 SYRINGE (ML) INJECTION EVERY 12 HOURS SCHEDULED
Status: DISCONTINUED | OUTPATIENT
Start: 2021-07-13 | End: 2021-07-20 | Stop reason: HOSPADM

## 2021-07-13 RX ORDER — MORPHINE SULFATE 2 MG/ML
2 INJECTION, SOLUTION INTRAMUSCULAR; INTRAVENOUS ONCE
Status: COMPLETED | OUTPATIENT
Start: 2021-07-13 | End: 2021-07-13

## 2021-07-13 RX ORDER — CHOLECALCIFEROL (VITAMIN D3) 125 MCG
10 CAPSULE ORAL NIGHTLY PRN
Status: DISCONTINUED | OUTPATIENT
Start: 2021-07-13 | End: 2021-07-20 | Stop reason: HOSPADM

## 2021-07-13 RX ORDER — TAMSULOSIN HYDROCHLORIDE 0.4 MG/1
0.4 CAPSULE ORAL DAILY
Status: DISCONTINUED | OUTPATIENT
Start: 2021-07-13 | End: 2021-07-20 | Stop reason: HOSPADM

## 2021-07-13 RX ORDER — SPIRONOLACTONE 50 MG/1
100 TABLET, FILM COATED ORAL DAILY
Status: DISCONTINUED | OUTPATIENT
Start: 2021-07-13 | End: 2021-07-18

## 2021-07-13 RX ORDER — HYDROCODONE BITARTRATE AND ACETAMINOPHEN 5; 325 MG/1; MG/1
1 TABLET ORAL ONCE
Status: COMPLETED | OUTPATIENT
Start: 2021-07-13 | End: 2021-07-13

## 2021-07-13 RX ORDER — LANOLIN ALCOHOL/MO/W.PET/CERES
25 CREAM (GRAM) TOPICAL DAILY
Status: DISCONTINUED | OUTPATIENT
Start: 2021-07-13 | End: 2021-07-20 | Stop reason: HOSPADM

## 2021-07-13 RX ADMIN — BUMETANIDE 2 MG: 2 TABLET ORAL at 10:03

## 2021-07-13 RX ADMIN — DIAZEPAM 2 MG: 2 TABLET ORAL at 20:38

## 2021-07-13 RX ADMIN — PYRIDOXINE HCL TAB 50 MG 25 MG: 50 TAB at 10:17

## 2021-07-13 RX ADMIN — RANOLAZINE 500 MG: 500 TABLET, EXTENDED RELEASE ORAL at 20:38

## 2021-07-13 RX ADMIN — OLANZAPINE 7.5 MG: 5 TABLET, FILM COATED ORAL at 20:37

## 2021-07-13 RX ADMIN — DIAZEPAM 2 MG: 2 TABLET ORAL at 12:24

## 2021-07-13 RX ADMIN — SODIUM CHLORIDE, PRESERVATIVE FREE 10 ML: 5 INJECTION INTRAVENOUS at 20:40

## 2021-07-13 RX ADMIN — LIDOCAINE 1 PATCH: 50 PATCH CUTANEOUS at 10:15

## 2021-07-13 RX ADMIN — CLOPIDOGREL BISULFATE 75 MG: 75 TABLET ORAL at 10:03

## 2021-07-13 RX ADMIN — OXYCODONE 10 MG: 5 TABLET ORAL at 17:11

## 2021-07-13 RX ADMIN — MORPHINE SULFATE 2 MG: 2 INJECTION, SOLUTION INTRAMUSCULAR; INTRAVENOUS at 22:23

## 2021-07-13 RX ADMIN — Medication 400 MG: at 17:11

## 2021-07-13 RX ADMIN — OXYCODONE 10 MG: 5 TABLET ORAL at 10:01

## 2021-07-13 RX ADMIN — OXYCODONE HYDROCHLORIDE AND ACETAMINOPHEN 1000 MG: 500 TABLET ORAL at 10:15

## 2021-07-13 RX ADMIN — FLUOXETINE HYDROCHLORIDE 40 MG: 20 CAPSULE ORAL at 10:07

## 2021-07-13 RX ADMIN — LACTULOSE 10 G: 20 SOLUTION ORAL at 10:03

## 2021-07-13 RX ADMIN — TRAZODONE HYDROCHLORIDE 50 MG: 50 TABLET ORAL at 20:38

## 2021-07-13 RX ADMIN — PANTOPRAZOLE SODIUM 40 MG: 40 TABLET, DELAYED RELEASE ORAL at 17:11

## 2021-07-13 RX ADMIN — RIFAXIMIN 550 MG: 550 TABLET ORAL at 10:17

## 2021-07-13 RX ADMIN — SPIRONOLACTONE 100 MG: 25 TABLET ORAL at 10:06

## 2021-07-13 RX ADMIN — RIFAXIMIN 550 MG: 550 TABLET ORAL at 20:39

## 2021-07-13 RX ADMIN — FOLIC ACID 1 MG: 1 TABLET ORAL at 10:03

## 2021-07-13 RX ADMIN — ROSUVASTATIN CALCIUM 20 MG: 20 TABLET, COATED ORAL at 20:37

## 2021-07-13 RX ADMIN — NITROGLYCERIN 1 INCH: 20 OINTMENT TOPICAL at 21:03

## 2021-07-13 RX ADMIN — ENOXAPARIN SODIUM 40 MG: 40 INJECTION SUBCUTANEOUS at 10:00

## 2021-07-13 RX ADMIN — SODIUM CHLORIDE, PRESERVATIVE FREE 10 ML: 5 INJECTION INTRAVENOUS at 10:18

## 2021-07-13 RX ADMIN — ASPIRIN 81 MG: 81 TABLET, COATED ORAL at 10:07

## 2021-07-13 RX ADMIN — CAPSAICIN: 0.25 CREAM TOPICAL at 17:11

## 2021-07-13 RX ADMIN — TAMSULOSIN HYDROCHLORIDE 0.4 MG: 0.4 CAPSULE ORAL at 10:03

## 2021-07-13 RX ADMIN — MORPHINE SULFATE 2 MG: 2 INJECTION, SOLUTION INTRAMUSCULAR; INTRAVENOUS at 14:04

## 2021-07-13 RX ADMIN — HYDROMORPHONE HYDROCHLORIDE 1 MG: 1 INJECTION, SOLUTION INTRAMUSCULAR; INTRAVENOUS; SUBCUTANEOUS at 01:25

## 2021-07-13 RX ADMIN — LACTULOSE 10 G: 20 SOLUTION ORAL at 20:39

## 2021-07-13 RX ADMIN — HYDROCODONE BITARTRATE AND ACETAMINOPHEN 1 TABLET: 5; 325 TABLET ORAL at 05:03

## 2021-07-13 RX ADMIN — MORPHINE SULFATE 2 MG: 2 INJECTION, SOLUTION INTRAMUSCULAR; INTRAVENOUS at 03:23

## 2021-07-13 NOTE — CASE MANAGEMENT/SOCIAL WORK
Continued Stay Note  [facility]     Patient Name: Timothy Guzman  MRN: [id number]  Today's Date: 7/13/2021    Admit Date: 7/12/2021    Discharge Plan     Row Name 07/13/21 1436       Plan    Plan  Home at DC    Patient/Family in Agreement with Plan  yes    Plan Comments  The pt came from [facility] acute. Per admissions, the pt would need to be back there by 8pm or they would need another PA.. I spoke with Alis at [facility] and she states the MD at [facility] had made arrangements to DC the pt from [facility] today, but she ended up in the ER here. I spoke with the pt. She plans to return to her parents home in [place] as planned by [facility]. [facility] has arranged a walker to be delivered to the pts home by Pt Aides. They have sent scripts to [facility]. I have placed a list of meds from [facility] in the chart. [facility] has arranged outpt [place] at [facility] and they will contact the pt after DC for a atart date. The pt states she [place]. Denies any other DC needs.    Final Discharge Disposition Code  01 - home or self-care        Discharge Codes    No documentation.       Expected Discharge Date and Time     Expected Discharge Date Expected Discharge Time    Jul 13, 2021             Diana Suresh RN

## 2021-07-13 NOTE — H&P
"    [facility] Medicine Services  HISTORY AND PHYSICAL    Patient Name: Timothy Guzman  : 1975  MRN: [id number]  Primary Care Physician: Toshia Mitchell APRN  Date of admission: 2021    Subjective   Subjective     Chief Complaint:  Chest pain     HPI:  Timothy Guzman is a 45 y.o. female w/ a hx of alcoholic cirrhosis, recurrent pancreatitis, gastritis w/ esophageal varices (s/p banding), CAD w/ recent inferior STEMI following PEA cardiac arrest (occurred after given IV contrast), depression who presented to the ED w/ c/o chest pain.   Pt w/ recent admit to [facility]  through . Pt initially presented to the ED w/ c/o vomiting blood. During patient's initial workup patient had a PEA cardiac arrest while receiving iv contrast dye, requiring ACLS & intubation. She had a STEMI on EKG following this event and was taken for [facility] w/ Dr. Gonzales who placed stent to the circumflex. Patient did experience some SVT postprocedurally and was placed on amiodarone and was takent to icu on mechanical ventilation. Patient subsequentaly underwent EGD on 21 and was found to have grade 1 varices, and erosive gastritis and antiplatelet meds were restarted. Pt was d/c to [facility] for cardiac rehab on .   Pt presented to the ED tonight w/ c/o chest pain. She reports that since d/c she has been doing well other than ongoing bilateral rib pain 2/2 rib fractures caused from compressions. Today she completed physical therapy and about 30 minutes later she developed heaviness and pressure in her central chest while resting in her bed. She reports that the heaviness in her chest was different than the pain she has been experiencing w/ her rib fractures. Pt reports associated mild dyspnea and nausea w/ the chest pressure onset. At worst pressure rated 8/10. Pt reports that she has noticed \"mild\" dyspnea intermittently since her discharge. Pt's chest heaviness lasted ~ 3 hours before " improving w/ Dilaudid and Morphine in the ED. At time of exam, pt's chest pressure rated 6/10.   Pt denies fever/chills, cough, V/D, abdominal pain, dysuria, edema, syncope, confusion.   Pt evaluated in the ED. CXR without acute findings. Troponin x2 WNL. EKG stable. Pt admitted to the hospital medicine service for further evaluation.     COVID Details:    Symptoms:    [] NONE [] Fever []  Cough [x] Shortness of breath [] Change in taste/smell      The patient qualifies to receive the vaccine, but they have not yet received it.    Review of Systems   Constitutional: Negative.  Negative for chills, fatigue and fever.   HENT: Negative.  Negative for congestion, rhinorrhea, sinus pressure, sinus pain, sneezing, sore throat and trouble swallowing.    Eyes: Negative.  Negative for visual disturbance.   Respiratory: Positive for shortness of breath. Negative for cough, wheezing and stridor.    Cardiovascular: Positive for chest pain. Negative for palpitations and leg swelling.   Gastrointestinal: Positive for nausea. Negative for abdominal distention, abdominal pain, blood in stool, constipation, diarrhea and vomiting.   Endocrine: Negative.    Genitourinary: Negative.  Negative for decreased urine volume, difficulty urinating, dysuria, flank pain, frequency, hematuria, pelvic pain and urgency.   Musculoskeletal: Negative for arthralgias, myalgias, neck pain and neck stiffness.        Bilateral rib pain 2/2 known rib fractures.    Skin: Negative.  Negative for wound.   Allergic/Immunologic: Negative.  Negative for immunocompromised state.   Neurological: Negative.  Negative for dizziness, tremors, seizures, syncope, facial asymmetry, speech difficulty, weakness, light-headedness, numbness and headaches.   Hematological: Negative.  Does not bruise/bleed easily.   Psychiatric/Behavioral: Negative.  Negative for confusion.   All other systems reviewed and are negative.     Personal History     Past Medical History:    Diagnosis Date   • Acute pancreatitis    • Alcoholism (CMS/HCC)    • Arthritis    • Bone necrosis (CMS/HCC)    • CAD (coronary artery disease) 2021   • Cirrhosis (CMS/HCC)    • Gastroparesis    • GERD (gastroesophageal reflux disease)    • History of esophageal varices    • History of kidney stones    • Hypertension    • Pancreatitis        Past Surgical History:   Procedure Laterality Date   • APPENDECTOMY     • CARDIAC CATHETERIZATION N/A 2021    Procedure: Left Heart Cath;  Surgeon: Vikram Gonzales MD;  Location:  [facility];  Service: Cardiovascular;  Laterality: N/A;   •  SECTION     • CHOLECYSTECTOMY     • COLONOSCOPY     • COLONOSCOPY N/A 3/31/2020    Procedure: COLONOSCOPY;  Surgeon: Tirso Giles MD;  Location:  [facility];  Service: Gastroenterology;  Laterality: N/A;   • ENDOSCOPY     • ENDOSCOPY     • ENDOSCOPY N/A 2021    Procedure: ESOPHAGOGASTRODUODENOSCOPY AT BEDSIDE;  Surgeon: Tyrese Rasmussen MD;  Location:  [facility];  Service: Gastroenterology;  Laterality: N/A;   • GALLBLADDER SURGERY     • REPLACEMENT TOTAL HIP LATERAL POSITION Left    • TOTAL KNEE ARTHROPLASTY Left        Family History: family history is not on file. Otherwise pertinent FHx was reviewed and unremarkable.     Social History:  reports that she quit smoking about 14 months ago. Her smoking use included electronic cigarette and cigarettes. She smoked 0.50 packs per day. She has never used smokeless tobacco. She reports previous alcohol use. She reports that she does not use drugs.  Social History     Social History Narrative   • Not on file       Medications:  Cholecalciferol, FLUoxetine, Melatonin, OLANZapine, ascorbic acid, aspirin, bumetanide, clopidogrel, docusate sodium, famotidine, folic acid, ipratropium-albuterol, lactulose, lidocaine, linaclotide, magnesium oxide, methocarbamol, multivitamin with minerals, oxyCODONE, pantoprazole, potassium chloride,  rOPINIRole, riFAXIMin, rosuvastatin, spironolactone, tamsulosin, traZODone, vitamin B-6, and vitamin b complex    Allergies   Allergen Reactions   • Contrast Dye Anaphylaxis     Pt coded after receiving contrast for a CT scan. Was premedicated. Was having an MI at the same time. Immediately underwent heart cath with contrast without additional allergic reaction   • Nsaids GI Bleeding   • Tylenol [Acetaminophen] Other (See Comments)     CIRRHOSIS       Objective   Objective     Vital Signs:   Temp:  [99 °F (37.2 °C)] 99 °F (37.2 °C)  Heart Rate:  [90-99] 92  Resp:  [16] 16  BP: (101-115)/(68-79) 101/70    Physical Exam     Constitutional: Awake, alert; non-toxic appearing   Eyes: PERRLA, sclerae anicteric, no conjunctival injection  HENT: NCAT, mucous membranes moist  Neck: Supple, no thyromegaly, no lymphadenopathy, trachea midline  Respiratory: Clear to auscultation bilaterally, nonlabored respirations   Cardiovascular: RRR, no murmurs, rubs, or gallops, no peripheral edema   Gastrointestinal: Positive bowel sounds, soft, nontender, nondistended  Musculoskeletal: Normal ROM bilaterally   Psychiatric: Appropriate affect, cooperative  Neurologic: Oriented x 3, strength symmetric in all extremities, Cranial Nerves grossly intact to confrontation, speech clear  Skin: No rashes, lesions or wounds     Results Reviewed:  I have personally reviewed most recent indicated data and agree with findings including:  [x]  Laboratory  [x]  Radiology  []  EKG/Telemetry  []  Pathology  []  Cardiac/Vascular Studies  []  Old records  []  Other:  Most pertinent findings include:    LAB RESULTS:      Lab 07/12/21 2208   WBC 5.66   HEMOGLOBIN 12.6   HEMATOCRIT 37.6   PLATELETS 159   NEUTROS ABS 2.82   IMMATURE GRANS (ABS) 0.02   LYMPHS ABS 1.81   MONOS ABS 0.81   EOS ABS 0.17   MCV 90.2         Lab 07/12/21 2208   SODIUM 136   POTASSIUM 3.7   CHLORIDE 98   CO2 26.0   ANION GAP 12.0   BUN 9   CREATININE 0.93   GLUCOSE 123*   CALCIUM 9.3          Lab 07/12/21  2208   TOTAL PROTEIN 6.4   ALBUMIN 3.70   GLOBULIN 2.7   ALT (SGPT) 18   AST (SGOT) 23   BILIRUBIN 0.4   ALK PHOS 200*   LIPASE 16         Lab 07/12/21  2329 07/12/21  2208   PROBNP  --  152.1   TROPONIN T <0.010 <0.010                 Brief Urine Lab Results  (Last result in the past 365 days)      Color   Clarity   Blood   Leuk Est   Nitrite   Protein   CREAT   Urine HCG        06/18/21 1924 Yellow Clear Negative Negative Negative Negative             Microbiology Results (last 10 days)     ** No results found for the last 240 hours. **          XR Chest 1 View    Result Date: 7/12/2021  CR Chest 1 Vw INDICATION: Chest pain, right-sided broken ribs COMPARISON:  None available. FINDINGS: Single portable AP view(s) of the chest.  The heart and mediastinal contours are normal. The lungs are clear. No definite large pneumothorax or pleural effusion. Patient's broken ribs are not well visualized on this x-ray.     Impression: No definite acute cardiopulmonary findings. If there is concern for subtle injury or other abnormality, consider follow-up chest CT. Signer Name: Luz Marina Pace MD  Signed: 7/12/2021 9:53 PM  Workstation Name: [id number]  [facility]      Results for orders placed during the hospital encounter of 06/18/21    Adult Transthoracic Echo Complete W/ Cont if Necessary Per Protocol    Interpretation Summary  · The quality of the study is limited due to patient positioning and patient being intubated.  · Left ventricular ejection fraction appears to be 51 - 55%. Left ventricular systolic function is normal.  · Left ventricular diastolic function is consistent with (grade Ia w/high LAP) impaired relaxation.  · Mildly reduced right ventricular systolic function noted.      Assessment/Plan   Assessment & Plan       Chest pain    History of esophageal varices    GERD (gastroesophageal reflux disease)    Depression    Cirrhosis of liver (CMS/HCC)    CAD (coronary  artery disease)    Timothy Guzman is a 45 y.o. female w/ a hx of alcoholic cirrhosis, recurrent pancreatitis, gastritis w/ esophageal varices (s/p banding), CAD w/ recent inferior STEMI following PEA cardiac arrest (occurred after given IV contrast), depression who presented to the ED w/ c/o chest pain.     **Chest pressure/heaviness  **CAD s/p stent to circumflex by Dr. Gonzales (100% AV groove stenosis status post ANGELICA x1); 6/18/2021   -troponin x 2 WNL; trend   -serial EKGs  -continue routine ASA, Plavix, Statin  -Npo for now   -s/p NS 500ml bolus; holding further IVF for now   -d.dimer pending   -CT Chest without contrast fractured ribs, no pneumothorax   -CXR without acute findings  -given IV Morphine and Dilaudid w/ minimal, brief improvement   -Cardiology consulted    **Cirrhosis of the liver; compensated   **Hx Upper GI bleed 2/2 erosive gastritis and grade 1 varices s/p banding/EGD 6/19   -continue Protonix; GI recommended BID PPI x 4 wks (from 7/2 through 7/30) then daily   -continue routine Bumex, Rifaximin, aldactone, lactulose    **Depression  -continue routine Zyprexa, Prozac     DVT prophylaxis:  Lovenox     CODE STATUS:  OBSERVATION status, however if further evaluation or treatment plans warrant, status may change.  Based upon current information, I predict patient's care encounter to be less than or equal to 2 midnights.    There are no questions and answers to display.     This note has been completed as part of a split-shared workflow.     Signature: Electronically signed by ISSAC Aldana, 07/13/21, 3:00 AM EDT.        Attending   Admission Attestation       I have seen and examined the patient, performing an independent face-to-face diagnostic evaluation with plan of care reviewed and developed with the advanced practice clinician (APC).      Brief Summary Statement:   Timothy Guzman is a 45 y.o. female with PMH of etoh cirrhosis and CAD was sent from [facility] with chest pain that she states  "occurred at rest. She described the pain as pressure and \"different from her rib\" pain. She states that her rib pain was not well controlled at [facility] either. Currently she is not short of breath and has pain intermittently in her back/ribs.     Remainder of detailed HPI is as noted by APC and has been reviewed and/or edited by me for completeness.    Attending Physical Exam:  Constitutional: Awake, alert  Eyes: PERRLA, sclerae anicteric, no conjunctival injection  HENT: NCAT, mucous membranes moist  Neck: Supple, no thyromegaly, no lymphadenopathy, trachea midline  Respiratory: Clear to auscultation bilaterally, nonlabored respirations   Cardiovascular: RRR, no murmurs, rubs, or gallops,  Gastrointestinal: Positive bowel sounds, soft, nontender, nondistended  Musculoskeletal: No bilateral ankle edema, no clubbing or cyanosis to extremities  Psychiatric: Appropriate affect, cooperative  Neurologic: Oriented x 3, strength symmetric in all extremities, Cranial Nerves grossly intact to confrontation, speech clear  Skin: No rashes      Brief Assessment/Plan :  See detailed assessment and plan developed with APC which I have reviewed and/or edited for completeness.        Admission Status: I believe that this patient meets OBSERVATION status, however if further evaluation or treatment plans warrant, status may change.  Based upon current information, I predict patient's care encounter to be less than or equal to 2 midnights.        Precious Sparks, DO  07/13/21                  "

## 2021-07-13 NOTE — PLAN OF CARE
Goal Outcome Evaluation:  Plan of Care Reviewed With: patient        Progress: no change  Outcome Summary: VSS. Pt frequently c/o pain in her right shoulder but also c/o midsternal chest pressure at times. Pt is constantly requesting more pain meds. Waiting for VQ scan and CT. Hopefully D/C home tomorrow.      Problem: Chest Pain  Goal: Resolution of Chest Pain Symptoms  Outcome: Ongoing, Progressing     Problem: Adult Inpatient Plan of Care  Goal: Plan of Care Review  Outcome: Ongoing, Progressing  Flowsheets (Taken 7/13/2021 4651)  Progress: no change  Plan of Care Reviewed With: patient  Outcome Summary: VSS. Pt frequently c/o pain in her right shoulder but also c/o midsternal chest pressure at times. Pt is constantly requesting more pain meds. Waiting for VQ scan and CT. Hopefully D/C home tomorrow.

## 2021-07-13 NOTE — CONSULTS
"Clinical Nutrition   Reason For Visit: Identified at risk by screening criteria, MST score 2+, Difficulty chewing/swallowing, Unintentional weight loss    Patient Name: Timothy Guzman  YOB: 1975  MRN: [id number]  Date of Encounter: 07/13/21 13:14 EDT  Admission date: 7/12/2021      Nutrition Assessment     Admission Problem List:  Chest pain  Rib fractures 2/2 compressions following recent cardiac arrest      Applicable PMH:  Alcoholic cirrhosis  Esophageal varices s/p banding  Recurrent pancreatitis  Gastritis  Gastroparesis  CAD  STEMI  Recent cardiac arrest s/p ACLS/intubation with rib fractures  S/p CCY      Applicable medical tests/procedures since admission:       Reported/Observed/Food/Nutrition Related History   Patient previously here at [facility] (6/18-7/2). Received EN during that admission and began eating by mouth prior to discharge: \"Pt reports she is consuming 3/4 of trays provide.\" (per RD note 6/28/21)    Patient reports she has recently lost weight from 237 lbs down to 191 lbs as a result of fluid retention/removal. RD notes highest bed scale weight previous admission was 235 lbs (6/21/21). Patient states she weighed 191 lbs on standing scale yesterday morning at rehab (prior to transferring to hospital). Reports dry UBW prior to last month's admission was ~190-196 lbs. States she has not been eating well/normal since discharging from [facility] on (7/2), eating ~50% of usual amount. Was not drinking any ONS at rehab.    Denies food allergies and difficulty chewing/swallowing. Agreeable to strawberry ONS daily during this admission to help meet increased protein needs.      Anthropometrics   Height: 63 in  Weight: 191 lbs (standing wt 7/12 at rehab per patient)  BMI: 33.8  BMI classification: Obese Class I: 30-34.9kg/m2   IBW: 115 lbs    UBW: 190-196 lbs dry UBW  Weight change: RD unable to determine if patient has had any recent dry unintentional weight loss 2/2 recent fluid status. If reported " UBW is accurate, possible dry weight loss up to 5 lbs.    Labs reviewed   Labs reviewed: Yes    Medications reviewed   Medications reviewed: Yes  Pertinent: vitamin C, vitamin B6, bumex, folic acid, lactulose, linzess, magnesium, protonix, rifaximin, spironolactone    Current Nutrition Prescription   PO: Diet Regular    Average PO intake: insufficient data    Nutrition Diagnosis     Problem Inadequate oral intake   Etiology Decreased appetite   Signs/Symptoms Pt reports eating ~50% of usual amount since discharging [facility] and going to rehab on (7/2)       Problem Increased nutrient needs (protein)   Etiology Condition associated with increased protein needs   Signs/Symptoms Cirrhosis       Intervention   Intervention: Follow treatment progress, Care plan reviewed, Interview for preferences, Encourage intake, Supplement provided    -Ordered strawberry Premier Protein 2x daily.    Goal:   General: Nutrition support treatment  PO: Increase intake    Monitoring/Evaluation:   Monitoring/Evaluation: Per protocol, PO intake, Supplement intake, Pertinent labs, Weight, Symptoms    Cassandra Skinner RD  Time Spent: 45 min

## 2021-07-13 NOTE — ED PROVIDER NOTES
Subjective   Patient is a 45-year-old female who presents emergency room today from [facility] after experiencing an onset of acute chest pain this afternoon.  Patient states that approximately 2 hours prior to arrival in the ED patient began to experience a sharp pain in her chest.  She shares that it is different than the pain that she has been experiencing during her stay in rehab at [facility].  She shares that the pain remained constant and the physician at [facility] recommended she present to the ER for further evaluation.  Patient received aspirin and nitroglycerin per EMS prior to arrival.  She states that her initial pain was approximately an 8/10 and was improved to a 6/10 following administration of aspirin and nitroglycerin.  Patient denies anything specific that makes her symptoms better or worse.  She shares no additional associated symptoms on exam.  She states that she does have some displaced ribs from CPR performed when she coded at this facility in May.  She shares having been working with her incentive spirometer to try and take deep breaths but that since the incident she has had some shortness of breath. On June 18, 2021 patient presented to the emergency department at this facility with coffee-ground emesis.  During her initial work-up she experienced a PEA cardiac arrest while receiving IV contrast dye.  Patient required ACLS and intubation.  She had a STEMI on EKG and was taken for left heart cath with Dr. Gonzales who stented her circumflex.  Following this event the patient had her EGD and was found to have grade 1 varices and erosive gastritis and antiplatelet medications were started.  She showed improvement in the hospitalization and was stable and transferred to [facility] for rehab.          Review of Systems   Constitutional: Positive for activity change. Negative for chills and fever.   HENT: Negative.    Eyes: Negative.     Respiratory: Positive for chest tightness and shortness of breath. Negative for cough.    Cardiovascular: Positive for chest pain. Negative for palpitations.   Gastrointestinal: Negative.    Genitourinary: Negative.    Musculoskeletal: Positive for arthralgias and myalgias.   Skin: Negative.    Neurological: Negative.    Psychiatric/Behavioral: Negative.    All other systems reviewed and are negative.      Past Medical History:   Diagnosis Date   • Acute pancreatitis    • Alcoholism (CMS/HCC)    • Arthritis    • Bone necrosis (CMS/HCC)    • CAD (coronary artery disease) 2021   • Cirrhosis (CMS/HCC)    • Gastroparesis    • GERD (gastroesophageal reflux disease)    • History of esophageal varices    • History of kidney stones    • Hypertension    • Pancreatitis        Allergies   Allergen Reactions   • Contrast Dye Anaphylaxis      Pt coded after receiving contrast for a CT scan. Was premedicated. Was having an MI at the same time. Immediately underwent heart cath with contrast without additional allergic reaction  7/15 Pt premedicated with prednisone/Benadryl for heart cath. Still with anaphylactic-like reaction with drop in BP and hypoxia. Treated 95% stenosis with minimal dye and supported with epinephrine, solumedrol, NRB   • Nsaids GI Bleeding   • Tylenol [Acetaminophen] Other (See Comments)     CIRRHOSIS       Past Surgical History:   Procedure Laterality Date   • APPENDECTOMY     • CARDIAC CATHETERIZATION N/A 2021    Procedure: Left Heart Cath;  Surgeon: Vikram Gonzales MD;  Location:  [facility];  Service: Cardiovascular;  Laterality: N/A;   • CARDIAC CATHETERIZATION N/A 7/15/2021    Procedure: LEFT HEART CATH;  Surgeon: Vikram Gonzales MD;  Location:  [facility];  Service: Cardiology;  Laterality: N/A;   •  SECTION     • CHOLECYSTECTOMY     • COLONOSCOPY     • COLONOSCOPY N/A 3/31/2020    Procedure: COLONOSCOPY;  Surgeon: Tirso Giles MD;   Location:  [facility];  Service: Gastroenterology;  Laterality: N/A;   • ENDOSCOPY     • ENDOSCOPY     • ENDOSCOPY N/A 2021    Procedure: ESOPHAGOGASTRODUODENOSCOPY AT BEDSIDE;  Surgeon: Tyrese Rasmussen MD;  Location:  [facility];  Service: Gastroenterology;  Laterality: N/A;   • GALLBLADDER SURGERY     • REPLACEMENT TOTAL HIP LATERAL POSITION Left    • TOTAL KNEE ARTHROPLASTY Left        Family History   Problem Relation Age of Onset   • Colon cancer Neg Hx    • Colon polyps Neg Hx        Social History     Socioeconomic History   • Marital status:      Spouse name: Not on file   • Number of children: Not on file   • Years of education: Not on file   • Highest education level: Not on file   Tobacco Use   • Smoking status: Former Smoker     Packs/day: 0.50     Types: Electronic Cigarette, Cigarettes     Quit date: 2020     Years since quittin.2   • Smokeless tobacco: Never Used   • Tobacco comment:  ppd    Substance and Sexual Activity   • Alcohol use: Not Currently   • Drug use: No   • Sexual activity: Defer           Objective   Physical Exam  Vitals and nursing note reviewed.   Constitutional:       General: She is not in acute distress.     Appearance: She is well-developed. She is not ill-appearing, toxic-appearing or diaphoretic.   HENT:      Head: Normocephalic and atraumatic.   Eyes:      Extraocular Movements: Extraocular movements intact.      Conjunctiva/sclera: Conjunctivae normal.   Cardiovascular:      Rate and Rhythm: Normal rate and regular rhythm.      Heart sounds: Normal heart sounds.   Pulmonary:      Effort: Pulmonary effort is normal. No respiratory distress.      Breath sounds: Normal breath sounds.   Chest:      Chest wall: Tenderness present.   Abdominal:      General: There is no distension.      Palpations: Abdomen is soft.      Tenderness: There is no abdominal tenderness.   Musculoskeletal:         General: Normal range of motion.      Cervical back:  Normal range of motion and neck supple.   Skin:     General: Skin is warm and dry.   Neurological:      General: No focal deficit present.      Mental Status: She is alert.   Psychiatric:         Behavior: Behavior normal.         Thought Content: Thought content normal.         Judgment: Judgment normal.         Procedures           ED Course  ED Course as of Jul 17 1621   Tue Jul 13, 2021   0001 On reassessment patient continues to complain of pain in her chest.    [JG]   Sat Jul 17, 2021   1614 Patient with recent cardiac arrest, heart cath with stenting presents to ED with complaints of chest pain. Patient has had pain from rib fractures as a result of her recent chest compressions but reported a different pain in her chest to her care team at rehab. Cardiac workup in ED without acute or emergent findings. Patient continued to have pain in her chest on numerous reassessments without resolution after multiple medication interventions. Heart score of 5 and based on recent history of cardiac arrest with stents hospitalist was paged for admission and agreeable to accept patient. Patient agreeable to plan of care and hospital admission for further evaluation and treatment of her chest pain.     [JG]      ED Course User Index  [JG] Mango Madera PA                                         HEART Score (for prediction of 6-week risk of major adverse cardiac event) reviewed and/or performed as part of the patient evaluation and treatment planning process.  The result associated with this review/performance is: 5       MDM  Number of Diagnoses or Management Options  Chest pain, unspecified type: established and worsening     Amount and/or Complexity of Data Reviewed  Clinical lab tests: reviewed  Tests in the radiology section of CPT®: reviewed  Tests in the medicine section of CPT®: reviewed  Decide to obtain previous medical records or to obtain history from someone other than the patient: yes    Risk of Complications,  Morbidity, and/or Mortality  Presenting problems: high  Diagnostic procedures: moderate  Management options: moderate    Patient Progress  Patient progress: stable      Final diagnoses:   Chest pain, unspecified type       ED Disposition  ED Disposition     ED Disposition Condition Comment    Decision to Admit  Level of Care: Telemetry [5]   Diagnosis: Chest pain, unspecified type [[id number]]   Admitting Physician: DARIAN BOYCE [[id number]]   Attending Physician: DARIAN BOYCE [[id number]]   Bed Request Comments: tele            No follow-up provider specified.       Medication List      No changes were made to your prescriptions during this visit.          Mango Madera PA  07/17/21 1340

## 2021-07-14 ENCOUNTER — APPOINTMENT (OUTPATIENT)
Dept: GENERAL RADIOLOGY | Facility: HOSPITAL | Age: 46
End: 2021-07-14

## 2021-07-14 ENCOUNTER — APPOINTMENT (OUTPATIENT)
Dept: CARDIOLOGY | Facility: HOSPITAL | Age: 46
End: 2021-07-14

## 2021-07-14 ENCOUNTER — APPOINTMENT (OUTPATIENT)
Dept: NUCLEAR MEDICINE | Facility: HOSPITAL | Age: 46
End: 2021-07-14

## 2021-07-14 LAB
ANION GAP SERPL CALCULATED.3IONS-SCNC: 11 MMOL/L (ref 5–15)
BH CV LOWER VASCULAR LEFT COMMON FEMORAL AUGMENT: NORMAL
BH CV LOWER VASCULAR LEFT COMMON FEMORAL COMPRESS: NORMAL
BH CV LOWER VASCULAR LEFT COMMON FEMORAL PHASIC: NORMAL
BH CV LOWER VASCULAR LEFT COMMON FEMORAL SPONT: NORMAL
BH CV LOWER VASCULAR LEFT DISTAL FEMORAL AUGMENT: NORMAL
BH CV LOWER VASCULAR LEFT DISTAL FEMORAL COMPRESS: NORMAL
BH CV LOWER VASCULAR LEFT DISTAL FEMORAL PHASIC: NORMAL
BH CV LOWER VASCULAR LEFT DISTAL FEMORAL SPONT: NORMAL
BH CV LOWER VASCULAR LEFT GASTRONEMIUS COMPRESS: NORMAL
BH CV LOWER VASCULAR LEFT GREATER SAPH AK COMPRESS: NORMAL
BH CV LOWER VASCULAR LEFT GREATER SAPH BK COMPRESS: NORMAL
BH CV LOWER VASCULAR LEFT LESSER SAPH COMPRESS: NORMAL
BH CV LOWER VASCULAR LEFT MID FEMORAL AUGMENT: NORMAL
BH CV LOWER VASCULAR LEFT MID FEMORAL COMPRESS: NORMAL
BH CV LOWER VASCULAR LEFT MID FEMORAL PHASIC: NORMAL
BH CV LOWER VASCULAR LEFT MID FEMORAL SPONT: NORMAL
BH CV LOWER VASCULAR LEFT PERONEAL COMPRESS: NORMAL
BH CV LOWER VASCULAR LEFT POPLITEAL AUGMENT: NORMAL
BH CV LOWER VASCULAR LEFT POPLITEAL COMPRESS: NORMAL
BH CV LOWER VASCULAR LEFT POPLITEAL PHASIC: NORMAL
BH CV LOWER VASCULAR LEFT POPLITEAL SPONT: NORMAL
BH CV LOWER VASCULAR LEFT POSTERIOR TIBIAL COMPRESS: NORMAL
BH CV LOWER VASCULAR LEFT PROFUNDA FEMORAL AUGMENT: NORMAL
BH CV LOWER VASCULAR LEFT PROFUNDA FEMORAL COMPRESS: NORMAL
BH CV LOWER VASCULAR LEFT PROFUNDA FEMORAL PHASIC: NORMAL
BH CV LOWER VASCULAR LEFT PROFUNDA FEMORAL SPONT: NORMAL
BH CV LOWER VASCULAR LEFT PROXIMAL FEMORAL AUGMENT: NORMAL
BH CV LOWER VASCULAR LEFT PROXIMAL FEMORAL COMPRESS: NORMAL
BH CV LOWER VASCULAR LEFT PROXIMAL FEMORAL PHASIC: NORMAL
BH CV LOWER VASCULAR LEFT PROXIMAL FEMORAL SPONT: NORMAL
BH CV LOWER VASCULAR LEFT SAPHENOFEMORAL JUNCTION AUGMENT: NORMAL
BH CV LOWER VASCULAR LEFT SAPHENOFEMORAL JUNCTION COMPRESS: NORMAL
BH CV LOWER VASCULAR LEFT SAPHENOFEMORAL JUNCTION PHASIC: NORMAL
BH CV LOWER VASCULAR LEFT SAPHENOFEMORAL JUNCTION SPONT: NORMAL
BH CV LOWER VASCULAR LEFT SOLEAL COMPRESS: NORMAL
BH CV LOWER VASCULAR RIGHT COMMON FEMORAL AUGMENT: NORMAL
BH CV LOWER VASCULAR RIGHT COMMON FEMORAL COMPRESS: NORMAL
BH CV LOWER VASCULAR RIGHT COMMON FEMORAL PHASIC: NORMAL
BH CV LOWER VASCULAR RIGHT COMMON FEMORAL SPONT: NORMAL
BH CV LOWER VASCULAR RIGHT DISTAL FEMORAL AUGMENT: NORMAL
BH CV LOWER VASCULAR RIGHT DISTAL FEMORAL COMPRESS: NORMAL
BH CV LOWER VASCULAR RIGHT DISTAL FEMORAL PHASIC: NORMAL
BH CV LOWER VASCULAR RIGHT DISTAL FEMORAL SPONT: NORMAL
BH CV LOWER VASCULAR RIGHT GASTRONEMIUS COMPRESS: NORMAL
BH CV LOWER VASCULAR RIGHT GREATER SAPH AK COMPRESS: NORMAL
BH CV LOWER VASCULAR RIGHT GREATER SAPH BK COMPRESS: NORMAL
BH CV LOWER VASCULAR RIGHT LESSER SAPH COMPRESS: NORMAL
BH CV LOWER VASCULAR RIGHT MID FEMORAL AUGMENT: NORMAL
BH CV LOWER VASCULAR RIGHT MID FEMORAL COMPRESS: NORMAL
BH CV LOWER VASCULAR RIGHT MID FEMORAL PHASIC: NORMAL
BH CV LOWER VASCULAR RIGHT MID FEMORAL SPONT: NORMAL
BH CV LOWER VASCULAR RIGHT PERONEAL COMPRESS: NORMAL
BH CV LOWER VASCULAR RIGHT POPLITEAL AUGMENT: NORMAL
BH CV LOWER VASCULAR RIGHT POPLITEAL COMPRESS: NORMAL
BH CV LOWER VASCULAR RIGHT POPLITEAL PHASIC: NORMAL
BH CV LOWER VASCULAR RIGHT POPLITEAL SPONT: NORMAL
BH CV LOWER VASCULAR RIGHT POSTERIOR TIBIAL COMPRESS: NORMAL
BH CV LOWER VASCULAR RIGHT PROFUNDA FEMORAL AUGMENT: NORMAL
BH CV LOWER VASCULAR RIGHT PROFUNDA FEMORAL COMPRESS: NORMAL
BH CV LOWER VASCULAR RIGHT PROFUNDA FEMORAL PHASIC: NORMAL
BH CV LOWER VASCULAR RIGHT PROFUNDA FEMORAL SPONT: NORMAL
BH CV LOWER VASCULAR RIGHT PROXIMAL FEMORAL AUGMENT: NORMAL
BH CV LOWER VASCULAR RIGHT PROXIMAL FEMORAL COMPRESS: NORMAL
BH CV LOWER VASCULAR RIGHT PROXIMAL FEMORAL PHASIC: NORMAL
BH CV LOWER VASCULAR RIGHT PROXIMAL FEMORAL SPONT: NORMAL
BH CV LOWER VASCULAR RIGHT SAPHENOFEMORAL JUNCTION AUGMENT: NORMAL
BH CV LOWER VASCULAR RIGHT SAPHENOFEMORAL JUNCTION COMPRESS: NORMAL
BH CV LOWER VASCULAR RIGHT SAPHENOFEMORAL JUNCTION PHASIC: NORMAL
BH CV LOWER VASCULAR RIGHT SAPHENOFEMORAL JUNCTION SPONT: NORMAL
BH CV LOWER VASCULAR RIGHT SOLEAL COMPRESS: NORMAL
BUN SERPL-MCNC: 11 MG/DL (ref 6–20)
BUN/CREAT SERPL: 12.1 (ref 7–25)
CALCIUM SPEC-SCNC: 8.8 MG/DL (ref 8.6–10.5)
CHLORIDE SERPL-SCNC: 102 MMOL/L (ref 98–107)
CO2 SERPL-SCNC: 25 MMOL/L (ref 22–29)
CREAT SERPL-MCNC: 0.91 MG/DL (ref 0.57–1)
GFR SERPL CREATININE-BSD FRML MDRD: 67 ML/MIN/1.73
GLUCOSE SERPL-MCNC: 104 MG/DL (ref 65–99)
MAGNESIUM SERPL-MCNC: 1.9 MG/DL (ref 1.6–2.6)
MAXIMAL PREDICTED HEART RATE: 174 BPM
POTASSIUM SERPL-SCNC: 3.8 MMOL/L (ref 3.5–5.2)
QT INTERVAL: 368 MS
QT INTERVAL: 408 MS
QTC INTERVAL: 469 MS
QTC INTERVAL: 479 MS
SODIUM SERPL-SCNC: 138 MMOL/L (ref 136–145)
STRESS TARGET HR: 148 BPM

## 2021-07-14 PROCEDURE — 80048 BASIC METABOLIC PNL TOTAL CA: CPT | Performed by: INTERNAL MEDICINE

## 2021-07-14 PROCEDURE — 93970 EXTREMITY STUDY: CPT

## 2021-07-14 PROCEDURE — 83735 ASSAY OF MAGNESIUM: CPT | Performed by: INTERNAL MEDICINE

## 2021-07-14 PROCEDURE — 71045 X-RAY EXAM CHEST 1 VIEW: CPT

## 2021-07-14 PROCEDURE — 25010000002 ENOXAPARIN PER 10 MG: Performed by: FAMILY MEDICINE

## 2021-07-14 PROCEDURE — 25010000002 MORPHINE PER 10 MG: Performed by: NURSE PRACTITIONER

## 2021-07-14 PROCEDURE — A9540 TC99M MAA: HCPCS | Performed by: INTERNAL MEDICINE

## 2021-07-14 PROCEDURE — 0 TECHNETIUM ALBUMIN AGGREGATED: Performed by: INTERNAL MEDICINE

## 2021-07-14 PROCEDURE — 78580 LUNG PERFUSION IMAGING: CPT

## 2021-07-14 PROCEDURE — G0378 HOSPITAL OBSERVATION PER HR: HCPCS

## 2021-07-14 PROCEDURE — 25010000002 MAGNESIUM SULFATE 2 GM/50ML SOLUTION: Performed by: INTERNAL MEDICINE

## 2021-07-14 PROCEDURE — 99233 SBSQ HOSP IP/OBS HIGH 50: CPT | Performed by: INTERNAL MEDICINE

## 2021-07-14 PROCEDURE — 63710000001 PREDNISONE PER 1 MG: Performed by: INTERNAL MEDICINE

## 2021-07-14 PROCEDURE — 99232 SBSQ HOSP IP/OBS MODERATE 35: CPT | Performed by: INTERNAL MEDICINE

## 2021-07-14 PROCEDURE — 93970 EXTREMITY STUDY: CPT | Performed by: INTERNAL MEDICINE

## 2021-07-14 RX ORDER — MAGNESIUM SULFATE 1 G/100ML
1 INJECTION INTRAVENOUS AS NEEDED
Status: DISCONTINUED | OUTPATIENT
Start: 2021-07-14 | End: 2021-07-20 | Stop reason: HOSPADM

## 2021-07-14 RX ORDER — PREDNISONE 20 MG/1
20 TABLET ORAL
Status: DISCONTINUED | OUTPATIENT
Start: 2021-07-14 | End: 2021-07-14

## 2021-07-14 RX ORDER — ACETAMINOPHEN 325 MG/1
650 TABLET ORAL ONCE
Status: COMPLETED | OUTPATIENT
Start: 2021-07-15 | End: 2021-07-14

## 2021-07-14 RX ORDER — DIPHENHYDRAMINE HYDROCHLORIDE 50 MG/ML
50 INJECTION INTRAMUSCULAR; INTRAVENOUS ONCE
Status: COMPLETED | OUTPATIENT
Start: 2021-07-15 | End: 2021-07-15

## 2021-07-14 RX ORDER — MAGNESIUM SULFATE HEPTAHYDRATE 40 MG/ML
4 INJECTION, SOLUTION INTRAVENOUS AS NEEDED
Status: DISCONTINUED | OUTPATIENT
Start: 2021-07-14 | End: 2021-07-20 | Stop reason: HOSPADM

## 2021-07-14 RX ORDER — MAGNESIUM SULFATE HEPTAHYDRATE 40 MG/ML
2 INJECTION, SOLUTION INTRAVENOUS AS NEEDED
Status: DISCONTINUED | OUTPATIENT
Start: 2021-07-14 | End: 2021-07-20 | Stop reason: HOSPADM

## 2021-07-14 RX ADMIN — FLUOXETINE HYDROCHLORIDE 40 MG: 20 CAPSULE ORAL at 08:19

## 2021-07-14 RX ADMIN — ACETAMINOPHEN 650 MG: 325 TABLET ORAL at 23:20

## 2021-07-14 RX ADMIN — MORPHINE SULFATE 2 MG: 2 INJECTION, SOLUTION INTRAMUSCULAR; INTRAVENOUS at 04:41

## 2021-07-14 RX ADMIN — LACTULOSE 10 G: 20 SOLUTION ORAL at 20:10

## 2021-07-14 RX ADMIN — ASPIRIN 81 MG: 81 TABLET, COATED ORAL at 08:20

## 2021-07-14 RX ADMIN — Medication 400 MG: at 17:15

## 2021-07-14 RX ADMIN — OXYCODONE 10 MG: 5 TABLET ORAL at 14:22

## 2021-07-14 RX ADMIN — PREDNISONE 50 MG: 5 TABLET ORAL at 23:20

## 2021-07-14 RX ADMIN — SODIUM CHLORIDE, PRESERVATIVE FREE 10 ML: 5 INJECTION INTRAVENOUS at 20:10

## 2021-07-14 RX ADMIN — CLOPIDOGREL BISULFATE 75 MG: 75 TABLET ORAL at 08:20

## 2021-07-14 RX ADMIN — BUMETANIDE 2 MG: 2 TABLET ORAL at 08:19

## 2021-07-14 RX ADMIN — PANTOPRAZOLE SODIUM 40 MG: 40 TABLET, DELAYED RELEASE ORAL at 17:15

## 2021-07-14 RX ADMIN — MAGNESIUM SULFATE HEPTAHYDRATE 2 G: 2 INJECTION, SOLUTION INTRAVENOUS at 17:17

## 2021-07-14 RX ADMIN — KIT FOR THE PREPARATION OF TECHNETIUM TC 99M ALBUMIN AGGREGATED 1 DOSE: 2.5 INJECTION, POWDER, FOR SOLUTION INTRAVENOUS at 15:37

## 2021-07-14 RX ADMIN — LIDOCAINE 1 PATCH: 50 PATCH CUTANEOUS at 08:21

## 2021-07-14 RX ADMIN — ENOXAPARIN SODIUM 40 MG: 40 INJECTION SUBCUTANEOUS at 08:22

## 2021-07-14 RX ADMIN — SODIUM CHLORIDE, PRESERVATIVE FREE 10 ML: 5 INJECTION INTRAVENOUS at 08:23

## 2021-07-14 RX ADMIN — PYRIDOXINE HCL TAB 50 MG 25 MG: 50 TAB at 08:20

## 2021-07-14 RX ADMIN — TAMSULOSIN HYDROCHLORIDE 0.4 MG: 0.4 CAPSULE ORAL at 08:20

## 2021-07-14 RX ADMIN — OLANZAPINE 7.5 MG: 5 TABLET, FILM COATED ORAL at 20:10

## 2021-07-14 RX ADMIN — ROSUVASTATIN CALCIUM 20 MG: 20 TABLET, COATED ORAL at 20:09

## 2021-07-14 RX ADMIN — LACTULOSE 10 G: 20 SOLUTION ORAL at 08:21

## 2021-07-14 RX ADMIN — PANTOPRAZOLE SODIUM 40 MG: 40 TABLET, DELAYED RELEASE ORAL at 08:20

## 2021-07-14 RX ADMIN — PREDNISONE 20 MG: 20 TABLET ORAL at 13:10

## 2021-07-14 RX ADMIN — MORPHINE SULFATE 2 MG: 2 INJECTION, SOLUTION INTRAMUSCULAR; INTRAVENOUS at 09:28

## 2021-07-14 RX ADMIN — RIFAXIMIN 550 MG: 550 TABLET ORAL at 08:21

## 2021-07-14 RX ADMIN — TRAZODONE HYDROCHLORIDE 50 MG: 50 TABLET ORAL at 20:09

## 2021-07-14 RX ADMIN — DIAZEPAM 2 MG: 2 TABLET ORAL at 08:20

## 2021-07-14 RX ADMIN — RANOLAZINE 500 MG: 500 TABLET, EXTENDED RELEASE ORAL at 08:18

## 2021-07-14 RX ADMIN — RANOLAZINE 500 MG: 500 TABLET, EXTENDED RELEASE ORAL at 20:10

## 2021-07-14 RX ADMIN — SPIRONOLACTONE 100 MG: 25 TABLET ORAL at 08:19

## 2021-07-14 RX ADMIN — RIFAXIMIN 550 MG: 550 TABLET ORAL at 20:17

## 2021-07-14 RX ADMIN — OXYCODONE HYDROCHLORIDE AND ACETAMINOPHEN 1000 MG: 500 TABLET ORAL at 08:20

## 2021-07-14 RX ADMIN — DIAZEPAM 2 MG: 2 TABLET ORAL at 20:10

## 2021-07-14 RX ADMIN — OXYCODONE 10 MG: 5 TABLET ORAL at 20:10

## 2021-07-14 RX ADMIN — FOLIC ACID 1 MG: 1 TABLET ORAL at 08:21

## 2021-07-14 RX ADMIN — OXYCODONE 10 MG: 5 TABLET ORAL at 08:19

## 2021-07-14 NOTE — PROGRESS NOTES
Cardiology update:    -Discussed today's studies and events with Dr. Ly.    -No current evidence pointing to a pulmonary embolus causing the patient's chest discomfort.    -Given that the patient continues to have chest pain along with the patient's most recent cardiac issues, we will proceed with a heart catheterization tomorrow.    -Premedication for possible contrast dye allergy with prednisone and Benadryl ordered with specific timing for the medications to be given.    -N.p.o. after midnight.    -Will discuss further with the patient in the morning.    -Timing of heart catheterization likely early afternoon.

## 2021-07-14 NOTE — PLAN OF CARE
Goal Outcome Evaluation:  Plan of Care Reviewed With: patient      VSS. Pt c/o of severe right shoulder pain most of the day. Refraining from using IV pain meds. Pt has not c/o freddie chest pain today. Duplex and VQ scan completed. NPO after midnight for possible heart cath tomorrow.     Problem: Chest Pain  Goal: Resolution of Chest Pain Symptoms  Outcome: Ongoing, Progressing

## 2021-07-14 NOTE — PROGRESS NOTES
[facility] Medicine Services  PROGRESS NOTE    Patient Name: Timothy Guzman  : 1975  MRN: [id number]    Date of Admission: 2021  Primary Care Physician: Toshia Mitchell APRN    Subjective   Subjective     CC:  Chest pain    HPI:  Still w/ some occasional chest pressure. Not related to exertion. Also w/ rib pain.     ROS:  No headache  No fever. No dysuria. Tolerating po    Objective   Objective     Vital Signs:   Temp:  [98 °F (36.7 °C)-98.8 °F (37.1 °C)] 98.1 °F (36.7 °C)  Heart Rate:  [] 85  Resp:  [16-18] 16  BP: (104-127)/(65-78) 112/71     Physical Exam:  Constitutional:Alert, oriented x 3, nontoxic appearing, no overt distress. Normal work of breathing  Psych:Normal/appropriate affect  HEENT:Ncat, oroph clear  Neck: neck supple, full range of motion  Neuro: Face symmetric, speech clear, equal , moves all extremities  Cardiac: Rrr; No pretibial pitting edema  Resp: Ctab, normal effort  GI: abd soft, nontender, obese  Skin: No extremity rash  Musculoskeletal/extremities: reproducable anterior bilateral chest           Results Reviewed:  LAB RESULTS:      Lab 21  0607 21  0245 21  2208   WBC 4.82  --  5.66   HEMOGLOBIN 11.8*  --  12.6   HEMATOCRIT 36.1  --  37.6   PLATELETS 157  --  159   NEUTROS ABS 2.11  --  2.82   IMMATURE GRANS (ABS) 0.02  --  0.02   LYMPHS ABS 1.74  --  1.81   MONOS ABS 0.69  --  0.81   EOS ABS 0.23  --  0.17   MCV 91.6  --  90.2   D DIMER QUANT  --  1.22*  --          Lab 21  0602 21  0436 21  2208   SODIUM 138 137 136   POTASSIUM 3.8 4.0 3.7   CHLORIDE 102 100 98   CO2 25.0 27.0 26.0   ANION GAP 11.0 10.0 12.0   BUN 11 11 9   CREATININE 0.91 0.91 0.93   GLUCOSE 104* 105* 123*   CALCIUM 8.8 9.2 9.3   MAGNESIUM 1.9 1.9  --          Lab 21  2208   TOTAL PROTEIN 6.4   ALBUMIN 3.70   GLOBULIN 2.7   ALT (SGPT) 18   AST (SGOT) 23   BILIRUBIN 0.4   ALK PHOS 200*   LIPASE 16         Lab  07/13/21  0436 07/12/21  2329 07/12/21  2208   PROBNP  --   --  152.1   TROPONIN T <0.010 <0.010 <0.010                 Brief Urine Lab Results  (Last result in the past 365 days)      Color   Clarity   Blood   Leuk Est   Nitrite   Protein   CREAT   Urine HCG        06/18/21 1924 Yellow Clear Negative Negative Negative Negative               Microbiology Results Abnormal     Procedure Component Value - Date/Time    COVID PRE-OP / PRE-PROCEDURE SCREENING ORDER (NO ISOLATION) - Swab, Nasopharynx [[id number]]  (Normal) Collected: 07/13/21 1440    Lab Status: Final result Specimen: Swab from Nasopharynx Updated: 07/13/21 1512    Narrative:      The following orders were created for panel order COVID PRE-OP / PRE-PROCEDURE SCREENING ORDER (NO ISOLATION) - Swab, Nasopharynx.  Procedure                               Abnormality         Status                     ---------                               -----------         ------                     COVID-19, ABBOTT IN-HOUS...[[id number]]  Normal              Final result                 Please view results for these tests on the individual orders.    COVID-19, ABBOTT IN-HOUSE,NASAL Swab (NO TRANSPORT MEDIA) 2 HR TAT - Swab, Nasopharynx [[id number]]  (Normal) Collected: 07/13/21 1440    Lab Status: Final result Specimen: Swab from Nasopharynx Updated: 07/13/21 1512     COVID19 Presumptive Negative    Narrative:      Fact sheet for providers: https://www.fda.gov/media/697738/download     Fact sheet for patients: https://www.fda.gov/media/177987/download    Test performed by PCR.  If inconsistent with clinical signs and symptoms patient should be tested with different authorized molecular test.          CT Chest Without Contrast Diagnostic    Result Date: 7/13/2021  CT Chest WO INDICATION: Chest pain with known rib fracture. CPR 3 weeks ago. TECHNIQUE: CT of the thorax without IV contrast. Coronal and sagittal reconstructions were obtained.  Radiation dose reduction techniques  included automated exposure control or exposure modulation based on body size. Count of known CT and cardiac nuc med studies performed in previous 12 months: 3. COMPARISON: 6/18/2021 FINDINGS: Heart size is normal. Coronary stents are present. No pleural or pericardial effusion is seen. There is no adenopathy. Visualized thyroid gland is homogeneous. Upper abdomen shows changes of cholecystectomy. Lung windows demonstrate atelectasis in the lower lobes and lingula. No CT findings present to indicate pneumonia. Note: CT may be negative in the earliest stages of COVID-19. There is no pneumothorax. There are subacute right anterolateral rib fractures numbers 2 through 8. There are subacute left anterolateral rib fractures numbers 2 through 7.     Impression: 1. Bilateral subacute rib fractures numbers 2 through 8 on the right and 2 through 7 on the left. No pneumothorax. 2. Atelectatic changes in the lungs with no evidence of pneumonia. Signer Name: Allen Schulte MD  Signed: 7/13/2021 3:19 AM  Workstation Name: [id number]  [facility]    XR Chest 1 View    Result Date: 7/12/2021  CR Chest 1 Vw INDICATION: Chest pain, right-sided broken ribs COMPARISON:  None available. FINDINGS: Single portable AP view(s) of the chest.  The heart and mediastinal contours are normal. The lungs are clear. No definite large pneumothorax or pleural effusion. Patient's broken ribs are not well visualized on this x-ray.     Impression: No definite acute cardiopulmonary findings. If there is concern for subtle injury or other abnormality, consider follow-up chest CT. Signer Name: Luz Marina Pace MD  Signed: 7/12/2021 9:53 PM  Workstation Name: [id number]  [facility]      Results for orders placed during the hospital encounter of 06/18/21    Adult Transthoracic Echo Complete W/ Cont if Necessary Per Protocol    Interpretation Summary  · The quality of the study is limited due to patient positioning  and patient being intubated.  · Left ventricular ejection fraction appears to be 51 - 55%. Left ventricular systolic function is normal.  · Left ventricular diastolic function is consistent with (grade Ia w/high LAP) impaired relaxation.  · Mildly reduced right ventricular systolic function noted.      I have reviewed the medications:  Scheduled Meds:ascorbic acid, 1,000 mg, Oral, Daily  aspirin, 81 mg, Oral, Daily  bumetanide, 2 mg, Oral, Daily  clopidogrel, 75 mg, Oral, Daily  diazePAM, 2 mg, Oral, BID  enoxaparin, 40 mg, Subcutaneous, Q24H  FLUoxetine, 40 mg, Oral, Daily  folic acid, 1 mg, Oral, Daily  lactulose, 10 g, Oral, BID  lidocaine, 1 patch, Transdermal, Q24H  linaclotide, 72 mcg, Oral, QAM AC  magnesium oxide, 400 mg, Oral, Q PM  OLANZapine, 7.5 mg, Oral, Nightly  pantoprazole, 40 mg, Oral, BID AC  predniSONE, 20 mg, Oral, Daily With Breakfast  ranolazine, 500 mg, Oral, BID  riFAXIMin, 550 mg, Oral, Q12H  rosuvastatin, 20 mg, Oral, Nightly  sodium chloride, 10 mL, Intravenous, Q12H  spironolactone, 100 mg, Oral, Daily  tamsulosin, 0.4 mg, Oral, Daily  traZODone, 50 mg, Oral, Nightly  vitamin B-6, 25 mg, Oral, Daily      Continuous Infusions:   PRN Meds:.docusate sodium  •  ipratropium-albuterol  •  magnesium sulfate **OR** magnesium sulfate in D5W 1g/100mL (PREMIX) **OR** magnesium sulfate  •  melatonin  •  oxyCODONE  •  sodium chloride  •  sodium chloride    Assessment/Plan   Assessment & Plan     Active Hospital Problems    Diagnosis  POA   • **Chest pain [R07.9]  Yes   • Cirrhosis of liver (CMS/HCC) [K74.60]  Yes   • CAD (coronary artery disease) [I25.10]  Yes   • Rib fractures [S22.49XA]  Yes   • Depression [F32.9]  Yes   • History of esophageal varices [Z87.19]  Not Applicable   • GERD (gastroesophageal reflux disease) [K21.9]  Yes      Resolved Hospital Problems   No resolved problems to display.        Brief Hospital Course to date:  Timothy Guzman is a 45 y.o. female w/ hx cirrhosis ,  "pancreatitis, gastritis w/ varices currently on transplant list at [facility]. Patient recently admitted from 6/18/21-7/2/21 after presenting w/ coffee emesis on 6/18/21. At that time initial workup in ED had PEA cardiac arrest while receiving iv contrast dye requiring ACLS and intubation. She had a STEMI on EKG following this event and was taken for [facility] w/ Dr. Storm who placed stent to the circumflex. Patient did experience some SVT postprocedurally and was placed on amiodarone and was takent to icu on mechanical ventilation. Patient subsequentaly underwent EGD on 6/19/21 and was found to have grade 1 varices, and erosive gastritis and antiplatelet meds were restarted and kept on bid protonix. Patient was discharged to rehab.  Patient was sent back to [facility] ED for chest pain described as different from her recent rib pain. Troponin negative. CT chest without contrast showed bilateral subacute rib fractures #2-8 on righ and 2-7 on left (no pneumothorax, no pneumonia). Cards consulted. D-dimer elevated at 1.22.     Chest Pain  *Recent rib fractures (from cpr received during PEA code last admission)  Elevated d-dimer  -unclear if musculoskeletal due to rib fractures, \"cardiac\", or thromboembolic at this point. Atypical and typical features  - CT chest without contrast showed bilateral subacute rib fractures #2-8 on righ and 2-7 on left (no pneumothorax, no pneumonia)  -ekg unchanged, troponin ok  -d-dimer elevated at 1.22, signicance following recent stemi and chest compressions unclear  -Dr. storm / cards following; BLE venous duplex prelim negative for dvt on 7/14/21  -ordering VQ scan (if positive then anticoagulate; if negative consider ischemic eval)  Hx of recent inferior STEMI following PEA arrest (which occurred after given iv contrast prior to ct scan)  *Hx SVT during recent admission (converted w/ adenosine previous admission)  *Possible Contrast dye allergy  -patient coded (PEA during workup in ED last admission " after receiving iv contrast for ct scan (was premedicated) and was found to have MI same time. Subsequently underwent heart cath w/ contrast without additional allergic reaction  -Cards following  -s/p ANGELICA x 1 av groove stenosis; asa, plavix, statin, empiric ranexa; holding b-blocker due to previous relative bradycardia and borderline low bp relative   *Hx recent GI bleed due to erosive gastritis (recent hospitalization  *Indicental grade 1 varices noted during EGD (recent hospitalization)  *Hx Cirrhosis  -egd 6/19/21: grade 1 varices without stimata of bleeding; mild erosive gastritis but no ulcers (pathology negative for h.pylori). ppi therapy recommended and ok'd to take asa & plavix; was discharged on bid ppi x 4 weeks, then once daily  -continue rifaxamin, aldactone 100mg, bumex 2mg daily  *Hx opioid dependence/chronic pain   -chronic oxycodone use; recommend weaning oxycodone at d/c and follow up pain physician  -lidoderm patch      Plan:  -prelim venous duplex negative for dvt. Ordering VQ scan  -if VQ scan negative then cards might consider further ischemic workup. In meantime, continue dapt, statin, ranexa for possible angina  -continue lidoderm patch  -oxycodone (wean to 5mg soon as tolerates)  -continue asa, plavix, statin, ranexa (no b-blocker due to borderline hypotension per cards)  -continue ppi bid, aldactone, bumex 2mg    Am labs: bmp,mag    DVT prophylaxis:  Medical DVT prophylaxis orders are present.       Disposition: I expect the patient to be discharged home once chest pain workup complete and ok w/ cards    CODE STATUS:   Code Status and Medical Interventions:   Ordered at: 07/13/21 0258     Code Status:    CPR     Medical Interventions (Level of Support Prior to Arrest):    Full       Filiberto Ly MD  07/14/21

## 2021-07-14 NOTE — PROGRESS NOTES
[facility] Cardiology   [address], [address]  [place], [place], [address] [phone]  WWW.[facility]           INPATIENT PROGRESS NOTE    Chief complaint:   Chief Complaint   Patient presents with   • Chest Pain            Timothy Guzman is a 45 y.o. female.    Subjective:  Rib fracture pain remains the most prominent.  Intermittent chest pain overnight.  Also with radiation to the right arm.  Has not had recurrent calf pain.  Some associated dyspnea    Review of Systems:  Positive for arm and chest pressure/pain, dyspnea at rest    PFSH:  Patient Active Problem List   Diagnosis   • Hepatic encephalopathy (CMS/HCC)   • Alcoholic cirrhosis of liver without ascites (CMS/HCC)   • Possible GI bleed   • Generalized abdominal pain   • History of esophageal varices   • GERD (gastroesophageal reflux disease)   • STEMI (ST elevation myocardial infarction) (CMS/HCC)   • Acute respiratory failure with hypoxia (CMS/HCC)   • Anaphylaxis   • Cardiac arrest (CMS/HCC)   • SVT (supraventricular tachycardia) (CMS/HCC)   • Depression   • Chest pain   • Cirrhosis of liver (CMS/HCC)   • CAD (coronary artery disease)   • Rib fractures       No current facility-administered medications on file prior to encounter.     Current Outpatient Medications on File Prior to Encounter   Medication Sig Dispense Refill   • ascorbic acid (VITAMIN C) 1000 MG tablet Take 1,000 mg by mouth Daily.     • aspirin 81 MG EC tablet Take 81 mg by mouth Daily.     • B Complex Vitamins (vitamin b complex) capsule capsule Take 1 capsule by mouth Daily.     • bumetanide (BUMEX) 1 MG tablet Take 2 tablets by mouth Daily.     • Cholecalciferol (SM Vitamin D3) 100 MCG (4000 UT) capsule Take 1 capsule by mouth Daily.     • clopidogrel (PLAVIX) 75 MG tablet Take 1 tablet by mouth Daily. 30 tablet    • docusate sodium (COLACE) 100 MG capsule Take 100 mg by mouth 2 (Two) Times a Day As Needed for Constipation.     • famotidine  (PEPCID) 20 MG tablet Take 1 tablet by mouth 2 (Two) Times a Day. 60 tablet 0   • FLUoxetine (PROzac) 40 MG capsule Take 40 mg by mouth Daily.     • folic acid (FOLVITE) 1 MG tablet Take 1 tablet by mouth Daily.     • ipratropium-albuterol (DUO-NEB) 0.5-2.5 mg/3 ml nebulizer Take 3 mL by nebulization Every 6 (Six) Hours As Needed for Wheezing or Shortness of Air. 360 mL    • lactulose (Constulose) 10 GM/15ML solution Take 15 mL by mouth 2 (Two) Times a Day.     • lidocaine (LIDODERM) 5 % Place 1 patch on the skin as directed by provider Daily. Remove & Discard patch within 12 hours or as directed by MD     • magnesium oxide (MAGOX) 400 (241.3 Mg) MG tablet tablet Take 400 mg by mouth Every Evening.     • Melatonin 10 MG tablet Take 1 tablet by mouth Every Night.     • methocarbamol (ROBAXIN) 750 MG tablet Take 750 mg by mouth 3 (Three) Times a Day As Needed.     • multivitamin with minerals (MULTIVITAMIN ADULT PO) Take 1 tablet by mouth Daily.     • OLANZapine (zyPREXA) 7.5 MG tablet Take 1 tablet by mouth Every Night.     • oxyCODONE (ROXICODONE) 5 MG immediate release tablet Take 1 tablet by mouth Every 6 (Six) Hours As Needed for Moderate Pain .     • pantoprazole (PROTONIX) 40 MG EC tablet Take 1 tablet by mouth 2 (Two) Times a Day Before Meals.     • potassium chloride (K-DUR,KLOR-CON) 20 MEQ CR tablet Take 1 tablet by mouth Daily As Needed.     • rifaximin (XIFAXAN) 550 MG tablet Take 1 tablet by mouth Every 12 (Twelve) Hours. 60 tablet 11   • rOPINIRole (REQUIP) 0.5 MG tablet Take 0.5 mg by mouth Every Night. Take 1 hour before bedtime.     • spironolactone (ALDACTONE) 50 MG tablet Take 2 tablets by mouth Daily.     • tamsulosin (FLOMAX) 0.4 MG capsule 24 hr capsule Take 1 capsule by mouth Daily.     • traZODone (DESYREL) 50 MG tablet Take 50 mg by mouth Every Night.     • vitamin B-6 (PYRIDOXINE) 25 MG tablet Take 25 mg by mouth Daily.     • linaclotide (LINZESS) 72 MCG capsule capsule Take 72 mcg by mouth  Every Morning Before Breakfast.     • rosuvastatin (CRESTOR) 20 MG tablet Take 1 tablet by mouth Every Night.       Allergies   Allergen Reactions   • Contrast Dye Anaphylaxis     Pt coded after receiving contrast for a CT scan. Was premedicated. Was having an MI at the same time. Immediately underwent heart cath with contrast without additional allergic reaction   • Nsaids GI Bleeding   • Tylenol [Acetaminophen] Other (See Comments)     CIRRHOSIS       Social History     Socioeconomic History   • Marital status:      Spouse name: Not on file   • Number of children: Not on file   • Years of education: Not on file   • Highest education level: Not on file   Tobacco Use   • Smoking status: Former Smoker     Packs/day: 0.50     Types: Electronic Cigarette, Cigarettes     Quit date: 2020     Years since quittin.2   • Smokeless tobacco: Never Used   • Tobacco comment:  ppd    Substance and Sexual Activity   • Alcohol use: Not Currently   • Drug use: No   • Sexual activity: Defer     Family History   Problem Relation Age of Onset   • Colon cancer Neg Hx    • Colon polyps Neg Hx             Objective:     Vital Sign Min/Max for last 24 hours  Temp  Min: 97.9 °F (36.6 °C)  Max: 98.8 °F (37.1 °C)   BP  Min: 104/67  Max: 127/78   Pulse  Min: 78  Max: 103   Resp  Min: 16  Max: 18   SpO2  Min: 90 %  Max: 98 %   No data recorded      Intake/Output Summary (Last 24 hours) at 2021 0906  Last data filed at 2021 0452  Gross per 24 hour   Intake --   Output 2100 ml   Net -2100 ml           Vitals:    21 0900   BP:    Pulse: 82   Resp:    Temp:    SpO2: 93%       CONSTITUTIONAL: Well-nourished. In no acute distress.     Today's labs and radiologic results reviewed by myself    Diagnostic Data:    EKG: Normal sinus rhythm, T wave abnormality    Results for orders placed during the hospital encounter of 21    Adult Transthoracic Echo Complete W/ Cont if Necessary Per Protocol    Interpretation  Summary  · The quality of the study is limited due to patient positioning and patient being intubated.  · Left ventricular ejection fraction appears to be 51 - 55%. Left ventricular systolic function is normal.  · Left ventricular diastolic function is consistent with (grade Ia w/high LAP) impaired relaxation.  · Mildly reduced right ventricular systolic function noted.      Tele: Normal sinus rhythm         Assessment and Plan:   ASSESSMENT:  1. CAD  a. Status post ANGELICA to the circumflex 6/18/2021 at time of STEMI.  b. Troponin x3 are negative, EKG without acute ischemic changes  c. Chest pain somewhat responsive to nitroglycerin but overall do not seem entirely consistent with angina.  2. Recent right calf pain   a. In the setting of recent limited mobility, elevated D-dimer  3. Rib fractures  4. Cirrhosis of the liver  5. Possible contrast dye allergy  a. First event recently at [facility] where the patient had acute onset dyspnea for an abdominal CT scan with contrast dye   b. Second event prior to CT scan at [facility], but was occurring at the same time of a cardiac arrest/STEMI  i. Given Solu-Medrol IV and then tolerated heart catheterization with contrast without difficulty     PLAN:  1. Lower extremity venous duplex to evaluate for DVT.    a. Discussed with vascular technician, will take place this morning  b. If abnormal will presume the patient had a PE and start anticoagulation.    c. If unremarkable, would consider a VQ scan versus CT per PE protocol with premedication for possible contrast dye allergy  2. Continue Ranexa 500 mg p.o. twice daily for possible angina.  3. Continue DAPT, statin  4. Holding off on a beta-blocker due to borderline hypotension  5. If with persistent unexplainable chest pain after work-up as noted above, will consider a repeat heart catheterization      Vikram Gonzales MD, MSc, FACC, Psychiatric  Interventional Cardiology  [facility]

## 2021-07-14 NOTE — PLAN OF CARE
Goal Outcome Evaluation:  Patient c/o continued chest pressure at beginning of shift vital signs taken and 12 lead EKG performed, on call cardiologist notified and 1 inch nitropaste ordered. Pt also suffering from rib pain, on call hospitalist notified and PRN morphine added.      Sinus rhythm per telemetry. VSS.

## 2021-07-15 ENCOUNTER — APPOINTMENT (OUTPATIENT)
Dept: GENERAL RADIOLOGY | Facility: HOSPITAL | Age: 46
End: 2021-07-15

## 2021-07-15 PROBLEM — T78.2XXA ANAPHYLACTIC SHOCK: Status: ACTIVE | Noted: 2021-07-15

## 2021-07-15 LAB
ACT BLD: 120 SECONDS (ref 82–152)
ANION GAP SERPL CALCULATED.3IONS-SCNC: 11 MMOL/L (ref 5–15)
B-HCG UR QL: NEGATIVE
BUN SERPL-MCNC: 10 MG/DL (ref 6–20)
BUN/CREAT SERPL: 11.9 (ref 7–25)
CALCIUM SPEC-SCNC: 9.1 MG/DL (ref 8.6–10.5)
CHLORIDE SERPL-SCNC: 105 MMOL/L (ref 98–107)
CO2 SERPL-SCNC: 24 MMOL/L (ref 22–29)
CREAT SERPL-MCNC: 0.84 MG/DL (ref 0.57–1)
GFR SERPL CREATININE-BSD FRML MDRD: 73 ML/MIN/1.73
GLUCOSE SERPL-MCNC: 138 MG/DL (ref 65–99)
MAGNESIUM SERPL-MCNC: 2.1 MG/DL (ref 1.6–2.6)
POTASSIUM SERPL-SCNC: 4.3 MMOL/L (ref 3.5–5.2)
SODIUM SERPL-SCNC: 140 MMOL/L (ref 136–145)
TROPONIN T SERPL-MCNC: <0.01 NG/ML (ref 0–0.03)

## 2021-07-15 PROCEDURE — 25010000002 HEPARIN (PORCINE) PER 1000 UNITS: Performed by: INTERNAL MEDICINE

## 2021-07-15 PROCEDURE — 84484 ASSAY OF TROPONIN QUANT: CPT | Performed by: NURSE PRACTITIONER

## 2021-07-15 PROCEDURE — 93005 ELECTROCARDIOGRAM TRACING: CPT | Performed by: INTERNAL MEDICINE

## 2021-07-15 PROCEDURE — C1887 CATHETER, GUIDING: HCPCS | Performed by: INTERNAL MEDICINE

## 2021-07-15 PROCEDURE — 25010000002 EPINEPHRINE PER 0.1 MG: Performed by: INTERNAL MEDICINE

## 2021-07-15 PROCEDURE — 027034Z DILATION OF CORONARY ARTERY, ONE ARTERY WITH DRUG-ELUTING INTRALUMINAL DEVICE, PERCUTANEOUS APPROACH: ICD-10-PCS | Performed by: INTERNAL MEDICINE

## 2021-07-15 PROCEDURE — 25010000002 EPINEPHRINE 1 MG/ML SOLUTION 30 ML VIAL: Performed by: INTERNAL MEDICINE

## 2021-07-15 PROCEDURE — 99153 MOD SED SAME PHYS/QHP EA: CPT | Performed by: INTERNAL MEDICINE

## 2021-07-15 PROCEDURE — C1894 INTRO/SHEATH, NON-LASER: HCPCS | Performed by: INTERNAL MEDICINE

## 2021-07-15 PROCEDURE — 83735 ASSAY OF MAGNESIUM: CPT | Performed by: INTERNAL MEDICINE

## 2021-07-15 PROCEDURE — G0378 HOSPITAL OBSERVATION PER HR: HCPCS

## 2021-07-15 PROCEDURE — S0260 H&P FOR SURGERY: HCPCS | Performed by: NURSE PRACTITIONER

## 2021-07-15 PROCEDURE — C9600 PERC DRUG-EL COR STENT SING: HCPCS | Performed by: INTERNAL MEDICINE

## 2021-07-15 PROCEDURE — 25010000002 ONDANSETRON PER 1 MG: Performed by: INTERNAL MEDICINE

## 2021-07-15 PROCEDURE — 63710000001 PREDNISONE PER 5 MG: Performed by: INTERNAL MEDICINE

## 2021-07-15 PROCEDURE — 80048 BASIC METABOLIC PNL TOTAL CA: CPT | Performed by: INTERNAL MEDICINE

## 2021-07-15 PROCEDURE — 71045 X-RAY EXAM CHEST 1 VIEW: CPT

## 2021-07-15 PROCEDURE — 25010000002 DIPHENHYDRAMINE PER 50 MG: Performed by: INTERNAL MEDICINE

## 2021-07-15 PROCEDURE — 85347 COAGULATION TIME ACTIVATED: CPT

## 2021-07-15 PROCEDURE — 25010000002 METHYLPREDNISOLONE PER 125 MG: Performed by: INTERNAL MEDICINE

## 2021-07-15 PROCEDURE — 92928 PRQ TCAT PLMT NTRAC ST 1 LES: CPT | Performed by: INTERNAL MEDICINE

## 2021-07-15 PROCEDURE — 25010000002 MIDAZOLAM PER 1 MG: Performed by: INTERNAL MEDICINE

## 2021-07-15 PROCEDURE — 63710000001 PREDNISONE PER 1 MG: Performed by: INTERNAL MEDICINE

## 2021-07-15 PROCEDURE — 93454 CORONARY ARTERY ANGIO S&I: CPT | Performed by: INTERNAL MEDICINE

## 2021-07-15 PROCEDURE — 99232 SBSQ HOSP IP/OBS MODERATE 35: CPT | Performed by: INTERNAL MEDICINE

## 2021-07-15 PROCEDURE — C1874 STENT, COATED/COV W/DEL SYS: HCPCS | Performed by: INTERNAL MEDICINE

## 2021-07-15 PROCEDURE — B2111ZZ FLUOROSCOPY OF MULTIPLE CORONARY ARTERIES USING LOW OSMOLAR CONTRAST: ICD-10-PCS | Performed by: INTERNAL MEDICINE

## 2021-07-15 PROCEDURE — 0 IOPAMIDOL PER 1 ML: Performed by: INTERNAL MEDICINE

## 2021-07-15 PROCEDURE — C1769 GUIDE WIRE: HCPCS | Performed by: INTERNAL MEDICINE

## 2021-07-15 PROCEDURE — 99152 MOD SED SAME PHYS/QHP 5/>YRS: CPT | Performed by: INTERNAL MEDICINE

## 2021-07-15 PROCEDURE — 99291 CRITICAL CARE FIRST HOUR: CPT | Performed by: INTERNAL MEDICINE

## 2021-07-15 PROCEDURE — 81025 URINE PREGNANCY TEST: CPT | Performed by: INTERNAL MEDICINE

## 2021-07-15 PROCEDURE — 25010000002 FENTANYL CITRATE (PF) 50 MCG/ML SOLUTION: Performed by: INTERNAL MEDICINE

## 2021-07-15 DEVICE — XIENCE SIERRA™ EVEROLIMUS ELUTING CORONARY STENT SYSTEM 3.00 MM X 18 MM / RAPID-EXCHANGE
Type: IMPLANTABLE DEVICE | Status: FUNCTIONAL
Brand: XIENCE SIERRA™

## 2021-07-15 RX ORDER — HEPARIN SODIUM 1000 [USP'U]/ML
INJECTION, SOLUTION INTRAVENOUS; SUBCUTANEOUS AS NEEDED
Status: DISCONTINUED | OUTPATIENT
Start: 2021-07-15 | End: 2021-07-15 | Stop reason: HOSPADM

## 2021-07-15 RX ORDER — LIDOCAINE HYDROCHLORIDE 10 MG/ML
INJECTION, SOLUTION EPIDURAL; INFILTRATION; INTRACAUDAL; PERINEURAL AS NEEDED
Status: DISCONTINUED | OUTPATIENT
Start: 2021-07-15 | End: 2021-07-15 | Stop reason: HOSPADM

## 2021-07-15 RX ORDER — EPINEPHRINE 1 MG/ML
INJECTION, SOLUTION, CONCENTRATE INTRAVENOUS AS NEEDED
Status: DISCONTINUED | OUTPATIENT
Start: 2021-07-15 | End: 2021-07-15 | Stop reason: HOSPADM

## 2021-07-15 RX ORDER — ONDANSETRON 2 MG/ML
INJECTION INTRAMUSCULAR; INTRAVENOUS AS NEEDED
Status: DISCONTINUED | OUTPATIENT
Start: 2021-07-15 | End: 2021-07-15 | Stop reason: HOSPADM

## 2021-07-15 RX ORDER — MIDAZOLAM HYDROCHLORIDE 1 MG/ML
INJECTION INTRAMUSCULAR; INTRAVENOUS AS NEEDED
Status: DISCONTINUED | OUTPATIENT
Start: 2021-07-15 | End: 2021-07-15 | Stop reason: HOSPADM

## 2021-07-15 RX ORDER — SODIUM CHLORIDE 9 MG/ML
INJECTION, SOLUTION INTRAVENOUS CONTINUOUS PRN
Status: COMPLETED | OUTPATIENT
Start: 2021-07-15 | End: 2021-07-15

## 2021-07-15 RX ORDER — FENTANYL CITRATE 50 UG/ML
INJECTION, SOLUTION INTRAMUSCULAR; INTRAVENOUS AS NEEDED
Status: DISCONTINUED | OUTPATIENT
Start: 2021-07-15 | End: 2021-07-15 | Stop reason: HOSPADM

## 2021-07-15 RX ORDER — METHYLPREDNISOLONE SODIUM SUCCINATE 125 MG/2ML
INJECTION, POWDER, LYOPHILIZED, FOR SOLUTION INTRAMUSCULAR; INTRAVENOUS AS NEEDED
Status: DISCONTINUED | OUTPATIENT
Start: 2021-07-15 | End: 2021-07-15 | Stop reason: HOSPADM

## 2021-07-15 RX ORDER — METHYLPREDNISOLONE SODIUM SUCCINATE 125 MG/2ML
60 INJECTION, POWDER, LYOPHILIZED, FOR SOLUTION INTRAMUSCULAR; INTRAVENOUS EVERY 8 HOURS SCHEDULED
Status: COMPLETED | OUTPATIENT
Start: 2021-07-15 | End: 2021-07-16

## 2021-07-15 RX ORDER — SODIUM CHLORIDE 9 MG/ML
250 INJECTION, SOLUTION INTRAVENOUS CONTINUOUS
Status: ACTIVE | OUTPATIENT
Start: 2021-07-15 | End: 2021-07-15

## 2021-07-15 RX ADMIN — FOLIC ACID 1 MG: 1 TABLET ORAL at 09:19

## 2021-07-15 RX ADMIN — DIPHENHYDRAMINE HYDROCHLORIDE 50 MG: 50 INJECTION INTRAMUSCULAR; INTRAVENOUS at 12:28

## 2021-07-15 RX ADMIN — PYRIDOXINE HCL TAB 50 MG 25 MG: 50 TAB at 09:26

## 2021-07-15 RX ADMIN — OXYCODONE 10 MG: 5 TABLET ORAL at 09:17

## 2021-07-15 RX ADMIN — TAMSULOSIN HYDROCHLORIDE 0.4 MG: 0.4 CAPSULE ORAL at 09:17

## 2021-07-15 RX ADMIN — RANOLAZINE 500 MG: 500 TABLET, EXTENDED RELEASE ORAL at 09:27

## 2021-07-15 RX ADMIN — FLUOXETINE HYDROCHLORIDE 40 MG: 20 CAPSULE ORAL at 09:17

## 2021-07-15 RX ADMIN — TRAZODONE HYDROCHLORIDE 50 MG: 50 TABLET ORAL at 20:54

## 2021-07-15 RX ADMIN — CLOPIDOGREL BISULFATE 75 MG: 75 TABLET ORAL at 09:22

## 2021-07-15 RX ADMIN — OLANZAPINE 7.5 MG: 5 TABLET, FILM COATED ORAL at 20:55

## 2021-07-15 RX ADMIN — LACTULOSE 10 G: 20 SOLUTION ORAL at 09:27

## 2021-07-15 RX ADMIN — METHYLPREDNISOLONE SODIUM SUCCINATE 60 MG: 125 INJECTION, POWDER, FOR SOLUTION INTRAMUSCULAR; INTRAVENOUS at 18:19

## 2021-07-15 RX ADMIN — LACTULOSE 10 G: 20 SOLUTION ORAL at 20:53

## 2021-07-15 RX ADMIN — SODIUM CHLORIDE, PRESERVATIVE FREE 10 ML: 5 INJECTION INTRAVENOUS at 09:22

## 2021-07-15 RX ADMIN — LIDOCAINE 1 PATCH: 50 PATCH CUTANEOUS at 09:28

## 2021-07-15 RX ADMIN — OXYCODONE 10 MG: 5 TABLET ORAL at 17:24

## 2021-07-15 RX ADMIN — DIAZEPAM 2 MG: 2 TABLET ORAL at 20:56

## 2021-07-15 RX ADMIN — OXYCODONE HYDROCHLORIDE AND ACETAMINOPHEN 1000 MG: 500 TABLET ORAL at 09:15

## 2021-07-15 RX ADMIN — ROSUVASTATIN CALCIUM 20 MG: 20 TABLET, COATED ORAL at 20:54

## 2021-07-15 RX ADMIN — Medication 10 MG: at 20:55

## 2021-07-15 RX ADMIN — SODIUM CHLORIDE 250 ML/HR: 9 INJECTION, SOLUTION INTRAVENOUS at 17:08

## 2021-07-15 RX ADMIN — PREDNISONE 50 MG: 5 TABLET ORAL at 05:23

## 2021-07-15 RX ADMIN — RANOLAZINE 500 MG: 500 TABLET, EXTENDED RELEASE ORAL at 20:55

## 2021-07-15 RX ADMIN — RIFAXIMIN 550 MG: 550 TABLET ORAL at 20:54

## 2021-07-15 RX ADMIN — PANTOPRAZOLE SODIUM 40 MG: 40 TABLET, DELAYED RELEASE ORAL at 09:22

## 2021-07-15 RX ADMIN — RIFAXIMIN 550 MG: 550 TABLET ORAL at 09:28

## 2021-07-15 RX ADMIN — SPIRONOLACTONE 100 MG: 25 TABLET ORAL at 09:23

## 2021-07-15 RX ADMIN — BUMETANIDE 2 MG: 2 TABLET ORAL at 09:20

## 2021-07-15 RX ADMIN — PANTOPRAZOLE SODIUM 40 MG: 40 TABLET, DELAYED RELEASE ORAL at 18:19

## 2021-07-15 RX ADMIN — OXYCODONE 10 MG: 5 TABLET ORAL at 02:30

## 2021-07-15 RX ADMIN — PREDNISONE 50 MG: 5 TABLET ORAL at 12:28

## 2021-07-15 RX ADMIN — DIAZEPAM 2 MG: 2 TABLET ORAL at 09:20

## 2021-07-15 RX ADMIN — ASPIRIN 81 MG: 81 TABLET, COATED ORAL at 09:21

## 2021-07-15 NOTE — PLAN OF CARE
Problem: Adult Inpatient Plan of Care  Goal: Plan of Care Review  Outcome: Ongoing, Progressing   Goal Outcome Evaluation:   VSS on RA. Pt c/o 8/10 bilateral side pain, PRN med's given with some relief. Pt has been NPO since MN for heart cath today.

## 2021-07-15 NOTE — PROGRESS NOTES
[facility] Cardiology    Inpatient Progress Note      Chief Complaint/Reason for service:    · Follow-up chest pain         Subjective:       Patient complaining of chest pressure this AM.  Also with pain from her broken ribs.  Requesting IV pain medication.  Denies dyspnea or palpitations.    Past medical, surgical, social and family history reviewed in the patient's electronic medical record.    Review of Systems:   Positive for chest pain  Negative for dyspnea with exertion, lower extremity edema, palpitations    Problem List  Active Hospital Problems    Diagnosis  POA   • **Chest pain [R07.9]  Yes   • Cirrhosis of liver (CMS/HCC) [K74.60]  Yes   • CAD (coronary artery disease) [I25.10]  Yes   • Rib fractures [S22.49XA]  Yes   • Depression [F32.9]  Yes   • History of esophageal varices [Z87.19]  Not Applicable   • GERD (gastroesophageal reflux disease) [K21.9]  Yes      Resolved Hospital Problems   No resolved problems to display.            Objective:      Current Facility-Administered Medications:   •  ascorbic acid (VITAMIN C) tablet 1,000 mg, 1,000 mg, Oral, Daily, Keira Adams DO, 1,000 mg at 07/14/21 0820  •  aspirin EC tablet 81 mg, 81 mg, Oral, Daily, Keira Adams DO, 81 mg at 07/14/21 0820  •  bumetanide (BUMEX) tablet 2 mg, 2 mg, Oral, Daily, Keira Adams DO, 2 mg at 07/14/21 0819  •  clopidogrel (PLAVIX) tablet 75 mg, 75 mg, Oral, Daily, Keira Adams DO, 75 mg at 07/14/21 0820  •  diazePAM (VALIUM) tablet 2 mg, 2 mg, Oral, BID, Precious Sparks DO, 2 mg at 07/14/21 2010  •  diphenhydrAMINE (BENADRYL) injection 50 mg, 50 mg, Intravenous, Once, Vikram Gonzales MD  •  docusate sodium (COLACE) capsule 100 mg, 100 mg, Oral, BID PRN, Keira Adams DO  •  enoxaparin (LOVENOX) syringe 40 mg, 40 mg, Subcutaneous, Q24H, Keira dAams DO, 40 mg at 07/14/21 0822  •  FLUoxetine (PROzac) capsule 40 mg, 40 mg, Oral, Daily, Keira Adams, , 40 mg at 07/14/21 0819  •  folic acid  (FOLVITE) tablet 1 mg, 1 mg, Oral, Daily, Keira Adams, , 1 mg at 07/14/21 0821  •  ipratropium-albuterol (DUO-NEB) nebulizer solution 3 mL, 3 mL, Nebulization, Q6H PRN, Keira Adams DO  •  lactulose (CHRONULAC) 10 GM/15ML solution 10 g, 10 g, Oral, BID, Keira Adams, DO, 10 g at 07/14/21 2010  •  lidocaine (LIDODERM) 5 % 1 patch, 1 patch, Transdermal, Q24H, Keira Adams, , 1 patch at 07/14/21 0821  •  linaclotide (LINZESS) capsule 72 mcg - Patient Supplied, 72 mcg, Oral, QAM AC, Keira Adams DO  •  magnesium oxide (MAG-OX) tablet 400 mg, 400 mg, Oral, Q PM, Keira Adams DO, 400 mg at 07/14/21 1715  •  magnesium sulfate 4 gram infusion - Mg less than or equal to 1mg/dL, 4 g, Intravenous, PRN **OR** magnesium sulfate 3 gram infusion (1gm x 3) - Mg 1.1 - 1.5 mg/dL, 1 g, Intravenous, PRN **OR** Magnesium Sulfate 2 gram infusion- Mg 1.6 - 1.9 mg/dL, 2 g, Intravenous, PRN, Filiberto Ly MD, Stopped at 07/14/21 2008  •  melatonin tablet 10 mg, 10 mg, Oral, Nightly PRN, Keira Adams DO  •  OLANZapine (zyPREXA) tablet 7.5 mg, 7.5 mg, Oral, Nightly, Keira Adams DO, 7.5 mg at 07/14/21 2010  •  oxyCODONE (ROXICODONE) immediate release tablet 10 mg, 10 mg, Oral, Q6H PRN, Precious Sparks, DO, 10 mg at 07/15/21 0230  •  pantoprazole (PROTONIX) EC tablet 40 mg, 40 mg, Oral, BID AC, Keira Adams DO, 40 mg at 07/14/21 1715  •  predniSONE (DELTASONE) tablet 50 mg, 50 mg, Oral, Q6H, Vikram Gonzales MD, 50 mg at 07/15/21 0523  •  ranolazine (RANEXA) 12 hr tablet 500 mg, 500 mg, Oral, BID, Vannessa Koch, APRN, 500 mg at 07/14/21 2010  •  riFAXIMin (XIFAXAN) tablet 550 mg, 550 mg, Oral, Q12H, Keira Adams DO, 550 mg at 07/14/21 2017  •  rosuvastatin (CRESTOR) tablet 20 mg, 20 mg, Oral, Nightly, Keira Adams DO, 20 mg at 07/14/21 2009  •  sodium chloride 0.9 % flush 10 mL, 10 mL, Intravenous, PRN, Byron Collado DO  •  sodium chloride 0.9 % flush 10 mL, 10 mL, Intravenous, Q12H, Angie  Keira RITTER DO, 10 mL at 07/14/21 2010  •  sodium chloride 0.9 % flush 10 mL, 10 mL, Intravenous, PRN, Keira Adams DO  •  spironolactone (ALDACTONE) tablet 100 mg, 100 mg, Oral, Daily, Keira Adams DO, 100 mg at 07/14/21 0819  •  tamsulosin (FLOMAX) 24 hr capsule 0.4 mg, 0.4 mg, Oral, Daily, Keira Adams DO, 0.4 mg at 07/14/21 0820  •  traZODone (DESYREL) tablet 50 mg, 50 mg, Oral, Nightly, Keira Adams DO, 50 mg at 07/14/21 2009  •  vitamin B-6 (PYRIDOXINE) tablet 25 mg, 25 mg, Oral, Daily, Keira Adams DO, 25 mg at 07/14/21 0820    Vital Sign Min/Max for last 24 hours  Temp  Min: 97.8 °F (36.6 °C)  Max: 98.3 °F (36.8 °C)   BP  Min: 106/64  Max: 117/71   Pulse  Min: 62  Max: 99   Resp  Min: 16  Max: 18   SpO2  Min: 93 %  Max: 96 %   No data recorded    No intake or output data in the 24 hours ending 07/15/21 0849        CONSTITUTIONAL: No acute distress  RESPIRATORY: Normal effort. Clear to auscultation bilaterally without wheezing or rales  CARDIOVASCULAR: Regular rate and rhythm with normal S1 and S2. Without murmur, gallop or rub. Normal radial pulse.  Right radial Barbeau type A.  PSYCH: Normal affect and mood    Results reviewed by myself today including studies listed below:   Lab Results   Component Value Date    TROPONINT <0.010 07/13/2021       BUN   Date Value Ref Range Status   07/15/2021 10 6 - 20 mg/dL Final     Creatinine   Date Value Ref Range Status   07/15/2021 0.84 0.57 - 1.00 mg/dL Final     Potassium   Date Value Ref Range Status   07/15/2021 4.3 3.5 - 5.2 mmol/L Final     Comment:     Slight hemolysis detected by analyzer. Results may be affected.     ALT (SGPT)   Date Value Ref Range Status   07/12/2021 18 1 - 33 U/L Final     AST (SGOT)   Date Value Ref Range Status   07/12/2021 23 1 - 32 U/L Final       Results for orders placed during the hospital encounter of 06/18/21    Adult Transthoracic Echo Complete W/ Cont if Necessary Per Protocol    Interpretation Summary  · The quality of  the study is limited due to patient positioning and patient being intubated.  · Left ventricular ejection fraction appears to be 51 - 55%. Left ventricular systolic function is normal.  · Left ventricular diastolic function is consistent with (grade Ia w/high LAP) impaired relaxation.  · Mildly reduced right ventricular systolic function noted.      Tele: Normal sinus rhythm           Assessment/Plan:     ASSESSMENT:  1. CAD  a. Status post ANGELICA to the circumflex 6/18/2021 at time of STEMI.  b. Troponin x3 are negative, EKG without acute ischemic changes  c. Chest pain somewhat responsive to nitroglycerin but overall do not seem entirely consistent with angina.  2. Recent right calf pain   a. In the setting of recent limited mobility, elevated D-dimer  3. Rib fractures  4. Cirrhosis of the liver  5. Possible contrast dye allergy  a. First event recently at [facility] where the patient had acute onset dyspnea for an abdominal CT scan with contrast dye   b. [facility] but was occurring at the same time of a cardiac arrest/STEMI  i. Given Solu-Medrol IV and then tolerated heart catheterization with contrast without difficulty     PLAN:  1. Keep n.p.o. for cardiac catheterization today with Dr. Gonzales.  2. Contrast allergy premedications previously ordered.  3. Continue DAPT, high intensity statin, Ranexa.  4. Beta-blocker being held due to borderline hypotension.    Electronically signed by ISSAC Kumar, 07/15/21, 1:10 PM EDT.

## 2021-07-15 NOTE — PROGRESS NOTES
Brief cardiology update:  · Cath results:  · There was a 95% stenosis just distal to a previously placed stent.  The lesion is status post intervention with a Xience Melissa 3.0 x 18 mm drug-eluting stent.  · The patient had an anaphylactic-like reaction intraprocedurally due to contrast dye, despite premedication.  She was supported with intraprocedural IV Solu-Medrol, epinephrine, and a nonrebreather.  The patient will be transferred to the intensive care unit post procedurally.   · The above findings and contrast dye reaction were discussed with the hospitalist Dr. Ly, the intensivist Dr. Kendrick, and the patient's mother

## 2021-07-15 NOTE — CONSULTS
"Clinical Nutrition   Reason For Visit: Follow-up protocol    Patient Name: Timothy Guzman  YOB: 1975  MRN: [id number]  Date of Encounter: 07/15/21 14:11 EDT  Admission date: 7/12/2021      Nutrition Assessment     Admission Problem List:  Chest pain  Rib fractures 2/2 compressions following recent cardiac arrest      Applicable PMH:  Alcoholic cirrhosis  Esophageal varices s/p banding  Recurrent pancreatitis  Gastritis  Gastroparesis  CAD  STEMI  Recent cardiac arrest s/p ACLS/intubation with rib fractures  S/p CCY      Applicable medical tests/procedures since admission:  7/15: Plan for cardiac cath today; NPO      Reported/Observed/Food/Nutrition Related History     7/15: RD visited pt this afternoon. Pt endorsed good appetite and intake yesterday. Pt currently NPO awaiting procedure. Pt stated she prefers vanilla or strawberry ONS (dislikes chocolate).    7/13: Patient previously here at [facility] (6/18-7/2). Received EN during that admission and began eating by mouth prior to discharge: \"Pt reports she is consuming 3/4 of trays provide.\" (per RD note 6/28/21)    Patient reports she has recently lost weight from 237 lbs down to 191 lbs as a result of fluid retention/removal. RD notes highest bed scale weight previous admission was 235 lbs (6/21/21). Patient states she weighed 191 lbs on standing scale yesterday morning at rehab (prior to transferring to hospital). Reports dry UBW prior to last month's admission was ~190-196 lbs. States she has not been eating well/normal since discharging from [facility] on (7/2), eating ~50% of usual amount. Was not drinking any ONS at rehab.    Denies food allergies and difficulty chewing/swallowing. Agreeable to strawberry ONS daily during this admission to help meet increased protein needs.      Anthropometrics   Height: 63 in  Weight: 191 lbs (standing wt 7/12 at rehab per patient)  BMI: 33.8  BMI classification: Obese Class I: 30-34.9kg/m2   IBW: 115 lbs    UBW: 190-196 " lbs dry UBW  Weight change: RD unable to determine if patient has had any recent dry unintentional weight loss 2/2 recent fluid status. If reported UBW is accurate, possible dry weight loss up to 5 lbs.    Labs reviewed   Labs reviewed: Yes    Medications reviewed   Medications reviewed: Yes  Pertinent: vitamin C, vitamin B6, bumex, folic acid, lactulose, linzess, magnesium, protonix, rifaximin, spironolactone    Current Nutrition Prescription   PO: NPO Diet   Previously Regular (7/13)  ONS: strawberry Premier Protein 2x daily.    Average PO intake: insufficient data    Nutrition Diagnosis     7/13, 7/15  Problem Inadequate oral intake   Etiology Decreased appetite   Signs/Symptoms Pt reports eating ~50% of usual amount since discharging [facility] and going to rehab on (7/2)   Status: ongoing; insufficient PO intake data    7/13, 7/15  Problem Increased nutrient needs (protein)   Etiology Condition associated with increased protein needs   Signs/Symptoms Cirrhosis   Status: ongoing    Intervention   Intervention: Follow treatment progress, Care plan reviewed    Goal:   General: Nutrition support treatment  PO: Increase intake    Monitoring/Evaluation:   Monitoring/Evaluation: Per protocol, PO intake, Supplement intake, Pertinent labs, Weight, Symptoms    Francesco Her RD  Time Spent: 20 min

## 2021-07-15 NOTE — PROGRESS NOTES
CRITICAL CARE ADMISSION NOTE    Chief Complaint     Anaphylactic shock    History of Present Illness     45-year-old female with cirrhosis, pancreatitis, gastritis and varices who is reportedly on the transplant list at [facility].  Previously admitted between 6/18/2021 and 7/2/2021 with coffee-ground emesis and during initial ER work-up had a PEA arrest while receiving IV contrast dye requiring ACLS and intubation.  She had a STEMI at that time and underwent left heart cath and had a stent placed to the circumflex.  Postoperative SVT required amiodarone.  During that hospital stay she was able to be weaned from mechanical ventilatory support.  She underwent an EGD which revealed grade 1 varices, erosive gastritis and she eventually was discharged to rehab.    She came back to [facility] emergency room and was readmitted on 7/12/2021.  EKG and troponin were not consistent with STEMI.  CT scan of the chest revealed bilateral subacute rib fractures presumably due to her prior cardiac arrest and chest compressions.    She was seen in consultation by cardiology and taken to the cardiac catheterization lab today where despite pretreatment developed an anaphylactic-like response to contrast with profound hypotension requiring epinephrine drip.  During the heart catheterization she was found to have a stenosis of the circumflex distal to the previously placed stent and an additional drug-eluting stent was placed.    On arrival to the intensive care unit the patient is in shock requiring epinephrine drip.  She is hypoxic requiring 100% nonrebreather  She is complaining of chest pain and requesting oxycodone which has helped in the past.    Problem List, Surgical History, Family, Social History, and ROS     Patient Active Problem List   Diagnosis   • Hepatic encephalopathy (CMS/HCC)   • Alcoholic cirrhosis of liver without ascites (CMS/HCC)   • Possible GI bleed   • Generalized abdominal pain   • History of esophageal varices   • GERD  (gastroesophageal reflux disease)   • STEMI (ST elevation myocardial infarction) (CMS/HCC)   • Acute respiratory failure with hypoxia (CMS/HCC)   • Anaphylaxis   • Cardiac arrest (CMS/HCC)   • SVT (supraventricular tachycardia) (CMS/HCC)   • Depression   • Chest pain   • Cirrhosis of liver (CMS/HCC)   • CAD (coronary artery disease)   • Rib fractures   • Anaphylactic shock - Requiring vasopressor support     Past Surgical History:   Procedure Laterality Date   • APPENDECTOMY     • CARDIAC CATHETERIZATION N/A 2021    Procedure: Left Heart Cath;  Surgeon: Vikram Gonzales MD;  Location:  [facility] CATH INVASIVE LOCATION;  Service: Cardiovascular;  Laterality: N/A;   •  SECTION     • CHOLECYSTECTOMY     • COLONOSCOPY     • COLONOSCOPY N/A 3/31/2020    Procedure: COLONOSCOPY;  Surgeon: Tirso Giles MD;  Location:  [facility] ENDOSCOPY;  Service: Gastroenterology;  Laterality: N/A;   • ENDOSCOPY     • ENDOSCOPY     • ENDOSCOPY N/A 2021    Procedure: ESOPHAGOGASTRODUODENOSCOPY AT BEDSIDE;  Surgeon: Tyrese Rasmussen MD;  Location:  [facility] ENDOSCOPY;  Service: Gastroenterology;  Laterality: N/A;   • GALLBLADDER SURGERY     • REPLACEMENT TOTAL HIP LATERAL POSITION Left    • TOTAL KNEE ARTHROPLASTY Left        Allergies   Allergen Reactions   • Contrast Dye Anaphylaxis      Pt coded after receiving contrast for a CT scan. Was premedicated. Was having an MI at the same time. Immediately underwent heart cath with contrast without additional allergic reaction  7/15 Pt premedicated with prednisone/Benadryl for heart cath. Still with anaphylactic-like reaction with drop in BP and hypoxia. Treated 95% stenosis with minimal dye and supported with epinephrine, solumedrol, NRB   • Nsaids GI Bleeding   • Tylenol [Acetaminophen] Other (See Comments)     CIRRHOSIS     No current facility-administered medications on file prior to encounter.     Current Outpatient Medications on File Prior to Encounter   Medication Sig    • ascorbic acid (VITAMIN C) 1000 MG tablet Take 1,000 mg by mouth Daily.   • aspirin 81 MG EC tablet Take 81 mg by mouth Daily.   • B Complex Vitamins (vitamin b complex) capsule capsule Take 1 capsule by mouth Daily.   • bumetanide (BUMEX) 1 MG tablet Take 2 tablets by mouth Daily.   • Cholecalciferol ( Vitamin D3) 100 MCG (4000 UT) capsule Take 1 capsule by mouth Daily.   • clopidogrel (PLAVIX) 75 MG tablet Take 1 tablet by mouth Daily.   • docusate sodium (COLACE) 100 MG capsule Take 100 mg by mouth 2 (Two) Times a Day As Needed for Constipation.   • famotidine (PEPCID) 20 MG tablet Take 1 tablet by mouth 2 (Two) Times a Day.   • FLUoxetine (PROzac) 40 MG capsule Take 40 mg by mouth Daily.   • folic acid (FOLVITE) 1 MG tablet Take 1 tablet by mouth Daily.   • ipratropium-albuterol (DUO-NEB) 0.5-2.5 mg/3 ml nebulizer Take 3 mL by nebulization Every 6 (Six) Hours As Needed for Wheezing or Shortness of Air.   • lactulose (Constulose) 10 GM/15ML solution Take 15 mL by mouth 2 (Two) Times a Day.   • lidocaine (LIDODERM) 5 % Place 1 patch on the skin as directed by provider Daily. Remove & Discard patch within 12 hours or as directed by MD   • magnesium oxide (MAGOX) 400 (241.3 Mg) MG tablet tablet Take 400 mg by mouth Every Evening.   • Melatonin 10 MG tablet Take 1 tablet by mouth Every Night.   • methocarbamol (ROBAXIN) 750 MG tablet Take 750 mg by mouth 3 (Three) Times a Day As Needed.   • multivitamin with minerals (MULTIVITAMIN ADULT PO) Take 1 tablet by mouth Daily.   • OLANZapine (zyPREXA) 7.5 MG tablet Take 1 tablet by mouth Every Night.   • oxyCODONE (ROXICODONE) 5 MG immediate release tablet Take 1 tablet by mouth Every 6 (Six) Hours As Needed for Moderate Pain .   • pantoprazole (PROTONIX) 40 MG EC tablet Take 1 tablet by mouth 2 (Two) Times a Day Before Meals.   • potassium chloride (K-DUR,KLOR-CON) 20 MEQ CR tablet Take 1 tablet by mouth Daily As Needed.   • rifaximin (XIFAXAN) 550 MG tablet Take 1  "tablet by mouth Every 12 (Twelve) Hours.   • rOPINIRole (REQUIP) 0.5 MG tablet Take 0.5 mg by mouth Every Night. Take 1 hour before bedtime.   • spironolactone (ALDACTONE) 50 MG tablet Take 2 tablets by mouth Daily.   • tamsulosin (FLOMAX) 0.4 MG capsule 24 hr capsule Take 1 capsule by mouth Daily.   • traZODone (DESYREL) 50 MG tablet Take 50 mg by mouth Every Night.   • vitamin B-6 (PYRIDOXINE) 25 MG tablet Take 25 mg by mouth Daily.   • linaclotide (LINZESS) 72 MCG capsule capsule Take 72 mcg by mouth Every Morning Before Breakfast.   • rosuvastatin (CRESTOR) 20 MG tablet Take 1 tablet by mouth Every Night.     MEDICATION LIST AND ALLERGIES REVIEWED.    Family History   Problem Relation Age of Onset   • Colon cancer Neg Hx    • Colon polyps Neg Hx      Social History     Tobacco Use   • Smoking status: Former Smoker     Packs/day: 0.50     Types: Electronic Cigarette, Cigarettes     Quit date: 2020     Years since quittin.2   • Smokeless tobacco: Never Used   • Tobacco comment:  ppd    Substance Use Topics   • Alcohol use: Not Currently   • Drug use: No     Social History     Social History Narrative   • Not on file     FAMILY AND SOCIAL HISTORY REVIEWED.    Review of Systems  AS ABOVE OR ALL OTHER SYSTEMS REVIEWED AND ARE NEGATIVE.    Physical Exam and Clinical Information   /71   Pulse (!) 135   Temp 97.6 °F (36.4 °C) (Oral)   Resp 18   Ht 157.5 cm (62\")   Wt 91.5 kg (201 lb 11.2 oz)   SpO2 96%   BMI 36.89 kg/m²   Physical Exam:   GENERAL: Awake, no distress   HEENT: No adenopathy or thyromegaly   LUNGS: No wheezes.  No stridor   HEART: Regular tachycardia with distant heart sounds   GI: Soft, nontender   EXTREMITIES: No clubbing, cyanosis, or edema.  Cath site without hematoma or bleeding   NEURO/PSYCH: Awake and alert.  Appears appropriate and responds to questions with no focal deficit    Results from last 7 days   Lab Units 21  0607 21  2208   WBC 10*3/mm3 4.82 5.66 "   HEMOGLOBIN g/dL 11.8* 12.6   PLATELETS 10*3/mm3 157 159     Results from last 7 days   Lab Units 07/15/21  0249 07/14/21  0602 07/13/21  0436   SODIUM mmol/L 140 138 137   POTASSIUM mmol/L 4.3 3.8 4.0   CO2 mmol/L 24.0 25.0 27.0   BUN mg/dL 10 11 11   CREATININE mg/dL 0.84 0.91 0.91   MAGNESIUM mg/dL 2.1 1.9 1.9   GLUCOSE mg/dL 138* 104* 105*     Estimated Creatinine Clearance: 88.1 mL/min (by C-G formula based on SCr of 0.84 mg/dL).          Lab Results   Component Value Date    LACTATE 2.4 (C) 06/20/2021        IMAGES: Perfusion scan 7/14/2021 revealed no perfusion defects.  Chest x-ray from 7/14/2021 revealed no effusions infiltrates or consolidation    I reviewed the patient's results and images.     Assesment     Active Hospital Problems    Diagnosis    • **Anaphylactic shock - Requiring vasopressor support    • Chest pain    • Rib fractures    • Cirrhosis of liver (CMS/HCC)    • CAD (coronary artery disease)    • Depression    • History of esophageal varices    • GERD (gastroesophageal reflux disease)      Plan/Recommendations     Admit to the intensive care unit  Continue antihistamine and steroids  Wean epinephrine drip as able  Pain control  Continue antiplatelet agent  Serial hemoglobin/hematocrit    Critical Care time spent in direct patient care: 35 minutes (excluding procedure time, if applicable) including high complexity decision making to assess, manipulate, and support vital organ system failure in this individual who has impairment of one or more vital organ systems such that there is a high probability of imminent or life threatening deterioration in the patient’s condition.    GORDO Ortega MD  Pulmonary and Critical Care Medicine     CC: Toshia Mitchell, ISSAC

## 2021-07-15 NOTE — PROGRESS NOTES
[facility] Medicine Services  PROGRESS NOTE    Patient Name: Timothy Guzman  : 1975  MRN: [id number]    Date of Admission: 2021  Primary Care Physician: Toshia Mitchell APRN    Subjective   Subjective     CC:  Chest pain    HPI:  Seen earlier in day prior to heart cath. At that time felt fine. Had chronic rib pain but no pressure. No fever or chills    ROS:  No headache  No fever. No dysuria. Tolerating po    Objective   Objective     Vital Signs:   Temp:  [97.6 °F (36.4 °C)-98.1 °F (36.7 °C)] 97.6 °F (36.4 °C)  Heart Rate:  [] 135  Resp:  [16-18] 16  BP: (100-127)/(63-81) 100/63     Physical Exam:  Seen prior to heart cath this morning. At that time  Constitutional:Alert, oriented x 3, nontoxic appearing, no overt distress. Normal work of breathing. Was sitting up in bed  Psych:Normal/appropriate affect  HEENT:Ncat, oroph clear  Neck: neck supple, full range of motion  Neuro: Face symmetric, speech clear, equal , moves all extremities  Cardiac: Rrr; No pretibial pitting edema  Resp: Ctab, normal effort  GI: abd soft, nontender, obese  Skin: No extremity rash  Musculoskeletal/extremities: reproducable anterior bilateral chest       Results Reviewed:  LAB RESULTS:      Lab 21  0607 21  0245 218   WBC 4.82  --  5.66   HEMOGLOBIN 11.8*  --  12.6   HEMATOCRIT 36.1  --  37.6   PLATELETS 157  --  159   NEUTROS ABS 2.11  --  2.82   IMMATURE GRANS (ABS) 0.02  --  0.02   LYMPHS ABS 1.74  --  1.81   MONOS ABS 0.69  --  0.81   EOS ABS 0.23  --  0.17   MCV 91.6  --  90.2   D DIMER QUANT  --  1.22*  --          Lab 07/15/21  0249 21  0602 21  0436 21  2208   SODIUM 140 138 137 136   POTASSIUM 4.3 3.8 4.0 3.7   CHLORIDE 105 102 100 98   CO2 24.0 25.0 27.0 26.0   ANION GAP 11.0 11.0 10.0 12.0   BUN 10 11 11 9   CREATININE 0.84 0.91 0.91 0.93   GLUCOSE 138* 104* 105* 123*   CALCIUM 9.1 8.8 9.2 9.3   MAGNESIUM 2.1 1.9 1.9  --           Lab 07/12/21 2208   TOTAL PROTEIN 6.4   ALBUMIN 3.70   GLOBULIN 2.7   ALT (SGPT) 18   AST (SGOT) 23   BILIRUBIN 0.4   ALK PHOS 200*   LIPASE 16         Lab 07/13/21  0436 07/12/21  2329 07/12/21 2208   PROBNP  --   --  152.1   TROPONIN T <0.010 <0.010 <0.010                 Brief Urine Lab Results  (Last result in the past 365 days)      Color   Clarity   Blood   Leuk Est   Nitrite   Protein   CREAT   Urine HCG        07/15/21 0903               Negative           Microbiology Results Abnormal     Procedure Component Value - Date/Time    COVID PRE-OP / PRE-PROCEDURE SCREENING ORDER (NO ISOLATION) - Swab, Nasopharynx [[id number]]  (Normal) Collected: 07/13/21 1440    Lab Status: Final result Specimen: Swab from Nasopharynx Updated: 07/13/21 1512    Narrative:      The following orders were created for panel order COVID PRE-OP / PRE-PROCEDURE SCREENING ORDER (NO ISOLATION) - Swab, Nasopharynx.  Procedure                               Abnormality         Status                     ---------                               -----------         ------                     COVID-19, ABBOTT IN-HOUS...[[id number]]  Normal              Final result                 Please view results for these tests on the individual orders.    COVID-19, ABBOTT IN-HOUSE,NASAL Swab (NO TRANSPORT MEDIA) 2 HR TAT - Swab, Nasopharynx [[id number]]  (Normal) Collected: 07/13/21 1440    Lab Status: Final result Specimen: Swab from Nasopharynx Updated: 07/13/21 1512     COVID19 Presumptive Negative    Narrative:      Fact sheet for providers: https://www.fda.gov/media/094959/download     Fact sheet for patients: https://www.fda.gov/media/097512/download    Test performed by PCR.  If inconsistent with clinical signs and symptoms patient should be tested with different authorized molecular test.          NM Lung Scan Perfusion Particulate    Result Date: 7/15/2021  EXAMINATION: NM LUNG SCAN PERFUSION PARTICULATE-07/14/2021:  INDICATION: Rule out PE;  R07.9-Chest pain, unspecified.  TECHNIQUE: Radiopharmaceutical: 5.5 mCi of Technetium 99m MAA, IV.  COMPARISON: Portable chest radiograph of same date.  FINDINGS: Perfusion of the lungs is uniform and symmetric with no large or small perfusion defects or unusual heterogeneous areas of perfusion. The study is considered negative, very low probability for pulmonary embolus.      Impression: Very low probability for pulmonary embolus.  D:  07/14/2021 E:  07/14/2021    This report was finalized on 7/15/2021 10:02 AM by Dr. Bo Gardner MD.      XR Chest 1 View    Result Date: 7/14/2021  EXAMINATION: XR CHEST 1 VW-07/14/2021:  INDICATION: VQ comparison; R07.9-Chest pain, unspecified.  COMPARISON: 07/12/2021.  FINDINGS: The heart is normal in size. The vasculature appears within normal limits. There is some coarsening of the pulmonary interstitial markings similar to the prior exam and trace linear scarring in the left lung base. No edema, effusion, or pneumothorax is seen.      Impression: Mild chronic appearing interstitial lung changes and minimal left basilar lung scarring similar to prior studies. No clearly new chest disease is seen.  D:  07/14/2021 E:  07/14/2021     This report was finalized on 7/14/2021 10:17 PM by Dr. Bo Gardner MD.      Duplex Venous Lower Extremity - Bilateral CAR    Result Date: 7/14/2021  · The bilateral lower extremity venous duplex scan was negative for evidence of a DVT and SVT.        Results for orders placed during the hospital encounter of 06/18/21    Adult Transthoracic Echo Complete W/ Cont if Necessary Per Protocol    Interpretation Summary  · The quality of the study is limited due to patient positioning and patient being intubated.  · Left ventricular ejection fraction appears to be 51 - 55%. Left ventricular systolic function is normal.  · Left ventricular diastolic function is consistent with (grade Ia w/high LAP) impaired relaxation.  · Mildly reduced right ventricular systolic  function noted.      I have reviewed the medications:  Scheduled Meds:[MAR Hold] ascorbic acid, 1,000 mg, Oral, Daily  [MAR Hold] aspirin, 81 mg, Oral, Daily  [MAR Hold] bumetanide, 2 mg, Oral, Daily  [MAR Hold] clopidogrel, 75 mg, Oral, Daily  [MAR Hold] diazePAM, 2 mg, Oral, BID  [MAR Hold] enoxaparin, 40 mg, Subcutaneous, Q24H  [MAR Hold] FLUoxetine, 40 mg, Oral, Daily  [MAR Hold] folic acid, 1 mg, Oral, Daily  [MAR Hold] lactulose, 10 g, Oral, BID  [MAR Hold] lidocaine, 1 patch, Transdermal, Q24H  [MAR Hold] linaclotide, 72 mcg, Oral, QAM AC  [MAR Hold] magnesium oxide, 400 mg, Oral, Q PM  [MAR Hold] OLANZapine, 7.5 mg, Oral, Nightly  [MAR Hold] pantoprazole, 40 mg, Oral, BID AC  [MAR Hold] ranolazine, 500 mg, Oral, BID  riFAXIMin, 550 mg, Oral, Q12H  [MAR Hold] rosuvastatin, 20 mg, Oral, Nightly  [MAR Hold] sodium chloride, 10 mL, Intravenous, Q12H  [MAR Hold] spironolactone, 100 mg, Oral, Daily  [MAR Hold] tamsulosin, 0.4 mg, Oral, Daily  [MAR Hold] traZODone, 50 mg, Oral, Nightly  [MAR Hold] vitamin B-6, 25 mg, Oral, Daily      Continuous Infusions:EPINEPHrine 5 mg in 250 mL infusion, , Last Rate: 0.2 mcg/kg/min (07/15/21 1628)  sodium chloride, , Last Rate: 250 mL/hr (07/15/21 1559)      PRN Meds:.[MAR Hold] docusate sodium  •  EPINEPHrine 5 mg in 250 mL infusion  •  EPINEPHrine PF  •  fentaNYL citrate (PF)  •  heparin (porcine)  •  iopamidol  •  [MAR Hold] ipratropium-albuterol  •  lidocaine PF 1%  •  [MAR Hold] magnesium sulfate **OR** [MAR Hold] magnesium sulfate in D5W 1g/100mL (PREMIX) **OR** [MAR Hold] magnesium sulfate  •  [MAR Hold] melatonin  •  methylPREDNISolone sodium succinate  •  midazolam  •  niCARdipine + nitroglycerin + heparin radial artery injection  •  O2  •  ondansetron  •  [MAR Hold] oxyCODONE  •  [MAR Hold] sodium chloride  •  [MAR Hold] sodium chloride  •  sodium chloride    Assessment/Plan   Assessment & Plan     Active Hospital Problems    Diagnosis  POA   • **Chest pain [R07.9]   Yes   • Cirrhosis of liver (CMS/HCC) [K74.60]  Yes   • CAD (coronary artery disease) [I25.10]  Yes   • Rib fractures [S22.49XA]  Yes   • Depression [F32.9]  Yes   • History of esophageal varices [Z87.19]  Not Applicable   • GERD (gastroesophageal reflux disease) [K21.9]  Yes      Resolved Hospital Problems   No resolved problems to display.        Brief Hospital Course to date:  Timothy Guzman is a 45 y.o. female w/ hx cirrhosis , pancreatitis, gastritis w/ varices currently on transplant list at [facility]. Patient recently admitted from 6/18/21-7/2/21 after presenting w/ coffee emesis on 6/18/21. At that time initial workup in ED had PEA cardiac arrest while receiving iv contrast dye requiring ACLS and intubation. She had a STEMI on EKG following this event and was taken for [facility] w/ Dr. Gonzales who placed stent to the circumflex. Patient did experience some SVT postprocedurally and was placed on amiodarone and was takent to icu on mechanical ventilation. Patient subsequentaly underwent EGD on 6/19/21 and was found to have grade 1 varices, and erosive gastritis and antiplatelet meds were restarted and kept on bid protonix. Patient was discharged to rehab.  Patient was sent back to [facility] ED for chest pain described as different from her recent rib pain. Troponin negative. CT chest without contrast showed bilateral subacute rib fractures #2-8 on righ and 2-7 on left (no pneumothorax, no pneumonia). Cards consulted. D-dimer elevated at 1.22. venous duplex negative. vq negative. Underwent heart cath     *Unstable Angina, s/p PCI to a  95% circumflex lesion distal to previous stent  *Anaphylactic reaction to iv contrast during Left heart cath 7/15/21  *Hx of recent previous inferior STEMI following PEA arrest (which occurred after was given iv contrast prior to a ct scan during recent admission)  *Hx SVT during recent admission (converted w/ adenosine previous admission)  -previous admission patient coded (PEA during workup in ED  "last admission after receiving iv contrast for ct scan (was premedicated) and was found to have MI same time. -Subsequently underwent heart cath w/ contrast without additional allergic reaction  -this admission 7/15/21 underwent C premedicated w/ prednisone and benadryl and developed anphylactic reaction w/ hypotension & hypoxia. Full report pending, but per discussion w/ Dr. Gonzales 7/15/21 patient had 95% stenosis just distal to previous circumflex stent.   -asa, plavix, statin, empiric ranexa; holding b-blocker due to previous relative bradycardia and borderline low bp relative     *Recent rib fractures (from cpr received during PEA code last admission)  Elevated d-dimer with negative VQ scan & negative duplex  -unclear if musculoskeletal due to rib fractures, \"cardiac\", or thromboembolic at this point. Atypical and typical features  - CT chest without contrast showed bilateral subacute rib fractures #2-8 on righ and 2-7 on left (no pneumothorax, no pneumonia)  -d-dimer elevated at 1.22, signicance following recent stemi and chest compressions unclear  -BLE venous duplex negative  -VQ scan negative  *Hx recent GI bleed due to erosive gastritis (recent hospitalization  *Indicental grade 1 varices noted during EGD (recent hospitalization)  *Hx Cirrhosis  -egd 6/19/21: grade 1 varices without stimata of bleeding; mild erosive gastritis but no ulcers (pathology negative for h.pylori). ppi therapy recommended and ok'd to take asa & plavix; was discharged on bid ppi x 4 weeks, then once daily  -continue rifaxamin, aldactone 100mg, bumex 2mg daily  *Hx opioid dependence/chronic pain   -chronic oxycodone use; recommend weaning oxycodone at d/c and follow up pain physician  -lidoderm patch    Plan:  -I saw patient earlier in the day. I received a call from Dr. Gonzales post-procedurally following heart cath. Patient had anaphylactoid reaction during the procedure despite pre-medication. Patient did have a 95% lesion which Dr." Janet was intervened upon.   -patient s/p solumedrol, antihistimines, non-rebreather, and epinephrine and now going to the ICU post-op. Dr. Gonzales states he is calling the intensivist after our discussion to update    Am labs: bmp,mag    DVT prophylaxis:  Medical DVT prophylaxis orders are present.     CODE STATUS:   Code Status and Medical Interventions:   Ordered at: 07/13/21 0258     Code Status:    CPR     Medical Interventions (Level of Support Prior to Arrest):    Full       Filiberto Ly MD  07/15/21

## 2021-07-16 ENCOUNTER — APPOINTMENT (OUTPATIENT)
Dept: GENERAL RADIOLOGY | Facility: HOSPITAL | Age: 46
End: 2021-07-16

## 2021-07-16 LAB
ABO GROUP BLD: NORMAL
ALBUMIN SERPL-MCNC: 3.1 G/DL (ref 3.5–5.2)
ALBUMIN/GLOB SERPL: 1.4 G/DL
ALP SERPL-CCNC: 138 U/L (ref 39–117)
ALT SERPL W P-5'-P-CCNC: 16 U/L (ref 1–33)
AMMONIA BLD-SCNC: 40 UMOL/L (ref 11–51)
ANION GAP SERPL CALCULATED.3IONS-SCNC: 10 MMOL/L (ref 5–15)
ANION GAP SERPL CALCULATED.3IONS-SCNC: 9 MMOL/L (ref 5–15)
AST SERPL-CCNC: 17 U/L (ref 1–32)
BILIRUB SERPL-MCNC: 0.2 MG/DL (ref 0–1.2)
BLD GP AB SCN SERPL QL: NEGATIVE
BUN SERPL-MCNC: 13 MG/DL (ref 6–20)
BUN SERPL-MCNC: 16 MG/DL (ref 6–20)
BUN/CREAT SERPL: 16 (ref 7–25)
BUN/CREAT SERPL: 16.7 (ref 7–25)
CA-I SERPL ISE-MCNC: 1.32 MMOL/L (ref 1.12–1.32)
CALCIUM SPEC-SCNC: 8.6 MG/DL (ref 8.6–10.5)
CALCIUM SPEC-SCNC: 8.7 MG/DL (ref 8.6–10.5)
CHLORIDE SERPL-SCNC: 104 MMOL/L (ref 98–107)
CHLORIDE SERPL-SCNC: 105 MMOL/L (ref 98–107)
CO2 SERPL-SCNC: 21 MMOL/L (ref 22–29)
CO2 SERPL-SCNC: 22 MMOL/L (ref 22–29)
CREAT SERPL-MCNC: 0.81 MG/DL (ref 0.57–1)
CREAT SERPL-MCNC: 0.96 MG/DL (ref 0.57–1)
D-LACTATE SERPL-SCNC: 1.9 MMOL/L (ref 0.5–2)
D-LACTATE SERPL-SCNC: 2.3 MMOL/L (ref 0.5–2)
DEPRECATED RDW RBC AUTO: 47.8 FL (ref 37–54)
DEPRECATED RDW RBC AUTO: 50.4 FL (ref 37–54)
ERYTHROCYTE [DISTWIDTH] IN BLOOD BY AUTOMATED COUNT: 14.2 % (ref 12.3–15.4)
ERYTHROCYTE [DISTWIDTH] IN BLOOD BY AUTOMATED COUNT: 14.7 % (ref 12.3–15.4)
GFR SERPL CREATININE-BSD FRML MDRD: 63 ML/MIN/1.73
GFR SERPL CREATININE-BSD FRML MDRD: 76 ML/MIN/1.73
GLOBULIN UR ELPH-MCNC: 2.2 GM/DL
GLUCOSE BLDC GLUCOMTR-MCNC: 209 MG/DL (ref 70–130)
GLUCOSE SERPL-MCNC: 192 MG/DL (ref 65–99)
GLUCOSE SERPL-MCNC: 205 MG/DL (ref 65–99)
HCT VFR BLD AUTO: 35.2 % (ref 34–46.6)
HCT VFR BLD AUTO: 38.6 % (ref 34–46.6)
HGB BLD-MCNC: 11.4 G/DL (ref 12–15.9)
HGB BLD-MCNC: 12.4 G/DL (ref 12–15.9)
MAGNESIUM SERPL-MCNC: 2 MG/DL (ref 1.6–2.6)
MCH RBC QN AUTO: 29.8 PG (ref 26.6–33)
MCH RBC QN AUTO: 30.2 PG (ref 26.6–33)
MCHC RBC AUTO-ENTMCNC: 32.1 G/DL (ref 31.5–35.7)
MCHC RBC AUTO-ENTMCNC: 32.4 G/DL (ref 31.5–35.7)
MCV RBC AUTO: 92.8 FL (ref 79–97)
MCV RBC AUTO: 93.4 FL (ref 79–97)
NT-PROBNP SERPL-MCNC: 616.3 PG/ML (ref 0–450)
PHOSPHATE SERPL-MCNC: 2.6 MG/DL (ref 2.5–4.5)
PLATELET # BLD AUTO: 123 10*3/MM3 (ref 140–450)
PLATELET # BLD AUTO: 144 10*3/MM3 (ref 140–450)
PMV BLD AUTO: 10.3 FL (ref 6–12)
PMV BLD AUTO: 10.6 FL (ref 6–12)
POTASSIUM SERPL-SCNC: 3.8 MMOL/L (ref 3.5–5.2)
POTASSIUM SERPL-SCNC: 4 MMOL/L (ref 3.5–5.2)
PROT SERPL-MCNC: 5.3 G/DL (ref 6–8.5)
QT INTERVAL: 360 MS
QT INTERVAL: 386 MS
QTC INTERVAL: 467 MS
QTC INTERVAL: 540 MS
RBC # BLD AUTO: 3.77 10*6/MM3 (ref 3.77–5.28)
RBC # BLD AUTO: 4.16 10*6/MM3 (ref 3.77–5.28)
RH BLD: POSITIVE
SODIUM SERPL-SCNC: 135 MMOL/L (ref 136–145)
SODIUM SERPL-SCNC: 136 MMOL/L (ref 136–145)
T&S EXPIRATION DATE: NORMAL
TROPONIN T SERPL-MCNC: <0.01 NG/ML (ref 0–0.03)
WBC # BLD AUTO: 12.83 10*3/MM3 (ref 3.4–10.8)
WBC # BLD AUTO: 8.33 10*3/MM3 (ref 3.4–10.8)

## 2021-07-16 PROCEDURE — 71045 X-RAY EXAM CHEST 1 VIEW: CPT

## 2021-07-16 PROCEDURE — 83735 ASSAY OF MAGNESIUM: CPT | Performed by: INTERNAL MEDICINE

## 2021-07-16 PROCEDURE — 25010000002 METHYLPREDNISOLONE PER 125 MG: Performed by: INTERNAL MEDICINE

## 2021-07-16 PROCEDURE — 86850 RBC ANTIBODY SCREEN: CPT | Performed by: PHYSICIAN ASSISTANT

## 2021-07-16 PROCEDURE — 86901 BLOOD TYPING SEROLOGIC RH(D): CPT | Performed by: PHYSICIAN ASSISTANT

## 2021-07-16 PROCEDURE — 25010000002 MORPHINE PER 10 MG: Performed by: PHYSICIAN ASSISTANT

## 2021-07-16 PROCEDURE — 93010 ELECTROCARDIOGRAM REPORT: CPT | Performed by: INTERNAL MEDICINE

## 2021-07-16 PROCEDURE — 83605 ASSAY OF LACTIC ACID: CPT | Performed by: INTERNAL MEDICINE

## 2021-07-16 PROCEDURE — 85027 COMPLETE CBC AUTOMATED: CPT | Performed by: PHYSICIAN ASSISTANT

## 2021-07-16 PROCEDURE — 83880 ASSAY OF NATRIURETIC PEPTIDE: CPT | Performed by: PHYSICIAN ASSISTANT

## 2021-07-16 PROCEDURE — 93005 ELECTROCARDIOGRAM TRACING: CPT | Performed by: INTERNAL MEDICINE

## 2021-07-16 PROCEDURE — 25010000002 ENOXAPARIN PER 10 MG: Performed by: INTERNAL MEDICINE

## 2021-07-16 PROCEDURE — 99232 SBSQ HOSP IP/OBS MODERATE 35: CPT | Performed by: INTERNAL MEDICINE

## 2021-07-16 PROCEDURE — 85027 COMPLETE CBC AUTOMATED: CPT | Performed by: INTERNAL MEDICINE

## 2021-07-16 PROCEDURE — 25010000002 ONDANSETRON PER 1 MG: Performed by: NURSE PRACTITIONER

## 2021-07-16 PROCEDURE — 82330 ASSAY OF CALCIUM: CPT | Performed by: INTERNAL MEDICINE

## 2021-07-16 PROCEDURE — 99233 SBSQ HOSP IP/OBS HIGH 50: CPT | Performed by: INTERNAL MEDICINE

## 2021-07-16 PROCEDURE — 80053 COMPREHEN METABOLIC PANEL: CPT | Performed by: INTERNAL MEDICINE

## 2021-07-16 PROCEDURE — 82140 ASSAY OF AMMONIA: CPT | Performed by: INTERNAL MEDICINE

## 2021-07-16 PROCEDURE — 82962 GLUCOSE BLOOD TEST: CPT

## 2021-07-16 PROCEDURE — 86900 BLOOD TYPING SEROLOGIC ABO: CPT | Performed by: PHYSICIAN ASSISTANT

## 2021-07-16 PROCEDURE — 84484 ASSAY OF TROPONIN QUANT: CPT | Performed by: PHYSICIAN ASSISTANT

## 2021-07-16 PROCEDURE — 84100 ASSAY OF PHOSPHORUS: CPT | Performed by: INTERNAL MEDICINE

## 2021-07-16 RX ORDER — OXYCODONE HYDROCHLORIDE AND ACETAMINOPHEN 5; 325 MG/1; MG/1
1 TABLET ORAL EVERY 6 HOURS PRN
Status: DISCONTINUED | OUTPATIENT
Start: 2021-07-16 | End: 2021-07-17

## 2021-07-16 RX ORDER — HEPARIN SODIUM 5000 [USP'U]/ML
5000 INJECTION, SOLUTION INTRAVENOUS; SUBCUTANEOUS EVERY 8 HOURS SCHEDULED
Status: DISCONTINUED | OUTPATIENT
Start: 2021-07-17 | End: 2021-07-20 | Stop reason: HOSPADM

## 2021-07-16 RX ORDER — ALPRAZOLAM 0.25 MG/1
0.25 TABLET ORAL ONCE
Status: COMPLETED | OUTPATIENT
Start: 2021-07-17 | End: 2021-07-17

## 2021-07-16 RX ORDER — METHYLPREDNISOLONE SODIUM SUCCINATE 125 MG/2ML
60 INJECTION, POWDER, LYOPHILIZED, FOR SOLUTION INTRAMUSCULAR; INTRAVENOUS EVERY 8 HOURS SCHEDULED
Status: COMPLETED | OUTPATIENT
Start: 2021-07-16 | End: 2021-07-16

## 2021-07-16 RX ORDER — ONDANSETRON 2 MG/ML
4 INJECTION INTRAMUSCULAR; INTRAVENOUS EVERY 6 HOURS PRN
Status: DISCONTINUED | OUTPATIENT
Start: 2021-07-16 | End: 2021-07-20 | Stop reason: HOSPADM

## 2021-07-16 RX ORDER — MORPHINE SULFATE 2 MG/ML
0.5 INJECTION, SOLUTION INTRAMUSCULAR; INTRAVENOUS ONCE AS NEEDED
Status: COMPLETED | OUTPATIENT
Start: 2021-07-16 | End: 2021-07-16

## 2021-07-16 RX ADMIN — TRAZODONE HYDROCHLORIDE 50 MG: 50 TABLET ORAL at 21:43

## 2021-07-16 RX ADMIN — METHYLPREDNISOLONE SODIUM SUCCINATE 60 MG: 125 INJECTION, POWDER, FOR SOLUTION INTRAMUSCULAR; INTRAVENOUS at 01:48

## 2021-07-16 RX ADMIN — ONDANSETRON 4 MG: 2 INJECTION INTRAMUSCULAR; INTRAVENOUS at 18:45

## 2021-07-16 RX ADMIN — FLUOXETINE HYDROCHLORIDE 40 MG: 20 CAPSULE ORAL at 09:25

## 2021-07-16 RX ADMIN — TAMSULOSIN HYDROCHLORIDE 0.4 MG: 0.4 CAPSULE ORAL at 09:21

## 2021-07-16 RX ADMIN — SODIUM CHLORIDE, PRESERVATIVE FREE 10 ML: 5 INJECTION INTRAVENOUS at 21:44

## 2021-07-16 RX ADMIN — LACTULOSE 10 G: 20 SOLUTION ORAL at 09:26

## 2021-07-16 RX ADMIN — ENOXAPARIN SODIUM 40 MG: 40 INJECTION SUBCUTANEOUS at 09:25

## 2021-07-16 RX ADMIN — SPIRONOLACTONE 100 MG: 25 TABLET ORAL at 09:26

## 2021-07-16 RX ADMIN — OXYCODONE 10 MG: 5 TABLET ORAL at 01:48

## 2021-07-16 RX ADMIN — OXYCODONE HYDROCHLORIDE AND ACETAMINOPHEN 1 TABLET: 5; 325 TABLET ORAL at 15:36

## 2021-07-16 RX ADMIN — FOLIC ACID 1 MG: 1 TABLET ORAL at 09:20

## 2021-07-16 RX ADMIN — PANTOPRAZOLE SODIUM 40 MG: 40 TABLET, DELAYED RELEASE ORAL at 09:22

## 2021-07-16 RX ADMIN — BUMETANIDE 2 MG: 2 TABLET ORAL at 09:25

## 2021-07-16 RX ADMIN — LIDOCAINE 1 PATCH: 50 PATCH CUTANEOUS at 09:27

## 2021-07-16 RX ADMIN — METHYLPREDNISOLONE SODIUM SUCCINATE 60 MG: 125 INJECTION, POWDER, FOR SOLUTION INTRAMUSCULAR; INTRAVENOUS at 17:14

## 2021-07-16 RX ADMIN — OLANZAPINE 7.5 MG: 5 TABLET, FILM COATED ORAL at 21:43

## 2021-07-16 RX ADMIN — PYRIDOXINE HCL TAB 50 MG 25 MG: 50 TAB at 09:20

## 2021-07-16 RX ADMIN — CLOPIDOGREL BISULFATE 75 MG: 75 TABLET ORAL at 09:22

## 2021-07-16 RX ADMIN — ROSUVASTATIN CALCIUM 20 MG: 20 TABLET, COATED ORAL at 21:43

## 2021-07-16 RX ADMIN — DIAZEPAM 2 MG: 2 TABLET ORAL at 09:21

## 2021-07-16 RX ADMIN — SODIUM CHLORIDE, PRESERVATIVE FREE 10 ML: 5 INJECTION INTRAVENOUS at 09:27

## 2021-07-16 RX ADMIN — OXYCODONE 10 MG: 5 TABLET ORAL at 09:22

## 2021-07-16 RX ADMIN — OXYCODONE HYDROCHLORIDE AND ACETAMINOPHEN 1000 MG: 500 TABLET ORAL at 09:21

## 2021-07-16 RX ADMIN — METHYLPREDNISOLONE SODIUM SUCCINATE 60 MG: 125 INJECTION, POWDER, FOR SOLUTION INTRAMUSCULAR; INTRAVENOUS at 09:22

## 2021-07-16 RX ADMIN — RIFAXIMIN 550 MG: 550 TABLET ORAL at 09:20

## 2021-07-16 RX ADMIN — PANTOPRAZOLE SODIUM 40 MG: 40 TABLET, DELAYED RELEASE ORAL at 17:13

## 2021-07-16 RX ADMIN — Medication 400 MG: at 17:13

## 2021-07-16 RX ADMIN — ASPIRIN 81 MG: 81 TABLET, COATED ORAL at 09:24

## 2021-07-16 RX ADMIN — MORPHINE SULFATE 0.5 MG: 2 INJECTION, SOLUTION INTRAMUSCULAR; INTRAVENOUS at 22:19

## 2021-07-16 RX ADMIN — RIFAXIMIN 550 MG: 550 TABLET ORAL at 21:43

## 2021-07-16 RX ADMIN — RANOLAZINE 500 MG: 500 TABLET, EXTENDED RELEASE ORAL at 22:19

## 2021-07-16 RX ADMIN — RANOLAZINE 500 MG: 500 TABLET, EXTENDED RELEASE ORAL at 09:21

## 2021-07-16 NOTE — NURSING NOTE
Pt requesting pain medicine at start of shift, pt states that she was sore and has been taking more pain meds at home. Around 2200, pt stated that she had chest pain, a stat EKG was obtained which showed NSR, NP was called and labs for troponin were ordered if pain did not resolve. Pt was offered Nitro and refused due to pt claiming it gave her a headache last time she took it. Pt is requesting Morphine or Dilaudid for her chest pain, she said it's helped her in the past. Troponin results back and they are also WNLs.

## 2021-07-16 NOTE — PAYOR COMM NOTE
"Patient was admitted as OBS on 21, transferred to ICU on 7/15/21 and was changed to INPT.    Pamela Vail RN CM  Phone [phone]  Fax [phone]        Timothy Guzman (45 y.o. Female)     Date of Birth Social Security Number Address Home Phone MRN    1975  [id number]  [facility] [address] [phone] [id number]    Congregation Marital Status          Sikh        Admission Date Admission Type Admitting Provider Attending Provider Department, Room/Bed    21 Emergency Keira Adams DO Thompson, John Randall, MD [facility] 2B ICU, N237/    Discharge Date Discharge Disposition Discharge Destination                       Attending Provider: Nitesh Ortega MD    Allergies: Contrast Dye, Nsaids, Tylenol [Acetaminophen]    Isolation: None   Infection: None   Code Status: CPR    Ht: 157.5 cm (62\")   Wt: 91.5 kg (201 lb 11.2 oz)    Admission Cmt: None   Principal Problem: Anaphylactic shock - Requiring vasopressor support [T78.2XXA]                 Active Insurance as of 2021     Primary Coverage     Payor Plan Insurance Group Employer/Plan Group    [employer] [facility] [employer] [id number]     Payor Plan Address Payor Plan Phone Number Payor Plan Fax Number Effective Dates    [address]   2021 - None Entered    [facility] [id number]       Subscriber Name Subscriber Birth Date Member ID       TIMOTHY GUZMAN JEM 1975 [id number]                 Emergency Contacts      (Rel.) Home Phone Work Phone Mobile Phone    devon yanci (Mother) [phone] -- --    DEVON FREDO (Father) [phone] -- --               History & Physical      Precious Sparks DO at 21 0222              [facility] Medicine Services  HISTORY AND PHYSICAL    Patient Name: Timothy Guzman  : 1975  MRN: [id number]  Primary Care Physician: Toshia Mitchell, ISSAC  Date of admission: " "7/12/2021    Subjective   Subjective     Chief Complaint:  Chest pain     HPI:  Timothy Guzman is a 45 y.o. female w/ a hx of alcoholic cirrhosis, recurrent pancreatitis, gastritis w/ esophageal varices (s/p banding), CAD w/ recent inferior STEMI following PEA cardiac arrest (occurred after given IV contrast), depression who presented to the ED w/ c/o chest pain.   Pt w/ recent admit to [facility] 6/18 through 7/2. Pt initially presented to the ED w/ c/o vomiting blood. During patient's initial workup patient had a PEA cardiac arrest while receiving iv contrast dye, requiring ACLS & intubation. She had a STEMI on EKG following this event and was taken for [facility] w/ Dr. Gonzales who placed stent to the circumflex. Patient did experience some SVT postprocedurally and was placed on amiodarone and was takent to icu on mechanical ventilation. Patient subsequentaly underwent EGD on 6/19/21 and was found to have grade 1 varices, and erosive gastritis and antiplatelet meds were restarted. Pt was d/c to [facility] for cardiac rehab on 7/2.   Pt presented to the ED tonight w/ c/o chest pain. She reports that since d/c she has been doing well other than ongoing bilateral rib pain 2/2 rib fractures caused from compressions. Today she completed physical therapy and about 30 minutes later she developed heaviness and pressure in her central chest while resting in her bed. She reports that the heaviness in her chest was different than the pain she has been experiencing w/ her rib fractures. Pt reports associated mild dyspnea and nausea w/ the chest pressure onset. At worst pressure rated 8/10. Pt reports that she has noticed \"mild\" dyspnea intermittently since her discharge. Pt's chest heaviness lasted ~ 3 hours before improving w/ Dilaudid and Morphine in the ED. At time of exam, pt's chest pressure rated 6/10.   Pt denies fever/chills, cough, V/D, abdominal pain, dysuria, edema, syncope, confusion.   Pt evaluated in the ED. CXR without " acute findings. Troponin x2 WNL. EKG stable. Pt admitted to the hospital medicine service for further evaluation.     COVID Details:    Symptoms:    [] NONE [] Fever []  Cough [x] Shortness of breath [] Change in taste/smell      The patient qualifies to receive the vaccine, but they have not yet received it.    Review of Systems   Constitutional: Negative.  Negative for chills, fatigue and fever.   HENT: Negative.  Negative for congestion, rhinorrhea, sinus pressure, sinus pain, sneezing, sore throat and trouble swallowing.    Eyes: Negative.  Negative for visual disturbance.   Respiratory: Positive for shortness of breath. Negative for cough, wheezing and stridor.    Cardiovascular: Positive for chest pain. Negative for palpitations and leg swelling.   Gastrointestinal: Positive for nausea. Negative for abdominal distention, abdominal pain, blood in stool, constipation, diarrhea and vomiting.   Endocrine: Negative.    Genitourinary: Negative.  Negative for decreased urine volume, difficulty urinating, dysuria, flank pain, frequency, hematuria, pelvic pain and urgency.   Musculoskeletal: Negative for arthralgias, myalgias, neck pain and neck stiffness.        Bilateral rib pain 2/2 known rib fractures.    Skin: Negative.  Negative for wound.   Allergic/Immunologic: Negative.  Negative for immunocompromised state.   Neurological: Negative.  Negative for dizziness, tremors, seizures, syncope, facial asymmetry, speech difficulty, weakness, light-headedness, numbness and headaches.   Hematological: Negative.  Does not bruise/bleed easily.   Psychiatric/Behavioral: Negative.  Negative for confusion.   All other systems reviewed and are negative.     Personal History     Past Medical History:   Diagnosis Date   • Acute pancreatitis    • Alcoholism (CMS/HCC)    • Arthritis    • Bone necrosis (CMS/HCC)    • CAD (coronary artery disease) 7/13/2021   • Cirrhosis (CMS/HCC)    • Gastroparesis    • GERD (gastroesophageal reflux  disease)    • History of esophageal varices    • History of kidney stones    • Hypertension    • Pancreatitis        Past Surgical History:   Procedure Laterality Date   • APPENDECTOMY     • CARDIAC CATHETERIZATION N/A 2021    Procedure: Left Heart Cath;  Surgeon: Vikram Gonzales MD;  Location: [facility] CATH INVASIVE LOCATION;  Service: Cardiovascular;  Laterality: N/A;   •  SECTION     • CHOLECYSTECTOMY     • COLONOSCOPY     • COLONOSCOPY N/A 3/31/2020    Procedure: COLONOSCOPY;  Surgeon: Tirso Giles MD;  Location:  [facility];  Service: Gastroenterology;  Laterality: N/A;   • ENDOSCOPY     • ENDOSCOPY     • ENDOSCOPY N/A 2021    Procedure: ESOPHAGOGASTRODUODENOSCOPY AT BEDSIDE;  Surgeon: Tyrese Rasmussen MD;  Location:  [facility];  Service: Gastroenterology;  Laterality: N/A;   • GALLBLADDER SURGERY     • REPLACEMENT TOTAL HIP LATERAL POSITION Left    • TOTAL KNEE ARTHROPLASTY Left        Family History: family history is not on file. Otherwise pertinent FHx was reviewed and unremarkable.     Social History:  reports that she quit smoking about 14 months ago. Her smoking use included electronic cigarette and cigarettes. She smoked 0.50 packs per day. She has never used smokeless tobacco. She reports previous alcohol use. She reports that she does not use drugs.  Social History     Social History Narrative   • Not on file       Medications:  Cholecalciferol, FLUoxetine, Melatonin, OLANZapine, ascorbic acid, aspirin, bumetanide, clopidogrel, docusate sodium, famotidine, folic acid, ipratropium-albuterol, lactulose, lidocaine, linaclotide, magnesium oxide, methocarbamol, multivitamin with minerals, oxyCODONE, pantoprazole, potassium chloride, rOPINIRole, riFAXIMin, rosuvastatin, spironolactone, tamsulosin, traZODone, vitamin B-6, and vitamin b complex    Allergies   Allergen Reactions   • Contrast Dye Anaphylaxis     Pt coded after receiving contrast for a CT scan. Was  premedicated. Was having an MI at the same time. Immediately underwent heart cath with contrast without additional allergic reaction   • Nsaids GI Bleeding   • Tylenol [Acetaminophen] Other (See Comments)     CIRRHOSIS       Objective   Objective     Vital Signs:   Temp:  [99 °F (37.2 °C)] 99 °F (37.2 °C)  Heart Rate:  [90-99] 92  Resp:  [16] 16  BP: (101-115)/(68-79) 101/70    Physical Exam     Constitutional: Awake, alert; non-toxic appearing   Eyes: PERRLA, sclerae anicteric, no conjunctival injection  HENT: NCAT, mucous membranes moist  Neck: Supple, no thyromegaly, no lymphadenopathy, trachea midline  Respiratory: Clear to auscultation bilaterally, nonlabored respirations   Cardiovascular: RRR, no murmurs, rubs, or gallops, no peripheral edema   Gastrointestinal: Positive bowel sounds, soft, nontender, nondistended  Musculoskeletal: Normal ROM bilaterally   Psychiatric: Appropriate affect, cooperative  Neurologic: Oriented x 3, strength symmetric in all extremities, Cranial Nerves grossly intact to confrontation, speech clear  Skin: No rashes, lesions or wounds     Results Reviewed:  I have personally reviewed most recent indicated data and agree with findings including:  [x]  Laboratory  [x]  Radiology  []  EKG/Telemetry  []  Pathology  []  Cardiac/Vascular Studies  []  Old records  []  Other:  Most pertinent findings include:    LAB RESULTS:      Lab 07/12/21 2208   WBC 5.66   HEMOGLOBIN 12.6   HEMATOCRIT 37.6   PLATELETS 159   NEUTROS ABS 2.82   IMMATURE GRANS (ABS) 0.02   LYMPHS ABS 1.81   MONOS ABS 0.81   EOS ABS 0.17   MCV 90.2         Lab 07/12/21 2208   SODIUM 136   POTASSIUM 3.7   CHLORIDE 98   CO2 26.0   ANION GAP 12.0   BUN 9   CREATININE 0.93   GLUCOSE 123*   CALCIUM 9.3         Lab 07/12/21 2208   TOTAL PROTEIN 6.4   ALBUMIN 3.70   GLOBULIN 2.7   ALT (SGPT) 18   AST (SGOT) 23   BILIRUBIN 0.4   ALK PHOS 200*   LIPASE 16         Lab 07/12/21 2329 07/12/21 2208   PROBNP  --  152.1   TROPONIN T  <0.010 <0.010                 Brief Urine Lab Results  (Last result in the past 365 days)      Color   Clarity   Blood   Leuk Est   Nitrite   Protein   CREAT   Urine HCG        06/18/21 1924 Yellow Clear Negative Negative Negative Negative             Microbiology Results (last 10 days)     ** No results found for the last 240 hours. **          XR Chest 1 View    Result Date: 7/12/2021  CR Chest 1 Vw INDICATION: Chest pain, right-sided broken ribs COMPARISON:  None available. FINDINGS: Single portable AP view(s) of the chest.  The heart and mediastinal contours are normal. The lungs are clear. No definite large pneumothorax or pleural effusion. Patient's broken ribs are not well visualized on this x-ray.     Impression: No definite acute cardiopulmonary findings. If there is concern for subtle injury or other abnormality, consider follow-up chest CT. Signer Name: Luz Marina Pace MD  Signed: 7/12/2021 9:53 PM  Workstation Name: [id number]  [facility]      Results for orders placed during the hospital encounter of 06/18/21    Adult Transthoracic Echo Complete W/ Cont if Necessary Per Protocol    Interpretation Summary  · The quality of the study is limited due to patient positioning and patient being intubated.  · Left ventricular ejection fraction appears to be 51 - 55%. Left ventricular systolic function is normal.  · Left ventricular diastolic function is consistent with (grade Ia w/high LAP) impaired relaxation.  · Mildly reduced right ventricular systolic function noted.      Assessment/Plan   Assessment & Plan       Chest pain    History of esophageal varices    GERD (gastroesophageal reflux disease)    Depression    Cirrhosis of liver (CMS/HCC)    CAD (coronary artery disease)    Timothy Guzman is a 45 y.o. female w/ a hx of alcoholic cirrhosis, recurrent pancreatitis, gastritis w/ esophageal varices (s/p banding), CAD w/ recent inferior STEMI following PEA cardiac arrest (occurred after  "given IV contrast), depression who presented to the ED w/ c/o chest pain.     **Chest pressure/heaviness  **CAD s/p stent to circumflex by Dr. Gonzales (100% AV groove stenosis status post ANGELICA x1); 6/18/2021   -troponin x 2 WNL; trend   -serial EKGs  -continue routine ASA, Plavix, Statin  -Npo for now   -s/p NS 500ml bolus; holding further IVF for now   -d.dimer pending   -CT Chest without contrast fractured ribs, no pneumothorax   -CXR without acute findings  -given IV Morphine and Dilaudid w/ minimal, brief improvement   -Cardiology consulted    **Cirrhosis of the liver; compensated   **Hx Upper GI bleed 2/2 erosive gastritis and grade 1 varices s/p banding/EGD 6/19   -continue Protonix; GI recommended BID PPI x 4 wks (from 7/2 through 7/30) then daily   -continue routine Bumex, Rifaximin, aldactone, lactulose    **Depression  -continue routine Zyprexa, Prozac     DVT prophylaxis:  Lovenox     CODE STATUS:  OBSERVATION status, however if further evaluation or treatment plans warrant, status may change.  Based upon current information, I predict patient's care encounter to be less than or equal to 2 midnights.    There are no questions and answers to display.     This note has been completed as part of a split-shared workflow.     Signature: Electronically signed by ISSAC Aldana, 07/13/21, 3:00 AM EDT.        Attending   Admission Attestation       I have seen and examined the patient, performing an independent face-to-face diagnostic evaluation with plan of care reviewed and developed with the advanced practice clinician (APC).      Brief Summary Statement:   Timothy Guzman is a 45 y.o. female with PMH of etoh cirrhosis and CAD was sent from [facility] with chest pain that she states occurred at rest. She described the pain as pressure and \"different from her rib\" pain. She states that her rib pain was not well controlled at [facility] either. Currently she is not short of breath and has pain intermittently in her " back/ribs.     Remainder of detailed HPI is as noted by APC and has been reviewed and/or edited by me for completeness.    Attending Physical Exam:  Constitutional: Awake, alert  Eyes: PERRLA, sclerae anicteric, no conjunctival injection  HENT: NCAT, mucous membranes moist  Neck: Supple, no thyromegaly, no lymphadenopathy, trachea midline  Respiratory: Clear to auscultation bilaterally, nonlabored respirations   Cardiovascular: RRR, no murmurs, rubs, or gallops,  Gastrointestinal: Positive bowel sounds, soft, nontender, nondistended  Musculoskeletal: No bilateral ankle edema, no clubbing or cyanosis to extremities  Psychiatric: Appropriate affect, cooperative  Neurologic: Oriented x 3, strength symmetric in all extremities, Cranial Nerves grossly intact to confrontation, speech clear  Skin: No rashes      Brief Assessment/Plan :  See detailed assessment and plan developed with APC which I have reviewed and/or edited for completeness.        Admission Status: I believe that this patient meets OBSERVATION status, however if further evaluation or treatment plans warrant, status may change.  Based upon current information, I predict patient's care encounter to be less than or equal to 2 midnights.        Precious Sparks DO  07/13/21                    Electronically signed by Precious Sparks DO at 07/13/21 0942       Vital Signs (last day)     Date/Time   Temp   Temp src   Pulse   Resp   BP   Patient Position   SpO2    07/16/21 0600   --   --   74   --   96/56   --   97    07/16/21 0500   --   --   75   --   100/61   --   95    07/16/21 0400   98.2 (36.8)   Oral   76   18   103/59   --   96    07/16/21 0300   --   --   79   --   94/56   --   95    07/16/21 0200   --   --   85   --   104/70   --   (!) 86    07/16/21 0100   --   --   83   --   100/62   --   100    07/16/21 0000   98.2 (36.8)   Oral   86   18   103/60   --   100    07/15/21 2300   --   --   87   --   109/61   --   98    07/15/21 2200    --   --   90   --   106/67   --   94    07/15/21 2100   --   --   107   --   108/63   --   95    07/15/21 2000   98.4 (36.9)   Oral   98   18   104/71   --   95    07/15/21 1900   --   --   109   --   107/75   --   95    07/15/21 1830   --   --   116   --   127/77   --   95    07/15/21 1800   --   --   118   --   98/76   Lying   98    07/15/21 1745   --   --   (!) 121   18   104/70   Lying   95    07/15/21 1730   --   --   (!) 126   --   118/73   --   96    07/15/21 1700   --   --   (!) 135   --   109/71   --   96    07/15/21 16:53:12   --   --   84   18   107/75   --   95    07/15/21 16:34:02   --   --   (!) 135   16   100/63   --   --    07/15/21 15:25:09   --   --   91   16   127/81   --   92    07/15/21 1055   97.6 (36.4)   Oral   84   --   120/78   Lying   --    07/15/21 0704   97.8 (36.6)   Oral   62   --   112/70   Lying   --    07/15/21 0246   97.8 (36.6)   Oral   75   18   106/64   Lying   95              Oxygen Therapy (last day)     Date/Time   SpO2   Device (Oxygen Therapy)   Flow (L/min)   Oxygen Concentration (%)   ETCO2 (mmHg)    07/16/21 0600   97   nasal cannula   4   --   --    07/16/21 0500   95   --   --   --   --    07/16/21 0400   96   nasal cannula   4   --   --    07/16/21 0300   95   --   --   --   --    07/16/21 0200   (!) 86   nasal cannula   4   --   --    07/16/21 0100   100   --   --   --   --    07/16/21 0000   100   nasal cannula   4   --   --    07/15/21 2300   98   --   --   --   --    07/15/21 2200   94   nasal cannula   3   --   --    07/15/21 2100   95   --   --   --   --    07/15/21 2000   95   nasal cannula   3   --   --    07/15/21 1900   95   --   --   --   --    07/15/21 1830   95   --   --   --   --    07/15/21 1800   98   room air   --   --   --    07/15/21 1745   95   room air   --   --   --    07/15/21 1730   96   --   --   --   --    07/15/21 1700   96   nasal cannula   6   --   --    07/15/21 16:53:12   95   --   --   --   --    07/15/21 15:25:09   92   --   --   --    --    07/15/21 1400   --   room air   --   --   --    07/15/21 1200   --   room air   --   --   --    07/15/21 1000   --   room air   --   --   --    07/15/21 0800   --   room air   --   --   --    07/15/21 0625   --   room air   --   --   --    07/15/21 0420   --   room air   --   --   --    07/15/21 0246   95   --   --   --   --    07/15/21 0230   --   room air   --   --   --    07/15/21 0020   --   room air   --   --   --                Facility-Administered Medications as of 7/16/2021   Medication Dose Route Frequency Provider Last Rate Last Admin   • [COMPLETED] acetaminophen (TYLENOL) tablet 650 mg  650 mg Oral Once Luz Marina Liao APRN   650 mg at 07/14/21 2320   • ascorbic acid (VITAMIN C) tablet 1,000 mg  1,000 mg Oral Daily Vikram Gonzales MD   1,000 mg at 07/15/21 0915   • aspirin EC tablet 81 mg  81 mg Oral Daily Vikram Gonzales MD   81 mg at 07/15/21 0921   • bumetanide (BUMEX) tablet 2 mg  2 mg Oral Daily Vikram Gonzales MD   2 mg at 07/15/21 0920   • clopidogrel (PLAVIX) tablet 75 mg  75 mg Oral Daily Vikram Gonzales MD   75 mg at 07/15/21 0922   • diazePAM (VALIUM) tablet 2 mg  2 mg Oral BID Vikram Gonzales MD   2 mg at 07/15/21 2056   • [COMPLETED] diphenhydrAMINE (BENADRYL) injection 50 mg  50 mg Intravenous Once Vikram Gonzales MD   50 mg at 07/15/21 1228   • docusate sodium (COLACE) capsule 100 mg  100 mg Oral BID PRN Vikram Gonzales MD       • enoxaparin (LOVENOX) syringe 40 mg  40 mg Subcutaneous Q24H Vikram Gonzales MD   40 mg at 07/14/21 0822   • [COMPLETED] EPINEPHrine (ADRENALIN) 5,000 mcg in sodium chloride 0.9 % 250 mL infusion    Continuous PRN Vikram Gonzales MD   Stopped at 07/15/21 1800   • FLUoxetine (PROzac) capsule 40 mg  40 mg Oral Daily Vikram Gonzales MD   40 mg at 07/15/21 0917   • folic acid (FOLVITE) tablet 1 mg  1 mg Oral Daily Vikram Gonzales MD   1 mg at 07/15/21 0919   • [COMPLETED] HYDROcodone-acetaminophen (NORCO) 5-325 MG per tablet 1 tablet  1 tablet Oral Once Keira Adams, DO    1 tablet at 07/13/21 0503   • [COMPLETED] HYDROmorphone (DILAUDID) injection 0.5 mg  0.5 mg Intravenous Once Byron Collado DO   0.5 mg at 07/12/21 2328   • [COMPLETED] HYDROmorphone (DILAUDID) injection 1 mg  1 mg Intravenous Once Byron Collado DO   1 mg at 07/13/21 0125   • ipratropium-albuterol (DUO-NEB) nebulizer solution 3 mL  3 mL Nebulization Q6H PRN Vikram Gonzales MD       • lactulose (CHRONULAC) 10 GM/15ML solution 10 g  10 g Oral BID Vikram Gonzales MD   10 g at 07/15/21 2053   • lidocaine (LIDODERM) 5 % 1 patch  1 patch Transdermal Q24H Vikram Gonzales MD   1 patch at 07/15/21 0928   • linaclotide (LINZESS) capsule 72 mcg - Patient Supplied  72 mcg Oral QAM AC Vikram Gonzales MD       • magnesium oxide (MAG-OX) tablet 400 mg  400 mg Oral Q PM Vikram Gonzales MD   400 mg at 07/14/21 1715   • magnesium sulfate 4 gram infusion - Mg less than or equal to 1mg/dL  4 g Intravenous PRN Vikram Gonzales MD        Or   • magnesium sulfate 3 gram infusion (1gm x 3) - Mg 1.1 - 1.5 mg/dL  1 g Intravenous PRN Vikram Gonzales MD        Or   • Magnesium Sulfate 2 gram infusion- Mg 1.6 - 1.9 mg/dL  2 g Intravenous PRN Vikram Gonzales MD   Stopped at 07/14/21 2008   • melatonin tablet 10 mg  10 mg Oral Nightly PRN Vikram Gonzales MD   10 mg at 07/15/21 2055   • methylPREDNISolone sodium succinate (SOLU-Medrol) injection 60 mg  60 mg Intravenous Q8H Nitesh Ortega MD   60 mg at 07/16/21 0148   • [COMPLETED] morphine injection 2 mg  2 mg Intravenous Once Byron Collado DO   2 mg at 07/12/21 2153   • [COMPLETED] morphine injection 2 mg  2 mg Intravenous Once Keira Adams DO   2 mg at 07/13/21 0323   • [COMPLETED] morphine injection 2 mg  2 mg Intravenous Once Precious Sparks DO   2 mg at 07/13/21 1404   • [COMPLETED] nitroglycerin (NITROSTAT) ointment 1 inch  1 inch Topical Once Vannessa Koch APRN   1 inch at 07/13/21 2103   • OLANZapine (zyPREXA) tablet 7.5 mg  7.5 mg Oral Nightly  Vikram Gonzales MD   7.5 mg at 07/15/21 2055   • oxyCODONE (ROXICODONE) immediate release tablet 10 mg  10 mg Oral Q6H PRN Vikram Gonzales MD   10 mg at 21 0148   • pantoprazole (PROTONIX) EC tablet 40 mg  40 mg Oral BID AC Vikram Gonzales MD   40 mg at 07/15/21 1819   • [START ON 2021] Pharmacy Consult - MTM   Does not apply Daily Arcadio Cook, PharmD       • [COMPLETED] predniSONE (DELTASONE) tablet 50 mg  50 mg Oral Q6H Vikram Gonzales MD   50 mg at 07/15/21 1228   • ranolazine (RANEXA) 12 hr tablet 500 mg  500 mg Oral BID Vikram Gonzales MD   500 mg at 07/15/21 2055   • riFAXIMin (XIFAXAN) tablet 550 mg  550 mg Oral Q12H Vikram Gonzales MD   550 mg at 07/15/21 2054   • rosuvastatin (CRESTOR) tablet 20 mg  20 mg Oral Nightly Vikram Gonzales MD   20 mg at 07/15/21 2054   • [COMPLETED] sodium chloride 0.9 % bolus 500 mL  500 mL Intravenous Once Mango Madera PA   Stopped at 21 0038   • sodium chloride 0.9 % flush 10 mL  10 mL Intravenous PRN Vikram Gonzales MD       • sodium chloride 0.9 % flush 10 mL  10 mL Intravenous Q12H Vikram Gonzales MD   10 mL at 07/15/21 0922   • sodium chloride 0.9 % flush 10 mL  10 mL Intravenous PRN Vikram Gonzales MD       • [COMPLETED] sodium chloride 0.9 % infusion    Continuous PRN Vikram Gonzales MD   Stopped at 07/15/21 1900   • [] sodium chloride 0.9 % infusion  250 mL/hr Intravenous Continuous Vikram Gonzales MD   Stopped at 07/15/21 2210   • spironolactone (ALDACTONE) tablet 100 mg  100 mg Oral Daily Vikram Gonzales MD   100 mg at 07/15/21 0923   • tamsulosin (FLOMAX) 24 hr capsule 0.4 mg  0.4 mg Oral Daily Vikram Gonzales MD   0.4 mg at 07/15/21 0917   • [COMPLETED] technetium albumin aggregated (MAA) solution 1 dose  1 dose Intravenous Once in [facility] [place], Filiberto PARISH MD   1 dose at 21 1537   • traZODone (DESYREL) tablet 50 mg  50 mg Oral Nightly Vikram Gonzales MD   50 mg at 07/15/21 2054   • vitamin B-6 (PYRIDOXINE) tablet 25 mg  25 mg Oral Daily  Vikram Gonzales MD   25 mg at 07/15/21 0926     Lab Results (last 48 hours)     Procedure Component Value Units Date/Time    STAT Lactic Acid, Reflex [[id number]]  (Normal) Collected: 07/16/21 0711    Specimen: Blood Updated: 07/16/21 0814     Lactate 1.9 mmol/L      Comment: Falsely depressed results may occur on samples drawn from patients receiving N-Acetylcysteine (NAC) or Metamizole.       Calcium, Ionized [[id number]]  (Normal) Collected: 07/16/21 0538    Specimen: Blood Updated: 07/16/21 0737     Ionized Calcium 1.32 mmol/L     Comprehensive Metabolic Panel [[id number]]  (Abnormal) Collected: 07/16/21 0538    Specimen: Blood Updated: 07/16/21 0708     Glucose 192 mg/dL      BUN 13 mg/dL      Creatinine 0.81 mg/dL      Sodium 135 mmol/L      Potassium 4.0 mmol/L      Chloride 104 mmol/L      CO2 22.0 mmol/L      Calcium 8.7 mg/dL      Total Protein 5.3 g/dL      Albumin 3.10 g/dL      ALT (SGPT) 16 U/L      AST (SGOT) 17 U/L      Alkaline Phosphatase 138 U/L      Total Bilirubin 0.2 mg/dL      eGFR Non African Amer 76 mL/min/1.73      Globulin 2.2 gm/dL      A/G Ratio 1.4 g/dL      BUN/Creatinine Ratio 16.0     Anion Gap 9.0 mmol/L     Narrative:      GFR Normal >60  Chronic Kidney Disease <60  Kidney Failure <15      Phosphorus [[id number]]  (Normal) Collected: 07/16/21 0538    Specimen: Blood Updated: 07/16/21 0708     Phosphorus 2.6 mg/dL     Magnesium [[id number]]  (Normal) Collected: 07/16/21 0538    Specimen: Blood Updated: 07/16/21 0708     Magnesium 2.0 mg/dL     Ammonia [[id number]]  (Normal) Collected: 07/16/21 0538    Specimen: Blood Updated: 07/16/21 0650     Ammonia 40 umol/L     Lactic Acid, Plasma [[id number]]  (Abnormal) Collected: 07/16/21 0538    Specimen: Blood Updated: 07/16/21 0644     Lactate 2.3 mmol/L      Comment: Falsely depressed results may occur on samples drawn from patients receiving N-Acetylcysteine (NAC) or Metamizole.       CBC (No Diff) [[id number]]  (Abnormal) Collected: 07/16/21  0538    Specimen: Blood Updated: 07/16/21 0626     WBC 12.83 10*3/mm3      RBC 4.16 10*6/mm3      Hemoglobin 12.4 g/dL      Hematocrit 38.6 %      MCV 92.8 fL      MCH 29.8 pg      MCHC 32.1 g/dL      RDW 14.2 %      RDW-SD 47.8 fl      MPV 10.6 fL      Platelets 144 10*3/mm3     Troponin [[id number]]  (Normal) Collected: 07/15/21 2310    Specimen: Blood Updated: 07/15/21 2345     Troponin T <0.010 ng/mL     Narrative:      Troponin T Reference Range:  <= 0.03 ng/mL-   Negative for AMI  >0.03 ng/mL-     Abnormal for myocardial necrosis.  Clinicians would have to utilize clinical acumen, EKG, Troponin and serial changes to determine if it is an Acute Myocardial Infarction or myocardial injury due to an underlying chronic condition.       Results may be falsely decreased if patient taking Biotin.      POC Activated Clotting Time [[id number]]  (Normal) Collected: 07/15/21 1628    Specimen: Blood Updated: 07/15/21 1700     Activated Clotting Time  120 Seconds      Comment: Serial Number: [id number]:  [id number]       Pregnancy, Urine - Urine, Clean Catch [[id number]]  (Normal) Collected: 07/15/21 0903    Specimen: Urine, Clean Catch Updated: 07/15/21 0950     HCG, Urine QL Negative    Basic Metabolic Panel [[id number]]  (Abnormal) Collected: 07/15/21 0249    Specimen: Blood Updated: 07/15/21 0348     Glucose 138 mg/dL      BUN 10 mg/dL      Creatinine 0.84 mg/dL      Sodium 140 mmol/L      Potassium 4.3 mmol/L      Comment: Slight hemolysis detected by analyzer. Results may be affected.        Chloride 105 mmol/L      CO2 24.0 mmol/L      Calcium 9.1 mg/dL      eGFR Non African Amer 73 mL/min/1.73      BUN/Creatinine Ratio 11.9     Anion Gap 11.0 mmol/L     Narrative:      GFR Normal >60  Chronic Kidney Disease <60  Kidney Failure <15      Magnesium [[id number]]  (Normal) Collected: 07/15/21 0249    Specimen: Blood Updated: 07/15/21 0348     Magnesium 2.1 mg/dL           Imaging Results (Last 48 Hours)     Procedure  Component Value Units Date/Time    XR Chest 1 View [[id number]] Collected: 07/16/21 0742     Updated: 07/16/21 0820    Narrative:      EXAMINATION: XR CHEST 1 VW-      INDICATION: Respiratory distress; R07.9-Chest pain, unspecified.      COMPARISON: 07/15/2021.     FINDINGS: Portable chest reveals heart to be borderline enlarged with  ill-defined opacification seen at the left lung base. Small left pleural  effusion. Bony structures are unremarkable.           Impression:      Mild increased markings identified at the left lung base  with small left pleural effusion. The remainder of the chest is stable  and unremarkable.     D:  07/16/2021  E:  07/16/2021       XR Chest 1 View [[id number]] Collected: 07/15/21 2004     Updated: 07/15/21 2006    Narrative:      CR Chest 1 Vw    INDICATION:   Acute onset of chest pain and hypoxia     COMPARISON:    July 14, 2021    FINDINGS:  Single portable AP view(s) of the chest.    The heart and mediastinal contours are normal. The lungs demonstrate parenchymal scarring in the left lung base. No clearly acute infiltrates. No pneumothorax or pleural effusion.       Impression:      No clearly acute cardiopulmonary findings. No significant change from prior study.    Signer Name: Tirso Guzman MD   Signed: 7/15/2021 8:04 PM   Workstation Name: [id number]    [facility] Lung Scan Perfusion Particulate [[id number]] Collected: 07/14/21 1557     Updated: 07/15/21 1005    Narrative:      EXAMINATION: NM LUNG SCAN PERFUSION PARTICULATE-07/14/2021:      INDICATION: Rule out PE; R07.9-Chest pain, unspecified.     TECHNIQUE: Radiopharmaceutical: 5.5 mCi of Technetium 99m MAA, IV.     COMPARISON: Portable chest radiograph of same date.     FINDINGS: Perfusion of the lungs is uniform and symmetric with no large  or small perfusion defects or unusual heterogeneous areas of perfusion.  The study is considered negative, very low probability for pulmonary  embolus.        Impression:      Very low probability for pulmonary embolus.     D:  07/14/2021  E:  07/14/2021           This report was finalized on 7/15/2021 10:02 AM by Dr. Bo Gardner MD.       XR Chest 1 View [[id number]] Collected: 07/14/21 1522     Updated: 07/14/21 2220    Narrative:      EXAMINATION: XR CHEST 1 VW-07/14/2021:     INDICATION: VQ comparison; R07.9-Chest pain, unspecified.     COMPARISON: 07/12/2021.     FINDINGS: The heart is normal in size. The vasculature appears within  normal limits. There is some coarsening of the pulmonary interstitial  markings similar to the prior exam and trace linear scarring in the left  lung base. No edema, effusion, or pneumothorax is seen.       Impression:      Mild chronic appearing interstitial lung changes and minimal  left basilar lung scarring similar to prior studies. No clearly new  chest disease is seen.     D:  07/14/2021  E:  07/14/2021            This report was finalized on 7/14/2021 10:17 PM by Dr. Bo Gardner MD.           ECG/EMG Results (all)     Procedure Component Value Units Date/Time    ECG 12 Lead [[id number]] Collected: 07/12/21 2119     Updated: 07/13/21 0708    ECG 12 Lead [[id number]] Collected: 07/12/21 2324     Updated: 07/13/21 0711    ECG 12 Lead [[id number]] Collected: 07/13/21 2031     Updated: 07/14/21 0816     QT Interval 368 ms      QTC Interval 469 ms     Narrative:      Test Reason : chest pressure  Blood Pressure :   */*   mmHG  Vent. Rate :  98 BPM     Atrial Rate :  98 BPM     P-R Int : 144 ms          QRS Dur :  80 ms      QT Int : 368 ms       P-R-T Axes :   3 -17  -7 degrees     QTc Int : 469 ms    Normal sinus rhythm  Inferior infarct (cited on or before 19-JUN-2021)  Abnormal ECG  When compared with ECG of 13-JUL-2021 06:03, (Unconfirmed)  Serial changes of Inferior infarct present  Confirmed by MD Guzman Robert ([id number]) on 7/14/2021 8:16:28 AM    Referred By:            Confirmed By: Allen Guzman MD    ECG 12 Lead [[id number]]  Collected: 07/13/21 0603     Updated: 07/14/21 0844     QT Interval 408 ms      QTC Interval 479 ms     Narrative:      Test Reason : chest pain  Blood Pressure :   */*   mmHG  Vent. Rate :  83 BPM     Atrial Rate :  83 BPM     P-R Int : 146 ms          QRS Dur :  80 ms      QT Int : 408 ms       P-R-T Axes : -10  -8  -8 degrees     QTc Int : 479 ms    Normal sinus rhythm  Inferior infarct (cited on or before 19-JUN-2021)  Abnormal ECG  When compared with ECG of 12-JUL-2021 23:24, (Unconfirmed)  Serial changes of Inferior infarct present  Confirmed by TEO ARCINIEGA MD ([id number]) on 7/14/2021 8:43:55 AM    Referred By: CARLI           Confirmed By: TEO ARCINIEGA MD    ECG 12 Lead [[id number]] Collected: 07/15/21 1712     Updated: 07/16/21 0702     QT Interval 360 ms      QTC Interval 540 ms     Narrative:      Test Reason : Post-cath  Blood Pressure :   */*   mmHG  Vent. Rate : 135 BPM     Atrial Rate : 136 BPM     P-R Int : 120 ms          QRS Dur :  84 ms      QT Int : 360 ms       P-R-T Axes :  38 -61  32 degrees     QTc Int : 540 ms    Sinus tachycardia  Left axis deviation  Inferior infarct (cited on or before 19-JUN-2021)  Possible Anterior infarct , age undetermined  Abnormal ECG  When compared with ECG of 13-JUL-2021 20:31,  Borderline criteria for Anterior infarct are now present    Referred By:            Confirmed By:     ECG 12 Lead [[id number]] Collected: 07/15/21 2221     Updated: 07/16/21 0747     QT Interval 386 ms      QTC Interval 467 ms     Narrative:      Test Reason : c/o chest pain  Blood Pressure :   */*   mmHG  Vent. Rate :  88 BPM     Atrial Rate :  88 BPM     P-R Int : 172 ms          QRS Dur :  80 ms      QT Int : 386 ms       P-R-T Axes :  49  -3   2 degrees     QTc Int : 467 ms    Normal sinus rhythm  Low voltage QRS  Cannot rule out Inferior infarct (cited on or before 19-JUN-2021)  Abnormal ECG  When compared with ECG of 15-JUL-2021 17:12, (Unconfirmed)  Vent. rate has decreased BY  47 BPM  QRS  axis shifted right  Borderline criteria for Anterior infarct are no longer present  Questionable change in initial forces of Inferior leads  Confirmed by SHIREEN GONZALEZ MD ([id number]) on 7/16/2021 7:47:51 AM    Referred By:            Confirmed By: SHIREEN GONZALEZ MD          Operative/Procedure Notes (all)    No notes of this type exist for this encounter.            Physician Progress Notes (all)      Nitesh Ortega MD at 07/15/21 0059            CRITICAL CARE ADMISSION NOTE    Chief Complaint     Anaphylactic shock    History of Present Illness     45-year-old female with cirrhosis, pancreatitis, gastritis and varices who is reportedly on the transplant list at [facility].  Previously admitted between 6/18/2021 and 7/2/2021 with coffee-ground emesis and during initial ER work-up had a PEA arrest while receiving IV contrast dye requiring ACLS and intubation.  She had a STEMI at that time and underwent left heart cath and had a stent placed to the circumflex.  Postoperative SVT required amiodarone.  During that hospital stay she was able to be weaned from mechanical ventilatory support.  She underwent an EGD which revealed grade 1 varices, erosive gastritis and she eventually was discharged to rehab.    She came back to [facility] emergency room and was readmitted on 7/12/2021.  EKG and troponin were not consistent with STEMI.  CT scan of the chest revealed bilateral subacute rib fractures presumably due to her prior cardiac arrest and chest compressions.    She was seen in consultation by cardiology and taken to the cardiac catheterization lab today where despite pretreatment developed an anaphylactic-like response to contrast with profound hypotension requiring epinephrine drip.  During the heart catheterization she was found to have a stenosis of the circumflex distal to the previously placed stent and an additional drug-eluting stent was placed.    On arrival to the intensive care unit the patient is in shock requiring  epinephrine drip.  She is hypoxic requiring 100% nonrebreather  She is complaining of chest pain and requesting oxycodone which has helped in the past.    Problem List, Surgical History, Family, Social History, and ROS     Patient Active Problem List   Diagnosis   • Hepatic encephalopathy (CMS/HCC)   • Alcoholic cirrhosis of liver without ascites (CMS/HCC)   • Possible GI bleed   • Generalized abdominal pain   • History of esophageal varices   • GERD (gastroesophageal reflux disease)   • STEMI (ST elevation myocardial infarction) (CMS/HCC)   • Acute respiratory failure with hypoxia (CMS/HCC)   • Anaphylaxis   • Cardiac arrest (CMS/HCC)   • SVT (supraventricular tachycardia) (CMS/HCC)   • Depression   • Chest pain   • Cirrhosis of liver (CMS/HCC)   • CAD (coronary artery disease)   • Rib fractures   • Anaphylactic shock - Requiring vasopressor support     Past Surgical History:   Procedure Laterality Date   • APPENDECTOMY     • CARDIAC CATHETERIZATION N/A 2021    Procedure: Left Heart Cath;  Surgeon: Vikram Gonzales MD;  Location:  [facility];  Service: Cardiovascular;  Laterality: N/A;   •  SECTION     • CHOLECYSTECTOMY     • COLONOSCOPY     • COLONOSCOPY N/A 3/31/2020    Procedure: COLONOSCOPY;  Surgeon: Tirso Giles MD;  Location:  [facility];  Service: Gastroenterology;  Laterality: N/A;   • ENDOSCOPY     • ENDOSCOPY     • ENDOSCOPY N/A 2021    Procedure: ESOPHAGOGASTRODUODENOSCOPY AT BEDSIDE;  Surgeon: Tyrese Rasmussen MD;  Location:  [facility];  Service: Gastroenterology;  Laterality: N/A;   • GALLBLADDER SURGERY     • REPLACEMENT TOTAL HIP LATERAL POSITION Left    • TOTAL KNEE ARTHROPLASTY Left        Allergies   Allergen Reactions   • Contrast Dye Anaphylaxis      Pt coded after receiving contrast for a CT scan. Was premedicated. Was having an MI at the same time. Immediately underwent heart cath with contrast without additional allergic  reaction  7/15 Pt premedicated with prednisone/Benadryl for heart cath. Still with anaphylactic-like reaction with drop in BP and hypoxia. Treated 95% stenosis with minimal dye and supported with epinephrine, solumedrol, NRB   • Nsaids GI Bleeding   • Tylenol [Acetaminophen] Other (See Comments)     CIRRHOSIS     No current facility-administered medications on file prior to encounter.     Current Outpatient Medications on File Prior to Encounter   Medication Sig   • ascorbic acid (VITAMIN C) 1000 MG tablet Take 1,000 mg by mouth Daily.   • aspirin 81 MG EC tablet Take 81 mg by mouth Daily.   • B Complex Vitamins (vitamin b complex) capsule capsule Take 1 capsule by mouth Daily.   • bumetanide (BUMEX) 1 MG tablet Take 2 tablets by mouth Daily.   • Cholecalciferol ( Vitamin D3) 100 MCG (4000 UT) capsule Take 1 capsule by mouth Daily.   • clopidogrel (PLAVIX) 75 MG tablet Take 1 tablet by mouth Daily.   • docusate sodium (COLACE) 100 MG capsule Take 100 mg by mouth 2 (Two) Times a Day As Needed for Constipation.   • famotidine (PEPCID) 20 MG tablet Take 1 tablet by mouth 2 (Two) Times a Day.   • FLUoxetine (PROzac) 40 MG capsule Take 40 mg by mouth Daily.   • folic acid (FOLVITE) 1 MG tablet Take 1 tablet by mouth Daily.   • ipratropium-albuterol (DUO-NEB) 0.5-2.5 mg/3 ml nebulizer Take 3 mL by nebulization Every 6 (Six) Hours As Needed for Wheezing or Shortness of Air.   • lactulose (Constulose) 10 GM/15ML solution Take 15 mL by mouth 2 (Two) Times a Day.   • lidocaine (LIDODERM) 5 % Place 1 patch on the skin as directed by provider Daily. Remove & Discard patch within 12 hours or as directed by MD   • magnesium oxide (MAGOX) 400 (241.3 Mg) MG tablet tablet Take 400 mg by mouth Every Evening.   • Melatonin 10 MG tablet Take 1 tablet by mouth Every Night.   • methocarbamol (ROBAXIN) 750 MG tablet Take 750 mg by mouth 3 (Three) Times a Day As Needed.   • multivitamin with minerals (MULTIVITAMIN ADULT PO) Take 1 tablet  "by mouth Daily.   • OLANZapine (zyPREXA) 7.5 MG tablet Take 1 tablet by mouth Every Night.   • oxyCODONE (ROXICODONE) 5 MG immediate release tablet Take 1 tablet by mouth Every 6 (Six) Hours As Needed for Moderate Pain .   • pantoprazole (PROTONIX) 40 MG EC tablet Take 1 tablet by mouth 2 (Two) Times a Day Before Meals.   • potassium chloride (K-DUR,KLOR-CON) 20 MEQ CR tablet Take 1 tablet by mouth Daily As Needed.   • rifaximin (XIFAXAN) 550 MG tablet Take 1 tablet by mouth Every 12 (Twelve) Hours.   • rOPINIRole (REQUIP) 0.5 MG tablet Take 0.5 mg by mouth Every Night. Take 1 hour before bedtime.   • spironolactone (ALDACTONE) 50 MG tablet Take 2 tablets by mouth Daily.   • tamsulosin (FLOMAX) 0.4 MG capsule 24 hr capsule Take 1 capsule by mouth Daily.   • traZODone (DESYREL) 50 MG tablet Take 50 mg by mouth Every Night.   • vitamin B-6 (PYRIDOXINE) 25 MG tablet Take 25 mg by mouth Daily.   • linaclotide (LINZESS) 72 MCG capsule capsule Take 72 mcg by mouth Every Morning Before Breakfast.   • rosuvastatin (CRESTOR) 20 MG tablet Take 1 tablet by mouth Every Night.     MEDICATION LIST AND ALLERGIES REVIEWED.    Family History   Problem Relation Age of Onset   • Colon cancer Neg Hx    • Colon polyps Neg Hx      Social History     Tobacco Use   • Smoking status: Former Smoker     Packs/day: 0.50     Types: Electronic Cigarette, Cigarettes     Quit date: 2020     Years since quittin.2   • Smokeless tobacco: Never Used   • Tobacco comment: 2 ppd    Substance Use Topics   • Alcohol use: Not Currently   • Drug use: No     Social History     Social History Narrative   • Not on file     FAMILY AND SOCIAL HISTORY REVIEWED.    Review of Systems  AS ABOVE OR ALL OTHER SYSTEMS REVIEWED AND ARE NEGATIVE.    Physical Exam and Clinical Information   /71   Pulse (!) 135   Temp 97.6 °F (36.4 °C) (Oral)   Resp 18   Ht 157.5 cm (62\")   Wt 91.5 kg (201 lb 11.2 oz)   SpO2 96%   BMI 36.89 kg/m²   Physical " Exam:   GENERAL: Awake, no distress   HEENT: No adenopathy or thyromegaly   LUNGS: No wheezes.  No stridor   HEART: Regular tachycardia with distant heart sounds   GI: Soft, nontender   EXTREMITIES: No clubbing, cyanosis, or edema.  Cath site without hematoma or bleeding   NEURO/PSYCH: Awake and alert.  Appears appropriate and responds to questions with no focal deficit    Results from last 7 days   Lab Units 07/13/21  0607 07/12/21  2208   WBC 10*3/mm3 4.82 5.66   HEMOGLOBIN g/dL 11.8* 12.6   PLATELETS 10*3/mm3 157 159     Results from last 7 days   Lab Units 07/15/21  0249 07/14/21  0602 07/13/21  0436   SODIUM mmol/L 140 138 137   POTASSIUM mmol/L 4.3 3.8 4.0   CO2 mmol/L 24.0 25.0 27.0   BUN mg/dL 10 11 11   CREATININE mg/dL 0.84 0.91 0.91   MAGNESIUM mg/dL 2.1 1.9 1.9   GLUCOSE mg/dL 138* 104* 105*     Estimated Creatinine Clearance: 88.1 mL/min (by C-G formula based on SCr of 0.84 mg/dL).          Lab Results   Component Value Date    LACTATE 2.4 (C) 06/20/2021        IMAGES: Perfusion scan 7/14/2021 revealed no perfusion defects.  Chest x-ray from 7/14/2021 revealed no effusions infiltrates or consolidation    I reviewed the patient's results and images.     Assesment     Active Hospital Problems    Diagnosis    • **Anaphylactic shock - Requiring vasopressor support    • Chest pain    • Rib fractures    • Cirrhosis of liver (CMS/HCC)    • CAD (coronary artery disease)    • Depression    • History of esophageal varices    • GERD (gastroesophageal reflux disease)      Plan/Recommendations     Admit to the intensive care unit  Continue antihistamine and steroids  Wean epinephrine drip as able  Pain control  Continue antiplatelet agent  Serial hemoglobin/hematocrit    Critical Care time spent in direct patient care: 35 minutes (excluding procedure time, if applicable) including high complexity decision making to assess, manipulate, and support vital organ system failure in this individual who has impairment of  one or more vital organ systems such that there is a high probability of imminent or life threatening deterioration in the patient’s condition.    GORDO Ortega MD  Pulmonary and Critical Care Medicine     CC: Toshia Mitchell APRN    Electronically signed by Nitesh Ortega MD at 07/15/21 1803     Vikram Gonzales MD at 07/15/21 1738        Brief cardiology update:  · Cath results:  · There was a 95% stenosis just distal to a previously placed stent.  The lesion is status post intervention with a Xience Melissa 3.0 x 18 mm drug-eluting stent.  · The patient had an anaphylactic-like reaction intraprocedurally due to contrast dye, despite premedication.  She was supported with intraprocedural IV Solu-Medrol, epinephrine, and a nonrebreather.  The patient will be transferred to the intensive care unit post procedurally.   · The above findings and contrast dye reaction were discussed with the hospitalist Dr. Ly, the intensivist Dr. Kendrick, and the patient's mother      Electronically signed by Vikram Gonzales MD at 07/15/21 6280     Filiberto Ly MD at 07/15/21 1636              [facility] Medicine Services  PROGRESS NOTE    Patient Name: Timothy Guzman  : 1975  MRN: [id number]    Date of Admission: 2021  Primary Care Physician: Toshia Mitchell APRN    Subjective   Subjective     CC:  Chest pain    HPI:  Seen earlier in day prior to heart cath. At that time felt fine. Had chronic rib pain but no pressure. No fever or chills    ROS:  No headache  No fever. No dysuria. Tolerating po    Objective   Objective     Vital Signs:   Temp:  [97.6 °F (36.4 °C)-98.1 °F (36.7 °C)] 97.6 °F (36.4 °C)  Heart Rate:  [] 135  Resp:  [16-18] 16  BP: (100-127)/(63-81) 100/63     Physical Exam:  Seen prior to heart cath this morning. At that time  Constitutional:Alert, oriented x 3, nontoxic appearing, no overt distress. Normal work of breathing. Was sitting up in  bed  Psych:Normal/appropriate affect  HEENT:Ncat, oroph clear  Neck: neck supple, full range of motion  Neuro: Face symmetric, speech clear, equal , moves all extremities  Cardiac: Rrr; No pretibial pitting edema  Resp: Ctab, normal effort  GI: abd soft, nontender, obese  Skin: No extremity rash  Musculoskeletal/extremities: reproducable anterior bilateral chest       Results Reviewed:  LAB RESULTS:      Lab 07/13/21  0607 07/13/21 0245 07/12/21 2208   WBC 4.82  --  5.66   HEMOGLOBIN 11.8*  --  12.6   HEMATOCRIT 36.1  --  37.6   PLATELETS 157  --  159   NEUTROS ABS 2.11  --  2.82   IMMATURE GRANS (ABS) 0.02  --  0.02   LYMPHS ABS 1.74  --  1.81   MONOS ABS 0.69  --  0.81   EOS ABS 0.23  --  0.17   MCV 91.6  --  90.2   D DIMER QUANT  --  1.22*  --          Lab 07/15/21  0249 07/14/21  0602 07/13/21 0436 07/12/21 2208   SODIUM 140 138 137 136   POTASSIUM 4.3 3.8 4.0 3.7   CHLORIDE 105 102 100 98   CO2 24.0 25.0 27.0 26.0   ANION GAP 11.0 11.0 10.0 12.0   BUN 10 11 11 9   CREATININE 0.84 0.91 0.91 0.93   GLUCOSE 138* 104* 105* 123*   CALCIUM 9.1 8.8 9.2 9.3   MAGNESIUM 2.1 1.9 1.9  --          Lab 07/12/21 2208   TOTAL PROTEIN 6.4   ALBUMIN 3.70   GLOBULIN 2.7   ALT (SGPT) 18   AST (SGOT) 23   BILIRUBIN 0.4   ALK PHOS 200*   LIPASE 16         Lab 07/13/21  0436 07/12/21  2329 07/12/21 2208   PROBNP  --   --  152.1   TROPONIN T <0.010 <0.010 <0.010                 Brief Urine Lab Results  (Last result in the past 365 days)      Color   Clarity   Blood   Leuk Est   Nitrite   Protein   CREAT   Urine HCG        07/15/21 0903               Negative           Microbiology Results Abnormal     Procedure Component Value - Date/Time    COVID PRE-OP / PRE-PROCEDURE SCREENING ORDER (NO ISOLATION) - Swab, Nasopharynx [[id number]]  (Normal) Collected: 07/13/21 1440    Lab Status: Final result Specimen: Swab from Nasopharynx Updated: 07/13/21 1512    Narrative:      The following orders were created for panel order COVID  PRE-OP / PRE-PROCEDURE SCREENING ORDER (NO ISOLATION) - Swab, Nasopharynx.  Procedure                               Abnormality         Status                     ---------                               -----------         ------                     COVID-19, ABBOTT IN-HOUS...[[id number]]  Normal              Final result                 Please view results for these tests on the individual orders.    COVID-19, ABBOTT IN-HOUSE,NASAL Swab (NO TRANSPORT MEDIA) 2 HR TAT - Swab, Nasopharynx [[id number]]  (Normal) Collected: 07/13/21 1440    Lab Status: Final result Specimen: Swab from Nasopharynx Updated: 07/13/21 1512     COVID19 Presumptive Negative    Narrative:      Fact sheet for providers: https://www.fda.gov/media/252669/download     Fact sheet for patients: https://www.fda.gov/media/686939/download    Test performed by PCR.  If inconsistent with clinical signs and symptoms patient should be tested with different authorized molecular test.          NM Lung Scan Perfusion Particulate    Result Date: 7/15/2021  EXAMINATION: NM LUNG SCAN PERFUSION PARTICULATE-07/14/2021:  INDICATION: Rule out PE; R07.9-Chest pain, unspecified.  TECHNIQUE: Radiopharmaceutical: 5.5 mCi of Technetium 99m MAA, IV.  COMPARISON: Portable chest radiograph of same date.  FINDINGS: Perfusion of the lungs is uniform and symmetric with no large or small perfusion defects or unusual heterogeneous areas of perfusion. The study is considered negative, very low probability for pulmonary embolus.      Impression: Very low probability for pulmonary embolus.  D:  07/14/2021 E:  07/14/2021    This report was finalized on 7/15/2021 10:02 AM by Dr. Bo Gardner MD.      XR Chest 1 View    Result Date: 7/14/2021  EXAMINATION: XR CHEST 1 VW-07/14/2021:  INDICATION: VQ comparison; R07.9-Chest pain, unspecified.  COMPARISON: 07/12/2021.  FINDINGS: The heart is normal in size. The vasculature appears within normal limits. There is some coarsening of the  pulmonary interstitial markings similar to the prior exam and trace linear scarring in the left lung base. No edema, effusion, or pneumothorax is seen.      Impression: Mild chronic appearing interstitial lung changes and minimal left basilar lung scarring similar to prior studies. No clearly new chest disease is seen.  D:  07/14/2021 E:  07/14/2021     This report was finalized on 7/14/2021 10:17 PM by Dr. Bo Gardner MD.      Duplex Venous Lower Extremity - Bilateral CAR    Result Date: 7/14/2021  · The bilateral lower extremity venous duplex scan was negative for evidence of a DVT and SVT.        Results for orders placed during the hospital encounter of 06/18/21    Adult Transthoracic Echo Complete W/ Cont if Necessary Per Protocol    Interpretation Summary  · The quality of the study is limited due to patient positioning and patient being intubated.  · Left ventricular ejection fraction appears to be 51 - 55%. Left ventricular systolic function is normal.  · Left ventricular diastolic function is consistent with (grade Ia w/high LAP) impaired relaxation.  · Mildly reduced right ventricular systolic function noted.      I have reviewed the medications:  Scheduled Meds:[MAR Hold] ascorbic acid, 1,000 mg, Oral, Daily  [MAR Hold] aspirin, 81 mg, Oral, Daily  [MAR Hold] bumetanide, 2 mg, Oral, Daily  [MAR Hold] clopidogrel, 75 mg, Oral, Daily  [MAR Hold] diazePAM, 2 mg, Oral, BID  [MAR Hold] enoxaparin, 40 mg, Subcutaneous, Q24H  [MAR Hold] FLUoxetine, 40 mg, Oral, Daily  [MAR Hold] folic acid, 1 mg, Oral, Daily  [MAR Hold] lactulose, 10 g, Oral, BID  [MAR Hold] lidocaine, 1 patch, Transdermal, Q24H  [MAR Hold] linaclotide, 72 mcg, Oral, QAM AC  [MAR Hold] magnesium oxide, 400 mg, Oral, Q PM  [MAR Hold] OLANZapine, 7.5 mg, Oral, Nightly  [MAR Hold] pantoprazole, 40 mg, Oral, BID AC  [MAR Hold] ranolazine, 500 mg, Oral, BID  riFAXIMin, 550 mg, Oral, Q12H  [MAR Hold] rosuvastatin, 20 mg, Oral, Nightly  [MAR Hold]  sodium chloride, 10 mL, Intravenous, Q12H  [MAR Hold] spironolactone, 100 mg, Oral, Daily  [MAR Hold] tamsulosin, 0.4 mg, Oral, Daily  [MAR Hold] traZODone, 50 mg, Oral, Nightly  [MAR Hold] vitamin B-6, 25 mg, Oral, Daily      Continuous Infusions:EPINEPHrine 5 mg in 250 mL infusion, , Last Rate: 0.2 mcg/kg/min (07/15/21 1628)  sodium chloride, , Last Rate: 250 mL/hr (07/15/21 1559)      PRN Meds:.[MAR Hold] docusate sodium  •  EPINEPHrine 5 mg in 250 mL infusion  •  EPINEPHrine PF  •  fentaNYL citrate (PF)  •  heparin (porcine)  •  iopamidol  •  [MAR Hold] ipratropium-albuterol  •  lidocaine PF 1%  •  [MAR Hold] magnesium sulfate **OR** [MAR Hold] magnesium sulfate in D5W 1g/100mL (PREMIX) **OR** [MAR Hold] magnesium sulfate  •  [MAR Hold] melatonin  •  methylPREDNISolone sodium succinate  •  midazolam  •  niCARdipine + nitroglycerin + heparin radial artery injection  •  O2  •  ondansetron  •  [MAR Hold] oxyCODONE  •  [MAR Hold] sodium chloride  •  [MAR Hold] sodium chloride  •  sodium chloride    Assessment/Plan   Assessment & Plan     Active Hospital Problems    Diagnosis  POA   • **Chest pain [R07.9]  Yes   • Cirrhosis of liver (CMS/HCC) [K74.60]  Yes   • CAD (coronary artery disease) [I25.10]  Yes   • Rib fractures [S22.49XA]  Yes   • Depression [F32.9]  Yes   • History of esophageal varices [Z87.19]  Not Applicable   • GERD (gastroesophageal reflux disease) [K21.9]  Yes      Resolved Hospital Problems   No resolved problems to display.        Brief Hospital Course to date:  Timothy Guzman is a 45 y.o. female w/ hx cirrhosis , pancreatitis, gastritis w/ varices currently on transplant list at [facility]. Patient recently admitted from 6/18/21-7/2/21 after presenting w/ coffee emesis on 6/18/21. At that time initial workup in ED had PEA cardiac arrest while receiving iv contrast dye requiring ACLS and intubation. She had a STEMI on EKG following this event and was taken for [facility] w/ Dr. Gonzales who placed stent to the  "circumflex. Patient did experience some SVT postprocedurally and was placed on amiodarone and was takent to icu on mechanical ventilation. Patient subsequentaly underwent EGD on 6/19/21 and was found to have grade 1 varices, and erosive gastritis and antiplatelet meds were restarted and kept on bid protonix. Patient was discharged to rehab.  Patient was sent back to [facility] ED for chest pain described as different from her recent rib pain. Troponin negative. CT chest without contrast showed bilateral subacute rib fractures #2-8 on righ and 2-7 on left (no pneumothorax, no pneumonia). Cards consulted. D-dimer elevated at 1.22. venous duplex negative. vq negative. Underwent heart cath     *Unstable Angina, s/p PCI to a  95% circumflex lesion distal to previous stent  *Anaphylactic reaction to iv contrast during Left heart cath 7/15/21  *Hx of recent previous inferior STEMI following PEA arrest (which occurred after was given iv contrast prior to a ct scan during recent admission)  *Hx SVT during recent admission (converted w/ adenosine previous admission)  -previous admission patient coded (PEA during workup in ED last admission after receiving iv contrast for ct scan (was premedicated) and was found to have MI same time. -Subsequently underwent heart cath w/ contrast without additional allergic reaction  -this admission 7/15/21 underwent [facility] premedicated w/ prednisone and benadryl and developed anphylactic reaction w/ hypotension & hypoxia. Full report pending, but per discussion w/ Dr. Gonzales 7/15/21 patient had 95% stenosis just distal to previous circumflex stent.   -asa, plavix, statin, empiric ranexa; holding b-blocker due to previous relative bradycardia and borderline low bp relative     *Recent rib fractures (from cpr received during PEA code last admission)  Elevated d-dimer with negative VQ scan & negative duplex  -unclear if musculoskeletal due to rib fractures, \"cardiac\", or thromboembolic at this point. " Atypical and typical features  - CT chest without contrast showed bilateral subacute rib fractures #2-8 on righ and 2-7 on left (no pneumothorax, no pneumonia)  -d-dimer elevated at 1.22, signicance following recent stemi and chest compressions unclear  -BLE venous duplex negative  -VQ scan negative  *Hx recent GI bleed due to erosive gastritis (recent hospitalization  *Indicental grade 1 varices noted during EGD (recent hospitalization)  *Hx Cirrhosis  -egd 6/19/21: grade 1 varices without stimata of bleeding; mild erosive gastritis but no ulcers (pathology negative for h.pylori). ppi therapy recommended and ok'd to take asa & plavix; was discharged on bid ppi x 4 weeks, then once daily  -continue rifaxamin, aldactone 100mg, bumex 2mg daily  *Hx opioid dependence/chronic pain   -chronic oxycodone use; recommend weaning oxycodone at d/c and follow up pain physician  -lidoderm patch    Plan:  -I saw patient earlier in the day. I received a call from Dr. Gonzales post-procedurally following heart cath. Patient had anaphylactoid reaction during the procedure despite pre-medication. Patient did have a 95% lesion which Dr. Gonzales was intervened upon.   -patient s/p solumedrol, antihistimines, non-rebreather, and epinephrine and now going to the ICU post-op. Dr. Gonzales states he is calling the intensivist after our discussion to update    Am labs: bmp,mag    DVT prophylaxis:  Medical DVT prophylaxis orders are present.     CODE STATUS:   Code Status and Medical Interventions:   Ordered at: 07/13/21 0258     Code Status:    CPR     Medical Interventions (Level of Support Prior to Arrest):    Full       Filiberto Ly MD  07/15/21                Electronically signed by Filiberto Ly MD at 07/15/21 9650     Vannessa Koch, ISSAC at 07/15/21 0849     Attestation signed by Vikram Gonzales MD at 07/15/21 1500    I have reviewed this documentation and agree.                    [facility]  Cardiology    Inpatient Progress Note      Chief Complaint/Reason for service:    · Follow-up chest pain    Subjective     Subjective:       Patient complaining of chest pressure this AM.  Also with pain from her broken ribs.  Requesting IV pain medication.  Denies dyspnea or palpitations.    Past medical, surgical, social and family history reviewed in the patient's electronic medical record.    Review of Systems:   Positive for chest pain  Negative for dyspnea with exertion, lower extremity edema, palpitations    Problem List  Active Hospital Problems    Diagnosis  POA   • **Chest pain [R07.9]  Yes   • Cirrhosis of liver (CMS/HCC) [K74.60]  Yes   • CAD (coronary artery disease) [I25.10]  Yes   • Rib fractures [S22.49XA]  Yes   • Depression [F32.9]  Yes   • History of esophageal varices [Z87.19]  Not Applicable   • GERD (gastroesophageal reflux disease) [K21.9]  Yes      Resolved Hospital Problems   No resolved problems to display.       Objective     Objective:      Current Facility-Administered Medications:   •  ascorbic acid (VITAMIN C) tablet 1,000 mg, 1,000 mg, Oral, Daily, Keira Adams DO, 1,000 mg at 07/14/21 0820  •  aspirin EC tablet 81 mg, 81 mg, Oral, Daily, Keira Adams DO, 81 mg at 07/14/21 0820  •  bumetanide (BUMEX) tablet 2 mg, 2 mg, Oral, Daily, Keira Adams DO, 2 mg at 07/14/21 0819  •  clopidogrel (PLAVIX) tablet 75 mg, 75 mg, Oral, Daily, Keira Adams DO, 75 mg at 07/14/21 0820  •  diazePAM (VALIUM) tablet 2 mg, 2 mg, Oral, BID, Precious Sparks DO, 2 mg at 07/14/21 2010  •  diphenhydrAMINE (BENADRYL) injection 50 mg, 50 mg, Intravenous, Once, Vikram Gonzales MD  •  docusate sodium (COLACE) capsule 100 mg, 100 mg, Oral, BID PRN, Keira Adams DO  •  enoxaparin (LOVENOX) syringe 40 mg, 40 mg, Subcutaneous, Q24H, Keira Adams DO, 40 mg at 07/14/21 0822  •  FLUoxetine (PROzac) capsule 40 mg, 40 mg, Oral, Daily, Keira Adams DO, 40 mg at 07/14/21 0819  •  folic acid (FOLVITE)  tablet 1 mg, 1 mg, Oral, Daily, Keira Adams DO, 1 mg at 07/14/21 0821  •  ipratropium-albuterol (DUO-NEB) nebulizer solution 3 mL, 3 mL, Nebulization, Q6H PRN, Keira Adams DO  •  lactulose (CHRONULAC) 10 GM/15ML solution 10 g, 10 g, Oral, BID, Keira Adams DO, 10 g at 07/14/21 2010  •  lidocaine (LIDODERM) 5 % 1 patch, 1 patch, Transdermal, Q24H, Keira Adams DO, 1 patch at 07/14/21 0821  •  linaclotide (LINZESS) capsule 72 mcg - Patient Supplied, 72 mcg, Oral, QAM AC, Keira Adams DO  •  magnesium oxide (MAG-OX) tablet 400 mg, 400 mg, Oral, Q PM, Keira Adams DO, 400 mg at 07/14/21 1715  •  magnesium sulfate 4 gram infusion - Mg less than or equal to 1mg/dL, 4 g, Intravenous, PRN **OR** magnesium sulfate 3 gram infusion (1gm x 3) - Mg 1.1 - 1.5 mg/dL, 1 g, Intravenous, PRN **OR** Magnesium Sulfate 2 gram infusion- Mg 1.6 - 1.9 mg/dL, 2 g, Intravenous, PRN, Filiberto Ly MD, Stopped at 07/14/21 2008  •  melatonin tablet 10 mg, 10 mg, Oral, Nightly PRN, Keira Adams DO  •  OLANZapine (zyPREXA) tablet 7.5 mg, 7.5 mg, Oral, Nightly, Keira Adams DO, 7.5 mg at 07/14/21 2010  •  oxyCODONE (ROXICODONE) immediate release tablet 10 mg, 10 mg, Oral, Q6H PRN, Precious Sparks, , 10 mg at 07/15/21 0230  •  pantoprazole (PROTONIX) EC tablet 40 mg, 40 mg, Oral, BID AC, Keira Adams DO, 40 mg at 07/14/21 1715  •  predniSONE (DELTASONE) tablet 50 mg, 50 mg, Oral, Q6H, Vikram Gonzales MD, 50 mg at 07/15/21 0523  •  ranolazine (RANEXA) 12 hr tablet 500 mg, 500 mg, Oral, BID, Vannessa Koch APRN, 500 mg at 07/14/21 2010  •  riFAXIMin (XIFAXAN) tablet 550 mg, 550 mg, Oral, Q12H, Keira Adams DO, 550 mg at 07/14/21 2017  •  rosuvastatin (CRESTOR) tablet 20 mg, 20 mg, Oral, Nightly, Keira Adams DO, 20 mg at 07/14/21 2009  •  sodium chloride 0.9 % flush 10 mL, 10 mL, Intravenous, PRN, Byron Collado DO  •  sodium chloride 0.9 % flush 10 mL, 10 mL, Intravenous, Q12H, Keira Adams DO,  10 mL at 07/14/21 2010  •  sodium chloride 0.9 % flush 10 mL, 10 mL, Intravenous, PRN, Keira Adams DO  •  spironolactone (ALDACTONE) tablet 100 mg, 100 mg, Oral, Daily, AngieKeira schmitt, DO, 100 mg at 07/14/21 0819  •  tamsulosin (FLOMAX) 24 hr capsule 0.4 mg, 0.4 mg, Oral, Daily, Keira Adams DO, 0.4 mg at 07/14/21 0820  •  traZODone (DESYREL) tablet 50 mg, 50 mg, Oral, Nightly, Keira Adams, DO, 50 mg at 07/14/21 2009  •  vitamin B-6 (PYRIDOXINE) tablet 25 mg, 25 mg, Oral, Daily, Keira Adams, DO, 25 mg at 07/14/21 0820    Vital Sign Min/Max for last 24 hours  Temp  Min: 97.8 °F (36.6 °C)  Max: 98.3 °F (36.8 °C)   BP  Min: 106/64  Max: 117/71   Pulse  Min: 62  Max: 99   Resp  Min: 16  Max: 18   SpO2  Min: 93 %  Max: 96 %   No data recorded    No intake or output data in the 24 hours ending 07/15/21 0849        CONSTITUTIONAL: No acute distress  RESPIRATORY: Normal effort. Clear to auscultation bilaterally without wheezing or rales  CARDIOVASCULAR: Regular rate and rhythm with normal S1 and S2. Without murmur, gallop or rub. Normal radial pulse.  Right radial Barbeau type A.  PSYCH: Normal affect and mood    Results reviewed by myself today including studies listed below:   Lab Results   Component Value Date    TROPONINT <0.010 07/13/2021       BUN   Date Value Ref Range Status   07/15/2021 10 6 - 20 mg/dL Final     Creatinine   Date Value Ref Range Status   07/15/2021 0.84 0.57 - 1.00 mg/dL Final     Potassium   Date Value Ref Range Status   07/15/2021 4.3 3.5 - 5.2 mmol/L Final     Comment:     Slight hemolysis detected by analyzer. Results may be affected.     ALT (SGPT)   Date Value Ref Range Status   07/12/2021 18 1 - 33 U/L Final     AST (SGOT)   Date Value Ref Range Status   07/12/2021 23 1 - 32 U/L Final       Results for orders placed during the hospital encounter of 06/18/21    Adult Transthoracic Echo Complete W/ Cont if Necessary Per Protocol    Interpretation Summary  · The quality of the study is  limited due to patient positioning and patient being intubated.  · Left ventricular ejection fraction appears to be 51 - 55%. Left ventricular systolic function is normal.  · Left ventricular diastolic function is consistent with (grade Ia w/high LAP) impaired relaxation.  · Mildly reduced right ventricular systolic function noted.      Tele: Normal sinus rhythm      Assessment     Assessment/Plan:     ASSESSMENT:  1. CAD  a. Status post ANGELICA to the circumflex 6/18/2021 at time of STEMI.  b. Troponin x3 are negative, EKG without acute ischemic changes  c. Chest pain somewhat responsive to nitroglycerin but overall do not seem entirely consistent with angina.  2. Recent right calf pain   a. In the setting of recent limited mobility, elevated D-dimer  3. Rib fractures  4. Cirrhosis of the liver  5. Possible contrast dye allergy  a. First event recently at [facility] where the patient had acute onset dyspnea for an abdominal CT scan with contrast dye   b. Second event prior to CT scan at [facility], but was occurring at the same time of a cardiac arrest/STEMI  i. Given Solu-Medrol IV and then tolerated heart catheterization with contrast without difficulty     PLAN:  1. Keep n.p.o. for cardiac catheterization today with Dr. Gonzales.  2. Contrast allergy premedications previously ordered.  3. Continue DAPT, high intensity statin, Ranexa.  4. Beta-blocker being held due to borderline hypotension.    Electronically signed by ISSAC Kumar, 07/15/21, 1:10 PM EDT.            Electronically signed by Vikram Gonzales MD at 07/15/21 1500     Vikram Gonzales MD at 07/14/21 190        Cardiology update:    -Discussed today's studies and events with Dr. Ly.    -No current evidence pointing to a pulmonary embolus causing the patient's chest discomfort.    -Given that the patient continues to have chest pain along with the patient's most recent cardiac issues, we will proceed with a heart catheterization  tomorrow.    -Premedication for possible contrast dye allergy with prednisone and Benadryl ordered with specific timing for the medications to be given.    -N.p.o. after midnight.    -Will discuss further with the patient in the morning.    -Timing of heart catheterization likely early afternoon.    Electronically signed by Vikram Gonzales MD at 21 1911     Filiberto Ly MD at 21 1354              [facility] Medicine Services  PROGRESS NOTE    Patient Name: Timothy Guzman  : 1975  MRN: [id number]    Date of Admission: 2021  Primary Care Physician: Toshia Mitchell APRN    Subjective   Subjective     CC:  Chest pain    HPI:  Still w/ some occasional chest pressure. Not related to exertion. Also w/ rib pain.     ROS:  No headache  No fever. No dysuria. Tolerating po    Objective   Objective     Vital Signs:   Temp:  [98 °F (36.7 °C)-98.8 °F (37.1 °C)] 98.1 °F (36.7 °C)  Heart Rate:  [] 85  Resp:  [16-18] 16  BP: (104-127)/(65-78) 112/71     Physical Exam:  Constitutional:Alert, oriented x 3, nontoxic appearing, no overt distress. Normal work of breathing  Psych:Normal/appropriate affect  HEENT:[place]cat, oroph clear  Neck: neck supple, full range of motion  Neuro: Face symmetric, speech clear, equal , moves all extremities  Cardiac: Rrr; No pretibial pitting edema  Resp: Ctab, normal effort  GI: abd soft, nontender, obese  Skin: No extremity rash  Musculoskeletal/extremities: reproducable anterior bilateral chest           Results Reviewed:  LAB RESULTS:      Lab 21  0607 21  0245 21  2208   WBC 4.82  --  5.66   HEMOGLOBIN 11.8*  --  12.6   HEMATOCRIT 36.1  --  37.6   PLATELETS 157  --  159   NEUTROS ABS 2.11  --  2.82   IMMATURE GRANS (ABS) 0.02  --  0.02   LYMPHS ABS 1.74  --  1.81   MONOS ABS 0.69  --  0.81   EOS ABS 0.23  --  0.17   MCV 91.6  --  90.2   D DIMER QUANT  --  1.22*  --          Lab 21  0602 21  0436  07/12/21 2208   SODIUM 138 137 136   POTASSIUM 3.8 4.0 3.7   CHLORIDE 102 100 98   CO2 25.0 27.0 26.0   ANION GAP 11.0 10.0 12.0   BUN 11 11 9   CREATININE 0.91 0.91 0.93   GLUCOSE 104* 105* 123*   CALCIUM 8.8 9.2 9.3   MAGNESIUM 1.9 1.9  --          Lab 07/12/21 2208   TOTAL PROTEIN 6.4   ALBUMIN 3.70   GLOBULIN 2.7   ALT (SGPT) 18   AST (SGOT) 23   BILIRUBIN 0.4   ALK PHOS 200*   LIPASE 16         Lab 07/13/21  0436 07/12/21  2329 07/12/21 2208   PROBNP  --   --  152.1   TROPONIN T <0.010 <0.010 <0.010                 Brief Urine Lab Results  (Last result in the past 365 days)      Color   Clarity   Blood   Leuk Est   Nitrite   Protein   CREAT   Urine HCG        06/18/21 1924 Yellow Clear Negative Negative Negative Negative               Microbiology Results Abnormal     Procedure Component Value - Date/Time    COVID PRE-OP / PRE-PROCEDURE SCREENING ORDER (NO ISOLATION) - Swab, Nasopharynx [[id number]]  (Normal) Collected: 07/13/21 1440    Lab Status: Final result Specimen: Swab from Nasopharynx Updated: 07/13/21 1512    Narrative:      The following orders were created for panel order COVID PRE-OP / PRE-PROCEDURE SCREENING ORDER (NO ISOLATION) - Swab, Nasopharynx.  Procedure                               Abnormality         Status                     ---------                               -----------         ------                     COVID-19, ABBOTT IN-HOUS...[[id number]]  Normal              Final result                 Please view results for these tests on the individual orders.    COVID-19, ABBOTT IN-HOUSE,NASAL Swab (NO TRANSPORT MEDIA) 2 HR TAT - Swab, Nasopharynx [[id number]]  (Normal) Collected: 07/13/21 1440    Lab Status: Final result Specimen: Swab from Nasopharynx Updated: 07/13/21 1512     COVID19 Presumptive Negative    Narrative:      Fact sheet for providers: https://www.fda.gov/media/809168/download     Fact sheet for patients: https://www.fda.gov/media/488613/download    Test performed by  PCR.  If inconsistent with clinical signs and symptoms patient should be tested with different authorized molecular test.          CT Chest Without Contrast Diagnostic    Result Date: 7/13/2021  CT Chest WO INDICATION: Chest pain with known rib fracture. CPR 3 weeks ago. TECHNIQUE: CT of the thorax without IV contrast. Coronal and sagittal reconstructions were obtained.  Radiation dose reduction techniques included automated exposure control or exposure modulation based on body size. Count of known CT and cardiac nuc med studies performed in previous 12 months: 3. COMPARISON: 6/18/2021 FINDINGS: Heart size is normal. Coronary stents are present. No pleural or pericardial effusion is seen. There is no adenopathy. Visualized thyroid gland is homogeneous. Upper abdomen shows changes of cholecystectomy. Lung windows demonstrate atelectasis in the lower lobes and lingula. No CT findings present to indicate pneumonia. Note: CT may be negative in the earliest stages of COVID-19. There is no pneumothorax. There are subacute right anterolateral rib fractures numbers 2 through 8. There are subacute left anterolateral rib fractures numbers 2 through 7.     Impression: 1. Bilateral subacute rib fractures numbers 2 through 8 on the right and 2 through 7 on the left. No pneumothorax. 2. Atelectatic changes in the lungs with no evidence of pneumonia. Signer Name: Allen Schulte MD  Signed: 7/13/2021 3:19 AM  [facility]station [facility]: [facility]  [facility]    XR Chest 1 View    Result Date: 7/12/2021  CR Chest 1 Vw INDICATION: Chest pain, right-sided broken ribs COMPARISON:  None available. FINDINGS: Single portable AP view(s) of the chest.  The heart and mediastinal contours are normal. The lungs are clear. No definite large pneumothorax or pleural effusion. Patient's broken ribs are not well visualized on this x-ray.     Impression: No definite acute cardiopulmonary findings. If there is concern for subtle  injury or other abnormality, consider follow-up chest CT. Signer Name: Luz Marina Pace MD  Signed: 7/12/2021 9:53 PM  Workstation Name: [id number]  [facility]      Results for orders placed during the hospital encounter of 06/18/21    Adult Transthoracic Echo Complete W/ Cont if Necessary Per Protocol    Interpretation Summary  · The quality of the study is limited due to patient positioning and patient being intubated.  · Left ventricular ejection fraction appears to be 51 - 55%. Left ventricular systolic function is normal.  · Left ventricular diastolic function is consistent with (grade Ia w/high LAP) impaired relaxation.  · Mildly reduced right ventricular systolic function noted.      I have reviewed the medications:  Scheduled Meds:ascorbic acid, 1,000 mg, Oral, Daily  aspirin, 81 mg, Oral, Daily  bumetanide, 2 mg, Oral, Daily  clopidogrel, 75 mg, Oral, Daily  diazePAM, 2 mg, Oral, BID  enoxaparin, 40 mg, Subcutaneous, Q24H  FLUoxetine, 40 mg, Oral, Daily  folic acid, 1 mg, Oral, Daily  lactulose, 10 g, Oral, BID  lidocaine, 1 patch, Transdermal, Q24H  linaclotide, 72 mcg, Oral, QAM AC  magnesium oxide, 400 mg, Oral, Q PM  OLANZapine, 7.5 mg, Oral, Nightly  pantoprazole, 40 mg, Oral, BID AC  predniSONE, 20 mg, Oral, Daily With Breakfast  ranolazine, 500 mg, Oral, BID  riFAXIMin, 550 mg, Oral, Q12H  rosuvastatin, 20 mg, Oral, Nightly  sodium chloride, 10 mL, Intravenous, Q12H  spironolactone, 100 mg, Oral, Daily  tamsulosin, 0.4 mg, Oral, Daily  traZODone, 50 mg, Oral, Nightly  vitamin B-6, 25 mg, Oral, Daily      Continuous Infusions:   PRN Meds:.docusate sodium  •  ipratropium-albuterol  •  magnesium sulfate **OR** magnesium sulfate in D5W 1g/100mL (PREMIX) **OR** magnesium sulfate  •  melatonin  •  oxyCODONE  •  sodium chloride  •  sodium chloride    Assessment/Plan   Assessment & Plan     Active Hospital Problems    Diagnosis  POA   • **Chest pain [R07.9]  Yes   • Cirrhosis of liver  "(CMS/[facility]) [K74.60]  Yes   • CAD (coronary artery disease) [I25.10]  Yes   • Rib fractures [S22.49XA]  Yes   • Depression [F32.9]  Yes   • History of esophageal varices [Z87.19]  Not Applicable   • GERD (gastroesophageal reflux disease) [K21.9]  Yes      Resolved Hospital Problems   No resolved problems to display.        Brief Hospital Course to date:  Timothy Guzman is a 45 y.o. female w/ hx cirrhosis , pancreatitis, gastritis w/ varices currently on transplant list at [facility]. Patient recently admitted from 6/18/21-7/2/21 after presenting w/ coffee emesis on 6/18/21. At that time initial workup in ED had PEA cardiac arrest while receiving iv contrast dye requiring ACLS and intubation. She had a STEMI on EKG following this event and was taken for [facility] w/ Dr. Storm who placed stent to the circumflex. Patient did experience some SVT postprocedurally and was placed on amiodarone and was takent to icu on mechanical ventilation. Patient subsequentaly underwent EGD on 6/19/21 and was found to have grade 1 varices, and erosive gastritis and antiplatelet meds were restarted and kept on bid protonix. Patient was discharged to rehab.  Patient was sent back to [facility] ED for chest pain described as different from her recent rib pain. Troponin negative. CT chest without contrast showed bilateral subacute rib fractures #2-8 on righ and 2-7 on left (no pneumothorax, no pneumonia). Cards consulted. D-dimer elevated at 1.22.     Chest Pain  *Recent rib fractures (from cpr received during PEA code last admission)  Elevated d-dimer  -unclear if musculoskeletal due to rib fractures, \"cardiac\", or thromboembolic at this point. Atypical and typical features  - CT chest without contrast showed bilateral subacute rib fractures #2-8 on righ and 2-7 on left (no pneumothorax, no pneumonia)  -ekg unchanged, troponin ok  -d-dimer elevated at 1.22, signicance following recent stemi and chest compressions unclear  -Dr. storm / cards following; BLE venous " duplex prelim negative for dvt on 7/14/21  -ordering VQ scan (if positive then anticoagulate; if negative consider ischemic eval)  Hx of recent inferior STEMI following PEA arrest (which occurred after given iv contrast prior to ct scan)  *Hx SVT during recent admission (converted w/ adenosine previous admission)  *Possible Contrast dye allergy  -patient coded (PEA during workup in ED last admission after receiving iv contrast for ct scan (was premedicated) and was found to have MI same time. Subsequently underwent heart cath w/ contrast without additional allergic reaction  -Cards following  -s/p ANGELICA x 1 av groove stenosis; asa, plavix, statin, empiric ranexa; holding b-blocker due to previous relative bradycardia and borderline low bp relative   *Hx recent GI bleed due to erosive gastritis (recent hospitalization  *Indicental grade 1 varices noted during EGD (recent hospitalization)  *Hx Cirrhosis  -egd 6/19/21: grade 1 varices without stimata of bleeding; mild erosive gastritis but no ulcers (pathology negative for h.pylori). ppi therapy recommended and ok'd to take asa & plavix; was discharged on bid ppi x 4 weeks, then once daily  -continue rifaxamin, aldactone 100mg, bumex 2mg daily  *Hx opioid dependence/chronic pain   -chronic oxycodone use; recommend weaning oxycodone at d/c and follow up pain physician  -lidoderm patch      Plan:  -prelim venous duplex negative for dvt. Ordering VQ scan  -if VQ scan negative then cards might consider further ischemic workup. In meantime, continue dapt, statin, ranexa for possible angina  -continue lidoderm patch  -oxycodone (wean to 5mg soon as tolerates)  -continue asa, plavix, statin, ranexa (no b-blocker due to borderline hypotension per cards)  -continue ppi bid, aldactone, bumex 2mg    Am labs: bmp,mag    DVT prophylaxis:  Medical DVT prophylaxis orders are present.       Disposition: I expect the patient to be discharged home once chest pain workup complete and ok w/  cards    CODE STATUS:   Code Status and Medical Interventions:   Ordered at: 07/13/21 0258     Code Status:    CPR     Medical Interventions (Level of Support Prior to Arrest):    Full       Filiberto Ly MD  07/14/21                Electronically signed by Filiberto Ly MD at 07/14/21 1421     Vikram Gonzales MD at 07/14/21 0906           [facility]   [facility] [facility], [address]  [place], [place], [address] [phone]  WWW.[facility]           INPATIENT PROGRESS NOTE    Chief complaint:   Chief Complaint   Patient presents with   • Chest Pain       Subjective     Timothy Guzman is a 45 y.o. female.    Subjective:  Rib fracture pain remains the most prominent.  Intermittent chest pain overnight.  Also with radiation to the right arm.  Has not had recurrent calf pain.  Some associated dyspnea    Review of Systems:  Positive for arm and chest pressure/pain, dyspnea at rest    PFSH:  Patient Active Problem List   Diagnosis   • Hepatic encephalopathy (CMS/HCC)   • Alcoholic cirrhosis of liver without ascites (CMS/HCC)   • Possible GI bleed   • Generalized abdominal pain   • History of esophageal varices   • GERD (gastroesophageal reflux disease)   • STEMI (ST elevation myocardial infarction) (CMS/HCC)   • Acute respiratory failure with hypoxia (CMS/HCC)   • Anaphylaxis   • Cardiac arrest (CMS/HCC)   • SVT (supraventricular tachycardia) (CMS/HCC)   • Depression   • Chest pain   • Cirrhosis of liver (CMS/HCC)   • CAD (coronary artery disease)   • Rib fractures       No current facility-administered medications on file prior to encounter.     Current Outpatient Medications on File Prior to Encounter   Medication Sig Dispense Refill   • ascorbic acid (VITAMIN C) 1000 MG tablet Take 1,000 mg by mouth Daily.     • aspirin 81 MG EC tablet Take 81 mg by mouth Daily.     • B Complex Vitamins (vitamin b complex) capsule capsule Take 1 capsule by mouth Daily.     •  bumetanide (BUMEX) 1 MG tablet Take 2 tablets by mouth Daily.     • Cholecalciferol ( Vitamin D3) 100 MCG (4000 UT) capsule Take 1 capsule by mouth Daily.     • clopidogrel (PLAVIX) 75 MG tablet Take 1 tablet by mouth Daily. 30 tablet    • docusate sodium (COLACE) 100 MG capsule Take 100 mg by mouth 2 (Two) Times a Day As Needed for Constipation.     • famotidine (PEPCID) 20 MG tablet Take 1 tablet by mouth 2 (Two) Times a Day. 60 tablet 0   • FLUoxetine (PROzac) 40 MG capsule Take 40 mg by mouth Daily.     • folic acid (FOLVITE) 1 MG tablet Take 1 tablet by mouth Daily.     • ipratropium-albuterol (DUO-NEB) 0.5-2.5 mg/3 ml nebulizer Take 3 mL by nebulization Every 6 (Six) Hours As Needed for Wheezing or Shortness of Air. 360 mL    • lactulose (Constulose) 10 GM/15ML solution Take 15 mL by mouth 2 (Two) Times a Day.     • lidocaine (LIDODERM) 5 % Place 1 patch on the skin as directed by provider Daily. Remove & Discard patch within 12 hours or as directed by MD     • magnesium oxide (MAGOX) 400 (241.3 Mg) MG tablet tablet Take 400 mg by mouth Every Evening.     • Melatonin 10 MG tablet Take 1 tablet by mouth Every Night.     • methocarbamol (ROBAXIN) 750 MG tablet Take 750 mg by mouth 3 (Three) Times a Day As Needed.     • multivitamin with minerals (MULTIVITAMIN ADULT PO) Take 1 tablet by mouth Daily.     • OLANZapine (zyPREXA) 7.5 MG tablet Take 1 tablet by mouth Every Night.     • oxyCODONE (ROXICODONE) 5 MG immediate release tablet Take 1 tablet by mouth Every 6 (Six) Hours As Needed for Moderate Pain .     • pantoprazole (PROTONIX) 40 MG EC tablet Take 1 tablet by mouth 2 (Two) Times a Day Before Meals.     • potassium chloride (K-DUR,KLOR-CON) 20 MEQ CR tablet Take 1 tablet by mouth Daily As Needed.     • rifaximin (XIFAXAN) 550 MG tablet Take 1 tablet by mouth Every 12 (Twelve) Hours. 60 tablet 11   • rOPINIRole (REQUIP) 0.5 MG tablet Take 0.5 mg by mouth Every Night. Take 1 hour before bedtime.     •  spironolactone (ALDACTONE) 50 MG tablet Take 2 tablets by mouth Daily.     • tamsulosin (FLOMAX) 0.4 MG capsule 24 hr capsule Take 1 capsule by mouth Daily.     • traZODone (DESYREL) 50 MG tablet Take 50 mg by mouth Every Night.     • vitamin B-6 (PYRIDOXINE) 25 MG tablet Take 25 mg by mouth Daily.     • linaclotide (LINZESS) 72 MCG capsule capsule Take 72 mcg by mouth Every Morning Before Breakfast.     • rosuvastatin (CRESTOR) 20 MG tablet Take 1 tablet by mouth Every Night.       Allergies   Allergen Reactions   • Contrast Dye Anaphylaxis     Pt coded after receiving contrast for a CT scan. Was premedicated. Was having an MI at the same time. Immediately underwent heart cath with contrast without additional allergic reaction   • Nsaids GI Bleeding   • Tylenol [Acetaminophen] Other (See Comments)     CIRRHOSIS       Social History     Socioeconomic History   • Marital status:      Spouse name: Not on file   • Number of children: Not on file   • Years of education: Not on file   • Highest education level: Not on file   Tobacco Use   • Smoking status: Former Smoker     Packs/day: 0.50     Types: Electronic Cigarette, Cigarettes     Quit date: 2020     Years since quittin.2   • Smokeless tobacco: Never Used   • Tobacco comment:  ppd    Substance and Sexual Activity   • Alcohol use: Not Currently   • Drug use: No   • Sexual activity: Defer     Family History   Problem Relation Age of Onset   • Colon cancer Neg Hx    • Colon polyps Neg Hx        Objective     Objective:     Vital Sign Min/Max for last 24 hours  Temp  Min: 97.9 °F (36.6 °C)  Max: 98.8 °F (37.1 °C)   BP  Min: 104/67  Max: 127/78   Pulse  Min: 78  Max: 103   Resp  Min: 16  Max: 18   SpO2  Min: 90 %  Max: 98 %   No data recorded      Intake/Output Summary (Last 24 hours) at 2021 0906  Last data filed at 2021 0452  Gross per 24 hour   Intake --   Output 2100 ml   Net -2100 ml           Vitals:    21 0900   BP:    Pulse: 82    Resp:    Temp:    SpO2: 93%       CONSTITUTIONAL: Well-nourished. In no acute distress.     Today's labs and radiologic results reviewed by myself    Diagnostic Data:    EKG: Normal sinus rhythm, T wave abnormality    Results for orders placed during the hospital encounter of 06/18/21    Adult Transthoracic Echo Complete W/ Cont if Necessary Per Protocol    Interpretation Summary  · The quality of the study is limited due to patient positioning and patient being intubated.  · Left ventricular ejection fraction appears to be 51 - 55%. Left ventricular systolic function is normal.  · Left ventricular diastolic function is consistent with (grade Ia w/high LAP) impaired relaxation.  · Mildly reduced right ventricular systolic function noted.      Tele: Normal sinus rhythm    Assessment     Assessment and Plan:   ASSESSMENT:  5. CAD  a. Status post ANGELICA to the circumflex 6/18/2021 at time of STEMI.  b. Troponin x3 are negative, EKG without acute ischemic changes  c. Chest pain somewhat responsive to nitroglycerin but overall do not seem entirely consistent with angina.  6. Recent right calf pain   a. In the setting of recent limited mobility, elevated D-dimer  7. Rib fractures  8. Cirrhosis of the liver  9. Possible contrast dye allergy  a. First event recently at [facility] where the patient had acute onset dyspnea for an abdominal CT scan with contrast dye   b. Second event prior to CT scan at [facility], but was occurring at the same time of a cardiac arrest/STEMI  i. Given Solu-Medrol IV and then tolerated heart catheterization with contrast without difficulty     PLAN:  1. Lower extremity venous duplex to evaluate for DVT.    a. Discussed with vascular technician, will take place this morning  b. If abnormal will presume the patient had a PE and start anticoagulation.    c. If unremarkable, would consider a VQ scan versus CT per PE protocol with premedication for possible contrast dye  allergy  2. Continue Ranexa 500 mg p.o. twice daily for possible angina.  3. Continue DAPT, statin  4. Holding off on a beta-blocker due to borderline hypotension  5. If with persistent unexplainable chest pain after work-up as noted above, will consider a repeat heart catheterization      Vikram Gonzales MD, MSc, FACC, [facility]  Interventional Cardiology  [facility]            Electronically signed by Vikram Gonzales MD at 07/14/21 0909     Vikram Gonzales MD at 07/13/21 0859           [facility]   [address], [address]  [place], [place], [address] [phone]  WWW.[facility] Hospital[facility]           INPATIENT CONSULTATION NOTE    Patient Care Team:  Patient Care Team:  Toshia Mitchell APRN as PCP - General (Family Medicine)  Nelson Yip APRN as Nurse Practitioner (Nurse Practitioner)    Requesting Provider and Reason for consultation: The patient is being seen today at the request of Dr. Sparks for chest pain.     Chief complaint:   Chief Complaint   Patient presents with   • Chest Pain       Subjective     HPI:    Timothy Guzman is a 45 y.o. female.    Partial problem list, including cardiac problems:  10. CAD  a. STEMI 6/18/2021 now status post Xience Melissa 3.0 x 28 mm ANGELICA and proximal optimization up to 4 mm AV groove stenosis with Dr. Gonzales.  During that admission also had PEA cardiac arrest, A-V dissociation, and SVT.  11. SVT  a. At time of STEMI 6/2021.   12. History of GI bleed  13. Cirrhosis  14. Gastritis with esophageal varices that are status post banding  15. Depression  16. History of opioid dependence    She to [facility] ED overnight with complaints of chest pain.  She was recently admitted to [facility] in June 2021 during which time she had a PEA arrest and STEMI.  She initially presented during that admission for hematemesis.  She has been at [facility] since discharge working with PT and OT.  She reports she has only ambulating during  therapy typically and is otherwise fairly sedentary.  She reports yesterday she was at rest in her bed when she developed sudden chest pressure/heaviness.  She was given aspirin and sublingual nitroglycerin in route via EMS with some improvement in her chest heaviness.  She also reports other chest pain from rib fractures she sustained during her previous admission.  She notes that the pain from her ribs is more severe than her chest pressure/heaviness.  She has been receiving morphine and Dilaudid with some improvement in her pain.  At times she notes that her chest pressure/heaviness is resolved.  Yesterday she also experienced right calf pain.  She has had intermittent dyspnea with speaking since being intubated during her last admission.  Also with intermittent lower extremity edema, however this is not been present over the last several days.    Review of Systems:  Positive for chest pressure/pain, dyspnea with speaking, intermittent lower extremity edema  All other systems reviewed and are negative.    PFSH:  Patient Active Problem List   Diagnosis   • Hepatic encephalopathy (CMS/HCC)   • Alcoholic cirrhosis of liver without ascites (CMS/HCC)   • Possible GI bleed   • Generalized abdominal pain   • History of esophageal varices   • GERD (gastroesophageal reflux disease)   • STEMI (ST elevation myocardial infarction) (CMS/HCC)   • Acute respiratory failure with hypoxia (CMS/HCC)   • Anaphylaxis   • Cardiac arrest (CMS/HCC)   • SVT (supraventricular tachycardia) (CMS/HCC)   • Depression   • Chest pain   • Cirrhosis of liver (CMS/HCC)   • CAD (coronary artery disease)   • Rib fractures       No current facility-administered medications on file prior to encounter.     Current Outpatient Medications on File Prior to Encounter   Medication Sig Dispense Refill   • ascorbic acid (VITAMIN C) 1000 MG tablet Take 1,000 mg by mouth Daily.     • aspirin 81 MG EC tablet Take 81 mg by mouth Daily.     • B Complex Vitamins  (vitamin b complex) capsule capsule Take 1 capsule by mouth Daily.     • bumetanide (BUMEX) 1 MG tablet Take 2 tablets by mouth Daily.     • Cholecalciferol ( Vitamin D3) 100 MCG (4000 UT) capsule Take 1 capsule by mouth Daily.     • clopidogrel (PLAVIX) 75 MG tablet Take 1 tablet by mouth Daily. 30 tablet    • docusate sodium (COLACE) 100 MG capsule Take 100 mg by mouth 2 (Two) Times a Day As Needed for Constipation.     • famotidine (PEPCID) 20 MG tablet Take 1 tablet by mouth 2 (Two) Times a Day. 60 tablet 0   • FLUoxetine (PROzac) 40 MG capsule Take 40 mg by mouth Daily.     • folic acid (FOLVITE) 1 MG tablet Take 1 tablet by mouth Daily.     • ipratropium-albuterol (DUO-NEB) 0.5-2.5 mg/3 ml nebulizer Take 3 mL by nebulization Every 6 (Six) Hours As Needed for Wheezing or Shortness of Air. 360 mL    • lactulose (Constulose) 10 GM/15ML solution Take 15 mL by mouth 2 (Two) Times a Day.     • lidocaine (LIDODERM) 5 % Place 1 patch on the skin as directed by provider Daily. Remove & Discard patch within 12 hours or as directed by MD     • magnesium oxide (MAGOX) 400 (241.3 Mg) MG tablet tablet Take 400 mg by mouth Every Evening.     • Melatonin 10 MG tablet Take 1 tablet by mouth Every Night.     • methocarbamol (ROBAXIN) 750 MG tablet Take 750 mg by mouth 3 (Three) Times a Day As Needed.     • multivitamin with minerals (MULTIVITAMIN ADULT PO) Take 1 tablet by mouth Daily.     • OLANZapine (zyPREXA) 7.5 MG tablet Take 1 tablet by mouth Every Night.     • oxyCODONE (ROXICODONE) 5 MG immediate release tablet Take 1 tablet by mouth Every 6 (Six) Hours As Needed for Moderate Pain .     • pantoprazole (PROTONIX) 40 MG EC tablet Take 1 tablet by mouth 2 (Two) Times a Day Before Meals.     • potassium chloride (K-DUR,KLOR-CON) 20 MEQ CR tablet Take 1 tablet by mouth Daily As Needed.     • rifaximin (XIFAXAN) 550 MG tablet Take 1 tablet by mouth Every 12 (Twelve) Hours. 60 tablet 11   • rOPINIRole (REQUIP) 0.5 MG tablet  Take 0.5 mg by mouth Every Night. Take 1 hour before bedtime.     • spironolactone (ALDACTONE) 50 MG tablet Take 2 tablets by mouth Daily.     • tamsulosin (FLOMAX) 0.4 MG capsule 24 hr capsule Take 1 capsule by mouth Daily.     • traZODone (DESYREL) 50 MG tablet Take 50 mg by mouth Every Night.     • vitamin B-6 (PYRIDOXINE) 25 MG tablet Take 25 mg by mouth Daily.     • linaclotide (LINZESS) 72 MCG capsule capsule Take 72 mcg by mouth Every Morning Before Breakfast.     • rosuvastatin (CRESTOR) 20 MG tablet Take 1 tablet by mouth Every Night.       Allergies   Allergen Reactions   • Contrast Dye Anaphylaxis     Pt coded after receiving contrast for a CT scan. Was premedicated. Was having an MI at the same time. Immediately underwent heart cath with contrast without additional allergic reaction   • Nsaids GI Bleeding   • Tylenol [Acetaminophen] Other (See Comments)     CIRRHOSIS       Social History     Socioeconomic History   • Marital status:      Spouse name: Not on file   • Number of children: Not on file   • Years of education: Not on file   • Highest education level: Not on file   Tobacco Use   • Smoking status: Former Smoker     Packs/day: 0.50     Types: Electronic Cigarette, Cigarettes     Quit date: 2020     Years since quittin.2   • Smokeless tobacco: Never Used   • Tobacco comment: 2 ppd    Substance and Sexual Activity   • Alcohol use: Not Currently   • Drug use: No   • Sexual activity: Defer     Family History   Problem Relation Age of Onset   • Colon cancer Neg Hx    • Colon polyps Neg Hx        Objective     Objective:     Vital Sign Min/Max for last 24 hours  Temp  Min: 97.8 °F (36.6 °C)  Max: 99 °F (37.2 °C)   BP  Min: 95/60  Max: 127/85   Pulse  Min: 82  Max: 103   Resp  Min: 16  Max: 16   SpO2  Min: 91 %  Max: 99 %   No data recorded      Intake/Output Summary (Last 24 hours) at 2021 0859  Last data filed at 2021 0038  Gross per 24 hour   Intake 500 ml   Output --    Net 500 ml           Vitals:    07/13/21 0820   BP: 127/85   Pulse: 103   Resp: 16   Temp: 97.8 °F (36.6 °C)   SpO2: 96%       CONSTITUTIONAL: Well-nourished. In no acute distress.   SKIN: Warm and dry. No rashes noted  LUNGS: Normal effort. Clear to auscultation bilaterally without wheezing, rhonchi, or rales noted.   CARDIOVASCULAR: The heart has a regular rate and rhythm with a normal S1 and S2. There is no murmur, gallop, rub, or click appreciated.   PERIPHERAL VASCULAR: Radial pulses are 2+ bilaterally. Posterior tibial pulses are 2+ and symmetrical. There is no significant lower extremity edema. .  PSYCHIATRIC: Alert, orientated x 3, appropriate affect and mood    Labs, results reviewed by myself:  Lab Results   Component Value Date    TROPONINT <0.010 07/13/2021       Glucose   Date Value Ref Range Status   07/13/2021 105 (H) 65 - 99 mg/dL Final     BUN   Date Value Ref Range Status   07/13/2021 11 6 - 20 mg/dL Final     Creatinine   Date Value Ref Range Status   07/13/2021 0.91 0.57 - 1.00 mg/dL Final     Sodium   Date Value Ref Range Status   07/13/2021 137 136 - 145 mmol/L Final     Potassium   Date Value Ref Range Status   07/13/2021 4.0 3.5 - 5.2 mmol/L Final     Comment:     Slight hemolysis detected by analyzer. Results may be affected.     Chloride   Date Value Ref Range Status   07/13/2021 100 98 - 107 mmol/L Final     CO2   Date Value Ref Range Status   07/13/2021 27.0 22.0 - 29.0 mmol/L Final     Calcium   Date Value Ref Range Status   07/13/2021 9.2 8.6 - 10.5 mg/dL Final     Total Protein   Date Value Ref Range Status   07/12/2021 6.4 6.0 - 8.5 g/dL Final     Albumin   Date Value Ref Range Status   07/12/2021 3.70 3.50 - 5.20 g/dL Final     ALT (SGPT)   Date Value Ref Range Status   07/12/2021 18 1 - 33 U/L Final     AST (SGOT)   Date Value Ref Range Status   07/12/2021 23 1 - 32 U/L Final     Alkaline Phosphatase   Date Value Ref Range Status   07/12/2021 200 (H) 39 - 117 U/L Final     Total  Bilirubin   Date Value Ref Range Status   07/12/2021 0.4 0.0 - 1.2 mg/dL Final     eGFR Non  Amer   Date Value Ref Range Status   07/13/2021 67 >60 mL/min/1.73 Final     BUN/Creatinine Ratio   Date Value Ref Range Status   07/13/2021 12.1 7.0 - 25.0 Final     Anion Gap   Date Value Ref Range Status   07/13/2021 10.0 5.0 - 15.0 mmol/L Final       Lab Results   Component Value Date    CHOL 136 06/28/2021    CHOL 136 06/21/2021    CHOL 156 06/19/2021     Lab Results   Component Value Date    TRIG 130 06/28/2021    TRIG 165 (H) 06/21/2021    TRIG 102 06/19/2021     Lab Results   Component Value Date    HDL 30 (L) 06/19/2021     Lab Results   Component Value Date     (H) 06/19/2021     No components found for: LDLDIRECTC      WBC   Date Value Ref Range Status   07/13/2021 4.82 3.40 - 10.80 10*3/mm3 Final     RBC   Date Value Ref Range Status   07/13/2021 3.94 3.77 - 5.28 10*6/mm3 Final     Hemoglobin   Date Value Ref Range Status   07/13/2021 11.8 (L) 12.0 - 15.9 g/dL Final     Hematocrit   Date Value Ref Range Status   07/13/2021 36.1 34.0 - 46.6 % Final     MCV   Date Value Ref Range Status   07/13/2021 91.6 79.0 - 97.0 fL Final     MCH   Date Value Ref Range Status   07/13/2021 29.9 26.6 - 33.0 pg Final     MCHC   Date Value Ref Range Status   07/13/2021 32.7 31.5 - 35.7 g/dL Final     RDW   Date Value Ref Range Status   07/13/2021 15.0 12.3 - 15.4 % Final     RDW-SD   Date Value Ref Range Status   07/13/2021 50.3 37.0 - 54.0 fl Final     MPV   Date Value Ref Range Status   07/13/2021 10.6 6.0 - 12.0 fL Final     Platelets   Date Value Ref Range Status   07/13/2021 157 140 - 450 10*3/mm3 Final     Neutrophil %   Date Value Ref Range Status   07/13/2021 43.8 42.7 - 76.0 % Final     Lymphocyte %   Date Value Ref Range Status   07/13/2021 36.1 19.6 - 45.3 % Final     Monocyte %   Date Value Ref Range Status   07/13/2021 14.3 (H) 5.0 - 12.0 % Final     Eosinophil %   Date Value Ref Range Status   07/13/2021  4.8 0.3 - 6.2 % Final     Basophil %   Date Value Ref Range Status   07/13/2021 0.6 0.0 - 1.5 % Final     Immature Grans %   Date Value Ref Range Status   07/13/2021 0.4 0.0 - 0.5 % Final     Neutrophils, Absolute   Date Value Ref Range Status   07/13/2021 2.11 1.70 - 7.00 10*3/mm3 Final     Lymphocytes, Absolute   Date Value Ref Range Status   07/13/2021 1.74 0.70 - 3.10 10*3/mm3 Final     Monocytes, Absolute   Date Value Ref Range Status   07/13/2021 0.69 0.10 - 0.90 10*3/mm3 Final     Eosinophils, Absolute   Date Value Ref Range Status   07/13/2021 0.23 0.00 - 0.40 10*3/mm3 Final     Basophils, Absolute   Date Value Ref Range Status   07/13/2021 0.03 0.00 - 0.20 10*3/mm3 Final     Immature Grans, Absolute   Date Value Ref Range Status   07/13/2021 0.02 0.00 - 0.05 10*3/mm3 Final     nRBC   Date Value Ref Range Status   07/13/2021 0.0 0.0 - 0.2 /100 WBC Final         Diagnostic Data:    EKG: Normal sinus rhythm, T wave abnormality    Results for orders placed during the hospital encounter of 06/18/21    Adult Transthoracic Echo Complete W/ Cont if Necessary Per Protocol    Interpretation Summary  · The quality of the study is limited due to patient positioning and patient being intubated.  · Left ventricular ejection fraction appears to be 51 - 55%. Left ventricular systolic function is normal.  · Left ventricular diastolic function is consistent with (grade Ia w/high LAP) impaired relaxation.  · Mildly reduced right ventricular systolic function noted.      Tele: Normal sinus rhythm    Assessment     Assessment and Plan:   ASSESSMENT:  6. CAD  a. Status post ANGELICA to the circumflex 6/18/2021 at time of STEMI.  b. Troponin x3 are negative, EKG without acute ischemic changes  c. Chest pain somewhat responsive to nitroglycerin but overall do not seem entirely consistent with angina.  7. Recent right calf pain   a. In the setting of recent limited mobility, elevated D-dimer  8. Rib fractures  9. Cirrhosis of the  liver    PLAN:  1. Lower extremity venous duplex to evaluate for DVT.  If abnormal will presume the patient had a PE and start anticoagulation.  If unremarkable, would consider a VQ scan versus CT per PE protocol with premedication for possible contrast dye allergy  2. Begin Ranexa 500 mg p.o. twice daily for possible angina.  3. Discontinue Nitropaste due to borderline hypotension.  4. Continue DAPT, statin  5. Holding off on a beta-blocker due to borderline hypotension  6. If with persistent pain in an arm remarkable work-up as noted above, will consider a repeat heart catheterization    -Plan discussed with Dr. Sparks    Scribed for Dr. Gonzales by Vannessa Koch, APRN. 7/13/2021  13:09 EDT    I, Vikram Gonzales MD, personally performed the services as scribed by the above named individual. I have made any necessary edits and it is both accurate and complete.     Vikram Gonzales MD, MSc, FACC, [facility]  Interventional Cardiology  [facility]            Electronically signed by Vikram Gonzales MD at 07/13/21 0580       Consult Notes (all)    No notes of this type exist for this encounter.

## 2021-07-16 NOTE — PROGRESS NOTES
CRITICAL CARE ADMISSION NOTE    Chief Complaint     Anaphylactic shock    History of Present Illness     45-year-old female with cirrhosis, pancreatitis, gastritis and varices who is reportedly on the transplant list at [facility].  Previously admitted between 6/18/2021 and 7/2/2021 with coffee-ground emesis and during initial ER work-up had a PEA arrest while receiving IV contrast dye requiring ACLS and intubation.  She had a STEMI at that time and underwent left heart cath and had a stent placed to the circumflex.  Postoperative SVT required amiodarone.  During that hospital stay she was able to be weaned from mechanical ventilatory support.  She underwent an EGD which revealed grade 1 varices, erosive gastritis and she eventually was discharged to rehab.    She came back to [facility] emergency room and was readmitted on 7/12/2021.  EKG and troponin were not consistent with STEMI.  CT scan of the chest revealed bilateral subacute rib fractures presumably due to her prior cardiac arrest and chest compressions.    She was seen in consultation by cardiology and taken to the cardiac catheterization lab 7/15 where despite pretreatment developed an anaphylactic-like response to contrast with profound hypotension requiring epinephrine drip.  During the heart catheterization she was found to have a stenosis of the circumflex distal to the previously placed stent and an additional drug-eluting stent was placed.  She was brought to the ICU on an epinephrine drip.    7/16 overnight epinephrine drip was successfully weaned.  This morning her blood pressure is 101/61, heart rate 76 in sinus.  She was complaining of abdominal pain and requesting IV narcotics.  Her father stepped outside the door and said she had problems with alcohol and opioids and drug-seeking behavior.  She had previously taken Suboxone but it was last filled in February of this year.    Problem List, Surgical History, Family, Social History, and ROS     Patient  Active Problem List   Diagnosis   • Hepatic encephalopathy (CMS/HCC)   • Alcoholic cirrhosis of liver without ascites (CMS/HCC)   • Possible GI bleed   • Generalized abdominal pain   • History of esophageal varices   • GERD (gastroesophageal reflux disease)   • STEMI (ST elevation myocardial infarction) (CMS/HCC)   • Acute respiratory failure with hypoxia (CMS/HCC)   • Anaphylaxis   • Cardiac arrest (CMS/HCC)   • SVT (supraventricular tachycardia) (CMS/HCC)   • Depression   • Chest pain   • Cirrhosis of liver (CMS/HCC)   • CAD (coronary artery disease)   • Rib fractures   • Anaphylactic shock - Requiring vasopressor support     Past Surgical History:   Procedure Laterality Date   • APPENDECTOMY     • CARDIAC CATHETERIZATION N/A 2021    Procedure: Left Heart Cath;  Surgeon: Vikram Gonzales MD;  Location:  [facility];  Service: Cardiovascular;  Laterality: N/A;   •  SECTION     • CHOLECYSTECTOMY     • COLONOSCOPY     • COLONOSCOPY N/A 3/31/2020    Procedure: COLONOSCOPY;  Surgeon: Tirso Giles MD;  Location:  [facility];  Service: Gastroenterology;  Laterality: N/A;   • ENDOSCOPY     • ENDOSCOPY     • ENDOSCOPY N/A 2021    Procedure: ESOPHAGOGASTRODUODENOSCOPY AT BEDSIDE;  Surgeon: Tyrese Rasmusesn MD;  Location:  [facility];  Service: Gastroenterology;  Laterality: N/A;   • GALLBLADDER SURGERY     • REPLACEMENT TOTAL HIP LATERAL POSITION Left    • TOTAL KNEE ARTHROPLASTY Left        Allergies   Allergen Reactions   • Contrast Dye Anaphylaxis      Pt coded after receiving contrast for a CT scan. Was premedicated. Was having an MI at the same time. Immediately underwent heart cath with contrast without additional allergic reaction  7/15 Pt premedicated with prednisone/Benadryl for heart cath. Still with anaphylactic-like reaction with drop in BP and hypoxia. Treated 95% stenosis with minimal dye and supported with epinephrine, solumedrol, NRB   • Nsaids GI  Bleeding   • Tylenol [Acetaminophen] Other (See Comments)     CIRRHOSIS     No current facility-administered medications on file prior to encounter.     Current Outpatient Medications on File Prior to Encounter   Medication Sig   • ascorbic acid (VITAMIN C) 1000 MG tablet Take 1,000 mg by mouth Daily.   • aspirin 81 MG EC tablet Take 81 mg by mouth Daily.   • B Complex Vitamins (vitamin b complex) capsule capsule Take 1 capsule by mouth Daily.   • bumetanide (BUMEX) 1 MG tablet Take 2 tablets by mouth Daily.   • Cholecalciferol ( Vitamin D3) 100 MCG (4000 UT) capsule Take 1 capsule by mouth Daily.   • clopidogrel (PLAVIX) 75 MG tablet Take 1 tablet by mouth Daily.   • docusate sodium (COLACE) 100 MG capsule Take 100 mg by mouth 2 (Two) Times a Day As Needed for Constipation.   • famotidine (PEPCID) 20 MG tablet Take 1 tablet by mouth 2 (Two) Times a Day.   • FLUoxetine (PROzac) 40 MG capsule Take 40 mg by mouth Daily.   • folic acid (FOLVITE) 1 MG tablet Take 1 tablet by mouth Daily.   • ipratropium-albuterol (DUO-NEB) 0.5-2.5 mg/3 ml nebulizer Take 3 mL by nebulization Every 6 (Six) Hours As Needed for Wheezing or Shortness of Air.   • lactulose (Constulose) 10 GM/15ML solution Take 15 mL by mouth 2 (Two) Times a Day.   • lidocaine (LIDODERM) 5 % Place 1 patch on the skin as directed by provider Daily. Remove & Discard patch within 12 hours or as directed by MD   • magnesium oxide (MAGOX) 400 (241.3 Mg) MG tablet tablet Take 400 mg by mouth Every Evening.   • Melatonin 10 MG tablet Take 1 tablet by mouth Every Night.   • methocarbamol (ROBAXIN) 750 MG tablet Take 750 mg by mouth 3 (Three) Times a Day As Needed.   • multivitamin with minerals (MULTIVITAMIN ADULT PO) Take 1 tablet by mouth Daily.   • OLANZapine (zyPREXA) 7.5 MG tablet Take 1 tablet by mouth Every Night.   • oxyCODONE (ROXICODONE) 5 MG immediate release tablet Take 1 tablet by mouth Every 6 (Six) Hours As Needed for Moderate Pain .   • pantoprazole  (PROTONIX) 40 MG EC tablet Take 1 tablet by mouth 2 (Two) Times a Day Before Meals.   • potassium chloride (K-DUR,KLOR-CON) 20 MEQ CR tablet Take 1 tablet by mouth Daily As Needed.   • rifaximin (XIFAXAN) 550 MG tablet Take 1 tablet by mouth Every 12 (Twelve) Hours.   • rOPINIRole (REQUIP) 0.5 MG tablet Take 0.5 mg by mouth Every Night. Take 1 hour before bedtime.   • spironolactone (ALDACTONE) 50 MG tablet Take 2 tablets by mouth Daily.   • tamsulosin (FLOMAX) 0.4 MG capsule 24 hr capsule Take 1 capsule by mouth Daily.   • traZODone (DESYREL) 50 MG tablet Take 50 mg by mouth Every Night.   • vitamin B-6 (PYRIDOXINE) 25 MG tablet Take 25 mg by mouth Daily.   • linaclotide (LINZESS) 72 MCG capsule capsule Take 72 mcg by mouth Every Morning Before Breakfast.   • rosuvastatin (CRESTOR) 20 MG tablet Take 1 tablet by mouth Every Night.     MEDICATION LIST AND ALLERGIES REVIEWED.    Family History   Problem Relation Age of Onset   • Colon cancer Neg Hx    • Colon polyps Neg Hx      Social History     Tobacco Use   • Smoking status: Former Smoker     Packs/day: 0.50     Types: Electronic Cigarette, Cigarettes     Quit date: 2020     Years since quittin.2   • Smokeless tobacco: Never Used   • Tobacco comment:  ppd    Substance Use Topics   • Alcohol use: Not Currently   • Drug use: No     Social History     Social History Narrative   • Not on file     FAMILY AND SOCIAL HISTORY REVIEWED.    Review of Systems   Constitutional: Positive for activity change and fatigue.   Respiratory: Positive for chest tightness.    Cardiovascular: Negative for palpitations.   Gastrointestinal: Positive for abdominal pain.   Endocrine: Negative.    Genitourinary: Positive for frequency.   Musculoskeletal: Positive for arthralgias and back pain.   Skin: Negative.    Allergic/Immunologic:        Contrast allergy   Neurological: Positive for weakness and headaches.   Psychiatric/Behavioral: Positive for dysphoric mood.     AS ABOVE OR  "ALL OTHER SYSTEMS REVIEWED AND ARE NEGATIVE.    Physical Exam and Clinical Information   /61   Pulse 76   Temp 98.2 °F (36.8 °C) (Oral)   Resp 18   Ht 157.5 cm (62\")   Wt 91.5 kg (201 lb 11.2 oz)   SpO2 94%   BMI 36.89 kg/m²   Physical Exam:   GENERAL: Awake, no distress, supine in bed   HEENT: No adenopathy or thyromegaly   LUNGS: No wheezes.  No stridor   HEART: Regular tachycardia with distant heart sounds   GI: Soft, nontender, bowel sounds present   EXTREMITIES: No clubbing, cyanosis, or edema.  Cath site without hematoma or bleeding, right wrist   NEURO/PSYCH: Awake and alert.  Appears appropriate and responds to questions with no focal deficit    Results from last 7 days   Lab Units 07/16/21  0538 07/13/21  0607 07/12/21  2208   WBC 10*3/mm3 12.83* 4.82 5.66   HEMOGLOBIN g/dL 12.4 11.8* 12.6   PLATELETS 10*3/mm3 144 157 159     Results from last 7 days   Lab Units 07/16/21  0538 07/15/21  0249 07/14/21  0602   SODIUM mmol/L 135* 140 138   POTASSIUM mmol/L 4.0 4.3 3.8   CO2 mmol/L 22.0 24.0 25.0   BUN mg/dL 13 10 11   CREATININE mg/dL 0.81 0.84 0.91   MAGNESIUM mg/dL 2.0 2.1 1.9   PHOSPHORUS mg/dL 2.6  --   --    GLUCOSE mg/dL 192* 138* 104*     Estimated Creatinine Clearance: 91.4 mL/min (by C-G formula based on SCr of 0.81 mg/dL).          Lab Results   Component Value Date    LACTATE 1.9 07/16/2021        IMAGES:   Small left effusion with some mild atelectasis, no edema, chest x-ray today  I reviewed the patient's results and images.     Assesment     Active Hospital Problems    Diagnosis    • **Anaphylactic shock - Requiring vasopressor support    • Chest pain    • Cirrhosis of liver (CMS/HCC)    • CAD (coronary artery disease)    • Rib fractures    • Depression    • History of esophageal varices    • GERD (gastroesophageal reflux disease)      Plan/Recommendations     Mobilize  Continue IV steroids another 24 hours  No need for IV narcotics  Diuretics per cardiology  Aspirin, Plavix, statin, " Ranexa    Lovenox for DVT prophylaxis, given her liver disease will transition to subcutaneous heparin  Protonix    Chronulac, rifaximin, spironolactone  Add a multivitamin    Multiple psychiatric medications, we have continued her usual home medication:  Trazodone, Zyprexa, Prozac      Rosemary Ruff MD,[facility]    Pulmonary and Critical Care Medicine     TIME: 30min  CC: Toshia Mitchell APRN

## 2021-07-16 NOTE — PLAN OF CARE
Goal Outcome Evaluation:      Pt is A&O and is able to follow command. VSS with SBPs around 95-120s. EKG and labs were during night due to c/o chest pain, results were WNL. K pad is in place for comfort and to help pt's pain. TR band removed and occlusive dsg applied, area is CDI.

## 2021-07-16 NOTE — CASE MANAGEMENT/SOCIAL WORK
Continued Stay Note  [facility]     Patient Name: Timothy Guzman  MRN: [id number]  Today's Date: 7/16/2021    Admit Date: 7/12/2021    Discharge Plan     Row Name 07/16/21 1144       Plan    Plan  Home with parents and start outpatient cardiac rehab at [facility]    Patient/Family in Agreement with Plan  yes    Plan Comments  Spoke to the patient at  and she plans to return home with her family at d/c. She is walking well in room.  She has been arranged for outpatient cardiac rehab at [facility] on 8/10. Called and spoke to Stephanie at outpatient PT at [facility], pt was scheduled to see them after leaving [facility], but they will defer pt to cardiac rehab since they can monitor her cardiac function. Pt aware and will f/u with outpatient cardiac rehab on 8/10. CM will cont to follow. Rosanna ext. [phone]        Discharge Codes    No documentation.       Expected Discharge Date and Time     Expected Discharge Date Expected Discharge Time    Jul 19, 2021             Rosanna Stephens RN

## 2021-07-16 NOTE — PROGRESS NOTES
Clinical Nutrition       Patient Name: Timothy Guzman  YOB: 1975  MRN: [id number]  Date of Encounter: 07/16/21 15:32 EDT  Admission date: 7/12/2021      Reason for Visit   MDR, MST score 2+, LOS, difficulty chewing swallowing      EMR  Reviewed   Yes    Weight Weight (kg) Weight (lbs) Weight Method   7/15/2021 91.491 kg 201 lb 11.2 oz Bed scale   7/14/2021 87.998 kg 194 lb -   7/14/2021 88.406 kg 194 lb 14.4 oz -   7/13/2021 82.101 kg 181 lb Stated   7/12/2021 86.637 kg 191 lb -   7/2/2021 95.664 kg 210 lb 14.4 oz Bed scale   7/1/2021 98.748 kg 217 lb 11.2 oz Bed scale   6/30/2021 96.253 kg 212 lb 3.2 oz Bed scale   6/24/2021 98.5 kg 217 lb 2.5 oz Bed scale   6/23/2021 104.2 kg 229 lb 11.5 oz Bed scale   6/21/2021 106.4 kg 234 lb 9.1 oz Bed scale   6/20/2021 102.5 kg 225 lb 15.5 oz Bed scale   6/19/2021 99.791 kg 220 lb -   6/19/2021 99.8 kg 220 lb 0.3 oz Bed scale   6/18/2021 87.998 kg 194 lb Stated        Reported/Observed/Food/Nutrition Related - Comments    Pt reports difficulty chewing d/t fact her dentures where left at [facility], she hopes to have them later today but can still eat a hamburger.  She states wt loss was ~ 3 months ago r/t pancreatitis, didn't really mind wt loss as she need to lose weight anyway.  She reports no recent wt loss, good appetite and eating well  w/o problems.       Current Nutrition Prescription     Diet Regular; Thin; Cardiac     ONS: Boost Plus strawberry ( premier protein n/a in SBerry)    Average Intake from Charting: insufficient data      Actions     Follow treatment progress, Care plan reviewed, Advise alternate selection, Menu provided, Adjusted supplement, Encourage intake,  Also advised pt sodium intake should be limited w/ cirrhosis ; advised pt of cardiac diet modifications    Monitor Per Protocol      Randee Maldonado, MS,RD,LD,   Time Spent: 30 mins

## 2021-07-16 NOTE — PROGRESS NOTES
"[facility] Progress Note     LOS: 0 days   Patient Care Team:  Toshia Mitchell APRN as PCP - General (Family Medicine)  Nelson Yip APRN as Nurse Practitioner (Nurse Practitioner)  PCP:  Toshia Mitchell APRN    Chief Complaint: Follow-up unstable angina, anaphylactic reaction to contrast    Subjective: This morning the patient still complains of some soreness in her chest.  She is off of epinephrine.  She continues to receive steroids.  Has some angioedema.  Blood pressure low normal.  She did receive her Bumex and Aldactone this morning      Review of Systems:   All systems have been reviewed and are negative with the exception of those mentioned above.      Objective:    Vital Sign Min/Max for last 24 hours  Temp  Min: 97.6 °F (36.4 °C)  Max: 98.4 °F (36.9 °C)   BP  Min: 94/56  Max: 127/77   Pulse  Min: 72  Max: 135   Resp  Min: 16  Max: 18   SpO2  Min: 86 %  Max: 100 %   No data recorded   No data recorded     Flowsheet Rows      First Filed Value   Admission Height  160 cm (63\") Documented at 07/12/2021 2127   Admission Weight  86.6 kg (191 lb) Documented at 07/12/2021 2127          Telemetry: Sinus rhythm    EKG: Sinus rhythm with stable inferior T wave inversions      Intake/Output Summary (Last 24 hours) at 7/16/2021 0938  Last data filed at 7/16/2021 0000  Gross per 24 hour   Intake 963 ml   Output --   Net 963 ml     Intake & Output (last 3 days)       07/13 0701 - 07/14 0700 07/14 0701 - 07/15 0700 07/15 0701 - 07/16 0700 07/16 0701 - 07/17 0700    I.V. (mL/kg)   963 (10.5)     IV Piggyback        Total Intake(mL/kg)   963 (10.5)     Urine (mL/kg/hr) 2100 (1)       Total Output 2100       Net -2100  +963             Urine Unmeasured Occurrence  3 x 4 x            Physical Exam:  Constitutional:       Comments: Has some facial angioedema   Pulmonary:      Effort: Pulmonary effort is normal.      Breath sounds: No wheezing. "   Cardiovascular:      Normal rate. Regular rhythm.      Comments: Right radial access site clean dry intact  Pulses:     Intact distal pulses.   Edema:     Peripheral edema absent.   Abdominal:      Palpations: Abdomen is soft.   Neurological:      General: No focal deficit present.          LABS/DIAGNOSTIC DATA:  Results from last 7 days   Lab Units 07/16/21  0538 07/13/21  0607 07/12/21  2208   WBC 10*3/mm3 12.83* 4.82 5.66   HEMOGLOBIN g/dL 12.4 11.8* 12.6   HEMATOCRIT % 38.6 36.1 37.6   PLATELETS 10*3/mm3 144 157 159     Lab Results   Lab Value Date/Time    TROPONINT <0.010 07/15/2021 2310    TROPONINT <0.010 07/13/2021 0436    TROPONINT <0.010 07/12/2021 2329    TROPONINT <0.010 07/12/2021 2208    TROPONINT 5.990 (C) 06/19/2021 1148    TROPONINT 5.130 (C) 06/19/2021 0542         Results from last 7 days   Lab Units 07/16/21  0538 07/15/21  0249 07/14/21  0602 07/12/21  2208   SODIUM mmol/L 135* 140 138 136   POTASSIUM mmol/L 4.0 4.3 3.8 3.7   CHLORIDE mmol/L 104 105 102 98   CO2 mmol/L 22.0 24.0 25.0 26.0   BUN mg/dL 13 10 11 9   CREATININE mg/dL 0.81 0.84 0.91 0.93   CALCIUM mg/dL 8.7 9.1 8.8 9.3   BILIRUBIN mg/dL 0.2  --   --  0.4   ALK PHOS U/L 138*  --   --  200*   ALT (SGPT) U/L 16  --   --  18   AST (SGOT) U/L 17  --   --  23   GLUCOSE mg/dL 192* 138* 104* 123*                       Medication Review:   ascorbic acid, 1,000 mg, Oral, Daily  aspirin, 81 mg, Oral, Daily  bumetanide, 2 mg, Oral, Daily  clopidogrel, 75 mg, Oral, Daily  diazePAM, 2 mg, Oral, BID  enoxaparin, 40 mg, Subcutaneous, Q24H  FLUoxetine, 40 mg, Oral, Daily  folic acid, 1 mg, Oral, Daily  lactulose, 10 g, Oral, BID  lidocaine, 1 patch, Transdermal, Q24H  linaclotide, 72 mcg, Oral, QAM AC  magnesium oxide, 400 mg, Oral, Q PM  OLANZapine, 7.5 mg, Oral, Nightly  pantoprazole, 40 mg, Oral, BID AC  [START ON 7/17/2021] pharmacy consult - [place], , Does not apply, Daily  ranolazine, 500 mg, Oral, BID  riFAXIMin, 550 mg, Oral,  Q12H  rosuvastatin, 20 mg, Oral, Nightly  sodium chloride, 10 mL, Intravenous, Q12H  spironolactone, 100 mg, Oral, Daily  tamsulosin, 0.4 mg, Oral, Daily  traZODone, 50 mg, Oral, Nightly  vitamin B-6, 25 mg, Oral, Daily               Anaphylactic shock - Requiring vasopressor support    History of esophageal varices    GERD (gastroesophageal reflux disease)    Depression    Chest pain    Cirrhosis of liver (CMS/HCC)    CAD (coronary artery disease)    Rib fractures    Cardiac catheterization summary:  · There was a 95% stenosis just distal to a previously placed stent.  The lesion is status post intervention with a Xience Melissa 3.0 x 18 mm drug-eluting stent.  · The patient had an anaphylactic-like reaction to contrast dye despite premedication.  She was supported with intraprocedural IV Solu-Medrol, epinephrine, and a nonrebreather.  The patient will be transferred to the intensive care unit.       Assessment  1. CAD  a. Status post ANGELICA to the circumflex 6/18/2021 at time of STEMI.  b. Status post ANGELICA to distal circumflex 7/15/2021 for unstable angina  2. Recent right calf pain   a. In the setting of recent limited mobility, elevated D-dimer  b. Negative bilateral lower extremity duplex 7/13/2021  3. Rib fractures  4. Cirrhosis of the liver, esophageal varices  5. Contrast dye allergy  a. First event recently at [facility] where the patient had acute onset dyspnea for an abdominal CT scan with contrast dye   b. Second event prior to CT scan at [facility], but was occurring at the same time of a cardiac arrest/STEMI  i. Given Solu-Medrol IV and then tolerated heart catheterization with contrast without difficulty           C.  Anaphylactic reaction to IV contrast 7/15/2021 requiring epinephrine,  antihistamines, IV steroids    Plan:  1.  Aspirin 81 mg, Plavix 75 mg, Crestor 20 mg for coronary artery disease  2.  Can continue Ranexa for antianginal effect  3.   Improving status post treatment for  anaphylactic contrast reaction, continue steroids per primary team.  Off of epinephrine.  4.  Patient remains on her home regimen of rifaximin, Aldactone, Bumex for cirrhosis.  Monitor blood pressure which is low end of normal  5.  Holding off on beta-blockers due to low normal BP today    We will continue to follow      Akhil Melendez MD Located within [facility]  07/16/21

## 2021-07-16 NOTE — NURSING NOTE
Pt. Referred for Phase II Cardiac Rehab. Staff discussed benefits of exercise, program protocol, and educational material provided. Teach back verified. Pt. Was previously referred to [facility] and had an orientation scheduled on 8/10/21.  Permission granted from patient for staff to fax new referral information to outlying program at this time.  Staff faxed new referral info to [facility].

## 2021-07-16 NOTE — CASE MANAGEMENT/SOCIAL WORK
Discharge Planning Assessment  [facility]     Patient Name: Timothy Guzman  MRN: [id number]  Today's Date: 7/16/2021    Admit Date: 7/12/2021    Discharge Needs Assessment     Row Name 07/16/21 0814       Living Environment    Lives With  facility resident    Current Living Arrangements  other (see comments)    Living Arrangement Comments  The pt came from [facility] acute rehab. Prior to that she lived at [facility].       Discharge Needs Assessment    Equipment Currently Used at Home  none    Equipment Needed After Discharge  none    Discharge Coordination/Progress  The pt was admitted from [facility] acute. She was to be DCed from [facility] when she ended up being admitted here. DME was arranged from [facility] with Pt Aides to be delivered to the pts parents home. Outpt TX was arranged at [facility].        Discharge Plan     Row Name 07/16/21 0818       Plan    Plan  Home with parents vs return to [facility]    Patient/Family in Agreement with Plan  other (see comments)    Plan Comments  The pt had planed to [place] home with her parents. CM will follow. May need to return to [facility] [place].    Final Discharge Disposition Code  01 - home or self-care        Continued Care and Services - Admitted Since 7/12/2021     Durable Medical Equipment Coordination complete    Service Provider Request Status Selected Services Address Phone Fax Patient Preferred    PATIENT AIDS - [place]   [facility] Durable Medical [facility] [address] [facility] [address] [phone] --          Therapy Coordination complete    Service Provider Request Status Selected Services Address Phone Fax Patient Preferred    [facility]   Selected Outpatient Rehabilitation [address] [facility] [address] [phone] --            Selected Continued Care - Prior Encounters Includes selections from prior encounters from 4/13/2021 to 7/16/2021    Discharged on 7/2/2021 Admission date: 6/18/2021  - Discharge disposition: Rehab Facility or Unit (DC - External)    Destination     Service Provider Selected Services Address Phone Fax Patient Preferred    [facility]  Inpatient Rehabilitation [address] , [facility] [address] [phone] --                    Expected Discharge Date and Time     Expected Discharge Date Expected Discharge Time    Jul 13, 2021         Demographic Summary    No documentation.       Functional Status     Row Name 07/16/21 0814       Functional Status    Usual Activity Tolerance  moderate       Functional Status, IADL    Medications  other (see comments)    Meal Preparation  other (see comments)    Housekeeping  other (see comments)    Laundry  other (see comments)    Shopping  other (see comments)        Psychosocial    No documentation.       Abuse/Neglect    No documentation.       Legal    No documentation.       Substance Abuse    No documentation.       Patient Forms    No documentation.           Diana Suresh RN

## 2021-07-17 PROCEDURE — 25010000002 HEPARIN (PORCINE) PER 1000 UNITS: Performed by: INTERNAL MEDICINE

## 2021-07-17 PROCEDURE — 25010000002 HYDROMORPHONE PER 4 MG: Performed by: INTERNAL MEDICINE

## 2021-07-17 PROCEDURE — 99232 SBSQ HOSP IP/OBS MODERATE 35: CPT | Performed by: INTERNAL MEDICINE

## 2021-07-17 PROCEDURE — 99233 SBSQ HOSP IP/OBS HIGH 50: CPT | Performed by: INTERNAL MEDICINE

## 2021-07-17 RX ORDER — RANOLAZINE 500 MG/1
1000 TABLET, EXTENDED RELEASE ORAL 2 TIMES DAILY
Status: DISCONTINUED | OUTPATIENT
Start: 2021-07-17 | End: 2021-07-20 | Stop reason: HOSPADM

## 2021-07-17 RX ORDER — HYDROMORPHONE HYDROCHLORIDE 1 MG/ML
0.25 INJECTION, SOLUTION INTRAMUSCULAR; INTRAVENOUS; SUBCUTANEOUS ONCE
Status: COMPLETED | OUTPATIENT
Start: 2021-07-17 | End: 2021-07-17

## 2021-07-17 RX ORDER — OXYCODONE AND ACETAMINOPHEN 7.5; 325 MG/1; MG/1
1 TABLET ORAL EVERY 6 HOURS PRN
Status: DISCONTINUED | OUTPATIENT
Start: 2021-07-17 | End: 2021-07-20 | Stop reason: HOSPADM

## 2021-07-17 RX ADMIN — RIFAXIMIN 550 MG: 550 TABLET ORAL at 20:18

## 2021-07-17 RX ADMIN — TAMSULOSIN HYDROCHLORIDE 0.4 MG: 0.4 CAPSULE ORAL at 08:18

## 2021-07-17 RX ADMIN — HEPARIN SODIUM 5000 UNITS: 5000 INJECTION INTRAVENOUS; SUBCUTANEOUS at 13:53

## 2021-07-17 RX ADMIN — TRAZODONE HYDROCHLORIDE 50 MG: 50 TABLET ORAL at 20:21

## 2021-07-17 RX ADMIN — ASPIRIN 81 MG: 81 TABLET, COATED ORAL at 08:18

## 2021-07-17 RX ADMIN — OXYCODONE HYDROCHLORIDE AND ACETAMINOPHEN 1 TABLET: 7.5; 325 TABLET ORAL at 15:04

## 2021-07-17 RX ADMIN — PYRIDOXINE HCL TAB 50 MG 25 MG: 50 TAB at 08:19

## 2021-07-17 RX ADMIN — SODIUM CHLORIDE, PRESERVATIVE FREE 10 ML: 5 INJECTION INTRAVENOUS at 20:21

## 2021-07-17 RX ADMIN — PANTOPRAZOLE SODIUM 40 MG: 40 TABLET, DELAYED RELEASE ORAL at 17:12

## 2021-07-17 RX ADMIN — OLANZAPINE 7.5 MG: 5 TABLET, FILM COATED ORAL at 20:18

## 2021-07-17 RX ADMIN — HEPARIN SODIUM 5000 UNITS: 5000 INJECTION INTRAVENOUS; SUBCUTANEOUS at 05:52

## 2021-07-17 RX ADMIN — RIFAXIMIN 550 MG: 550 TABLET ORAL at 08:28

## 2021-07-17 RX ADMIN — Medication 400 MG: at 17:11

## 2021-07-17 RX ADMIN — LACTULOSE 10 G: 20 SOLUTION ORAL at 20:17

## 2021-07-17 RX ADMIN — SODIUM CHLORIDE, PRESERVATIVE FREE 10 ML: 5 INJECTION INTRAVENOUS at 08:19

## 2021-07-17 RX ADMIN — HYDROMORPHONE HYDROCHLORIDE 0.25 MG: 1 INJECTION, SOLUTION INTRAMUSCULAR; INTRAVENOUS; SUBCUTANEOUS at 11:03

## 2021-07-17 RX ADMIN — CLOPIDOGREL BISULFATE 75 MG: 75 TABLET ORAL at 08:18

## 2021-07-17 RX ADMIN — OXYCODONE HYDROCHLORIDE AND ACETAMINOPHEN 1000 MG: 500 TABLET ORAL at 08:18

## 2021-07-17 RX ADMIN — FOLIC ACID 1 MG: 1 TABLET ORAL at 08:18

## 2021-07-17 RX ADMIN — HEPARIN SODIUM 5000 UNITS: 5000 INJECTION INTRAVENOUS; SUBCUTANEOUS at 20:17

## 2021-07-17 RX ADMIN — RANOLAZINE 1000 MG: 500 TABLET, EXTENDED RELEASE ORAL at 20:18

## 2021-07-17 RX ADMIN — LIDOCAINE 1 PATCH: 50 PATCH CUTANEOUS at 08:18

## 2021-07-17 RX ADMIN — OXYCODONE HYDROCHLORIDE AND ACETAMINOPHEN 1 TABLET: 7.5; 325 TABLET ORAL at 21:16

## 2021-07-17 RX ADMIN — LACTULOSE 10 G: 20 SOLUTION ORAL at 08:19

## 2021-07-17 RX ADMIN — ALPRAZOLAM 0.25 MG: 0.25 TABLET ORAL at 00:11

## 2021-07-17 RX ADMIN — PANTOPRAZOLE SODIUM 40 MG: 40 TABLET, DELAYED RELEASE ORAL at 05:52

## 2021-07-17 RX ADMIN — OXYCODONE HYDROCHLORIDE AND ACETAMINOPHEN 1 TABLET: 5; 325 TABLET ORAL at 09:11

## 2021-07-17 RX ADMIN — ROSUVASTATIN CALCIUM 20 MG: 20 TABLET, COATED ORAL at 20:21

## 2021-07-17 RX ADMIN — FLUOXETINE HYDROCHLORIDE 40 MG: 20 CAPSULE ORAL at 08:18

## 2021-07-17 NOTE — SIGNIFICANT NOTE
2150 Pt complained of sharp mid chest pain. Pain was not radiating. Pt stated it was hard for her to breathe also. O2 sats in mid 90s on 2L NC. Charge was called and EKG ordered. Charge advised to call rapid because of patient's history. Intensivist APRN contacted. Cardiology also contacted. Rapid  Called.

## 2021-07-17 NOTE — PROGRESS NOTES
[facility] Medicine Services  PROGRESS NOTE    Patient Name: Timothy Guzman  : 1975  MRN: [id number]    Date of Admission: 2021  Primary Care Physician: Toshia Mitchell APRN    Subjective   Subjective     CC:  Chest pain    HPI:  Moved out of the ICU yesterday.  Continues to have rib pain, worse around the right shoulder.  Requesting pain medicines with regular frequency.  Also complaining of chest pressure this morning    ROS:  Gen- No fevers, chills  CV-yes chest pain, no palpitations  Resp- No cough, dyspnea  GI- No N/V/D, abd pain    Objective   Objective     Vital Signs:   Temp:  [96.6 °F (35.9 °C)-98.3 °F (36.8 °C)] 98.1 °F (36.7 °C)  Heart Rate:  [67-92] 67  Resp:  [16-18] 18  BP: ()/(57-71) 98/64  Flow (L/min):  [2] 2     Physical Exam:  Constitutional: No acute distress, awake, alert  HENT: NCAT, mucous membranes moist  Respiratory: Clear to auscultation bilaterally, respiratory effort normal   Cardiovascular: RRR, no murmurs, rubs, or gallops, palpable radial pulses  Gastrointestinal: Positive bowel sounds, soft, nontender, nondistended  Musculoskeletal: No bilateral ankle edema, chest wall tenderness to palpation  Psychiatric: Appropriate affect, cooperative  Neurologic: Speech clear, moving all extremities symmetrically  Skin: No rashes visualized on exposed skin    Results Reviewed:  LAB RESULTS:      Lab 21  2210 21  0711 21  0538 21  0607 21  0245 21  2208   WBC 8.33  --  12.83* 4.82  --  5.66   HEMOGLOBIN 11.4*  --  12.4 11.8*  --  12.6   HEMATOCRIT 35.2  --  38.6 36.1  --  37.6   PLATELETS 123*  --  144 157  --  159   NEUTROS ABS  --   --   --  2.11  --  2.82   IMMATURE GRANS (ABS)  --   --   --  0.02  --  0.02   LYMPHS ABS  --   --   --  1.74  --  1.81   MONOS ABS  --   --   --  0.69  --  0.81   EOS ABS  --   --   --  0.23  --  0.17   MCV 93.4  --  92.8 91.6  --  90.2   LACTATE  --  1.9 2.3*  --   --   --     D DIMER QUANT  --   --   --   --  1.22*  --          Lab 07/16/21 2209 07/16/21  0538 07/15/21  0249 07/14/21  0602 07/13/21  0436   SODIUM 136 135* 140 138 137   POTASSIUM 3.8 4.0 4.3 3.8 4.0   CHLORIDE 105 104 105 102 100   CO2 21.0* 22.0 24.0 25.0 27.0   ANION GAP 10.0 9.0 11.0 11.0 10.0   BUN 16 13 10 11 11   CREATININE 0.96 0.81 0.84 0.91 0.91   GLUCOSE 205* 192* 138* 104* 105*   CALCIUM 8.6 8.7 9.1 8.8 9.2   IONIZED CALCIUM  --  1.32  --   --   --    MAGNESIUM  --  2.0 2.1 1.9 1.9   PHOSPHORUS  --  2.6  --   --   --          Lab 07/16/21  0538 07/12/21  2208   TOTAL PROTEIN 5.3* 6.4   ALBUMIN 3.10* 3.70   GLOBULIN 2.2 2.7   ALT (SGPT) 16 18   AST (SGOT) 17 23   BILIRUBIN 0.2 0.4   ALK PHOS 138* 200*   LIPASE  --  16         Lab 07/16/21  2209 07/15/21  2310 07/13/21  0436 07/12/21  2329 07/12/21  2208   PROBNP 616.3*  --   --   --  152.1   TROPONIN T <0.010 <0.010 <0.010 <0.010 <0.010             Lab 07/16/21 2209   ABO TYPING A   RH TYPING Positive   ANTIBODY SCREEN Negative         Brief Urine Lab Results  (Last result in the past 365 days)      Color   Clarity   Blood   Leuk Est   Nitrite   Protein   CREAT   Urine HCG        07/15/21 0903               Negative           Microbiology Results Abnormal     Procedure Component Value - Date/Time    COVID PRE-OP / PRE-PROCEDURE SCREENING ORDER (NO ISOLATION) - Swab, Nasopharynx [[id number]]  (Normal) Collected: 07/13/21 1440    Lab Status: Final result Specimen: Swab from Nasopharynx Updated: 07/13/21 1512    Narrative:      The following orders were created for panel order COVID PRE-OP / PRE-PROCEDURE SCREENING ORDER (NO ISOLATION) - Swab, Nasopharynx.  Procedure                               Abnormality         Status                     ---------                               -----------         ------                     COVID-19, ABBOTT IN-HOUS...[[id number]]  Normal              Final result                 Please view results for these tests on the  individual orders.    COVID-19, ABBOTT IN-HOUSE,NASAL Swab (NO TRANSPORT MEDIA) 2 HR TAT - Swab, Nasopharynx [[id number]]  (Normal) Collected: 07/13/21 1440    Lab Status: Final result Specimen: Swab from Nasopharynx Updated: 07/13/21 1512     COVID19 Presumptive Negative    Narrative:      Fact sheet for providers: https://www.fda.gov/media/786253/download     Fact sheet for patients: https://www.fda.gov/media/549773/download    Test performed by PCR.  If inconsistent with clinical signs and symptoms patient should be tested with different authorized molecular test.          Cardiac Catheterization/Vascular Study    Result Date: 7/15/2021   [facility] Cardiology [address] [place] [address], [address] [place], [place], [address] [phone]  WWW.[facility]   CARDIAC CATHETERIZATION PROCEDURE NOTE     Impression: · There was a 95% stenosis just distal to a previously placed stent.  The lesion is status post intervention with a Xience Melissa 3.0 x 18 mm drug-eluting stent. · The patient had an anaphylactic-like reaction to contrast dye despite premedication.  She was supported with intraprocedural IV Solu-Medrol, epinephrine, and a nonrebreather.  The patient will be transferred to the intensive care unit. RECOMMENDATIONS: · The above findings and contrast dye reaction were discussed with the hospitalist Dr. Ly, the intensivist Dr. Kendrick, and the patient's mother --------------------------------------------------------------------------- ------------------------------------------- Indication(s) for this Procedure:  Unstable angina, cardiac arrest and STEMI last month status post ANGELICA to circumflex artery Procedure(s) Performed: 1. Right artery access 2. Selective coronary angiography 3. Percutaneous coronary intervention of the circumflex artery Description of the Procedure:   Informed consent was obtained with the goals, rationale, alternatives, risks and benefits of the procedure explained to the  patient.  A 6Fr Glidesheath slender sheath was placed in the right radial artery. Selective angiography of the right coronary artery was performed with a 5Fr JR4 diagnostic catheter.  Selective angiography of the left coronary arteries was performed with a 5Fr JL3.5 diagnostic catheter.  Due to a severe stenosis in the circumflex coronary artery, it was decided to proceed with intervention.  Heparin was given for anticoagulation. A 6 Surinamese FL3.0 guide catheter was inserted. A Arley blue guidewire was used to cross the stenosis. A Xience Melissa 3.0 x 18 mm drug eluting stent was placed.  Please see the description of the patient's reaction to contrast dye as noted below in the complications section. The procedure was completed and the sheath was removed. A radial hemostasis band was placed on the artery for hemostasis.  Angiographic Findings: Left dominant coronary circulation · Left main artery:   The vessel is normal sized and bifurcates into an anterior descending and circumflex artery. · Left anterior descending artery: The vessel is normal size and terminates in an apical artery. There is mild diffuse atherosclerosis.  The first diagonal branch is medium sized and is without significant disease. · Left circumflex artery: The vessel is large sized and terminates in a posterior descending artery.  There is a 95% discrete AV groove artery stenosis just distal to the previously placed stent.  The first obtuse marginal branch is large sized and without significant disease. · Right coronary artery: The vessel small sized.  There is no significant disease. Hemodynamic Findings: · Ao pressure:  66/58/61 mmHg Post-interventional Results: Lesion #1 · ACC AHA class lesion: Type A · Location:    Mid circumflex · Stenosis pre-PCI:  95% · Stenosis post-PCI:  0% · YOBANY flow pre-PCI:  1 · YOBANY flow post-PCI:  3 Estimated Blood Loss: Minimal Specimen(s): None obtained Sheath: Removed, radial hemostasis band Complications: The  patient had an anaphylactic-like reaction to contrast dye despite being premedicated with prednisone and Benadryl.  The patient was supported with IV Solu-Medrol 40 mg IV push, 1 amp of epinephrine, and started on an epinephrine drip.  Placed on a nonrebreather.  Patient was able to converse throughout the procedure.  It was decided to proceed with urgent revascularization since the patient had a flow-limiting stenosis. Vikram Gonzales MD, MSc, FACC, [facility] Interventional Cardiology [place] [facility]     XR Chest 1 View    Result Date: 7/16/2021  CR Chest 1 Vw INDICATION: Sudden onset chest pain today. COMPARISON:  Chest 7/16/2021 FINDINGS: Single portable AP view(s) of the chest. Left basilar subsegmental atelectasis again noted. No significant pleural effusions. No pneumothorax. Right lung is clear. Heart size and mediastinum are within normal limits.     Impression: Stable left basilar subsegmental atelectasis. No other acute chest findings. Signer Name: Julio Guzman MD  Signed: 7/16/2021 10:34 PM  Workstation Name: [id number]  [facility]    XR Chest 1 View    Result Date: 7/16/2021  EXAMINATION: XR CHEST 1 VW-  INDICATION: Respiratory distress; R07.9-Chest pain, unspecified.  COMPARISON: 07/15/2021.  FINDINGS: Portable chest reveals heart to be borderline enlarged with ill-defined opacification seen at the left lung base. Small left pleural effusion. Bony structures are unremarkable.         Impression: Mild increased markings identified at the left lung base with small left pleural effusion. The remainder of the chest is stable and unremarkable.  D:  07/16/2021 E:  07/16/2021  This report was finalized on 7/16/2021 5:29 PM by Dr. Elin Pretty MD.      XR Chest 1 View    Result Date: 7/15/2021  CR Chest 1 Vw INDICATION: Acute onset of chest pain and hypoxia COMPARISON:  July 14, 2021 FINDINGS: Single portable AP view(s) of the chest. The heart and  mediastinal contours are normal. The lungs demonstrate parenchymal scarring in the left lung base. No clearly acute infiltrates. No pneumothorax or pleural effusion.     Impression: No clearly acute cardiopulmonary findings. No significant change from prior study. Signer Name: Tirso Guzman MD  Signed: 7/15/2021 8:04 PM  Workstation Name: [id number]  [facility]      Results for orders placed during the hospital encounter of 06/18/21    Adult Transthoracic Echo Complete W/ Cont if Necessary Per Protocol    Interpretation Summary  · The quality of the study is limited due to patient positioning and patient being intubated.  · Left ventricular ejection fraction appears to be 51 - 55%. Left ventricular systolic function is normal.  · Left ventricular diastolic function is consistent with (grade Ia w/high LAP) impaired relaxation.  · Mildly reduced right ventricular systolic function noted.      I have reviewed the medications:  Scheduled Meds:ascorbic acid, 1,000 mg, Oral, Daily  aspirin, 81 mg, Oral, Daily  bumetanide, 2 mg, Oral, Daily  clopidogrel, 75 mg, Oral, Daily  FLUoxetine, 40 mg, Oral, Daily  folic acid, 1 mg, Oral, Daily  heparin (porcine), 5,000 Units, Subcutaneous, Q8H  lactulose, 10 g, Oral, BID  lidocaine, 1 patch, Transdermal, Q24H  magnesium oxide, 400 mg, Oral, Q PM  OLANZapine, 7.5 mg, Oral, Nightly  pantoprazole, 40 mg, Oral, BID AC  pharmacy consult - MTM, , Does not apply, Daily  ranolazine, 500 mg, Oral, BID  riFAXIMin, 550 mg, Oral, Q12H  rosuvastatin, 20 mg, Oral, Nightly  sodium chloride, 10 mL, Intravenous, Q12H  spironolactone, 100 mg, Oral, Daily  tamsulosin, 0.4 mg, Oral, Daily  traZODone, 50 mg, Oral, Nightly  vitamin B-6, 25 mg, Oral, Daily      Continuous Infusions:   PRN Meds:.docusate sodium  •  ipratropium-albuterol  •  magnesium sulfate **OR** magnesium sulfate in D5W 1g/100mL (PREMIX) **OR** magnesium sulfate  •  melatonin  •  ondansetron  •   oxyCODONE-acetaminophen  •  sodium chloride  •  sodium chloride    Assessment/Plan   Assessment & Plan     Active Hospital Problems    Diagnosis  POA   • **Anaphylactic shock - Requiring vasopressor support [T78.2XXA]  Yes   • Chest pain [R07.9]  Yes   • Cirrhosis of liver (CMS/HCC) [K74.60]  Yes   • CAD (coronary artery disease) [I25.10]  Yes   • Rib fractures [S22.49XA]  Yes   • Depression [F32.9]  Yes   • History of esophageal varices [Z87.19]  Not Applicable   • GERD (gastroesophageal reflux disease) [K21.9]  Yes      Resolved Hospital Problems   No resolved problems to display.        Brief Hospital Course to date:  Timothy Guzman is a 45 y.o. female with cirrhosis/varices, Hx pancreatitis, admitted 6/18/2021-7/2/2021 with coffee ground emesis developing PEA arrest in the ED while receiving IV contrast; subsequently developing STEMI with LHC and stent placement to circumflex, complicated with postoperative SVT.  EGD at that time showed grade 1 varices, erosive gastritis, and was eventually discharged to rehab.  She returned 7/12/2021 with chest pain, seen by cardiology and went for LHC 7/15/2021 with anaphylactic response despite pretreatment but did receive stenting to stenosis of the circumflex distal to prior stenting; she was managed in the ICU requiring epinephrine gtt and weaned off, subsequently ordered out of the ICU 7/16/2021; per prior documentation family has reported known EtOH/opioid abuse with drug-seeking behavior    The following problems new to me today    Assessment/plan    Anaphylactic shock (contrast dye)  S/p vasopressor support  -S/p x2 days IV steroids  -BP meds held this morning for borderline blood pressure  -Continue supportive care, monitoring    CAD s/p recent STEMI  -STEMI during last hospitalization; repeat LHC with ANGELICA this hospitalization  -Continue aspirin, Plavix, rosuvastatin  -Recurrent chest pain today, discussed with cardiology, they are adjusting antianginals    Rib pain  s/p recent CPR  -Per documentation has Hx opiate abuse, drug-seeking behavior  -Adjusting Norco dose today, x1 dose 0.25 mg hydromorphone, discussed with patient this will be the only IV narcotic provided  -Plan for Tylenol discharge    Hepatic cirrhosis, compensated  Grade 1 esophageal varices  GERD  -Recent admission with coffee-ground emesis  -Continue lactulose, PPI bid, rifaximin  -Bumex/Aldactone held this a.m. for borderline BP    Depression  -Continue fluoxetine, olanzapine, trazodone    Obesity  -BMI 38.45 kg/M2    DVT prophylaxis:  Medical DVT prophylaxis orders are present.       Disposition: I expect the patient to be discharged early this coming week if respiratory status and BP stable.    CODE STATUS:   Code Status and Medical Interventions:   Ordered at: 07/15/21 1705     Level Of Support Discussed With:    Patient     Code Status:    CPR     Medical Interventions (Level of Support Prior to Arrest):    Full       Haroldo Hillman, DO  07/17/21

## 2021-07-17 NOTE — PROGRESS NOTES
"[facility] Progress Note     LOS: 1 day   Patient Care Team:  Toshia Mitchell APRN as PCP - General (Family Medicine)  Nelson Yip APRN as Nurse Practitioner (Nurse Practitioner)  PCP:  Toshia Mitchell APRN    Chief Complaint: Follow-up unstable angina, anaphylactic reaction to contrast         Subjective: Chronic multiterritory pain treated with opiates and anxiolytics    Review of Systems:   All systems have been reviewed and are negative with the exception of those mentioned above.         Objective:    Vital Sign Min/Max for last 24 hours  Temp  Min: 96.6 °F (35.9 °C)  Max: 98.3 °F (36.8 °C)   BP  Min: 95/51  Max: 122/71   Pulse  Min: 67  Max: 92   Resp  Min: 16  Max: 18   SpO2  Min: 91 %  Max: 97 %   No data recorded   Weight  Min: 95.3 kg (210 lb 3.2 oz)  Max: 95.3 kg (210 lb 3.2 oz)     Flowsheet Rows      First Filed Value   Admission Height  160 cm (63\") Documented at 07/12/2021 2127   Admission Weight  86.6 kg (191 lb) Documented at 07/12/2021 2127          Telemetry: Sinus rhythm    EKG: Sinus rhythm with stable inferior T wave inversions      Intake/Output Summary (Last 24 hours) at 7/17/2021 0929  Last data filed at 7/17/2021 0559  Gross per 24 hour   Intake 871 ml   Output --   Net 871 ml     Intake & Output (last 3 days)       07/14 0701 - 07/15 0700 07/15 0701 - 07/16 0700 07/16 0701 - 07/17 0700 07/17 0701 - 07/18 0700    P.O.   871     I.V. (mL/kg)  963 (10.5)      Total Intake(mL/kg)  963 (10.5) 871 (9.1)     Urine (mL/kg/hr)        Total Output        Net  +963 +871             Urine Unmeasured Occurrence 3 x 4 x             Physical Exam:  General: In no acute distress     LABS/DIAGNOSTIC DATA:  Results from last 7 days   Lab Units 07/16/21  2210 07/16/21  0538 07/13/21  0607   WBC 10*3/mm3 8.33 12.83* 4.82   HEMOGLOBIN g/dL 11.4* 12.4 11.8*   HEMATOCRIT % 35.2 38.6 36.1   PLATELETS 10*3/mm3 123* 144 157     Lab Results "   Lab Value Date/Time    TROPONINT <0.010 07/16/2021 2209    TROPONINT <0.010 07/15/2021 2310    TROPONINT <0.010 07/13/2021 0436    TROPONINT <0.010 07/12/2021 2329    TROPONINT <0.010 07/12/2021 2208    TROPONINT 5.990 (C) 06/19/2021 1148    TROPONINT 5.130 (C) 06/19/2021 0542         Results from last 7 days   Lab Units 07/16/21 2209 07/16/21  0538 07/15/21  0249 07/12/21 2208   SODIUM mmol/L 136 135* 140 136   POTASSIUM mmol/L 3.8 4.0 4.3 3.7   CHLORIDE mmol/L 105 104 105 98   CO2 mmol/L 21.0* 22.0 24.0 26.0   BUN mg/dL 16 13 10 9   CREATININE mg/dL 0.96 0.81 0.84 0.93   CALCIUM mg/dL 8.6 8.7 9.1 9.3   BILIRUBIN mg/dL  --  0.2  --  0.4   ALK PHOS U/L  --  138*  --  200*   ALT (SGPT) U/L  --  16  --  18   AST (SGOT) U/L  --  17  --  23   GLUCOSE mg/dL 205* 192* 138* 123*                       Medication Review:   ascorbic acid, 1,000 mg, Oral, Daily  aspirin, 81 mg, Oral, Daily  bumetanide, 2 mg, Oral, Daily  clopidogrel, 75 mg, Oral, Daily  FLUoxetine, 40 mg, Oral, Daily  folic acid, 1 mg, Oral, Daily  heparin (porcine), 5,000 Units, Subcutaneous, Q8H  lactulose, 10 g, Oral, BID  lidocaine, 1 patch, Transdermal, Q24H  magnesium oxide, 400 mg, Oral, Q PM  OLANZapine, 7.5 mg, Oral, Nightly  pantoprazole, 40 mg, Oral, BID AC  pharmacy consult - MTM, , Does not apply, Daily  ranolazine, 500 mg, Oral, BID  riFAXIMin, 550 mg, Oral, Q12H  rosuvastatin, 20 mg, Oral, Nightly  sodium chloride, 10 mL, Intravenous, Q12H  spironolactone, 100 mg, Oral, Daily  tamsulosin, 0.4 mg, Oral, Daily  traZODone, 50 mg, Oral, Nightly  vitamin B-6, 25 mg, Oral, Daily               Anaphylactic shock - Requiring vasopressor support    History of esophageal varices    GERD (gastroesophageal reflux disease)    Depression    Chest pain    Cirrhosis of liver (CMS/HCC)    CAD (coronary artery disease)    Rib fractures    Cardiac catheterization summary:  · There was a 95% stenosis just distal to a previously placed stent.  The lesion is  status post intervention with a Xience Melissa 3.0 x 18 mm drug-eluting stent.  · The patient had an anaphylactic-like reaction to contrast dye despite premedication.  She was supported with intraprocedural IV Solu-Medrol, epinephrine, and a nonrebreather.  The patient will be transferred to the intensive care unit.          Assessment  1. CAD  a. Status post ANGELICA to the circumflex 6/18/2021 at time of STEMI.  b. Status post ANGELICA to distal circumflex 7/15/2021 for unstable angina  2. Recent right calf pain   a. In the setting of recent limited mobility, elevated D-dimer  b. Negative bilateral lower extremity duplex 7/13/2021  3. Rib fractures  4. Cirrhosis of the liver, esophageal varices  5. Contrast dye allergy  a. First event recently at [facility] where the patient had acute onset dyspnea for an abdominal CT scan with contrast dye   b. Second event prior to CT scan at [facility], but was occurring at the same time of a cardiac arrest/STEMI  1. Given Solu-Medrol IV and then tolerated heart catheterization with contrast without difficulty  6/2021  c. Anaphylactic reaction to IV contrast 7/15/2021 requiringepinephrine, antihistamines, IV steroids    Plan:  1. DAPT, statin  2. Increasing Ranexa to 1000mg twice daily  3. Patient remains on her home regimen of rifaximin, Aldactone, Bumex for cirrhosis.  Monitor blood pressure which is low end of normal  4. Holding off on beta-blockers due to low normal BP today  5. We will revisit on Monday unless the patient has a change in her cardiac status.  Please feel free to call with any questions or concerns      Vikram Gonzales MD [facility]  07/17/21

## 2021-07-17 NOTE — PLAN OF CARE
Goal Outcome Evaluation:              Outcome Summary: Significant note on rapid called on patient at beginning of shift. Pt was given xanax for anxiety and rested overnight. This morning she stated she feels better and in no pain. VSS.Will continue to monitor.

## 2021-07-18 PROCEDURE — 93005 ELECTROCARDIOGRAM TRACING: CPT | Performed by: INTERNAL MEDICINE

## 2021-07-18 PROCEDURE — 99232 SBSQ HOSP IP/OBS MODERATE 35: CPT | Performed by: INTERNAL MEDICINE

## 2021-07-18 PROCEDURE — 25010000002 HEPARIN (PORCINE) PER 1000 UNITS: Performed by: INTERNAL MEDICINE

## 2021-07-18 PROCEDURE — 93010 ELECTROCARDIOGRAM REPORT: CPT | Performed by: INTERNAL MEDICINE

## 2021-07-18 RX ORDER — BUMETANIDE 1 MG/1
1 TABLET ORAL DAILY
Status: DISCONTINUED | OUTPATIENT
Start: 2021-07-18 | End: 2021-07-19

## 2021-07-18 RX ORDER — SPIRONOLACTONE 50 MG/1
50 TABLET, FILM COATED ORAL DAILY
Status: DISCONTINUED | OUTPATIENT
Start: 2021-07-18 | End: 2021-07-19

## 2021-07-18 RX ADMIN — SPIRONOLACTONE 50 MG: 50 TABLET ORAL at 09:03

## 2021-07-18 RX ADMIN — SODIUM CHLORIDE, PRESERVATIVE FREE 10 ML: 5 INJECTION INTRAVENOUS at 09:06

## 2021-07-18 RX ADMIN — FOLIC ACID 1 MG: 1 TABLET ORAL at 09:03

## 2021-07-18 RX ADMIN — ROSUVASTATIN CALCIUM 20 MG: 20 TABLET, COATED ORAL at 20:01

## 2021-07-18 RX ADMIN — OXYCODONE HYDROCHLORIDE AND ACETAMINOPHEN 1 TABLET: 7.5; 325 TABLET ORAL at 13:31

## 2021-07-18 RX ADMIN — TAMSULOSIN HYDROCHLORIDE 0.4 MG: 0.4 CAPSULE ORAL at 09:02

## 2021-07-18 RX ADMIN — BUMETANIDE 1 MG: 1 TABLET ORAL at 09:02

## 2021-07-18 RX ADMIN — LACTULOSE 10 G: 20 SOLUTION ORAL at 09:02

## 2021-07-18 RX ADMIN — RIFAXIMIN 550 MG: 550 TABLET ORAL at 09:02

## 2021-07-18 RX ADMIN — SODIUM CHLORIDE, PRESERVATIVE FREE 10 ML: 5 INJECTION INTRAVENOUS at 20:02

## 2021-07-18 RX ADMIN — RANOLAZINE 1000 MG: 500 TABLET, EXTENDED RELEASE ORAL at 20:00

## 2021-07-18 RX ADMIN — CLOPIDOGREL BISULFATE 75 MG: 75 TABLET ORAL at 09:02

## 2021-07-18 RX ADMIN — PANTOPRAZOLE SODIUM 40 MG: 40 TABLET, DELAYED RELEASE ORAL at 16:39

## 2021-07-18 RX ADMIN — HEPARIN SODIUM 5000 UNITS: 5000 INJECTION INTRAVENOUS; SUBCUTANEOUS at 13:31

## 2021-07-18 RX ADMIN — Medication 400 MG: at 16:39

## 2021-07-18 RX ADMIN — LIDOCAINE 1 PATCH: 50 PATCH CUTANEOUS at 09:01

## 2021-07-18 RX ADMIN — ASPIRIN 81 MG: 81 TABLET, COATED ORAL at 09:03

## 2021-07-18 RX ADMIN — OXYCODONE HYDROCHLORIDE AND ACETAMINOPHEN 1 TABLET: 7.5; 325 TABLET ORAL at 19:52

## 2021-07-18 RX ADMIN — PYRIDOXINE HCL TAB 50 MG 25 MG: 50 TAB at 09:03

## 2021-07-18 RX ADMIN — OXYCODONE HYDROCHLORIDE AND ACETAMINOPHEN 1000 MG: 500 TABLET ORAL at 09:03

## 2021-07-18 RX ADMIN — OXYCODONE HYDROCHLORIDE AND ACETAMINOPHEN 1 TABLET: 7.5; 325 TABLET ORAL at 06:08

## 2021-07-18 RX ADMIN — FLUOXETINE HYDROCHLORIDE 40 MG: 20 CAPSULE ORAL at 09:02

## 2021-07-18 RX ADMIN — OLANZAPINE 7.5 MG: 5 TABLET, FILM COATED ORAL at 20:01

## 2021-07-18 RX ADMIN — LACTULOSE 10 G: 20 SOLUTION ORAL at 20:01

## 2021-07-18 RX ADMIN — PANTOPRAZOLE SODIUM 40 MG: 40 TABLET, DELAYED RELEASE ORAL at 06:08

## 2021-07-18 RX ADMIN — TRAZODONE HYDROCHLORIDE 50 MG: 50 TABLET ORAL at 20:01

## 2021-07-18 RX ADMIN — HEPARIN SODIUM 5000 UNITS: 5000 INJECTION INTRAVENOUS; SUBCUTANEOUS at 20:00

## 2021-07-18 RX ADMIN — RIFAXIMIN 550 MG: 550 TABLET ORAL at 20:01

## 2021-07-18 RX ADMIN — HEPARIN SODIUM 5000 UNITS: 5000 INJECTION INTRAVENOUS; SUBCUTANEOUS at 06:08

## 2021-07-18 RX ADMIN — RANOLAZINE 1000 MG: 500 TABLET, EXTENDED RELEASE ORAL at 09:02

## 2021-07-18 NOTE — PROGRESS NOTES
[facility] Medicine Services  PROGRESS NOTE    Patient Name: Timothy Guzman  : 1975  MRN: [id number]    Date of Admission: 2021  Primary Care Physician: Toshia Mitchell APRN    Subjective   Subjective     CC:  Pain    HPI:  Complained of chest pain again overnight.  Does not notice a difference with increased Ranexa. This morning states she has right-sided chest pain with occasional shortness of breath.    ROS:  Gen- No fevers, chills  CV-yes chest pain, no palpitations  Resp- No cough, yes dyspnea  GI- No N/V/D, abd pain    Objective   Objective     Vital Signs:   Temp:  [97.5 °F (36.4 °C)-98.3 °F (36.8 °C)] 97.7 °F (36.5 °C)  Heart Rate:  [69-85] 69  Resp:  [18] 18  BP: ()/(51-62) 96/56  Flow (L/min):  [2] 2     Physical Exam:  Constitutional: Awake, alert, sitting up in bed  HENT: NCAT, mucous membranes moist  Respiratory: Clear to auscultation bilaterally, respiratory effort normal   Cardiovascular: RRR, no murmurs, rubs, or gallops, palpable radial pulses  Gastrointestinal: Positive bowel sounds, soft, nontender, nondistended  Musculoskeletal: No bilateral ankle edema, chest wall tenderness to palpation  Psychiatric: Appropriate affect, cooperative  Neurologic: Speech clear, moving all extremities symmetrically    Results Reviewed:  LAB RESULTS:      Lab 21  2210 21  0711 21  0538 21  0607 21  0245 21  2208   WBC 8.33  --  12.83* 4.82  --  5.66   HEMOGLOBIN 11.4*  --  12.4 11.8*  --  12.6   HEMATOCRIT 35.2  --  38.6 36.1  --  37.6   PLATELETS 123*  --  144 157  --  159   NEUTROS ABS  --   --   --  2.11  --  2.82   IMMATURE GRANS (ABS)  --   --   --  0.02  --  0.02   LYMPHS ABS  --   --   --  1.74  --  1.81   MONOS ABS  --   --   --  0.69  --  0.81   EOS ABS  --   --   --  0.23  --  0.17   MCV 93.4  --  92.8 91.6  --  90.2   LACTATE  --  1.9 2.3*  --   --   --    D DIMER QUANT  --   --   --   --  1.22*  --          Lab  07/16/21 2209 07/16/21  0538 07/15/21  0249 07/14/21  0602 07/13/21  0436   SODIUM 136 135* 140 138 137   POTASSIUM 3.8 4.0 4.3 3.8 4.0   CHLORIDE 105 104 105 102 100   CO2 21.0* 22.0 24.0 25.0 27.0   ANION GAP 10.0 9.0 11.0 11.0 10.0   BUN 16 13 10 11 11   CREATININE 0.96 0.81 0.84 0.91 0.91   GLUCOSE 205* 192* 138* 104* 105*   CALCIUM 8.6 8.7 9.1 8.8 9.2   IONIZED CALCIUM  --  1.32  --   --   --    MAGNESIUM  --  2.0 2.1 1.9 1.9   PHOSPHORUS  --  2.6  --   --   --          Lab 07/16/21 0538 07/12/21  2208   TOTAL PROTEIN 5.3* 6.4   ALBUMIN 3.10* 3.70   GLOBULIN 2.2 2.7   ALT (SGPT) 16 18   AST (SGOT) 17 23   BILIRUBIN 0.2 0.4   ALK PHOS 138* 200*   LIPASE  --  16         Lab 07/16/21  2209 07/15/21  2310 07/13/21  0436 07/12/21  2329 07/12/21 2208   PROBNP 616.3*  --   --   --  152.1   TROPONIN T <0.010 <0.010 <0.010 <0.010 <0.010             Lab 07/16/21 2209   ABO TYPING A   RH TYPING Positive   ANTIBODY SCREEN Negative         Brief Urine Lab Results  (Last result in the past 365 days)      Color   Clarity   Blood   Leuk Est   Nitrite   Protein   CREAT   Urine HCG        07/15/21 0903               Negative           Microbiology Results Abnormal     Procedure Component Value - Date/Time    COVID PRE-OP / PRE-PROCEDURE SCREENING ORDER (NO ISOLATION) - Swab, Nasopharynx [[id number]]  (Normal) Collected: 07/13/21 1440    Lab Status: Final result Specimen: Swab from Nasopharynx Updated: 07/13/21 1512    Narrative:      The following orders were created for panel order COVID PRE-OP / PRE-PROCEDURE SCREENING ORDER (NO ISOLATION) - Swab, Nasopharynx.  Procedure                               Abnormality         Status                     ---------                               -----------         ------                     COVID-19, ABBOTT IN-HOUS...[[id number]]  Normal              Final result                 Please view results for these tests on the individual orders.    COVID-19, ABBOTT IN-HOUSE,NASAL Swab (NO  TRANSPORT MEDIA) 2 HR TAT - Swab, Nasopharynx [[id number]]  (Normal) Collected: 07/13/21 1440    Lab Status: Final result Specimen: Swab from Nasopharynx Updated: 07/13/21 1512     COVID19 Presumptive Negative    Narrative:      Fact sheet for providers: https://www.fda.gov/media/886745/download     Fact sheet for patients: https://www.fda.gov/media/835846/download    Test performed by PCR.  If inconsistent with clinical signs and symptoms patient should be tested with different authorized molecular test.          XR Chest 1 View    Result Date: 7/16/2021  CR Chest 1 Vw INDICATION: Sudden onset chest pain today. COMPARISON:  Chest 7/16/2021 FINDINGS: Single portable AP view(s) of the chest. Left basilar subsegmental atelectasis again noted. No significant pleural effusions. No pneumothorax. Right lung is clear. Heart size and mediastinum are within normal limits.     Impression: Stable left basilar subsegmental atelectasis. No other acute chest findings. Signer Name: Julio Guzman MD  Signed: 7/16/2021 10:34 PM  Workstation Name: [id number]  [facility]      Results for orders placed during the hospital encounter of 06/18/21    Adult Transthoracic Echo Complete W/ Cont if Necessary Per Protocol    Interpretation Summary  · The quality of the study is limited due to patient positioning and patient being intubated.  · Left ventricular ejection fraction appears to be 51 - 55%. Left ventricular systolic function is normal.  · Left ventricular diastolic function is consistent with (grade Ia w/high LAP) impaired relaxation.  · Mildly reduced right ventricular systolic function noted.      I have reviewed the medications:  Scheduled Meds:ascorbic acid, 1,000 mg, Oral, Daily  aspirin, 81 mg, Oral, Daily  bumetanide, 2 mg, Oral, Daily  clopidogrel, 75 mg, Oral, Daily  FLUoxetine, 40 mg, Oral, Daily  folic acid, 1 mg, Oral, Daily  heparin (porcine), 5,000 Units, Subcutaneous, Q8H  lactulose, 10 g,  Oral, BID  lidocaine, 1 patch, Transdermal, Q24H  magnesium oxide, 400 mg, Oral, Q PM  OLANZapine, 7.5 mg, Oral, Nightly  pantoprazole, 40 mg, Oral, BID AC  pharmacy consult - MTM, , Does not apply, Daily  ranolazine, 1,000 mg, Oral, BID  riFAXIMin, 550 mg, Oral, Q12H  rosuvastatin, 20 mg, Oral, Nightly  sodium chloride, 10 mL, Intravenous, Q12H  spironolactone, 100 mg, Oral, Daily  tamsulosin, 0.4 mg, Oral, Daily  traZODone, 50 mg, Oral, Nightly  vitamin B-6, 25 mg, Oral, Daily      Continuous Infusions:   PRN Meds:.docusate sodium  •  ipratropium-albuterol  •  magnesium sulfate **OR** magnesium sulfate in D5W 1g/100mL (PREMIX) **OR** magnesium sulfate  •  melatonin  •  ondansetron  •  oxyCODONE-acetaminophen  •  sodium chloride  •  sodium chloride    Assessment/Plan   Assessment & Plan     Active Hospital Problems    Diagnosis  POA   • **Anaphylactic shock - Requiring vasopressor support [T78.2XXA]  Yes   • Chest pain [R07.9]  Yes   • Cirrhosis of liver (CMS/HCC) [K74.60]  Yes   • CAD (coronary artery disease) [I25.10]  Yes   • Rib fractures [S22.49XA]  Yes   • Depression [F32.9]  Yes   • History of esophageal varices [Z87.19]  Not Applicable   • GERD (gastroesophageal reflux disease) [K21.9]  Yes      Resolved Hospital Problems   No resolved problems to display.        Brief Hospital Course to date:  Timothy Guzman is a 45 y.o. female with cirrhosis/varices, Hx pancreatitis, admitted 6/18/2021-7/2/2021 with coffee ground emesis developing PEA arrest in the ED while receiving IV contrast; subsequently developing STEMI with LHC and stent placement to circumflex, complicated with postoperative SVT.  EGD at that time showed grade 1 varices, erosive gastritis, and was eventually discharged to rehab.  She returned 7/12/2021 with chest pain, seen by cardiology and went for LHC 7/15/2021 with anaphylactic response despite pretreatment but did receive stenting to stenosis of the circumflex distal to prior stenting; she  was managed in the ICU requiring epinephrine gtt and weaned off, subsequently ordered out of the ICU 7/16/2021; per prior documentation family has reported known EtOH/opioid abuse with drug-seeking behavior    Assessment/plan    Anaphylactic shock (contrast dye)  S/p vasopressor support  -S/p x2 days IV steroids  -Continue supportive care, BP remains stable at an appropriate level for a known cirrhotic  -Plan for referral to allergist at discharge    CAD s/p recent STEMI  -STEMI during last hospitalization; repeat [facility] with ANGELICA this hospitalization  -Continue aspirin, Plavix, rosuvastatin  -Continues to call out for chest pain overnight, continue Ranexa, cardiology to follow-up tomorrow    Rib pain s/p recent CPR  -Per documentation has Hx opiate abuse, drug-seeking behavior  -Continue oral Norco while hospitalized  -Plan for Tylenol discharge    Hepatic cirrhosis, compensated  Grade 1 esophageal varices  GERD  -Recent admission with coffee-ground emesis  -Continue lactulose, PPI bid, rifaximin  -Half dose of Bumex/Aldactone and monitor BP    Depression  -Continue fluoxetine, olanzapine, trazodone    Obesity  -BMI 38.45 kg/M2    DVT prophylaxis:  Medical DVT prophylaxis orders are present.       Disposition: Hopeful to discharge in 1-2 days once seen and cleared by cardiology.  She is not interested in returning to rehab    CODE STATUS:   Code Status and Medical Interventions:   Ordered at: 07/15/21 1703     Level Of Support Discussed With:    Patient     Code Status:    CPR     Medical Interventions (Level of Support Prior to Arrest):    Full       Haroldo Hillman, DO  07/18/21

## 2021-07-18 NOTE — PLAN OF CARE
Goal Outcome Evaluation:              Outcome Summary: Pt complained of pain beginning of shift, PRN given, EKG obtained. No difference in EKG. Pt's VSS, on RA to 2L. Continue POC.

## 2021-07-18 NOTE — PLAN OF CARE
Problem: Adult Inpatient Plan of Care  Goal: Plan of Care Review  Outcome: Ongoing, Progressing  Flowsheets (Taken 7/18/2021 1825)  Outcome Summary: Pt pleasant today, minimal chest pain, only after activity or moving around. Anxious to go home, possible DC tomorrow. VSS. no complaints at this time.  Goal: Patient-Specific Goal (Individualized)  Outcome: Ongoing, Progressing  Goal: Absence of Hospital-Acquired Illness or Injury  Outcome: Ongoing, Progressing  Intervention: Identify and Manage Fall Risk  Recent Flowsheet Documentation  Taken 7/18/2021 1800 by So Baze RN  Safety Promotion/Fall Prevention:   activity supervised   assistive device/personal items within reach   clutter free environment maintained   nonskid shoes/slippers when out of bed   room organization consistent   safety round/check completed  Taken 7/18/2021 1600 by So Baez RN  Safety Promotion/Fall Prevention:   activity supervised   assistive device/personal items within reach   clutter free environment maintained   nonskid shoes/slippers when out of bed   room organization consistent   safety round/check completed  Taken 7/18/2021 1400 by So Baez, RN  Safety Promotion/Fall Prevention:   activity supervised   assistive device/personal items within reach   clutter free environment maintained   nonskid shoes/slippers when out of bed   safety round/check completed   room organization consistent  Taken 7/18/2021 1200 by So Baez, RN  Safety Promotion/Fall Prevention:   activity supervised   assistive device/personal items within reach   clutter free environment maintained   nonskid shoes/slippers when out of bed   room organization consistent   safety round/check completed  Taken 7/18/2021 1000 by So Baez, RN  Safety Promotion/Fall Prevention:   activity supervised   assistive device/personal items within reach   clutter free environment maintained   nonskid shoes/slippers when out of bed   room organization  consistent   safety round/check completed  Taken 7/18/2021 0830 by So Baez RN  Safety Promotion/Fall Prevention:   activity supervised   assistive device/personal items within reach   clutter free environment maintained   nonskid shoes/slippers when out of bed   room organization consistent   safety round/check completed  Intervention: Prevent Skin Injury  Recent Flowsheet Documentation  Taken 7/18/2021 1800 by So Baez RN  Body Position: position changed independently  Skin Protection:   adhesive use limited   skin sealant/moisture barrier applied   skin-to-device areas padded   skin-to-skin areas padded   tubing/devices free from skin contact  Taken 7/18/2021 1600 by So Baez RN  Body Position: position changed independently  Skin Protection:   adhesive use limited   skin sealant/moisture barrier applied   skin-to-device areas padded   skin-to-skin areas padded   tubing/devices free from skin contact  Taken 7/18/2021 1400 by So Baez RN  Body Position: position changed independently  Skin Protection:   adhesive use limited   skin sealant/moisture barrier applied   skin-to-device areas padded   skin-to-skin areas padded   tubing/devices free from skin contact  Taken 7/18/2021 1200 by So Baez RN  Body Position: position changed independently  Skin Protection:   adhesive use limited   skin sealant/moisture barrier applied   skin-to-device areas padded   skin-to-skin areas padded   tubing/devices free from skin contact  Taken 7/18/2021 1000 by So Baez RN  Body Position: position changed independently  Skin Protection:   adhesive use limited   skin sealant/moisture barrier applied   skin-to-device areas padded   skin-to-skin areas padded   tubing/devices free from skin contact  Taken 7/18/2021 0830 by So Baez RN  Body Position: position changed independently  Skin Protection:   adhesive use limited   skin sealant/moisture barrier applied   skin-to-device areas  padded   skin-to-skin areas padded   tubing/devices free from skin contact  Intervention: Prevent and Manage VTE (venous thromboembolism) Risk  Recent Flowsheet Documentation  Taken 7/18/2021 0830 by So Baez RN  VTE Prevention/Management:   bilateral   dorsiflexion/plantar flexion performed  Intervention: Prevent Infection  Recent Flowsheet Documentation  Taken 7/18/2021 1800 by So Baez RN  Infection Prevention:   visitors restricted/screened   single patient room provided   rest/sleep promoted   hand hygiene promoted  Taken 7/18/2021 1600 by So Baez RN  Infection Prevention:   visitors restricted/screened   single patient room provided   rest/sleep promoted   hand hygiene promoted  Taken 7/18/2021 1400 by So Baez RN  Infection Prevention:   visitors restricted/screened   single patient room provided   rest/sleep promoted   hand hygiene promoted  Taken 7/18/2021 1200 by So Baez RN  Infection Prevention:   visitors restricted/screened   single patient room provided   rest/sleep promoted   hand hygiene promoted  Taken 7/18/2021 1000 by So Baez RN  Infection Prevention:   visitors restricted/screened   single patient room provided   rest/sleep promoted   hand hygiene promoted  Taken 7/18/2021 0830 by So Baez RN  Infection Prevention:   visitors restricted/screened   single patient room provided   rest/sleep promoted   hand hygiene promoted  Goal: Optimal Comfort and Wellbeing  Outcome: Ongoing, Progressing  Intervention: Provide Person-Centered Care  Recent Flowsheet Documentation  Taken 7/18/2021 0830 by So Baez RN  Trust Relationship/Rapport:   care explained   choices provided   questions answered   reassurance provided   thoughts/feelings acknowledged  Goal: Readiness for Transition of Care  Outcome: Ongoing, Progressing     Problem: Chest Pain  Goal: Resolution of Chest Pain Symptoms  Outcome: Ongoing, Progressing     Problem: Fall Injury  Risk  Goal: Absence of Fall and Fall-Related Injury  Outcome: Ongoing, Progressing  Intervention: Identify and Manage Contributors to Fall Injury Risk  Recent Flowsheet Documentation  Taken 7/18/2021 0830 by So Baez RN  Medication Review/Management: medications reviewed  Intervention: Promote Injury-Free Environment  Recent Flowsheet Documentation  Taken 7/18/2021 1800 by So Baez RN  Safety Promotion/Fall Prevention:   activity supervised   assistive device/personal items within reach   clutter free environment maintained   nonskid shoes/slippers when out of bed   room organization consistent   safety round/check completed  Taken 7/18/2021 1600 by So Baez RN  Safety Promotion/Fall Prevention:   activity supervised   assistive device/personal items within reach   clutter free environment maintained   nonskid shoes/slippers when out of bed   room organization consistent   safety round/check completed  Taken 7/18/2021 1400 by So Baez RN  Safety Promotion/Fall Prevention:   activity supervised   assistive device/personal items within reach   clutter free environment maintained   nonskid shoes/slippers when out of bed   safety round/check completed   room organization consistent  Taken 7/18/2021 1200 by So Baez RN  Safety Promotion/Fall Prevention:   activity supervised   assistive device/personal items within reach   clutter free environment maintained   nonskid shoes/slippers when out of bed   room organization consistent   safety round/check completed  Taken 7/18/2021 1000 by So Baez RN  Safety Promotion/Fall Prevention:   activity supervised   assistive device/personal items within reach   clutter free environment maintained   nonskid shoes/slippers when out of bed   room organization consistent   safety round/check completed  Taken 7/18/2021 0830 by So Baez RN  Safety Promotion/Fall Prevention:   activity supervised   assistive device/personal items  within reach   clutter free environment maintained   nonskid shoes/slippers when out of bed   room organization consistent   safety round/check completed   Goal Outcome Evaluation:              Outcome Summary: Pt pleasant today, minimal chest pain, only after activity or moving around. Anxious to go home, possible DC tomorrow. VSS. no complaints at this time.

## 2021-07-19 ENCOUNTER — APPOINTMENT (OUTPATIENT)
Dept: PREADMISSION TESTING | Facility: HOSPITAL | Age: 46
End: 2021-07-19

## 2021-07-19 LAB
ANION GAP SERPL CALCULATED.3IONS-SCNC: 8 MMOL/L (ref 5–15)
BUN SERPL-MCNC: 18 MG/DL (ref 6–20)
BUN/CREAT SERPL: 20 (ref 7–25)
CALCIUM SPEC-SCNC: 8.3 MG/DL (ref 8.6–10.5)
CHLORIDE SERPL-SCNC: 107 MMOL/L (ref 98–107)
CO2 SERPL-SCNC: 24 MMOL/L (ref 22–29)
CREAT SERPL-MCNC: 0.9 MG/DL (ref 0.57–1)
GFR SERPL CREATININE-BSD FRML MDRD: 67 ML/MIN/1.73
GLUCOSE SERPL-MCNC: 140 MG/DL (ref 65–99)
POTASSIUM SERPL-SCNC: 3.4 MMOL/L (ref 3.5–5.2)
QT INTERVAL: 378 MS
QT INTERVAL: 380 MS
QT INTERVAL: 404 MS
QT INTERVAL: 422 MS
QTC INTERVAL: 474 MS
QTC INTERVAL: 477 MS
QTC INTERVAL: 480 MS
QTC INTERVAL: 485 MS
SODIUM SERPL-SCNC: 139 MMOL/L (ref 136–145)

## 2021-07-19 PROCEDURE — 99232 SBSQ HOSP IP/OBS MODERATE 35: CPT | Performed by: PHYSICIAN ASSISTANT

## 2021-07-19 PROCEDURE — 25010000002 HEPARIN (PORCINE) PER 1000 UNITS: Performed by: INTERNAL MEDICINE

## 2021-07-19 PROCEDURE — 80048 BASIC METABOLIC PNL TOTAL CA: CPT | Performed by: INTERNAL MEDICINE

## 2021-07-19 PROCEDURE — 97530 THERAPEUTIC ACTIVITIES: CPT

## 2021-07-19 PROCEDURE — 99232 SBSQ HOSP IP/OBS MODERATE 35: CPT | Performed by: INTERNAL MEDICINE

## 2021-07-19 PROCEDURE — 97166 OT EVAL MOD COMPLEX 45 MIN: CPT

## 2021-07-19 RX ORDER — SPIRONOLACTONE 50 MG/1
100 TABLET, FILM COATED ORAL DAILY
Status: DISCONTINUED | OUTPATIENT
Start: 2021-07-19 | End: 2021-07-20 | Stop reason: HOSPADM

## 2021-07-19 RX ORDER — BUMETANIDE 1 MG/1
2 TABLET ORAL DAILY
Status: DISCONTINUED | OUTPATIENT
Start: 2021-07-19 | End: 2021-07-20 | Stop reason: HOSPADM

## 2021-07-19 RX ADMIN — RANOLAZINE 1000 MG: 500 TABLET, EXTENDED RELEASE ORAL at 08:47

## 2021-07-19 RX ADMIN — RANOLAZINE 1000 MG: 500 TABLET, EXTENDED RELEASE ORAL at 20:46

## 2021-07-19 RX ADMIN — OXYCODONE HYDROCHLORIDE AND ACETAMINOPHEN 1 TABLET: 7.5; 325 TABLET ORAL at 02:29

## 2021-07-19 RX ADMIN — OXYCODONE HYDROCHLORIDE AND ACETAMINOPHEN 1000 MG: 500 TABLET ORAL at 08:47

## 2021-07-19 RX ADMIN — TRAZODONE HYDROCHLORIDE 50 MG: 50 TABLET ORAL at 20:46

## 2021-07-19 RX ADMIN — OXYCODONE HYDROCHLORIDE AND ACETAMINOPHEN 1 TABLET: 7.5; 325 TABLET ORAL at 22:29

## 2021-07-19 RX ADMIN — TAMSULOSIN HYDROCHLORIDE 0.4 MG: 0.4 CAPSULE ORAL at 08:47

## 2021-07-19 RX ADMIN — SPIRONOLACTONE 100 MG: 50 TABLET ORAL at 08:48

## 2021-07-19 RX ADMIN — PANTOPRAZOLE SODIUM 40 MG: 40 TABLET, DELAYED RELEASE ORAL at 06:13

## 2021-07-19 RX ADMIN — OLANZAPINE 7.5 MG: 5 TABLET, FILM COATED ORAL at 22:29

## 2021-07-19 RX ADMIN — HEPARIN SODIUM 5000 UNITS: 5000 INJECTION INTRAVENOUS; SUBCUTANEOUS at 13:47

## 2021-07-19 RX ADMIN — SODIUM CHLORIDE, PRESERVATIVE FREE 10 ML: 5 INJECTION INTRAVENOUS at 20:46

## 2021-07-19 RX ADMIN — OXYCODONE HYDROCHLORIDE AND ACETAMINOPHEN 1 TABLET: 7.5; 325 TABLET ORAL at 08:48

## 2021-07-19 RX ADMIN — PANTOPRAZOLE SODIUM 40 MG: 40 TABLET, DELAYED RELEASE ORAL at 17:11

## 2021-07-19 RX ADMIN — HEPARIN SODIUM 5000 UNITS: 5000 INJECTION INTRAVENOUS; SUBCUTANEOUS at 20:45

## 2021-07-19 RX ADMIN — LIDOCAINE 1 PATCH: 50 PATCH CUTANEOUS at 08:48

## 2021-07-19 RX ADMIN — BUMETANIDE 2 MG: 1 TABLET ORAL at 08:48

## 2021-07-19 RX ADMIN — HEPARIN SODIUM 5000 UNITS: 5000 INJECTION INTRAVENOUS; SUBCUTANEOUS at 06:12

## 2021-07-19 RX ADMIN — CLOPIDOGREL BISULFATE 75 MG: 75 TABLET ORAL at 08:47

## 2021-07-19 RX ADMIN — LACTULOSE 10 G: 20 SOLUTION ORAL at 20:46

## 2021-07-19 RX ADMIN — PYRIDOXINE HCL TAB 50 MG 25 MG: 50 TAB at 08:50

## 2021-07-19 RX ADMIN — FLUOXETINE HYDROCHLORIDE 40 MG: 20 CAPSULE ORAL at 08:47

## 2021-07-19 RX ADMIN — ROSUVASTATIN CALCIUM 20 MG: 20 TABLET, COATED ORAL at 20:46

## 2021-07-19 RX ADMIN — ASPIRIN 81 MG: 81 TABLET, COATED ORAL at 08:48

## 2021-07-19 RX ADMIN — FOLIC ACID 1 MG: 1 TABLET ORAL at 08:48

## 2021-07-19 RX ADMIN — OXYCODONE HYDROCHLORIDE AND ACETAMINOPHEN 1 TABLET: 7.5; 325 TABLET ORAL at 15:20

## 2021-07-19 RX ADMIN — LACTULOSE 10 G: 20 SOLUTION ORAL at 08:48

## 2021-07-19 RX ADMIN — Medication 400 MG: at 17:11

## 2021-07-19 RX ADMIN — RIFAXIMIN 550 MG: 550 TABLET ORAL at 08:48

## 2021-07-19 RX ADMIN — SODIUM CHLORIDE, PRESERVATIVE FREE 10 ML: 5 INJECTION INTRAVENOUS at 08:49

## 2021-07-19 RX ADMIN — RIFAXIMIN 550 MG: 550 TABLET ORAL at 20:46

## 2021-07-19 NOTE — PROGRESS NOTES
"[facility] Progress Note     LOS: 3 days   Patient Care Team:  Toshia Mitchell APRN as PCP - General (Family Medicine)  Nelson Yip APRN as Nurse Practitioner (Nurse Practitioner)  PCP:  Toshia Mitchell APRN    Chief Complaint: Follow-up unstable angina, anaphylactic reaction to contrast         Subjective:   Persistent chest pain.  Also with chronic generalized pain, treated with opiates and anxiolytics    Review of Systems:   Positive for generalized pain  Negative for dyspnea at rest, palpitations         Objective:    Vital Sign Min/Max for last 24 hours  Temp  Min: 96.5 °F (35.8 °C)  Max: 98 °F (36.7 °C)   BP  Min: 93/59  Max: 108/63   Pulse  Min: 62  Max: 82   Resp  Min: 18  Max: 20   SpO2  Min: 96 %  Max: 97 %   No data recorded   No data recorded     Flowsheet Rows      First Filed Value   Admission Height  160 cm (63\") Documented at 07/12/2021 2127   Admission Weight  86.6 kg (191 lb) Documented at 07/12/2021 2127          Telemetry: Sinus rhythm    EKG: Sinus rhythm with stable inferior T wave inversions      Intake/Output Summary (Last 24 hours) at 7/19/2021 0651  Last data filed at 7/18/2021 1700  Gross per 24 hour   Intake 3120 ml   Output --   Net 3120 ml     Intake & Output (last 3 days)       07/16 0701 - 07/17 0700 07/17 0701 - 07/18 0700 07/18 0701 - 07/19 0700    P.O. 871  3120    Total Intake(mL/kg) 871 (9.1)  3120 (32.3)    Net +871  +3120           Urine Unmeasured Occurrence  1 x 1 x           Physical Exam:  General: In no acute distress     LABS/DIAGNOSTIC DATA:  Results from last 7 days   Lab Units 07/16/21  2210 07/16/21  0538 07/13/21  0607   WBC 10*3/mm3 8.33 12.83* 4.82   HEMOGLOBIN g/dL 11.4* 12.4 11.8*   HEMATOCRIT % 35.2 38.6 36.1   PLATELETS 10*3/mm3 123* 144 157     Lab Results   Lab Value Date/Time    TROPONINT <0.010 07/16/2021 2209    TROPONINT <0.010 07/15/2021 2310    TROPONINT <0.010 07/13/2021 0436    " TROPONINT <0.010 07/12/2021 2329    TROPONINT <0.010 07/12/2021 2208    TROPONINT 5.990 (C) 06/19/2021 1148    TROPONINT 5.130 (C) 06/19/2021 0542         Results from last 7 days   Lab Units 07/16/21 2209 07/16/21  0538 07/15/21  0249 07/12/21 2208   SODIUM mmol/L 136 135* 140 136   POTASSIUM mmol/L 3.8 4.0 4.3 3.7   CHLORIDE mmol/L 105 104 105 98   CO2 mmol/L 21.0* 22.0 24.0 26.0   BUN mg/dL 16 13 10 9   CREATININE mg/dL 0.96 0.81 0.84 0.93   CALCIUM mg/dL 8.6 8.7 9.1 9.3   BILIRUBIN mg/dL  --  0.2  --  0.4   ALK PHOS U/L  --  138*  --  200*   ALT (SGPT) U/L  --  16  --  18   AST (SGOT) U/L  --  17  --  23   GLUCOSE mg/dL 205* 192* 138* 123*                       Medication Review:   ascorbic acid, 1,000 mg, Oral, Daily  aspirin, 81 mg, Oral, Daily  bumetanide, 1 mg, Oral, Daily  clopidogrel, 75 mg, Oral, Daily  FLUoxetine, 40 mg, Oral, Daily  folic acid, 1 mg, Oral, Daily  heparin (porcine), 5,000 Units, Subcutaneous, Q8H  lactulose, 10 g, Oral, BID  lidocaine, 1 patch, Transdermal, Q24H  magnesium oxide, 400 mg, Oral, Q PM  OLANZapine, 7.5 mg, Oral, Nightly  pantoprazole, 40 mg, Oral, BID AC  pharmacy consult - MTM, , Does not apply, Daily  ranolazine, 1,000 mg, Oral, BID  riFAXIMin, 550 mg, Oral, Q12H  rosuvastatin, 20 mg, Oral, Nightly  sodium chloride, 10 mL, Intravenous, Q12H  spironolactone, 50 mg, Oral, Daily  tamsulosin, 0.4 mg, Oral, Daily  traZODone, 50 mg, Oral, Nightly  vitamin B-6, 25 mg, Oral, Daily               Anaphylactic shock - Requiring vasopressor support    History of esophageal varices    GERD (gastroesophageal reflux disease)    Depression    Chest pain    Cirrhosis of liver (CMS/HCC)    CAD (coronary artery disease)    Rib fractures    Cardiac catheterization summary:  · There was a 95% stenosis just distal to a previously placed stent.  The lesion is status post intervention with a Xience Melissa 3.0 x 18 mm drug-eluting stent.  · The patient had an anaphylactic-like reaction to  contrast dye despite premedication.  She was supported with intraprocedural IV Solu-Medrol, epinephrine, and a nonrebreather.  The patient will be transferred to the intensive care unit.          Assessment  1. CAD  a. Status post ANGELICA to the circumflex 6/18/2021 at time of STEMI.  b. Status post ANGELICA to distal circumflex 7/15/2021 for unstable angina  2. Recent right calf pain   a. In the setting of recent limited mobility, elevated D-dimer  b. Negative bilateral lower extremity duplex 7/13/2021  3. Rib fractures  4. Cirrhosis of the liver, esophageal varices  5. Contrast dye allergy  a. First event recently at [facility] where the patient had acute onset dyspnea for an abdominal CT scan with contrast dye   b. Second event prior to CT scan at [facility], but was occurring at the same time of a cardiac arrest/STEMI  1. Given Solu-Medrol IV and then tolerated heart catheterization with contrast without difficulty  6/2021  c. Anaphylactic reaction to IV contrast 7/15/2021 requiringepinephrine, antihistamines, IV steroids    Plan:  1. DAPT, statin  2. Ranexa for coronary vasospasm  3. Holding off on beta-blockers/calcium channel blockers due to relative hypotension.  Significant headache with nitro in the past.    4. Consider physical therapy evaluation for short-term rehab versus home    5. We will follow peripherally for now.    6. Upon discharge, follow-up in the cardiology clinic as previously scheduled on August 16, 2021      Vikram Gonzales MD [facility]  07/19/21

## 2021-07-19 NOTE — PLAN OF CARE
Goal Outcome Evaluation:  Plan of Care Reviewed With: patient           Outcome Summary: OT eval complete. Pt presents w/ decreased activity tolerance, generalized weakness, and mild balance deficits limiting her ADL independence. Pt presents w/ increased pain in R flank limiting her ADL independence d/t inability to reach feet, discussed AE for LBD & pt interested. Pt would benefit from short-stay at [facility] at discharge to address current functional deficits and increase safety w/ return home. If pt not agreeable to STR, recommend home w/ 24/7 A & OP OT/PT.

## 2021-07-19 NOTE — THERAPY EVALUATION
Patient Name: Timothy Guzman  : 1975    MRN: [id number]                              Today's Date: 2021       Admit Date: 2021    Visit Dx:     ICD-10-CM ICD-9-CM   1. Chest pain, unspecified type  R07.9 786.50     Patient Active Problem List   Diagnosis   • Hepatic encephalopathy (CMS/HCC)   • Alcoholic cirrhosis of liver without ascites (CMS/HCC)   • Possible GI bleed   • Generalized abdominal pain   • History of esophageal varices   • GERD (gastroesophageal reflux disease)   • STEMI (ST elevation myocardial infarction) (CMS/HCC)   • Acute respiratory failure with hypoxia (CMS/HCC)   • Anaphylaxis   • Cardiac arrest (CMS/HCC)   • SVT (supraventricular tachycardia) (CMS/HCC)   • Depression   • Chest pain   • Cirrhosis of liver (CMS/HCC)   • CAD (coronary artery disease)   • Rib fractures   • Anaphylactic shock - Requiring vasopressor support     Past Medical History:   Diagnosis Date   • Acute pancreatitis    • Alcoholism (CMS/HCC)    • Arthritis    • Bone necrosis (CMS/HCC)    • CAD (coronary artery disease) 2021   • Cirrhosis (CMS/HCC)    • Gastroparesis    • GERD (gastroesophageal reflux disease)    • History of esophageal varices    • History of kidney stones    • Hypertension    • Pancreatitis      Past Surgical History:   Procedure Laterality Date   • APPENDECTOMY     • CARDIAC CATHETERIZATION N/A 2021    Procedure: Left Heart Cath;  Surgeon: Vikram Gonzales MD;  Location:  [facility];  Service: Cardiovascular;  Laterality: N/A;   • CARDIAC CATHETERIZATION N/A 7/15/2021    Procedure: LEFT HEART CATH;  Surgeon: Vikram Gonzales MD;  Location:  [facility];  Service: Cardiology;  Laterality: N/A;   •  SECTION     • CHOLECYSTECTOMY     • COLONOSCOPY     • COLONOSCOPY N/A 3/31/2020    Procedure: COLONOSCOPY;  Surgeon: Tirso Giles MD;  Location:  [facility];  Service: Gastroenterology;  Laterality: N/A;   • ENDOSCOPY     •  ENDOSCOPY     • ENDOSCOPY N/A 6/19/2021    Procedure: ESOPHAGOGASTRODUODENOSCOPY AT BEDSIDE;  Surgeon: Tyrese Rasmussen MD;  Location: [facility];  Service: Gastroenterology;  Laterality: N/A;   • GALLBLADDER SURGERY     • REPLACEMENT TOTAL HIP LATERAL POSITION Left    • TOTAL KNEE ARTHROPLASTY Left      General Information     Row Name 07/19/21 1539          OT Time and Intention    Document Type  evaluation  -MA     Mode of Treatment  occupational therapy  -MA     Row Name 07/19/21 1539          General Information    Patient Profile Reviewed  yes  -MA     Prior Level of Function  min assist:;transfer;bed mobility;max assist:;ADL's;dressing;bathing Pt reports prior to recent hospital admission, she was I w/ all ADLs, transfers & mobility w/ no AD. Pt uses RW for functional mobility and has experienced recent decline in functional status.  -MA     Existing Precautions/Restrictions  fall  -MA     Barriers to Rehab  medically complex;previous functional deficit  -MA     Row Name 07/19/21 1539          Living Environment    Lives With  parent(s)  -MA     Row Name 07/19/21 1539          Home Main Entrance    Number of Stairs, Main Entrance  five  -MA     Stair Railings, Main Entrance  railings on both sides of stairs  -MA     Row Name 07/19/21 1539          Stairs Within Home, Primary    Number of Stairs, Within Home, Primary  none  -MA     Row Name 07/19/21 1539          Cognition    Orientation Status (Cognition)  oriented x 3  -MA     Row Name 07/19/21 1539          Safety Issues, Functional Mobility    Safety Issues Affecting Function (Mobility)  awareness of need for assistance;insight into deficits/self-awareness;safety precaution awareness;safety precautions follow-through/compliance;sequencing abilities  -MA     Impairments Affecting Function (Mobility)  balance;endurance/activity tolerance;pain;strength  -MA       User Key  (r) = Recorded By, (t) = Taken By, (c) = Cosigned By    Initials Name Provider  "Type    Lynda Navarrete OT Occupational Therapist          Mobility/ADL's     Row Name 07/19/21 1544          Bed Mobility    Bed Mobility  supine-sit;sit-supine  -MA     Supine-Sit [place] (Bed Mobility)  contact guard  -MA     Sit-Supine [place] (Bed Mobility)  contact guard  -MA     Assistive Device (Bed Mobility)  bed rails;head of bed elevated  -MA     Row Name 07/19/21 1544          Transfers    Transfers  sit-stand transfer  -MA     Comment (Transfers)  Pt CGA for STS from EOB w/ RW, VC's for sequencing and hand placement  -MA     Sit-Stand [place] (Transfers)  contact guard;verbal cues  -MA     Row Name 07/19/21 1544          Sit-Stand Transfer    Assistive Device (Sit-Stand Transfers)  walker, front-wheeled  -MA     Row Name 07/19/21 1544          Functional Mobility    Functional Mobility- Ind. Level  minimum assist (75% patient effort);1 person;verbal cues required  -MA     Functional Mobility- Device  rolling walker  -MA     Functional Mobility-Distance (Feet)  13  -MA     Functional Mobility- Safety Issues  balance decreased during turns;sequencing ability decreased;step length decreased  -MA     Functional Mobility- Comment  Pt min Ax1 for 13ft functional mobility in room w/ RW. Pt c/o BLE's feeling like \"jelly\", slow w/ mobility req extended time and effort, fatigues quickly.  -MA     Row Name 07/19/21 1544          Activities of Daily Living    BADL Assessment/Intervention  lower body dressing  -MA     Row Name 07/19/21 1544          Lower Body Dressing Assessment/Training    [place] Level (Lower Body Dressing)  don;socks;dependent (less than 25% patient effort)  -MA     Comment (Lower Body Dressing)  Pt dep for LBD d/t R flank pain limiting her ability to reach feet  -MA       User Key  (r) = Recorded By, (t) = Taken By, (c) = Cosigned By    Initials Name Provider Type    Lynda Navarrete OT Occupational Therapist        Obj/Interventions     Row Name 07/19/21 1546       "    Sensory Assessment (Somatosensory)    Sensory Assessment (Somatosensory)  UE sensation intact  -[facility] Name 07/19/21 1546          Vision Assessment/Intervention    Visual Impairment/Limitations  WFL  -[facility] Name 07/19/21 1546          Range of Motion Comprehensive    General Range of Motion  bilateral upper extremity ROM WFL  -MA     Row Name 07/19/21 1546          Strength Comprehensive (MMT)    General Manual Muscle Testing (MMT) Assessment  upper extremity strength deficits identified  -MA     Comment, General Manual Muscle Testing (MMT) Assessment  BUE grossly 4/5  -[facility] Name 07/19/21 1546          Balance    Balance Assessment  sitting static balance;standing dynamic balance  -MA     Static Sitting Balance  WFL;unsupported;sitting, edge of bed  -MA     Dynamic Standing Balance  mild impairment;supported;standing  -MA     Balance Interventions  sit to stand;occupation based/functional task  -MA       User Key  (r) = Recorded By, (t) = Taken By, (c) = Cosigned By    Initials Name Provider Type    Lynda Navarrete OT Occupational Therapist        Goals/Plan     Row Name 07/19/21 5011          Bed Mobility Goal 1 (OT)    Activity/Assistive Device (Bed Mobility Goal 1, OT)  sit to supine;supine to sit  -MA     [place] Level/Cues Needed (Bed Mobility Goal 1, OT)  standby assist w/ bed flat to mimic home environment  -MA     Time Frame (Bed Mobility Goal 1, OT)  long term goal (LTG);10 days  -MA     Progress/Outcomes (Bed Mobility Goal 1, OT)  goal ongoing  -MA     Row Name 07/19/21 4108          Transfer Goal 1 (OT)    Activity/Assistive Device (Transfer Goal 1, OT)  sit-to-stand/stand-to-sit;bed-to-chair/chair-to-bed;toilet;commode;walker, rolling  -MA     [place] Level/Cues Needed (Transfer Goal 1, OT)  standby assist  -MA     Time Frame (Transfer Goal 1, OT)  long term goal (LTG);10 days  -MA     Progress/Outcome (Transfer Goal 1, OT)  goal ongoing  -MA     Row Name 07/19/21 4003           Dressing Goal 1 (OT)    Activity/Device (Dressing Goal 1, OT)  lower body dressing don/doff socks w/ AAD  -MA     [place]/Cues Needed (Dressing Goal 1, OT)  moderate assist (50-74% patient effort);verbal cues required  -MA     Time Frame (Dressing Goal 1, OT)  long term goal (LTG);10 days  -MA     Progress/Outcome (Dressing Goal 1, OT)  goal ongoing  -MA     Row Name 07/19/21 1558          Grooming Goal 1 (OT)    Activity/Device (Grooming Goal 1, OT)  hair care;oral care;wash face, hands standing sinkside  -MA     [place] (Grooming Goal 1, OT)  set-up required  -MA     Time Frame (Grooming Goal 1, OT)  long term goal (LTG);10 days  -MA     Progress/Outcome (Grooming Goal 1, OT)  goal ongoing  -MA       User Key  (r) = Recorded By, (t) = Taken By, (c) = Cosigned By    Initials Name Provider Type    Lynda Navarrete OT Occupational Therapist        Clinical Impression     Row Name 07/19/21 4361          Pain Assessment    Additional Documentation  Pain Scale: Numbers Pre/Post-Treatment (Group)  -MA     Row Name 07/19/21 9183          Pain Scale: Numbers Pre/Post-Treatment    Pretreatment Pain Rating  8/10  -MA     Posttreatment Pain Rating  8/10  -MA     Pain Location - Side  Right  -MA     Pain Location  abdomen  -MA     Pre/Posttreatment Pain Comment  RN aware & managing  -MA     Pain Intervention(s)  Repositioned;Ambulation/increased activity  -MA     Row Name 07/19/21 0821          Plan of Care Review    Plan of Care Reviewed With  patient  -MA     Outcome Summary  OT eval complete. Pt presents w/ decreased activity tolerance, generalized weakness, and mild balance deficits limiting her ADL independence. Pt presents w/ increased pain in R flank limiting her ADL independence d/t inability to reach feet, discussed AE for LBD & pt interested. Pt would benefit from short-stay at IRF at discharge to address current functional deficits and increase safety w/ return home. If pt not agreeable to STR,  recommend home w/ 24/7 A & OP OT/PT.  -MA     Row Name 07/19/21 1547          Therapy Assessment/Plan (OT)    Rehab Potential (OT)  good, to achieve stated therapy goals  -MA     Criteria for Skilled Therapeutic Interventions Met (OT)  yes;skilled treatment is necessary  -MA     Therapy Frequency (OT)  daily  -MA     Row Name 07/19/21 1547          Therapy Plan Review/Discharge Plan (OT)    Anticipated Discharge Disposition (OT)  inpatient rehabilitation facility  -MA     Row Name 07/19/21 1547          Vital Signs    Pre Systolic BP Rehab  -- VSS - RN cleared OT eval  -MA     O2 Delivery Pre Treatment  room air  -MA     O2 Delivery Intra Treatment  room air  -MA     O2 Delivery Post Treatment  room air  -MA     Pre Patient Position  Supine  -MA     Intra Patient Position  Standing  -MA     Post Patient Position  Supine  -MA     Row Name 07/19/21 1547          Positioning and Restraints    Pre-Treatment Position  in bed  -MA     Post Treatment Position  bed  -MA     In Bed  supine;call light within reach;encouraged to call for assist;exit alarm on  -MA       User Key  (r) = Recorded By, (t) = Taken By, (c) = Cosigned By    Initials Name Provider Type    Lynda Navarrete, OT Occupational Therapist        Outcome Measures     Row Name 07/19/21 6702          How much help from another is currently needed...    Putting on and taking off regular lower body clothing?  1  -MA     Bathing (including washing, rinsing, and drying)  2  -MA     Toileting (which includes using toilet bed pan or urinal)  3  -MA     Putting on and taking off regular upper body clothing  3  -MA     Taking care of personal grooming (such as brushing teeth)  4  -MA     Eating meals  4  -MA     AM-PAC 6 Clicks Score (OT)  17  -MA     Row Name 07/19/21 3291          Functional Assessment    Outcome Measure Options  AM-PAC 6 Clicks Daily Activity (OT)  -MA       User Key  (r) = Recorded By, (t) = Taken By, (c) = Cosigned By    Initials Name Provider  Type    Lynda Navarrete OT Occupational Therapist        Occupational Therapy Education                 Title: PT OT SLP Therapies (In Progress)     Topic: Occupational Therapy (In Progress)     Point: ADL training (Done)     Description:   Instruct learner(s) on proper safety adaptation and remediation techniques during self care or transfers.   Instruct in proper use of assistive devices.              Learning Progress Summary           Patient Acceptance, E, VU by MA at 7/19/2021 1522                   Point: Home exercise program (Not Started)     Description:   Instruct learner(s) on appropriate technique for monitoring, assisting and/or progressing therapeutic exercises/activities.              Learner Progress:  Not documented in this visit.          Point: Precautions (Done)     Description:   Instruct learner(s) on prescribed precautions during self-care and functional transfers.              Learning Progress Summary           Patient Acceptance, E, VU by MA at 7/19/2021 1522                   Point: Body mechanics (Done)     Description:   Instruct learner(s) on proper positioning and spine alignment during self-care, functional mobility activities and/or exercises.              Learning Progress Summary           Patient Acceptance, E, VU by MA at 7/19/2021 1522                               User Key     Initials Effective Dates Name Provider Type Discipline    MA 11/19/20 -  Lynda Dickens OT Occupational Therapist OT              OT Recommendation and Plan  Therapy Frequency (OT): daily  Plan of Care Review  Plan of Care Reviewed With: patient  Outcome Summary: OT eval complete. Pt presents w/ decreased activity tolerance, generalized weakness, and mild balance deficits limiting her ADL independence. Pt presents w/ increased pain in R flank limiting her ADL independence d/t inability to reach feet, discussed AE for LBD & pt interested. Pt would benefit from short-stay at IRF at discharge to  address current functional deficits and increase safety w/ return home. If pt not agreeable to STR, recommend home w/ 24/7 A & OP OT/PT.     Time Calculation:   Time Calculation- OT     Row Name 07/19/21 1556             Time Calculation- OT    OT Start Time  1522  -MA      OT Received On  07/19/21  -MA      OT Goal Re-Cert Due Date  07/29/21  -MA         Timed Charges    90008 - OT Therapeutic Activity Minutes  8  -MA         Untimed Charges    OT Eval/Re-eval Minutes  31  -MA         Total Minutes    Timed Charges Total Minutes  8  -MA      Untimed Charges Total Minutes  31  -MA       Total Minutes  39  -MA        User Key  (r) = Recorded By, (t) = Taken By, (c) = Cosigned By    Initials Name Provider Type    Lynda Navarrete OT Occupational Therapist        Therapy Charges for Today     Code Description Service Date Service Provider Modifiers Qty    35514762428 HC OT THERAPEUTIC ACT EA 15 MIN 7/19/2021 Lynda Dickens OT GO 1    58913247304 HC OT EVAL MOD COMPLEXITY 3 7/19/2021 Lynda Dickens OT GO 1               Lynda Dickens OT  7/19/2021

## 2021-07-19 NOTE — PROGRESS NOTES
[facility] Medicine Services  PROGRESS NOTE    Patient Name: Timothy Guzman  : 1975  MRN: [id number]    Date of Admission: 2021  Primary Care Physician: Toshia Mitchell APRN    Subjective   Subjective     CC:  F/u weakness, and chest pain    HPI:  Ms. Guzman reports she is short of breath, weak and has chest pain.  She does not feel she can go home today.  She has not walked much at all with PT.    ROS:  Gen- No fevers, chills  CV- No chest pain, palpitations  Resp- No cough, dyspnea  GI- No N/V/D, abd pain        Objective   Objective     Vital Signs:   Temp:  [96.5 °F (35.8 °C)-98 °F (36.7 °C)] 97.7 °F (36.5 °C)  Heart Rate:  [62-82] 62  Resp:  [18-20] 18  BP: ()/(59-75) 108/75  Flow (L/min):  [2] 2     Physical Exam:  Constitutional: No acute distress, awake, alert  HENT: NCAT, mucous membranes moist  Respiratory: Clear to auscultation bilaterally, respiratory effort normal   Cardiovascular: RRR, no murmurs, rubs, or gallops  Gastrointestinal: Positive bowel sounds, soft, nontender, nondistended  Musculoskeletal: No bilateral ankle edema  Psychiatric: Appropriate affect, cooperative  Neurologic: Oriented x 3  Skin: No rashes      Results Reviewed:  LAB RESULTS:      Lab 21  2210 21  0711 21  0538 21  0607 21  0245 21  2208   WBC 8.33  --  12.83* 4.82  --  5.66   HEMOGLOBIN 11.4*  --  12.4 11.8*  --  12.6   HEMATOCRIT 35.2  --  38.6 36.1  --  37.6   PLATELETS 123*  --  144 157  --  159   NEUTROS ABS  --   --   --  2.11  --  2.82   IMMATURE GRANS (ABS)  --   --   --  0.02  --  0.02   LYMPHS ABS  --   --   --  1.74  --  1.81   MONOS ABS  --   --   --  0.69  --  0.81   EOS ABS  --   --   --  0.23  --  0.17   MCV 93.4  --  92.8 91.6  --  90.2   LACTATE  --  1.9 2.3*  --   --   --    D DIMER QUANT  --   --   --   --  1.22*  --          Lab 21  0855 21  2209 21  0538 07/15/21  0249 21  0602 21  0436    SODIUM 139 136 135* 140 138 137   POTASSIUM 3.4* 3.8 4.0 4.3 3.8 4.0   CHLORIDE 107 105 104 105 102 100   CO2 24.0 21.0* 22.0 24.0 25.0 27.0   ANION GAP 8.0 10.0 9.0 11.0 11.0 10.0   BUN 18 16 13 10 11 11   CREATININE 0.90 0.96 0.81 0.84 0.91 0.91   GLUCOSE 140* 205* 192* 138* 104* 105*   CALCIUM 8.3* 8.6 8.7 9.1 8.8 9.2   IONIZED CALCIUM  --   --  1.32  --   --   --    MAGNESIUM  --   --  2.0 2.1 1.9 1.9   PHOSPHORUS  --   --  2.6  --   --   --          Lab 07/16/21  0538 07/12/21  2208   TOTAL PROTEIN 5.3* 6.4   ALBUMIN 3.10* 3.70   GLOBULIN 2.2 2.7   ALT (SGPT) 16 18   AST (SGOT) 17 23   BILIRUBIN 0.2 0.4   ALK PHOS 138* 200*   LIPASE  --  16         Lab 07/16/21  2209 07/15/21  2310 07/13/21  0436 07/12/21  2329 07/12/21  2208   PROBNP 616.3*  --   --   --  152.1   TROPONIN T <0.010 <0.010 <0.010 <0.010 <0.010             Lab 07/16/21 2209   ABO TYPING A   RH TYPING Positive   ANTIBODY SCREEN Negative         Brief Urine Lab Results  (Last result in the past 365 days)      Color   Clarity   Blood   Leuk Est   Nitrite   Protein   CREAT   Urine HCG        07/15/21 0903               Negative           Microbiology Results Abnormal     Procedure Component Value - Date/Time    COVID PRE-OP / PRE-PROCEDURE SCREENING ORDER (NO ISOLATION) - Swab, Nasopharynx [[id number]]  (Normal) Collected: 07/13/21 1440    Lab Status: Final result Specimen: Swab from Nasopharynx Updated: 07/13/21 1512    Narrative:      The following orders were created for panel order COVID PRE-OP / PRE-PROCEDURE SCREENING ORDER (NO ISOLATION) - Swab, Nasopharynx.  Procedure                               Abnormality         Status                     ---------                               -----------         ------                     COVID-19, ABBOTT IN-HOUS...[[id number]]  Normal              Final result                 Please view results for these tests on the individual orders.    COVID-19, ABBOTT IN-HOUSE,NASAL Swab (NO TRANSPORT MEDIA) 2  HR TAT - Swab, Nasopharynx [[id number]]  (Normal) Collected: 07/13/21 1440    Lab Status: Final result Specimen: Swab from Nasopharynx Updated: 07/13/21 1512     COVID19 Presumptive Negative    Narrative:      Fact sheet for providers: https://www.fda.gov/media/389379/download     Fact sheet for patients: https://www.fda.gov/media/326418/download    Test performed by PCR.  If inconsistent with clinical signs and symptoms patient should be tested with different authorized molecular test.          No radiology results from the last 24 hrs    Results for orders placed during the hospital encounter of 06/18/21    Adult Transthoracic Echo Complete W/ Cont if Necessary Per Protocol    Interpretation Summary  · The quality of the study is limited due to patient positioning and patient being intubated.  · Left ventricular ejection fraction appears to be 51 - 55%. Left ventricular systolic function is normal.  · Left ventricular diastolic function is consistent with (grade Ia w/high LAP) impaired relaxation.  · Mildly reduced right ventricular systolic function noted.      I have reviewed the medications:  Scheduled Meds:ascorbic acid, 1,000 mg, Oral, Daily  aspirin, 81 mg, Oral, Daily  bumetanide, 2 mg, Oral, Daily  clopidogrel, 75 mg, Oral, Daily  FLUoxetine, 40 mg, Oral, Daily  folic acid, 1 mg, Oral, Daily  heparin (porcine), 5,000 Units, Subcutaneous, Q8H  lactulose, 10 g, Oral, BID  lidocaine, 1 patch, Transdermal, Q24H  magnesium oxide, 400 mg, Oral, Q PM  OLANZapine, 7.5 mg, Oral, Nightly  pantoprazole, 40 mg, Oral, BID AC  pharmacy consult - MTM, , Does not apply, Daily  ranolazine, 1,000 mg, Oral, BID  riFAXIMin, 550 mg, Oral, Q12H  rosuvastatin, 20 mg, Oral, Nightly  sodium chloride, 10 mL, Intravenous, Q12H  spironolactone, 100 mg, Oral, Daily  tamsulosin, 0.4 mg, Oral, Daily  traZODone, 50 mg, Oral, Nightly  vitamin B-6, 25 mg, Oral, Daily      Continuous Infusions:   PRN Meds:.docusate sodium  •   ipratropium-albuterol  •  magnesium sulfate **OR** magnesium sulfate in D5W 1g/100mL (PREMIX) **OR** magnesium sulfate  •  melatonin  •  ondansetron  •  oxyCODONE-acetaminophen  •  sodium chloride  •  sodium chloride    Assessment/Plan   Assessment & Plan     Active Hospital Problems    Diagnosis  POA   • **Anaphylactic shock - Requiring vasopressor support [T78.2XXA]  Yes   • Chest pain [R07.9]  Yes   • Cirrhosis of liver (CMS/HCC) [K74.60]  Yes   • CAD (coronary artery disease) [I25.10]  Yes   • Rib fractures [S22.49XA]  Yes   • Depression [F32.9]  Yes   • History of esophageal varices [Z87.19]  Not Applicable   • GERD (gastroesophageal reflux disease) [K21.9]  Yes      Resolved Hospital Problems   No resolved problems to display.        Brief Hospital Course to date:  Timothy Guzman is a 45 y.o. female  with cirrhosis/varices, Hx pancreatitis, admitted 6/18/2021-7/2/2021 with coffee ground emesis developing PEA arrest in the ED while receiving IV contrast; subsequently developing STEMI with LHC and stent placement to circumflex, complicated with postoperative SVT.  EGD at that time showed grade 1 varices, erosive gastritis, and was eventually discharged to rehab.  She returned 7/12/2021 with chest pain, seen by cardiology and went for LHC 7/15/2021 with anaphylactic response despite pretreatment but did receive stenting to stenosis of the circumflex distal to prior stenting; she was managed in the ICU requiring epinephrine gtt and weaned off, subsequently ordered out of the ICU 7/16/2021; per prior documentation family has reported known ETOH/opioid abuse with drug-seeking behavior     Assessment/plan     Anaphylactic shock (contrast dye)  S/p vasopressor support  -S/p x2 days IV steroids  -Continue supportive care, BP remains stable at an appropriate level for a known cirrhotic  -Plan for referral to allergist at discharge     CAD s/p recent STEMI  -STEMI during last hospitalization; repeat LHC with ANGELICA this  hospitalization  -Continue aspirin, Plavix, rosuvastatin  -Continues to call out for chest pain overnight, continue Ranexa     Rib pain s/p recent CPR  -Per documentation has Hx opiate abuse, drug-seeking behavior  -Continue oral Norco while hospitalized  -Plan for Tylenol discharge     Hepatic cirrhosis, compensated  Grade 1 esophageal varices  GERD  -Recent admission with coffee-ground emesis  -Continue lactulose, PPI bid, rifaximin  -Half dose of Bumex/Aldactone and monitor BP     Depression  -Continue fluoxetine, olanzapine, trazodone     Obesity  -BMI 38.45 kg/M2     Needs PT and OT.  Has not walked this admission.  Feels she is too weak to leave.    DVT prophylaxis:  Medical DVT prophylaxis orders are present.       Disposition: I expect the patient to be discharged tomorrow.    CODE STATUS:   Code Status and Medical Interventions:   Ordered at: 07/15/21 8932     Level Of Support Discussed With:    Patient     Code Status:    CPR     Medical Interventions (Level of Support Prior to Arrest):    Full       Jessika Clark PA-C  07/19/21

## 2021-07-19 NOTE — PLAN OF CARE
Goal Outcome Evaluation:              Outcome Summary: Pt's VSS, RA most of shift, 2L NC for comfort. Complaints of pain addressed with PRN's. Continue POC til D/C.

## 2021-07-19 NOTE — CASE MANAGEMENT/SOCIAL WORK
Continued Stay Note  Psychiatric     Patient Name: Timothy Guzman  MRN: [id number]  Today's Date: 7/19/2021    Admit Date: 7/12/2021    Discharge Plan     Row Name 07/19/21 1353       Plan    Plan  update    Patient/Family in Agreement with Plan  yes    Plan Comments  Per MDR, potential discharge today or tomorrow.  Spoke with patient at bedside regarding discharge plan.  Patient indicates that she anticipates going home tomorrow.  No need verbalized.  CM following.  Patient plan is to discharge home via car with family to transport.    Final Discharge Disposition Code  01 - home or self-care        Discharge Codes    No documentation.       Expected Discharge Date and Time     Expected Discharge Date Expected Discharge Time    Jul 19, 2021             Lynda Winslow RN

## 2021-07-19 NOTE — PLAN OF CARE
Goal Outcome Evaluation:  Plan of Care Reviewed With: patient        Progress: improving  Outcome Summary: Pt VSS and on RA. Pt alert and oriented, up independently. Pt complained of pain throughout shift, PRN pain medication given. Pt with bruising to skin. Pt with no other complaints at this time. Will continue to monitor.

## 2021-07-19 NOTE — PAYOR COMM NOTE
"Pamela Vail RN CM  Phone [phone]  Fax [phone]      Bipin Osorio (45 y.o. Female)     Date of Birth Social Security Number Address Home Phone MRN    1975  [id number]  [facility] [phone] [id number]    Confucianist Marital Status          Gnosticism        Admission Date Admission Type Admitting Provider Attending Provider Department, Room/Bed    7/12/21 Emergency Haroldo Hillman DO Shields, Daniel Alan, DO [facility] 6B, N636/1    Discharge Date Discharge Disposition Discharge Destination                       Attending Provider: Haroldo Hillman DO    Allergies: Contrast Dye, Nsaids, Tylenol [Acetaminophen]    Isolation: None   Infection: None   Code Status: CPR    Ht: 157.5 cm (62\")   Wt: 96.5 kg (212 lb 11.2 oz)    Admission Cmt: None   Principal Problem: Anaphylactic shock - Requiring vasopressor support [T78.2XXA]                 Active Insurance as of 7/12/2021     Primary Coverage     Payor Plan Insurance Group Employer/Plan Group    [facility] [employer] [facility] [id number]     Payor Plan Address Payor Plan Phone Number Payor Plan Fax Number Effective Dates    [address]   1/1/2021 - None Entered    [place] [address]       Subscriber Name Subscriber Birth Date Member ID       BIPIN OSORIO 1975 [id number]                 Emergency Contacts      (Rel.) Home Phone Work Phone Mobile Phone    yanci osorio (Mother) [phone] -- --    FREDO OSORIO (Father) [phone] -- --            Vital Signs (last day)     Date/Time   Temp   Temp src   Pulse   Resp   BP   Patient Position   SpO2    07/19/21 1145   97.7 (36.5)   Oral   --   18   108/75   Sitting   --    07/19/21 0704   97.8 (36.6)   Oral   --   18   94/61   Sitting   --    07/19/21 0403   97.9 (36.6)   Oral   62   20   94/59   Lying   96    07/19/21 0010   96.5 (35.8)   Oral   63   20   102/60   Lying   97    07/18/21 2018   98 (36.7)   " Oral   82   20   108/63   Lying   96    07/18/21 2000   --   --   71   --   99/62   --   --    07/18/21 1611   97.3 (36.3)   Oral   --   18   93/59   Lying   --    07/18/21 1109   97.4 (36.3)   Oral   --   18   98/56   Lying   --    07/18/21 0902   --   --   75   --   --   --   --    07/18/21 0735   97.6 (36.4)   Oral   --   18 99/62   Lying   --    07/18/21 0505   --   --   69   --   --   --   --    07/18/21 0300   97.7 (36.5)   Oral   70   18   96/56   Lying   --    07/18/21 0120   --   --   71   --   --   --   --              Current Facility-Administered Medications   Medication Dose Route Frequency Provider Last Rate Last Admin   • ascorbic acid (VITAMIN C) tablet 1,000 mg  1,000 mg Oral Daily Vikram Gonzales MD   1,000 mg at 07/19/21 0847   • aspirin EC tablet 81 mg  81 mg Oral Daily Vikram Gonzales MD   81 mg at 07/19/21 0848   • bumetanide (BUMEX) tablet 2 mg  2 mg Oral Daily Haroldo Hillman DO   2 mg at 07/19/21 0848   • clopidogrel (PLAVIX) tablet 75 mg  75 mg Oral Daily Vikram Gonzales MD   75 mg at 07/19/21 0847   • docusate sodium (COLACE) capsule 100 mg  100 mg Oral BID PRN Vikram Gonzales MD       • FLUoxetine (PROzac) capsule 40 mg  40 mg Oral Daily Vikram Gonzales MD   40 mg at 07/19/21 0847   • folic acid (FOLVITE) tablet 1 mg  1 mg Oral Daily Vikram Gonzales MD   1 mg at 07/19/21 0848   • heparin (porcine) 5000 UNIT/ML injection 5,000 Units  5,000 Units Subcutaneous Q8H Rosemary Ruff MD   5,000 Units at 07/19/21 1347   • ipratropium-albuterol (DUO-NEB) nebulizer solution 3 mL  3 mL Nebulization Q6H PRN Vikram Gonzales MD       • lactulose (CHRONULAC) 10 GM/15ML solution 10 g  10 g Oral BID Vikram Gonzales MD   10 g at 07/19/21 0848   • lidocaine (LIDODERM) 5 % 1 patch  1 patch Transdermal Q24H Vikram Gonzales MD   1 patch at 07/19/21 0848   • magnesium oxide (MAG-OX) tablet 400 mg  400 mg Oral Q PM Vikram Gonzales MD   400 mg at 07/18/21 1639   • magnesium sulfate 4 gram infusion - Mg less  than or equal to 1mg/dL  4 g Intravenous PRN Vikram Gonzales MD        Or   • magnesium sulfate 3 gram infusion (1gm x 3) - Mg 1.1 - 1.5 mg/dL  1 g Intravenous PRN Vikram Gonzales MD        Or   • Magnesium Sulfate 2 gram infusion- Mg 1.6 - 1.9 mg/dL  2 g Intravenous PRN Vikram Gonzales MD   Stopped at 07/14/21 2008   • melatonin tablet 10 mg  10 mg Oral Nightly PRN Vikram Gonzales MD   10 mg at 07/15/21 2055   • OLANZapine (zyPREXA) tablet 7.5 mg  7.5 mg Oral Nightly Vikram Gonzales MD   7.5 mg at 07/18/21 2001   • ondansetron (ZOFRAN) injection 4 mg  4 mg Intravenous Q6H PRN Elpidio Apodaca APRN   4 mg at 07/16/21 1845   • oxyCODONE-acetaminophen (PERCOCET) 7.5-325 MG per tablet 1 tablet  1 tablet Oral Q6H PRN Haroldo Hillman DO   1 tablet at 07/19/21 1520   • pantoprazole (PROTONIX) EC tablet 40 mg  40 mg Oral BID AC Vikram Gonzales MD   40 mg at 07/19/21 0613   • Pharmacy Consult - MTM   Does not apply Daily Arcadio Cook, PharmD       • ranolazine (RANEXA) 12 hr tablet 1,000 mg  1,000 mg Oral BID Vikram Gonzales MD   1,000 mg at 07/19/21 0847   • riFAXIMin (XIFAXAN) tablet 550 mg  550 mg Oral Q12H Vikram Gonzales MD   550 mg at 07/19/21 0848   • rosuvastatin (CRESTOR) tablet 20 mg  20 mg Oral Nightly Vikram Gonzales MD   20 mg at 07/18/21 2001   • sodium chloride 0.9 % flush 10 mL  10 mL Intravenous PRN Vikram Gonzales MD       • sodium chloride 0.9 % flush 10 mL  10 mL Intravenous Q12H Vikram Gonzales MD   10 mL at 07/19/21 0849   • sodium chloride 0.9 % flush 10 mL  10 mL Intravenous PRN Vikram Gonzales MD       • spironolactone (ALDACTONE) tablet 100 mg  100 mg Oral Daily Haroldo Hillman DO   100 mg at 07/19/21 0848   • tamsulosin (FLOMAX) 24 hr capsule 0.4 mg  0.4 mg Oral Daily Vikram Gonzales MD   0.4 mg at 07/19/21 0847   • traZODone (DESYREL) tablet 50 mg  50 mg Oral Nightly Vikram Gonzales MD   50 mg at 07/18/21 2001   • vitamin B-6 (PYRIDOXINE) tablet 25 mg  25 mg Oral Daily Vikram Gonzales MD   25  "mg at 07/19/21 0850     Lab Results (last 24 hours)     Procedure Component Value Units Date/Time    Basic Metabolic Panel [[id number]]  (Abnormal) Collected: 07/19/21 0855    Specimen: Blood Updated: 07/19/21 0951     Glucose 140 mg/dL      BUN 18 mg/dL      Creatinine 0.90 mg/dL      Sodium 139 mmol/L      Potassium 3.4 mmol/L      Chloride 107 mmol/L      CO2 24.0 mmol/L      Calcium 8.3 mg/dL      eGFR Non African Amer 67 mL/min/1.73      BUN/Creatinine Ratio 20.0     Anion Gap 8.0 mmol/L     Narrative:      GFR Normal >60  Chronic Kidney Disease <60  Kidney Failure <15          Imaging Results (Last 24 Hours)     ** No results found for the last 24 hours. **           Physician Progress Notes (last 24 hours) (Notes from 07/18/21 1552 through 07/19/21 1552)      Vikram Gonzales MD at 07/19/21 0651          [facility] Progress Note     LOS: 3 days   Patient Care Team:  Toshia Mitchell APRN as PCP - General (Family Medicine)  Neslon Yip APRN as Nurse Practitioner (Nurse Practitioner)  PCP:  Toshia Mitchell APRN    Chief Complaint: Follow-up unstable angina, anaphylactic reaction to contrast    Subjective     Subjective:   Persistent chest pain.  Also with chronic generalized pain, treated with opiates and anxiolytics    Review of Systems:   Positive for generalized pain  Negative for dyspnea at rest, palpitations    Objective     Objective:    Vital Sign Min/Max for last 24 hours  Temp  Min: 96.5 °F (35.8 °C)  Max: 98 °F (36.7 °C)   BP  Min: 93/59  Max: 108/63   Pulse  Min: 62  Max: 82   Resp  Min: 18  Max: 20   SpO2  Min: 96 %  Max: 97 %   No data recorded   No data recorded     Flowsheet Rows      First Filed Value   Admission Height  160 cm (63\") Documented at 07/12/2021 2127   Admission Weight  86.6 kg (191 lb) Documented at 07/12/2021 2127          Telemetry: Sinus rhythm    EKG: Sinus rhythm with stable inferior T wave " inversions      Intake/Output Summary (Last 24 hours) at 7/19/2021 0651  Last data filed at 7/18/2021 1700  Gross per 24 hour   Intake 3120 ml   Output --   Net 3120 ml     Intake & Output (last 3 days)       07/16 0701 - 07/17 0700 07/17 0701 - 07/18 0700 07/18 0701 - 07/19 0700    P.O. 871  3120    Total Intake(mL/kg) 871 (9.1)  3120 (32.3)    Net +871  +3120           Urine Unmeasured Occurrence  1 x 1 x           Physical Exam:  General: In no acute distress     LABS/DIAGNOSTIC DATA:  Results from last 7 days   Lab Units 07/16/21  2210 07/16/21  0538 07/13/21  0607   WBC 10*3/mm3 8.33 12.83* 4.82   HEMOGLOBIN g/dL 11.4* 12.4 11.8*   HEMATOCRIT % 35.2 38.6 36.1   PLATELETS 10*3/mm3 123* 144 157     Lab Results   Lab Value Date/Time    TROPONINT <0.010 07/16/2021 2209    TROPONINT <0.010 07/15/2021 2310    TROPONINT <0.010 07/13/2021 0436    TROPONINT <0.010 07/12/2021 2329    TROPONINT <0.010 07/12/2021 2208    TROPONINT 5.990 (C) 06/19/2021 1148    TROPONINT 5.130 (C) 06/19/2021 0542         Results from last 7 days   Lab Units 07/16/21 2209 07/16/21  0538 07/15/21  0249 07/12/21  2208   SODIUM mmol/L 136 135* 140 136   POTASSIUM mmol/L 3.8 4.0 4.3 3.7   CHLORIDE mmol/L 105 104 105 98   CO2 mmol/L 21.0* 22.0 24.0 26.0   BUN mg/dL 16 13 10 9   CREATININE mg/dL 0.96 0.81 0.84 0.93   CALCIUM mg/dL 8.6 8.7 9.1 9.3   BILIRUBIN mg/dL  --  0.2  --  0.4   ALK PHOS U/L  --  138*  --  200*   ALT (SGPT) U/L  --  16  --  18   AST (SGOT) U/L  --  17  --  23   GLUCOSE mg/dL 205* 192* 138* 123*                       Medication Review:   ascorbic acid, 1,000 mg, Oral, Daily  aspirin, 81 mg, Oral, Daily  bumetanide, 1 mg, Oral, Daily  clopidogrel, 75 mg, Oral, Daily  FLUoxetine, 40 mg, Oral, Daily  folic acid, 1 mg, Oral, Daily  heparin (porcine), 5,000 Units, Subcutaneous, Q8H  lactulose, 10 g, Oral, BID  lidocaine, 1 patch, Transdermal, Q24H  magnesium oxide, 400 mg, Oral, Q PM  OLANZapine, 7.5 mg, Oral,  Nightly  pantoprazole, 40 mg, Oral, BID AC  pharmacy consult - MTM, , Does not apply, Daily  ranolazine, 1,000 mg, Oral, BID  riFAXIMin, 550 mg, Oral, Q12H  rosuvastatin, 20 mg, Oral, Nightly  sodium chloride, 10 mL, Intravenous, Q12H  spironolactone, 50 mg, Oral, Daily  tamsulosin, 0.4 mg, Oral, Daily  traZODone, 50 mg, Oral, Nightly  vitamin B-6, 25 mg, Oral, Daily               Anaphylactic shock - Requiring vasopressor support    History of esophageal varices    GERD (gastroesophageal reflux disease)    Depression    Chest pain    Cirrhosis of liver (CMS/HCC)    CAD (coronary artery disease)    Rib fractures    Cardiac catheterization summary:  · There was a 95% stenosis just distal to a previously placed stent.  The lesion is status post intervention with a Xience Melissa 3.0 x 18 mm drug-eluting stent.  · The patient had an anaphylactic-like reaction to contrast dye despite premedication.  She was supported with intraprocedural IV Solu-Medrol, epinephrine, and a nonrebreather.  The patient will be transferred to the intensive care unit.     Assessment     Assessment  1. CAD  a. Status post AGNELICA to the circumflex 6/18/2021 at time of STEMI.  b. Status post ANGELICA to distal circumflex 7/15/2021 for unstable angina  2. Recent right calf pain   a. In the setting of recent limited mobility, elevated D-dimer  b. Negative bilateral lower extremity duplex 7/13/2021  3. Rib fractures  4. Cirrhosis of the liver, esophageal varices  5. Contrast dye allergy  a. First event recently at [facility] where the patient had acute onset dyspnea for an abdominal CT scan with contrast dye   b. Second event prior to CT scan at [facility], but was occurring at the same time of a cardiac arrest/STEMI  1. Given Solu-Medrol IV and then tolerated heart catheterization with contrast without difficulty  6/2021  c. Anaphylactic reaction to IV contrast 7/15/2021 requiringepinephrine, antihistamines, IV steroids    Plan:  1. DAPT,  statin  2. Ranexa for coronary vasospasm  3. Holding off on beta-blockers/calcium channel blockers due to relative hypotension.  Significant headache with nitro in the past.    4. Consider physical therapy evaluation for short-term rehab versus home    5. We will follow peripherally for now.    6. Upon discharge, follow-up in the cardiology clinic as previously scheduled on August 16, 2021      Vikram Gonzales MD [facility]  07/19/21      Electronically signed by Vikram Gonzales MD at 07/19/21 0750

## 2021-07-20 VITALS
DIASTOLIC BLOOD PRESSURE: 66 MMHG | SYSTOLIC BLOOD PRESSURE: 115 MMHG | TEMPERATURE: 96.9 F | RESPIRATION RATE: 18 BRPM | BODY MASS INDEX: 39.14 KG/M2 | HEIGHT: 62 IN | WEIGHT: 212.7 LBS | OXYGEN SATURATION: 95 % | HEART RATE: 75 BPM

## 2021-07-20 PROBLEM — T78.2XXA ANAPHYLACTIC SHOCK: Status: RESOLVED | Noted: 2021-07-15 | Resolved: 2021-07-20

## 2021-07-20 PROBLEM — R07.9 CHEST PAIN: Status: RESOLVED | Noted: 2021-07-13 | Resolved: 2021-07-20

## 2021-07-20 PROCEDURE — 97161 PT EVAL LOW COMPLEX 20 MIN: CPT

## 2021-07-20 PROCEDURE — 99239 HOSP IP/OBS DSCHRG MGMT >30: CPT | Performed by: PHYSICIAN ASSISTANT

## 2021-07-20 PROCEDURE — 25010000002 HEPARIN (PORCINE) PER 1000 UNITS: Performed by: INTERNAL MEDICINE

## 2021-07-20 PROCEDURE — 97110 THERAPEUTIC EXERCISES: CPT

## 2021-07-20 RX ORDER — TAMSULOSIN HYDROCHLORIDE 0.4 MG/1
1 CAPSULE ORAL DAILY
Qty: 30 CAPSULE | Refills: 0 | Status: SHIPPED | OUTPATIENT
Start: 2021-07-20 | End: 2022-05-11 | Stop reason: HOSPADM

## 2021-07-20 RX ORDER — SPIRONOLACTONE 50 MG/1
100 TABLET, FILM COATED ORAL DAILY
Start: 2021-07-20 | End: 2021-11-15 | Stop reason: SDUPTHER

## 2021-07-20 RX ORDER — BUMETANIDE 1 MG/1
2 TABLET ORAL DAILY
Qty: 60 TABLET | Refills: 0
Start: 2021-07-20 | End: 2021-11-15 | Stop reason: SDUPTHER

## 2021-07-20 RX ORDER — LUBIPROSTONE 8 UG/1
8 CAPSULE ORAL ONCE
Status: COMPLETED | OUTPATIENT
Start: 2021-07-20 | End: 2021-07-20

## 2021-07-20 RX ORDER — FOLIC ACID 1 MG/1
1 TABLET ORAL DAILY
Qty: 30 TABLET | Refills: 0
Start: 2021-07-20 | End: 2022-01-29

## 2021-07-20 RX ORDER — PANTOPRAZOLE SODIUM 40 MG/1
40 TABLET, DELAYED RELEASE ORAL
Qty: 60 TABLET | Refills: 0 | Status: SHIPPED | OUTPATIENT
Start: 2021-07-20 | End: 2021-08-19

## 2021-07-20 RX ORDER — RANOLAZINE 1000 MG/1
1000 TABLET, EXTENDED RELEASE ORAL 2 TIMES DAILY
Qty: 60 TABLET | Refills: 0 | Status: SHIPPED | OUTPATIENT
Start: 2021-07-20 | End: 2022-01-29

## 2021-07-20 RX ORDER — TRAZODONE HYDROCHLORIDE 50 MG/1
50 TABLET ORAL NIGHTLY
Qty: 30 TABLET | Refills: 0 | Status: ON HOLD | OUTPATIENT
Start: 2021-07-20 | End: 2022-05-11

## 2021-07-20 RX ORDER — IPRATROPIUM BROMIDE AND ALBUTEROL SULFATE 2.5; .5 MG/3ML; MG/3ML
3 SOLUTION RESPIRATORY (INHALATION) EVERY 6 HOURS PRN
Qty: 360 ML | Refills: 0 | Status: SHIPPED | OUTPATIENT
Start: 2021-07-20 | End: 2022-01-12

## 2021-07-20 RX ORDER — FLUOXETINE HYDROCHLORIDE 40 MG/1
40 CAPSULE ORAL DAILY
Qty: 30 CAPSULE | Refills: 0 | Status: SHIPPED | OUTPATIENT
Start: 2021-07-20 | End: 2022-01-29

## 2021-07-20 RX ORDER — ONDANSETRON 4 MG/1
4 TABLET, FILM COATED ORAL EVERY 8 HOURS PRN
Qty: 30 TABLET | Refills: 0 | Status: ON HOLD | OUTPATIENT
Start: 2021-07-20 | End: 2022-03-12 | Stop reason: SDUPTHER

## 2021-07-20 RX ADMIN — TAMSULOSIN HYDROCHLORIDE 0.4 MG: 0.4 CAPSULE ORAL at 08:58

## 2021-07-20 RX ADMIN — RIFAXIMIN 550 MG: 550 TABLET ORAL at 08:58

## 2021-07-20 RX ADMIN — SODIUM CHLORIDE, PRESERVATIVE FREE 10 ML: 5 INJECTION INTRAVENOUS at 08:59

## 2021-07-20 RX ADMIN — SPIRONOLACTONE 100 MG: 50 TABLET ORAL at 09:05

## 2021-07-20 RX ADMIN — OXYCODONE HYDROCHLORIDE AND ACETAMINOPHEN 1 TABLET: 7.5; 325 TABLET ORAL at 05:58

## 2021-07-20 RX ADMIN — BUMETANIDE 2 MG: 1 TABLET ORAL at 09:05

## 2021-07-20 RX ADMIN — FLUOXETINE HYDROCHLORIDE 40 MG: 20 CAPSULE ORAL at 08:58

## 2021-07-20 RX ADMIN — DOCUSATE SODIUM 100 MG: 100 CAPSULE, LIQUID FILLED ORAL at 08:59

## 2021-07-20 RX ADMIN — OXYCODONE HYDROCHLORIDE AND ACETAMINOPHEN 1000 MG: 500 TABLET ORAL at 08:58

## 2021-07-20 RX ADMIN — HEPARIN SODIUM 5000 UNITS: 5000 INJECTION INTRAVENOUS; SUBCUTANEOUS at 05:14

## 2021-07-20 RX ADMIN — FOLIC ACID 1 MG: 1 TABLET ORAL at 08:59

## 2021-07-20 RX ADMIN — PANTOPRAZOLE SODIUM 40 MG: 40 TABLET, DELAYED RELEASE ORAL at 05:14

## 2021-07-20 RX ADMIN — HEPARIN SODIUM 5000 UNITS: 5000 INJECTION INTRAVENOUS; SUBCUTANEOUS at 13:45

## 2021-07-20 RX ADMIN — CLOPIDOGREL BISULFATE 75 MG: 75 TABLET ORAL at 08:59

## 2021-07-20 RX ADMIN — OXYCODONE HYDROCHLORIDE AND ACETAMINOPHEN 1 TABLET: 7.5; 325 TABLET ORAL at 12:21

## 2021-07-20 RX ADMIN — LUBIPROSTONE 8 MCG: 8 CAPSULE, GELATIN COATED ORAL at 11:11

## 2021-07-20 RX ADMIN — RANOLAZINE 1000 MG: 500 TABLET, EXTENDED RELEASE ORAL at 08:59

## 2021-07-20 RX ADMIN — ASPIRIN 81 MG: 81 TABLET, COATED ORAL at 09:00

## 2021-07-20 RX ADMIN — PYRIDOXINE HCL TAB 50 MG 25 MG: 50 TAB at 09:03

## 2021-07-20 RX ADMIN — LACTULOSE 10 G: 20 SOLUTION ORAL at 08:58

## 2021-07-20 RX ADMIN — LIDOCAINE 1 PATCH: 50 PATCH CUTANEOUS at 08:57

## 2021-07-20 NOTE — DISCHARGE SUMMARY
[facility] Medicine Services  DISCHARGE SUMMARY    Patient Name: Timothy Guzman  : 1975  MRN: [id number]    Date of Admission: 2021  9:13 PM  Date of Discharge:  21  Primary Care Physician: Toshia Mitchell APRN    Consults     Date and Time Order Name Status Description    2021  3:08 AM Inpatient Cardiology Consult Completed     2021 12:35 AM Inpatient Gastroenterology Consult Completed           Hospital Course     Presenting Problem:   Chest pain [R07.9]  Chest pain, unspecified type [R07.9]    Active Hospital Problems    Diagnosis  POA   • Cirrhosis of liver (CMS/HCC) [K74.60]  Yes   • CAD (coronary artery disease) [I25.10]  Yes   • Rib fractures [S22.49XA]  Yes   • Depression [F32.9]  Yes   • History of esophageal varices [Z87.19]  Not Applicable   • GERD (gastroesophageal reflux disease) [K21.9]  Yes      Resolved Hospital Problems    Diagnosis Date Resolved POA   • **Anaphylactic shock - Requiring vasopressor support [T78.2XXA] 2021 Yes   • Chest pain [R07.9] 2021 Yes          Hospital Course:  Timothy Guzman is a 45 y.o. female with past medical history significant for liver cirrhosis, esophageal varices, pancreatitis, hypertension, coronary artery disease, depression, previous rib fractures who presented to [facility] on 21 with complaints of chest pain.    Of note, the patient was admitted from 21 to 21 with coffee ground emesis developing PEA arrest in the ED while receiving IV contrast.  She developed STEMI and underwent LHC with stent placement to circumflex.  She had course complicated by SVT.  She received stenting to stenosis of the circumflex distal to prior stenting.       Anaphylactic shock to contrast dye  S/p vasopressor support  -S/p x2 days IV steroids  -Continue supportive care, BP remains stable at an appropriate level for a known cirrhotic  -Follow up with Allergist     CAD s/p recent STEMI  -STEMI during  last hospitalization; repeat C with ANGELICA this hospitalization  -Continue aspirin, Plavix, rosuvastatin  -continue Ranexa     Rib pain s/p recent CPR   -tylenol for pain control    Hepatic cirrhosis, compensated  Grade 1 esophageal varices  GERD  -Recent admission with coffee-ground emesis  -Continue lactulose, PPI bid, rifaximin  -Half dose of Bumex/Aldactone and monitor BP     Depression  -Continue fluoxetine, olanzapine, trazodone     Obesity  -BMI 38.45 kg/M2    Discharge Follow Up Recommendations for outpatient labs/diagnostics:   f/u with cardiology 8/16/21  Referral to allergist.  Day of Discharge     HPI:   Ms. Guzman is feeling well today.  She denies any current pain.  Her overall strength is improved.  Physical therapy evaluated her and recommended rehab however patient declined and wants to go home.  She was able to walk 25 feet with walker.    Review of Systems  Gen- No fevers, chills  CV- No chest pain, palpitations  Resp- No cough, dyspnea  GI- No N/V/D, abd pain    Vital Signs:   Temp:  [96.9 °F (36.1 °C)-97.6 °F (36.4 °C)] 96.9 °F (36.1 °C)  Heart Rate:  [66-76] 75  Resp:  [18-20] 18  BP: ()/(55-66) 115/66     Physical Exam:  Constitutional: No acute distress, awake, alert  HENT: NCAT, mucous membranes moist  Respiratory: Clear to auscultation bilaterally, respiratory effort normal   Cardiovascular: RRR, no murmurs, rubs, or gallops  Gastrointestinal: Positive bowel sounds, soft, nontender, nondistended  Musculoskeletal: No bilateral ankle edema  Psychiatric: Appropriate affect, cooperative  Neurologic: Oriented x 3  Skin: No rashes      Pertinent  and/or Most Recent Results     LAB RESULTS:      Lab 07/16/21  2210 07/16/21  0711 07/16/21  0538   WBC 8.33  --  12.83*   HEMOGLOBIN 11.4*  --  12.4   HEMATOCRIT 35.2  --  38.6   PLATELETS 123*  --  144   MCV 93.4  --  92.8   LACTATE  --  1.9 2.3*         Lab 07/19/21  0855 07/16/21  2209 07/16/21  0538 07/15/21  0249 07/14/21  0602   SODIUM 139  136 135* 140 138   POTASSIUM 3.4* 3.8 4.0 4.3 3.8   CHLORIDE 107 105 104 105 102   CO2 24.0 21.0* 22.0 24.0 25.0   ANION GAP 8.0 10.0 9.0 11.0 11.0   BUN 18 16 13 10 11   CREATININE 0.90 0.96 0.81 0.84 0.91   GLUCOSE 140* 205* 192* 138* 104*   CALCIUM 8.3* 8.6 8.7 9.1 8.8   IONIZED CALCIUM  --   --  1.32  --   --    MAGNESIUM  --   --  2.0 2.1 1.9   PHOSPHORUS  --   --  2.6  --   --          Lab 07/16/21  0538   TOTAL PROTEIN 5.3*   ALBUMIN 3.10*   GLOBULIN 2.2   ALT (SGPT) 16   AST (SGOT) 17   BILIRUBIN 0.2   ALK PHOS 138*         Lab 07/16/21  2209 07/15/21  2310   PROBNP 616.3*  --    TROPONIN T <0.010 <0.010             Lab 07/16/21 2209   ABO TYPING A   RH TYPING Positive   ANTIBODY SCREEN Negative         Brief Urine Lab Results  (Last result in the past 365 days)      Color   Clarity   Blood   Leuk Est   Nitrite   Protein   CREAT   Urine HCG        07/15/21 0903               Negative         Microbiology Results (last 10 days)     Procedure Component Value - Date/Time    COVID PRE-OP / PRE-PROCEDURE SCREENING ORDER (NO ISOLATION) - Swab, Nasopharynx [[id number]]  (Normal) Collected: 07/13/21 1440    Lab Status: Final result Specimen: Swab from Nasopharynx Updated: 07/13/21 1512    Narrative:      The following orders were created for panel order COVID PRE-OP / PRE-PROCEDURE SCREENING ORDER (NO ISOLATION) - Swab, Nasopharynx.  Procedure                               Abnormality         Status                     ---------                               -----------         ------                     COVID-19, ABBOTT IN-HOUS...[[id number]]  Normal              Final result                 Please view results for these tests on the individual orders.    COVID-19, ABBOTT IN-HOUSE,NASAL Swab (NO TRANSPORT MEDIA) 2 HR TAT - Swab, Nasopharynx [[id number]]  (Normal) Collected: 07/13/21 1440    Lab Status: Final result Specimen: Swab from Nasopharynx Updated: 07/13/21 1512     COVID19 Presumptive Negative    Narrative:       Fact sheet for providers: https://www.fda.gov/media/647028/download     Fact sheet for patients: https://www.fda.gov/media/122424/download    Test performed by PCR.  If inconsistent with clinical signs and symptoms patient should be tested with different authorized molecular test.          CT Chest Without Contrast Diagnostic    Result Date: 7/13/2021  CT Chest WO INDICATION: Chest pain with known rib fracture. CPR 3 weeks ago. TECHNIQUE: CT of the thorax without IV contrast. Coronal and sagittal reconstructions were obtained.  Radiation dose reduction techniques included automated exposure control or exposure modulation based on body size. Count of known CT and cardiac nuc med studies performed in previous 12 months: 3. COMPARISON: 6/18/2021 FINDINGS: Heart size is normal. Coronary stents are present. No pleural or pericardial effusion is seen. There is no adenopathy. Visualized thyroid gland is homogeneous. Upper abdomen shows changes of cholecystectomy. Lung windows demonstrate atelectasis in the lower lobes and lingula. No CT findings present to indicate pneumonia. Note: CT may be negative in the earliest stages of COVID-19. There is no pneumothorax. There are subacute right anterolateral rib fractures numbers 2 through 8. There are subacute left anterolateral rib fractures numbers 2 through 7.     1. Bilateral subacute rib fractures numbers 2 through 8 on the right and 2 through 7 on the left. No pneumothorax. 2. Atelectatic changes in the lungs with no evidence of pneumonia. Signer Name: Allen Schulte MD  Signed: 7/13/2021 3:19 AM  Workstation Name: [email or web address]  [facility] Lung Scan Perfusion Particulate    Result Date: 7/15/2021  EXAMINATION: NM LUNG SCAN PERFUSION PARTICULATE-07/14/2021:  INDICATION: Rule out PE; R07.9-Chest pain, unspecified.  TECHNIQUE: Radiopharmaceutical: 5.5 mCi of Technetium 99m MAA, IV.  COMPARISON: Portable chest radiograph of same date.  FINDINGS:  Perfusion of the lungs is uniform and symmetric with no large or small perfusion defects or unusual heterogeneous areas of perfusion. The study is considered negative, very low probability for pulmonary embolus.      Very low probability for pulmonary embolus.  D:  07/14/2021 E:  07/14/2021    This report was finalized on 7/15/2021 10:02 AM by Dr. Bo Gardner MD.      Cardiac Catheterization/Vascular Study    Result Date: 7/15/2021   [facility] Cardiology [address] [place] [address], [address] [place], [place], [id number] [phone]  [facility]   CARDIAC CATHETERIZATION PROCEDURE NOTE     · There was a 95% stenosis just distal to a previously placed stent.  The lesion is status post intervention with a Xience Melissa 3.0 x 18 mm drug-eluting stent. · The patient had an anaphylactic-like reaction to contrast dye despite premedication.  She was supported with intraprocedural IV Solu-Medrol, epinephrine, and a nonrebreather.  The patient will be transferred to the intensive care unit. RECOMMENDATIONS: · The above findings and contrast dye reaction were discussed with the hospitalist Dr. Ly, the intensivist Dr. Kendrick, and the patient's mother --------------------------------------------------------------------------- ------------------------------------------- Indication(s) for this Procedure:  Unstable angina, cardiac arrest and STEMI last month status post ANGELICA to circumflex artery Procedure(s) Performed: 1. Right artery access 2. Selective coronary angiography 3. Percutaneous coronary intervention of the circumflex artery Description of the Procedure:   Informed consent was obtained with the goals, rationale, alternatives, risks and benefits of the procedure explained to the patient.  A 6Fr Glidesheath slender sheath was placed in the right radial artery. Selective angiography of the right coronary artery was performed with a 5Fr JR4 diagnostic catheter.  Selective angiography of the left  coronary arteries was performed with a 5Fr JL3.5 diagnostic catheter.  Due to a severe stenosis in the circumflex coronary artery, it was decided to proceed with intervention.  Heparin was given for anticoagulation. A 6 [place] FL3.0 guide catheter was inserted. A Arley blue guidewire was used to cross the stenosis. A Xience Melissa 3.0 x 18 mm drug eluting stent was placed.  Please see the description of the patient's reaction to contrast dye as noted below in the complications section. The procedure was completed and the sheath was removed. A radial hemostasis band was placed on the artery for hemostasis.  Angiographic Findings: Left dominant coronary circulation · Left main artery:   The vessel is normal sized and bifurcates into an anterior descending and circumflex artery. · Left anterior descending artery: The vessel is normal size and terminates in an apical artery. There is mild diffuse atherosclerosis.  The first diagonal branch is medium sized and is without significant disease. · Left circumflex artery: The vessel is large sized and terminates in a posterior descending artery.  There is a 95% discrete AV groove artery stenosis just distal to the previously placed stent.  The first obtuse marginal branch is large sized and without significant disease. · Right coronary artery: The vessel small sized.  There is no significant disease. Hemodynamic Findings: · Ao pressure:  66/58/61 mmHg Post-interventional Results: Lesion #1 · ACC AHA class lesion: Type A · Location:    Mid circumflex · Stenosis pre-PCI:  95% · Stenosis post-PCI:  0% · YOBANY flow pre-PCI:  1 · YOBANY flow post-PCI:  3 Estimated Blood Loss: Minimal Specimen(s): None obtained Sheath: Removed, radial hemostasis band Complications: The patient had an anaphylactic-like reaction to contrast dye despite being premedicated with prednisone and Benadryl.  The patient was supported with IV Solu-Medrol 40 mg IV push, 1 amp of epinephrine, and started on an  epinephrine drip.  Placed on a nonrebreather.  Patient was able to converse throughout the procedure.  It was decided to proceed with urgent revascularization since the patient had a flow-limiting stenosis. Vikram Gonzales MD, MSc, FAC, [facility] Interventional Cardiology [facility]     XR Chest 1 View    Result Date: 7/16/2021  CR Chest 1 Vw INDICATION: Sudden onset chest pain today. COMPARISON:  Chest 7/16/2021 FINDINGS: Single portable AP view(s) of the chest. Left basilar subsegmental atelectasis again noted. No significant pleural effusions. No pneumothorax. Right lung is clear. Heart size and mediastinum are within normal limits.     Stable left basilar subsegmental atelectasis. No other acute chest findings. Signer Name: Julio Guzman MD  Signed: 7/16/2021 10:34 PM  Workstation Name: [email or web address] [email or web address]  [facility] [place]    XR Chest 1 View    Result Date: 7/16/2021  EXAMINATION: XR CHEST 1 VW-  INDICATION: Respiratory distress; R07.9-Chest pain, unspecified.  COMPARISON: 07/15/2021.  FINDINGS: Portable chest reveals heart to be borderline enlarged with ill-defined opacification seen at the left lung base. Small left pleural effusion. Bony structures are unremarkable.         Mild increased markings identified at the left lung base with small left pleural effusion. The remainder of the chest is stable and unremarkable.  D:  07/16/2021 E:  07/16/2021  This report was finalized on 7/16/2021 5:29 PM by Dr. Elin Pretty MD.      XR Chest 1 View    Result Date: 7/15/2021  CR Chest 1 Vw INDICATION: Acute onset of chest pain and hypoxia COMPARISON:  July 14, 2021 FINDINGS: Single portable AP view(s) of the chest. The heart and mediastinal contours are normal. The lungs demonstrate parenchymal scarring in the left lung base. No clearly acute infiltrates. No pneumothorax or pleural effusion.     No clearly acute cardiopulmonary findings. No significant change from prior  study. Signer Name: Tirso Guzman MD  Signed: 7/15/2021 8:04 PM  Workstation Name: [id number]  [facility]    XR Chest 1 View    Result Date: 7/14/2021  EXAMINATION: XR CHEST 1 VW-07/14/2021:  INDICATION: VQ comparison; R07.9-Chest pain, unspecified.  COMPARISON: 07/12/2021.  FINDINGS: The heart is normal in size. The vasculature appears within normal limits. There is some coarsening of the pulmonary interstitial markings similar to the prior exam and trace linear scarring in the left lung base. No edema, effusion, or pneumothorax is seen.      Mild chronic appearing interstitial lung changes and minimal left basilar lung scarring similar to prior studies. No clearly new chest disease is seen.  D:  07/14/2021 E:  07/14/2021     This report was finalized on 7/14/2021 10:17 PM by Dr. Bo Gardner MD.      XR Chest 1 View    Result Date: 7/12/2021  CR Chest 1 Vw INDICATION: Chest pain, right-sided broken ribs COMPARISON:  None available. FINDINGS: Single portable AP view(s) of the chest.  The heart and mediastinal contours are normal. The lungs are clear. No definite large pneumothorax or pleural effusion. Patient's broken ribs are not well visualized on this x-ray.     No definite acute cardiopulmonary findings. If there is concern for subtle injury or other abnormality, consider follow-up chest CT. Signer Name: Luz Marina Pace MD  Signed: 7/12/2021 9:53 PM  Workstation Name: [id number]  [facility]    Duplex Venous Lower Extremity - Bilateral CAR    Result Date: 7/14/2021  · The bilateral lower extremity venous duplex scan was negative for evidence of a DVT and SVT.        Results for orders placed during the hospital encounter of 07/12/21    Duplex Venous Lower Extremity - Bilateral CAR    Interpretation Summary  · The bilateral lower extremity venous duplex scan was negative for evidence of a DVT and SVT.      Results for orders placed during the hospital encounter of  07/12/21    Duplex Venous Lower Extremity - Bilateral CAR    Interpretation Summary  · The bilateral lower extremity venous duplex scan was negative for evidence of a DVT and SVT.      Results for orders placed during the hospital encounter of 06/18/21    Adult Transthoracic Echo Complete W/ Cont if Necessary Per Protocol    Interpretation Summary  · The quality of the study is limited due to patient positioning and patient being intubated.  · Left ventricular ejection fraction appears to be 51 - 55%. Left ventricular systolic function is normal.  · Left ventricular diastolic function is consistent with (grade Ia w/high LAP) impaired relaxation.  · Mildly reduced right ventricular systolic function noted.      Plan for Follow-up of Pending Labs/Results:     Discharge Details        Discharge Medications      New Medications      Instructions Start Date   ondansetron 4 MG tablet  Commonly known as: Zofran   4 mg, Oral, Every 8 Hours PRN      ranolazine 1000 MG 12 hr tablet  Commonly known as: RANEXA   1,000 mg, Oral, 2 Times Daily         Continue These Medications      Instructions Start Date   ascorbic acid 1000 MG tablet  Commonly known as: VITAMIN C   1,000 mg, Oral, Daily      aspirin 81 MG EC tablet   81 mg, Oral, Daily      bumetanide 1 MG tablet  Commonly known as: BUMEX   2 mg, Oral, Daily      clopidogrel 75 MG tablet  Commonly known as: PLAVIX   75 mg, Oral, Daily      docusate sodium 100 MG capsule  Commonly known as: COLACE   100 mg, Oral, 2 Times Daily PRN      FLUoxetine 40 MG capsule  Commonly known as: PROzac   40 mg, Oral, Daily      folic acid 1 MG tablet  Commonly known as: FOLVITE   1 mg, Oral, Daily      ipratropium-albuterol 0.5-2.5 mg/3 ml nebulizer  Commonly known as: DUO-NEB   3 mL, Nebulization, Every 6 Hours PRN      lactulose 10 GM/15ML solution  Commonly known as: Constulose   10 g, Oral, 2 Times Daily      lidocaine 5 %  Commonly known as: LIDODERM   1 patch, Transdermal, Every 24 Hours  Scheduled, Remove & Discard patch within 12 hours or as directed by MD      magnesium oxide 400 (241.3 Mg) MG tablet tablet  Commonly known as: MAGOX   400 mg, Oral, Every Evening      Melatonin 10 MG tablet   1 tablet, Oral, Nightly      multivitamin with minerals tablet tablet   1 tablet, Oral, Daily      OLANZapine 7.5 MG tablet  Commonly known as: zyPREXA   7.5 mg, Oral, Nightly      pantoprazole 40 MG EC tablet  Commonly known as: PROTONIX   40 mg, Oral, 2 Times Daily Before Meals      potassium chloride 20 MEQ CR tablet  Commonly known as: K-DUR,KLOR-CON   1 tablet, Oral, Daily PRN      riFAXIMin 550 MG tablet  Commonly known as: Xifaxan   550 mg, Oral, Every 12 Hours Scheduled      rosuvastatin 20 MG tablet  Commonly known as: CRESTOR   20 mg, Oral, Nightly      SM Vitamin D3 100 MCG (4000 UT) capsule  Generic drug: Cholecalciferol   1 capsule, Oral, Daily      spironolactone 50 MG tablet  Commonly known as: ALDACTONE   100 mg, Oral, Daily      tamsulosin 0.4 MG capsule 24 hr capsule  Commonly known as: FLOMAX   0.4 mg, Oral, Daily      traZODone 50 MG tablet  Commonly known as: DESYREL   50 mg, Oral, Nightly      vitamin b complex capsule capsule   1 capsule, Oral, Daily      vitamin B-6 25 MG tablet  Commonly known as: PYRIDOXINE   25 mg, Oral, Daily         Stop These Medications    famotidine 20 MG tablet  Commonly known as: PEPCID     linaclotide 72 MCG capsule capsule  Commonly known as: LINZESS     methocarbamol 750 MG tablet  Commonly known as: ROBAXIN     oxyCODONE 5 MG immediate release tablet  Commonly known as: ROXICODONE     rOPINIRole 0.5 MG tablet  Commonly known as: REQUIP            Allergies   Allergen Reactions   • Contrast Dye Anaphylaxis     6/18 Pt coded after receiving contrast for a CT scan. Was premedicated. Was having an MI at the same time. Immediately underwent heart cath with contrast without additional allergic reaction  7/15 Pt premedicated with prednisone/Benadryl for heart  cath. Still with anaphylactic-like reaction with drop in BP and hypoxia. Treated 95% stenosis with minimal dye and supported with epinephrine, solumedrol, NRB   • Nsaids GI Bleeding   • Tylenol [Acetaminophen] Other (See Comments)     CIRRHOSIS         Discharge Disposition:  Home or Self Care    Diet:  Hospital:  Diet Order   Procedures   • Diet Regular; Thin; Cardiac       Activity:  as tolerated    Restrictions or Other Recommendations:  none       CODE STATUS:    Code Status and Medical Interventions:   Ordered at: 07/15/21 1705     Level Of Support Discussed With:    Patient     Code Status:    CPR     Medical Interventions (Level of Support Prior to Arrest):    Full       Future Appointments   Date Time Provider Department Center   7/21/2021 10:30 AM [facility] XR [facility] 5 [facility]   8/16/2021  4:15 PM Vikram Gonzales MD [facility]   8/24/2021 11:30 AM Nelson Yip APRN [facility]       Additional Instructions for the Follow-ups that You Need to Schedule     Discharge Follow-up with Specified Provider: follow up with Cardiology, follow up August 16th   As directed      To: follow up with Cardiology, follow up August 16th        Additional information on Labs and Follow-ups:     PLEASE schedule follow up with Cardiology for AUGUST 16th                  Jessika Clark PA-C  07/20/21      Time Spent on Discharge:  I spent  45  minutes on this discharge activity which included: face-to-face encounter with the patient, reviewing the data in the system, coordination of the care with the nursing staff as well as consultants, documentation, and entering orders.

## 2021-07-20 NOTE — NURSING NOTE
Patient received discharge instructions and medication teaching. Verbalized understanding. IV and telemetry removed. No complaints at this time. Will continue to monitor closely.

## 2021-07-20 NOTE — THERAPY EVALUATION
Patient Name: Timothy Guzman  : 1975    MRN: [id number]                              Today's Date: 2021       Admit Date: 2021    Visit Dx:     ICD-10-CM ICD-9-CM   1. Chest pain, unspecified type  R07.9 786.50   2. Anaphylactic shock - Requiring vasopressor support  T78.2XXA 995.0   3. Anaphylaxis, initial encounter  T78.2XXA 995.0     Patient Active Problem List   Diagnosis   • Hepatic encephalopathy (CMS/HCC)   • Alcoholic cirrhosis of liver without ascites (CMS/HCC)   • Possible GI bleed   • Generalized abdominal pain   • History of esophageal varices   • GERD (gastroesophageal reflux disease)   • STEMI (ST elevation myocardial infarction) (CMS/HCC)   • Acute respiratory failure with hypoxia (CMS/HCC)   • Anaphylaxis   • Cardiac arrest (CMS/HCC)   • SVT (supraventricular tachycardia) (CMS/HCC)   • Depression   • Chest pain   • Cirrhosis of liver (CMS/HCC)   • CAD (coronary artery disease)   • Rib fractures   • Anaphylactic shock - Requiring vasopressor support     Past Medical History:   Diagnosis Date   • Acute pancreatitis    • Alcoholism (CMS/HCC)    • Arthritis    • Bone necrosis (CMS/HCC)    • CAD (coronary artery disease) 2021   • Cirrhosis (CMS/HCC)    • Gastroparesis    • GERD (gastroesophageal reflux disease)    • History of esophageal varices    • History of kidney stones    • Hypertension    • Pancreatitis      Past Surgical History:   Procedure Laterality Date   • APPENDECTOMY     • CARDIAC CATHETERIZATION N/A 2021    Procedure: Left Heart Cath;  Surgeon: Vikram Gonzales MD;  Location:  [facility];  Service: Cardiovascular;  Laterality: N/A;   • CARDIAC CATHETERIZATION N/A 7/15/2021    Procedure: LEFT HEART CATH;  Surgeon: Vikram Gonzales MD;  Location:  [facility];  Service: Cardiology;  Laterality: N/A;   •  SECTION     • CHOLECYSTECTOMY     • COLONOSCOPY     • COLONOSCOPY N/A 3/31/2020    Procedure: COLONOSCOPY;  Surgeon:  Tirso Giles MD;  Location:  [facility];  Service: Gastroenterology;  Laterality: N/A;   • ENDOSCOPY     • ENDOSCOPY     • ENDOSCOPY N/A 6/19/2021    Procedure: ESOPHAGOGASTRODUODENOSCOPY AT BEDSIDE;  Surgeon: Tyrese Rasmussen MD;  Location:  [facility];  Service: Gastroenterology;  Laterality: N/A;   • GALLBLADDER SURGERY     • REPLACEMENT TOTAL HIP LATERAL POSITION Left    • TOTAL KNEE ARTHROPLASTY Left      General Information     Row Name 07/20/21 0840          Physical Therapy Time and Intention    Document Type  evaluation  -NS     Mode of Treatment  individual therapy;physical therapy  -NS     Row Name 07/20/21 0840          General Information    Patient Profile Reviewed  yes  -NS     Prior Level of Function  min assist:;gait;transfer;bed mobility;max assist:;ADL's Pt came from rehab, was independent prior to last admission and rehab.  -NS     Existing Precautions/Restrictions  fall  -NS     Barriers to Rehab  medically complex  -NS     Row Name 07/20/21 0840          Living Environment    Lives With  parent(s)  -NS     Row Name 07/20/21 0840          Home Main Entrance    Number of Stairs, Main Entrance  five  -NS     Stair Railings, Main Entrance  railings on both sides of stairs  -NS     Row Name 07/20/21 0840          Stairs Within Home, Primary    Number of Stairs, Within Home, Primary  none  -NS     Stairs Comment, Within Home, Primary  Parent's home has a basement but pt is able to stay on ground level.  -NS     Row Name 07/20/21 0840          Cognition    Orientation Status (Cognition)  oriented x 3  -NS     Row Name 07/20/21 0840          Safety Issues, Functional Mobility    Safety Issues Affecting Function (Mobility)  insight into deficits/self-awareness;safety precaution awareness;safety precautions follow-through/compliance;sequencing abilities  -NS     Impairments Affecting Function (Mobility)  balance;endurance/activity tolerance;pain;strength;coordination;sensation/sensory  awareness  -NS       User Key  (r) = Recorded By, (t) = Taken By, (c) = Cosigned By    Initials Name Provider Type    Emma Ramirez PT Physical Therapist        Mobility     Row Name 07/20/21 0840          Bed Mobility    Bed Mobility  supine-sit  -NS     Supine-Sit [place] (Bed Mobility)  supervision  -NS     Assistive Device (Bed Mobility)  bed rails;head of bed elevated  -NS     Row Name 07/20/21 0840          Transfers    Comment (Transfers)  VCs for hand placement and reaching back for chair when sitting.  -NS     Row Name 07/20/21 0840          Sit-Stand Transfer    Sit-Stand [place] (Transfers)  contact guard;verbal cues  -NS     Assistive Device (Sit-Stand Transfers)  walker, front-wheeled  -NS     Row Name 07/20/21 0840          Gait/Stairs (Locomotion)    [place] Level (Gait)  contact guard  -NS     Assistive Device (Gait)  walker, front-wheeled  -NS     Distance in Feet (Gait)  25  -NS     Deviations/Abnormal Patterns (Gait)  kim decreased;gait speed decreased;stride length decreased  -NS     Bilateral Gait Deviations  forward flexed posture;heel strike decreased  -NS     Comment (Gait/Stairs)  Pt ambulated around room, demonstrating slow gait speed and flexed posture. Pt noted increased weakness in B quads as she increased her gait distance. Limited overall by weakness and fatigue.  -NS       User Key  (r) = Recorded By, (t) = Taken By, (c) = Cosigned By    Initials Name Provider Type    Emma Ramirez PT Physical Therapist        Obj/Interventions     Row Name 07/20/21 0840          Range of Motion Comprehensive    General Range of Motion  bilateral lower extremity ROM WFL  -NS     Row Name 07/20/21 0840          Strength Comprehensive (MMT)    General Manual Muscle Testing (MMT) Assessment  lower extremity strength deficits identified  -NS     Comment, General Manual Muscle Testing (MMT) Assessment  BLEs: grossly 4-/5  -NS     Row Name 07/20/21 0840          Motor Skills     Motor Skills  therapeutic exercise  -NS     Therapeutic Exercise  hip;knee;ankle  -NS     Row Name 07/20/21 0840          Hip (Therapeutic Exercise)    Hip (Therapeutic Exercise)  strengthening exercise  -NS     Hip Strengthening (Therapeutic Exercise)  bilateral;external rotation;internal rotation;marching while seated;10 repetitions  -NS     Row Name 07/20/21 0840          Knee (Therapeutic Exercise)    Knee (Therapeutic Exercise)  strengthening exercise;isometric exercises  -NS     Knee Isometrics (Therapeutic Exercise)  bilateral;quad sets;10 repetitions  -NS     Knee Strengthening (Therapeutic Exercise)  bilateral;LAQ (long arc quad);10 repetitions  -NS     Row Name 07/20/21 0840          Ankle (Therapeutic Exercise)    Ankle (Therapeutic Exercise)  AROM (active range of motion)  -NS     Ankle AROM (Therapeutic Exercise)  bilateral;dorsiflexion;plantarflexion;10 repetitions  -NS     Row Name 07/20/21 0840          Balance    Balance Assessment  sitting static balance;sitting dynamic balance;standing static balance;standing dynamic balance  -NS     Static Sitting Balance  WFL;unsupported;sitting, edge of bed  -NS     Dynamic Sitting Balance  WFL;unsupported;sitting, edge of bed  -NS     Static Standing Balance  WFL;supported;standing  -NS     Dynamic Standing Balance  mild impairment;supported;standing  -NS     Row Name 07/20/21 0840          Sensory Assessment (Somatosensory)    Sensory Assessment (Somatosensory)  other (see comments) Pt notes B feet numbness  -NS       User Key  (r) = Recorded By, (t) = Taken By, (c) = Cosigned By    Initials Name Provider Type    Emma Ramirez, PT Physical Therapist        Goals/Plan     Row Name 07/20/21 0840          Bed Mobility Goal 1 (PT)    Activity/Assistive Device (Bed Mobility Goal 1, PT)  sit to supine/supine to sit  -NS     [place] Level/Cues Needed (Bed Mobility Goal 1, PT)  independent  -NS     Time Frame (Bed Mobility Goal 1, PT)  long term goal (LTG);2  weeks  -NS     Row Name 07/20/21 0840          Transfer Goal 1 (PT)    Activity/Assistive Device (Transfer Goal 1, PT)  sit-to-stand/stand-to-sit;bed-to-chair/chair-to-bed  -NS     [place] Level/Cues Needed (Transfer Goal 1, PT)  modified independence  -NS     Time Frame (Transfer Goal 1, PT)  long term goal (LTG);2 weeks  -NS     Row Name 07/20/21 0840          Gait Training Goal 1 (PT)    Activity/Assistive Device (Gait Training Goal 1, PT)  gait (walking locomotion);assistive device use;walker, rolling  -NS     [place] Level (Gait Training Goal 1, PT)  modified independence  -NS     Distance (Gait Training Goal 1, PT)  150  -NS     Time Frame (Gait Training Goal 1, PT)  long term goal (LTG);2 weeks  -NS     Row Name 07/20/21 0840          Stairs Goal 1 (PT)    Activity/Assistive Device (Stairs Goal 1, PT)  ascending stairs;descending stairs  -NS     [place] Level/Cues Needed (Stairs Goal 1, PT)  supervision required  -NS     Number of Stairs (Stairs Goal 1, PT)  5  -NS     Time Frame (Stairs Goal 1, PT)  long term goal (LTG);2 weeks  -NS       User Key  (r) = Recorded By, (t) = Taken By, (c) = Cosigned By    Initials Name Provider Type    Emma Ramirez, PT Physical Therapist        Clinical Impression     Row Name 07/20/21 0840          Pain    Additional Documentation  Pain Scale: Numbers Pre/Post-Treatment (Group)  -NS     Row Name 07/20/21 0840          Pain Scale: Numbers Pre/Post-Treatment    Pretreatment Pain Rating  8/10  -NS     Posttreatment Pain Rating  7/10  -NS     Pain Location - Side  Right  -NS     Pain Location - Orientation  anterior  -NS     Pain Location  abdomen  -NS     Pain Intervention(s)  Repositioned;Nursing Notified  -NS     Row Name 07/20/21 0840          Plan of Care Review    Plan of Care Reviewed With  patient  -NS     Progress  no change PT eval  -NS     Outcome Summary  Patient presents with generalized weakness and decreased activity tolerance affecting  independence with mobility. She required CGA to stand and ambulate 25ft with a RW, demonstrating flexed posture with pt noting increased B quad weakness towards end of gait. Overall mobility limited by weakness. Skilled IP PT warranted. Pt would benefit from return to IRF at discharge to promote independence.  -NS     Row Name 07/20/21 0840          Therapy Assessment/Plan (PT)    Patient/Family Therapy Goals Statement (PT)  To get stronger  -NS     Rehab Potential (PT)  good, to achieve stated therapy goals  -NS     Criteria for Skilled Interventions Met (PT)  yes;meets criteria;skilled treatment is necessary  -NS     Predicted Duration of Therapy Intervention (PT)  2 weeks  -NS     Row Name 07/20/21 0840          Vital Signs    Pre Systolic BP Rehab  98  -NS     Pre Treatment Diastolic BP  55  -NS     Post Systolic BP Rehab  106  -NS     Post Treatment Diastolic BP  61  -NS     Pretreatment Heart Rate (beats/min)  77  -NS     Posttreatment Heart Rate (beats/min)  71  -NS     Pre SpO2 (%)  95  -NS     O2 Delivery Pre Treatment  room air  -NS     Post SpO2 (%)  95  -NS     O2 Delivery Post Treatment  room air  -NS     Pre Patient Position  Supine  -NS     Intra Patient Position  Standing  -NS     Post Patient Position  Sitting  -NS     Row Name 07/20/21 0840          Positioning and Restraints    Pre-Treatment Position  in bed  -NS     Post Treatment Position  chair  -NS     In Chair  notified nsg;reclined;encouraged to call for assist;call light within reach;exit alarm on;legs elevated;with nsg;waffle cushion  -NS       User Key  (r) = Recorded By, (t) = Taken By, (c) = Cosigned By    Initials Name Provider Type    Emma Ramirez, PT Physical Therapist        Outcome Measures     Row Name 07/20/21 0840          How much help from another person do you currently need...    Turning from your back to your side while in flat bed without using bedrails?  4  -NS     Moving from lying on back to sitting on the side of a  flat bed without bedrails?  3  -NS     Moving to and from a bed to a chair (including a wheelchair)?  3  -NS     Standing up from a chair using your arms (e.g., wheelchair, bedside chair)?  3  -NS     Climbing 3-5 steps with a railing?  2  -NS     To walk in hospital room?  3  -NS     AM-PAC 6 Clicks Score (PT)  18  -NS     Row Name 07/20/21 0840          Functional Assessment    Outcome Measure Options  AM-PAC 6 Clicks Basic Mobility (PT)  -NS       User Key  (r) = Recorded By, (t) = Taken By, (c) = Cosigned By    Initials Name Provider Type    Emma Ramirez, CHEL Physical Therapist                       Physical Therapy Education                 Title: PT OT SLP Therapies (In Progress)     Topic: Physical Therapy (Done)     Point: Mobility training (Done)     Learning Progress Summary           Patient Acceptance, E, VU,NR by NS at 7/20/2021 0926                   Point: Home exercise program (Done)     Learning Progress Summary           Patient Acceptance, E, VU,NR by NS at 7/20/2021 0926                   Point: Body mechanics (Done)     Learning Progress Summary           Patient Acceptance, E, VU,NR by NS at 7/20/2021 0926                   Point: Precautions (Done)     Learning Progress Summary           Patient Acceptance, E, VU,NR by NS at 7/20/2021 0926                               User Key     Initials Effective Dates Name Provider Type Discipline    NS 06/16/21 -  Emma Clark, CHEL Physical Therapist PT              PT Recommendation and Plan  Planned Therapy Interventions (PT): balance training, bed mobility training, gait training, home exercise program, neuromuscular re-education, patient/family education, transfer training, stair training, strengthening  Plan of Care Reviewed With: patient  Progress: no change (PT eval)  Outcome Summary: Patient presents with generalized weakness and decreased activity tolerance affecting independence with mobility. She required CGA to stand and ambulate 25ft with  a RW, demonstrating flexed posture with pt noting increased B quad weakness towards end of gait. Overall mobility limited by weakness. Skilled IP PT warranted. Pt would benefit from return to IRF at discharge to promote independence.     Time Calculation:   PT Charges     Row Name 07/20/21 0927             Time Calculation    Start Time  0840  -NS      PT Received On  07/20/21  -NS      PT Goal Re-Cert Due Date  07/30/21  -NS         Timed Charges    22028 - PT Therapeutic Exercise Minutes  8  -NS         Untimed Charges    PT Eval/Re-eval Minutes  36  -NS         Total Minutes    Timed Charges Total Minutes  8  -NS      Untimed Charges Total Minutes  36  -NS       Total Minutes  44  -NS        User Key  (r) = Recorded By, (t) = Taken By, (c) = Cosigned By    Initials Name Provider Type    Emma Ramirez PT Physical Therapist        Therapy Charges for Today     Code Description Service Date Service Provider Modifiers Qty    30584685618 HC PT THER PROC EA 15 MIN 7/20/2021 Emma Clark, PT GP 1    43379326997 HC PT EVAL LOW COMPLEXITY 3 7/20/2021 Emma Clark, PT GP 1          PT G-Codes  Outcome Measure Options: AM-PAC 6 Clicks Basic Mobility (PT)  AM-PAC 6 Clicks Score (PT): 18  AM-PAC 6 Clicks Score (OT): 17    Emma Clark PT  7/20/2021

## 2021-07-20 NOTE — CASE MANAGEMENT/SOCIAL WORK
Continued Stay Note   [place]     Patient Name: Timothy Guzman  MRN: [id number]  Today's Date: 7/20/2021    Admit Date: 7/12/2021    Discharge Plan     Row Name 07/20/21 1030       Plan    Plan  Home    Patient/Family in Agreement with Plan  yes    Plan Comments  Spoke with patient in room.  Therapy recommending IRF.  Patient states that her pplan is still to return home at discharge.  No needs voiced at this time.  CM will continue to follow.  Carissa Connor RN [phone]    Final Discharge Disposition Code  01 - home or self-care        Discharge Codes    No documentation.       Expected Discharge Date and Time     Expected Discharge Date Expected Discharge Time    Jul 20, 2021             Carissa Connor RN

## 2021-07-20 NOTE — PLAN OF CARE
Goal Outcome Evaluation:  Plan of Care Reviewed With: patient        Progress: no change (PT eval)  Outcome Summary: Patient presents with generalized weakness and decreased activity tolerance affecting independence with mobility. She required CGA to stand and ambulate 25ft with a RW, demonstrating flexed posture with pt noting increased B quad weakness towards end of gait. Overall mobility limited by weakness. Skilled IP PT warranted. Pt would benefit from return to IRF at discharge to promote independence.

## 2021-07-20 NOTE — PLAN OF CARE
Goal Outcome Evaluation:              Outcome Summary: Pt's VSS, RA, NSR. Mass developed on L arm, seems to be from last nights IV attempts. APRN notified, K PAD/heat applied. No other changes or concerns. Continue POC til D/C.

## 2021-07-21 ENCOUNTER — READMISSION MANAGEMENT (OUTPATIENT)
Dept: CALL CENTER | Facility: HOSPITAL | Age: 46
End: 2021-07-21

## 2021-07-21 ENCOUNTER — APPOINTMENT (OUTPATIENT)
Dept: GENERAL RADIOLOGY | Facility: HOSPITAL | Age: 46
End: 2021-07-21

## 2021-07-21 RX ORDER — ROSUVASTATIN CALCIUM 20 MG/1
20 TABLET, COATED ORAL NIGHTLY
Qty: 90 TABLET | Refills: 3 | Status: SHIPPED | OUTPATIENT
Start: 2021-07-21

## 2021-07-21 RX ORDER — CLOPIDOGREL BISULFATE 75 MG/1
75 TABLET ORAL DAILY
Qty: 90 TABLET | Refills: 3 | Status: SHIPPED | OUTPATIENT
Start: 2021-07-21

## 2021-07-21 NOTE — OUTREACH NOTE
Prep Survey      Responses   [facility]orship facility patient discharged from?  [facility]   Is LACE score < 7 ?  No   Emergency Room discharge w/ pulse ox?  No   Eligibility  Not Eligible [[facility] ]   What are the reasons patient is not eligible?  [facility]   Does the patient have one of the following disease processes/diagnoses(primary or secondary)?  Other   Prep survey completed?  Yes          Stephanie Marques RN

## 2021-07-22 ENCOUNTER — TELEPHONE (OUTPATIENT)
Dept: GASTROENTEROLOGY | Facility: CLINIC | Age: 46
End: 2021-07-22

## 2021-07-22 NOTE — PAYOR COMM NOTE
"Bipin Osorio (45 y.o. Female)     Date of Birth Social Security Number Address Home Phone MRN    1975  [id number]  [facility] [address] [phone] [id number]    Druze Marital Status          Mandaen        Admission Date Admission Type Admitting Provider Attending Provider Department, Room/Bed    21 Emergency Haroldo Hillman DO  Psychiatric 6B, N636/1    Discharge Date Discharge Disposition Discharge Destination        2021 Home or Self Care              Attending Provider: (none)   Allergies: Contrast Dye, Nsaids, Tylenol [Acetaminophen]    Isolation: None   Infection: None   Code Status: Prior    Ht: 157.5 cm (62\")   Wt: 96.5 kg (212 lb 11.2 oz)    Admission Cmt: None   Principal Problem: Anaphylactic shock - Requiring vasopressor support [T78.2XXA]                 Active Insurance as of 2021     Primary Coverage     Payor Plan Insurance Group Employer/Plan Group    [facility] BY [employer] [id number]     Payor Plan Address Payor Plan Phone Number Payor Plan Fax Number Effective Dates    [address]   2021 - None Entered    [facility] [id number]       Subscriber Name Subscriber Birth Date Member ID       BIPIN OSORIO 1975 [id number]                 Emergency Contacts      (Rel.) Home Phone Work Phone Mobile Phone    yanci osorio (Mother) [phone] -- --    GREGORIOFREDO WILSON (Father) [phone] -- --               Discharge Summary      Jessika Clark PA-C at 21 1321     Attestation signed by Haroldo Hillman DO at 21 1704    I have reviewed this documentation and agree.                        [facility] Medicine Services  DISCHARGE SUMMARY    Patient Name: Bipin Osorio  : 1975  MRN: [id number]    Date of Admission: 2021  9:13 PM  Date of Discharge:  21  Primary Care Physician: Toshia Mitchell APRN    Consults     Date and Time Order " Name Status Description    7/13/2021  3:08 AM Inpatient Cardiology Consult Completed     6/19/2021 12:35 AM Inpatient Gastroenterology Consult Completed           Hospital Course     Presenting Problem:   Chest pain [R07.9]  Chest pain, unspecified type [R07.9]    Active Hospital Problems    Diagnosis  POA   • Cirrhosis of liver (CMS/HCC) [K74.60]  Yes   • CAD (coronary artery disease) [I25.10]  Yes   • Rib fractures [S22.49XA]  Yes   • Depression [F32.9]  Yes   • History of esophageal varices [Z87.19]  Not Applicable   • GERD (gastroesophageal reflux disease) [K21.9]  Yes      Resolved Hospital Problems    Diagnosis Date Resolved POA   • **Anaphylactic shock - Requiring vasopressor support [T78.2XXA] 07/20/2021 Yes   • Chest pain [R07.9] 07/20/2021 Yes          Hospital Course:  Timothy Guzman is a 45 y.o. female with past medical history significant for liver cirrhosis, esophageal varices, pancreatitis, hypertension, coronary artery disease, depression, previous rib fractures who presented to [facility] on 7/12/21 with complaints of chest pain.    Of note, the patient was admitted from 6/18/21 to 7/2/21 with coffee ground emesis developing PEA arrest in the ED while receiving IV contrast.  She developed STEMI and underwent LHC with stent placement to circumflex.  She had course complicated by SVT.  She received stenting to stenosis of the circumflex distal to prior stenting.       Anaphylactic shock to contrast dye  S/p vasopressor support  -S/p x2 days IV steroids  -Continue supportive care, BP remains stable at an appropriate level for a known cirrhotic  -Follow up with Allergist     CAD s/p recent STEMI  -STEMI during last hospitalization; repeat LHC with ANGELICA this hospitalization  -Continue aspirin, Plavix, rosuvastatin  -continue Ranexa     Rib pain s/p recent CPR   -tylenol for pain control    Hepatic cirrhosis, compensated  Grade 1 esophageal varices  GERD  -Recent admission with coffee-ground emesis  -Continue  lactulose, PPI bid, rifaximin  -Half dose of Bumex/Aldactone and monitor BP     Depression  -Continue fluoxetine, olanzapine, trazodone     Obesity  -BMI 38.45 kg/M2    Discharge Follow Up Recommendations for outpatient labs/diagnostics:   f/u with cardiology 8/16/21  Referral to allergist.  Day of Discharge     HPI:   Ms. Guzman is feeling well today.  She denies any current pain.  Her overall strength is improved.  Physical therapy evaluated her and recommended rehab however patient declined and wants to go home.  She was able to walk 25 feet with walker.    Review of Systems  Gen- No fevers, chills  CV- No chest pain, palpitations  Resp- No cough, dyspnea  GI- No N/V/D, abd pain    Vital Signs:   Temp:  [96.9 °F (36.1 °C)-97.6 °F (36.4 °C)] 96.9 °F (36.1 °C)  Heart Rate:  [66-76] 75  Resp:  [18-20] 18  BP: ()/(55-66) 115/66     Physical Exam:  Constitutional: No acute distress, awake, alert  HENT: NCAT, mucous membranes moist  Respiratory: Clear to auscultation bilaterally, respiratory effort normal   Cardiovascular: RRR, no murmurs, rubs, or gallops  Gastrointestinal: Positive bowel sounds, soft, nontender, nondistended  Musculoskeletal: No bilateral ankle edema  Psychiatric: Appropriate affect, cooperative  Neurologic: Oriented x 3  Skin: No rashes      Pertinent  and/or Most Recent Results     LAB RESULTS:      Lab 07/16/21  2210 07/16/21  0711 07/16/21  0538   WBC 8.33  --  12.83*   HEMOGLOBIN 11.4*  --  12.4   HEMATOCRIT 35.2  --  38.6   PLATELETS 123*  --  144   MCV 93.4  --  92.8   LACTATE  --  1.9 2.3*         Lab 07/19/21  0855 07/16/21  2209 07/16/21  0538 07/15/21  0249 07/14/21  0602   SODIUM 139 136 135* 140 138   POTASSIUM 3.4* 3.8 4.0 4.3 3.8   CHLORIDE 107 105 104 105 102   CO2 24.0 21.0* 22.0 24.0 25.0   ANION GAP 8.0 10.0 9.0 11.0 11.0   BUN 18 16 13 10 11   CREATININE 0.90 0.96 0.81 0.84 0.91   GLUCOSE 140* 205* 192* 138* 104*   CALCIUM 8.3* 8.6 8.7 9.1 8.8   IONIZED CALCIUM  --   --   1.32  --   --    MAGNESIUM  --   --  2.0 2.1 1.9   PHOSPHORUS  --   --  2.6  --   --          Lab 07/16/21  0538   TOTAL PROTEIN 5.3*   ALBUMIN 3.10*   GLOBULIN 2.2   ALT (SGPT) 16   AST (SGOT) 17   BILIRUBIN 0.2   ALK PHOS 138*         Lab 07/16/21  2209 07/15/21  2310   PROBNP 616.3*  --    TROPONIN T <0.010 <0.010             Lab 07/16/21 2209   ABO TYPING A   RH TYPING Positive   ANTIBODY SCREEN Negative         Brief Urine Lab Results  (Last result in the past 365 days)      Color   Clarity   Blood   Leuk Est   Nitrite   Protein   CREAT   Urine HCG        07/15/21 0903               Negative         Microbiology Results (last 10 days)     Procedure Component Value - Date/Time    COVID PRE-OP / PRE-PROCEDURE SCREENING ORDER (NO ISOLATION) - Swab, Nasopharynx [[id number]]  (Normal) Collected: 07/13/21 1440    Lab Status: Final result Specimen: Swab from Nasopharynx Updated: 07/13/21 1512    Narrative:      The following orders were created for panel order COVID PRE-OP / PRE-PROCEDURE SCREENING ORDER (NO ISOLATION) - Swab, Nasopharynx.  Procedure                               Abnormality         Status                     ---------                               -----------         ------                     COVID-19, ABBOTT IN-HOUS...[[id number]]  Normal              Final result                 Please view results for these tests on the individual orders.    COVID-19, ABBOTT IN-HOUSE,NASAL Swab (NO TRANSPORT MEDIA) 2 HR TAT - Swab, Nasopharynx [[id number]]  (Normal) Collected: 07/13/21 1440    Lab Status: Final result Specimen: Swab from Nasopharynx Updated: 07/13/21 1512     COVID19 Presumptive Negative    Narrative:      Fact sheet for providers: https://www.fda.gov/media/533077/download     Fact sheet for patients: https://www.fda.gov/media/522912/download    Test performed by PCR.  If inconsistent with clinical signs and symptoms patient should be tested with different authorized molecular test.          CT  Chest Without Contrast Diagnostic    Result Date: 7/13/2021  CT Chest WO INDICATION: Chest pain with known rib fracture. CPR 3 weeks ago. TECHNIQUE: CT of the thorax without IV contrast. Coronal and sagittal reconstructions were obtained.  Radiation dose reduction techniques included automated exposure control or exposure modulation based on body size. Count of known CT and cardiac nuc med studies performed in previous 12 months: 3. COMPARISON: 6/18/2021 FINDINGS: Heart size is normal. Coronary stents are present. No pleural or pericardial effusion is seen. There is no adenopathy. Visualized thyroid gland is homogeneous. Upper abdomen shows changes of cholecystectomy. Lung windows demonstrate atelectasis in the lower lobes and lingula. No CT findings present to indicate pneumonia. Note: CT may be negative in the earliest stages of COVID-19. There is no pneumothorax. There are subacute right anterolateral rib fractures numbers 2 through 8. There are subacute left anterolateral rib fractures numbers 2 through 7.     1. Bilateral subacute rib fractures numbers 2 through 8 on the right and 2 through 7 on the left. No pneumothorax. 2. Atelectatic changes in the lungs with no evidence of pneumonia. Signer Name: Allen Schulte MD  Signed: 7/13/2021 3:19 AM  Workstation Name: [facility]  [facility] Lung Scan Perfusion Particulate    Result Date: 7/15/2021  EXAMINATION: NM LUNG SCAN PERFUSION PARTICULATE-07/14/2021:  INDICATION: Rule out PE; R07.9-Chest pain, unspecified.  TECHNIQUE: Radiopharmaceutical: 5.5 mCi of Technetium 99m MAA, IV.  COMPARISON: Portable chest radiograph of same date.  FINDINGS: Perfusion of the lungs is uniform and symmetric with no large or small perfusion defects or unusual heterogeneous areas of perfusion. The study is considered negative, very low probability for pulmonary embolus.      Very low probability for pulmonary embolus.  D:  07/14/2021 E:  07/14/2021     This report was finalized on 7/15/2021 10:02 AM by Dr. Bo Gardner MD.      Cardiac Catheterization/Vascular Study    Result Date: 7/15/2021   [facility] Cardiology [facility] [facility], [address] [place], [place], [address] [phone]  [employer]   CARDIAC CATHETERIZATION PROCEDURE NOTE     · There was a 95% stenosis just distal to a previously placed stent.  The lesion is status post intervention with a Xience Melissa 3.0 x 18 mm drug-eluting stent. · The patient had an anaphylactic-like reaction to contrast dye despite premedication.  She was supported with intraprocedural IV Solu-Medrol, epinephrine, and a nonrebreather.  The patient will be transferred to the intensive care unit. RECOMMENDATIONS: · The above findings and contrast dye reaction were discussed with the hospitalist Dr. Ly, the intensivist Dr. Kendrick, and the patient's mother --------------------------------------------------------------------------- ------------------------------------------- Indication(s) for this Procedure:  Unstable angina, cardiac arrest and STEMI last month status post ANGELICA to circumflex artery Procedure(s) Performed: 1. Right artery access 2. Selective coronary angiography 3. Percutaneous coronary intervention of the circumflex artery Description of the Procedure:   Informed consent was obtained with the goals, rationale, alternatives, risks and benefits of the procedure explained to the patient.  A 6Fr Glidesheath slender sheath was placed in the right radial artery. Selective angiography of the right coronary artery was performed with a 5Fr JR4 diagnostic catheter.  Selective angiography of the left coronary arteries was performed with a 5Fr JL3.5 diagnostic catheter.  Due to a severe stenosis in the circumflex coronary artery, it was decided to proceed with intervention.  Heparin was given for anticoagulation. A 6 Yoruba FL3.0 guide catheter was inserted. A Arley blue guidewire was used to cross  the stenosis. A Xience Melissa 3.0 x 18 mm drug eluting stent was placed.  Please see the description of the patient's reaction to contrast dye as noted below in the complications section. The procedure was completed and the sheath was removed. A radial hemostasis band was placed on the artery for hemostasis.  Angiographic Findings: Left dominant coronary circulation · Left main artery:   The vessel is normal sized and bifurcates into an anterior descending and circumflex artery. · Left anterior descending artery: The vessel is normal size and terminates in an apical artery. There is mild diffuse atherosclerosis.  The first diagonal branch is medium sized and is without significant disease. · Left circumflex artery: The vessel is large sized and terminates in a posterior descending artery.  There is a 95% discrete AV groove artery stenosis just distal to the previously placed stent.  The first obtuse marginal branch is large sized and without significant disease. · Right coronary artery: The vessel small sized.  There is no significant disease. Hemodynamic Findings: · Ao pressure:  66/58/61 mmHg Post-interventional Results: Lesion #1 · ACC AHA class lesion: Type A · Location:    Mid circumflex · Stenosis pre-PCI:  95% · Stenosis post-PCI:  0% · YOBANY flow pre-PCI:  1 · YOBANY flow post-PCI:  3 Estimated Blood Loss: Minimal Specimen(s): None obtained Sheath: Removed, radial hemostasis band Complications: The patient had an anaphylactic-like reaction to contrast dye despite being premedicated with prednisone and Benadryl.  The patient was supported with IV Solu-Medrol 40 mg IV push, 1 amp of epinephrine, and started on an epinephrine drip.  Placed on a nonrebreather.  Patient was able to converse throughout the procedure.  It was decided to proceed with urgent revascularization since the patient had a flow-limiting stenosis. Vikram Gonzales MD, MSc, FACC, [facility] Interventional Cardiology [place] [facility]  [employer]     XR Chest 1 View    Result Date: 7/16/2021  CR Chest 1 Vw INDICATION: Sudden onset chest pain today. COMPARISON:  Chest 7/16/2021 FINDINGS: Single portable AP view(s) of the chest. Left basilar subsegmental atelectasis again noted. No significant pleural effusions. No pneumothorax. Right lung is clear. Heart size and mediastinum are within normal limits.     Stable left basilar subsegmental atelectasis. No other acute chest findings. Signer Name: Julio Guzman MD  Signed: 7/16/2021 10:34 PM  Workstation Name: [email or web address]  [facility]    XR Chest 1 View    Result Date: 7/16/2021  EXAMINATION: XR CHEST 1 VW-  INDICATION: Respiratory distress; R07.9-Chest pain, unspecified.  COMPARISON: 07/15/2021.  FINDINGS: Portable chest reveals heart to be borderline enlarged with ill-defined opacification seen at the left lung base. Small left pleural effusion. Bony structures are unremarkable.         Mild increased markings identified at the left lung base with small left pleural effusion. The remainder of the chest is stable and unremarkable.  D:  07/16/2021 E:  07/16/2021  This report was finalized on 7/16/2021 5:29 PM by Dr. Elin Pretty MD.      XR Chest 1 View    Result Date: 7/15/2021  CR Chest 1 Vw INDICATION: Acute onset of chest pain and hypoxia COMPARISON:  July 14, 2021 FINDINGS: Single portable AP view(s) of the chest. The heart and mediastinal contours are normal. The lungs demonstrate parenchymal scarring in the left lung base. No clearly acute infiltrates. No pneumothorax or pleural effusion.     No clearly acute cardiopulmonary findings. No significant change from prior study. Signer Name: Tirso Guzman MD  Signed: 7/15/2021 8:04 PM  Workstation Name: [email or web address]  [facility]    XR Chest 1 View    Result Date: 7/14/2021  EXAMINATION: XR CHEST 1 VW-07/14/2021:  INDICATION: VQ comparison; R07.9-Chest pain, unspecified.  COMPARISON: 07/12/2021.   FINDINGS: The heart is normal in size. The vasculature appears within normal limits. There is some coarsening of the pulmonary interstitial markings similar to the prior exam and trace linear scarring in the left lung base. No edema, effusion, or pneumothorax is seen.      Mild chronic appearing interstitial lung changes and minimal left basilar lung scarring similar to prior studies. No clearly new chest disease is seen.  D:  07/14/2021 E:  07/14/2021     This report was finalized on 7/14/2021 10:17 PM by Dr. Bo Gardner MD.      XR Chest 1 View    Result Date: 7/12/2021  CR Chest 1 Vw INDICATION: Chest pain, right-sided broken ribs COMPARISON:  None available. FINDINGS: Single portable AP view(s) of the chest.  The heart and mediastinal contours are normal. The lungs are clear. No definite large pneumothorax or pleural effusion. Patient's broken ribs are not well visualized on this x-ray.     No definite acute cardiopulmonary findings. If there is concern for subtle injury or other abnormality, consider follow-up chest CT. Signer Name: Luz Marina Pace MD  Signed: 7/12/2021 9:53 PM  Workstation Name: [id number]  [facility] [place]    Duplex Venous Lower Extremity - Bilateral CAR    Result Date: 7/14/2021  · The bilateral lower extremity venous duplex scan was negative for evidence of a DVT and SVT.        Results for orders placed during the hospital encounter of 07/12/21    Duplex Venous Lower Extremity - Bilateral CAR    Interpretation Summary  · The bilateral lower extremity venous duplex scan was negative for evidence of a DVT and SVT.      Results for orders placed during the hospital encounter of 07/12/21    Duplex Venous Lower Extremity - Bilateral CAR    Interpretation Summary  · The bilateral lower extremity venous duplex scan was negative for evidence of a DVT and SVT.      Results for orders placed during the hospital encounter of 06/18/21    Adult Transthoracic Echo Complete W/ Cont if  Necessary Per Protocol    Interpretation Summary  · The quality of the study is limited due to patient positioning and patient being intubated.  · Left ventricular ejection fraction appears to be 51 - 55%. Left ventricular systolic function is normal.  · Left ventricular diastolic function is consistent with (grade Ia w/high LAP) impaired relaxation.  · Mildly reduced right ventricular systolic function noted.      Plan for Follow-up of Pending Labs/Results:     Discharge Details        Discharge Medications      New Medications      Instructions Start Date   ondansetron 4 MG tablet  Commonly known as: Zofran   4 mg, Oral, Every 8 Hours PRN      ranolazine 1000 MG 12 hr tablet  Commonly known as: RANEXA   1,000 mg, Oral, 2 Times Daily         Continue These Medications      Instructions Start Date   ascorbic acid 1000 MG tablet  Commonly known as: VITAMIN C   1,000 mg, Oral, Daily      aspirin 81 MG EC tablet   81 mg, Oral, Daily      bumetanide 1 MG tablet  Commonly known as: BUMEX   2 mg, Oral, Daily      clopidogrel 75 MG tablet  Commonly known as: PLAVIX   75 mg, Oral, Daily      docusate sodium 100 MG capsule  Commonly known as: COLACE   100 mg, Oral, 2 Times Daily PRN      FLUoxetine 40 MG capsule  Commonly known as: PROzac   40 mg, Oral, Daily      folic acid 1 MG tablet  Commonly known as: FOLVITE   1 mg, Oral, Daily      ipratropium-albuterol 0.5-2.5 mg/3 ml nebulizer  Commonly known as: DUO-NEB   3 mL, Nebulization, Every 6 Hours PRN      lactulose 10 GM/15ML solution  Commonly known as: Constulose   10 g, Oral, 2 Times Daily      lidocaine 5 %  Commonly known as: LIDODERM   1 patch, Transdermal, Every 24 Hours Scheduled, Remove & Discard patch within 12 hours or as directed by MD      magnesium oxide 400 (241.3 Mg) MG tablet tablet  Commonly known as: MAGOX   400 mg, Oral, Every Evening      Melatonin 10 MG tablet   1 tablet, Oral, Nightly      multivitamin with minerals tablet tablet   1 tablet, Oral,  Daily      OLANZapine 7.5 MG tablet  Commonly known as: zyPREXA   7.5 mg, Oral, Nightly      pantoprazole 40 MG EC tablet  Commonly known as: PROTONIX   40 mg, Oral, 2 Times Daily Before Meals      potassium chloride 20 MEQ CR tablet  Commonly known as: K-DUR,KLOR-CON   1 tablet, Oral, Daily PRN      riFAXIMin 550 MG tablet  Commonly known as: Xifaxan   550 mg, Oral, Every 12 Hours Scheduled      rosuvastatin 20 MG tablet  Commonly known as: CRESTOR   20 mg, Oral, Nightly      SM Vitamin D3 100 MCG (4000 UT) capsule  Generic drug: Cholecalciferol   1 capsule, Oral, Daily      spironolactone 50 MG tablet  Commonly known as: ALDACTONE   100 mg, Oral, Daily      tamsulosin 0.4 MG capsule 24 hr capsule  Commonly known as: FLOMAX   0.4 mg, Oral, Daily      traZODone 50 MG tablet  Commonly known as: DESYREL   50 mg, Oral, Nightly      vitamin b complex capsule capsule   1 capsule, Oral, Daily      vitamin B-6 25 MG tablet  Commonly known as: PYRIDOXINE   25 mg, Oral, Daily         Stop These Medications    famotidine 20 MG tablet  Commonly known as: PEPCID     linaclotide 72 MCG capsule capsule  Commonly known as: LINZESS     methocarbamol 750 MG tablet  Commonly known as: ROBAXIN     oxyCODONE 5 MG immediate release tablet  Commonly known as: ROXICODONE     rOPINIRole 0.5 MG tablet  Commonly known as: REQUIP            Allergies   Allergen Reactions   • Contrast Dye Anaphylaxis     6/18 Pt coded after receiving contrast for a CT scan. Was premedicated. Was having an MI at the same time. Immediately underwent heart cath with contrast without additional allergic reaction  7/15 Pt premedicated with prednisone/Benadryl for heart cath. Still with anaphylactic-like reaction with drop in BP and hypoxia. Treated 95% stenosis with minimal dye and supported with epinephrine, solumedrol, NRB   • Nsaids GI Bleeding   • Tylenol [Acetaminophen] Other (See Comments)     CIRRHOSIS         Discharge Disposition:  Home or Self  Care    Diet:  Hospital:  Diet Order   Procedures   • Diet Regular; Thin; Cardiac       Activity:  as tolerated    Restrictions or Other Recommendations:  none       CODE STATUS:    Code Status and Medical Interventions:   Ordered at: 07/15/21 1703     Level Of Support Discussed With:    Patient     Code Status:    CPR     Medical Interventions (Level of Support Prior to Arrest):    Full       Future Appointments   Date Time Provider Department Center   7/21/2021 10:30 AM [facility] XR FL 5 [facility]   8/16/2021  4:15 PM Vikram Gonzales MD [facility]   8/24/2021 11:30 AM Nelson Yip APRN [facility]       Additional Instructions for the Follow-ups that You Need to Schedule     Discharge Follow-up with Specified Provider: follow up with Cardiology, follow up August 16th   As directed      To: follow up with Cardiology, follow up August 16th        Additional information on Labs and Follow-ups:     PLEASE schedule follow up with Cardiology for AUGUST 16th                  Jessika Clark PA-C  07/20/21      Time Spent on Discharge:  I spent  45  minutes on this discharge activity which included: face-to-face encounter with the patient, reviewing the data in the system, coordination of the care with the nursing staff as well as consultants, documentation, and entering orders.            Electronically signed by Haroldo Hillman DO at 07/20/21 0487

## 2021-07-22 NOTE — TELEPHONE ENCOUNTER
Patient was recently discharged from the hospital and is requesting refills on the following medications:  Spironolactone  bumex  Potassium  Lactulose  I let her know that Nelson would be out of the office until next week and that she would address these medications upon her return.

## 2021-07-22 NOTE — PAYOR COMM NOTE
"Bipin Osorio (45 y.o. Female)     Date of Birth Social Security Number Address Home Phone MRN    1975  [id number]  [facility] [address] [phone] [id number]    Rastafarian Marital Status          Restoration        Admission Date Admission Type Admitting Provider Attending Provider Department, Room/Bed    21 Emergency Haroldo Hillman DO  [facility] 6B, N636/1    Discharge Date Discharge Disposition Discharge Destination        2021 Home or Self Care              Attending Provider: (none)   Allergies: Contrast Dye, Nsaids, Tylenol [Acetaminophen]    Isolation: None   Infection: None   Code Status: Prior    Ht: 157.5 cm (62\")   Wt: 96.5 kg (212 lb 11.2 oz)    Admission Cmt: None   Principal Problem: Anaphylactic shock - Requiring vasopressor support [T78.2XXA]                 Active Insurance as of 2021     Primary Coverage     Payor Plan Insurance Group Employer/Plan Group    [facility] BY [facility]IE [facility] BY GEORGIE [id number]     Payor Plan Address Payor Plan Phone Number Payor Plan Fax Number Effective Dates    [address]   2021 - None Entered    [facility] [id number]       Subscriber Name Subscriber Birth Date Member ID       BIPIN OSORIO 1975 [id number]                 Emergency Contacts      (Rel.) Home Phone Work Phone Mobile Phone    yanci osorio (Mother) [phone] -- --    GREGORIOFREDO WILSON (Father) [phone] -- --               Discharge Summary      Jessika Clark PA-C at 21 1321     Attestation signed by Haroldo Hillman DO at 21 1704    I have reviewed this documentation and agree.                        [facility]  DISCHARGE SUMMARY    Patient Name: Bipin Osorio  : 1975  MRN: [id number]    Date of Admission: 2021  9:13 PM  Date of Discharge:  21  Primary Care Physician: Toshia Mitchell APRN    Consults     Date and Time Order " Name Status Description    7/13/2021  3:08 AM Inpatient Cardiology Consult Completed     6/19/2021 12:35 AM Inpatient Gastroenterology Consult Completed           Hospital Course     Presenting Problem:   Chest pain [R07.9]  Chest pain, unspecified type [R07.9]    Active Hospital Problems    Diagnosis  POA   • Cirrhosis of liver (CMS/HCC) [K74.60]  Yes   • CAD (coronary artery disease) [I25.10]  Yes   • Rib fractures [S22.49XA]  Yes   • Depression [F32.9]  Yes   • History of esophageal varices [Z87.19]  Not Applicable   • GERD (gastroesophageal reflux disease) [K21.9]  Yes      Resolved Hospital Problems    Diagnosis Date Resolved POA   • **Anaphylactic shock - Requiring vasopressor support [T78.2XXA] 07/20/2021 Yes   • Chest pain [R07.9] 07/20/2021 Yes          Hospital Course:  Timothy Guzman is a 45 y.o. female with past medical history significant for liver cirrhosis, esophageal varices, pancreatitis, hypertension, coronary artery disease, depression, previous rib fractures who presented to [facility] on 7/12/21 with complaints of chest pain.    Of note, the patient was admitted from 6/18/21 to 7/2/21 with coffee ground emesis developing PEA arrest in the ED while receiving IV contrast.  She developed STEMI and underwent LHC with stent placement to circumflex.  She had course complicated by SVT.  She received stenting to stenosis of the circumflex distal to prior stenting.       Anaphylactic shock to contrast dye  S/p vasopressor support  -S/p x2 days IV steroids  -Continue supportive care, BP remains stable at an appropriate level for a known cirrhotic  -Follow up with Allergist     CAD s/p recent STEMI  -STEMI during last hospitalization; repeat LHC with ANGELICA this hospitalization  -Continue aspirin, Plavix, rosuvastatin  -continue Ranexa     Rib pain s/p recent CPR   -tylenol for pain control    Hepatic cirrhosis, compensated  Grade 1 esophageal varices  GERD  -Recent admission with coffee-ground emesis  -Continue  lactulose, PPI bid, rifaximin  -Half dose of Bumex/Aldactone and monitor BP     Depression  -Continue fluoxetine, olanzapine, trazodone     Obesity  -BMI 38.45 kg/M2    Discharge Follow Up Recommendations for outpatient labs/diagnostics:   f/u with cardiology 8/16/21  Referral to allergist.  Day of Discharge     HPI:   Ms. Guzman is feeling well today.  She denies any current pain.  Her overall strength is improved.  Physical therapy evaluated her and recommended rehab however patient declined and wants to go home.  She was able to walk 25 feet with walker.    Review of Systems  Gen- No fevers, chills  CV- No chest pain, palpitations  Resp- No cough, dyspnea  GI- No N/V/D, abd pain    Vital Signs:   Temp:  [96.9 °F (36.1 °C)-97.6 °F (36.4 °C)] 96.9 °F (36.1 °C)  Heart Rate:  [66-76] 75  Resp:  [18-20] 18  BP: ()/(55-66) 115/66     Physical Exam:  Constitutional: No acute distress, awake, alert  HENT: NCAT, mucous membranes moist  Respiratory: Clear to auscultation bilaterally, respiratory effort normal   Cardiovascular: RRR, no murmurs, rubs, or gallops  Gastrointestinal: Positive bowel sounds, soft, nontender, nondistended  Musculoskeletal: No bilateral ankle edema  Psychiatric: Appropriate affect, cooperative  Neurologic: Oriented x 3  Skin: No rashes      Pertinent  and/or Most Recent Results     LAB RESULTS:      Lab 07/16/21  2210 07/16/21  0711 07/16/21  0538   WBC 8.33  --  12.83*   HEMOGLOBIN 11.4*  --  12.4   HEMATOCRIT 35.2  --  38.6   PLATELETS 123*  --  144   MCV 93.4  --  92.8   LACTATE  --  1.9 2.3*         Lab 07/19/21  0855 07/16/21  2209 07/16/21  0538 07/15/21  0249 07/14/21  0602   SODIUM 139 136 135* 140 138   POTASSIUM 3.4* 3.8 4.0 4.3 3.8   CHLORIDE 107 105 104 105 102   CO2 24.0 21.0* 22.0 24.0 25.0   ANION GAP 8.0 10.0 9.0 11.0 11.0   BUN 18 16 13 10 11   CREATININE 0.90 0.96 0.81 0.84 0.91   GLUCOSE 140* 205* 192* 138* 104*   CALCIUM 8.3* 8.6 8.7 9.1 8.8   IONIZED CALCIUM  --   --   1.32  --   --    MAGNESIUM  --   --  2.0 2.1 1.9   PHOSPHORUS  --   --  2.6  --   --          Lab 07/16/21  0538   TOTAL PROTEIN 5.3*   ALBUMIN 3.10*   GLOBULIN 2.2   ALT (SGPT) 16   AST (SGOT) 17   BILIRUBIN 0.2   ALK PHOS 138*         Lab 07/16/21  2209 07/15/21  2310   PROBNP 616.3*  --    TROPONIN T <0.010 <0.010             Lab 07/16/21 2209   ABO TYPING A   RH TYPING Positive   ANTIBODY SCREEN Negative         Brief Urine Lab Results  (Last result in the past 365 days)      Color   Clarity   Blood   Leuk Est   Nitrite   Protein   CREAT   Urine HCG        07/15/21 0903               Negative         Microbiology Results (last 10 days)     Procedure Component Value - Date/Time    COVID PRE-OP / PRE-PROCEDURE SCREENING ORDER (NO ISOLATION) - Swab, Nasopharynx [[id number]]  (Normal) Collected: 07/13/21 1440    Lab Status: Final result Specimen: Swab from Nasopharynx Updated: 07/13/21 1512    Narrative:      The following orders were created for panel order COVID PRE-OP / PRE-PROCEDURE SCREENING ORDER (NO ISOLATION) - Swab, Nasopharynx.  Procedure                               Abnormality         Status                     ---------                               -----------         ------                     COVID-19, ABBOTT IN-HOUS...[[id number]]  Normal              Final result                 Please view results for these tests on the individual orders.    COVID-19, ABBOTT IN-HOUSE,NASAL Swab (NO TRANSPORT MEDIA) 2 HR TAT - Swab, Nasopharynx [[id number]]  (Normal) Collected: 07/13/21 1440    Lab Status: Final result Specimen: Swab from Nasopharynx Updated: 07/13/21 1512     COVID19 Presumptive Negative    Narrative:      Fact sheet for providers: https://www.fda.gov/media/194918/download     Fact sheet for patients: https://www.fda.gov/media/760981/download    Test performed by PCR.  If inconsistent with clinical signs and symptoms patient should be tested with different authorized molecular test.          CT  Chest Without Contrast Diagnostic    Result Date: 7/13/2021  CT Chest WO INDICATION: Chest pain with known rib fracture. CPR 3 weeks ago. TECHNIQUE: CT of the thorax without IV contrast. Coronal and sagittal reconstructions were obtained.  Radiation dose reduction techniques included automated exposure control or exposure modulation based on body size. Count of known CT and cardiac nuc med studies performed in previous 12 months: 3. COMPARISON: 6/18/2021 FINDINGS: Heart size is normal. Coronary stents are present. No pleural or pericardial effusion is seen. There is no adenopathy. Visualized thyroid gland is homogeneous. Upper abdomen shows changes of cholecystectomy. Lung windows demonstrate atelectasis in the lower lobes and lingula. No CT findings present to indicate pneumonia. Note: CT may be negative in the earliest stages of COVID-19. There is no pneumothorax. There are subacute right anterolateral rib fractures numbers 2 through 8. There are subacute left anterolateral rib fractures numbers 2 through 7.     1. Bilateral subacute rib fractures numbers 2 through 8 on the right and 2 through 7 on the left. No pneumothorax. 2. Atelectatic changes in the lungs with no evidence of pneumonia. Signer Name: Allen Schulte MD  Signed: 7/13/2021 3:19 AM  Workstation Name: [facility]  [facility] Lung Scan Perfusion Particulate    Result Date: 7/15/2021  EXAMINATION: NM LUNG SCAN PERFUSION PARTICULATE-07/14/2021:  INDICATION: Rule out PE; R07.9-Chest pain, unspecified.  TECHNIQUE: Radiopharmaceutical: 5.5 mCi of Technetium 99m MAA, IV.  COMPARISON: Portable chest radiograph of same date.  FINDINGS: Perfusion of the lungs is uniform and symmetric with no large or small perfusion defects or unusual heterogeneous areas of perfusion. The study is considered negative, very low probability for pulmonary embolus.      Very low probability for pulmonary embolus.  D:  07/14/2021 E:  07/14/2021     This report was finalized on 7/15/2021 10:02 AM by Dr. Bo Gardner MD.      Cardiac Catheterization/Vascular Study    Result Date: 7/15/2021   [facility]iology [facility], [address] [place], [address] [phone]  [email or web address]   CARDIAC CATHETERIZATION PROCEDURE NOTE     · There was a 95% stenosis just distal to a previously placed stent.  The lesion is status post intervention with a Xience Melissa 3.0 x 18 mm drug-eluting stent. · The patient had an anaphylactic-like reaction to contrast dye despite premedication.  She was supported with intraprocedural IV Solu-Medrol, epinephrine, and a nonrebreather.  The patient will be transferred to the intensive care unit. RECOMMENDATIONS: · The above findings and contrast dye reaction were discussed with the hospitalist Dr. Ly, the intensivist Dr. Kendrick, and the patient's mother --------------------------------------------------------------------------- ------------------------------------------- Indication(s) for this Procedure:  Unstable angina, cardiac arrest and STEMI last month status post ANGELICA to circumflex artery Procedure(s) Performed: 1. Right artery access 2. Selective coronary angiography 3. Percutaneous coronary intervention of the circumflex artery Description of the Procedure:   Informed consent was obtained with the goals, rationale, alternatives, risks and benefits of the procedure explained to the patient.  A 6Fr Glidesheath slender sheath was placed in the right radial artery. Selective angiography of the right coronary artery was performed with a 5Fr JR4 diagnostic catheter.  Selective angiography of the left coronary arteries was performed with a 5Fr JL3.5 diagnostic catheter.  Due to a severe stenosis in the circumflex coronary artery, it was decided to proceed with intervention.  Heparin was given for anticoagulation. A 6 Telugu FL3.0 guide catheter was inserted. A Arley blue guidewire was used to cross  the stenosis. A Xience Melissa 3.0 x 18 mm drug eluting stent was placed.  Please see the description of the patient's reaction to contrast dye as noted below in the complications section. The procedure was completed and the sheath was removed. A radial hemostasis band was placed on the artery for hemostasis.  Angiographic Findings: Left dominant coronary circulation · Left main artery:   The vessel is normal sized and bifurcates into an anterior descending and circumflex artery. · Left anterior descending artery: The vessel is normal size and terminates in an apical artery. There is mild diffuse atherosclerosis.  The first diagonal branch is medium sized and is without significant disease. · Left circumflex artery: The vessel is large sized and terminates in a posterior descending artery.  There is a 95% discrete AV groove artery stenosis just distal to the previously placed stent.  The first obtuse marginal branch is large sized and without significant disease. · Right coronary artery: The vessel small sized.  There is no significant disease. Hemodynamic Findings: · Ao pressure:  66/58/61 mmHg Post-interventional Results: Lesion #1 · ACC AHA class lesion: Type A · Location:    Mid circumflex · Stenosis pre-PCI:  95% · Stenosis post-PCI:  0% · YOBANY flow pre-PCI:  1 · YOBANY flow post-PCI:  3 Estimated Blood Loss: Minimal Specimen(s): None obtained Sheath: Removed, radial hemostasis band Complications: The patient had an anaphylactic-like reaction to contrast dye despite being premedicated with prednisone and Benadryl.  The patient was supported with IV Solu-Medrol 40 mg IV push, 1 amp of epinephrine, and started on an epinephrine drip.  Placed on a nonrebreather.  Patient was able to converse throughout the procedure.  It was decided to proceed with urgent revascularization since the patient had a flow-limiting stenosis. Vikram Gonzales MD, MSc, FACC, [facility] Interventional Cardiology [place] [facility]  [facility]     XR Chest 1 View    Result Date: 7/16/2021  CR Chest 1 Vw INDICATION: Sudden onset chest pain today. COMPARISON:  Chest 7/16/2021 FINDINGS: Single portable AP view(s) of the chest. Left basilar subsegmental atelectasis again noted. No significant pleural effusions. No pneumothorax. Right lung is clear. Heart size and mediastinum are within normal limits.     Stable left basilar subsegmental atelectasis. No other acute chest findings. Signer Name: Julio Guzman MD  Signed: 7/16/2021 10:34 PM  Workstation Name: [email or web address]  [facility]    XR Chest 1 View    Result Date: 7/16/2021  EXAMINATION: XR CHEST 1 VW-  INDICATION: Respiratory distress; R07.9-Chest pain, unspecified.  COMPARISON: 07/15/2021.  FINDINGS: Portable chest reveals heart to be borderline enlarged with ill-defined opacification seen at the left lung base. Small left pleural effusion. Bony structures are unremarkable.         Mild increased markings identified at the left lung base with small left pleural effusion. The remainder of the chest is stable and unremarkable.  D:  07/16/2021 E:  07/16/2021  This report was finalized on 7/16/2021 5:29 PM by Dr. Elin Pretty MD.      XR Chest 1 View    Result Date: 7/15/2021  CR Chest 1 Vw INDICATION: Acute onset of chest pain and hypoxia COMPARISON:  July 14, 2021 FINDINGS: Single portable AP view(s) of the chest. The heart and mediastinal contours are normal. The lungs demonstrate parenchymal scarring in the left lung base. No clearly acute infiltrates. No pneumothorax or pleural effusion.     No clearly acute cardiopulmonary findings. No significant change from prior study. Signer Name: Tirso Guzman MD  Signed: 7/15/2021 8:04 PM  Workstation Name: [email or web address]  [facility]    XR Chest 1 View    Result Date: 7/14/2021  EXAMINATION: XR CHEST 1 VW-07/14/2021:  INDICATION: VQ comparison; R07.9-Chest pain, unspecified.  COMPARISON: 07/12/2021.   FINDINGS: The heart is normal in size. The vasculature appears within normal limits. There is some coarsening of the pulmonary interstitial markings similar to the prior exam and trace linear scarring in the left lung base. No edema, effusion, or pneumothorax is seen.      Mild chronic appearing interstitial lung changes and minimal left basilar lung scarring similar to prior studies. No clearly new chest disease is seen.  D:  07/14/2021 E:  07/14/2021     This report was finalized on 7/14/2021 10:17 PM by Dr. Bo Gardner MD.      XR Chest 1 View    Result Date: 7/12/2021  CR Chest 1 Vw INDICATION: Chest pain, right-sided broken ribs COMPARISON:  None available. FINDINGS: Single portable AP view(s) of the chest.  The heart and mediastinal contours are normal. The lungs are clear. No definite large pneumothorax or pleural effusion. Patient's broken ribs are not well visualized on this x-ray.     No definite acute cardiopulmonary findings. If there is concern for subtle injury or other abnormality, consider follow-up chest CT. Signer Name: Luz Marina Pace MD  Signed: 7/12/2021 9:53 PM  Workstation Name: [id number]  [facility]    Duplex Venous Lower Extremity - Bilateral CAR    Result Date: 7/14/2021  · The bilateral lower extremity venous duplex scan was negative for evidence of a DVT and SVT.        Results for orders placed during the hospital encounter of 07/12/21    Duplex Venous Lower Extremity - Bilateral CAR    Interpretation Summary  · The bilateral lower extremity venous duplex scan was negative for evidence of a DVT and SVT.      Results for orders placed during the hospital encounter of 07/12/21    Duplex Venous Lower Extremity - Bilateral CAR    Interpretation Summary  · The bilateral lower extremity venous duplex scan was negative for evidence of a DVT and SVT.      Results for orders placed during the hospital encounter of 06/18/21    Adult Transthoracic Echo Complete W/ Cont if  Necessary Per Protocol    Interpretation Summary  · The quality of the study is limited due to patient positioning and patient being intubated.  · Left ventricular ejection fraction appears to be 51 - 55%. Left ventricular systolic function is normal.  · Left ventricular diastolic function is consistent with (grade Ia w/high LAP) impaired relaxation.  · Mildly reduced right ventricular systolic function noted.      Plan for Follow-up of Pending Labs/Results:     Discharge Details        Discharge Medications      New Medications      Instructions Start Date   ondansetron 4 MG tablet  Commonly known as: Zofran   4 mg, Oral, Every 8 Hours PRN      ranolazine 1000 MG 12 hr tablet  Commonly known as: RANEXA   1,000 mg, Oral, 2 Times Daily         Continue These Medications      Instructions Start Date   ascorbic acid 1000 MG tablet  Commonly known as: VITAMIN C   1,000 mg, Oral, Daily      aspirin 81 MG EC tablet   81 mg, Oral, Daily      bumetanide 1 MG tablet  Commonly known as: BUMEX   2 mg, Oral, Daily      clopidogrel 75 MG tablet  Commonly known as: PLAVIX   75 mg, Oral, Daily      docusate sodium 100 MG capsule  Commonly known as: COLACE   100 mg, Oral, 2 Times Daily PRN      FLUoxetine 40 MG capsule  Commonly known as: PROzac   40 mg, Oral, Daily      folic acid 1 MG tablet  Commonly known as: FOLVITE   1 mg, Oral, Daily      ipratropium-albuterol 0.5-2.5 mg/3 ml nebulizer  Commonly known as: DUO-NEB   3 mL, Nebulization, Every 6 Hours PRN      lactulose 10 GM/15ML solution  Commonly known as: Constulose   10 g, Oral, 2 Times Daily      lidocaine 5 %  Commonly known as: LIDODERM   1 patch, Transdermal, Every 24 Hours Scheduled, Remove & Discard patch within 12 hours or as directed by MD      magnesium oxide 400 (241.3 Mg) MG tablet tablet  Commonly known as: MAGOX   400 mg, Oral, Every Evening      Melatonin 10 MG tablet   1 tablet, Oral, Nightly      multivitamin with minerals tablet tablet   1 tablet, Oral,  Daily      OLANZapine 7.5 MG tablet  Commonly known as: zyPREXA   7.5 mg, Oral, Nightly      pantoprazole 40 MG EC tablet  Commonly known as: PROTONIX   40 mg, Oral, 2 Times Daily Before Meals      potassium chloride 20 MEQ CR tablet  Commonly known as: K-DUR,KLOR-CON   1 tablet, Oral, Daily PRN      riFAXIMin 550 MG tablet  Commonly known as: Xifaxan   550 mg, Oral, Every 12 Hours Scheduled      rosuvastatin 20 MG tablet  Commonly known as: CRESTOR   20 mg, Oral, Nightly      SM Vitamin D3 100 MCG (4000 UT) capsule  Generic drug: Cholecalciferol   1 capsule, Oral, Daily      spironolactone 50 MG tablet  Commonly known as: ALDACTONE   100 mg, Oral, Daily      tamsulosin 0.4 MG capsule 24 hr capsule  Commonly known as: FLOMAX   0.4 mg, Oral, Daily      traZODone 50 MG tablet  Commonly known as: DESYREL   50 mg, Oral, Nightly      vitamin b complex capsule capsule   1 capsule, Oral, Daily      vitamin B-6 25 MG tablet  Commonly known as: PYRIDOXINE   25 mg, Oral, Daily         Stop These Medications    famotidine 20 MG tablet  Commonly known as: PEPCID     linaclotide 72 MCG capsule capsule  Commonly known as: LINZESS     methocarbamol 750 MG tablet  Commonly known as: ROBAXIN     oxyCODONE 5 MG immediate release tablet  Commonly known as: ROXICODONE     rOPINIRole 0.5 MG tablet  Commonly known as: REQUIP            Allergies   Allergen Reactions   • Contrast Dye Anaphylaxis     6/18 Pt coded after receiving contrast for a CT scan. Was premedicated. Was having an MI at the same time. Immediately underwent heart cath with contrast without additional allergic reaction  7/15 Pt premedicated with prednisone/Benadryl for heart cath. Still with anaphylactic-like reaction with drop in BP and hypoxia. Treated 95% stenosis with minimal dye and supported with epinephrine, solumedrol, NRB   • Nsaids GI Bleeding   • Tylenol [Acetaminophen] Other (See Comments)     CIRRHOSIS         Discharge Disposition:  Home or Self  Care    Diet:  Hospital:  Diet Order   Procedures   • Diet Regular; Thin; Cardiac       Activity:  as tolerated    Restrictions or Other Recommendations:  none       CODE STATUS:    Code Status and Medical Interventions:   Ordered at: 07/15/21 1708     Level Of Support Discussed With:    Patient     Code Status:    CPR     Medical Interventions (Level of Support Prior to Arrest):    Full       Future Appointments   Date Time Provider Department Center   7/21/2021 10:30 AM [facility] XR FL 5 [facility]   8/16/2021  4:15 PM Vikram Gonzales MD [facility]   8/24/2021 11:30 AM Nelson Yip APRN [facility]       Additional Instructions for the Follow-ups that You Need to Schedule     Discharge Follow-up with Specified Provider: follow up with Cardiology, follow up August 16th   As directed      To: follow up with Cardiology, follow up August 16th        Additional information on Labs and Follow-ups:     PLEASE schedule follow up with Cardiology for AUGUST 16th                  Jessika Clark PA-C  07/20/21      Time Spent on Discharge:  I spent  45  minutes on this discharge activity which included: face-to-face encounter with the patient, reviewing the data in the system, coordination of the care with the nursing staff as well as consultants, documentation, and entering orders.            Electronically signed by Haroldo Hillman DO at 07/20/21 8261

## 2021-07-27 ENCOUNTER — APPOINTMENT (OUTPATIENT)
Dept: PREADMISSION TESTING | Facility: HOSPITAL | Age: 46
End: 2021-07-27

## 2021-07-27 LAB — SARS-COV-2 RNA PNL SPEC NAA+PROBE: NOT DETECTED

## 2021-07-27 PROCEDURE — U0004 COV-19 TEST NON-CDC HGH THRU: HCPCS

## 2021-07-27 PROCEDURE — C9803 HOPD COVID-19 SPEC COLLECT: HCPCS

## 2021-07-27 NOTE — TELEPHONE ENCOUNTER
I have not seen Ms. Guzman in over a year.  She has a follow-up appointment with me on 8/24 and looks as if she has enough of these medications to last her until 8/19.  Please see if you can move her appointment up to 8/19 or just prior to this and I will refill them after I see her.

## 2021-07-27 NOTE — TELEPHONE ENCOUNTER
Spoke with patient and she stated that she has enough refills that were given by her PCP to last until her appointment with Nelson on 8/24/21.

## 2021-07-30 ENCOUNTER — HOSPITAL ENCOUNTER (OUTPATIENT)
Dept: GENERAL RADIOLOGY | Facility: HOSPITAL | Age: 46
Discharge: HOME | End: 2021-07-30
Admitting: INTERNAL MEDICINE

## 2021-07-30 DIAGNOSIS — R13.10 DYSPHAGIA, UNSPECIFIED TYPE: ICD-10-CM

## 2021-07-30 PROCEDURE — 74230 X-RAY XM SWLNG FUNCJ C+: CPT

## 2021-07-30 PROCEDURE — 92611 MOTION FLUOROSCOPY/SWALLOW: CPT

## 2021-07-30 RX ADMIN — BARIUM SULFATE 100 ML: 0.81 POWDER, FOR SUSPENSION ORAL at 11:29

## 2021-07-30 RX ADMIN — BARIUM SULFATE 20 ML: 400 PASTE ORAL at 11:29

## 2021-07-30 NOTE — MBS/VFSS/FEES
Outpatient Speech Language Pathology   Adult Swallow Initial Evaluation   [place]   Modified Barium Swallow Study (MBS)     Patient Name: Timothy Guzman  : 1975  MRN: [id number]  Today's Date: 2021         Visit Date: 2021   Patient Active Problem List   Diagnosis   • Hepatic encephalopathy (CMS/HCC)   • Alcoholic cirrhosis of liver without ascites (CMS/HCC)   • Possible GI bleed   • Generalized abdominal pain   • History of esophageal varices   • GERD (gastroesophageal reflux disease)   • STEMI (ST elevation myocardial infarction) (CMS/HCC)   • Acute respiratory failure with hypoxia (CMS/HCC)   • Anaphylaxis   • Cardiac arrest (CMS/HCC)   • SVT (supraventricular tachycardia) (CMS/HCC)   • Depression   • Cirrhosis of liver (CMS/HCC)   • CAD (coronary artery disease)   • Rib fractures        Past Medical History:   Diagnosis Date   • Acute pancreatitis    • Alcoholism (CMS/HCC)    • Arthritis    • Bone necrosis (CMS/HCC)    • CAD (coronary artery disease) 2021   • Cirrhosis (CMS/HCC)    • Gastroparesis    • GERD (gastroesophageal reflux disease)    • History of esophageal varices    • History of kidney stones    • Hypertension    • Pancreatitis         Past Surgical History:   Procedure Laterality Date   • APPENDECTOMY     • CARDIAC CATHETERIZATION N/A 2021    Procedure: Left Heart Cath;  Surgeon: Vikram Gonzales MD;  Location: [facility] CATH INVASIVE LOCATION;  Service: Cardiovascular;  Laterality: N/A;   • CARDIAC CATHETERIZATION N/A 7/15/2021    Procedure: LEFT HEART CATH;  Surgeon: Vikram Gonzales MD;  Location:  [facility] CATH INVASIVE LOCATION;  Service: Cardiology;  Laterality: N/A;   •  SECTION     • CHOLECYSTECTOMY     • COLONOSCOPY     • COLONOSCOPY N/A 3/31/2020    Procedure: COLONOSCOPY;  Surgeon: Tirso Giles MD;  Location:  [facility] ENDOSCOPY;  Service: Gastroenterology;  Laterality: N/A;   • ENDOSCOPY     • ENDOSCOPY     • ENDOSCOPY N/A 2021    Procedure:  ESOPHAGOGASTRODUODENOSCOPY AT BEDSIDE;  Surgeon: Tyrese Rasmussen MD;  Location: [facility] ENDOSCOPY;  Service: Gastroenterology;  Laterality: N/A;   • GALLBLADDER SURGERY     • REPLACEMENT TOTAL HIP LATERAL POSITION Left    • TOTAL KNEE ARTHROPLASTY Left          Visit Dx:     ICD-10-CM ICD-9-CM   1. Dysphagia, unspecified type  R13.10 787.20           OP SLP Assessment/Plan - 07/30/21 1525        SLP Assessment    Functional Problems  Swallowing   -MP    Clinical Impression: Swallowing  Mild:;pharyngeal phase dysphagia   -MP      User Key  (r) = Recorded By, (t) = Taken By, (c) = Cosigned By    Initials Name Provider Type    MP Jono Rasheed MS CCC-SLP Speech and Language Pathologist          SLP Adult Swallow Evaluation     Row Name 07/30/21 1115       Rehab Evaluation    Document Type  evaluation  -MP    Subjective Information  no complaints  -MP    Patient Observations  alert;cooperative  -MP    Patient/Family/Caregiver Comments/Observations  No family present  -MP    Care Plan Review  evaluation/treatment results reviewed;patient/other agree to care plan  -MP    Patient Effort  good  -MP       General Information    Patient Profile Reviewed  yes  -MP    Pertinent History Of Current Problem  46 y/o F w/ hx recent adm to [facility] June/July 2021. EtOH use, pancreatitis, esophageal varices, NSTEMI, intubated 6/19-25. Last MBS 7/2 w/ recommendations for thin liquids via small sip + chin tuck + hold in anterior portion of mouth before swallow.  -MP    Current Method of Nutrition  regular textures;thin liquids  -MP    Precautions/Limitations, Vision  WFL;for purposes of eval  -MP    Precautions/Limitations, Hearing  WFL;for purposes of eval  -MP    Prior Level of Function-Communication  WFL  -MP    Prior Level of Function-Swallowing  other (see comments);regular textures;thin liquids;compensations (maneuvers, postures) needed see hx above w/ previous MBS recs  -MP    Plans/Goals Discussed with  patient;agreed upon  -MP     Barriers to Rehab  previous functional deficit  -MP    Patient's Goals for Discharge  patient did not state  -MP       Pain    Additional Documentation  Pain Scale: FACES Pre/Post-Treatment (Group)  -MP       Pain Scale: FACES Pre/Post-Treatment    Pain: FACES Scale, Pretreatment  0-->no hurt  -MP    Posttreatment Pain Rating  0-->no hurt  -MP       Oral Motor Structure and Function    Dentition Assessment  natural, present and adequate  -MP    Secretion Management  WNL/WFL  -MP    Mucosal Quality  moist, healthy  -MP       Oral Musculature and Cranial Nerve Assessment    Oral Motor General Assessment  WFL  -MP       General Eating/Swallowing Observations    Respiratory Support Currently in Use  room air  -MP    Eating/Swallowing Skills  self-fed;fed by SLP;appropriate self-feeding skills observed  -MP    Positioning During Eating  upright in chair  -       MBS/VFSS    Utensils Used  spoon;cup;straw  -    Consistencies Trialed  thin liquids;pudding thick;regular textures  -       MBS/VFSS Interpretation    VFSS Summary Similar c/t previous MBS 7/2/21. Mild pharyngeal dysphagia. Penetration before/during the swallow w/ thin liquid 2' delay & reduced vestibular closure. Aspiration after of vestibular residue. Aspiration was silent. Eliminated penetration/aspiration w/ small single sip + chin tuck. No significant pharyngeal residue.  Recommend:  - Regular diet (avoid/modify mixed consistencies),  - Thin liquids via single sip + chin tuck  - Meds whole in pudding/puree  - Pt would benefit from continued SLP services to address dysphagia  -MP       Clinical Impression    SLP Swallowing Diagnosis  mild;pharyngeal dysphagia  -MP    Functional Impact  risk of aspiration/pneumonia  -MP    Rehab Potential/Prognosis, Swallowing  good, to achieve stated therapy goals  -    Swallow Criteria for Skilled Therapeutic Interventions Met  demonstrates skilled criteria  -       Recommendations    Therapy Frequency  (Swallow)  evaluation only  -MP    SLP Diet Recommendation  regular textures;thin liquids;other (see comments) avoid/modify mixed consistencies  -MP    Recommended Precautions and Strategies  upright posture during/after eating;small bites of food and sips of liquid;chin tuck;general aspiration precautions  -MP    Oral Care Recommendations  Oral Care BID/PRN  -MP    SLP Rec. for Method of Medication Administration  meds whole;with pudding or applesauce;as tolerated  -MP    Anticipated Discharge Disposition (SLP)  home with OP services;anticipate therapy at next level of care  -MP    Demonstrates Need for Referral to Another Service  speech therapy;other (see comments) needs referral for OP SLP services  -MP      User Key  (r) = Recorded By, (t) = Taken By, (c) = Cosigned By    Initials Name Provider Type    Jono Johns MS CCC-SLP Speech and Language Pathologist                        OP SLP Education     Row Name 07/30/21 1526       Education    Barriers to Learning  No barriers identified  -MP    Education Provided  Described results of evaluation;Patient expressed understanding of evaluation  -MP    Learning Method  Explanation  -MP    Teaching Response  Verbalized understanding  -MP      User Key  (r) = Recorded By, (t) = Taken By, (c) = Cosigned By    Initials Name Effective Dates    Jono Johns MS CCC-SLP 06/16/21 -                    Time Calculation:   SLP Start Time: 1120  Untimed Charges  00216-LP Motion Fluoro Eval Swallow Minutes: 60  Total Minutes  Untimed Charges Total Minutes: 60   Total Minutes: 60    Therapy Charges for Today     Code Description Service Date Service Provider Modifiers Qty    75349101156 HC ST MOTION FLUORO EVAL SWALLOW 4 7/30/2021 Jono Rasheed MS CCC-SLP GN 1                   Jono Rasheed MS CCC-SLP  7/30/2021

## 2021-08-16 ENCOUNTER — OFFICE VISIT (OUTPATIENT)
Dept: CARDIOLOGY | Facility: CLINIC | Age: 46
End: 2021-08-16

## 2021-08-16 VITALS
HEIGHT: 63 IN | DIASTOLIC BLOOD PRESSURE: 68 MMHG | OXYGEN SATURATION: 98 % | BODY MASS INDEX: 34.91 KG/M2 | WEIGHT: 197 LBS | SYSTOLIC BLOOD PRESSURE: 100 MMHG | HEART RATE: 85 BPM

## 2021-08-16 DIAGNOSIS — E78.2 MIXED HYPERLIPIDEMIA: ICD-10-CM

## 2021-08-16 DIAGNOSIS — I25.118 CORONARY ARTERY DISEASE OF NATIVE ARTERY OF NATIVE HEART WITH STABLE ANGINA PECTORIS (HCC): Primary | ICD-10-CM

## 2021-08-16 PROCEDURE — 99214 OFFICE O/P EST MOD 30 MIN: CPT | Performed by: INTERNAL MEDICINE

## 2021-08-16 RX ORDER — FERROUS SULFATE 325(65) MG
1 TABLET ORAL DAILY
COMMUNITY
Start: 2021-06-14 | End: 2021-12-28

## 2021-08-16 RX ORDER — URSODIOL 300 MG/1
300 CAPSULE ORAL 2 TIMES DAILY
COMMUNITY
End: 2021-08-24 | Stop reason: SDUPTHER

## 2021-08-16 RX ORDER — IBUPROFEN 400 MG/1
400 TABLET ORAL AS NEEDED
COMMUNITY
End: 2021-08-24

## 2021-08-16 NOTE — PROGRESS NOTES
[facility]   [address], [address]  [place], [place], [address] [phone]  WWW.[facility]           OUTPATIENT CLINIC FOLLOW UP NOTE    Patient Care Team:  Patient Care Team:  Toshia Mitchell APRN as PCP - General (Family Medicine)  Nelson Yip APRN as Nurse Practitioner (Nurse Practitioner)    Subjective:      Chief Complaint   Patient presents with   • hospital follow up s/p catheterization       HPI:    Timothy Guzman is a 45 y.o. female.  Problem list:  1. CAD  a. Inferior STEMI, 6/2021, 100% AV groove stenosis status post ANGELICA x1  i. Concomitant A-V dissociation on presentation, conversion  b. Recurrent chest pain syndrome, 95% stenosis just distal to previously placed stent status post ANGELICA x1  i. Despite premedication, had intraprocedural anaphylactic-like reaction quickly after administration of contrast dye.  Supported with intraprocedural IV Solu-Medrol, epinephrine, nonrebreather  2. SVT  a. Definitive SVT in ER 6/2021, converted briefly to sinus rhythm with 12 mg of IV adenosine  3. Subsequent PEA due to contrast dye allergy cardiac arrest  4. Liver cirrhosis  5. Esophageal varices  6. Pancreatitis  7. Hypertension  8. Depression  9. Previous rib fractures    Today the patient presents for follow-up    Intermittent rare chest pains that are not functionally limiting.  Participating with cardiac rehab without difficulty    High doses of diuretics for liver cirrhosis.  Possibly some BRIANNA.  Having repeat labs soon.    Having some memory issues    Seeing allergist due to contrast dye allergy    Review of Systems:  Positive for swelling, memory issues  Negative for exertional chest pain, dyspnea with exertion, palpitations, syncope.     PFSH:  Patient Active Problem List   Diagnosis   • Hepatic encephalopathy (CMS/HCC)   • Alcoholic cirrhosis of liver without ascites (CMS/HCC)   • Possible GI bleed   • Generalized abdominal pain   • History of  esophageal varices   • GERD (gastroesophageal reflux disease)   • STEMI (ST elevation myocardial infarction) (CMS/HCC)   • Acute respiratory failure with hypoxia (CMS/HCC)   • Anaphylaxis   • Cardiac arrest (CMS/HCC)   • SVT (supraventricular tachycardia) (CMS/HCC)   • Depression   • Cirrhosis of liver (CMS/HCC)   • CAD (coronary artery disease)   • Rib fractures         Current Outpatient Medications:   •  aspirin 81 MG EC tablet, Take 81 mg by mouth Daily., Disp: , Rfl:   •  B Complex Vitamins (vitamin b complex) capsule capsule, Take 1 capsule by mouth Daily., Disp: , Rfl:   •  bumetanide (BUMEX) 1 MG tablet, Take 2 tablets by mouth Daily for 30 days. (Patient taking differently: Take 6 mg by mouth Daily. Takes 3 of the 2 mg tabs), Disp: 60 tablet, Rfl: 0  •  clopidogrel (PLAVIX) 75 MG tablet, Take 1 tablet by mouth Daily., Disp: 90 tablet, Rfl: 3  •  docusate sodium (COLACE) 100 MG capsule, Take 100 mg by mouth 2 (Two) Times a Day As Needed for Constipation., Disp: , Rfl:   •  FeroSul 325 (65 Fe) MG tablet, Take 1 tablet by mouth Daily., Disp: , Rfl:   •  FLUoxetine (PROzac) 40 MG capsule, Take 1 capsule by mouth Daily for 30 days., Disp: 30 capsule, Rfl: 0  •  folic acid (FOLVITE) 1 MG tablet, Take 1 tablet by mouth Daily., Disp: 30 tablet, Rfl: 0  •  ibuprofen (ADVIL,MOTRIN) 400 MG tablet, Take 400 mg by mouth As Needed., Disp: , Rfl:   •  ipratropium-albuterol (DUO-NEB) 0.5-2.5 mg/3 ml nebulizer, Take 3 mL by nebulization Every 6 (Six) Hours As Needed for Wheezing or Shortness of Air., Disp: 360 mL, Rfl: 0  •  lactulose (Constulose) 10 GM/15ML solution, Take 15 mL by mouth 2 (Two) Times a Day. (Patient taking differently: Take 10 g by mouth 3 (Three) Times a Day.), Disp: , Rfl:   •  magnesium oxide (MAGOX) 400 (241.3 Mg) MG tablet tablet, Take 400 mg by mouth Every Evening., Disp: , Rfl:   •  Melatonin 10 MG tablet, Take 1 tablet by mouth Every Night., Disp: , Rfl:   •  OLANZapine (zyPREXA) 7.5 MG tablet,  Take 1 tablet by mouth Every Night. (Patient taking differently: Take 10 mg by mouth Every Night.), Disp: , Rfl:   •  ondansetron (Zofran) 4 MG tablet, Take 1 tablet by mouth Every 8 (Eight) Hours As Needed for Nausea or Vomiting for up to 30 doses., Disp: 30 tablet, Rfl: 0  •  pantoprazole (PROTONIX) 40 MG EC tablet, Take 1 tablet by mouth 2 (Two) Times a Day Before Meals for 30 days., Disp: 60 tablet, Rfl: 0  •  potassium chloride (K-DUR,KLOR-CON) 20 MEQ CR tablet, Take 1 tablet by mouth Daily., Disp: , Rfl:   •  ranolazine (RANEXA) 1000 MG 12 hr tablet, Take 1 tablet by mouth 2 (Two) Times a Day for 30 days., Disp: 60 tablet, Rfl: 0  •  riFAXIMin (Xifaxan) 550 MG tablet, Take 1 tablet by mouth Every 12 (Twelve) Hours for 30 days., Disp: 60 tablet, Rfl: 0  •  rosuvastatin (CRESTOR) 20 MG tablet, Take 1 tablet by mouth Every Night., Disp: 90 tablet, Rfl: 3  •  spironolactone (ALDACTONE) 50 MG tablet, Take 2 tablets by mouth Daily for 30 days. (Patient taking differently: Take 100 mg by mouth 3 (Three) Times a Day.), Disp: , Rfl:   •  tamsulosin (FLOMAX) 0.4 MG capsule 24 hr capsule, Take 1 capsule by mouth Daily., Disp: 30 capsule, Rfl: 0  •  traZODone (DESYREL) 50 MG tablet, Take 1 tablet by mouth Every Night for 30 days., Disp: 30 tablet, Rfl: 0  •  ursodiol (ACTIGALL) 300 MG capsule, Take 300 mg by mouth 2 (two) times a day., Disp: , Rfl:   •  vitamin B-6 (PYRIDOXINE) 25 MG tablet, Take 25 mg by mouth Daily., Disp: , Rfl:     Allergies   Allergen Reactions   • Contrast Dye Anaphylaxis     6/18 Pt coded after receiving contrast for a CT scan. Was premedicated. Was having an MI at the same time. Immediately underwent heart cath with contrast without additional allergic reaction  7/15 Pt premedicated with prednisone/Benadryl for heart cath. Still with anaphylactic-like reaction with drop in BP and hypoxia. Treated 95% stenosis with minimal dye and supported with epinephrine, solumedrol, NRB   • Nsaids GI Bleeding  "  • Tylenol [Acetaminophen] Other (See Comments)     CIRRHOSIS        reports that she quit smoking about 15 months ago. Her smoking use included electronic cigarette and cigarettes. She smoked 0.50 packs per day. She has never used smokeless tobacco.      Objective:   Physical exam:  /68 (BP Location: Right arm, Patient Position: Sitting, Cuff Size: Adult)   Pulse 85   Ht 158.8 cm (62.5\")   Wt 89.4 kg (197 lb)   SpO2 98%   BMI 35.46 kg/m²   CONSTITUTIONAL: No acute distress  RESPIRATORY: Normal effort. Clear to auscultation bilaterally without wheezing or rales  CARDIOVASCULAR:  Regular rate and rhythm with normal S1 and S2. Without murmur, gallop or rub. Normal radial pulse. There is NO lower extremity edema bilaterally.    Labs:    BUN   Date Value Ref Range Status   07/19/2021 18 6 - 20 mg/dL Final   02/19/2021 11 6 - 20 mg/dL Final     Creatinine   Date Value Ref Range Status   07/19/2021 0.90 0.57 - 1.00 mg/dL Final   02/19/2021 1.1 (H) 0.4 - 1.0 mg/dL Final     Potassium   Date Value Ref Range Status   07/19/2021 3.4 (L) 3.5 - 5.2 mmol/L Final   02/19/2021 4.2 3.6 - 5.0 mmol/L Final     ALT (SGPT)   Date Value Ref Range Status   07/16/2021 16 1 - 33 U/L Final   02/19/2021 33 10 - 60 [iU]/L Final     AST (SGOT)   Date Value Ref Range Status   07/16/2021 17 1 - 32 U/L Final   02/19/2021 27 10 - 42 [iU]/L Final       Lab Results   Component Value Date    CHOL 136 06/28/2021     Lab Results   Component Value Date    TRIG 130 06/28/2021     Lab Results   Component Value Date    HDL 30 (L) 06/19/2021     Lab Results   Component Value Date     (H) 06/19/2021     No components found for: LDLDIRECTC    Diagnostic Data:    Procedures    Results for orders placed during the hospital encounter of 06/18/21    Adult Transthoracic Echo Complete W/ Cont if Necessary Per Protocol    Interpretation Summary  · The quality of the study is limited due to patient positioning and patient being intubated.  · Left " ventricular ejection fraction appears to be 51 - 55%. Left ventricular systolic function is normal.  · Left ventricular diastolic function is consistent with (grade Ia w/high LAP) impaired relaxation.  · Mildly reduced right ventricular systolic function noted.      Assessment and Plan:   Diagnoses and all orders for this visit:    Coronary artery disease of native artery of native heart with stable angina pectoris   Prior cardiac arrest  Mixed hyperlipidemia  -Stable from a cardiac standpoint.  Continue current medications.  Continue cardiac rehab  -Discussed that in the future, if she warrants contrast dye allergy for cardiac reasons, we will need to carefully weigh the risk versus benefit of such a procedure.  If for example she is having a high risk non-STEMI or STEMI, would consider elective intubation, premedication for contrast dye allergy, and then proceeding with a heart catheterization.  Or, if for example, she is having a low risk non-STEMI, would favor medical treatment given her life-threatening reactions to contrast dye    Contrast dye allergy  -Seen by an allergist in Dr Mary Berrios      - Return in about 6 months (around 2/16/2022) for Next scheduled follow up.    Electronically signed by Vikram Gonzales MD, 08/16/21, 4:13 PM EDT.

## 2021-08-24 ENCOUNTER — OFFICE VISIT (OUTPATIENT)
Dept: GASTROENTEROLOGY | Facility: CLINIC | Age: 46
End: 2021-08-24

## 2021-08-24 ENCOUNTER — LAB (OUTPATIENT)
Dept: LAB | Facility: HOSPITAL | Age: 46
End: 2021-08-24

## 2021-08-24 VITALS
HEIGHT: 63 IN | WEIGHT: 201 LBS | BODY MASS INDEX: 35.61 KG/M2 | SYSTOLIC BLOOD PRESSURE: 133 MMHG | DIASTOLIC BLOOD PRESSURE: 82 MMHG | TEMPERATURE: 97.7 F

## 2021-08-24 DIAGNOSIS — K70.31 ALCOHOLIC CIRRHOSIS OF LIVER WITH ASCITES (HCC): ICD-10-CM

## 2021-08-24 DIAGNOSIS — K70.31 ALCOHOLIC CIRRHOSIS OF LIVER WITH ASCITES (HCC): Primary | ICD-10-CM

## 2021-08-24 DIAGNOSIS — K74.3 PRIMARY BILIARY CHOLANGITIS (HCC): ICD-10-CM

## 2021-08-24 DIAGNOSIS — K76.82 HEPATIC ENCEPHALOPATHY (HCC): ICD-10-CM

## 2021-08-24 LAB
ALBUMIN SERPL-MCNC: 4 G/DL (ref 3.5–5.2)
ALBUMIN/GLOB SERPL: 1.4 G/DL
ALP SERPL-CCNC: 144 U/L (ref 39–117)
ALT SERPL W P-5'-P-CCNC: 23 U/L (ref 1–33)
ANION GAP SERPL CALCULATED.3IONS-SCNC: 10.3 MMOL/L (ref 5–15)
AST SERPL-CCNC: 24 U/L (ref 1–32)
BASOPHILS # BLD AUTO: 0.03 10*3/MM3 (ref 0–0.2)
BASOPHILS NFR BLD AUTO: 0.5 % (ref 0–1.5)
BILIRUB SERPL-MCNC: 0.4 MG/DL (ref 0–1.2)
BUN SERPL-MCNC: 6 MG/DL (ref 6–20)
BUN/CREAT SERPL: 6.1 (ref 7–25)
CALCIUM SPEC-SCNC: 9 MG/DL (ref 8.6–10.5)
CHLORIDE SERPL-SCNC: 107 MMOL/L (ref 98–107)
CO2 SERPL-SCNC: 21.7 MMOL/L (ref 22–29)
CREAT SERPL-MCNC: 0.99 MG/DL (ref 0.57–1)
DEPRECATED RDW RBC AUTO: 44.1 FL (ref 37–54)
EOSINOPHIL # BLD AUTO: 0.1 10*3/MM3 (ref 0–0.4)
EOSINOPHIL NFR BLD AUTO: 1.5 % (ref 0.3–6.2)
ERYTHROCYTE [DISTWIDTH] IN BLOOD BY AUTOMATED COUNT: 14 % (ref 12.3–15.4)
GFR SERPL CREATININE-BSD FRML MDRD: 60 ML/MIN/1.73
GLOBULIN UR ELPH-MCNC: 2.8 GM/DL
GLUCOSE SERPL-MCNC: 89 MG/DL (ref 65–99)
HCT VFR BLD AUTO: 40 % (ref 34–46.6)
HGB BLD-MCNC: 13.6 G/DL (ref 12–15.9)
IMM GRANULOCYTES # BLD AUTO: 0.03 10*3/MM3 (ref 0–0.05)
IMM GRANULOCYTES NFR BLD AUTO: 0.5 % (ref 0–0.5)
INR PPP: 1.06 (ref 0.85–1.16)
LYMPHOCYTES # BLD AUTO: 1.94 10*3/MM3 (ref 0.7–3.1)
LYMPHOCYTES NFR BLD AUTO: 29.4 % (ref 19.6–45.3)
MCH RBC QN AUTO: 30.3 PG (ref 26.6–33)
MCHC RBC AUTO-ENTMCNC: 34 G/DL (ref 31.5–35.7)
MCV RBC AUTO: 89.1 FL (ref 79–97)
MONOCYTES # BLD AUTO: 0.6 10*3/MM3 (ref 0.1–0.9)
MONOCYTES NFR BLD AUTO: 9.1 % (ref 5–12)
NEUTROPHILS NFR BLD AUTO: 3.89 10*3/MM3 (ref 1.7–7)
NEUTROPHILS NFR BLD AUTO: 59 % (ref 42.7–76)
NRBC BLD AUTO-RTO: 0 /100 WBC (ref 0–0.2)
PLATELET # BLD AUTO: 133 10*3/MM3 (ref 140–450)
PMV BLD AUTO: 11.3 FL (ref 6–12)
POTASSIUM SERPL-SCNC: 4.5 MMOL/L (ref 3.5–5.2)
PROT SERPL-MCNC: 6.8 G/DL (ref 6–8.5)
PROTHROMBIN TIME: 13.5 SECONDS (ref 11.4–14.4)
RBC # BLD AUTO: 4.49 10*6/MM3 (ref 3.77–5.28)
SODIUM SERPL-SCNC: 139 MMOL/L (ref 136–145)
WBC # BLD AUTO: 6.59 10*3/MM3 (ref 3.4–10.8)

## 2021-08-24 PROCEDURE — 85025 COMPLETE CBC W/AUTO DIFF WBC: CPT

## 2021-08-24 PROCEDURE — 99214 OFFICE O/P EST MOD 30 MIN: CPT | Performed by: NURSE PRACTITIONER

## 2021-08-24 PROCEDURE — 85610 PROTHROMBIN TIME: CPT

## 2021-08-24 PROCEDURE — 80053 COMPREHEN METABOLIC PANEL: CPT

## 2021-08-24 RX ORDER — URSODIOL 300 MG/1
600 CAPSULE ORAL 2 TIMES DAILY
Qty: 120 CAPSULE | Refills: 11 | Status: SHIPPED | OUTPATIENT
Start: 2021-08-24 | End: 2021-11-15 | Stop reason: SDUPTHER

## 2021-08-24 RX ORDER — LACTULOSE 10 G/15ML
30 SOLUTION ORAL 3 TIMES DAILY
Qty: 1892 ML | Refills: 5 | Status: SHIPPED | OUTPATIENT
Start: 2021-08-24 | End: 2021-11-15 | Stop reason: SDUPTHER

## 2021-08-24 RX ORDER — MULTIVITAMIN
TABLET ORAL DAILY
COMMUNITY
Start: 2021-08-17

## 2021-08-24 RX ORDER — RIFAXIMIN 550 MG/1
TABLET ORAL
COMMUNITY
Start: 2021-08-19 | End: 2021-11-15 | Stop reason: SDUPTHER

## 2021-08-26 NOTE — PROGRESS NOTES
GASTROENTEROLOGY OFFICE NOTE  Timothy Guzman  [id number]  1975    CARE TEAM  Patient Care Team:  Toshia Mitchell APRN as PCP - General (Family Medicine)  Nelson Yip APRN as Nurse Practitioner (Nurse Practitioner)    Referring Provider: Toshia Mitchell APRN    Chief Complaint   Patient presents with   • Hospital Follow Up Visit        HISTORY OF PRESENT ILLNESS:  Timothy is a 45-year-old female well-known to me with past medical history of EtOH liver cirrhosis, primary biliary cholangitis esophageal varices, pancreatitis, hypertension, coronary artery disease, depression lost in follow-up for over a year.  She was admitted at [facility] in June 2020 with coffee-ground emesis developing PEA arrest in the ED while receiving IV contrast.  She developed STEMI and underwent LHC with stent placement to the circumflex.  She had course complicated by SVT.  She received stenting to stenosis of the circumflex distal to prior stenting.  She returns today to follow-up regarding cirrhosis.    She was discharged from the hospital taking Bumex 2 mg daily and Aldactone 100 mg daily, this was decreased from her previous dose due to hypotension.  Since her discharge she increased her diuretics back to Bumex 6 mg daily and spironolactone 300 mg daily for reasons unclear.  She reports that she has seen her primary care provider recently and was told that her creatinine was elevated.  She exhibits no signs of ascites and no pedal edema.    She has a history of hepatic encephalopathy, currently taking lactulose 10 g 3 times daily and Xifaxan 550 mg 3 times daily.  She has not had any troubles with changes in level of consciousness confusion or overt signs of hepatic encephalopathy.  She is only having 1 bowel movement daily.    EGD in June 2021 (at the time of hematemesis close parentheses showed grade 1 esophageal varices in the lower third of the esophagus.  They were 3 mm in largest  diameter.  Mild erythema at GEJ.  No stigmata of bleeding.  No red stephanie signs.  Patchy mild inflammation characterized by erosions was found in the gastric antrum.    She is doing well today without any localizing complaints related to cirrhosis.  She reports that she has been a year and a half sober with the exception of a single day when she relapsed and drank.  She admits that she has not taken her health very seriously over the years but since her heart attack she has committed herself to taking better care of herself.      PAST MEDICAL HISTORY  Past Medical History:   Diagnosis Date   • Acute pancreatitis    • Alcoholism (CMS/HCC)    • Arthritis    • Bone necrosis (CMS/HCC)    • CAD (coronary artery disease) 2021   • Cirrhosis (CMS/HCC)    • Gastroparesis    • GERD (gastroesophageal reflux disease)    • History of esophageal varices    • History of kidney stones    • Hypertension    • Pancreatitis         PAST SURGICAL HISTORY  Past Surgical History:   Procedure Laterality Date   • APPENDECTOMY     • CARDIAC CATHETERIZATION N/A 2021    Procedure: Left Heart Cath;  Surgeon: Vikram Gonzales MD;  Location:  [facility] CATH INVASIVE LOCATION;  Service: Cardiovascular;  Laterality: N/A;   • CARDIAC CATHETERIZATION N/A 7/15/2021    Procedure: LEFT HEART CATH;  Surgeon: Vikram Gonzales MD;  Location:  [facility] CATH INVASIVE LOCATION;  Service: Cardiology;  Laterality: N/A;   •  SECTION     • CHOLECYSTECTOMY     • COLONOSCOPY     • COLONOSCOPY N/A 3/31/2020    Procedure: COLONOSCOPY;  Surgeon: Tirso Giles MD;  Location:  [facility] ENDOSCOPY;  Service: Gastroenterology;  Laterality: N/A;   • CORONARY STENT PLACEMENT     • ENDOSCOPY     • ENDOSCOPY     • ENDOSCOPY N/A 2021    Procedure: ESOPHAGOGASTRODUODENOSCOPY AT BEDSIDE;  Surgeon: Tyrese Rasmussen MD;  Location:  [facility] ENDOSCOPY;  Service: Gastroenterology;  Laterality: N/A;   • GALLBLADDER SURGERY     • REPLACEMENT TOTAL HIP LATERAL POSITION Left     • TOTAL KNEE ARTHROPLASTY Left         MEDICATIONS:    Current Outpatient Medications:   •  aspirin 81 MG EC tablet, Take 81 mg by mouth Daily., Disp: , Rfl:   •  B Complex Vitamins (vitamin b complex) capsule capsule, Take 1 capsule by mouth Daily., Disp: , Rfl:   •  clopidogrel (PLAVIX) 75 MG tablet, Take 1 tablet by mouth Daily., Disp: 90 tablet, Rfl: 3  •  FeroSul 325 (65 Fe) MG tablet, Take 1 tablet by mouth Daily., Disp: , Rfl:   •  folic acid (FOLVITE) 1 MG tablet, Take 1 tablet by mouth Daily., Disp: 30 tablet, Rfl: 0  •  ipratropium-albuterol (DUO-NEB) 0.5-2.5 mg/3 ml nebulizer, Take 3 mL by nebulization Every 6 (Six) Hours As Needed for Wheezing or Shortness of Air., Disp: 360 mL, Rfl: 0  •  magnesium oxide (MAGOX) 400 (241.3 Mg) MG tablet tablet, Take 400 mg by mouth Every Evening., Disp: , Rfl:   •  Melatonin 10 MG tablet, Take 1 tablet by mouth Every Night., Disp: , Rfl:   •  Multivitamin tablet tablet, , Disp: , Rfl:   •  OLANZapine (zyPREXA) 7.5 MG tablet, Take 1 tablet by mouth Every Night. (Patient taking differently: Take 10 mg by mouth Every Night.), Disp: , Rfl:   •  ondansetron (Zofran) 4 MG tablet, Take 1 tablet by mouth Every 8 (Eight) Hours As Needed for Nausea or Vomiting for up to 30 doses., Disp: 30 tablet, Rfl: 0  •  potassium chloride (K-DUR,KLOR-CON) 20 MEQ CR tablet, Take 1 tablet by mouth Daily., Disp: , Rfl:   •  rosuvastatin (CRESTOR) 20 MG tablet, Take 1 tablet by mouth Every Night., Disp: 90 tablet, Rfl: 3  •  tamsulosin (FLOMAX) 0.4 MG capsule 24 hr capsule, Take 1 capsule by mouth Daily., Disp: 30 capsule, Rfl: 0  •  ursodiol (ACTIGALL) 300 MG capsule, Take 2 capsules by mouth 2 (two) times a day., Disp: 120 capsule, Rfl: 11  •  vitamin B-6 (PYRIDOXINE) 25 MG tablet, Take 25 mg by mouth Daily., Disp: , Rfl:   •  Xifaxan 550 MG tablet, , Disp: , Rfl:   •  bumetanide (BUMEX) 1 MG tablet, Take 2 tablets by mouth Daily for 30 days. (Patient taking differently: Take 6 mg by mouth  Daily. Takes 3 of the 2 mg tabs), Disp: 60 tablet, Rfl: 0  •  docusate sodium (COLACE) 100 MG capsule, Take 100 mg by mouth 2 (Two) Times a Day As Needed for Constipation., Disp: , Rfl:   •  FLUoxetine (PROzac) 40 MG capsule, Take 1 capsule by mouth Daily for 30 days., Disp: 30 capsule, Rfl: 0  •  lactulose (CHRONULAC) 10 GM/15ML solution, Take 45 mL by mouth 3 (Three) Times a Day., Disp: 1892 mL, Rfl: 5  •  ranolazine (RANEXA) 1000 MG 12 hr tablet, Take 1 tablet by mouth 2 (Two) Times a Day for 30 days., Disp: 60 tablet, Rfl: 0  •  spironolactone (ALDACTONE) 50 MG tablet, Take 2 tablets by mouth Daily for 30 days. (Patient taking differently: Take 100 mg by mouth 3 (Three) Times a Day.), Disp: , Rfl:   •  traZODone (DESYREL) 50 MG tablet, Take 1 tablet by mouth Every Night for 30 days., Disp: 30 tablet, Rfl: 0    ALLERGIES  Allergies   Allergen Reactions   • Contrast Dye Anaphylaxis     6/18 Pt coded after receiving contrast for a CT scan. Was premedicated. Was having an MI at the same time. Immediately underwent heart cath with contrast without additional allergic reaction  7/15 Pt premedicated with prednisone/Benadryl for heart cath. Still with anaphylactic-like reaction with drop in BP and hypoxia. Treated 95% stenosis with minimal dye and supported with epinephrine, solumedrol, NRB   • Nsaids GI Bleeding   • Tylenol [Acetaminophen] Other (See Comments)     CIRRHOSIS       FAMILY HISTORY:  Family History   Problem Relation Age of Onset   • Diabetes type II Mother    • Heart disease Father    • Colon cancer Neg Hx    • Colon polyps Neg Hx        SOCIAL HISTORY  Social History     Socioeconomic History   • Marital status:      Spouse name: Not on file   • Number of children: Not on file   • Years of education: Not on file   • Highest education level: Not on file   Tobacco Use   • Smoking status: Former Smoker     Packs/day: 0.50     Types: Electronic Cigarette, Cigarettes     Quit date: 4/29/2020     Years  "since quittin.3   • Smokeless tobacco: Never Used   • Tobacco comment:  ppd    Substance and Sexual Activity   • Alcohol use: Not Currently   • Drug use: No   • Sexual activity: Defer       REVIEW OF SYSTEMS  Review of Systems   Constitutional: Negative for activity change, appetite change, chills, diaphoresis, fatigue, fever, unexpected weight gain and unexpected weight loss.   HENT: Negative for trouble swallowing and voice change.    Gastrointestinal: Negative for abdominal distention, abdominal pain, anal bleeding, blood in stool, constipation, diarrhea, nausea, rectal pain, vomiting, GERD and indigestion.         PHYSICAL EXAM   /82   Temp 97.7 °F (36.5 °C)   Ht 158.8 cm (62.5\")   Wt 91.2 kg (201 lb)   BMI 36.18 kg/m²   Physical Exam  Constitutional:       General: She is not in acute distress.     Appearance: She is well-developed.   HENT:      Head: Normocephalic and atraumatic.      Nose: Nose normal.   Eyes:      Conjunctiva/sclera: Conjunctivae normal.      Pupils: Pupils are equal, round, and reactive to light.   Pulmonary:      Effort: Pulmonary effort is normal.   Abdominal:      General: Bowel sounds are normal. There is no distension.      Palpations: Abdomen is soft.      Tenderness: There is no abdominal tenderness.   Neurological:      Mental Status: She is alert and oriented to person, place, and time.   Psychiatric:         Behavior: Behavior normal.         Judgment: Judgment normal.       Results Review:  INDICATION:   Generalized abdominal pain history of cirrhosis and pancreatitis. Postcholecystectomy and appendectomy.     TECHNIQUE:   CT of the abdomen and pelvis during intravenous contrast. contrast dose recorded in the patient's chart. Coronal and sagittal reconstructions were obtained.  Radiation dose reduction techniques included automated exposure control or exposure modulation  based on body size. Radiation audit for number of CT and nuclear cardiology exams performed " in the last year: 2.       COMPARISON:   Abdomen pelvis CT from 5/30/2021.     FINDINGS:  Lung bases: See accompanying CT chest      Abdomen: The contrast bolus is suboptimal.     The patient is postcholecystectomy. No focal liver lesion is appreciated. There is some enlargement of the left lobe and caudate lobe consistent with provided history of cirrhosis. There is dilatation of the common bile duct which is presumably  postoperative but please exclude any clinical concern for recurrent biliary obstruction given pancreatitis history. There is no CT evidence for pancreatitis at this time. The adrenal glands and spleen are unremarkable. There is no hydronephrosis or focal  renal mass is suspected.     There is dehiscence of the midline anterior abdominal wall. There our shotty nonspecific retroperitoneal lymph nodes not appreciably changed from previous. There are a few prominent loops of small bowel in the right lower quadrant but there is no  convincing evidence for bowel obstruction. There is no free air. Patient is post appendectomy by history.     Pelvis: There are bilateral hip arthroplasties that results in a large amount of metal artifact obscuring structures in the pelvis. Pelvis is grossly unremarkable. There is a transitional lumbosacral vertebral body.     IMPRESSION:     1. Subtle morphologic changes of liver consistent with provided diagnosis of cirrhosis. There is no evidence for portal hypertension or ascites. No focal liver lesion is seen.  2. Redemonstrated is nonspecific shotty retroperitoneal lymphadenopathy. This is stable There is no analy CT evidence for acute pancreatitis.  3. Post cholecystectomy with biliary ductal dilatation likely postoperative. Please exclude any clinical concern for recurrent biliary obstruction given history.  4. There are a few prominent small bowel loops in the right lower quadrant but there is nothing to suggest bowel obstruction. The patient is post  appendectomy.     Signer Name: Imelda Ferrer MD   Signed: 6/18/2021 9:07 PM    ASSESSMENT / PLAN  1. Cirrhosis, decompensated by ascites and hepatic encephalopathy  -cirrhosis secondary to EtOH and PBC  Plan:  -CBC, CMP, PT/INR  -Hepatocellular carcinoma surveillance-imaging due next in December 2021  -High-protein diet  -Avoid alcohol, avoid NSAIDs    2. Hepatic encephalopathy  Plan:  -Lactulose 2 to 3 times a day. Titrate to 2-3 soft BM/day-she is currently only having 1 bowel movement daily taking 10 g 3 times a day.  Suggested an increase to 20 g in the morning then 10 g in the afternoon and evening.  Increase dose as necessary to yield 2-3 soft bowel movements daily.  -Xifaxan 550 mg twice daily    3.  Ascites  Plan:  -Low sodium diet 2 gm/day  -Decrease Bumex to 4 mg daily (secondary to elevated creatinine)  -Spironolactone 300 mg daily  -BMP in 4 days    4.  Primary biliary cholangitis  Plan:  -Ursodiol 600 mg twice daily      Return in about 2 months (around 10/24/2021).    I discussed the patients findings and my recommendations with patient    ISASC Garcia

## 2021-09-03 ENCOUNTER — NURSE TRIAGE (OUTPATIENT)
Dept: CALL CENTER | Facility: HOSPITAL | Age: 46
End: 2021-09-03

## 2021-09-03 ENCOUNTER — HOSPITAL ENCOUNTER (INPATIENT)
Facility: HOSPITAL | Age: 46
LOS: 2 days | Discharge: HOME | End: 2021-09-06
Attending: EMERGENCY MEDICINE | Admitting: INTERNAL MEDICINE

## 2021-09-03 ENCOUNTER — APPOINTMENT (OUTPATIENT)
Dept: GENERAL RADIOLOGY | Facility: HOSPITAL | Age: 46
End: 2021-09-03

## 2021-09-03 ENCOUNTER — TELEPHONE (OUTPATIENT)
Dept: GASTROENTEROLOGY | Facility: CLINIC | Age: 46
End: 2021-09-03

## 2021-09-03 DIAGNOSIS — R10.10 ACUTE UPPER ABDOMINAL PAIN: ICD-10-CM

## 2021-09-03 DIAGNOSIS — K92.2 ACUTE GI BLEEDING: Primary | ICD-10-CM

## 2021-09-03 DIAGNOSIS — Z87.19 HISTORY OF CIRRHOSIS: ICD-10-CM

## 2021-09-03 DIAGNOSIS — Z87.19 HISTORY OF ESOPHAGEAL VARICES: ICD-10-CM

## 2021-09-03 DIAGNOSIS — F10.20 ALCOHOLISM (HCC): ICD-10-CM

## 2021-09-03 DIAGNOSIS — K70.30 ALCOHOLIC CIRRHOSIS OF LIVER WITHOUT ASCITES (HCC): ICD-10-CM

## 2021-09-03 LAB
ALBUMIN SERPL-MCNC: 4.4 G/DL (ref 3.5–5.2)
ALBUMIN/GLOB SERPL: 1.4 G/DL
ALP SERPL-CCNC: 164 U/L (ref 39–117)
ALT SERPL W P-5'-P-CCNC: 23 U/L (ref 1–33)
ANION GAP SERPL CALCULATED.3IONS-SCNC: 15 MMOL/L (ref 5–15)
AST SERPL-CCNC: 23 U/L (ref 1–32)
BASOPHILS # BLD AUTO: 0.04 10*3/MM3 (ref 0–0.2)
BASOPHILS NFR BLD AUTO: 0.4 % (ref 0–1.5)
BILIRUB SERPL-MCNC: 0.7 MG/DL (ref 0–1.2)
BUN SERPL-MCNC: 10 MG/DL (ref 6–20)
BUN/CREAT SERPL: 10.3 (ref 7–25)
CALCIUM SPEC-SCNC: 9.7 MG/DL (ref 8.6–10.5)
CHLORIDE SERPL-SCNC: 100 MMOL/L (ref 98–107)
CO2 SERPL-SCNC: 20 MMOL/L (ref 22–29)
CREAT SERPL-MCNC: 0.97 MG/DL (ref 0.57–1)
DEPRECATED RDW RBC AUTO: 43.6 FL (ref 37–54)
EOSINOPHIL # BLD AUTO: 0.11 10*3/MM3 (ref 0–0.4)
EOSINOPHIL NFR BLD AUTO: 1 % (ref 0.3–6.2)
ERYTHROCYTE [DISTWIDTH] IN BLOOD BY AUTOMATED COUNT: 13.5 % (ref 12.3–15.4)
GFR SERPL CREATININE-BSD FRML MDRD: 62 ML/MIN/1.73
GLOBULIN UR ELPH-MCNC: 3.1 GM/DL
GLUCOSE SERPL-MCNC: 117 MG/DL (ref 65–99)
HCT VFR BLD AUTO: 46.4 % (ref 34–46.6)
HGB BLD-MCNC: 16 G/DL (ref 12–15.9)
HOLD SPECIMEN: NORMAL
IMM GRANULOCYTES # BLD AUTO: 0.05 10*3/MM3 (ref 0–0.05)
IMM GRANULOCYTES NFR BLD AUTO: 0.5 % (ref 0–0.5)
LIPASE SERPL-CCNC: 14 U/L (ref 13–60)
LYMPHOCYTES # BLD AUTO: 2.54 10*3/MM3 (ref 0.7–3.1)
LYMPHOCYTES NFR BLD AUTO: 23.7 % (ref 19.6–45.3)
MCH RBC QN AUTO: 30.2 PG (ref 26.6–33)
MCHC RBC AUTO-ENTMCNC: 34.5 G/DL (ref 31.5–35.7)
MCV RBC AUTO: 87.5 FL (ref 79–97)
MONOCYTES # BLD AUTO: 1.27 10*3/MM3 (ref 0.1–0.9)
MONOCYTES NFR BLD AUTO: 11.8 % (ref 5–12)
NEUTROPHILS NFR BLD AUTO: 6.71 10*3/MM3 (ref 1.7–7)
NEUTROPHILS NFR BLD AUTO: 62.6 % (ref 42.7–76)
NRBC BLD AUTO-RTO: 0 /100 WBC (ref 0–0.2)
NT-PROBNP SERPL-MCNC: 136.9 PG/ML (ref 0–450)
PLATELET # BLD AUTO: 177 10*3/MM3 (ref 140–450)
PMV BLD AUTO: 10.6 FL (ref 6–12)
POTASSIUM SERPL-SCNC: 3.5 MMOL/L (ref 3.5–5.2)
PROT SERPL-MCNC: 7.5 G/DL (ref 6–8.5)
RBC # BLD AUTO: 5.3 10*6/MM3 (ref 3.77–5.28)
SODIUM SERPL-SCNC: 135 MMOL/L (ref 136–145)
TROPONIN T SERPL-MCNC: <0.01 NG/ML (ref 0–0.03)
WBC # BLD AUTO: 10.72 10*3/MM3 (ref 3.4–10.8)
WHOLE BLOOD HOLD SPECIMEN: NORMAL
WHOLE BLOOD HOLD SPECIMEN: NORMAL

## 2021-09-03 PROCEDURE — 84484 ASSAY OF TROPONIN QUANT: CPT

## 2021-09-03 PROCEDURE — 99284 EMERGENCY DEPT VISIT MOD MDM: CPT

## 2021-09-03 PROCEDURE — 0DJ08ZZ INSPECTION OF UPPER INTESTINAL TRACT, VIA NATURAL OR ARTIFICIAL OPENING ENDOSCOPIC: ICD-10-PCS | Performed by: INTERNAL MEDICINE

## 2021-09-03 PROCEDURE — 71045 X-RAY EXAM CHEST 1 VIEW: CPT

## 2021-09-03 PROCEDURE — 0DJD8ZZ INSPECTION OF LOWER INTESTINAL TRACT, VIA NATURAL OR ARTIFICIAL OPENING ENDOSCOPIC: ICD-10-PCS | Performed by: INTERNAL MEDICINE

## 2021-09-03 PROCEDURE — 93005 ELECTROCARDIOGRAM TRACING: CPT | Performed by: EMERGENCY MEDICINE

## 2021-09-03 PROCEDURE — 85025 COMPLETE CBC W/AUTO DIFF WBC: CPT

## 2021-09-03 PROCEDURE — 93005 ELECTROCARDIOGRAM TRACING: CPT

## 2021-09-03 PROCEDURE — 83880 ASSAY OF NATRIURETIC PEPTIDE: CPT

## 2021-09-03 PROCEDURE — 80053 COMPREHEN METABOLIC PANEL: CPT

## 2021-09-03 PROCEDURE — 83690 ASSAY OF LIPASE: CPT

## 2021-09-03 RX ORDER — ASPIRIN 81 MG/1
324 TABLET, CHEWABLE ORAL ONCE
Status: DISCONTINUED | OUTPATIENT
Start: 2021-09-03 | End: 2021-09-04

## 2021-09-03 RX ORDER — OCTREOTIDE ACETATE 50 UG/ML
50 INJECTION, SOLUTION INTRAVENOUS; SUBCUTANEOUS ONCE
Status: DISCONTINUED | OUTPATIENT
Start: 2021-09-03 | End: 2021-09-03 | Stop reason: CLARIF

## 2021-09-03 RX ORDER — SODIUM CHLORIDE 0.9 % (FLUSH) 0.9 %
10 SYRINGE (ML) INJECTION AS NEEDED
Status: DISCONTINUED | OUTPATIENT
Start: 2021-09-03 | End: 2021-09-04

## 2021-09-03 RX ORDER — PANTOPRAZOLE SODIUM 40 MG/10ML
80 INJECTION, POWDER, LYOPHILIZED, FOR SOLUTION INTRAVENOUS ONCE
Status: COMPLETED | OUTPATIENT
Start: 2021-09-03 | End: 2021-09-04

## 2021-09-03 NOTE — TELEPHONE ENCOUNTER
"    Reason for Disposition  • Known cirrhosis of the liver (or history of liver failure or ascites)    Additional Information  • Negative: Shock suspected (e.g., cold/pale/clammy skin, too weak to stand, low BP, rapid pulse)  • Negative: Difficult to awaken or acting confused (e.g., disoriented, slurred speech)  • Negative: Passed out (i.e., lost consciousness, collapsed and was not responding)  • Negative: [1] Vomiting AND [2] contains red blood or black (\"coffee ground\") material  (Exception: few red streaks in vomit that only happened once)  • Negative: Sounds like a life-threatening emergency to the triager  • Negative: Diarrhea is main symptom  • Negative: Stool color other than brown or tan is main concern  (no bleeding and no melena)  • Negative: SEVERE rectal bleeding (large blood clots; on and off, or constant bleeding)  • Negative: SEVERE dizziness (e.g., unable to stand, requires support to walk, feels like passing out now)  • Negative: [1] MODERATE rectal bleeding (small blood clots, passing blood without stool, or toilet water turns red) AND [2] more than once a day  • Negative: Pale skin (pallor) of new-onset or worsening  • Negative: Black or tarry bowel movements (Exception: chronic-unchanged black-grey bowel movements AND is taking iron pills or Pepto-bismol)  • Negative: [1] Constant abdominal pain AND [2] present > 2 hours  • Negative: Rectal foreign body (i.e., now or within past week;  inserted or swallowed)  • Negative: High-risk adult (e.g., prior surgery on aorta, abdominal aortic aneurysm)  • Negative: Taking Coumadin (warfarin) or other strong blood thinner, or known bleeding disorder (e.g., thrombocytopenia)  • Negative: [1] Colonoscopy AND [2] in past 72 hours    Answer Assessment - Initial Assessment Questions  1. APPEARANCE of BLOOD: \"What color is it?\" \"Is it passed separately, on the surface of the stool, or mixed in with the stool?\"        Bright red  2. AMOUNT: \"How much blood was " "passed?\"      Quite a bit  3. FREQUENCY: \"How many times has blood been passed with the stools?\"       Just this morning  4. ONSET: \"When was the blood first seen in the stools?\" (Days or weeks)      today  5. DIARRHEA: \"Is there also some diarrhea?\" If Yes, ask: \"How many diarrhea stools were passed in past 24 hours?\"       no  6. CONSTIPATION: \"Do you have constipation?\" If Yes, ask: \"How bad is it?\"      no  7. RECURRENT SYMPTOMS: \"Have you had blood in your stools before?\" If Yes, ask: \"When was the last time?\" and \"What happened that time?\"       No but has cirrhosis of the liver and varices  8. BLOOD THINNERS: \"Do you take any blood thinners?\" (e.g., Coumadin/warfarin, Pradaxa/dabigatran, aspirin)      plavix and aspirin  9. OTHER SYMPTOMS: \"Do you have any other symptoms?\"  (e.g., abdominal pain, vomiting, dizziness, fever)      none  10. PREGNANCY: \"Is there any chance you are pregnant?\" \"When was your last menstrual period?\"        no    Protocols used: RECTAL BLEEDING-ADULT-AH      "

## 2021-09-03 NOTE — TELEPHONE ENCOUNTER
Ms. Guzman called and reported one episode of Bright Red Blood floating in the toilet this morning. Due to her health history our recommendation is to go to the Urgent Treatment to be evaluated or to the nearest ER.  She voiced understanding.

## 2021-09-03 NOTE — TELEPHONE ENCOUNTER
"This is the second call to the call center today and she has also called the office and was told to go to the ER for her rectal bleeding.  When she called she stated that she was waiting on a call back from the office, office note states that she was told to go to  or the ER.  Not sure if she will go as this is her third call.  She also states that her hands are drawing like there potassium is low.  Reason for Disposition  • Known cirrhosis of the liver (or history of liver failure or ascites)    Additional Information  • Negative: Shock suspected (e.g., cold/pale/clammy skin, too weak to stand, low BP, rapid pulse)  • Negative: Difficult to awaken or acting confused (e.g., disoriented, slurred speech)  • Negative: Passed out (i.e., lost consciousness, collapsed and was not responding)  • Negative: [1] Vomiting AND [2] contains red blood or black (\"coffee ground\") material  (Exception: few red streaks in vomit that only happened once)  • Negative: Sounds like a life-threatening emergency to the triager  • Negative: Diarrhea is main symptom  • Negative: Stool color other than brown or tan is main concern  (no bleeding and no melena)  • Negative: SEVERE rectal bleeding (large blood clots; on and off, or constant bleeding)  • Negative: SEVERE dizziness (e.g., unable to stand, requires support to walk, feels like passing out now)  • Negative: [1] MODERATE rectal bleeding (small blood clots, passing blood without stool, or toilet water turns red) AND [2] more than once a day  • Negative: Pale skin (pallor) of new-onset or worsening  • Negative: Black or tarry bowel movements (Exception: chronic-unchanged black-grey bowel movements AND is taking iron pills or Pepto-bismol)  • Negative: [1] Constant abdominal pain AND [2] present > 2 hours  • Negative: Rectal foreign body (i.e., now or within past week;  inserted or swallowed)  • Negative: High-risk adult (e.g., prior surgery on aorta, abdominal aortic aneurysm)  • " "Negative: Taking Coumadin (warfarin) or other strong blood thinner, or known bleeding disorder (e.g., thrombocytopenia)    Answer Assessment - Initial Assessment Questions  1. APPEARANCE of BLOOD: \"What color is it?\" \"Is it passed separately, on the surface of the stool, or mixed in with the stool?\"       Bright red in water, none noted on stool  2. AMOUNT: \"How much blood was passed?\"       Hard to tell  3. FREQUENCY: \"How many times has blood been passed with the stools?\"       twice  4. ONSET: \"When was the blood first seen in the stools?\" (Days or weeks)       This morning  5. DIARRHEA: \"Is there also some diarrhea?\" If Yes, ask: \"How many diarrhea stools were passed in past 24 hours?\"       no  6. CONSTIPATION: \"Do you have constipation?\" If Yes, ask: \"How bad is it?\"      no  7. RECURRENT SYMPTOMS: \"Have you had blood in your stools before?\" If Yes, ask: \"When was the last time?\" and \"What happened that time?\"       no  8. BLOOD THINNERS: \"Do you take any blood thinners?\" (e.g., Coumadin/warfarin, Pradaxa/dabigatran, aspirin)      no  9. OTHER SYMPTOMS: \"Do you have any other symptoms?\"  (e.g., abdominal pain, vomiting, dizziness, fever)      Hands are drawing like potassium is low  10. PREGNANCY: \"Is there any chance you are pregnant?\" \"When was your last menstrual period?\"        na    Protocols used: RECTAL BLEEDING-ADULT-AH    "

## 2021-09-04 ENCOUNTER — APPOINTMENT (OUTPATIENT)
Dept: CT IMAGING | Facility: HOSPITAL | Age: 46
End: 2021-09-04

## 2021-09-04 PROBLEM — F32.A ANXIETY AND DEPRESSION: Status: ACTIVE | Noted: 2021-09-04

## 2021-09-04 PROBLEM — F41.9 ANXIETY AND DEPRESSION: Status: ACTIVE | Noted: 2021-09-04

## 2021-09-04 PROBLEM — K86.1 CHRONIC PANCREATITIS: Status: ACTIVE | Noted: 2021-09-04

## 2021-09-04 PROBLEM — F10.20 ALCOHOLISM (HCC): Status: ACTIVE | Noted: 2021-09-04

## 2021-09-04 LAB
ABO GROUP BLD: NORMAL
ALBUMIN SERPL-MCNC: 4.1 G/DL (ref 3.5–5.2)
ALBUMIN/GLOB SERPL: 1.5 G/DL
ALP SERPL-CCNC: 155 U/L (ref 39–117)
ALT SERPL W P-5'-P-CCNC: 27 U/L (ref 1–33)
ANION GAP SERPL CALCULATED.3IONS-SCNC: 14 MMOL/L (ref 5–15)
AST SERPL-CCNC: 38 U/L (ref 1–32)
BILIRUB SERPL-MCNC: 1.6 MG/DL (ref 0–1.2)
BLD GP AB SCN SERPL QL: NEGATIVE
BUN SERPL-MCNC: 11 MG/DL (ref 6–20)
BUN/CREAT SERPL: 10.7 (ref 7–25)
CALCIUM SPEC-SCNC: 9.3 MG/DL (ref 8.6–10.5)
CHLORIDE SERPL-SCNC: 98 MMOL/L (ref 98–107)
CO2 SERPL-SCNC: 22 MMOL/L (ref 22–29)
CREAT SERPL-MCNC: 1.03 MG/DL (ref 0.57–1)
GFR SERPL CREATININE-BSD FRML MDRD: 58 ML/MIN/1.73
GLOBULIN UR ELPH-MCNC: 2.8 GM/DL
GLUCOSE SERPL-MCNC: 117 MG/DL (ref 65–99)
HCT VFR BLD AUTO: 40.8 % (ref 34–46.6)
HCT VFR BLD AUTO: 41.6 % (ref 34–46.6)
HCT VFR BLD AUTO: 41.6 % (ref 34–46.6)
HCT VFR BLD AUTO: 42 % (ref 34–46.6)
HCT VFR BLD AUTO: 43.4 % (ref 34–46.6)
HGB BLD-MCNC: 14 G/DL (ref 12–15.9)
HGB BLD-MCNC: 14.2 G/DL (ref 12–15.9)
HGB BLD-MCNC: 14.3 G/DL (ref 12–15.9)
HGB BLD-MCNC: 14.3 G/DL (ref 12–15.9)
HGB BLD-MCNC: 15.1 G/DL (ref 12–15.9)
POTASSIUM SERPL-SCNC: 4.1 MMOL/L (ref 3.5–5.2)
PROT SERPL-MCNC: 6.9 G/DL (ref 6–8.5)
QT INTERVAL: 338 MS
QTC INTERVAL: 451 MS
RH BLD: POSITIVE
SARS-COV-2 RNA RESP QL NAA+PROBE: NOT DETECTED
SODIUM SERPL-SCNC: 134 MMOL/L (ref 136–145)
T&S EXPIRATION DATE: NORMAL
TROPONIN T SERPL-MCNC: <0.01 NG/ML (ref 0–0.03)

## 2021-09-04 PROCEDURE — 25010000002 CEFTRIAXONE PER 250 MG: Performed by: INTERNAL MEDICINE

## 2021-09-04 PROCEDURE — 25010000002 HYDROMORPHONE PER 4 MG: Performed by: INTERNAL MEDICINE

## 2021-09-04 PROCEDURE — 25010000002 MORPHINE PER 10 MG: Performed by: INTERNAL MEDICINE

## 2021-09-04 PROCEDURE — 74176 CT ABD & PELVIS W/O CONTRAST: CPT

## 2021-09-04 PROCEDURE — 86900 BLOOD TYPING SEROLOGIC ABO: CPT | Performed by: INTERNAL MEDICINE

## 2021-09-04 PROCEDURE — 25010000002 MORPHINE PER 10 MG: Performed by: EMERGENCY MEDICINE

## 2021-09-04 PROCEDURE — 85014 HEMATOCRIT: CPT | Performed by: INTERNAL MEDICINE

## 2021-09-04 PROCEDURE — 25010000002 ONDANSETRON PER 1 MG: Performed by: EMERGENCY MEDICINE

## 2021-09-04 PROCEDURE — 25010000002 OCTREOTIDE PER 25 MCG: Performed by: EMERGENCY MEDICINE

## 2021-09-04 PROCEDURE — 25010000002 CEFTRIAXONE PER 250 MG: Performed by: EMERGENCY MEDICINE

## 2021-09-04 PROCEDURE — U0003 INFECTIOUS AGENT DETECTION BY NUCLEIC ACID (DNA OR RNA); SEVERE ACUTE RESPIRATORY SYNDROME CORONAVIRUS 2 (SARS-COV-2) (CORONAVIRUS DISEASE [COVID-19]), AMPLIFIED PROBE TECHNIQUE, MAKING USE OF HIGH THROUGHPUT TECHNOLOGIES AS DESCRIBED BY CMS-2020-01-R: HCPCS | Performed by: INTERNAL MEDICINE

## 2021-09-04 PROCEDURE — 99223 1ST HOSP IP/OBS HIGH 75: CPT | Performed by: INTERNAL MEDICINE

## 2021-09-04 PROCEDURE — 84484 ASSAY OF TROPONIN QUANT: CPT | Performed by: EMERGENCY MEDICINE

## 2021-09-04 PROCEDURE — 86901 BLOOD TYPING SEROLOGIC RH(D): CPT | Performed by: INTERNAL MEDICINE

## 2021-09-04 PROCEDURE — 80053 COMPREHEN METABOLIC PANEL: CPT | Performed by: PHYSICIAN ASSISTANT

## 2021-09-04 PROCEDURE — 86850 RBC ANTIBODY SCREEN: CPT | Performed by: INTERNAL MEDICINE

## 2021-09-04 PROCEDURE — 99223 1ST HOSP IP/OBS HIGH 75: CPT | Performed by: NURSE PRACTITIONER

## 2021-09-04 PROCEDURE — 85018 HEMOGLOBIN: CPT | Performed by: INTERNAL MEDICINE

## 2021-09-04 RX ORDER — OLANZAPINE 5 MG/1
10 TABLET ORAL NIGHTLY
Status: DISCONTINUED | OUTPATIENT
Start: 2021-09-04 | End: 2021-09-06 | Stop reason: HOSPADM

## 2021-09-04 RX ORDER — RANOLAZINE 500 MG/1
1000 TABLET, EXTENDED RELEASE ORAL 2 TIMES DAILY
Status: DISCONTINUED | OUTPATIENT
Start: 2021-09-04 | End: 2021-09-06 | Stop reason: HOSPADM

## 2021-09-04 RX ORDER — SPIRONOLACTONE 25 MG/1
100 TABLET ORAL 3 TIMES DAILY
Status: DISCONTINUED | OUTPATIENT
Start: 2021-09-04 | End: 2021-09-06 | Stop reason: HOSPADM

## 2021-09-04 RX ORDER — ONDANSETRON 2 MG/ML
4 INJECTION INTRAMUSCULAR; INTRAVENOUS EVERY 6 HOURS PRN
Status: DISCONTINUED | OUTPATIENT
Start: 2021-09-04 | End: 2021-09-06 | Stop reason: HOSPADM

## 2021-09-04 RX ORDER — CHOLECALCIFEROL (VITAMIN D3) 125 MCG
5 CAPSULE ORAL NIGHTLY PRN
Status: DISCONTINUED | OUTPATIENT
Start: 2021-09-04 | End: 2021-09-06 | Stop reason: HOSPADM

## 2021-09-04 RX ORDER — LACTULOSE 10 G/15ML
30 SOLUTION ORAL 3 TIMES DAILY
Status: DISCONTINUED | OUTPATIENT
Start: 2021-09-04 | End: 2021-09-06 | Stop reason: HOSPADM

## 2021-09-04 RX ORDER — BUMETANIDE 2 MG/1
6 TABLET ORAL DAILY
Status: DISCONTINUED | OUTPATIENT
Start: 2021-09-04 | End: 2021-09-06 | Stop reason: HOSPADM

## 2021-09-04 RX ORDER — TAMSULOSIN HYDROCHLORIDE 0.4 MG/1
0.4 CAPSULE ORAL DAILY
Status: DISCONTINUED | OUTPATIENT
Start: 2021-09-04 | End: 2021-09-06 | Stop reason: HOSPADM

## 2021-09-04 RX ORDER — SODIUM CHLORIDE 0.9 % (FLUSH) 0.9 %
10 SYRINGE (ML) INJECTION EVERY 12 HOURS SCHEDULED
Status: DISCONTINUED | OUTPATIENT
Start: 2021-09-04 | End: 2021-09-06 | Stop reason: HOSPADM

## 2021-09-04 RX ORDER — CLOPIDOGREL BISULFATE 75 MG/1
75 TABLET ORAL DAILY
Status: DISCONTINUED | OUTPATIENT
Start: 2021-09-04 | End: 2021-09-06 | Stop reason: HOSPADM

## 2021-09-04 RX ORDER — SODIUM CHLORIDE, SODIUM LACTATE, POTASSIUM CHLORIDE, CALCIUM CHLORIDE 600; 310; 30; 20 MG/100ML; MG/100ML; MG/100ML; MG/100ML
50 INJECTION, SOLUTION INTRAVENOUS CONTINUOUS
Status: DISCONTINUED | OUTPATIENT
Start: 2021-09-04 | End: 2021-09-06 | Stop reason: HOSPADM

## 2021-09-04 RX ORDER — LIDOCAINE 50 MG/G
1 PATCH TOPICAL
Status: DISCONTINUED | OUTPATIENT
Start: 2021-09-04 | End: 2021-09-06 | Stop reason: HOSPADM

## 2021-09-04 RX ORDER — ROSUVASTATIN CALCIUM 20 MG/1
20 TABLET, COATED ORAL NIGHTLY
Status: DISCONTINUED | OUTPATIENT
Start: 2021-09-04 | End: 2021-09-06 | Stop reason: HOSPADM

## 2021-09-04 RX ORDER — ONDANSETRON 2 MG/ML
4 INJECTION INTRAMUSCULAR; INTRAVENOUS ONCE
Status: COMPLETED | OUTPATIENT
Start: 2021-09-04 | End: 2021-09-04

## 2021-09-04 RX ORDER — ASPIRIN 81 MG/1
81 TABLET, CHEWABLE ORAL DAILY
Status: DISCONTINUED | OUTPATIENT
Start: 2021-09-04 | End: 2021-09-06 | Stop reason: HOSPADM

## 2021-09-04 RX ORDER — TRAZODONE HYDROCHLORIDE 50 MG/1
50 TABLET ORAL NIGHTLY
Status: DISCONTINUED | OUTPATIENT
Start: 2021-09-04 | End: 2021-09-06 | Stop reason: HOSPADM

## 2021-09-04 RX ORDER — SODIUM CHLORIDE 0.9 % (FLUSH) 0.9 %
10 SYRINGE (ML) INJECTION AS NEEDED
Status: DISCONTINUED | OUTPATIENT
Start: 2021-09-04 | End: 2021-09-06 | Stop reason: HOSPADM

## 2021-09-04 RX ORDER — MORPHINE SULFATE 4 MG/ML
3 INJECTION, SOLUTION INTRAMUSCULAR; INTRAVENOUS
Status: DISCONTINUED | OUTPATIENT
Start: 2021-09-04 | End: 2021-09-04

## 2021-09-04 RX ORDER — PANTOPRAZOLE SODIUM 40 MG/10ML
40 INJECTION, POWDER, LYOPHILIZED, FOR SOLUTION INTRAVENOUS EVERY 12 HOURS
Status: DISCONTINUED | OUTPATIENT
Start: 2021-09-04 | End: 2021-09-06 | Stop reason: HOSPADM

## 2021-09-04 RX ORDER — PEG-3350, SODIUM SULFATE, SODIUM CHLORIDE, POTASSIUM CHLORIDE, SODIUM ASCORBATE AND ASCORBIC ACID 7.5-2.691G
1000 KIT ORAL
Status: COMPLETED | OUTPATIENT
Start: 2021-09-05 | End: 2021-09-05

## 2021-09-04 RX ORDER — MORPHINE SULFATE 4 MG/ML
4 INJECTION, SOLUTION INTRAMUSCULAR; INTRAVENOUS ONCE
Status: COMPLETED | OUTPATIENT
Start: 2021-09-04 | End: 2021-09-04

## 2021-09-04 RX ORDER — PEG-3350, SODIUM SULFATE, SODIUM CHLORIDE, POTASSIUM CHLORIDE, SODIUM ASCORBATE AND ASCORBIC ACID 7.5-2.691G
1000 KIT ORAL
Status: COMPLETED | OUTPATIENT
Start: 2021-09-04 | End: 2021-09-04

## 2021-09-04 RX ORDER — IPRATROPIUM BROMIDE AND ALBUTEROL SULFATE 2.5; .5 MG/3ML; MG/3ML
3 SOLUTION RESPIRATORY (INHALATION) EVERY 6 HOURS PRN
Status: DISCONTINUED | OUTPATIENT
Start: 2021-09-04 | End: 2021-09-06 | Stop reason: HOSPADM

## 2021-09-04 RX ORDER — HYDROMORPHONE HYDROCHLORIDE 1 MG/ML
0.5 INJECTION, SOLUTION INTRAMUSCULAR; INTRAVENOUS; SUBCUTANEOUS
Status: DISCONTINUED | OUTPATIENT
Start: 2021-09-04 | End: 2021-09-05

## 2021-09-04 RX ORDER — FLUOXETINE HYDROCHLORIDE 20 MG/1
40 CAPSULE ORAL DAILY
Status: DISCONTINUED | OUTPATIENT
Start: 2021-09-04 | End: 2021-09-06 | Stop reason: HOSPADM

## 2021-09-04 RX ORDER — URSODIOL 300 MG/1
600 CAPSULE ORAL 2 TIMES DAILY
Status: DISCONTINUED | OUTPATIENT
Start: 2021-09-04 | End: 2021-09-06 | Stop reason: HOSPADM

## 2021-09-04 RX ADMIN — URSODIOL 600 MG: 300 CAPSULE ORAL at 09:09

## 2021-09-04 RX ADMIN — MORPHINE SULFATE 3 MG: 4 INJECTION, SOLUTION INTRAMUSCULAR; INTRAVENOUS at 06:10

## 2021-09-04 RX ADMIN — SODIUM CHLORIDE, POTASSIUM CHLORIDE, SODIUM LACTATE AND CALCIUM CHLORIDE 50 ML/HR: 600; 310; 30; 20 INJECTION, SOLUTION INTRAVENOUS at 15:34

## 2021-09-04 RX ADMIN — SPIRONOLACTONE 100 MG: 25 TABLET ORAL at 09:16

## 2021-09-04 RX ADMIN — POLYETHYLENE GLYCOL 3350, SODIUM SULFATE, SODIUM CHLORIDE, POTASSIUM CHLORIDE, ASCORBIC ACID, SODIUM ASCORBATE 1000 ML: KIT at 18:37

## 2021-09-04 RX ADMIN — ROSUVASTATIN CALCIUM 20 MG: 20 TABLET, COATED ORAL at 20:18

## 2021-09-04 RX ADMIN — MORPHINE SULFATE 4 MG: 4 INJECTION, SOLUTION INTRAMUSCULAR; INTRAVENOUS at 00:58

## 2021-09-04 RX ADMIN — HYDROMORPHONE HYDROCHLORIDE 0.5 MG: 1 INJECTION, SOLUTION INTRAMUSCULAR; INTRAVENOUS; SUBCUTANEOUS at 15:32

## 2021-09-04 RX ADMIN — SPIRONOLACTONE 100 MG: 25 TABLET ORAL at 20:18

## 2021-09-04 RX ADMIN — TRAZODONE HYDROCHLORIDE 50 MG: 50 TABLET ORAL at 20:18

## 2021-09-04 RX ADMIN — RANOLAZINE 1000 MG: 500 TABLET, EXTENDED RELEASE ORAL at 09:09

## 2021-09-04 RX ADMIN — OCTREOTIDE ACETATE 50 MCG/HR: 500 INJECTION, SOLUTION INTRAVENOUS; SUBCUTANEOUS at 00:20

## 2021-09-04 RX ADMIN — LACTULOSE 30 G: 20 SOLUTION ORAL at 15:32

## 2021-09-04 RX ADMIN — HYDROMORPHONE HYDROCHLORIDE 0.5 MG: 1 INJECTION, SOLUTION INTRAMUSCULAR; INTRAVENOUS; SUBCUTANEOUS at 17:38

## 2021-09-04 RX ADMIN — SODIUM CHLORIDE, PRESERVATIVE FREE 10 ML: 5 INJECTION INTRAVENOUS at 09:15

## 2021-09-04 RX ADMIN — OCTREOTIDE ACETATE 50 MCG/HR: 500 INJECTION, SOLUTION INTRAVENOUS; SUBCUTANEOUS at 00:21

## 2021-09-04 RX ADMIN — OCTREOTIDE ACETATE 50 MCG/HR: 500 INJECTION, SOLUTION INTRAVENOUS; SUBCUTANEOUS at 11:25

## 2021-09-04 RX ADMIN — SODIUM CHLORIDE, PRESERVATIVE FREE 10 ML: 5 INJECTION INTRAVENOUS at 20:18

## 2021-09-04 RX ADMIN — HYDROMORPHONE HYDROCHLORIDE 0.5 MG: 1 INJECTION, SOLUTION INTRAMUSCULAR; INTRAVENOUS; SUBCUTANEOUS at 11:25

## 2021-09-04 RX ADMIN — TAMSULOSIN HYDROCHLORIDE 0.4 MG: 0.4 CAPSULE ORAL at 09:10

## 2021-09-04 RX ADMIN — MORPHINE SULFATE 3 MG: 4 INJECTION, SOLUTION INTRAMUSCULAR; INTRAVENOUS at 08:55

## 2021-09-04 RX ADMIN — PANTOPRAZOLE SODIUM 80 MG: 40 INJECTION, POWDER, FOR SOLUTION INTRAVENOUS at 00:20

## 2021-09-04 RX ADMIN — SODIUM CHLORIDE 1 G: 900 INJECTION INTRAVENOUS at 20:20

## 2021-09-04 RX ADMIN — HYDROMORPHONE HYDROCHLORIDE 0.5 MG: 1 INJECTION, SOLUTION INTRAMUSCULAR; INTRAVENOUS; SUBCUTANEOUS at 22:45

## 2021-09-04 RX ADMIN — HYDROMORPHONE HYDROCHLORIDE 0.5 MG: 1 INJECTION, SOLUTION INTRAMUSCULAR; INTRAVENOUS; SUBCUTANEOUS at 13:45

## 2021-09-04 RX ADMIN — FLUOXETINE HYDROCHLORIDE 40 MG: 20 CAPSULE ORAL at 09:10

## 2021-09-04 RX ADMIN — SODIUM CHLORIDE 1 G: 900 INJECTION INTRAVENOUS at 00:44

## 2021-09-04 RX ADMIN — MORPHINE SULFATE 3 MG: 4 INJECTION, SOLUTION INTRAMUSCULAR; INTRAVENOUS at 03:00

## 2021-09-04 RX ADMIN — ONDANSETRON 4 MG: 2 INJECTION INTRAMUSCULAR; INTRAVENOUS at 00:58

## 2021-09-04 RX ADMIN — OLANZAPINE 10 MG: 5 TABLET, FILM COATED ORAL at 20:18

## 2021-09-04 RX ADMIN — URSODIOL 600 MG: 300 CAPSULE ORAL at 20:19

## 2021-09-04 RX ADMIN — LIDOCAINE 1 PATCH: 50 PATCH CUTANEOUS at 17:38

## 2021-09-04 RX ADMIN — LACTULOSE 30 G: 20 SOLUTION ORAL at 09:10

## 2021-09-04 RX ADMIN — RANOLAZINE 1000 MG: 500 TABLET, EXTENDED RELEASE ORAL at 20:19

## 2021-09-04 RX ADMIN — SPIRONOLACTONE 100 MG: 25 TABLET ORAL at 15:32

## 2021-09-04 RX ADMIN — HYDROMORPHONE HYDROCHLORIDE 0.5 MG: 1 INJECTION, SOLUTION INTRAMUSCULAR; INTRAVENOUS; SUBCUTANEOUS at 20:18

## 2021-09-04 RX ADMIN — PANTOPRAZOLE SODIUM 40 MG: 40 INJECTION, POWDER, FOR SOLUTION INTRAVENOUS at 11:24

## 2021-09-04 NOTE — PLAN OF CARE
Problem: Adult Inpatient Plan of Care  Goal: Plan of Care Review  Flowsheets (Taken 9/4/2021 0530)  Progress: no change  Plan of Care Reviewed With: patient  Outcome Summary: Pt admitted to floor. VSS. RA. C/o severe abdominal pain, treated with prn morphine. IV abx adminsitered. NPO for GI consult. Plan of care discussed with pt. Verbalized understanding.  Goal: Absence of Hospital-Acquired Illness or Injury  Intervention: Identify and Manage Fall Risk  Recent Flowsheet Documentation  Taken 9/4/2021 0400 by Rohan Clark RN  Safety Promotion/Fall Prevention:   activity supervised   assistive device/personal items within reach   clutter free environment maintained  Intervention: Prevent Skin Injury  Recent Flowsheet Documentation  Taken 9/4/2021 0400 by Rohan Clark RN  Body Position:   position changed independently   neutral body alignment  Intervention: Prevent and Manage VTE (venous thromboembolism) Risk  Recent Flowsheet Documentation  Taken 9/4/2021 0400 by Rohan Clark RN  VTE Prevention/Management:   bilateral   dorsiflexion/plantar flexion performed  Intervention: Prevent Infection  Recent Flowsheet Documentation  Taken 9/4/2021 0400 by Rohan Clark RN  Infection Prevention:   environmental surveillance performed   rest/sleep promoted   single patient room provided  Goal: Optimal Comfort and Wellbeing  Intervention: Provide Person-Centered Care  Recent Flowsheet Documentation  Taken 9/4/2021 0400 by Rohan Clark RN  Trust Relationship/Rapport:   care explained   choices provided   questions encouraged   questions answered  Goal: Readiness for Transition of Care  Intervention: Mutually Develop Transition Plan  Recent Flowsheet Documentation  Taken 9/4/2021 0312 by Rohan Clark RN  Transportation Anticipated: family or friend will provide  Patient/Family Anticipated Services at Transition: none  Patient/Family Anticipates Transition to: home  Taken 9/4/2021 0305 by Eduardo  Rohan VELA RN  Equipment Currently Used at Home:   cane, straight   walker, rolling   nebulizer     Problem: Pain Acute  Goal: Optimal Pain Control  Intervention: Develop Pain Management Plan  Recent Flowsheet Documentation  Taken 9/4/2021 0400 by Rohan Clark RN  Pain Management Interventions:   pain management plan reviewed with patient/caregiver   see MAR  Intervention: Optimize Psychosocial Wellbeing  Recent Flowsheet Documentation  Taken 9/4/2021 0400 by Rohan Clark RN  Diversional Activities: television   Goal Outcome Evaluation:  Plan of Care Reviewed With: patient        Progress: no change  Outcome Summary: Pt admitted to floor. VSS. RA. C/o severe abdominal pain, treated with prn morphine. IV abx adminsitered. NPO for GI consult. Plan of care discussed with pt. Verbalized understanding.

## 2021-09-04 NOTE — PROGRESS NOTES
[facility] Medicine Services  ADMISSION FOLLOW-UP NOTE          Patient admitted after midnight, H&P by my partner performed earlier on today's date reviewed.  Interim findings, labs, and charting also reviewed.        The [facility] Hospital Problem List has been managed and updated to include any new diagnoses:  Active Hospital Problems    Diagnosis  POA   • **Possible GI bleed [K92.2]  Yes   • Chronic pancreatitis (CMS/HCC) [K86.1]  Yes   • Anxiety and depression [F41.9, F32.9]  Yes   • Alcoholism (CMS/HCC) [F10.20]  Yes   • CAD (coronary artery disease) [I25.10]  Yes   • History of esophageal varices [Z87.19]  Not Applicable   • GERD (gastroesophageal reflux disease) [K21.9]  Yes   • Alcoholic cirrhosis of liver without ascites (CMS/HCC) [K70.30]  Yes      Resolved Hospital Problems   No resolved problems to display.         ADDITIONAL PLAN:  - detailed assessment and plan from admission reviewed    Patient seen and examined at bedside. H&P by my partner, Dr Newman , on this same date reviewed. Agree with current plan of care with below updates    Possible GI Bleed:  - with history of esophageal varices and alcoholic cirrhosis ( [place])  - EGD in June 2021 showed grade 1 esophageal varices in the lower 1/3 of the esophagus, 3 MM un largest diameter  - started on Octreotide gtt  - start Rocephin  - Protonix 40 mg q 12 hours  - H/H stable. Will type and screen and monitor q 6 hours. transfuse as needed  - continue Lactulose 10 g TID, Xifaxan 550 mg TID, Bumex, spironolactone  - NPO with gentle IV hydration--resume fluids today   - consult to GI  - avoid hepatotoxins  - am labs     CAD s/p STEMI:  - ANGELICA X2 in June and July 2021 respectively  - continue Plavix and aspirin given recent stenting  - continue statin, ranexa     Anxiety/depression:  - continue trazadone, fluoxetine, and olanzapine     DVT prophylaxis:  mechanical      Precious Izquierdo MD  09/04/21

## 2021-09-04 NOTE — H&P
[facility] Medicine Services  HISTORY AND PHYSICAL    Patient Name: Timothy Guzman  : 1975  MRN: [id number]  Primary Care Physician: Toshia Mitchell APRN  Date of admission: 9/3/2021    Subjective   Subjective     Chief Complaint:  abdominal pain    HPI:  Timothy Guzman is a 45 y.o. female with a past medical history significant for alcoholic liver cirrhosis with esophageal varices, PBC, chronic pancreatitis, hypertension, CAD s/p stent placement on DAPT, and anxiety/depression. She is a former smoker. Presents today with complaints of right upper to epigastric abdominal pain progressively worsening over the past 2 days. Worse with PO intake. Patient was concerned today when she developed bright red blood in stool and vomited coffee ground like substance. She is followed by Dr. Giles per GI. States she called his office and was instructed to present to ED for further evaluation and treatment.   Records reviewed indicate that patient was admitted to [facility] in 2020 with coffee ground emesis. She developed PEA arrest in the ED while receiving IV contrast despite pre-medication. Had subsequent STEMI then underwent LHC with stent placement to the circumflex. Patient returned in 2021 and underwent repeat LHC with stenting to stenosis of the circumflex distal to prior stenting. She volunteers that due to complicated cardiac course, she was taken off the transplant list at .  Currently there are no complaints of fever, cough, congestion, SOB, or chest pain, no diarrhea, constipation, dysuria or flank pain. No changes in skin or eye color. She is fully vaccinated for COVID-19. Will admit to inpatient.      COVID Details:    Symptoms:    [x] NONE [] Fever []  Cough [] Shortness of breath [] Change in taste/smell      The patient has a COVID HM Topic on their chart, and they are fully vaccinated.      Review of Systems   Constitutional: Negative for chills, fatigue and  fever.   HENT: Negative for congestion and trouble swallowing.    Eyes: Negative for photophobia and visual disturbance.   Respiratory: Negative for cough and shortness of breath.    Cardiovascular: Negative for chest pain and leg swelling.   Gastrointestinal: Positive for abdominal pain, blood in stool, nausea and vomiting.   Endocrine: Negative for cold intolerance and heat intolerance.   Genitourinary: Negative for dysuria and flank pain.   Musculoskeletal: Negative for back pain and gait problem.   Skin: Negative for pallor and rash.   Allergic/Immunologic: Positive for immunocompromised state.   Neurological: Negative for dizziness, weakness and headaches.   Hematological: Negative for adenopathy.   Psychiatric/Behavioral: Negative for agitation and confusion.        All other systems reviewed and are negative.     Personal History     Past Medical History:   Diagnosis Date   • Acute pancreatitis    • Alcoholism (CMS/HCC)    • Arthritis    • Bone necrosis (CMS/HCC)    • CAD (coronary artery disease) 2021   • Cirrhosis (CMS/HCC)    • Gastroparesis    • GERD (gastroesophageal reflux disease)    • History of esophageal varices    • History of kidney stones    • Hypertension    • Pancreatitis        Past Surgical History:   Procedure Laterality Date   • APPENDECTOMY     • CARDIAC CATHETERIZATION N/A 2021    Procedure: Left Heart Cath;  Surgeon: Vikram Gonzales MD;  Location:  [facility] CATH INVASIVE LOCATION;  Service: Cardiovascular;  Laterality: N/A;   • CARDIAC CATHETERIZATION N/A 7/15/2021    Procedure: LEFT HEART CATH;  Surgeon: Vikram Gonzales MD;  Location:  [facility] CATH INVASIVE LOCATION;  Service: Cardiology;  Laterality: N/A;   •  SECTION     • CHOLECYSTECTOMY     • COLONOSCOPY     • COLONOSCOPY N/A 3/31/2020    Procedure: COLONOSCOPY;  Surgeon: Tirso Giles MD;  Location:  [facility] ENDOSCOPY;  Service: Gastroenterology;  Laterality: N/A;   • CORONARY STENT PLACEMENT     • ENDOSCOPY     •  ENDOSCOPY     • ENDOSCOPY N/A 6/19/2021    Procedure: ESOPHAGOGASTRODUODENOSCOPY AT BEDSIDE;  Surgeon: Tyrese Rasmussen MD;  Location: [facility] ENDOSCOPY;  Service: Gastroenterology;  Laterality: N/A;   • GALLBLADDER SURGERY     • REPLACEMENT TOTAL HIP LATERAL POSITION Left    • TOTAL KNEE ARTHROPLASTY Left        Family History: family history includes Diabetes type II in her mother; Heart disease in her father. Otherwise pertinent FHx was reviewed and unremarkable.     Social History:  reports that she quit smoking about 16 months ago. Her smoking use included electronic cigarette and cigarettes. She smoked 0.50 packs per day. She has never used smokeless tobacco. She reports previous alcohol use. She reports that she does not use drugs.  Social History     Social History Narrative   • Not on file       Medications:  FLUoxetine, Melatonin, OLANZapine, aspirin, bumetanide, clopidogrel, docusate sodium, ferrous sulfate, folic acid, ipratropium-albuterol, lactulose, magnesium oxide, multivitamin, ondansetron, potassium chloride, ranolazine, riFAXIMin, rosuvastatin, spironolactone, tamsulosin, traZODone, ursodiol, vitamin B-6, and vitamin b complex    Allergies   Allergen Reactions   • Contrast Dye Anaphylaxis     6/18 Pt coded after receiving contrast for a CT scan. Was premedicated. Was having an MI at the same time. Immediately underwent heart cath with contrast without additional allergic reaction  7/15 Pt premedicated with prednisone/Benadryl for heart cath. Still with anaphylactic-like reaction with drop in BP and hypoxia. Treated 95% stenosis with minimal dye and supported with epinephrine, solumedrol, NRB   • Nsaids GI Bleeding   • Tylenol [Acetaminophen] Other (See Comments)     CIRRHOSIS       Objective   Objective     Vital Signs:   Temp:  [98 °F (36.7 °C)] 98 °F (36.7 °C)  Heart Rate:  [] 97  Resp:  [16-18] 16  BP: (116-135)/(87-98) 127/87    Physical Exam   Constitutional: Awake, alert  Eyes: PERRLA,  sclerae anicteric, no conjunctival injection  HENT: NCAT, mucous membranes moist  Neck: Supple, no thyromegaly, no lymphadenopathy, trachea midline  Respiratory: Clear to auscultation bilaterally, nonlabored respirations   Cardiovascular: RRR, no murmurs, rubs, or gallops, palpable pedal pulses bilaterally  Gastrointestinal: Positive bowel sounds, soft, nontender, nondistended  Musculoskeletal: No bilateral ankle edema, no clubbing or cyanosis to extremities  Psychiatric: Appropriate affect, cooperative  Neurologic: Oriented x 3, strength symmetric in all extremities, Cranial Nerves grossly intact to confrontation, speech clear  Skin: No rashes      Results Reviewed:  I have personally reviewed most recent indicated data and agree with findings including:  [x]  Laboratory  [x]  Radiology  []  EKG/Telemetry  []  Pathology  [x]  Cardiac/Vascular Studies  [x]  Old records  [x]  Other:   Most pertinent findings include: vitals stable. Chemistry and hematology favorable. CT A/P unremarkable.       LAB RESULTS:      Lab 09/03/21 1912   WBC 10.72   HEMOGLOBIN 16.0*   HEMATOCRIT 46.4   PLATELETS 177   NEUTROS ABS 6.71   IMMATURE GRANS (ABS) 0.05   LYMPHS ABS 2.54   MONOS ABS 1.27*   EOS ABS 0.11   MCV 87.5         Lab 09/03/21 1912   SODIUM 135*   POTASSIUM 3.5   CHLORIDE 100   CO2 20.0*   ANION GAP 15.0   BUN 10   CREATININE 0.97   GLUCOSE 117*   CALCIUM 9.7         Lab 09/03/21 1912   TOTAL PROTEIN 7.5   ALBUMIN 4.40   GLOBULIN 3.1   ALT (SGPT) 23   AST (SGOT) 23   BILIRUBIN 0.7   ALK PHOS 164*   LIPASE 14         Lab 09/03/21 1912   PROBNP 136.9   TROPONIN T <0.010                 Brief Urine Lab Results  (Last result in the past 365 days)      Color   Clarity   Blood   Leuk Est   Nitrite   Protein   CREAT   Urine HCG        07/15/21 0903               Negative         Microbiology Results (last 10 days)     ** No results found for the last 240 hours. **          CT Abdomen Pelvis Without Contrast    Result Date:  9/4/2021  CT Abdomen Pelvis WO INDICATION: Acute onset of upper abdominal pain. TECHNIQUE: CT of the abdomen and pelvis without IV contrast. Coronal and sagittal reconstructions were obtained.  Radiation dose reduction techniques included automated exposure control or exposure modulation based on body size. Count of known CT and cardiac nuc med studies performed in previous 12 months: 4. COMPARISON: 6/18/2021 FINDINGS: There is a 6 mm left lower lobe lung nodule. This measured about 5 mm on 3/30/2020. This is likely benign. Additional follow-up with a repeat chest CT is recommended on or after 3/30/2022. There are bilateral subacute anterolateral rib fractures. These were previously identified on chest CT of 7/13/2021. Abdominal aorta is normal in caliber. Gallbladder is surgically absent. No biliary obstruction is identified. No definite renal or ureteral stones are seen. There is no hydronephrosis. Please note that characterization of the lower ureters is limited due to streak artifact from bilateral hip arthroplasties. Unenhanced solid abdominal organs are grossly normal. Urinary bladder is grossly normal allowing for streak artifact. Patient is status post tubal ligation. The appendix is surgically absent. There is no evidence of acute colitis. There is no bowel obstruction. Stomach is normal. No free fluid or bulky adenopathy is seen.     Impression: 1. No clearly acute findings in the abdomen or pelvis. 2. Left lower lobe lung nodule. Chest CT follow-up on or after 3/30/2022 is recommended. 3. Cholecystectomy without biliary obstruction. 4. No acute findings in the GI tract. The appendix is surgically absent. 5. Additional findings as above. Signer Name: Allen Schulte MD  Signed: 9/4/2021 12:28 AM  Workstation Name: [email or web address]  [facility]    XR Chest 1 View    Result Date: 9/3/2021  CR Chest 1 Vw INDICATION: Chest pain COMPARISON:  None available. FINDINGS: Portable AP view(s) of the  chest.  The heart and mediastinal contours are normal. The lungs are clear. No pneumothorax or pleural effusion.     Impression: No acute cardiopulmonary findings. Signer Name: Luz Marina Pace MD  Signed: 9/3/2021 10:30 PM  Workstation Name: [id number]  [facility]      Results for orders placed during the hospital encounter of 06/18/21    Adult Transthoracic Echo Complete W/ Cont if Necessary Per Protocol    Interpretation Summary  · The quality of the study is limited due to patient positioning and patient being intubated.  · Left ventricular ejection fraction appears to be 51 - 55%. Left ventricular systolic function is normal.  · Left ventricular diastolic function is consistent with (grade Ia w/high LAP) impaired relaxation.  · Mildly reduced right ventricular systolic function noted.      Assessment/Plan   Assessment & Plan       Possible GI bleed    Alcoholic cirrhosis of liver without ascites (CMS/HCC)    History of esophageal varices    CAD (coronary artery disease)    Chronic pancreatitis (CMS/HCC)    Alcoholism (CMS/HCC)    GERD (gastroesophageal reflux disease)    Anxiety and depression      1. Possible GI Bleed:  - with history of esophageal varices and alcoholic cirrhosis ( [place])  - EGD in June 2021 showed grade 1 esophageal varices in the lower 1/3 of the esophagus, 3 MM un largest diameter  - started on Octreotide gtt  - start Rocephin  - Protonix 40 mg q 12 hours  - H/H stable. Will type and screen and monitor q 6 hours. transfuse as needed  - continue Lactulose 10 g TID, Xifaxan 550 mg TID, Bumex, spironolactone  - NPO with gentle IV hydration  - consult to GI  - avoid hepatotoxins  - am labs    2. CAD s/p STEMI:  - ANGELICA X2 in June and July 2021 respectively  - continue Plavix and aspirin given recent stenting  - continue statin, ranexa    3. Anxiety/depression:  - continue trazadone, fluoxetine, and olanzapine    DVT prophylaxis:  mechanical    CODE STATUS:  Full code  Level Of  Support Discussed With: Patient  Code Status: CPR  Medical Interventions (Level of Support Prior to Arrest): Full      This note has been completed as part of a split-shared workflow.     Electronically signed by Garrett Hobson PA-C, 09/04/21, 1:02 AM EDT.            Attending   Admission Attestation       I have seen and examined the patient, performing an independent face-to-face diagnostic evaluation with plan of care reviewed and developed with the advanced practice clinician (APC).      Brief Summary Statement:   Timothy Guzman is a 45 y.o. female with past medical history of PBC history of prior alcoholism, GERD, history of recurrent pancreatitis, coronary artery disease,, anxiety/depression, history of anaphylaxis secondary to contrast with hospitalization complicated by STEMI and cardiac arrest in July 2021 status post cardiac cath with stenting currently on aspirin and Plavix who presents to the ER with complaints of bright red blood per rectum and new episode of hematemesis today.    Patient reports small amounts each time. She denies any syncope. Denies any chest pain or pressure. Does report continued abdominal pain mostly located in the right upper quadrant. Denies any other symptoms at this time. Patient started on PPI. GI has been consulted.        Remainder of detailed HPI is as noted by APC and has been reviewed and/or edited by me for completeness.    Attending Physical Exam:  Constitutional: Awake, alert  Eyes: PERRLA, sclerae anicteric, no conjunctival injection  HENT: NCAT, mucous membranes moist  Neck: Supple, no thyromegaly, no lymphadenopathy, trachea midline  Respiratory: Clear to auscultation bilaterally, nonlabored respirations   Cardiovascular: RRR, no murmurs, rubs, or gallops, palpable pedal pulses bilaterally  Gastrointestinal: Positive bowel sounds, soft, abd tenderness without rebound or guarding, nondistended  Musculoskeletal: No bilateral ankle edema, no clubbing or cyanosis to  extremities  Psychiatric: Appropriate affect, cooperative  Neurologic: Oriented x 3, strength symmetric in all extremities, Cranial Nerves grossly intact to confrontation, speech clear  Skin: No rashes      Brief Assessment/Plan :  See detailed assessment and plan developed with APC which I have reviewed and/or edited for completeness.        Admission Status: I believe that this patient meets INPATIENT status due to gi bleed, decompensated cirrhosis, hx of varices.  I feel patient’s risk for adverse outcomes and need for care warrant INPATIENT evaluation and I predict the patient’s care encounter to likely last beyond 2 midnights.        Rob Newman,   09/04/21

## 2021-09-04 NOTE — CONSULTS
[facility] Consult    Referring Provider: No ref. provider found    PCP: Toshia Mitchell APRN    Reason for Consultation: Concern for GI bleed, history of cirrhosis    Chief complaint: Coffee-ground emesis and BRBPR    History of present illness:    Timothy Guzman is a 45 y.o. female who is admitted with a 2-day onset of worsening upper quadrant and epigastric abdominal pain that is exacerbated by any and all p.o. intake.  Patient is well-known to this service having seen her in past for management of her alcohol liver cirrhosis and PBC; during most recent admission in June, patient unfortunately developed PEA arrest following administration of contrast dye and ultimately required emergent cardiac stenting where she received a stent to the circumflex.  Patient notes for the last 2 days she has been able to tolerate any p.o. intake as she is having epigastric abdominal pain.  Does not report any nausea but has had some coffee-ground emesis.  Patient also reports that she has had some BRBPR and is due a colonoscopy.  Does not endorse any shortness of breath, chest pain, fever/chills, or sick contacts.  Patient notes that she was recently taken off of the liver transplant list following cardiac arrest and STEMI.  Hemoglobin stable.  Last EGD in June of this year per Dr. Rasmussen; grade 1 varices noted.  Past medical, surgical, social, and family history is reviewed for accuracy.  No documented alleviating or exacerbating factors.  Patient reports epigastric and right upper quadrant abdominal pain at time of exam.    Allergies:  Contrast dye, Nsaids, and Tylenol [acetaminophen]    Scheduled Meds:  aspirin, 81 mg, Oral, Daily  bumetanide, 6 mg, Oral, Daily  cefTRIAXone, 1 g, Intravenous, Q24H  clopidogrel, 75 mg, Oral, Daily  FLUoxetine, 40 mg, Oral, Daily  lactulose, 30 g, Oral, TID  OLANZapine, 10 mg, Oral, Nightly  pantoprazole, 40 mg, Intravenous, Q12H  ranolazine, 1,000 mg, Oral,  BID  rosuvastatin, 20 mg, Oral, Nightly  sodium chloride, 10 mL, Intravenous, Q12H  spironolactone, 100 mg, Oral, TID  tamsulosin, 0.4 mg, Oral, Daily  traZODone, 50 mg, Oral, Nightly  ursodiol, 600 mg, Oral, BID         Infusions:  lactated ringers, 50 mL/hr, Last Rate: 50 mL/hr (09/04/21 1534)  octreotide (SandoSTATIN) infusion, 50 mcg/hr, Last Rate: 50 mcg/hr (09/04/21 1125)        PRN Meds:  HYDROmorphone  •  ipratropium-albuterol  •  melatonin  •  ondansetron  •  sodium chloride    Home Meds:  Medications Prior to Admission   Medication Sig Dispense Refill Last Dose   • aspirin 81 MG EC tablet Take 81 mg by mouth Daily.   9/3/2021 at Unknown time   • B Complex Vitamins (vitamin b complex) capsule capsule Take 1 capsule by mouth Daily.   9/3/2021 at Unknown time   • clopidogrel (PLAVIX) 75 MG tablet Take 1 tablet by mouth Daily. 90 tablet 3 9/3/2021 at Unknown time   • FeroSul 325 (65 Fe) MG tablet Take 1 tablet by mouth Daily.   9/3/2021 at Unknown time   • folic acid (FOLVITE) 1 MG tablet Take 1 tablet by mouth Daily. 30 tablet 0 9/3/2021 at Unknown time   • lactulose (CHRONULAC) 10 GM/15ML solution Take 45 mL by mouth 3 (Three) Times a Day. 1892 mL 5 Patient Taking Differently at Unknown time   • magnesium oxide (MAGOX) 400 (241.3 Mg) MG tablet tablet Take 400 mg by mouth Every Evening.   9/3/2021 at Unknown time   • Melatonin 10 MG tablet Take 1 tablet by mouth Every Night.   9/3/2021 at Unknown time   • Multivitamin tablet tablet    9/3/2021 at Unknown time   • potassium chloride (K-DUR,KLOR-CON) 20 MEQ CR tablet Take 1 tablet by mouth Daily.   9/3/2021 at Unknown time   • rosuvastatin (CRESTOR) 20 MG tablet Take 1 tablet by mouth Every Night. 90 tablet 3 9/3/2021 at Unknown time   • tamsulosin (FLOMAX) 0.4 MG capsule 24 hr capsule Take 1 capsule by mouth Daily. 30 capsule 0 9/3/2021 at Unknown time   • ursodiol (ACTIGALL) 300 MG capsule Take 2 capsules by mouth 2 (two) times a day. 120 capsule 11 9/3/2021  at Unknown time   • vitamin B-6 (PYRIDOXINE) 25 MG tablet Take 25 mg by mouth Daily.   9/3/2021 at Unknown time   • Xifaxan 550 MG tablet    9/3/2021 at Unknown time   • bumetanide (BUMEX) 1 MG tablet Take 2 tablets by mouth Daily for 30 days. (Patient taking differently: Take 6 mg by mouth Daily. Takes 3 of the 2 mg tabs) 60 tablet 0    • docusate sodium (COLACE) 100 MG capsule Take 100 mg by mouth 2 (Two) Times a Day As Needed for Constipation.      • FLUoxetine (PROzac) 40 MG capsule Take 1 capsule by mouth Daily for 30 days. 30 capsule 0    • ipratropium-albuterol (DUO-NEB) 0.5-2.5 mg/3 ml nebulizer Take 3 mL by nebulization Every 6 (Six) Hours As Needed for Wheezing or Shortness of Air. 360 mL 0    • OLANZapine (zyPREXA) 7.5 MG tablet Take 1 tablet by mouth Every Night. (Patient taking differently: Take 10 mg by mouth Every Night.)      • ondansetron (Zofran) 4 MG tablet Take 1 tablet by mouth Every 8 (Eight) Hours As Needed for Nausea or Vomiting for up to 30 doses. 30 tablet 0    • ranolazine (RANEXA) 1000 MG 12 hr tablet Take 1 tablet by mouth 2 (Two) Times a Day for 30 days. 60 tablet 0    • spironolactone (ALDACTONE) 50 MG tablet Take 2 tablets by mouth Daily for 30 days. (Patient taking differently: Take 100 mg by mouth 3 (Three) Times a Day.)      • traZODone (DESYREL) 50 MG tablet Take 1 tablet by mouth Every Night for 30 days. 30 tablet 0        ROS: Review of Systems   Constitutional: Positive for activity change, appetite change and fatigue. Negative for chills, diaphoresis, fever and unexpected weight change.   HENT: Negative for sore throat, trouble swallowing and voice change.    Eyes: Negative.    Respiratory: Positive for shortness of breath. Negative for apnea, cough, choking, chest tightness, wheezing and stridor.    Cardiovascular: Negative for chest pain, palpitations and leg swelling.   Gastrointestinal: Positive for abdominal pain, blood in stool, nausea and vomiting (hematemesis ).  Negative for abdominal distention, anal bleeding, constipation, diarrhea and rectal pain.   Endocrine: Negative.    Genitourinary: Negative.    Musculoskeletal: Negative.    Skin: Negative for color change, pallor, rash and wound.   Allergic/Immunologic: Negative.    Neurological: Negative.    Hematological: Negative for adenopathy. Does not bruise/bleed easily.   Psychiatric/Behavioral: Negative.    All other systems reviewed and are negative.      PAST MED HX:  Past Medical History:   Diagnosis Date   • Acute pancreatitis    • Alcoholism (CMS/HCC)    • Arthritis    • Bone necrosis (CMS/HCC)    • CAD (coronary artery disease) 2021   • Cirrhosis (CMS/HCC)    • Gastroparesis    • GERD (gastroesophageal reflux disease)    • History of esophageal varices    • History of kidney stones    • Hypertension    • Pancreatitis        PAST SURG HX:  Past Surgical History:   Procedure Laterality Date   • APPENDECTOMY     • CARDIAC CATHETERIZATION N/A 2021    Procedure: Left Heart Cath;  Surgeon: Vikram Gonzales MD;  Location:  [facility];  Service: Cardiovascular;  Laterality: N/A;   • CARDIAC CATHETERIZATION N/A 7/15/2021    Procedure: LEFT HEART CATH;  Surgeon: Vikram Gonzales MD;  Location:  [facility];  Service: Cardiology;  Laterality: N/A;   •  SECTION     • CHOLECYSTECTOMY     • COLONOSCOPY     • COLONOSCOPY N/A 3/31/2020    Procedure: COLONOSCOPY;  Surgeon: Tirso Giles MD;  Location:  [facility];  Service: Gastroenterology;  Laterality: N/A;   • CORONARY STENT PLACEMENT     • ENDOSCOPY     • ENDOSCOPY     • ENDOSCOPY N/A 2021    Procedure: ESOPHAGOGASTRODUODENOSCOPY AT BEDSIDE;  Surgeon: Tyrese Rasmussen MD;  Location:  [facility];  Service: Gastroenterology;  Laterality: N/A;   • GALLBLADDER SURGERY     • REPLACEMENT TOTAL HIP LATERAL POSITION Left    • TOTAL KNEE ARTHROPLASTY Left        FAM HX:  Family History   Problem Relation Age of Onset  "  • Diabetes type II Mother    • Heart disease Father    • Colon cancer Neg Hx    • Colon polyps Neg Hx        SOC HX:  Social History     Socioeconomic History   • Marital status:      Spouse name: Not on file   • Number of children: Not on file   • Years of education: Not on file   • Highest education level: Not on file   Tobacco Use   • Smoking status: Former Smoker     Packs/day: 0.50     Types: Electronic Cigarette, Cigarettes     Quit date: 2020     Years since quittin.3   • Smokeless tobacco: Never Used   • Tobacco comment:  ppd    Substance and Sexual Activity   • Alcohol use: Not Currently   • Drug use: No   • Sexual activity: Defer       PHYSICAL EXAM  /72 (BP Location: Left arm, Patient Position: Lying)   Pulse 67   Temp 98.2 °F (36.8 °C) (Oral)   Resp 17   Ht 160 cm (63\")   Wt 90.4 kg (199 lb 6.4 oz)   SpO2 96%   BMI 35.32 kg/m²   Wt Readings from Last 3 Encounters:   21 90.4 kg (199 lb 6.4 oz)   21 91.2 kg (201 lb)   21 89.4 kg (197 lb)   ,body mass index is 35.32 kg/m².  Physical Exam  Vitals and nursing note reviewed.   Constitutional:       General: She is not in acute distress.     Appearance: Normal appearance. She is obese. She is not ill-appearing or toxic-appearing.   HENT:      Head: Normocephalic and atraumatic.      Mouth/Throat:      Mouth: Mucous membranes are moist.      Pharynx: Oropharynx is clear. No oropharyngeal exudate.   Eyes:      General: No scleral icterus.     Extraocular Movements: Extraocular movements intact.      Conjunctiva/sclera: Conjunctivae normal.      Pupils: Pupils are equal, round, and reactive to light.   Cardiovascular:      Rate and Rhythm: Normal rate and regular rhythm.      Pulses: Normal pulses.      Heart sounds: Normal heart sounds.   Pulmonary:      Effort: Pulmonary effort is normal. No respiratory distress.      Breath sounds: Normal breath sounds.   Abdominal:      General: Abdomen is flat. Bowel sounds " are normal. There is no distension.      Palpations: Abdomen is soft. There is no mass.      Tenderness: There is abdominal tenderness. There is no guarding or rebound.      Hernia: No hernia is present.   Genitourinary:     Comments: defer  Musculoskeletal:         General: Normal range of motion.      Cervical back: Normal range of motion and neck supple. No rigidity or tenderness.      Right lower leg: No edema.      Left lower leg: No edema.      Comments: Patient with paraspinal tenderness that radiates in dermatome fashion throughout right upper quadrant and settles in epigastrium; pain in right upper quadrant/epigastric reproducible upon palpation of area paraspinal tenderness.   Skin:     General: Skin is warm and dry.      Capillary Refill: Capillary refill takes less than 2 seconds.      Coloration: Skin is pale. Skin is not jaundiced.   Neurological:      General: No focal deficit present.      Mental Status: She is alert and oriented to person, place, and time.   Psychiatric:         Mood and Affect: Mood normal.         Behavior: Behavior normal.         Thought Content: Thought content normal.         Judgment: Judgment normal.         Results Review:   I reviewed the patient's new clinical results.  I reviewed the patient's new imaging results and agree with the interpretation.  I personally viewed and interpreted the patient's EKG/Telemetry data    Lab Results   Component Value Date    WBC 10.72 09/03/2021    HGB 14.3 09/04/2021    HGB 14.0 09/04/2021    HGB 14.2 09/04/2021    HCT 42.0 09/04/2021    MCV 87.5 09/03/2021     09/03/2021       Lab Results   Component Value Date    INR 1.06 08/24/2021    INR 1.57 (H) 06/20/2021    INR 1.52 (H) 06/19/2021       Lab Results   Component Value Date    GLUCOSE 117 (H) 09/04/2021    BUN 11 09/04/2021    CREATININE 1.03 (H) 09/04/2021    EGFRIFNONA 58 (L) 09/04/2021    BCR 10.7 09/04/2021     (L) 09/04/2021    K 4.1 09/04/2021    CO2 22.0 09/04/2021     CALCIUM 9.3 09/04/2021    ALBUMIN 4.10 09/04/2021    ALKPHOS 155 (H) 09/04/2021    BILITOT 1.6 (H) 09/04/2021    BILIDIR 0.2 06/23/2021    ALT 27 09/04/2021    AST 38 (H) 09/04/2021       CT Abdomen Pelvis Without Contrast    Result Date: 9/4/2021  CT Abdomen Pelvis WO INDICATION: Acute onset of upper abdominal pain. TECHNIQUE: CT of the abdomen and pelvis without IV contrast. Coronal and sagittal reconstructions were obtained.  Radiation dose reduction techniques included automated exposure control or exposure modulation based on body size. Count of known CT and cardiac nuc med studies performed in previous 12 months: 4. COMPARISON: 6/18/2021 FINDINGS: There is a 6 mm left lower lobe lung nodule. This measured about 5 mm on 3/30/2020. This is likely benign. Additional follow-up with a repeat chest CT is recommended on or after 3/30/2022. There are bilateral subacute anterolateral rib fractures. These were previously identified on chest CT of 7/13/2021. Abdominal aorta is normal in caliber. Gallbladder is surgically absent. No biliary obstruction is identified. No definite renal or ureteral stones are seen. There is no hydronephrosis. Please note that characterization of the lower ureters is limited due to streak artifact from bilateral hip arthroplasties. Unenhanced solid abdominal organs are grossly normal. Urinary bladder is grossly normal allowing for streak artifact. Patient is status post tubal ligation. The appendix is surgically absent. There is no evidence of acute colitis. There is no bowel obstruction. Stomach is normal. No free fluid or bulky adenopathy is seen.     1. No clearly acute findings in the abdomen or pelvis. 2. Left lower lobe lung nodule. Chest CT follow-up on or after 3/30/2022 is recommended. 3. Cholecystectomy without biliary obstruction. 4. No acute findings in the GI tract. The appendix is surgically absent. 5. Additional findings as above. Signer Name: Allen Schulte MD  Signed:  9/4/2021 12:28 AM  Workstation Name: [email or web address]  [facility]    XR Chest 1 View    Result Date: 9/3/2021  CR Chest 1 Vw INDICATION: Chest pain COMPARISON:  None available. FINDINGS: Portable AP view(s) of the chest.  The heart and mediastinal contours are normal. The lungs are clear. No pneumothorax or pleural effusion.     No acute cardiopulmonary findings. Signer Name: Luz Marina Pace MD  Signed: 9/3/2021 10:30 PM  Workstation Name: [id number]  [facility]    COVID19   Date Value Ref Range Status   09/04/2021 Not Detected Not Detected - Ref. Range Final     ASSESSMENTS/PLANS  1.  Acute epigastric and right upper quadrant abdominal pain  2.  Gastrointestinal bleeding with coffee-ground emesis and BRBPR  3.  Decompensated alcohol liver cirrhosis, history of PBC   -Meld-Na: 14 points  4.  History of hepatic encephalopathy  5.  History of primary biliary cholangitis, on ursodiol    Timothy Guzman is a 45 y.o. female admitted to hospital with epigastric and upper quadrant abdominal pain.  Patient also with BRBPR and coffee-ground emesis in setting of decompensated alcohol liver cirrhosis.  Recommend bidirectional endoscopy tomorrow for further evaluation of source of patient's gastrointestinal blood loss.  Hemoglobin stable.  Patient's pain seems to originate from area of paraspinal tenderness and radiating in dermatomal fashion across flank into epigastrium; will trial lidocaine patch and evaluate response.    >>> Given epigastric pain, BRBPR, and coffee-ground emesis, recommend bidirectional endoscopy tomorrow.  >>> N.p.o. at midnight  >>> Obtain informed consent for colonoscopy and esophagogastroduodenoscopy  >>> IV PPI twice daily  >>> Continue octreotide drip for concerns of varices as noted on prior EGDs.  >>> SBP prophylaxis with Rocephin  >>> Continue to trend H&H and transfuse for hemoglobins less than 7 or symptomatic anemia per protocol.  >>> Will provide lidocaine  patch to right flank and evaluate response  >>> Continue lactulose and xifaxan   >>> May use Tylenol for mild to moderate pain; less than 2 g/day is acceptable in patients with liver cirrhosis.    I discussed the patient's findings and my recommendations with patient, family and nursing staff    ISSAC Mckee  09/04/21  16:27 EDT

## 2021-09-04 NOTE — CONSULTS
Clinical Nutrition   Reason For Visit: Identified at risk by screening criteria, MST score 2+, Difficulty chewing/swallowing    Patient Name: Timothy Guzman  YOB: 1975  MRN: [id number]  Date of Encounter: 21 10:36 EDT  Admission date: 9/3/2021    Comments:   - RD recommends SLP eval for hx of swallowing concern.  - RD ordering berry Boost Breeze when diet is advanced to CL diet.    Nutrition Assessment     Admission Problem List:    Possible GI bleed     Applicable medical tests/procedures since admission:  - GI consult pending; plan for NPO with gentle IV hydration    PMH: She  has a past medical history of Acute pancreatitis, Alcoholism (CMS/HCC), Arthritis, Bone necrosis (CMS/HCC), CAD (coronary artery disease) (2021), Cirrhosis (CMS/HCC), Gastroparesis, GERD (gastroesophageal reflux disease), History of esophageal varices, History of kidney stones, Hypertension, and Pancreatitis.   PSxH: She  has a past surgical history that includes Replacement total hip lateral position (Left); Total knee arthroplasty (Left); Gallbladder surgery; Appendectomy;  section; Esophagogastroduodenoscopy; Colonoscopy; Cholecystectomy; Colonoscopy (N/A, 3/31/2020); Esophagogastroduodenoscopy; Esophagogastroduodenoscopy (N/A, 2021); Cardiac catheterization (N/A, 2021); Cardiac catheterization (N/A, 7/15/2021); and Coronary stent placement.     Reported/Observed/Food/Nutrition Related History     Pt resting in bed and alert during RD visit. Pt endorsed poor appetite due to N/V for 2 days PTA. Prior to the last few days, pt reports appetite and intake OK. Pt reported she has had swallowing issues since being intubated in 2021; has since had MBS and worked with SLP and uses chin tuck for swallowing liquids (RD spoke with RN; RN will order consult for SLP). Pt does not currently use thickeners or modified diet consistencies. Pt stated UBW of 230 lbs with ~30 lbs unintentional wt loss since  June 2021. Pt stated she eats a lot of soup at home due to GI upset related to gastroparesis. RD discussed low-sodium diet and making soup at home/reading labels on store bought soups to reduce sodium intake. RD discussed HS snack. Pt agreeable to strawberry/berry flavored ONS.    Pt likes tomato, chicken noodle, cream of mushroom, and vegetable soups, hamburger, grilled chicken, breakfast potatoes, pancakes, eggs, turkey sandwiches, extra ketchup.    Anthropometrics   Height: 63 in  Weight: 199 lbs (bed scale on 9/4)  BMI: 35.32  BMI classification: Obese Class II: 35-39.9kg/m2   IBW: 115 lbs  UBW: 230 lbs per pt report  Weight change: 26 lbs unintentional wt loss over 2-3 months (11.6%). Wt loss may be fluid related - noted diuretics administered.    Weight Weight (kg) Weight (lbs) Weight Method   9/4/2021 90.447 kg 199 lb 6.4 oz Bed scale   9/3/2021 84.369 kg 186 lb Stated   8/24/2021 91.173 kg 201 lb -   8/16/2021 89.359 kg 197 lb -   7/18/2021 96.48 kg 212 lb 11.2 oz Bed scale   7/17/2021 95.346 kg 210 lb 3.2 oz Bed scale   7/15/2021 91.491 kg 201 lb 11.2 oz Bed scale   7/14/2021 87.998 kg 194 lb -   7/14/2021 88.406 kg 194 lb 14.4 oz -   7/13/2021 82.101 kg 181 lb Stated   7/12/2021 86.637 kg 191 lb -   7/2/2021 95.664 kg 210 lb 14.4 oz Bed scale   7/1/2021 98.748 kg 217 lb 11.2 oz Bed scale   6/30/2021 96.253 kg 212 lb 3.2 oz Bed scale   6/24/2021 98.5 kg 217 lb 2.5 oz Bed scale      6/20/2021: 225 lbs  9/11/2019: 225 lbs    Nutrition Focused Physical Exam - 9/4     No subcutaneous fat or muscle loss observed.    Labs reviewed   Labs reviewed: Yes    Noted hyponatremia    Results from last 7 days   Lab Units 09/04/21  0345 09/03/21 1912   SODIUM mmol/L 134* 135*   POTASSIUM mmol/L 4.1 3.5   CHLORIDE mmol/L 98 100   CO2 mmol/L 22.0 20.0*   BUN mg/dL 11 10   CREATININE mg/dL 1.03* 0.97   GLUCOSE mg/dL 117* 117*   CALCIUM mg/dL 9.3 9.7     Results from last 7 days   Lab Units 09/04/21 0345 09/03/21 1912   WBC  10*3/mm3  --  10.72   ALBUMIN g/dL 4.10 4.40         Lab Results   Lab Value Date/Time    HGBA1C 5.20 06/19/2021 0542     Medications reviewed   Medications reviewed: Yes  Pertinent: bumex, rocephin, protonix, spironolactone  GTT: sandostatin  PRN: [email or web address]    Current Nutrition Prescription   PO: NPO Diet  Oral Nutrition Supplement: No active supplement orders     Average PO intake: 0% / 1 meal    Nutrition Diagnosis     Problem Inadequate oral intake   Etiology Clinical condition; N/V   Signs/Symptoms NPO; pt report of poor intake for 2 days PTA     Intervention   Intervention: Follow treatment progress, Care plan reviewed, Interview for preferences, Supplement provided    - RD ordering berry Boost Breeze when diet is advanced to CL diet.  - RD recommends SLP eval for hx of swallowing concern.    Goal:   General: Nutrition support treatment  PO: Advace diet as medically appropriate     Additional goals: No further weight loss    Monitoring/Evaluation:   Monitoring/Evaluation: Per protocol, Pertinent labs, Weight, GI status, Symptoms, Swallow function    Francesco Her RD  Time Spent: 30 min

## 2021-09-04 NOTE — ED PROVIDER NOTES
[facility]    EMERGENCY DEPARTMENT ENCOUNTER      Pt Name: Timothy Guzman  MRN: [id number]  YOB: 1975  Date of evaluation: 9/3/2021  Provider: Mike Clark MD    CHIEF COMPLAINT       Chief Complaint   Patient presents with   • Abdominal Pain         HISTORY OF PRESENT ILLNESS  (Location/Symptom, Timing/Onset, Context/Setting, Quality, Duration, Modifying Factors, Severity.)   Timothy Guzman is a 45 y.o. female who presents to the emergency department with 1 week of progressively worsening moderate aching epigastric pain radiating to the right upper quadrant that is worse with palpation but no other associated inciting or modifying factors in the setting of a history of cirrhosis, recurrent pancreatitis, and gastroparesis with some associated hematemesis and bright red blood per rectum along with a history of esophageal varices. She has had banding of her varices in the past. She does not take any anticoagulant medications.      Nursing notes were reviewed.    REVIEW OF SYSTEMS    (2-9 systems for level 4, 10 or more for level 5)   ROS:  General:  No fevers, no chills, no weakness  Cardiovascular:  No chest pain, no palpitations  Respiratory:  No shortness of breath, no cough, no wheezing  Gastrointestinal: Abdominal pain, hematemesis  Musculoskeletal:  No muscle pain, no joint pain  Skin:  No rash  Neurologic:  No speech problems, no headache, no extremity numbness, no extremity tingling, no extremity weakness  Psychiatric:  No anxiety  Genitourinary:  No dysuria, no hematuria    Except as noted above the remainder of the review of systems was reviewed and negative.       PAST MEDICAL HISTORY     Past Medical History:   Diagnosis Date   • Acute pancreatitis    • Alcoholism (CMS/HCC)    • Arthritis    • Bone necrosis (CMS/HCC)    • CAD (coronary artery disease) 7/13/2021   • Cirrhosis (CMS/HCC)    • Gastroparesis    • GERD (gastroesophageal reflux disease)    • History of esophageal varices    •  History of kidney stones    • Hypertension    • Pancreatitis          SURGICAL HISTORY       Past Surgical History:   Procedure Laterality Date   • APPENDECTOMY     • CARDIAC CATHETERIZATION N/A 2021    Procedure: Left Heart Cath;  Surgeon: Vikram Gonzales MD;  Location:  [facility];  Service: Cardiovascular;  Laterality: N/A;   • CARDIAC CATHETERIZATION N/A 7/15/2021    Procedure: LEFT HEART CATH;  Surgeon: Vikram Gonzales MD;  Location:  [facility];  Service: Cardiology;  Laterality: N/A;   •  SECTION     • CHOLECYSTECTOMY     • COLONOSCOPY     • COLONOSCOPY N/A 3/31/2020    Procedure: COLONOSCOPY;  Surgeon: Tirso Giles MD;  Location:  [facility];  Service: Gastroenterology;  Laterality: N/A;   • CORONARY STENT PLACEMENT     • ENDOSCOPY     • ENDOSCOPY     • ENDOSCOPY N/A 2021    Procedure: ESOPHAGOGASTRODUODENOSCOPY AT BEDSIDE;  Surgeon: Tyrese Rasmussen MD;  Location:  [facility];  Service: Gastroenterology;  Laterality: N/A;   • GALLBLADDER SURGERY     • REPLACEMENT TOTAL HIP LATERAL POSITION Left    • TOTAL KNEE ARTHROPLASTY Left          CURRENT MEDICATIONS       Current Facility-Administered Medications:   •  aspirin chewable tablet 81 mg, 81 mg, Oral, Daily, Trent, Rob, DO  •  bumetanide (BUMEX) tablet 6 mg, 6 mg, Oral, Daily, Garrett Hobson PA-C  •  cefTRIAXone (ROCEPHIN) 1 g/100 mL 0.9% NS (MBP), 1 g, Intravenous, Q24H, Trent, Rob, DO  •  clopidogrel (PLAVIX) tablet 75 mg, 75 mg, Oral, Daily, Garrett Hobson PA-C  •  FLUoxetine (PROzac) capsule 40 mg, 40 mg, Oral, Daily, Garrett Hobson PA-C  •  ipratropium-albuterol (DUO-NEB) nebulizer solution 3 mL, 3 mL, Nebulization, Q6H PRN, Garrett Hobson PA-C  •  lactulose (CHRONULAC) 10 GM/15ML solution 30 g, 30 g, Oral, TID, Garrett Hobson PA-C  •  melatonin tablet 5 mg, 5 mg, Oral, Nightly PRN, Garrett Hobson PA-C  •  Morphine sulfate (PF) injection 3 mg, 3 mg,  Intravenous, Q3H PRN, Rob Newman, , 3 mg at 09/04/21 0300  •  octreotide (sandoSTATIN) 500 mcg in sodium chloride 0.9 % 100 mL (5 mcg/mL) infusion, 50 mcg/hr, Intravenous, Continuous, Mike Clark MD, Last Rate: 10 mL/hr at 09/04/21 0021, 50 mcg/hr at 09/04/21 0021  •  OLANZapine (zyPREXA) tablet 10 mg, 10 mg, Oral, Nightly, Garrett Hobson PA-C  •  ondansetron (ZOFRAN) injection 4 mg, 4 mg, Intravenous, Q6H PRN, Garrett Hobson PA-C  •  pantoprazole (PROTONIX) injection 40 mg, 40 mg, Intravenous, Q12H, Rob Newman DO  •  ranolazine (RANEXA) 12 hr tablet 1,000 mg, 1,000 mg, Oral, BID, Garrett Hobson PA-C  •  rosuvastatin (CRESTOR) tablet 20 mg, 20 mg, Oral, Nightly, Garrett Hobson PA-C  •  sodium chloride 0.9 % flush 10 mL, 10 mL, Intravenous, PRN, Mike Clark MD  •  sodium chloride 0.9 % flush 10 mL, 10 mL, Intravenous, Q12H, Garrett Hobson PA-C  •  sodium chloride 0.9 % flush 10 mL, 10 mL, Intravenous, PRN, Garrett Hobson PA-C  •  spironolactone (ALDACTONE) tablet 100 mg, 100 mg, Oral, TID, Garrett Hobson PA-C  •  tamsulosin (FLOMAX) 24 hr capsule 0.4 mg, 0.4 mg, Oral, Daily, Garrett Hobson PA-C  •  traZODone (DESYREL) tablet 50 mg, 50 mg, Oral, Nightly, Garrett Hobson PA-C  •  ursodiol (ACTIGALL) capsule 600 mg, 600 mg, Oral, BID, Garrett Hobson PA-C    ALLERGIES     Contrast dye, Nsaids, and Tylenol [acetaminophen]    FAMILY HISTORY       Family History   Problem Relation Age of Onset   • Diabetes type II Mother    • Heart disease Father    • Colon cancer Neg Hx    • Colon polyps Neg Hx           SOCIAL HISTORY       Social History     Socioeconomic History   • Marital status:      Spouse name: Not on file   • Number of children: Not on file   • Years of education: Not on file   • Highest education level: Not on file   Tobacco Use   • Smoking status: Former Smoker     Packs/day: 0.50     Types: Electronic Cigarette, Cigarettes     Quit date:  "2020     Years since quittin.3   • Smokeless tobacco: Never Used   • Tobacco comment:  ppd    Substance and Sexual Activity   • Alcohol use: Not Currently   • Drug use: No   • Sexual activity: Defer         PHYSICAL EXAM    (up to 7 for level 4, 8 or more for level 5)     Vitals:    21 0200 21 0215 21 0230 21 0248   BP: 132/92 134/94 134/93 123/90   BP Location:    Right arm   Patient Position:    Lying   Pulse: 84 90 87 83   Resp:    20   Temp:    97.4 °F (36.3 °C)   TempSrc:    Oral   SpO2: 96% 98% 95%    Weight:    90.4 kg (199 lb 6.4 oz)   Height:    160 cm (63\")       Physical Exam  General: Awake, alert, no acute distress.  HEENT: Conjunctivae normal.  Neck: Trachea midline.  Cardiac: Heart regular rate, rhythm, no murmurs, rubs, or gallops  Lungs: Lungs are clear to auscultation, there is no wheezing, rhonchi, or rales. There is no use of accessory muscles.  Chest wall: There is no tenderness to palpation over the chest wall or over ribs  Abdomen: Moderate epigastric and right upper quadrant tenderness without any associated distention, rebound, or guarding  Musculoskeletal: No deformity.  Neuro: Alert and oriented x 4.  Dermatology: Skin is warm and dry  Psych: Mentation is grossly normal, cognition is grossly normal. Affect is appropriate.        DIAGNOSTIC RESULTS     EKG: All EKG's are interpreted by the Emergency Department Physician who either signs or Co-signs this chart in the absence of a cardiologist.    ECG 12 Lead   Final Result   Test Reason : chest pain   Blood Pressure :   */*   mmHG   Vent. Rate : 107 BPM     Atrial Rate : 107 BPM      P-R Int : 154 ms          QRS Dur :  78 ms       QT Int : 338 ms       P-R-T Axes :  45 -22  37 degrees      QTc Int : 451 ms      ** Critical Test Result: High HR   Sinus tachycardia   Possible Left atrial enlargement   Possible Inferior infarct (cited on or before 2021)   Cannot rule out Anterior infarct (cited on or " before 26-JUN-2021)   Abnormal ECG   When compared with ECG of 18-JUL-2021 00:34,   Nonspecific T wave abnormality has replaced inverted T waves in Inferior    leads   Confirmed by DAMIAN FALCON ([id number]) on 9/4/2021 3:22:06 AM      Referred By:            Confirmed By: DAMIAN FALCON      ECG 12 Lead    (Results Pending)       RADIOLOGY:   Non-plain film images such as CT, Ultrasound and MRI are read by the radiologist. Plain radiographic images are visualized and preliminarily interpreted by the emergency physician with the below findings:      [x] Radiologist's Report Reviewed:  CT Abdomen Pelvis Without Contrast   Final Result      1. No clearly acute findings in the abdomen or pelvis.   2. Left lower lobe lung nodule. Chest CT follow-up on or after 3/30/2022 is recommended.   3. Cholecystectomy without biliary obstruction.   4. No acute findings in the GI tract. The appendix is surgically absent.   5. Additional findings as above.               Signer Name: Allen Schulte MD    Signed: 9/4/2021 12:28 AM    Workstation Name: [email or web address]     [facility]      XR Chest 1 View   Final Result   No acute cardiopulmonary findings.      Signer Name: Luz Marina Pace MD    Signed: 9/3/2021 10:30 PM    Workstation Name: [id number]     [facility]            ED BEDSIDE ULTRASOUND:   Performed by ED Physician - none    LABS:    I have reviewed and interpreted all of the currently available lab results from this visit (if applicable):  Results for orders placed or performed during the hospital encounter of 09/03/21   COVID-19,CEPHEID/RU,[facility] IN-HOUSE(OR EMERGENT/ADD-ON),NP SWAB IN TRANSPORT MEDIA 3-4 HR TAT - Swab, Nasopharynx    Specimen: Nasopharynx; Swab   Result Value Ref Range    COVID19 Not Detected Not Detected - Ref. Range   Troponin    Specimen: Blood   Result Value Ref Range    Troponin T <0.010 0.000 - 0.030 ng/mL   Troponin    Specimen: Blood   Result Value Ref Range     Troponin T <0.010 0.000 - 0.030 ng/mL   Comprehensive Metabolic Panel    Specimen: Blood   Result Value Ref Range    Glucose 117 (H) 65 - 99 mg/dL    BUN 10 6 - 20 mg/dL    Creatinine 0.97 0.57 - 1.00 mg/dL    Sodium 135 (L) 136 - 145 mmol/L    Potassium 3.5 3.5 - 5.2 mmol/L    Chloride 100 98 - 107 mmol/L    CO2 20.0 (L) 22.0 - 29.0 mmol/L    Calcium 9.7 8.6 - 10.5 mg/dL    Total Protein 7.5 6.0 - 8.5 g/dL    Albumin 4.40 3.50 - 5.20 g/dL    ALT (SGPT) 23 1 - 33 U/L    AST (SGOT) 23 1 - 32 U/L    Alkaline Phosphatase 164 (H) 39 - 117 U/L    Total Bilirubin 0.7 0.0 - 1.2 mg/dL    eGFR Non African Amer 62 >60 mL/min/1.73    Globulin 3.1 gm/dL    A/G Ratio 1.4 g/dL    BUN/Creatinine Ratio 10.3 7.0 - 25.0    Anion Gap 15.0 5.0 - 15.0 mmol/L   Lipase    Specimen: Blood   Result Value Ref Range    Lipase 14 13 - 60 U/L   BNP    Specimen: Blood   Result Value Ref Range    proBNP 136.9 0.0 - 450.0 pg/mL   CBC Auto Differential    Specimen: Blood   Result Value Ref Range    WBC 10.72 3.40 - 10.80 10*3/mm3    RBC 5.30 (H) 3.77 - 5.28 10*6/mm3    Hemoglobin 16.0 (H) 12.0 - 15.9 g/dL    Hematocrit 46.4 34.0 - 46.6 %    MCV 87.5 79.0 - 97.0 fL    MCH 30.2 26.6 - 33.0 pg    MCHC 34.5 31.5 - 35.7 g/dL    RDW 13.5 12.3 - 15.4 %    RDW-SD 43.6 37.0 - 54.0 fl    MPV 10.6 6.0 - 12.0 fL    Platelets 177 140 - 450 10*3/mm3    Neutrophil % 62.6 42.7 - 76.0 %    Lymphocyte % 23.7 19.6 - 45.3 %    Monocyte % 11.8 5.0 - 12.0 %    Eosinophil % 1.0 0.3 - 6.2 %    Basophil % 0.4 0.0 - 1.5 %    Immature Grans % 0.5 0.0 - 0.5 %    Neutrophils, Absolute 6.71 1.70 - 7.00 10*3/mm3    Lymphocytes, Absolute 2.54 0.70 - 3.10 10*3/mm3    Monocytes, Absolute 1.27 (H) 0.10 - 0.90 10*3/mm3    Eosinophils, Absolute 0.11 0.00 - 0.40 10*3/mm3    Basophils, Absolute 0.04 0.00 - 0.20 10*3/mm3    Immature Grans, Absolute 0.05 0.00 - 0.05 10*3/mm3    nRBC 0.0 0.0 - 0.2 /100 WBC   Hemoglobin & Hematocrit, Blood    Specimen: Blood   Result Value Ref Range     "Hemoglobin 15.1 12.0 - 15.9 g/dL    Hematocrit 43.4 34.0 - 46.6 %   ECG 12 Lead   Result Value Ref Range    QT Interval 338 ms    QTC Interval 451 ms   Green Top (Gel)   Result Value Ref Range    Extra Tube Hold for add-ons.    Lavender Top   Result Value Ref Range    Extra Tube hold for add-on    Gold Top - SST   Result Value Ref Range    Extra Tube Hold for add-ons.    Gray Top   Result Value Ref Range    Extra Tube Hold for add-ons.    Light Blue Top   Result Value Ref Range    Extra Tube hold for add-on         All other labs were within normal range or not returned as of this dictation.      EMERGENCY DEPARTMENT COURSE and DIFFERENTIAL DIAGNOSIS/MDM:   Vitals:    Vitals:    09/04/21 0200 09/04/21 0215 09/04/21 0230 09/04/21 0248   BP: 132/92 134/94 134/93 123/90   BP Location:    Right arm   Patient Position:    Lying   Pulse: 84 90 87 83   Resp:    20   Temp:    97.4 °F (36.3 °C)   TempSrc:    Oral   SpO2: 96% 98% 95%    Weight:    90.4 kg (199 lb 6.4 oz)   Height:    160 cm (63\")       ED Course as of Sep 04 0322   Fri Sep 03, 2021   2340 CXR personally reviewed and negative for acute process.        [NS]   Sat Sep 04, 2021   0036 Patient presents with acute GI bleeding as well as abdominal pain.  Given history of esophageal varices, this is the primary concern although there does not appear to be any heavy bleeding at this point.  She is hemodynamically stable and her hemoglobin is also within normal limits.  Also possible is peptic ulcer disease/gastritis.  I have given her Rocephin, octreotide, and Protonix in the ED and will admit to the hospitalist service for this.  There is no suggestion of additional vascular pathology such as mesenteric ischemia or aortoenteric fistula based on history.  In terms of the patient's abdominal pain, I suspect this is most likely related to her ongoing pancreatitis, possible gastritis/PUD, or gastroparesis.  Her laboratory evaluation demonstrates no evidence of acute " hepatic or pancreatic inflammation and CT scan also demonstrates no evidence of acute pathology.  I do not feel that this is an atypical ACS presentation and ECG and troponin are within normal limits.  I spoke with Dr. Newman who accepts the patient for admission.    [NS]      ED Course User Index  [NS] Mike Clark MD           MEDICATIONS ADMINISTERED IN ED:  Medications   sodium chloride 0.9 % flush 10 mL (has no administration in time range)   octreotide (sandoSTATIN) 500 mcg in sodium chloride 0.9 % 100 mL (5 mcg/mL) infusion (50 mcg/hr Intravenous New Bag 9/4/21 0021)   cefTRIAXone (ROCEPHIN) 1 g/100 mL 0.9% NS (MBP) (has no administration in time range)   rosuvastatin (CRESTOR) tablet 20 mg (has no administration in time range)   FLUoxetine (PROzac) capsule 40 mg (has no administration in time range)   ipratropium-albuterol (DUO-NEB) nebulizer solution 3 mL (has no administration in time range)   lactulose (CHRONULAC) 10 GM/15ML solution 30 g (has no administration in time range)   OLANZapine (zyPREXA) tablet 10 mg (has no administration in time range)   ranolazine (RANEXA) 12 hr tablet 1,000 mg (has no administration in time range)   tamsulosin (FLOMAX) 24 hr capsule 0.4 mg (has no administration in time range)   traZODone (DESYREL) tablet 50 mg (has no administration in time range)   sodium chloride 0.9 % flush 10 mL (has no administration in time range)   sodium chloride 0.9 % flush 10 mL (has no administration in time range)   melatonin tablet 5 mg (has no administration in time range)   ondansetron (ZOFRAN) injection 4 mg (has no administration in time range)   pantoprazole (PROTONIX) injection 40 mg (has no administration in time range)   bumetanide (BUMEX) tablet 6 mg (has no administration in time range)   spironolactone (ALDACTONE) tablet 100 mg (has no administration in time range)   ursodiol (ACTIGALL) capsule 600 mg (has no administration in time range)   clopidogrel (PLAVIX) tablet 75 mg  (has no administration in time range)   Morphine sulfate (PF) injection 3 mg (3 mg Intravenous Given 9/4/21 0300)   aspirin chewable tablet 81 mg (has no administration in time range)   cefTRIAXone (ROCEPHIN) 1 g/100 mL 0.9% NS (MBP) (0 g Intravenous Stopped 9/4/21 0156)   pantoprazole (PROTONIX) injection 80 mg (80 mg Intravenous Given 9/4/21 0020)   octreotide (SANDOSTATIN) bolus from bag 5 mcg/mL 50 mcg (50 mcg Intravenous Bolus from Bag 9/4/21 0021)   Morphine sulfate (PF) injection 4 mg (4 mg Intravenous Given 9/4/21 0058)   ondansetron (ZOFRAN) injection 4 mg (4 mg Intravenous Given 9/4/21 0058)           FINAL IMPRESSION      1. Acute GI bleeding    2. Acute upper abdominal pain    3. History of esophageal varices    4. History of cirrhosis          DISPOSITION/PLAN     ED Disposition     ED Disposition Condition Comment    Decision to Admit  Level of Care: Telemetry [5]   Diagnosis: GI bleed [403015]   Admitting Physician: MAME GUZMAN [[id number]]   Attending Physician: MAME GUZMAN [[id number]]   Bed Request Comments: magno Clark MD  Attending Emergency Physician               Mike Clark MD  09/04/21 8230

## 2021-09-04 NOTE — PLAN OF CARE
Goal Outcome Evaluation:  VSS, room air. NSR on tele. Alert and oriented. Pt c/o severe RUQ abd pain several times a day, PRN dilaudid administered q2hr, intervention effective per pt. Denies nausea. Low UOP, provider aware, fluids initiated per orders. Octreotide continuous. No BM today. Clear liquids diet right now, plan to scope on 9/5/21 per GI.   Problem: Adult Inpatient Plan of Care  Goal: Plan of Care Review  Outcome: Ongoing, Progressing  Goal: Patient-Specific Goal (Individualized)  Outcome: Ongoing, Progressing  Goal: Absence of Hospital-Acquired Illness or Injury  Outcome: Ongoing, Progressing  Intervention: Identify and Manage Fall Risk  Description: Perform standard risk assessment with a validated tool or comprehensive approach appropriate to the patient on admission; reassess fall risk frequently, with change in status or transfer to another level of care.  Communicate fall injury risk to interprofessional healthcare team.  Determine need for increased observation, equipment and environmental modification, such as low bed and signage, as well as supportive, nonskid footwear.  Adjust safety measures to individual developmental age, stage and identified risk factors.  Reinforce the importance of safety and physical activity with patient and family.  Perform regular intentional rounding to assess need for position change, pain assessment, personal needs, including assistance with toileting.  Recent Flowsheet Documentation  Taken 9/4/2021 1532 by Lulu Parr, RN  Safety Promotion/Fall Prevention:   clutter free environment maintained   assistive device/personal items within reach   fall prevention program maintained   nonskid shoes/slippers when out of bed   room organization consistent   safety round/check completed  Taken 9/4/2021 1425 by Lulu Parr, RN  Safety Promotion/Fall Prevention:   assistive device/personal items within reach   clutter free environment maintained   fall  prevention program maintained   nonskid shoes/slippers when out of bed   room organization consistent   safety round/check completed  Taken 9/4/2021 1200 by Keshav, Lulu, RN  Safety Promotion/Fall Prevention:   assistive device/personal items within reach   clutter free environment maintained   fall prevention program maintained   nonskid shoes/slippers when out of bed   room organization consistent   safety round/check completed  Taken 9/4/2021 1033 by Lulu Parr, RN  Safety Promotion/Fall Prevention:   assistive device/personal items within reach   clutter free environment maintained   fall prevention program maintained   muscle strengthening facilitated   safety round/check completed   room organization consistent  Taken 9/4/2021 0910 by Lulu Parr, RN  Safety Promotion/Fall Prevention:   activity supervised   assistive device/personal items within reach   clutter free environment maintained   fall prevention program maintained   nonskid shoes/slippers when out of bed   room organization consistent   safety round/check completed  Taken 9/4/2021 0740 by Lulu Parr, RN  Safety Promotion/Fall Prevention:   assistive device/personal items within reach   clutter free environment maintained   fall prevention program maintained   nonskid shoes/slippers when out of bed   room organization consistent   safety round/check completed  Intervention: Prevent Skin Injury  Description: Assess skin risk on admission and at regular intervals throughout hospital stay.  Keep all areas of skin (especially folds) clean and dry.  Maintain adequate skin hydration.  Relieve and redistribute pressure and protect bony prominences; implement measures based on patient-specific risk factors.  Match turning and repositioning schedule to clinical condition.  Encourage weight shift frequently; assist with reposition if unable to complete independently.  Float heels off bed. Avoid pressure on the Achilles  tendon.  Keep skin free from extended contact with medical devices.  Use aids (e.g., slide boards, mechanical lift) during transfer.  Recent Flowsheet Documentation  Taken 9/4/2021 1532 by Lulu Parr RN  Body Position:   position changed independently   sitting up in bed   dangle, side of bed  Skin Protection: adhesive use limited  Taken 9/4/2021 1425 by Lulu Parr RN  Body Position: position changed independently  Skin Protection: adhesive use limited  Taken 9/4/2021 1200 by Lulu Parr RN  Body Position: position changed independently  Skin Protection: adhesive use limited  Taken 9/4/2021 1033 by Lulu Parr RN  Body Position: position changed independently  Skin Protection: adhesive use limited  Taken 9/4/2021 0910 by Lulu Parr RN  Body Position: position changed independently  Taken 9/4/2021 0740 by Lulu Parr RN  Body Position: position changed independently  Skin Protection: adhesive use limited  Intervention: Prevent and Manage VTE (venous thromboembolism) Risk  Description: Assess for VTE risk.  Encourage/assist with early ambulation.  Initiate and maintain compression or other therapy, as indicated based on identified risk in accordance with organizational protocol/provider order.  Encourage both active and passive leg exercises while in bed, if unable to ambulate.  Recent Flowsheet Documentation  Taken 9/4/2021 0910 by Lulu Parr RN  VTE Prevention/Management:   bilateral   dorsiflexion/plantar flexion performed   bleeding risk factor(s) identified  Intervention: Prevent Infection  Description: Maintain skin and mucous membrane integrity; promote hand, oral and pulmonary hygiene.  Optimize fluid balance, nutrition, sleep and glycemic control to maximize infection resistance.  Identify potential sources of infection early to prevent or mitigate progression of infection (e.g., wound, lines, devices).  Evaluate ongoing need for invasive  devices; remove promptly when no longer indicated.  Recent Flowsheet Documentation  Taken 9/4/2021 1532 by Lulu Parr RN  Infection Prevention:   rest/sleep promoted   environmental surveillance performed  Taken 9/4/2021 1200 by Lulu Parr RN  Infection Prevention:   rest/sleep promoted   environmental surveillance performed  Taken 9/4/2021 1033 by Lulu Parr RN  Infection Prevention:   rest/sleep promoted   environmental surveillance performed  Taken 9/4/2021 0910 by Lulu Parr RN  Infection Prevention:   rest/sleep promoted   environmental surveillance performed  Taken 9/4/2021 0740 by Lulu Parr RN  Infection Prevention:   rest/sleep promoted   environmental surveillance performed  Goal: Optimal Comfort and Wellbeing  Outcome: Ongoing, Progressing  Intervention: Provide Person-Centered Care  Description: Use a family-focused approach to care.  Develop trust and rapport by proactively providing information, encouraging questions, addressing concerns and offering reassurance.  Acknowledge emotional response to hospitalization.  Recognize and utilize personal coping strategies.  Honor spiritual and cultural preferences.  Recent Flowsheet Documentation  Taken 9/4/2021 0910 by Lulu Parr RN  Trust Relationship/Rapport:   care explained   choices provided   empathic listening provided   questions answered   thoughts/feelings acknowledged  Goal: Readiness for Transition of Care  Outcome: Ongoing, Progressing   Will continue to monitor.

## 2021-09-05 ENCOUNTER — ANESTHESIA EVENT (OUTPATIENT)
Dept: GASTROENTEROLOGY | Facility: HOSPITAL | Age: 46
End: 2021-09-05

## 2021-09-05 ENCOUNTER — ANESTHESIA (OUTPATIENT)
Dept: GASTROENTEROLOGY | Facility: HOSPITAL | Age: 46
End: 2021-09-05

## 2021-09-05 LAB
ALBUMIN SERPL-MCNC: 3.7 G/DL (ref 3.5–5.2)
ALBUMIN/GLOB SERPL: 1.5 G/DL
ALP SERPL-CCNC: 151 U/L (ref 39–117)
ALT SERPL W P-5'-P-CCNC: 26 U/L (ref 1–33)
ANION GAP SERPL CALCULATED.3IONS-SCNC: 8 MMOL/L (ref 5–15)
AST SERPL-CCNC: 26 U/L (ref 1–32)
BASOPHILS # BLD AUTO: 0.04 10*3/MM3 (ref 0–0.2)
BASOPHILS NFR BLD AUTO: 0.6 % (ref 0–1.5)
BILIRUB SERPL-MCNC: 0.5 MG/DL (ref 0–1.2)
BUN SERPL-MCNC: 7 MG/DL (ref 6–20)
BUN/CREAT SERPL: 6.7 (ref 7–25)
CALCIUM SPEC-SCNC: 8.2 MG/DL (ref 8.6–10.5)
CHLORIDE SERPL-SCNC: 110 MMOL/L (ref 98–107)
CO2 SERPL-SCNC: 20 MMOL/L (ref 22–29)
CREAT SERPL-MCNC: 1.04 MG/DL (ref 0.57–1)
DEPRECATED RDW RBC AUTO: 45.9 FL (ref 37–54)
EOSINOPHIL # BLD AUTO: 0.16 10*3/MM3 (ref 0–0.4)
EOSINOPHIL NFR BLD AUTO: 2.5 % (ref 0.3–6.2)
ERYTHROCYTE [DISTWIDTH] IN BLOOD BY AUTOMATED COUNT: 13.2 % (ref 12.3–15.4)
GFR SERPL CREATININE-BSD FRML MDRD: 57 ML/MIN/1.73
GLOBULIN UR ELPH-MCNC: 2.4 GM/DL
GLUCOSE SERPL-MCNC: 117 MG/DL (ref 65–99)
HCT VFR BLD AUTO: 35.4 % (ref 34–46.6)
HCT VFR BLD AUTO: 37.4 % (ref 34–46.6)
HCT VFR BLD AUTO: 39.1 % (ref 34–46.6)
HGB BLD-MCNC: 11.8 G/DL (ref 12–15.9)
HGB BLD-MCNC: 12.4 G/DL (ref 12–15.9)
HGB BLD-MCNC: 13 G/DL (ref 12–15.9)
IMM GRANULOCYTES # BLD AUTO: 0.02 10*3/MM3 (ref 0–0.05)
IMM GRANULOCYTES NFR BLD AUTO: 0.3 % (ref 0–0.5)
LYMPHOCYTES # BLD AUTO: 2.05 10*3/MM3 (ref 0.7–3.1)
LYMPHOCYTES NFR BLD AUTO: 31.4 % (ref 19.6–45.3)
MCH RBC QN AUTO: 31.2 PG (ref 26.6–33)
MCHC RBC AUTO-ENTMCNC: 33.2 G/DL (ref 31.5–35.7)
MCV RBC AUTO: 94.2 FL (ref 79–97)
MONOCYTES # BLD AUTO: 0.78 10*3/MM3 (ref 0.1–0.9)
MONOCYTES NFR BLD AUTO: 11.9 % (ref 5–12)
NEUTROPHILS NFR BLD AUTO: 3.48 10*3/MM3 (ref 1.7–7)
NEUTROPHILS NFR BLD AUTO: 53.3 % (ref 42.7–76)
NRBC BLD AUTO-RTO: 0 /100 WBC (ref 0–0.2)
PLATELET # BLD AUTO: 115 10*3/MM3 (ref 140–450)
PMV BLD AUTO: 11.3 FL (ref 6–12)
POTASSIUM SERPL-SCNC: 3.8 MMOL/L (ref 3.5–5.2)
PROT SERPL-MCNC: 6.1 G/DL (ref 6–8.5)
RBC # BLD AUTO: 3.97 10*6/MM3 (ref 3.77–5.28)
SODIUM SERPL-SCNC: 138 MMOL/L (ref 136–145)
WBC # BLD AUTO: 6.53 10*3/MM3 (ref 3.4–10.8)

## 2021-09-05 PROCEDURE — 85014 HEMATOCRIT: CPT | Performed by: INTERNAL MEDICINE

## 2021-09-05 PROCEDURE — 92610 EVALUATE SWALLOWING FUNCTION: CPT

## 2021-09-05 PROCEDURE — 43235 EGD DIAGNOSTIC BRUSH WASH: CPT | Performed by: INTERNAL MEDICINE

## 2021-09-05 PROCEDURE — 25010000002 HYDROMORPHONE PER 4 MG: Performed by: INTERNAL MEDICINE

## 2021-09-05 PROCEDURE — 25010000002 HYDROMORPHONE PER 4 MG: Performed by: NURSE PRACTITIONER

## 2021-09-05 PROCEDURE — 45378 DIAGNOSTIC COLONOSCOPY: CPT | Performed by: INTERNAL MEDICINE

## 2021-09-05 PROCEDURE — 85018 HEMOGLOBIN: CPT | Performed by: INTERNAL MEDICINE

## 2021-09-05 PROCEDURE — 25010000002 CEFTRIAXONE PER 250 MG: Performed by: INTERNAL MEDICINE

## 2021-09-05 PROCEDURE — 80053 COMPREHEN METABOLIC PANEL: CPT | Performed by: INTERNAL MEDICINE

## 2021-09-05 PROCEDURE — 25010000002 PROPOFOL 10 MG/ML EMULSION: Performed by: NURSE ANESTHETIST, CERTIFIED REGISTERED

## 2021-09-05 PROCEDURE — 85025 COMPLETE CBC W/AUTO DIFF WBC: CPT | Performed by: INTERNAL MEDICINE

## 2021-09-05 PROCEDURE — 99233 SBSQ HOSP IP/OBS HIGH 50: CPT | Performed by: NURSE PRACTITIONER

## 2021-09-05 RX ORDER — SODIUM CHLORIDE, SODIUM LACTATE, POTASSIUM CHLORIDE, CALCIUM CHLORIDE 600; 310; 30; 20 MG/100ML; MG/100ML; MG/100ML; MG/100ML
9 INJECTION, SOLUTION INTRAVENOUS CONTINUOUS
Status: DISCONTINUED | OUTPATIENT
Start: 2021-09-05 | End: 2021-09-06 | Stop reason: HOSPADM

## 2021-09-05 RX ORDER — SODIUM CHLORIDE 0.9 % (FLUSH) 0.9 %
10 SYRINGE (ML) INJECTION EVERY 12 HOURS SCHEDULED
Status: DISCONTINUED | OUTPATIENT
Start: 2021-09-05 | End: 2021-09-05 | Stop reason: HOSPADM

## 2021-09-05 RX ORDER — SODIUM CHLORIDE, SODIUM LACTATE, POTASSIUM CHLORIDE, CALCIUM CHLORIDE 600; 310; 30; 20 MG/100ML; MG/100ML; MG/100ML; MG/100ML
30 INJECTION, SOLUTION INTRAVENOUS CONTINUOUS PRN
Status: DISCONTINUED | OUTPATIENT
Start: 2021-09-05 | End: 2021-09-06 | Stop reason: HOSPADM

## 2021-09-05 RX ORDER — MIDAZOLAM HYDROCHLORIDE 1 MG/ML
1 INJECTION INTRAMUSCULAR; INTRAVENOUS
Status: DISCONTINUED | OUTPATIENT
Start: 2021-09-05 | End: 2021-09-05 | Stop reason: HOSPADM

## 2021-09-05 RX ORDER — ONDANSETRON 2 MG/ML
4 INJECTION INTRAMUSCULAR; INTRAVENOUS ONCE AS NEEDED
Status: DISCONTINUED | OUTPATIENT
Start: 2021-09-05 | End: 2021-09-05 | Stop reason: HOSPADM

## 2021-09-05 RX ORDER — MAGNESIUM CARB/ALUMINUM HYDROX 105-160MG
296 TABLET,CHEWABLE ORAL ONCE
Status: COMPLETED | OUTPATIENT
Start: 2021-09-05 | End: 2021-09-05

## 2021-09-05 RX ORDER — SODIUM CHLORIDE 0.9 % (FLUSH) 0.9 %
10 SYRINGE (ML) INJECTION AS NEEDED
Status: DISCONTINUED | OUTPATIENT
Start: 2021-09-05 | End: 2021-09-05 | Stop reason: HOSPADM

## 2021-09-05 RX ORDER — HYDROMORPHONE HYDROCHLORIDE 1 MG/ML
0.25 INJECTION, SOLUTION INTRAMUSCULAR; INTRAVENOUS; SUBCUTANEOUS EVERY 8 HOURS PRN
Status: DISCONTINUED | OUTPATIENT
Start: 2021-09-06 | End: 2021-09-05

## 2021-09-05 RX ORDER — PROPOFOL 10 MG/ML
VIAL (ML) INTRAVENOUS AS NEEDED
Status: DISCONTINUED | OUTPATIENT
Start: 2021-09-05 | End: 2021-09-05 | Stop reason: SURG

## 2021-09-05 RX ORDER — HYDROMORPHONE HYDROCHLORIDE 1 MG/ML
0.25 INJECTION, SOLUTION INTRAMUSCULAR; INTRAVENOUS; SUBCUTANEOUS EVERY 8 HOURS PRN
Status: COMPLETED | OUTPATIENT
Start: 2021-09-05 | End: 2021-09-06

## 2021-09-05 RX ORDER — LIDOCAINE HYDROCHLORIDE 10 MG/ML
INJECTION, SOLUTION EPIDURAL; INFILTRATION; INTRACAUDAL; PERINEURAL AS NEEDED
Status: DISCONTINUED | OUTPATIENT
Start: 2021-09-05 | End: 2021-09-05 | Stop reason: SURG

## 2021-09-05 RX ORDER — LIDOCAINE HYDROCHLORIDE 10 MG/ML
0.5 INJECTION, SOLUTION EPIDURAL; INFILTRATION; INTRACAUDAL; PERINEURAL ONCE AS NEEDED
Status: DISCONTINUED | OUTPATIENT
Start: 2021-09-05 | End: 2021-09-05 | Stop reason: HOSPADM

## 2021-09-05 RX ADMIN — POLYETHYLENE GLYCOL 3350, SODIUM SULFATE, SODIUM CHLORIDE, POTASSIUM CHLORIDE, ASCORBIC ACID, SODIUM ASCORBATE 1000 ML: KIT at 04:36

## 2021-09-05 RX ADMIN — SODIUM CHLORIDE, PRESERVATIVE FREE 10 ML: 5 INJECTION INTRAVENOUS at 21:38

## 2021-09-05 RX ADMIN — PANTOPRAZOLE SODIUM 40 MG: 40 INJECTION, POWDER, FOR SOLUTION INTRAVENOUS at 00:48

## 2021-09-05 RX ADMIN — ROSUVASTATIN CALCIUM 20 MG: 20 TABLET, COATED ORAL at 21:37

## 2021-09-05 RX ADMIN — HYDROMORPHONE HYDROCHLORIDE 0.5 MG: 1 INJECTION, SOLUTION INTRAMUSCULAR; INTRAVENOUS; SUBCUTANEOUS at 02:59

## 2021-09-05 RX ADMIN — PROPOFOL 200 MCG/KG/MIN: 10 INJECTION, EMULSION INTRAVENOUS at 11:09

## 2021-09-05 RX ADMIN — URSODIOL 600 MG: 300 CAPSULE ORAL at 21:37

## 2021-09-05 RX ADMIN — HYDROMORPHONE HYDROCHLORIDE 0.25 MG: 1 INJECTION, SOLUTION INTRAMUSCULAR; INTRAVENOUS; SUBCUTANEOUS at 16:28

## 2021-09-05 RX ADMIN — PROPOFOL 50 MG: 10 INJECTION, EMULSION INTRAVENOUS at 11:09

## 2021-09-05 RX ADMIN — LIDOCAINE HYDROCHLORIDE 50 MG: 10 INJECTION, SOLUTION EPIDURAL; INFILTRATION; INTRACAUDAL; PERINEURAL at 11:09

## 2021-09-05 RX ADMIN — SODIUM CHLORIDE, POTASSIUM CHLORIDE, SODIUM LACTATE AND CALCIUM CHLORIDE 50 ML/HR: 600; 310; 30; 20 INJECTION, SOLUTION INTRAVENOUS at 21:40

## 2021-09-05 RX ADMIN — PANTOPRAZOLE SODIUM 40 MG: 40 INJECTION, POWDER, FOR SOLUTION INTRAVENOUS at 23:50

## 2021-09-05 RX ADMIN — HYDROMORPHONE HYDROCHLORIDE 0.25 MG: 1 INJECTION, SOLUTION INTRAMUSCULAR; INTRAVENOUS; SUBCUTANEOUS at 23:50

## 2021-09-05 RX ADMIN — SODIUM CHLORIDE, POTASSIUM CHLORIDE, SODIUM LACTATE AND CALCIUM CHLORIDE: 600; 310; 30; 20 INJECTION, SOLUTION INTRAVENOUS at 11:42

## 2021-09-05 RX ADMIN — OLANZAPINE 10 MG: 5 TABLET, FILM COATED ORAL at 21:37

## 2021-09-05 RX ADMIN — RANOLAZINE 1000 MG: 500 TABLET, EXTENDED RELEASE ORAL at 13:11

## 2021-09-05 RX ADMIN — TRAZODONE HYDROCHLORIDE 50 MG: 50 TABLET ORAL at 21:37

## 2021-09-05 RX ADMIN — Medication 296 ML: at 07:54

## 2021-09-05 RX ADMIN — BUMETANIDE 6 MG: 2 TABLET ORAL at 13:11

## 2021-09-05 RX ADMIN — RIFAXIMIN 550 MG: 550 TABLET ORAL at 15:12

## 2021-09-05 RX ADMIN — HYDROMORPHONE HYDROCHLORIDE 0.5 MG: 1 INJECTION, SOLUTION INTRAMUSCULAR; INTRAVENOUS; SUBCUTANEOUS at 05:16

## 2021-09-05 RX ADMIN — FLUOXETINE HYDROCHLORIDE 40 MG: 20 CAPSULE ORAL at 13:11

## 2021-09-05 RX ADMIN — SODIUM CHLORIDE, PRESERVATIVE FREE 10 ML: 5 INJECTION INTRAVENOUS at 13:13

## 2021-09-05 RX ADMIN — SODIUM CHLORIDE 1 G: 900 INJECTION INTRAVENOUS at 21:38

## 2021-09-05 RX ADMIN — ASPIRIN 81 MG: 81 TABLET, CHEWABLE ORAL at 13:11

## 2021-09-05 RX ADMIN — SPIRONOLACTONE 100 MG: 25 TABLET ORAL at 13:11

## 2021-09-05 RX ADMIN — HYDROMORPHONE HYDROCHLORIDE 0.5 MG: 1 INJECTION, SOLUTION INTRAMUSCULAR; INTRAVENOUS; SUBCUTANEOUS at 13:09

## 2021-09-05 RX ADMIN — SPIRONOLACTONE 100 MG: 25 TABLET ORAL at 21:36

## 2021-09-05 RX ADMIN — RANOLAZINE 1000 MG: 500 TABLET, EXTENDED RELEASE ORAL at 21:37

## 2021-09-05 RX ADMIN — LACTULOSE 30 G: 20 SOLUTION ORAL at 13:11

## 2021-09-05 RX ADMIN — PANTOPRAZOLE SODIUM 40 MG: 40 INJECTION, POWDER, FOR SOLUTION INTRAVENOUS at 13:13

## 2021-09-05 RX ADMIN — TAMSULOSIN HYDROCHLORIDE 0.4 MG: 0.4 CAPSULE ORAL at 13:11

## 2021-09-05 RX ADMIN — HYDROMORPHONE HYDROCHLORIDE 0.5 MG: 1 INJECTION, SOLUTION INTRAMUSCULAR; INTRAVENOUS; SUBCUTANEOUS at 00:48

## 2021-09-05 RX ADMIN — RIFAXIMIN 550 MG: 550 TABLET ORAL at 21:44

## 2021-09-05 RX ADMIN — CLOPIDOGREL BISULFATE 75 MG: 75 TABLET ORAL at 13:12

## 2021-09-05 RX ADMIN — SODIUM CHLORIDE, POTASSIUM CHLORIDE, SODIUM LACTATE AND CALCIUM CHLORIDE 9 ML/HR: 600; 310; 30; 20 INJECTION, SOLUTION INTRAVENOUS at 10:32

## 2021-09-05 RX ADMIN — URSODIOL 600 MG: 300 CAPSULE ORAL at 15:12

## 2021-09-05 RX ADMIN — Medication 5 MG: at 21:36

## 2021-09-05 NOTE — PLAN OF CARE
Goal Outcome Evaluation:  Plan of Care Reviewed With: patient      SLP evaluation completed. No further SLP dysphagia needs. Will sign-off. Please see note for further details and recommendations.

## 2021-09-05 NOTE — ANESTHESIA PREPROCEDURE EVALUATION
Anesthesia Evaluation     Patient summary reviewed   no history of anesthetic complications:  NPO Solid Status: > 8 hours  NPO Liquid Status: > 8 hours           Airway   Mallampati: II  TM distance: >3 FB  Neck ROM: full  Dental - normal exam     Pulmonary - normal exam   (+) a smoker Former,   Cardiovascular - normal exam    ECG reviewed    (+) hypertension, past MI , CAD, cardiac stents within the past 12 months dysrhythmias (SVT),     ROS comment: 7/15/21- USA/STEMI/arrest 2/2/ contrast dye, 95% stenosis LCx just distal to prior sent -- restented    6/19/21 - lvef 52, gr1dd, mild rv dysfunction, no as/ai, nl mv/tv.     Neuro/Psych  (+) psychiatric history Anxiety and Depression,     GI/Hepatic/Renal/Endo    (+)  GERD,  liver disease (cirrhosis, listed for OLT prior to STEMI/ANGELICA),     Musculoskeletal     Abdominal    Substance History   (+) alcohol use (hx),      OB/GYN          Other        ROS/Med Hx Other: 06/2021 - PEA arrest after contrast dye, required emergent stent to L Cx.    hgb 11.8 k    3.8  plavix/asa  No new CP/SOA                Anesthesia Plan    ASA 3     general   (MAP 80+ given CAD/ANGELICA    Pt reports post menopausal status, no chance of pregnancy; discussed risks of pregnancy and GA; pt agreeable to proceed.)  intravenous induction     Anesthetic plan, all risks, benefits, and alternatives have been provided, discussed and informed consent has been obtained with: patient.    Plan discussed with CRNA.

## 2021-09-05 NOTE — ANESTHESIA POSTPROCEDURE EVALUATION
Patient: Timothy Guzman    Procedure Summary     Date: 09/05/21 Room / Location:  [facility]    Anesthesia Start: 1105 Anesthesia Stop:     Procedures:       ESOPHAGOGASTRODUODENOSCOPY (N/A )      COLONOSCOPY (N/A ) Diagnosis:     Surgeons: Tyrese Rasmussen MD Provider: So Sims DO    Anesthesia Type: general ASA Status: 3          Anesthesia Type: general    Vitals  No vitals data found for the desired time range.          Post Anesthesia Care and Evaluation    Patient location during evaluation: PACU  Patient participation: complete - patient participated  Level of consciousness: awake and alert  Pain management: adequate  Airway patency: patent  Anesthetic complications: No anesthetic complications  PONV Status: none  Cardiovascular status: hemodynamically stable and acceptable  Respiratory status: nonlabored ventilation, acceptable and nasal cannula  Hydration status: acceptable

## 2021-09-05 NOTE — POST-PROCEDURE NOTE
EGD- no varices or bleeding site seen  Colonoscopy-  Normal to TI apart from hemorrhoids      REC Resume diet.  Repeat EGD in 1 yr

## 2021-09-05 NOTE — THERAPY EVALUATION
Acute Care - Speech Language Pathology   Swallow Initial Evaluation  [place]   Clinical Swallow Evaluation     Patient Name: Timothy Guzman  : 1975  MRN: [id number]  Today's Date: 2021               Admit Date: 9/3/2021    Visit Dx:     ICD-10-CM ICD-9-CM   1. Acute GI bleeding  K92.2 578.9   2. Acute upper abdominal pain  R10.10 789.09   3. History of esophageal varices  Z87.19 V12.79   4. History of cirrhosis  Z87.19 V12.79   5. Alcoholism (CMS/HCC)  F10.20 303.90   6. Alcoholic cirrhosis of liver without ascites (CMS/HCC)  K70.30 571.2     Patient Active Problem List   Diagnosis   • Hepatic encephalopathy (CMS/HCC)   • Alcoholic cirrhosis of liver without ascites (CMS/HCC)   • Possible GI bleed   • Generalized abdominal pain   • History of esophageal varices   • GERD (gastroesophageal reflux disease)   • STEMI (ST elevation myocardial infarction) (CMS/HCC)   • Acute respiratory failure with hypoxia (CMS/HCC)   • Anaphylaxis   • Cardiac arrest (CMS/HCC)   • SVT (supraventricular tachycardia) (CMS/HCC)   • Depression   • Cirrhosis of liver (CMS/HCC)   • CAD (coronary artery disease)   • Rib fractures   • Chronic pancreatitis (CMS/HCC)   • Anxiety and depression   • Alcoholism (CMS/HCC)     Past Medical History:   Diagnosis Date   • Acute pancreatitis    • Alcoholism (CMS/HCC)    • Arthritis    • Bone necrosis (CMS/HCC)    • CAD (coronary artery disease) 2021   • Cirrhosis (CMS/HCC)    • Gastroparesis    • GERD (gastroesophageal reflux disease)    • History of esophageal varices    • History of kidney stones    • Hypertension    • Pancreatitis      Past Surgical History:   Procedure Laterality Date   • APPENDECTOMY     • CARDIAC CATHETERIZATION N/A 2021    Procedure: Left Heart Cath;  Surgeon: Vikram Gonzales MD;  Location:  [facility];  Service: Cardiovascular;  Laterality: N/A;   • CARDIAC CATHETERIZATION N/A 7/15/2021    Procedure: LEFT HEART CATH;  Surgeon: Janet  MD Vikram;  Location:  [facility];  Service: Cardiology;  Laterality: N/A;   •  SECTION     • CHOLECYSTECTOMY     • COLONOSCOPY     • COLONOSCOPY N/A 3/31/2020    Procedure: COLONOSCOPY;  Surgeon: Tirso Giles MD;  Location:  [facility];  Service: Gastroenterology;  Laterality: N/A;   • CORONARY STENT PLACEMENT     • ENDOSCOPY     • ENDOSCOPY     • ENDOSCOPY N/A 2021    Procedure: ESOPHAGOGASTRODUODENOSCOPY AT BEDSIDE;  Surgeon: Tyrese Rasmussen MD;  Location:  [facility];  Service: Gastroenterology;  Laterality: N/A;   • GALLBLADDER SURGERY     • REPLACEMENT TOTAL HIP LATERAL POSITION Left    • TOTAL KNEE ARTHROPLASTY Left        SLP Recommendation and Plan     SLP Diet Recommendation: regular textures, thin liquids     SLP Rec. for Method of Medication Administration: as tolerated     Monitor for Signs of Aspiration: notify SLP if any concerns     Swallow Criteria for Skilled Therapeutic Interventions Met: no problems identified which require skilled intervention        Therapy Frequency (Swallow): evaluation only                            Plan of Care Reviewed With: patient         SWALLOW EVALUATION (last 72 hours)      SLP Adult Swallow Evaluation     Row Name 21 1344                   Rehab Evaluation    Document Type  evaluation  -SM        Subjective Information  no complaints  -SM        Patient Observations  alert;cooperative  -SM           General Information    Patient Profile Reviewed  yes  -SM        Pertinent History Of Current Problem  Adm epigastric pain which increased with PO intake. Hx dysphagia with silent aspriation r/t prolonged intubation and comorbidities. Last MBS  revealed silent aspiration of thin liquids and recommended chin tuck to prevent such. EGD and colonoscopy earlier were normal.   -SM        Plans/Goals Discussed with  patient  -SM        Barriers to Rehab  none identified  -SM        Patient's Goals for Discharge   return to regular diet  -           Pain    Additional Documentation  Pain Scale: Numbers Pre/Post-Treatment (Group)  -           Pain Scale: Numbers Pre/Post-Treatment    Pretreatment Pain Rating  0/10 - no pain  -SM        Posttreatment Pain Rating  0/10 - no pain  -           Clinical Swallow Eval    Oral Prep Phase  WFL  -SM        Pharyngeal Phase  no overt signs/symptoms of pharyngeal impairment  -        Clinical Swallow Evaluation Summary  Pt reported she has been inconsistent, at best, with use of chin tuck for liquids since last MBS. CXR NAD, RN reports no concerns wit lungs. Given hx silent aspiration, cannot deem pt with WFL for swallow. However, dysphagia has likely resolved since July, especially given age and lungs showing no obvious concerns for aspiration. Discussed GOC/POC options with pt for +/- MBS. She felt comfortable with swallow function and does not wish for MBS. She will notify RN/MD if any concerns.   -           Clinical Impression    Swallow Criteria for Skilled Therapeutic Interventions Met  no problems identified which require skilled intervention  -           Recommendations    Therapy Frequency (Swallow)  evaluation only  -        SLP Diet Recommendation  regular textures;thin liquids  -        SLP Rec. for Method of Medication Administration  as tolerated  -        Monitor for Signs of Aspiration  notify SLP if any concerns  -          User Key  (r) = Recorded By, (t) = Taken By, (c) = Cosigned By    Initials Name Effective Dates     Elda Barahona MS CCC-SLP 06/16/21 -           EDUCATION  The patient has been educated in the following areas:   Dysphagia (Swallowing Impairment).              Time Calculation:   Time Calculation- SLP     Row Name 09/05/21 1444             Time Calculation- [facility]    SLP Start Time  1345  -      SLP Received On  09/05/21  -         Untimed Charges    90093-LV Eval Oral Pharyng Swallow Minutes  42  -         Total  Minutes    Untimed Charges Total Minutes  42  -SM       Total Minutes  42  -SM        User Key  (r) = Recorded By, (t) = Taken By, (c) = Cosigned By    Initials Name Provider Type    Elda Horton MS CCC-SLP Speech and Language Pathologist          Therapy Charges for Today     Code Description Service Date Service Provider Modifiers Qty    [id number] HC ST EVAL ORAL PHARYNG SWALLOW 3 9/5/2021 Elda Barahona MS EDISON-SLP GN 1               Elda Barahona MS CCC-SLP  9/5/2021

## 2021-09-05 NOTE — PROGRESS NOTES
[facility] Medicine Services  PROGRESS NOTE    Patient Name: Timothy Guzman  : 1975  MRN: [id number]    Date of Admission: 9/3/2021  Primary Care Physician: Toshia Mitchell APRN    Subjective   Subjective   CC:  Abdominal pain    HPI:  Patient sitting up in bed in NAD.  Told she can be discharged home her scopes were normal and she stated her pancreatitis hurt.  Patient received Dilaudid every 2 hours overnight, so medication dose decreased in interval increased for 14 hours.  LFTs and lipase were normal.    ROS:  Gen- No fevers, chills  CV- No chest pain, palpitations  Resp- No cough, dyspnea  GI- No N/V/D; +RUQ abd pain and right sided back pain(tender to touch)  All other systems reviewed pertinent positives and negatives are listed above in HPI and ROS    Objective   Objective     Vital Signs:   Temp:  [98.1 °F (36.7 °C)-98.7 °F (37.1 °C)] 98.7 °F (37.1 °C)  Heart Rate:  [] 76  Resp:  [17-18] 18  BP: (109-116)/(68-84) 111/68  Flow (L/min):  [2] 2     Physical Exam:  Constitutional: Alert, morbidly obese female sitting up in bed in NAD  Eyes: EOMI, sclerae anicteric, no conjunctival injection  Head: NCAT  ENT: [place], moist mucous membranes   Respiratory: Nonlabored, symmetrical chest expansion, CTAB 97% RA  Cardiovascular: RRR, HR 68, no M/R/G, +DP pulses bilaterally; right posterior chest tender to palpation  Gastrointestinal: Morbidly obese, soft, NT, ND +BS  Musculoskeletal: BLANCO; no LE edema bilaterally  Neurologic: Oriented x4, strength symmetric in all extremities, follows all commands, CN II-XII intact, speech clear  Skin: No rashes on exposed skin  Psychiatric: Pleasant and cooperative; normal affect    Results Reviewed:  LAB RESULTS:      Lab 21  0750 21  0607 21  2347 21  1534 21  0108 21  1912   WBC  --  6.53  --   --   --   --  10.72   HEMOGLOBIN 11.8* 12.4 13.0 14.3 14.3   < > 16.0*   HEMATOCRIT 35.4  37.4 39.1 41.6 42.0   < > 46.4   PLATELETS  --  115*  --   --   --   --  177   NEUTROS ABS  --  3.48  --   --   --   --  6.71   IMMATURE GRANS (ABS)  --  0.02  --   --   --   --  0.05   LYMPHS ABS  --  2.05  --   --   --   --  2.54   MONOS ABS  --  0.78  --   --   --   --  1.27*   EOS ABS  --  0.16  --   --   --   --  0.11   MCV  --  94.2  --   --   --   --  87.5    < > = values in this interval not displayed.         Lab 09/05/21  0607 09/04/21 0345 09/03/21 1912   SODIUM 138 134* 135*   POTASSIUM 3.8 4.1 3.5   CHLORIDE 110* 98 100   CO2 20.0* 22.0 20.0*   ANION GAP 8.0 14.0 15.0   BUN 7 11 10   CREATININE 1.04* 1.03* 0.97   GLUCOSE 117* 117* 117*   CALCIUM 8.2* 9.3 9.7         Lab 09/05/21  0607 09/04/21 0345 09/03/21 1912   TOTAL PROTEIN 6.1 6.9 7.5   ALBUMIN 3.70 4.10 4.40   GLOBULIN 2.4 2.8 3.1   ALT (SGPT) 26 27 23   AST (SGOT) 26 38* 23   BILIRUBIN 0.5 1.6* 0.7   ALK PHOS 151* 155* 164*   LIPASE  --   --  14         Lab 09/04/21 0108 09/03/21 1912   PROBNP  --  136.9   TROPONIN T <0.010 <0.010             Lab 09/04/21 0345   ABO TYPING A   RH TYPING Positive   ANTIBODY SCREEN Negative         Brief Urine Lab Results  (Last result in the past 365 days)      Color   Clarity   Blood   Leuk Est   Nitrite   Protein   CREAT   Urine HCG        07/15/21 0903               Negative           Microbiology Results Abnormal     Procedure Component Value - Date/Time    COVID PRE-OP / PRE-PROCEDURE SCREENING ORDER (NO ISOLATION) - Swab, Nasopharynx [[id number]]  (Normal) Collected: 09/04/21 0108    Lab Status: Final result Specimen: Swab from Nasopharynx Updated: 09/04/21 0230    Narrative:      The following orders were created for panel order COVID PRE-OP / PRE-PROCEDURE SCREENING ORDER (NO ISOLATION) - Swab, Nasopharynx.  Procedure                               Abnormality         Status                     ---------                               -----------         ------                      COVID-19,CEPHEID/RU,LE...[[id number]]  Normal              Final result                 Please view results for these tests on the individual orders.    COVID-19,CEPHEID/RU,[facility] IN-HOUSE(OR EMERGENT/ADD-ON),NP SWAB IN TRANSPORT MEDIA 3-4 HR TAT - Swab, Nasopharynx [[id number]]  (Normal) Collected: 09/04/21 0108    Lab Status: Final result Specimen: Swab from Nasopharynx Updated: 09/04/21 0230     COVID19 Not Detected    Narrative:      Fact sheet for providers: https://www.fda.gov/media/593797/download     Fact sheet for patients: https://www.fda.gov/media/564495/download  Fact sheet for providers: https://www.fda.gov/media/246205/download     Fact sheet for patients: https://www.fda.gov/media/848130/download          CT Abdomen Pelvis Without Contrast    Result Date: 9/4/2021  CT Abdomen Pelvis WO INDICATION: Acute onset of upper abdominal pain. TECHNIQUE: CT of the abdomen and pelvis without IV contrast. Coronal and sagittal reconstructions were obtained.  Radiation dose reduction techniques included automated exposure control or exposure modulation based on body size. Count of known CT and cardiac nuc med studies performed in previous 12 months: 4. COMPARISON: 6/18/2021 FINDINGS: There is a 6 mm left lower lobe lung nodule. This measured about 5 mm on 3/30/2020. This is likely benign. Additional follow-up with a repeat chest CT is recommended on or after 3/30/2022. There are bilateral subacute anterolateral rib fractures. These were previously identified on chest CT of 7/13/2021. Abdominal aorta is normal in caliber. Gallbladder is surgically absent. No biliary obstruction is identified. No definite renal or ureteral stones are seen. There is no hydronephrosis. Please note that characterization of the lower ureters is limited due to streak artifact from bilateral hip arthroplasties. Unenhanced solid abdominal organs are grossly normal. Urinary bladder is grossly normal allowing for streak artifact. Patient is  status post tubal ligation. The appendix is surgically absent. There is no evidence of acute colitis. There is no bowel obstruction. Stomach is normal. No free fluid or bulky adenopathy is seen.     Impression: 1. No clearly acute findings in the abdomen or pelvis. 2. Left lower lobe lung nodule. Chest CT follow-up on or after 3/30/2022 is recommended. 3. Cholecystectomy without biliary obstruction. 4. No acute findings in the GI tract. The appendix is surgically absent. 5. Additional findings as above. Signer Name: Allen Schulte MD  Signed: 9/4/2021 12:28 AM  Workstation Name: [email or web address]  [facility]    XR Chest 1 View    Result Date: 9/3/2021  CR Chest 1 Vw INDICATION: Chest pain COMPARISON:  None available. FINDINGS: Portable AP view(s) of the chest.  The heart and mediastinal contours are normal. The lungs are clear. No pneumothorax or pleural effusion.     Impression: No acute cardiopulmonary findings. Signer Name: Luz Marina Pace MD  Signed: 9/3/2021 10:30 PM  Workstation Name: [id number]  [facility]      Results for orders placed during the hospital encounter of 06/18/21    Adult Transthoracic Echo Complete W/ Cont if Necessary Per Protocol    Interpretation Summary  · The quality of the study is limited due to patient positioning and patient being intubated.  · Left ventricular ejection fraction appears to be 51 - 55%. Left ventricular systolic function is normal.  · Left ventricular diastolic function is consistent with (grade Ia w/high LAP) impaired relaxation.  · Mildly reduced right ventricular systolic function noted.      I have reviewed the medications:  Scheduled Meds:aspirin, 81 mg, Oral, Daily  bumetanide, 6 mg, Oral, Daily  cefTRIAXone, 1 g, Intravenous, Q24H  clopidogrel, 75 mg, Oral, Daily  FLUoxetine, 40 mg, Oral, Daily  lactulose, 30 g, Oral, TID  lidocaine, 1 patch, Transdermal, Q24H  OLANZapine, 10 mg, Oral, Nightly  pantoprazole, 40 mg,  Intravenous, Q12H  ranolazine, 1,000 mg, Oral, BID  rosuvastatin, 20 mg, Oral, Nightly  sodium chloride, 10 mL, Intravenous, Q12H  spironolactone, 100 mg, Oral, TID  tamsulosin, 0.4 mg, Oral, Daily  traZODone, 50 mg, Oral, Nightly  ursodiol, 600 mg, Oral, BID      Continuous Infusions:lactated ringers, 50 mL/hr, Last Rate: 50 mL/hr (09/04/21 1534)  octreotide (SandoSTATIN) infusion, 50 mcg/hr, Last Rate: 50 mcg/hr (09/04/21 1125)      PRN Meds:.HYDROmorphone  •  ipratropium-albuterol  •  melatonin  •  ondansetron  •  sodium chloride    Assessment/Plan   Assessment & Plan     Active Hospital Problems    Diagnosis  POA   • **Possible GI bleed [K92.2]  Yes   • Chronic pancreatitis (CMS/HCC) [K86.1]  Yes   • Anxiety and depression [F41.9, F32.9]  Yes   • Alcoholism (CMS/HCC) [F10.20]  Yes   • CAD (coronary artery disease) [I25.10]  Yes   • History of esophageal varices [Z87.19]  Not Applicable   • GERD (gastroesophageal reflux disease) [K21.9]  Yes   • Alcoholic cirrhosis of liver without ascites (CMS/HCC) [K70.30]  Yes      Resolved Hospital Problems   No resolved problems to display.        Brief Hospital Course to date:  Timothy Guzman is a 45 y.o. female with a past medical history significant for alcoholic liver cirrhosis with esophageal varices, PBC, chronic pancreatitis, hypertension, CAD s/p stent placement on DAPT, and anxiety/depression. She is a former smoker. Presents today with complaints of right upper to epigastric abdominal pain progressively worsening over the past 2 days. Worse with PO intake. Patient was concerned today when she developed bright red blood in stool and vomited coffee ground like substance. She is followed by Dr. Giles per GI. States she called his office and was instructed to present to ED for further evaluation and treatment.    Records reviewed indicate that patient was admitted to [facility] in June 2020 with coffee ground emesis. She developed PEA arrest in the ED while receiving IV  contrast despite pre-medication. Had subsequent STEMI then underwent LHC with stent placement to the circumflex. Patient returned in July 2021 and underwent repeat LHC with stenting to stenosis of the circumflex distal to prior stenting. She volunteers that due to complicated cardiac course, she was taken off the transplant list at .  Currently there are no complaints of fever, cough, congestion, SOB, or chest pain, no diarrhea, constipation, dysuria or flank pain. No changes in skin or eye color. She is fully vaccinated for COVID-19. Will admit to inpatient.    These plans are new to me today    This patient's problems and plans were partially entered by my partner and updated as appropriate by me 09/05/21.    Possible GI Bleed:  - with history of esophageal varices and alcoholic cirrhosis ([place])  - EGD in June 2021 showed grade 1 esophageal varices in the lower 1/3 of the esophagus, 3 MM un largest diameter  - started on Octreotide gtt  - start Rocephin  - Protonix 40 mg q 12 hours  - H/H stable; stop every 4 hours H&H  - continue Lactulose 10 g TID, Xifaxan 550 mg TID, Bumex, spironolactone  --Regular diet; low-sodium  - GI consulted EGD and colonoscopy to normal  - avoid hepatotoxins  --Decrease Dilaudid dose and increase interval and then will stop all narcotics in 14 hours  --AM labs     CAD s/p STEMI:  - ANGELICA X2 in June and July 2021 respectively  - continue Plavix and aspirin given recent stenting  - continue statin, ranexa     Anxiety/depression:  - continue trazadone, fluoxetine, and olanzapine    DVT prophylaxis:  Mechanical DVT prophylaxis orders are present.       AM-PAC 6 Clicks Score (PT): 24 (09/04/21 2000)    Disposition: I expect the patient to be discharged tomorrow    CODE STATUS:   Code Status and Medical Interventions:   Ordered at: 09/04/21 0058     Level Of Support Discussed With:    Patient     Code Status:    CPR     Medical Interventions (Level of Support Prior to Arrest):    Full        Ernestina Nazario, APRN  09/05/21

## 2021-09-06 ENCOUNTER — READMISSION MANAGEMENT (OUTPATIENT)
Dept: CALL CENTER | Facility: HOSPITAL | Age: 46
End: 2021-09-06

## 2021-09-06 VITALS
HEART RATE: 68 BPM | DIASTOLIC BLOOD PRESSURE: 58 MMHG | SYSTOLIC BLOOD PRESSURE: 100 MMHG | BODY MASS INDEX: 35.33 KG/M2 | OXYGEN SATURATION: 97 % | HEIGHT: 63 IN | WEIGHT: 199.4 LBS | RESPIRATION RATE: 18 BRPM | TEMPERATURE: 98.4 F

## 2021-09-06 PROBLEM — K92.2 GI BLEED: Status: RESOLVED | Noted: 2020-03-30 | Resolved: 2021-09-06

## 2021-09-06 LAB
ALBUMIN SERPL-MCNC: 3.4 G/DL (ref 3.5–5.2)
ALBUMIN/GLOB SERPL: 1.5 G/DL
ALP SERPL-CCNC: 122 U/L (ref 39–117)
ALT SERPL W P-5'-P-CCNC: 18 U/L (ref 1–33)
ANION GAP SERPL CALCULATED.3IONS-SCNC: 10 MMOL/L (ref 5–15)
AST SERPL-CCNC: 15 U/L (ref 1–32)
BILIRUB SERPL-MCNC: 0.2 MG/DL (ref 0–1.2)
BUN SERPL-MCNC: 9 MG/DL (ref 6–20)
BUN/CREAT SERPL: 9 (ref 7–25)
CALCIUM SPEC-SCNC: 8.2 MG/DL (ref 8.6–10.5)
CHLORIDE SERPL-SCNC: 105 MMOL/L (ref 98–107)
CO2 SERPL-SCNC: 24 MMOL/L (ref 22–29)
CREAT SERPL-MCNC: 1 MG/DL (ref 0.57–1)
DEPRECATED RDW RBC AUTO: 42.5 FL (ref 37–54)
ERYTHROCYTE [DISTWIDTH] IN BLOOD BY AUTOMATED COUNT: 12.9 % (ref 12.3–15.4)
GFR SERPL CREATININE-BSD FRML MDRD: 60 ML/MIN/1.73
GLOBULIN UR ELPH-MCNC: 2.2 GM/DL
GLUCOSE SERPL-MCNC: 117 MG/DL (ref 65–99)
HCT VFR BLD AUTO: 34.3 % (ref 34–46.6)
HGB BLD-MCNC: 11.7 G/DL (ref 12–15.9)
LIPASE SERPL-CCNC: 25 U/L (ref 13–60)
MCH RBC QN AUTO: 30.7 PG (ref 26.6–33)
MCHC RBC AUTO-ENTMCNC: 34.1 G/DL (ref 31.5–35.7)
MCV RBC AUTO: 90 FL (ref 79–97)
PLATELET # BLD AUTO: 105 10*3/MM3 (ref 140–450)
PMV BLD AUTO: 11 FL (ref 6–12)
POTASSIUM SERPL-SCNC: 3.4 MMOL/L (ref 3.5–5.2)
PROT SERPL-MCNC: 5.6 G/DL (ref 6–8.5)
RBC # BLD AUTO: 3.81 10*6/MM3 (ref 3.77–5.28)
SODIUM SERPL-SCNC: 139 MMOL/L (ref 136–145)
WBC # BLD AUTO: 5.1 10*3/MM3 (ref 3.4–10.8)

## 2021-09-06 PROCEDURE — 80053 COMPREHEN METABOLIC PANEL: CPT | Performed by: NURSE PRACTITIONER

## 2021-09-06 PROCEDURE — 85027 COMPLETE CBC AUTOMATED: CPT | Performed by: NURSE PRACTITIONER

## 2021-09-06 PROCEDURE — 99239 HOSP IP/OBS DSCHRG MGMT >30: CPT | Performed by: NURSE PRACTITIONER

## 2021-09-06 PROCEDURE — 25010000002 HYDROMORPHONE PER 4 MG: Performed by: NURSE PRACTITIONER

## 2021-09-06 PROCEDURE — 83690 ASSAY OF LIPASE: CPT | Performed by: NURSE PRACTITIONER

## 2021-09-06 RX ORDER — OLANZAPINE 10 MG/1
10 TABLET ORAL NIGHTLY
Start: 2021-09-06

## 2021-09-06 RX ORDER — SPIRONOLACTONE 100 MG/1
100 TABLET, FILM COATED ORAL 3 TIMES DAILY
Status: ON HOLD
Start: 2021-09-06 | End: 2022-03-12 | Stop reason: SDUPTHER

## 2021-09-06 RX ADMIN — CLOPIDOGREL BISULFATE 75 MG: 75 TABLET ORAL at 09:01

## 2021-09-06 RX ADMIN — FLUOXETINE HYDROCHLORIDE 40 MG: 20 CAPSULE ORAL at 09:01

## 2021-09-06 RX ADMIN — SPIRONOLACTONE 100 MG: 25 TABLET ORAL at 09:01

## 2021-09-06 RX ADMIN — HYDROMORPHONE HYDROCHLORIDE 0.25 MG: 1 INJECTION, SOLUTION INTRAMUSCULAR; INTRAVENOUS; SUBCUTANEOUS at 09:01

## 2021-09-06 RX ADMIN — RANOLAZINE 1000 MG: 500 TABLET, EXTENDED RELEASE ORAL at 09:10

## 2021-09-06 RX ADMIN — TAMSULOSIN HYDROCHLORIDE 0.4 MG: 0.4 CAPSULE ORAL at 09:01

## 2021-09-06 RX ADMIN — BUMETANIDE 6 MG: 2 TABLET ORAL at 09:10

## 2021-09-06 RX ADMIN — PANTOPRAZOLE SODIUM 40 MG: 40 INJECTION, POWDER, FOR SOLUTION INTRAVENOUS at 12:31

## 2021-09-06 RX ADMIN — ASPIRIN 81 MG: 81 TABLET, CHEWABLE ORAL at 09:01

## 2021-09-06 RX ADMIN — SODIUM CHLORIDE, PRESERVATIVE FREE 10 ML: 5 INJECTION INTRAVENOUS at 09:01

## 2021-09-06 RX ADMIN — URSODIOL 600 MG: 300 CAPSULE ORAL at 09:10

## 2021-09-06 RX ADMIN — RIFAXIMIN 550 MG: 550 TABLET ORAL at 09:01

## 2021-09-06 RX ADMIN — LACTULOSE 30 G: 20 SOLUTION ORAL at 09:01

## 2021-09-06 NOTE — PLAN OF CARE
Inpatient RN   Plan of Care Update  ________________________________________________    Patient Name:  Timothy Guzman  YOB: 1975  MRN: [id number]  Admit Date:  9/3/2021      Assessment Date:  9/6/2021    Vital Signs stable, Sinus Rythym, Pt currently on room air Pt has been sleeping well with a Pain status of well controlled and finding no nausea and no vomiting.    Pt orientated to person, place, time and situation with LOC of alert.    UOP adequate.    Pt has no requests at this time.   Will continue to monitor.         Electronically signed by:  GIOVANNA CASTILLO RN  09/06/21 05:38 EDT    Problem: Adult Inpatient Plan of Care  Goal: Plan of Care Review  Outcome: Ongoing, Progressing  Goal: Patient-Specific Goal (Individualized)  Outcome: Ongoing, Progressing  Goal: Absence of Hospital-Acquired Illness or Injury  Outcome: Ongoing, Progressing  Intervention: Identify and Manage Fall Risk  Recent Flowsheet Documentation  Taken 9/6/2021 0200 by Giovanna Castillo RN  Safety Promotion/Fall Prevention:   activity supervised   fall prevention program maintained   assistive device/personal items within reach   clutter free environment maintained   nonskid shoes/slippers when out of bed   room organization consistent   safety round/check completed  Taken 9/6/2021 0000 by Giovanna Castillo RN  Safety Promotion/Fall Prevention:   activity supervised   assistive device/personal items within reach   clutter free environment maintained   fall prevention program maintained   nonskid shoes/slippers when out of bed   room organization consistent   safety round/check completed  Taken 9/5/2021 2200 by Giovanna Castillo RN  Safety Promotion/Fall Prevention:   activity supervised   assistive device/personal items within reach   clutter free environment maintained   fall prevention program maintained   nonskid shoes/slippers when out of bed   room organization consistent   safety round/check completed  Taken 9/5/2021 2000 by Christina  DAISY Heredia  Safety Promotion/Fall Prevention:   activity supervised   assistive device/personal items within reach   clutter free environment maintained   fall prevention program maintained   nonskid shoes/slippers when out of bed   room organization consistent   safety round/check completed  Intervention: Prevent Skin Injury  Recent Flowsheet Documentation  Taken 9/6/2021 0400 by Giovanna Vasquez RN  Skin Protection:   adhesive use limited   incontinence pads utilized   transparent dressing maintained   tubing/devices free from skin contact  Taken 9/6/2021 0200 by Giovanna Vasquez RN  Body Position: position changed independently  Skin Protection:   adhesive use limited   incontinence pads utilized   transparent dressing maintained   tubing/devices free from skin contact  Taken 9/6/2021 0000 by Giovanna Vasquez RN  Body Position: position changed independently  Skin Protection:   adhesive use limited   incontinence pads utilized   transparent dressing maintained   tubing/devices free from skin contact  Taken 9/5/2021 2200 by Giovanna Vasquez RN  Body Position: position changed independently  Skin Protection:   adhesive use limited   incontinence pads utilized   skin-to-device areas padded   skin-to-skin areas padded  Taken 9/5/2021 2000 by Giovanna Vasquez RN  Body Position: position changed independently  Skin Protection:   adhesive use limited   incontinence pads utilized   transparent dressing maintained   tubing/devices free from skin contact  Intervention: Prevent Infection  Recent Flowsheet Documentation  Taken 9/6/2021 0200 by Giovanna Vasquez RN  Infection Prevention:   hand hygiene promoted   personal protective equipment utilized   rest/sleep promoted  Taken 9/6/2021 0000 by Giovanna Vasquez RN  Infection Prevention:   hand hygiene promoted   personal protective equipment utilized   rest/sleep promoted  Taken 9/5/2021 2200 by Giovanna Vasquez RN  Infection Prevention:   hand hygiene promoted   personal protective equipment  utilized   rest/sleep promoted  Taken 9/5/2021 2000 by Giovanna Vasquez, RN  Infection Prevention:   hand hygiene promoted   personal protective equipment utilized   rest/sleep promoted  Goal: Optimal Comfort and Wellbeing  Outcome: Ongoing, Progressing  Intervention: Provide Person-Centered Care  Recent Flowsheet Documentation  Taken 9/5/2021 2000 by Giovanna Vasquez, RN  Trust Relationship/Rapport:   care explained   choices provided   empathic listening provided   questions answered   emotional support provided   questions encouraged  Goal: Readiness for Transition of Care  Outcome: Ongoing, Progressing     Problem: Pain Acute  Goal: Optimal Pain Control  Outcome: Ongoing, Progressing  Intervention: Develop Pain Management Plan  Recent Flowsheet Documentation  Taken 9/5/2021 2350 by Giovanna Vasquez, RN  Pain Management Interventions:   see MAR   quiet environment facilitated   relaxation techniques promoted  Intervention: Optimize Psychosocial Wellbeing  Recent Flowsheet Documentation  Taken 9/5/2021 2000 by Giovanna Vasquez, RN  Diversional Activities: television   Goal Outcome Evaluation:

## 2021-09-06 NOTE — DISCHARGE SUMMARY
[facility] Medicine Services  DISCHARGE SUMMARY    Patient Name: Timothy Guzman  : 1975  MRN: [id number]    Date of Admission: 9/3/2021 11:36 PM  Date of Discharge: 2021  Primary Care Physician: Toshia Mitchell APRN    Consults     Date and Time Order Name Status Description    2021  2:50 AM Inpatient Gastroenterology Consult Completed           Hospital Course     Presenting Problem:   GI bleed [K92.2]    Active Hospital Problems    Diagnosis  POA   • Chronic pancreatitis (CMS/HCC) [K86.1]  Yes   • Anxiety and depression [F41.9, F32.9]  Yes   • Alcoholism (CMS/HCC) [F10.20]  Yes   • CAD (coronary artery disease) [I25.10]  Yes   • History of esophageal varices [Z87.19]  Not Applicable   • GERD (gastroesophageal reflux disease) [K21.9]  Yes   • Alcoholic cirrhosis of liver without ascites (CMS/HCC) [K70.30]  Yes      Resolved Hospital Problems    Diagnosis Date Resolved POA   • **Possible GI bleed [K92.2] 2021 Yes      Hospital Course:  Timothy Guzman is a 45 y.o. female with a past medical history significant for alcoholic liver cirrhosis with esophageal varices, PBC, chronic pancreatitis, hypertension, CAD s/p stent placement on DAPT, and anxiety/depression. She is a former smoker. Presents today with complaints of right upper to epigastric abdominal pain progressively worsening over the past 2 days. Worse with PO intake. Patient was concerned today when she developed bright red blood in stool and vomited coffee ground like substance. She is followed by Dr. Giles per GI. States she called his office and was instructed to present to ED for further evaluation and treatment.     Records reviewed indicate that patient was admitted to [facility] in 2020 with coffee ground emesis. She developed PEA arrest in the ED while receiving IV contrast despite pre-medication. Had subsequent STEMI then underwent LHC with stent placement to the circumflex. Patient returned in  July 2021 and underwent repeat C with stenting to stenosis of the circumflex distal to prior stenting. She volunteers that due to complicated cardiac course, she was taken off the transplant list at .  Currently there are no complaints of fever, cough, congestion, SOB, or chest pain, no diarrhea, constipation, dysuria or flank pain. No changes in skin or eye color. She is fully vaccinated for COVID-19. Will admit to inpatient.     Possible GI Bleed:  - with history of esophageal varices and alcoholic cirrhosis ([place])  - EGD in June 2021 showed grade 1 esophageal varices in the lower 1/3 of the esophagus, 3 MM un largest diameter  - started on Octreotide gtt  - stopped Rocephin  - Protonix 40 mg q 12 hours; stopped at discharge  - H/H stable; stop every 4 hours H&H  - continue Lactulose 10 g TID, Xifaxan 550 mg TID, Bumex, spironolactone  --Regular diet; low-sodium  - GI consulted EGD and colonoscopy to normal; repeat EGD in 1 year  - avoid hepatotoxins  --All narcotics stopped after 14 hours  --AM labs improving     CAD s/p STEMI:  - ANGELICA X2 in June and July 2021 respectively  - continue Plavix and aspirin given recent stenting  - continue statin, ranexa     Anxiety/depression:  - continue trazadone, fluoxetine, and olanzapine    Patient be discharged home today and family will transport.  Discharge follow-up recommendations and appointments are listed below.    Discharge Follow Up Recommendations for outpatient labs/diagnostics:  --Follow-up with your family doctor in 1 week  --Follow-up with SYED Brooks on 9/30/2021  --Follow-up with mother and stepfatherISSAC on 10/25  --Repeat EGD in 1 year  Day of Discharge     HPI:   Patient may be awakened for exam this morning, but states she still does not feel well.  Explained that this may be a chronic state and that nothing could be found.  Speech exam was normal as was her EGD and colonoscopy.  Patient [facility] up with GI at [facility].    Review of Systems  Gen- No  fevers, chills; +general malaise  CV- No chest pain, palpitations  Resp- No cough, dyspnea  GI- No N/V/D; +chronic epigastric discomfort    All other systems are reviewed and pertinent positives and negatives are listed above in HPI or ROS  Vital Signs:   Temp:  [97.4 °F (36.3 °C)-99.4 °F (37.4 °C)] 98.4 °F (36.9 °C)  Heart Rate:  [68-83] 68  Resp:  [14-20] 18  BP: (100-126)/(54-85) 100/58   Physical Exam:  Constitutional: Alert, morbidly obese female sitting up in bed in NAD  Eyes: EOMI, sclerae anicteric, no conjunctival injection  Head: NCAT  ENT: [place], moist mucous membranes   Respiratory: Nonlabored, symmetrical chest expansion, CTAB 97% RA  Cardiovascular: RRR, HR 68, no M/R/G, +DP pulses bilaterally; right posterior chest tender to palpation  Gastrointestinal: Morbidly obese, soft, NT, ND +BS  Musculoskeletal: BLANCO; no LE edema bilaterally  Neurologic: Oriented x4, strength symmetric in all extremities, follows all commands, CN II-XII intact, speech clear  Skin: No rashes on exposed skin  Psychiatric: Pleasant and cooperative; normal affect  Pertinent  and/or Most Recent Results     LAB RESULTS:      Lab 09/06/21  0718 09/05/21  0750 09/05/21  0607 09/04/21  2347 09/04/21 2020 09/04/21  0108 09/03/21  1912   WBC 5.10  --  6.53  --   --   --  10.72   HEMOGLOBIN 11.7* 11.8* 12.4 13.0 14.3   < > 16.0*   HEMATOCRIT 34.3 35.4 37.4 39.1 41.6   < > 46.4   PLATELETS 105*  --  115*  --   --   --  177   NEUTROS ABS  --   --  3.48  --   --   --  6.71   IMMATURE GRANS (ABS)  --   --  0.02  --   --   --  0.05   LYMPHS ABS  --   --  2.05  --   --   --  2.54   MONOS ABS  --   --  0.78  --   --   --  1.27*   EOS ABS  --   --  0.16  --   --   --  0.11   MCV 90.0  --  94.2  --   --   --  87.5    < > = values in this interval not displayed.         Lab 09/06/21  0718 09/05/21  0607 09/04/21  0345 09/03/21 1912   SODIUM 139 138 134* 135*   POTASSIUM 3.4* 3.8 4.1 3.5   CHLORIDE 105 110* 98 100   CO2 24.0 20.0* 22.0 20.0*   ANION  GAP 10.0 8.0 14.0 15.0   BUN 9 7 11 10   CREATININE 1.00 1.04* 1.03* 0.97   GLUCOSE 117* 117* 117* 117*   CALCIUM 8.2* 8.2* 9.3 9.7         Lab 09/06/21  0718 09/05/21  0607 09/04/21  0345 09/03/21 1912   TOTAL PROTEIN 5.6* 6.1 6.9 7.5   ALBUMIN 3.40* 3.70 4.10 4.40   GLOBULIN 2.2 2.4 2.8 3.1   ALT (SGPT) 18 26 27 23   AST (SGOT) 15 26 38* 23   BILIRUBIN 0.2 0.5 1.6* 0.7   ALK PHOS 122* 151* 155* 164*   LIPASE 25  --   --  14         Lab 09/04/21  0108 09/03/21 1912   PROBNP  --  136.9   TROPONIN T <0.010 <0.010             Lab 09/04/21 0345   ABO TYPING A   RH TYPING Positive   ANTIBODY SCREEN Negative         Brief Urine Lab Results  (Last result in the past 365 days)      Color   Clarity   Blood   Leuk Est   Nitrite   Protein   CREAT   Urine HCG        07/15/21 0903               Negative         Microbiology Results (last 10 days)     Procedure Component Value - Date/Time    COVID PRE-OP / PRE-PROCEDURE SCREENING ORDER (NO ISOLATION) - Swab, Nasopharynx [[id number]]  (Normal) Collected: 09/04/21 0108    Lab Status: Final result Specimen: Swab from Nasopharynx Updated: 09/04/21 0230    Narrative:      The following orders were created for panel order COVID PRE-OP / PRE-PROCEDURE SCREENING ORDER (NO ISOLATION) - Swab, Nasopharynx.  Procedure                               Abnormality         Status                     ---------                               -----------         ------                     COVID-19,CEPHEID/RU,LE...[[id number]]  Normal              Final result                 Please view results for these tests on the individual orders.    COVID-19,CEPHEID/RU,JAVON IN-HOUSE(OR EMERGENT/ADD-ON),NP SWAB IN TRANSPORT MEDIA 3-4 HR TAT - Swab, Nasopharynx [[id number]]  (Normal) Collected: 09/04/21 0108    Lab Status: Final result Specimen: Swab from Nasopharynx Updated: 09/04/21 0230     COVID19 Not Detected    Narrative:      Fact sheet for providers: https://www.fda.gov/media/451599/download     Fact  sheet for patients: https://www.fda.gov/media/349702/download  Fact sheet for providers: https://www.fda.gov/media/240402/download     Fact sheet for patients: https://www.fda.gov/media/307620/download          CT Abdomen Pelvis Without Contrast    Result Date: 9/4/2021  CT Abdomen Pelvis WO INDICATION: Acute onset of upper abdominal pain. TECHNIQUE: CT of the abdomen and pelvis without IV contrast. Coronal and sagittal reconstructions were obtained.  Radiation dose reduction techniques included automated exposure control or exposure modulation based on body size. Count of known CT and cardiac nuc med studies performed in previous 12 months: 4. COMPARISON: 6/18/2021 FINDINGS: There is a 6 mm left lower lobe lung nodule. This measured about 5 mm on 3/30/2020. This is likely benign. Additional follow-up with a repeat chest CT is recommended on or after 3/30/2022. There are bilateral subacute anterolateral rib fractures. These were previously identified on chest CT of 7/13/2021. Abdominal aorta is normal in caliber. Gallbladder is surgically absent. No biliary obstruction is identified. No definite renal or ureteral stones are seen. There is no hydronephrosis. Please note that characterization of the lower ureters is limited due to streak artifact from bilateral hip arthroplasties. Unenhanced solid abdominal organs are grossly normal. Urinary bladder is grossly normal allowing for streak artifact. Patient is status post tubal ligation. The appendix is surgically absent. There is no evidence of acute colitis. There is no bowel obstruction. Stomach is normal. No free fluid or bulky adenopathy is seen.     1. No clearly acute findings in the abdomen or pelvis. 2. Left lower lobe lung nodule. Chest CT follow-up on or after 3/30/2022 is recommended. 3. Cholecystectomy without biliary obstruction. 4. No acute findings in the GI tract. The appendix is surgically absent. 5. Additional findings as above. Signer Name: Allen  MD Franca  Signed: 9/4/2021 12:28 AM  Workstation Name: [email or web address]  [facility]    XR Chest 1 View    Result Date: 9/3/2021  CR Chest 1 Vw INDICATION: Chest pain COMPARISON:  None available. FINDINGS: Portable AP view(s) of the chest.  The heart and mediastinal contours are normal. The lungs are clear. No pneumothorax or pleural effusion.     No acute cardiopulmonary findings. Signer Name: Luz Marina Pace MD  Signed: 9/3/2021 10:30 PM  Workstation Name: [id number]  [facility]      Results for orders placed during the hospital encounter of 07/12/21    Duplex Venous Lower Extremity - Bilateral CAR    Interpretation Summary  · The bilateral lower extremity venous duplex scan was negative for evidence of a DVT and SVT.      Results for orders placed during the hospital encounter of 07/12/21    Duplex Venous Lower Extremity - Bilateral CAR    Interpretation Summary  · The bilateral lower extremity venous duplex scan was negative for evidence of a DVT and SVT.      Results for orders placed during the hospital encounter of 06/18/21    Adult Transthoracic Echo Complete W/ Cont if Necessary Per Protocol    Interpretation Summary  · The quality of the study is limited due to patient positioning and patient being intubated.  · Left ventricular ejection fraction appears to be 51 - 55%. Left ventricular systolic function is normal.  · Left ventricular diastolic function is consistent with (grade Ia w/high LAP) impaired relaxation.  · Mildly reduced right ventricular systolic function noted.      Plan for Follow-up of Pending Labs/Results: None    Discharge Details        Discharge Medications      Changes to Medications      Instructions Start Date   bumetanide 1 MG tablet  Commonly known as: BUMEX  What changed:   how much to take  additional instructions   2 mg, Oral, Daily      OLANZapine 10 MG tablet  Commonly known as: zyPREXA  What changed:   medication strength  how much to  take   10 mg, Oral, Nightly      spironolactone 50 MG tablet  Commonly known as: ALDACTONE  What changed: when to take this   100 mg, Oral, Daily      spironolactone 100 MG tablet  Commonly known as: ALDACTONE  What changed: You were already taking a medication with the same name, and this prescription was added. Make sure you understand how and when to take each.   100 mg, Oral, 3 Times Daily         Continue These Medications      Instructions Start Date   aspirin 81 MG EC tablet   81 mg, Oral, Daily      clopidogrel 75 MG tablet  Commonly known as: PLAVIX   75 mg, Oral, Daily      docusate sodium 100 MG capsule  Commonly known as: COLACE   100 mg, Oral, 2 Times Daily PRN      FeroSul 325 (65 FE) MG tablet  Generic drug: ferrous sulfate   1 tablet, Oral, Daily      FLUoxetine 40 MG capsule  Commonly known as: PROzac   40 mg, Oral, Daily      folic acid 1 MG tablet  Commonly known as: FOLVITE   1 mg, Oral, Daily      ipratropium-albuterol 0.5-2.5 mg/3 ml nebulizer  Commonly known as: DUO-NEB   3 mL, Nebulization, Every 6 Hours PRN      lactulose 10 GM/15ML solution  Commonly known as: CHRONULAC   30 g, Oral, 3 Times Daily      magnesium oxide 400 (241.3 Mg) MG tablet tablet  Commonly known as: MAGOX   400 mg, Oral, Every Evening      Melatonin 10 MG tablet   1 tablet, Oral, Nightly      Multivitamin tablet tablet  Generic drug: multivitamin   No dose, route, or frequency recorded.      ondansetron 4 MG tablet  Commonly known as: Zofran   4 mg, Oral, Every 8 Hours PRN      potassium chloride 20 MEQ CR tablet  Commonly known as: K-DUR,KLOR-CON   1 tablet, Oral, Daily      ranolazine 1000 MG 12 hr tablet  Commonly known as: RANEXA   1,000 mg, Oral, 2 Times Daily      rosuvastatin 20 MG tablet  Commonly known as: CRESTOR   20 mg, Oral, Nightly      tamsulosin 0.4 MG capsule 24 hr capsule  Commonly known as: FLOMAX   0.4 mg, Oral, Daily      traZODone 50 MG tablet  Commonly known as: DESYREL   50 mg, Oral, Nightly       ursodiol 300 MG capsule  Commonly known as: ACTIGALL   600 mg, Oral, 2 times daily      vitamin b complex capsule capsule   1 capsule, Oral, Daily      vitamin B-6 25 MG tablet  Commonly known as: PYRIDOXINE   25 mg, Oral, Daily      Xifaxan 550 MG tablet  Generic drug: riFAXIMin   No dose, route, or frequency recorded.             Allergies   Allergen Reactions   • Contrast Dye Anaphylaxis     6/18 Pt coded after receiving contrast for a CT scan. Was premedicated. Was having an MI at the same time. Immediately underwent heart cath with contrast without additional allergic reaction  7/15 Pt premedicated with prednisone/Benadryl for heart cath. Still with anaphylactic-like reaction with drop in BP and hypoxia. Treated 95% stenosis with minimal dye and supported with epinephrine, solumedrol, NRB   • Nsaids GI Bleeding   • Tylenol [Acetaminophen] Other (See Comments)     CIRRHOSIS         Discharge Disposition:  Home or Self Care    Diet:  Hospital:  Diet Order   Procedures   • Diet Regular; Low Sodium       Activity:  Activity Instructions     Activity as Tolerated            Restrictions or Other Recommendations:  None       CODE STATUS:    Code Status and Medical Interventions:   Ordered at: 09/04/21 0058     Level Of Support Discussed With:    Patient     Code Status:    CPR     Medical Interventions (Level of Support Prior to Arrest):    Full       Future Appointments   Date Time Provider Department Center   9/30/2021  9:00 AM Sapphire Ambriz PA-C [facility]   10/25/2021 12:00 PM Nelosn Yip APRN [facility] [facility]   4/4/2022  9:15 AM Vikram Gonzales MD [facility]       Additional Instructions for the Follow-ups that You Need to Schedule     Discharge Follow-up with PCP   As directed       Currently Documented PCP:    Toshia Mitchell APRN    PCP Phone Number:    [phone]     Follow Up Details: 1 week; please schedule appointment prior to patient's discharge         Discharge Follow-up  with Specified Provider: Follow-up with SYED Brooks on 9/30/2021 as already scheduled   As directed      To: Follow-up with SYED Brooks on 9/30/2021 as already scheduled         Discharge Follow-up with Specified Provider: Follow-up with ISSAC Leonardo on 10/28/2021 as already scheduled   As directed      To: Follow-up with ISSAC Leonardo on 10/28/2021 as already scheduled                 ISSAC Quigley  09/06/21      Time Spent on Discharge:  I spent 35 minutes on this discharge activity which included: face-to-face encounter with the patient, reviewing the data in the system, coordination of the care with the nursing staff as well as consultants, documentation, and entering orders.

## 2021-09-07 NOTE — OUTREACH NOTE
Prep Survey      Responses   [facility] facility patient discharged from?  [place]   Is LACE score < 7 ?  No   Emergency Room discharge w/ pulse ox?  No   Eligibility  Readm Mgmt   Discharge diagnosis  Possible GI Bleed:   Does the patient have one of the following disease processes/diagnoses(primary or secondary)?  Other   Does the patient have Home health ordered?  No   Is there a DME ordered?  No   Prep survey completed?  Yes          Tracy Moralez RN

## 2021-09-09 ENCOUNTER — READMISSION MANAGEMENT (OUTPATIENT)
Dept: CALL CENTER | Facility: HOSPITAL | Age: 46
End: 2021-09-09

## 2021-09-09 NOTE — OUTREACH NOTE
Medical Week 1 Survey      Responses   [facility] patient discharged from?  [place]   Does the patient have one of the following disease processes/diagnoses(primary or secondary)?  Other   Week 1 attempt successful?  Yes   Call start time  1544   Call end time  1546   Discharge diagnosis  Possible GI Bleed   Meds reviewed with patient/caregiver?  Yes   Is the patient having any side effects they believe may be caused by any medication additions or changes?  No   Does the patient have all medications ordered at discharge?  N/A   Is the patient taking all medications as directed (includes completed medication regime)?  Yes   Does the patient have a primary care provider?   Yes   Does the patient have an appointment with their PCP within 7 days of discharge?  No   What is preventing the patient from scheduling follow up appointments within 7 days of discharge?  Haven't had time   Nursing Interventions  Advised patient to make appointment   Has the patient kept scheduled appointments due by today?  N/A   Psychosocial issues?  No   Did the patient receive a copy of their discharge instructions?  Yes   Nursing interventions  Reviewed instructions with patient   What is the patient's perception of their health status since discharge?  Improving   Is the patient/caregiver able to teach back signs and symptoms related to disease process for when to call PCP?  Yes   Is the patient/caregiver able to teach back signs and symptoms related to disease process for when to call 911?  Yes   Is the patient/caregiver able to teach back the hierarchy of who to call/visit for symptoms/problems? PCP, Specialist, Home health nurse, Urgent Care, ED, 911  Yes   If the patient is a current smoker, are they able to teach back resources for cessation?  Not a smoker   Week 1 call completed?  Yes          Lindsey Almanza RN

## 2021-09-17 ENCOUNTER — READMISSION MANAGEMENT (OUTPATIENT)
Dept: CALL CENTER | Facility: HOSPITAL | Age: 46
End: 2021-09-17

## 2021-09-17 NOTE — OUTREACH NOTE
Medical Week 2 Survey      Responses   [facility] patient discharged from?  [place]   Does the patient have one of the following disease processes/diagnoses(primary or secondary)?  Other   Week 2 attempt successful?  No   Unsuccessful attempts  Attempt 1   Discharge diagnosis  Possible GI Bleed          Enriqueta Ruelas RN

## 2021-09-22 ENCOUNTER — READMISSION MANAGEMENT (OUTPATIENT)
Dept: CALL CENTER | Facility: HOSPITAL | Age: 46
End: 2021-09-22

## 2021-09-22 NOTE — OUTREACH NOTE
Medical Week 2 Survey      Responses   [facility] patient discharged from?  [place]   Does the patient have one of the following disease processes/diagnoses(primary or secondary)?  Other   Week 2 attempt successful?  No   Unsuccessful attempts  Attempt 2          Lyn Alvarado RN

## 2021-09-30 ENCOUNTER — LAB (OUTPATIENT)
Dept: LAB | Facility: HOSPITAL | Age: 46
End: 2021-09-30

## 2021-09-30 ENCOUNTER — OFFICE VISIT (OUTPATIENT)
Dept: NEUROLOGY | Facility: CLINIC | Age: 46
End: 2021-09-30

## 2021-09-30 VITALS
HEIGHT: 63 IN | SYSTOLIC BLOOD PRESSURE: 140 MMHG | HEART RATE: 93 BPM | WEIGHT: 199 LBS | DIASTOLIC BLOOD PRESSURE: 100 MMHG | BODY MASS INDEX: 35.26 KG/M2 | OXYGEN SATURATION: 98 %

## 2021-09-30 DIAGNOSIS — R41.3 MEMORY LOSS: Primary | ICD-10-CM

## 2021-09-30 DIAGNOSIS — M54.9 BACK PAIN, UNSPECIFIED BACK LOCATION, UNSPECIFIED BACK PAIN LATERALITY, UNSPECIFIED CHRONICITY: ICD-10-CM

## 2021-09-30 LAB
AMMONIA BLD-SCNC: 43 UMOL/L (ref 11–51)
FOLATE SERPL-MCNC: 17 NG/ML (ref 4.78–24.2)
HIV1+2 AB SER QL: NORMAL
TSH SERPL DL<=0.05 MIU/L-ACNC: 3.14 UIU/ML (ref 0.27–4.2)
VIT B12 BLD-MCNC: 621 PG/ML (ref 211–946)

## 2021-09-30 PROCEDURE — 84155 ASSAY OF PROTEIN SERUM: CPT | Performed by: PHYSICIAN ASSISTANT

## 2021-09-30 PROCEDURE — 86592 SYPHILIS TEST NON-TREP QUAL: CPT | Performed by: PHYSICIAN ASSISTANT

## 2021-09-30 PROCEDURE — 84443 ASSAY THYROID STIM HORMONE: CPT | Performed by: PHYSICIAN ASSISTANT

## 2021-09-30 PROCEDURE — 84207 ASSAY OF VITAMIN B-6: CPT | Performed by: PHYSICIAN ASSISTANT

## 2021-09-30 PROCEDURE — 87798 DETECT AGENT NOS DNA AMP: CPT | Performed by: PHYSICIAN ASSISTANT

## 2021-09-30 PROCEDURE — 82140 ASSAY OF AMMONIA: CPT | Performed by: PHYSICIAN ASSISTANT

## 2021-09-30 PROCEDURE — 84165 PROTEIN E-PHORESIS SERUM: CPT | Performed by: PHYSICIAN ASSISTANT

## 2021-09-30 PROCEDURE — 82607 VITAMIN B-12: CPT | Performed by: PHYSICIAN ASSISTANT

## 2021-09-30 PROCEDURE — 82746 ASSAY OF FOLIC ACID SERUM: CPT | Performed by: PHYSICIAN ASSISTANT

## 2021-09-30 PROCEDURE — 99215 OFFICE O/P EST HI 40 MIN: CPT | Performed by: PHYSICIAN ASSISTANT

## 2021-09-30 PROCEDURE — 36415 COLL VENOUS BLD VENIPUNCTURE: CPT | Performed by: PHYSICIAN ASSISTANT

## 2021-09-30 PROCEDURE — G0432 EIA HIV-1/HIV-2 SCREEN: HCPCS | Performed by: PHYSICIAN ASSISTANT

## 2021-09-30 RX ORDER — PANTOPRAZOLE SODIUM 40 MG/1
40 TABLET, DELAYED RELEASE ORAL
COMMUNITY
Start: 2021-08-03 | End: 2021-11-15 | Stop reason: SDUPTHER

## 2021-09-30 RX ORDER — METHION/INOS/CHOL BT/B COM/LIV 110MG-86MG
100 CAPSULE ORAL
COMMUNITY
Start: 2021-05-07 | End: 2022-01-12

## 2021-09-30 RX ORDER — IBUPROFEN 200 MG
400 TABLET ORAL
COMMUNITY
Start: 2021-08-03 | End: 2021-09-30

## 2021-09-30 RX ORDER — GABAPENTIN 300 MG/1
300 CAPSULE ORAL NIGHTLY
Qty: 30 CAPSULE | Refills: 5 | Status: SHIPPED | OUTPATIENT
Start: 2021-09-30 | End: 2022-01-12 | Stop reason: SDUPTHER

## 2021-09-30 NOTE — PROGRESS NOTES
"Subjective       Chief Complaint: memory loss      History of Present Illness   Timothy Guzman is a 45 y.o. female with past medical history significant for liver cirrhosis, esophageal varices, pancreatitis, and hypertension, who comes to clinic today for evaluation of memory loss . She initially noted symptoms since in 6/21 when she was hospitalized at [facility] for a STEMI. While receiving IV contrast in the ED, she developed PEA arrest for several minutes. Since this time, she has noted forgetfulness  and word-finding difficulties . This has remained static  over time. Additional symptoms have included impairments in short term memory . There have been associated  symptoms of depression . She denies  impairments in ADL's. She manages her medications and finances. She is currently residing with family.     She also reports significant back pain as well as constant burning, numbness, and tingling in her feet bilaterally. This has worsened over time.     She has a history of alcohol abuse, though notes that she has been sober for 1.5 years.     She is currently working with SLP for cognitive rehabilitation and PT/OT for her back pain.      I have reviewed and confirmed the past family, social and medical history as accurate on 9/30/21.     Review of Systems   Constitutional: Negative.    HENT: Negative.    Eyes: Negative.    Respiratory: Negative.    Cardiovascular: Negative.    Gastrointestinal: Negative.    Endocrine: Negative.    Genitourinary: Negative.    Musculoskeletal: Negative.    Skin: Negative.    Allergic/Immunologic: Negative.    Hematological: Negative.        Objective     /100   Pulse 93   Ht 160 cm (63\")   Wt 90.3 kg (199 lb)   SpO2 98%   BMI 35.25 kg/m²     General appearance today is normal.   Peripheral pulses were present and symmetric.  The ophthalmoscopic exam today is unremarkable. The discs and posterior elements are unremarkable.      Physical Exam  Neurological:      Coordination: " Finger-Nose-Finger Test and Heel to Ackerman Test normal.      Gait: Gait is intact.      Deep Tendon Reflexes:      Reflex Scores:       Patellar reflexes are 2+ on the right side and 2+ on the left side.  Psychiatric:         Speech: Speech normal.          Neurologic Exam     Mental Status   Oriented to person.   Oriented to place.   Disoriented to day and season. Oriented to year, month and date.   Registration: recalls 3 of 3 objects. Recall at 5 minutes: recalls 1 of 3 objects. Follows 2 step commands.   Attention: normal.   Speech: speech is normal   Level of consciousness: alert  Able to name object. Able to read. Able to repeat. Able to write. Normal comprehension.     Cranial Nerves   Cranial nerves II through XII intact.     Motor Exam   Muscle bulk: normal  Overall muscle tone: normal    Strength   Strength 5/5 except as noted. Mild weakness on left     Sensory Exam   Light touch normal.   Except for decreased sensation to light touch on left     Gait, Coordination, and Reflexes     Gait  Gait: normal    Coordination   Finger to nose coordination: normal  Heel to shin coordination: normal    Tremor   Resting tremor: absent    Reflexes   Right patellar: 2+  Left patellar: 2+        Results  MMSE=25      Assessment/Plan   Diagnoses and all orders for this visit:    1. Memory loss (Primary)  -     Folate  -     TSH  -     Vitamin B12  -     MRI Brain Without Contrast; Future  -     Ammonia  -     Tropheryma Whipplei PCR  -     HIV-1 / O / 2 Ag / Antibody 4th Generation  -     RPR  -     Vitamin B6  -     Protein Elec + Interp, Serum  -     EEG Awake or Drowsy Routine; Future    2. Back pain, unspecified back location, unspecified back pain laterality, unspecified chronicity  -     MRI Lumbar Spine Without Contrast; Future  -     gabapentin (NEURONTIN) 300 MG capsule; Take 1 capsule by mouth Every Night.  Dispense: 30 capsule; Refill: 5          Discussion/Summary   Timothy Guzman comes to clinic today for  evaluation of cognitive impairment, likely related to hypoxic injury and her history of alcoholic cirrhosis. This was discussed in detail. It was elected to obtain screening blood work  and an MRI of the brain as well as an EEG. It was also elected to obtain an MRI of the lumbar spine given her back pain and lower extremity numbness and paresthesias. After discussing potential treatment options, it was elected to add a low dose of GBP. She will continue working closely with outpatient rehab services. She will then follow up in 3 months , or sooner if needed.   Total time of visit today: 60 minutes. As part of this visit I reviewed prior lab results and reviewed records from prior hospitalizations which is incorporated in the HPI. I also discussed diagnosis, prognosis, diagnostic testing, evaluation, current status, treatment options and management as discussed above.       Current outpatient and discharge medications have been reconciled for the patient.  Reviewed by: SONIA Brooks PA-C

## 2021-10-01 LAB
ALBUMIN SERPL ELPH-MCNC: 4 G/DL (ref 2.9–4.4)
ALBUMIN/GLOB SERPL: 1.1 {RATIO} (ref 0.7–1.7)
ALPHA1 GLOB SERPL ELPH-MCNC: 0.3 G/DL (ref 0–0.4)
ALPHA2 GLOB SERPL ELPH-MCNC: 0.7 G/DL (ref 0.4–1)
B-GLOBULIN SERPL ELPH-MCNC: 1.3 G/DL (ref 0.7–1.3)
GAMMA GLOB SERPL ELPH-MCNC: 1.1 G/DL (ref 0.4–1.8)
GLOBULIN SER CALC-MCNC: 3.5 G/DL (ref 2.2–3.9)
LABORATORY COMMENT REPORT: NORMAL
M PROTEIN SERPL ELPH-MCNC: NORMAL G/DL
PROT PATTERN SERPL ELPH-IMP: NORMAL
PROT SERPL-MCNC: 7.5 G/DL (ref 6–8.5)
RPR SER QL: NORMAL

## 2021-10-06 LAB
T WHIPPLEI BY PCR, SOURCE: NORMAL
TROPHERYMA WHIPPLEI PCR: NOT DETECTED

## 2021-10-07 LAB — VIT B6 SERPL-MCNC: 8 UG/L (ref 2–32.8)

## 2021-10-08 ENCOUNTER — PATIENT ROUNDING (BHMG ONLY) (OUTPATIENT)
Dept: NEUROLOGY | Facility: CLINIC | Age: 46
End: 2021-10-08

## 2021-10-08 NOTE — PROGRESS NOTES
October 8, 2021    Hello, may I speak with Timothy Guzman?    My name is  Kandy     I am  with [facility]  [address] [facility] [address]  [place] [address] [phone].    Before we get started may I verify your date of birth? 1975    I am calling to officially welcome you to our practice and ask about your recent visit. Is this a good time to talk? YES    Tell me about your visit with us. What things went well?  THE VISIT WENT WELL        We're always looking for ways to make our patients' experiences even better. Do you have recommendations on ways we may improve?  NO    Overall were you satisfied with your first visit to our practice? YES       I appreciate you taking the time to speak with me today. Is there anything else I can do for you? NO      Thank you, and have a great day.

## 2021-10-11 ENCOUNTER — TELEPHONE (OUTPATIENT)
Dept: NEUROLOGY | Facility: CLINIC | Age: 46
End: 2021-10-11

## 2021-10-11 NOTE — TELEPHONE ENCOUNTER
Caller: Timothy Guzman    Relationship: Self    Best call back number:   [phone]  PLEASE LEAVE MSG IF NO ANSWER    Caller requesting test results: PT    What test was performed: LABS    When was the test performed: 9/30/21    Where was the test performed:  [facility]    Additional notes: PLEASE CALL PT WITH RESULTS FROM BLOODWORK

## 2021-10-29 ENCOUNTER — HOSPITAL ENCOUNTER (OUTPATIENT)
Dept: MRI IMAGING | Facility: HOSPITAL | Age: 46
Discharge: HOME | End: 2021-10-29

## 2021-10-29 DIAGNOSIS — R41.3 MEMORY LOSS: ICD-10-CM

## 2021-10-29 DIAGNOSIS — M54.9 BACK PAIN, UNSPECIFIED BACK LOCATION, UNSPECIFIED BACK PAIN LATERALITY, UNSPECIFIED CHRONICITY: ICD-10-CM

## 2021-10-29 PROCEDURE — 70551 MRI BRAIN STEM W/O DYE: CPT

## 2021-10-29 PROCEDURE — 72148 MRI LUMBAR SPINE W/O DYE: CPT

## 2021-11-02 ENCOUNTER — TELEPHONE (OUTPATIENT)
Dept: NEUROLOGY | Facility: CLINIC | Age: 46
End: 2021-11-02

## 2021-11-02 ENCOUNTER — TELEPHONE (OUTPATIENT)
Dept: NEUROLOGY | Facility: OTHER | Age: 46
End: 2021-11-02

## 2021-11-02 DIAGNOSIS — M54.50 LOW BACK PAIN, UNSPECIFIED BACK PAIN LATERALITY, UNSPECIFIED CHRONICITY, UNSPECIFIED WHETHER SCIATICA PRESENT: Primary | ICD-10-CM

## 2021-11-02 DIAGNOSIS — R20.2 NUMBNESS AND TINGLING OF BOTH FEET: ICD-10-CM

## 2021-11-02 DIAGNOSIS — R20.0 NUMBNESS AND TINGLING OF BOTH FEET: ICD-10-CM

## 2021-11-02 NOTE — TELEPHONE ENCOUNTER
Provider: SALLY HIRSCH  Caller: PATIENT  Relationship to Patient: SELF  Pharmacy: NA  Phone Number: [phone]  Reason for Call: PATIENT ASKING FOR A CALL TO GO OVER  MRI RESULTS. CALL GOT DISCONNECTED. ATTEMPTED TO CALL PATIENT BACK BUT COULD NOT HEAR ANYTHING WHEN SOMEONE ANSWERED. PLEASE ADVISE.   When was the patient last seen: 9-30-21

## 2021-11-02 NOTE — TELEPHONE ENCOUNTER
Caller: Timothy Guzman    Relationship: Self    Best call back number: [phone]    What was the call regarding: PATIENT CALLED BACK AGAIN- I ADV HER THAT A CALL BACK WILL BE MADE TO HER WITHIN 72 HOURS. SHE IS LOOKING TO GET HER MRI RESULTS. THANKS.     PLEASE CALL PT BACK.

## 2021-11-02 NOTE — TELEPHONE ENCOUNTER
PATIENT CALLED IN 3 TIMES, AND THE PHONE GOES DEAD AND I HEAR MYSELF ECHOING IN THE PHONE. SHE WAS ON THE PHONE WITH KAZ- AS HER CHART WAS LOCKED- SHE SAID SHE WAS TRYING TO GET MRI RESULTS.

## 2021-11-12 ENCOUNTER — LAB (OUTPATIENT)
Dept: LAB | Facility: HOSPITAL | Age: 46
End: 2021-11-12

## 2021-11-12 ENCOUNTER — OFFICE VISIT (OUTPATIENT)
Dept: GASTROENTEROLOGY | Facility: CLINIC | Age: 46
End: 2021-11-12

## 2021-11-12 VITALS
HEIGHT: 63 IN | DIASTOLIC BLOOD PRESSURE: 94 MMHG | BODY MASS INDEX: 37.99 KG/M2 | SYSTOLIC BLOOD PRESSURE: 137 MMHG | HEART RATE: 87 BPM | TEMPERATURE: 96.9 F | WEIGHT: 214.4 LBS

## 2021-11-12 DIAGNOSIS — K74.3 PRIMARY BILIARY CHOLANGITIS (HCC): ICD-10-CM

## 2021-11-12 DIAGNOSIS — K76.82 HEPATIC ENCEPHALOPATHY (HCC): ICD-10-CM

## 2021-11-12 DIAGNOSIS — K59.04 CHRONIC IDIOPATHIC CONSTIPATION: ICD-10-CM

## 2021-11-12 DIAGNOSIS — K70.31 ALCOHOLIC CIRRHOSIS OF LIVER WITH ASCITES (HCC): ICD-10-CM

## 2021-11-12 DIAGNOSIS — K70.31 ALCOHOLIC CIRRHOSIS OF LIVER WITH ASCITES (HCC): Primary | ICD-10-CM

## 2021-11-12 LAB
INR PPP: 0.72 (ref 0.85–1.16)
PROTHROMBIN TIME: 10 SECONDS (ref 11.4–14.4)

## 2021-11-12 PROCEDURE — 36415 COLL VENOUS BLD VENIPUNCTURE: CPT

## 2021-11-12 PROCEDURE — 80053 COMPREHEN METABOLIC PANEL: CPT

## 2021-11-12 PROCEDURE — 82105 ALPHA-FETOPROTEIN SERUM: CPT

## 2021-11-12 PROCEDURE — 99214 OFFICE O/P EST MOD 30 MIN: CPT | Performed by: NURSE PRACTITIONER

## 2021-11-12 PROCEDURE — 85610 PROTHROMBIN TIME: CPT

## 2021-11-12 PROCEDURE — 83735 ASSAY OF MAGNESIUM: CPT

## 2021-11-12 PROCEDURE — 85025 COMPLETE CBC W/AUTO DIFF WBC: CPT

## 2021-11-12 RX ORDER — POTASSIUM CHLORIDE 1500 MG/1
TABLET, FILM COATED, EXTENDED RELEASE ORAL
COMMUNITY
Start: 2021-10-15 | End: 2022-01-12

## 2021-11-12 NOTE — PROGRESS NOTES
"GASTROENTEROLOGY OFFICE NOTE  Timothy Guzman  [id number]  1975    CARE TEAM  Patient Care Team:  Toshia Mitchell APRN as PCP - General (Family Medicine)  Nelson Yip APRN as Nurse Practitioner (Nurse Practitioner)    Referring Provider: Toshia Mitchell APRN    Chief Complaint   Patient presents with   • Hepatic Disease   • Abdominal Pain     Under ribs towards your back and shoulder blade.   • Constipation     Last good bowel movement 6 days ago        HISTORY OF PRESENT ILLNESS:  Timothy Guzman is a 45 y.o. female with a medical history significant for alcoholic liver cirrhosis with esophageal varices, PBC, chronic pancreatitis, hypertension, CAD s/p stent placement on DAPT, and anxiety/depression presents today in follow-up for cirrhosis, PBC and constipation.    She is currently taking Xifaxan 550 mg twice daily and lactulose 30 g 3 times daily.  She is doing well without any signs of hepatic encephalopathy.  She does feel like she is a little \"foggy\" at times.  Despite lactulose, she continues to have problems with constipation.  At her last hospitalization in September she reports that Linzess and Motegrity were discontinued on discharge.    She continues on Bumex 2 mg daily and spironolactone 300 mg daily with no evidence of overt ascites and no pedal edema.    She is taking ursodiol 600 mg 2 times daily for treatment of PBC.  Her liver enzymes have trended downward as expected.    She complains today of pain under her right rib cage that radiates around to her back.  This is a constant type pressure sensation feeling like a fullness in the area.  She continues to remain abstinent from alcohol.        PAST MEDICAL HISTORY  Past Medical History:   Diagnosis Date   • Acute pancreatitis    • Alcoholism (HCC)    • Arthritis    • Bone necrosis (HCC)    • CAD (coronary artery disease) 7/13/2021   • Cirrhosis (HCC)    • Gastroparesis    • GERD (gastroesophageal reflux disease)    • " History of esophageal varices    • History of kidney stones    • Hypertension    • Memory loss    • Pancreatitis         PAST SURGICAL HISTORY  Past Surgical History:   Procedure Laterality Date   • APPENDECTOMY     • CARDIAC CATHETERIZATION N/A 2021    Procedure: Left Heart Cath;  Surgeon: Vikram Gonzales MD;  Location:  [facility];  Service: Cardiovascular;  Laterality: N/A;   • CARDIAC CATHETERIZATION N/A 7/15/2021    Procedure: LEFT HEART CATH;  Surgeon: Vikram Gonzales MD;  Location:  [facility];  Service: Cardiology;  Laterality: N/A;   •  SECTION     • CHOLECYSTECTOMY     • COLONOSCOPY     • COLONOSCOPY N/A 3/31/2020    Procedure: COLONOSCOPY;  Surgeon: Tirso Giles MD;  Location:  [facility];  Service: Gastroenterology;  Laterality: N/A;   • COLONOSCOPY N/A 2021    Procedure: COLONOSCOPY;  Surgeon: Tyrese Rasmussen MD;  Location:  [facility];  Service: Gastroenterology;  Laterality: N/A;   • CORONARY STENT PLACEMENT     • ENDOSCOPY     • ENDOSCOPY     • ENDOSCOPY N/A 2021    Procedure: ESOPHAGOGASTRODUODENOSCOPY AT BEDSIDE;  Surgeon: Tyrese Rasmussen MD;  Location:  [facility];  Service: Gastroenterology;  Laterality: N/A;   • ENDOSCOPY N/A 2021    Procedure: ESOPHAGOGASTRODUODENOSCOPY;  Surgeon: Tyrese Rasmussen MD;  Location:  [facility];  Service: Gastroenterology;  Laterality: N/A;   • GALLBLADDER SURGERY     • REPLACEMENT TOTAL HIP LATERAL POSITION Left    • TOTAL KNEE ARTHROPLASTY Left         MEDICATIONS:    Current Outpatient Medications:   •  aspirin 81 MG EC tablet, Take 81 mg by mouth Daily., Disp: , Rfl:   •  B Complex Vitamins (vitamin b complex) capsule capsule, Take 1 capsule by mouth Daily., Disp: , Rfl:   •  clopidogrel (PLAVIX) 75 MG tablet, Take 1 tablet by mouth Daily., Disp: 90 tablet, Rfl: 3  •  docusate sodium (COLACE) 100 MG capsule, Take 100 mg by mouth 2 (Two) Times a Day As Needed for  Constipation., Disp: , Rfl:   •  FeroSul 325 (65 Fe) MG tablet, Take 1 tablet by mouth Daily., Disp: , Rfl:   •  folic acid (FOLVITE) 1 MG tablet, Take 1 tablet by mouth Daily., Disp: 30 tablet, Rfl: 0  •  gabapentin (NEURONTIN) 300 MG capsule, Take 1 capsule by mouth Every Night., Disp: 30 capsule, Rfl: 5  •  lactulose (CHRONULAC) 10 GM/15ML solution, Take 45 mL by mouth 3 (Three) Times a Day., Disp: 1892 mL, Rfl: 5  •  magnesium oxide (MAGOX) 400 (241.3 Mg) MG tablet tablet, Take 400 mg by mouth Every Evening., Disp: , Rfl:   •  Melatonin 10 MG tablet, Take 1 tablet by mouth Every Night., Disp: , Rfl:   •  Multivitamin tablet tablet, , Disp: , Rfl:   •  OLANZapine (zyPREXA) 10 MG tablet, Take 1 tablet by mouth Every Night., Disp: , Rfl:   •  ondansetron (Zofran) 4 MG tablet, Take 1 tablet by mouth Every 8 (Eight) Hours As Needed for Nausea or Vomiting for up to 30 doses., Disp: 30 tablet, Rfl: 0  •  pantoprazole (PROTONIX) 40 MG EC tablet, 40 mg., Disp: , Rfl:   •  potassium chloride (K-DUR,KLOR-CON) 20 MEQ CR tablet, Take 1 tablet by mouth Daily., Disp: , Rfl:   •  rosuvastatin (CRESTOR) 20 MG tablet, Take 1 tablet by mouth Every Night., Disp: 90 tablet, Rfl: 3  •  spironolactone (ALDACTONE) 100 MG tablet, Take 1 tablet by mouth 3 (Three) Times a Day., Disp: , Rfl:   •  tamsulosin (FLOMAX) 0.4 MG capsule 24 hr capsule, Take 1 capsule by mouth Daily., Disp: 30 capsule, Rfl: 0  •  thiamine (thiamine) 100 MG tablet tablet, 100 mg., Disp: , Rfl:   •  ursodiol (ACTIGALL) 300 MG capsule, Take 2 capsules by mouth 2 (two) times a day., Disp: 120 capsule, Rfl: 11  •  vitamin B-6 (PYRIDOXINE) 25 MG tablet, Take 25 mg by mouth Daily., Disp: , Rfl:   •  Xifaxan 550 MG tablet, , Disp: , Rfl:   •  bumetanide (BUMEX) 1 MG tablet, Take 2 tablets by mouth Daily for 30 days. (Patient taking differently: Take 6 mg by mouth Daily. Takes 3 of the 2 mg tabs), Disp: 60 tablet, Rfl: 0  •  FLUoxetine (PROzac) 40 MG capsule, Take 1  capsule by mouth Daily for 30 days., Disp: 30 capsule, Rfl: 0  •  ipratropium-albuterol (DUO-NEB) 0.5-2.5 mg/3 ml nebulizer, Take 3 mL by nebulization Every 6 (Six) Hours As Needed for Wheezing or Shortness of Air., Disp: 360 mL, Rfl: 0  •  potassium chloride ER (K-TAB) 20 MEQ tablet controlled-release ER tablet, , Disp: , Rfl:   •  ranolazine (RANEXA) 1000 MG 12 hr tablet, Take 1 tablet by mouth 2 (Two) Times a Day for 30 days., Disp: 60 tablet, Rfl: 0  •  spironolactone (ALDACTONE) 50 MG tablet, Take 2 tablets by mouth Daily for 30 days. (Patient taking differently: Take 100 mg by mouth 3 (Three) Times a Day.), Disp: , Rfl:   •  traZODone (DESYREL) 50 MG tablet, Take 1 tablet by mouth Every Night for 30 days., Disp: 30 tablet, Rfl: 0    ALLERGIES  Allergies   Allergen Reactions   • Contrast Dye Anaphylaxis      Pt coded after receiving contrast for a CT scan. Was premedicated. Was having an MI at the same time. Immediately underwent heart cath with contrast without additional allergic reaction  7/15 Pt premedicated with prednisone/Benadryl for heart cath. Still with anaphylactic-like reaction with drop in BP and hypoxia. Treated 95% stenosis with minimal dye and supported with epinephrine, solumedrol, NRB   • Haloperidol Hallucinations   • Nsaids GI Bleeding     Other reaction(s): Bleeding, VOMITING   • Tylenol [Acetaminophen] Other (See Comments)     CIRRHOSIS       FAMILY HISTORY:  Family History   Problem Relation Age of Onset   • Diabetes type II Mother    • Heart disease Father    • Colon cancer Neg Hx    • Colon polyps Neg Hx        SOCIAL HISTORY  Social History     Socioeconomic History   • Marital status:    Tobacco Use   • Smoking status: Former Smoker     Packs/day: 0.50     Types: Electronic Cigarette, Cigarettes     Quit date: 2020     Years since quittin.5   • Smokeless tobacco: Never Used   • Tobacco comment:  ppd    Vaping Use   • Vaping Use: Former   Substance and Sexual  "Activity   • Alcohol use: Not Currently   • Drug use: No   • Sexual activity: Defer       REVIEW OF SYSTEMS  Review of Systems   Constitutional: Negative for activity change, appetite change, chills, diaphoresis, fatigue, fever, unexpected weight gain and unexpected weight loss.   HENT: Negative for trouble swallowing and voice change.    Gastrointestinal: Positive for abdominal pain and constipation. Negative for abdominal distention, anal bleeding, blood in stool, diarrhea, nausea, rectal pain, vomiting, GERD and indigestion.         PHYSICAL EXAM   /94 (BP Location: Left arm, Patient Position: Sitting, Cuff Size: Adult)   Pulse 87   Temp 96.9 °F (36.1 °C) (Temporal)   Ht 160 cm (63\")   Wt 97.3 kg (214 lb 6.4 oz)   BMI 37.98 kg/m²   Physical Exam  Vitals and nursing note reviewed.   Constitutional:       Appearance: Normal appearance. She is well-developed.   HENT:      Head: Normocephalic and atraumatic.      Nose: Nose normal.      Mouth/Throat:      Mouth: Mucous membranes are moist.      Pharynx: Oropharynx is clear.   Eyes:      Pupils: Pupils are equal, round, and reactive to light.   Cardiovascular:      Rate and Rhythm: Normal rate and regular rhythm.   Pulmonary:      Effort: Pulmonary effort is normal.      Breath sounds: Normal breath sounds. No wheezing or rales.   Abdominal:      General: Bowel sounds are normal.      Palpations: Abdomen is soft. There is no mass.      Tenderness: There is abdominal tenderness in the right upper quadrant. There is no guarding or rebound.      Hernia: No hernia is present.   Musculoskeletal:         General: Normal range of motion.      Cervical back: Normal range of motion and neck supple.   Skin:     General: Skin is warm and dry.   Neurological:      Mental Status: She is alert and oriented to person, place, and time.      Cranial Nerves: No cranial nerve deficit.   Psychiatric:         Behavior: Behavior normal.         Judgment: Judgment normal. "       Results Review:  CT Abdomen Pelvis WO     INDICATION:   Acute onset of upper abdominal pain.     TECHNIQUE:   CT of the abdomen and pelvis without IV contrast. Coronal and sagittal reconstructions were obtained.  Radiation dose reduction techniques included automated exposure control or exposure modulation based on body size. Count of known CT and cardiac nuc  med studies performed in previous 12 months: 4.      COMPARISON:   6/18/2021     FINDINGS:  There is a 6 mm left lower lobe lung nodule. This measured about 5 mm on 3/30/2020. This is likely benign. Additional follow-up with a repeat chest CT is recommended on or after 3/30/2022. There are bilateral subacute anterolateral rib fractures. These  were previously identified on chest CT of 7/13/2021. Abdominal aorta is normal in caliber. Gallbladder is surgically absent. No biliary obstruction is identified. No definite renal or ureteral stones are seen. There is no hydronephrosis. Please note that  characterization of the lower ureters is limited due to streak artifact from bilateral hip arthroplasties. Unenhanced solid abdominal organs are grossly normal.     Urinary bladder is grossly normal allowing for streak artifact. Patient is status post tubal ligation. The appendix is surgically absent. There is no evidence of acute colitis. There is no bowel obstruction. Stomach is normal. No free fluid or bulky  adenopathy is seen.     IMPRESSION:     1. No clearly acute findings in the abdomen or pelvis.  2. Left lower lobe lung nodule. Chest CT follow-up on or after 3/30/2022 is recommended.  3. Cholecystectomy without biliary obstruction.  4. No acute findings in the GI tract. The appendix is surgically absent.  5. Additional findings as above    ASSESSMENT / PLAN  1. Cirrhosis, decompensated by ascites and hepatic encephalopathy  -cirrhosis secondary to EtOH and PBC  Plan:  -CBC, CMP, PT/INR  -Hepatocellular carcinoma surveillance-imaging due next in March  2022  -High-protein diet  -Avoid alcohol, avoid NSAIDs     2. Hepatic encephalopathy  Plan:  -Lactulose 2 to 3 times a day. Titrate to 2-3 soft BM/day  -Xifaxan 550 mg twice daily     3.  Ascites  Plan:  -Low sodium diet 2 gm/day  -Bumex to 2 mg daily   -Spironolactone 300 mg daily     4.  Primary biliary cholangitis  Plan:  -Ursodiol 600 mg twice daily    5.  Chronic idiopathic constipation  Plan:  -Discontinue iron  -Linzess 290 mcg daily       Return in about 6 months (around 5/12/2022).    I discussed the patients findings and my recommendations with patient    Nelson Yip, APRN

## 2021-11-13 LAB
ALBUMIN SERPL-MCNC: 4.9 G/DL (ref 3.5–5.2)
ALBUMIN/GLOB SERPL: 2.3 G/DL
ALP SERPL-CCNC: 119 U/L (ref 39–117)
ALPHA-FETOPROTEIN: 5.44 NG/ML (ref 0–8.3)
ALT SERPL W P-5'-P-CCNC: 17 U/L (ref 1–33)
ANION GAP SERPL CALCULATED.3IONS-SCNC: 14.7 MMOL/L (ref 5–15)
AST SERPL-CCNC: 20 U/L (ref 1–32)
BASOPHILS # BLD AUTO: 0.04 10*3/MM3 (ref 0–0.2)
BASOPHILS NFR BLD AUTO: 0.4 % (ref 0–1.5)
BILIRUB SERPL-MCNC: 0.4 MG/DL (ref 0–1.2)
BUN SERPL-MCNC: 19 MG/DL (ref 6–20)
BUN/CREAT SERPL: 15 (ref 7–25)
CALCIUM SPEC-SCNC: 9.4 MG/DL (ref 8.6–10.5)
CHLORIDE SERPL-SCNC: 99 MMOL/L (ref 98–107)
CO2 SERPL-SCNC: 21.3 MMOL/L (ref 22–29)
CREAT SERPL-MCNC: 1.27 MG/DL (ref 0.57–1)
DEPRECATED RDW RBC AUTO: 41.9 FL (ref 37–54)
EOSINOPHIL # BLD AUTO: 0.28 10*3/MM3 (ref 0–0.4)
EOSINOPHIL NFR BLD AUTO: 3 % (ref 0.3–6.2)
ERYTHROCYTE [DISTWIDTH] IN BLOOD BY AUTOMATED COUNT: 12.8 % (ref 12.3–15.4)
GFR SERPL CREATININE-BSD FRML MDRD: 45 ML/MIN/1.73
GLOBULIN UR ELPH-MCNC: 2.1 GM/DL
GLUCOSE SERPL-MCNC: 76 MG/DL (ref 65–99)
HCT VFR BLD AUTO: 45.7 % (ref 34–46.6)
HGB BLD-MCNC: 15.2 G/DL (ref 12–15.9)
IMM GRANULOCYTES # BLD AUTO: 0.04 10*3/MM3 (ref 0–0.05)
IMM GRANULOCYTES NFR BLD AUTO: 0.4 % (ref 0–0.5)
LYMPHOCYTES # BLD AUTO: 2.07 10*3/MM3 (ref 0.7–3.1)
LYMPHOCYTES NFR BLD AUTO: 22 % (ref 19.6–45.3)
MAGNESIUM SERPL-MCNC: 2 MG/DL (ref 1.6–2.6)
MCH RBC QN AUTO: 30.3 PG (ref 26.6–33)
MCHC RBC AUTO-ENTMCNC: 33.3 G/DL (ref 31.5–35.7)
MCV RBC AUTO: 91 FL (ref 79–97)
MONOCYTES # BLD AUTO: 1.05 10*3/MM3 (ref 0.1–0.9)
MONOCYTES NFR BLD AUTO: 11.2 % (ref 5–12)
NEUTROPHILS NFR BLD AUTO: 5.92 10*3/MM3 (ref 1.7–7)
NEUTROPHILS NFR BLD AUTO: 63 % (ref 42.7–76)
NRBC BLD AUTO-RTO: 0 /100 WBC (ref 0–0.2)
PLATELET # BLD AUTO: 149 10*3/MM3 (ref 140–450)
PMV BLD AUTO: 10.9 FL (ref 6–12)
POTASSIUM SERPL-SCNC: 4.6 MMOL/L (ref 3.5–5.2)
PROT SERPL-MCNC: 7 G/DL (ref 6–8.5)
RBC # BLD AUTO: 5.02 10*6/MM3 (ref 3.77–5.28)
SODIUM SERPL-SCNC: 135 MMOL/L (ref 136–145)
WBC # BLD AUTO: 9.4 10*3/MM3 (ref 3.4–10.8)

## 2021-11-15 ENCOUNTER — TELEPHONE (OUTPATIENT)
Dept: NEUROLOGY | Facility: CLINIC | Age: 46
End: 2021-11-15

## 2021-11-15 ENCOUNTER — HOSPITAL ENCOUNTER (OUTPATIENT)
Dept: NEUROLOGY | Facility: HOSPITAL | Age: 46
Discharge: HOME | End: 2021-11-15

## 2021-11-15 DIAGNOSIS — R20.0 NUMBNESS AND TINGLING OF BOTH FEET: ICD-10-CM

## 2021-11-15 DIAGNOSIS — R20.2 NUMBNESS AND TINGLING OF BOTH FEET: ICD-10-CM

## 2021-11-15 DIAGNOSIS — M54.50 LOW BACK PAIN, UNSPECIFIED BACK PAIN LATERALITY, UNSPECIFIED CHRONICITY, UNSPECIFIED WHETHER SCIATICA PRESENT: ICD-10-CM

## 2021-11-15 DIAGNOSIS — R41.3 MEMORY LOSS: ICD-10-CM

## 2021-11-15 PROCEDURE — 95912 NRV CNDJ TEST 11-12 STUDIES: CPT

## 2021-11-15 PROCEDURE — 95886 MUSC TEST DONE W/N TEST COMP: CPT

## 2021-11-15 PROCEDURE — 95816 EEG AWAKE AND DROWSY: CPT

## 2021-11-15 RX ORDER — BUMETANIDE 1 MG/1
2 TABLET ORAL DAILY
Qty: 60 TABLET | Refills: 5 | Status: ON HOLD
Start: 2021-11-15 | End: 2022-03-12 | Stop reason: SDUPTHER

## 2021-11-15 RX ORDER — LACTULOSE 10 G/15ML
30 SOLUTION ORAL 3 TIMES DAILY
Qty: 1892 ML | Refills: 5 | Status: ON HOLD | OUTPATIENT
Start: 2021-11-15 | End: 2021-12-06 | Stop reason: SDUPTHER

## 2021-11-15 RX ORDER — RIFAXIMIN 550 MG/1
550 TABLET ORAL EVERY 12 HOURS SCHEDULED
Qty: 180 TABLET | Refills: 3 | Status: SHIPPED | OUTPATIENT
Start: 2021-11-15

## 2021-11-15 RX ORDER — SPIRONOLACTONE 100 MG/1
300 TABLET, FILM COATED ORAL DAILY
Qty: 90 TABLET | Refills: 5
Start: 2021-11-15 | End: 2021-12-06 | Stop reason: HOSPADM

## 2021-11-15 RX ORDER — PANTOPRAZOLE SODIUM 40 MG/1
40 TABLET, DELAYED RELEASE ORAL DAILY
Qty: 90 TABLET | Refills: 3 | Status: SHIPPED | OUTPATIENT
Start: 2021-11-15 | End: 2022-01-12

## 2021-11-15 RX ORDER — URSODIOL 300 MG/1
600 CAPSULE ORAL 2 TIMES DAILY
Qty: 120 CAPSULE | Refills: 5 | Status: ON HOLD | OUTPATIENT
Start: 2021-11-15 | End: 2022-03-09

## 2021-11-15 NOTE — TELEPHONE ENCOUNTER
Caller: Timothy Guzman    Relationship: Self    Best call back number:[phone]    Caller requesting test results:  PT     What test was performed:  EMG AND EEG     When was the test performed:  11/15/2021    Where was the test performed:  [facility]     Additional notes: PT WAS TOLD RESULTS WOULD BE AVAILABLE THIS  AFTERNOON  IS CALLING TO CHECK ON THIS PLEASE CONTACT PT WITH RESULTS

## 2021-11-23 ENCOUNTER — TELEPHONE (OUTPATIENT)
Dept: GASTROENTEROLOGY | Facility: CLINIC | Age: 46
End: 2021-11-23

## 2021-11-23 ENCOUNTER — APPOINTMENT (OUTPATIENT)
Dept: CT IMAGING | Facility: HOSPITAL | Age: 46
End: 2021-11-23

## 2021-11-23 ENCOUNTER — HOSPITAL ENCOUNTER (INPATIENT)
Facility: HOSPITAL | Age: 46
LOS: 13 days | Discharge: HOME | End: 2021-12-06
Attending: EMERGENCY MEDICINE | Admitting: FAMILY MEDICINE

## 2021-11-23 DIAGNOSIS — K92.0 COFFEE GROUND EMESIS: ICD-10-CM

## 2021-11-23 DIAGNOSIS — R10.9 INTRACTABLE ABDOMINAL PAIN: ICD-10-CM

## 2021-11-23 DIAGNOSIS — R10.11 RIGHT UPPER QUADRANT ABDOMINAL PAIN: Primary | ICD-10-CM

## 2021-11-23 PROBLEM — E87.20 LACTIC ACIDOSIS: Status: ACTIVE | Noted: 2021-11-23

## 2021-11-23 LAB
ABO GROUP BLD: NORMAL
ALBUMIN SERPL-MCNC: 4.6 G/DL (ref 3.5–5.2)
ALBUMIN/GLOB SERPL: 1.7 G/DL
ALP SERPL-CCNC: 129 U/L (ref 39–117)
ALT SERPL W P-5'-P-CCNC: 21 U/L (ref 1–33)
AMMONIA BLD-SCNC: 17 UMOL/L (ref 11–51)
ANION GAP SERPL CALCULATED.3IONS-SCNC: 12 MMOL/L (ref 5–15)
AST SERPL-CCNC: 22 U/L (ref 1–32)
B-HCG UR QL: NEGATIVE
BACTERIA UR QL AUTO: ABNORMAL /HPF
BASOPHILS # BLD AUTO: 0.04 10*3/MM3 (ref 0–0.2)
BASOPHILS NFR BLD AUTO: 0.4 % (ref 0–1.5)
BILIRUB SERPL-MCNC: 0.7 MG/DL (ref 0–1.2)
BILIRUB UR QL STRIP: NEGATIVE
BLD GP AB SCN SERPL QL: NEGATIVE
BUN SERPL-MCNC: 16 MG/DL (ref 6–20)
BUN/CREAT SERPL: 14.7 (ref 7–25)
CALCIUM SPEC-SCNC: 9.2 MG/DL (ref 8.6–10.5)
CHLORIDE SERPL-SCNC: 100 MMOL/L (ref 98–107)
CLARITY UR: CLEAR
CO2 SERPL-SCNC: 22 MMOL/L (ref 22–29)
COLOR UR: YELLOW
CREAT SERPL-MCNC: 1.09 MG/DL (ref 0.57–1)
D-LACTATE SERPL-SCNC: 2.2 MMOL/L (ref 0.5–2)
DEPRECATED RDW RBC AUTO: 43.7 FL (ref 37–54)
EOSINOPHIL # BLD AUTO: 0.13 10*3/MM3 (ref 0–0.4)
EOSINOPHIL NFR BLD AUTO: 1.4 % (ref 0.3–6.2)
ERYTHROCYTE [DISTWIDTH] IN BLOOD BY AUTOMATED COUNT: 13.2 % (ref 12.3–15.4)
EXPIRATION DATE: NORMAL
GFR SERPL CREATININE-BSD FRML MDRD: 54 ML/MIN/1.73
GLOBULIN UR ELPH-MCNC: 2.7 GM/DL
GLUCOSE SERPL-MCNC: 184 MG/DL (ref 65–99)
GLUCOSE UR STRIP-MCNC: NEGATIVE MG/DL
HCT VFR BLD AUTO: 37.7 % (ref 34–46.6)
HCT VFR BLD AUTO: 44.5 % (ref 34–46.6)
HGB BLD-MCNC: 13.5 G/DL (ref 12–15.9)
HGB BLD-MCNC: 15.1 G/DL (ref 12–15.9)
HGB UR QL STRIP.AUTO: NEGATIVE
HOLD SPECIMEN: NORMAL
HYALINE CASTS UR QL AUTO: ABNORMAL /LPF
IMM GRANULOCYTES # BLD AUTO: 0.07 10*3/MM3 (ref 0–0.05)
IMM GRANULOCYTES NFR BLD AUTO: 0.7 % (ref 0–0.5)
INTERNAL NEGATIVE CONTROL: NEGATIVE
INTERNAL POSITIVE CONTROL: POSITIVE
KETONES UR QL STRIP: NEGATIVE
LEUKOCYTE ESTERASE UR QL STRIP.AUTO: ABNORMAL
LIPASE SERPL-CCNC: 22 U/L (ref 13–60)
LYMPHOCYTES # BLD AUTO: 2.34 10*3/MM3 (ref 0.7–3.1)
LYMPHOCYTES NFR BLD AUTO: 24.5 % (ref 19.6–45.3)
Lab: NORMAL
MCH RBC QN AUTO: 31.1 PG (ref 26.6–33)
MCHC RBC AUTO-ENTMCNC: 33.9 G/DL (ref 31.5–35.7)
MCV RBC AUTO: 91.6 FL (ref 79–97)
MONOCYTES # BLD AUTO: 0.75 10*3/MM3 (ref 0.1–0.9)
MONOCYTES NFR BLD AUTO: 7.8 % (ref 5–12)
NEUTROPHILS NFR BLD AUTO: 6.23 10*3/MM3 (ref 1.7–7)
NEUTROPHILS NFR BLD AUTO: 65.2 % (ref 42.7–76)
NITRITE UR QL STRIP: NEGATIVE
NRBC BLD AUTO-RTO: 0 /100 WBC (ref 0–0.2)
PH UR STRIP.AUTO: 6.5 [PH] (ref 5–8)
PLATELET # BLD AUTO: 146 10*3/MM3 (ref 140–450)
PMV BLD AUTO: 10.4 FL (ref 6–12)
POTASSIUM SERPL-SCNC: 4.4 MMOL/L (ref 3.5–5.2)
PROCALCITONIN SERPL-MCNC: 0.11 NG/ML (ref 0–0.25)
PROT SERPL-MCNC: 7.3 G/DL (ref 6–8.5)
PROT UR QL STRIP: NEGATIVE
RBC # BLD AUTO: 4.86 10*6/MM3 (ref 3.77–5.28)
RBC # UR STRIP: ABNORMAL /HPF
REF LAB TEST METHOD: ABNORMAL
RH BLD: POSITIVE
SARS-COV-2 RDRP RESP QL NAA+PROBE: NORMAL
SODIUM SERPL-SCNC: 134 MMOL/L (ref 136–145)
SP GR UR STRIP: 1.02 (ref 1–1.03)
SQUAMOUS #/AREA URNS HPF: ABNORMAL /HPF
T&S EXPIRATION DATE: NORMAL
TROPONIN T SERPL-MCNC: <0.01 NG/ML (ref 0–0.03)
UROBILINOGEN UR QL STRIP: ABNORMAL
WBC # UR STRIP: ABNORMAL /HPF
WBC NRBC COR # BLD: 9.56 10*3/MM3 (ref 3.4–10.8)
WHOLE BLOOD HOLD SPECIMEN: NORMAL
WHOLE BLOOD HOLD SPECIMEN: NORMAL

## 2021-11-23 PROCEDURE — 99284 EMERGENCY DEPT VISIT MOD MDM: CPT

## 2021-11-23 PROCEDURE — 87635 SARS-COV-2 COVID-19 AMP PRB: CPT | Performed by: PHYSICIAN ASSISTANT

## 2021-11-23 PROCEDURE — 81025 URINE PREGNANCY TEST: CPT | Performed by: EMERGENCY MEDICINE

## 2021-11-23 PROCEDURE — 93005 ELECTROCARDIOGRAM TRACING: CPT | Performed by: INTERNAL MEDICINE

## 2021-11-23 PROCEDURE — 85025 COMPLETE CBC W/AUTO DIFF WBC: CPT

## 2021-11-23 PROCEDURE — 25010000002 HYDROMORPHONE PER 4 MG: Performed by: INTERNAL MEDICINE

## 2021-11-23 PROCEDURE — 81001 URINALYSIS AUTO W/SCOPE: CPT | Performed by: EMERGENCY MEDICINE

## 2021-11-23 PROCEDURE — 86901 BLOOD TYPING SEROLOGIC RH(D): CPT | Performed by: PHYSICIAN ASSISTANT

## 2021-11-23 PROCEDURE — 93010 ELECTROCARDIOGRAM REPORT: CPT | Performed by: INTERNAL MEDICINE

## 2021-11-23 PROCEDURE — 82140 ASSAY OF AMMONIA: CPT | Performed by: NURSE PRACTITIONER

## 2021-11-23 PROCEDURE — 86900 BLOOD TYPING SEROLOGIC ABO: CPT | Performed by: PHYSICIAN ASSISTANT

## 2021-11-23 PROCEDURE — 25010000002 ONDANSETRON PER 1 MG: Performed by: EMERGENCY MEDICINE

## 2021-11-23 PROCEDURE — 83690 ASSAY OF LIPASE: CPT | Performed by: EMERGENCY MEDICINE

## 2021-11-23 PROCEDURE — 84484 ASSAY OF TROPONIN QUANT: CPT | Performed by: INTERNAL MEDICINE

## 2021-11-23 PROCEDURE — 85014 HEMATOCRIT: CPT | Performed by: INTERNAL MEDICINE

## 2021-11-23 PROCEDURE — 84145 PROCALCITONIN (PCT): CPT | Performed by: PHYSICIAN ASSISTANT

## 2021-11-23 PROCEDURE — 99223 1ST HOSP IP/OBS HIGH 75: CPT | Performed by: INTERNAL MEDICINE

## 2021-11-23 PROCEDURE — P9612 CATHETERIZE FOR URINE SPEC: HCPCS

## 2021-11-23 PROCEDURE — 80053 COMPREHEN METABOLIC PANEL: CPT | Performed by: EMERGENCY MEDICINE

## 2021-11-23 PROCEDURE — 25010000002 HYDROMORPHONE PER 4 MG: Performed by: EMERGENCY MEDICINE

## 2021-11-23 PROCEDURE — 83605 ASSAY OF LACTIC ACID: CPT | Performed by: NURSE PRACTITIONER

## 2021-11-23 PROCEDURE — 74176 CT ABD & PELVIS W/O CONTRAST: CPT

## 2021-11-23 PROCEDURE — 86850 RBC ANTIBODY SCREEN: CPT | Performed by: PHYSICIAN ASSISTANT

## 2021-11-23 PROCEDURE — 85018 HEMOGLOBIN: CPT | Performed by: INTERNAL MEDICINE

## 2021-11-23 RX ORDER — LACTULOSE 10 G/15ML
300 SOLUTION ORAL ONCE
Status: COMPLETED | OUTPATIENT
Start: 2021-11-23 | End: 2021-11-24

## 2021-11-23 RX ORDER — FLUOXETINE HYDROCHLORIDE 20 MG/1
40 CAPSULE ORAL DAILY
Status: DISCONTINUED | OUTPATIENT
Start: 2021-11-24 | End: 2021-12-06 | Stop reason: HOSPADM

## 2021-11-23 RX ORDER — BUMETANIDE 1 MG/1
2 TABLET ORAL DAILY
Status: DISCONTINUED | OUTPATIENT
Start: 2021-11-24 | End: 2021-12-06 | Stop reason: HOSPADM

## 2021-11-23 RX ORDER — SODIUM CHLORIDE 0.9 % (FLUSH) 0.9 %
10 SYRINGE (ML) INJECTION AS NEEDED
Status: DISCONTINUED | OUTPATIENT
Start: 2021-11-23 | End: 2021-11-26 | Stop reason: SDUPTHER

## 2021-11-23 RX ORDER — SODIUM CHLORIDE 0.9 % (FLUSH) 0.9 %
10 SYRINGE (ML) INJECTION AS NEEDED
Status: DISCONTINUED | OUTPATIENT
Start: 2021-11-23 | End: 2021-12-06 | Stop reason: HOSPADM

## 2021-11-23 RX ORDER — NALOXONE HCL 0.4 MG/ML
1 VIAL (ML) INJECTION
Status: DISCONTINUED | OUTPATIENT
Start: 2021-11-23 | End: 2021-12-06 | Stop reason: HOSPADM

## 2021-11-23 RX ORDER — OLANZAPINE 5 MG/1
10 TABLET ORAL NIGHTLY
Status: DISCONTINUED | OUTPATIENT
Start: 2021-11-23 | End: 2021-12-06 | Stop reason: HOSPADM

## 2021-11-23 RX ORDER — HYDROMORPHONE HYDROCHLORIDE 1 MG/ML
0.5 INJECTION, SOLUTION INTRAMUSCULAR; INTRAVENOUS; SUBCUTANEOUS ONCE
Status: COMPLETED | OUTPATIENT
Start: 2021-11-23 | End: 2021-11-23

## 2021-11-23 RX ORDER — GABAPENTIN 300 MG/1
300 CAPSULE ORAL NIGHTLY
Status: DISCONTINUED | OUTPATIENT
Start: 2021-11-23 | End: 2021-12-01

## 2021-11-23 RX ORDER — LANOLIN ALCOHOL/MO/W.PET/CERES
25 CREAM (GRAM) TOPICAL DAILY
Status: DISCONTINUED | OUTPATIENT
Start: 2021-11-24 | End: 2021-12-06 | Stop reason: HOSPADM

## 2021-11-23 RX ORDER — PANTOPRAZOLE SODIUM 40 MG/10ML
40 INJECTION, POWDER, LYOPHILIZED, FOR SOLUTION INTRAVENOUS ONCE
Status: COMPLETED | OUTPATIENT
Start: 2021-11-23 | End: 2021-11-23

## 2021-11-23 RX ORDER — TAMSULOSIN HYDROCHLORIDE 0.4 MG/1
0.4 CAPSULE ORAL DAILY
Status: DISCONTINUED | OUTPATIENT
Start: 2021-11-24 | End: 2021-12-06 | Stop reason: HOSPADM

## 2021-11-23 RX ORDER — CHOLECALCIFEROL (VITAMIN D3) 125 MCG
5 CAPSULE ORAL NIGHTLY PRN
Status: DISCONTINUED | OUTPATIENT
Start: 2021-11-23 | End: 2021-12-06 | Stop reason: HOSPADM

## 2021-11-23 RX ORDER — HYDROMORPHONE HYDROCHLORIDE 1 MG/ML
0.5 INJECTION, SOLUTION INTRAMUSCULAR; INTRAVENOUS; SUBCUTANEOUS
Status: DISCONTINUED | OUTPATIENT
Start: 2021-11-23 | End: 2021-11-24

## 2021-11-23 RX ORDER — ONDANSETRON 2 MG/ML
4 INJECTION INTRAMUSCULAR; INTRAVENOUS ONCE
Status: COMPLETED | OUTPATIENT
Start: 2021-11-23 | End: 2021-11-23

## 2021-11-23 RX ORDER — PANTOPRAZOLE SODIUM 40 MG/10ML
40 INJECTION, POWDER, LYOPHILIZED, FOR SOLUTION INTRAVENOUS EVERY 12 HOURS SCHEDULED
Status: DISCONTINUED | OUTPATIENT
Start: 2021-11-24 | End: 2021-11-26

## 2021-11-23 RX ORDER — OCTREOTIDE ACETATE 50 UG/ML
50 INJECTION, SOLUTION INTRAVENOUS; SUBCUTANEOUS ONCE
Status: COMPLETED | OUTPATIENT
Start: 2021-11-23 | End: 2021-11-24

## 2021-11-23 RX ORDER — SPIRONOLACTONE 25 MG/1
100 TABLET ORAL 3 TIMES DAILY
Status: DISCONTINUED | OUTPATIENT
Start: 2021-11-23 | End: 2021-12-06 | Stop reason: HOSPADM

## 2021-11-23 RX ORDER — IPRATROPIUM BROMIDE AND ALBUTEROL SULFATE 2.5; .5 MG/3ML; MG/3ML
3 SOLUTION RESPIRATORY (INHALATION) EVERY 6 HOURS PRN
Status: DISCONTINUED | OUTPATIENT
Start: 2021-11-23 | End: 2021-12-06 | Stop reason: HOSPADM

## 2021-11-23 RX ORDER — ROSUVASTATIN CALCIUM 20 MG/1
20 TABLET, COATED ORAL NIGHTLY
Status: DISCONTINUED | OUTPATIENT
Start: 2021-11-23 | End: 2021-12-06 | Stop reason: HOSPADM

## 2021-11-23 RX ORDER — ASPIRIN 81 MG/1
81 TABLET ORAL DAILY
Status: DISCONTINUED | OUTPATIENT
Start: 2021-11-24 | End: 2021-11-24

## 2021-11-23 RX ORDER — SODIUM CHLORIDE 9 MG/ML
10 INJECTION INTRAVENOUS AS NEEDED
Status: DISCONTINUED | OUTPATIENT
Start: 2021-11-23 | End: 2021-11-26 | Stop reason: SDUPTHER

## 2021-11-23 RX ORDER — RANOLAZINE 500 MG/1
1000 TABLET, EXTENDED RELEASE ORAL 2 TIMES DAILY
Status: DISCONTINUED | OUTPATIENT
Start: 2021-11-23 | End: 2021-12-06 | Stop reason: HOSPADM

## 2021-11-23 RX ORDER — URSODIOL 300 MG/1
600 CAPSULE ORAL 2 TIMES DAILY
Status: DISCONTINUED | OUTPATIENT
Start: 2021-11-23 | End: 2021-12-06 | Stop reason: HOSPADM

## 2021-11-23 RX ORDER — SODIUM CHLORIDE 0.9 % (FLUSH) 0.9 %
10 SYRINGE (ML) INJECTION EVERY 12 HOURS SCHEDULED
Status: DISCONTINUED | OUTPATIENT
Start: 2021-11-23 | End: 2021-12-06 | Stop reason: HOSPADM

## 2021-11-23 RX ORDER — TRAZODONE HYDROCHLORIDE 50 MG/1
50 TABLET ORAL NIGHTLY
Status: DISCONTINUED | OUTPATIENT
Start: 2021-11-23 | End: 2021-12-06 | Stop reason: HOSPADM

## 2021-11-23 RX ORDER — SODIUM CHLORIDE, SODIUM LACTATE, POTASSIUM CHLORIDE, CALCIUM CHLORIDE 600; 310; 30; 20 MG/100ML; MG/100ML; MG/100ML; MG/100ML
75 INJECTION, SOLUTION INTRAVENOUS CONTINUOUS
Status: ACTIVE | OUTPATIENT
Start: 2021-11-23 | End: 2021-11-24

## 2021-11-23 RX ORDER — LACTULOSE 10 G/15ML
30 SOLUTION ORAL 3 TIMES DAILY
Status: DISCONTINUED | OUTPATIENT
Start: 2021-11-23 | End: 2021-11-24

## 2021-11-23 RX ORDER — ONDANSETRON 2 MG/ML
4 INJECTION INTRAMUSCULAR; INTRAVENOUS EVERY 6 HOURS PRN
Status: DISCONTINUED | OUTPATIENT
Start: 2021-11-23 | End: 2021-12-06 | Stop reason: HOSPADM

## 2021-11-23 RX ADMIN — HYDROMORPHONE HYDROCHLORIDE 0.5 MG: 1 INJECTION, SOLUTION INTRAMUSCULAR; INTRAVENOUS; SUBCUTANEOUS at 17:37

## 2021-11-23 RX ADMIN — ONDANSETRON 4 MG: 2 INJECTION INTRAMUSCULAR; INTRAVENOUS at 17:37

## 2021-11-23 RX ADMIN — SODIUM CHLORIDE, PRESERVATIVE FREE 10 ML: 5 INJECTION INTRAVENOUS at 23:29

## 2021-11-23 RX ADMIN — SODIUM CHLORIDE 1000 ML: 9 INJECTION, SOLUTION INTRAVENOUS at 17:37

## 2021-11-23 RX ADMIN — HYDROMORPHONE HYDROCHLORIDE 0.5 MG: 1 INJECTION, SOLUTION INTRAMUSCULAR; INTRAVENOUS; SUBCUTANEOUS at 23:25

## 2021-11-23 RX ADMIN — PANTOPRAZOLE SODIUM 40 MG: 40 INJECTION, POWDER, FOR SOLUTION INTRAVENOUS at 20:27

## 2021-11-23 RX ADMIN — HYDROMORPHONE HYDROCHLORIDE 0.5 MG: 1 INJECTION, SOLUTION INTRAMUSCULAR; INTRAVENOUS; SUBCUTANEOUS at 18:32

## 2021-11-23 RX ADMIN — HYDROMORPHONE HYDROCHLORIDE 0.5 MG: 1 INJECTION, SOLUTION INTRAMUSCULAR; INTRAVENOUS; SUBCUTANEOUS at 21:26

## 2021-11-23 RX ADMIN — HYDROMORPHONE HYDROCHLORIDE 0.5 MG: 1 INJECTION, SOLUTION INTRAMUSCULAR; INTRAVENOUS; SUBCUTANEOUS at 19:52

## 2021-11-23 RX ADMIN — SODIUM CHLORIDE, POTASSIUM CHLORIDE, SODIUM LACTATE AND CALCIUM CHLORIDE 75 ML/HR: 600; 310; 30; 20 INJECTION, SOLUTION INTRAVENOUS at 21:39

## 2021-11-23 RX ADMIN — HYDROMORPHONE HYDROCHLORIDE 0.5 MG: 1 INJECTION, SOLUTION INTRAMUSCULAR; INTRAVENOUS; SUBCUTANEOUS at 21:27

## 2021-11-23 NOTE — TELEPHONE ENCOUNTER
Patient called and was having right sided pain in her shoulder blades and under her right breast area described as severe. Patient instructed to go to the ER per Nelson DAVENPORT. Patient verbalized understanding.

## 2021-11-24 ENCOUNTER — APPOINTMENT (OUTPATIENT)
Dept: CARDIOLOGY | Facility: HOSPITAL | Age: 46
End: 2021-11-24

## 2021-11-24 ENCOUNTER — APPOINTMENT (OUTPATIENT)
Dept: GENERAL RADIOLOGY | Facility: HOSPITAL | Age: 46
End: 2021-11-24

## 2021-11-24 ENCOUNTER — APPOINTMENT (OUTPATIENT)
Dept: ULTRASOUND IMAGING | Facility: HOSPITAL | Age: 46
End: 2021-11-24

## 2021-11-24 LAB
ANION GAP SERPL CALCULATED.3IONS-SCNC: 14 MMOL/L (ref 5–15)
BUN SERPL-MCNC: 16 MG/DL (ref 6–20)
BUN/CREAT SERPL: 14.4 (ref 7–25)
CALCIUM SPEC-SCNC: 9 MG/DL (ref 8.6–10.5)
CHLORIDE SERPL-SCNC: 104 MMOL/L (ref 98–107)
CO2 SERPL-SCNC: 18 MMOL/L (ref 22–29)
CREAT SERPL-MCNC: 1.11 MG/DL (ref 0.57–1)
D-LACTATE SERPL-SCNC: 1.2 MMOL/L (ref 0.5–2)
DEPRECATED RDW RBC AUTO: 42.8 FL (ref 37–54)
ERYTHROCYTE [DISTWIDTH] IN BLOOD BY AUTOMATED COUNT: 13.2 % (ref 12.3–15.4)
GFR SERPL CREATININE-BSD FRML MDRD: 53 ML/MIN/1.73
GLUCOSE SERPL-MCNC: 118 MG/DL (ref 65–99)
HBA1C MFR BLD: 5.7 % (ref 4.8–5.6)
HCT VFR BLD AUTO: 39.8 % (ref 34–46.6)
HCT VFR BLD AUTO: 40.1 % (ref 34–46.6)
HCT VFR BLD AUTO: 40.9 % (ref 34–46.6)
HCT VFR BLD AUTO: 41.2 % (ref 34–46.6)
HCT VFR BLD AUTO: 43 % (ref 34–46.6)
HCT VFR BLD AUTO: 44.4 % (ref 34–46.6)
HGB BLD-MCNC: 13.4 G/DL (ref 12–15.9)
HGB BLD-MCNC: 13.6 G/DL (ref 12–15.9)
HGB BLD-MCNC: 14 G/DL (ref 12–15.9)
HGB BLD-MCNC: 14.2 G/DL (ref 12–15.9)
HGB BLD-MCNC: 14.4 G/DL (ref 12–15.9)
HGB BLD-MCNC: 14.8 G/DL (ref 12–15.9)
MCH RBC QN AUTO: 30.8 PG (ref 26.6–33)
MCHC RBC AUTO-ENTMCNC: 34.7 G/DL (ref 31.5–35.7)
MCV RBC AUTO: 88.7 FL (ref 79–97)
PLATELET # BLD AUTO: 156 10*3/MM3 (ref 140–450)
PMV BLD AUTO: 10.6 FL (ref 6–12)
POTASSIUM SERPL-SCNC: 4.2 MMOL/L (ref 3.5–5.2)
RBC # BLD AUTO: 4.61 10*6/MM3 (ref 3.77–5.28)
SODIUM SERPL-SCNC: 136 MMOL/L (ref 136–145)
WBC NRBC COR # BLD: 9.78 10*3/MM3 (ref 3.4–10.8)

## 2021-11-24 PROCEDURE — 99233 SBSQ HOSP IP/OBS HIGH 50: CPT | Performed by: HOSPITALIST

## 2021-11-24 PROCEDURE — 85018 HEMOGLOBIN: CPT | Performed by: INTERNAL MEDICINE

## 2021-11-24 PROCEDURE — 85027 COMPLETE CBC AUTOMATED: CPT | Performed by: PHYSICIAN ASSISTANT

## 2021-11-24 PROCEDURE — 74018 RADEX ABDOMEN 1 VIEW: CPT

## 2021-11-24 PROCEDURE — 25010000002 HYDROMORPHONE 1 MG/ML SOLUTION: Performed by: HOSPITALIST

## 2021-11-24 PROCEDURE — 80048 BASIC METABOLIC PNL TOTAL CA: CPT | Performed by: PHYSICIAN ASSISTANT

## 2021-11-24 PROCEDURE — 85014 HEMATOCRIT: CPT | Performed by: PHYSICIAN ASSISTANT

## 2021-11-24 PROCEDURE — 93975 VASCULAR STUDY: CPT

## 2021-11-24 PROCEDURE — 83036 HEMOGLOBIN GLYCOSYLATED A1C: CPT | Performed by: PHYSICIAN ASSISTANT

## 2021-11-24 PROCEDURE — 25010000002 CEFTRIAXONE PER 250 MG: Performed by: INTERNAL MEDICINE

## 2021-11-24 PROCEDURE — 99253 IP/OBS CNSLTJ NEW/EST LOW 45: CPT | Performed by: INTERNAL MEDICINE

## 2021-11-24 PROCEDURE — 85014 HEMATOCRIT: CPT | Performed by: INTERNAL MEDICINE

## 2021-11-24 PROCEDURE — 99222 1ST HOSP IP/OBS MODERATE 55: CPT | Performed by: PHYSICIAN ASSISTANT

## 2021-11-24 PROCEDURE — 85018 HEMOGLOBIN: CPT | Performed by: PHYSICIAN ASSISTANT

## 2021-11-24 PROCEDURE — 25010000002 OCTREOTIDE PER 25 MCG: Performed by: PHYSICIAN ASSISTANT

## 2021-11-24 PROCEDURE — 83605 ASSAY OF LACTIC ACID: CPT | Performed by: NURSE PRACTITIONER

## 2021-11-24 PROCEDURE — 25010000002 HYDROMORPHONE PER 4 MG: Performed by: INTERNAL MEDICINE

## 2021-11-24 PROCEDURE — 76705 ECHO EXAM OF ABDOMEN: CPT

## 2021-11-24 RX ORDER — CLOPIDOGREL BISULFATE 75 MG/1
75 TABLET ORAL DAILY
Status: DISCONTINUED | OUTPATIENT
Start: 2021-11-24 | End: 2021-12-06 | Stop reason: HOSPADM

## 2021-11-24 RX ORDER — ASPIRIN 81 MG/1
81 TABLET, CHEWABLE ORAL DAILY
Status: DISCONTINUED | OUTPATIENT
Start: 2021-11-24 | End: 2021-12-06 | Stop reason: HOSPADM

## 2021-11-24 RX ORDER — SODIUM PHOSPHATE,MONO-DIBASIC 19G-7G/118
1 ENEMA (ML) RECTAL ONCE
Status: COMPLETED | OUTPATIENT
Start: 2021-11-24 | End: 2021-11-24

## 2021-11-24 RX ADMIN — SODIUM CHLORIDE 1 G: 900 INJECTION INTRAVENOUS at 00:11

## 2021-11-24 RX ADMIN — HYDROMORPHONE HYDROCHLORIDE 1 MG: 1 INJECTION, SOLUTION INTRAMUSCULAR; INTRAVENOUS; SUBCUTANEOUS at 20:02

## 2021-11-24 RX ADMIN — FLUOXETINE HYDROCHLORIDE 40 MG: 20 CAPSULE ORAL at 08:40

## 2021-11-24 RX ADMIN — SPIRONOLACTONE 100 MG: 25 TABLET ORAL at 15:13

## 2021-11-24 RX ADMIN — LACTULOSE 300 ML: 10 SOLUTION ORAL at 00:16

## 2021-11-24 RX ADMIN — HYDROMORPHONE HYDROCHLORIDE 1 MG: 1 INJECTION, SOLUTION INTRAMUSCULAR; INTRAVENOUS; SUBCUTANEOUS at 17:35

## 2021-11-24 RX ADMIN — HYDROMORPHONE HYDROCHLORIDE 1 MG: 1 INJECTION, SOLUTION INTRAMUSCULAR; INTRAVENOUS; SUBCUTANEOUS at 15:13

## 2021-11-24 RX ADMIN — RIFAXIMIN 550 MG: 550 TABLET ORAL at 08:39

## 2021-11-24 RX ADMIN — OCTREOTIDE ACETATE 25 MCG/HR: 500 INJECTION, SOLUTION INTRAVENOUS; SUBCUTANEOUS at 01:28

## 2021-11-24 RX ADMIN — HYDROMORPHONE HYDROCHLORIDE 1 MG: 1 INJECTION, SOLUTION INTRAMUSCULAR; INTRAVENOUS; SUBCUTANEOUS at 13:20

## 2021-11-24 RX ADMIN — RANOLAZINE 1000 MG: 500 TABLET, EXTENDED RELEASE ORAL at 20:10

## 2021-11-24 RX ADMIN — HYDROMORPHONE HYDROCHLORIDE 0.5 MG: 1 INJECTION, SOLUTION INTRAMUSCULAR; INTRAVENOUS; SUBCUTANEOUS at 03:33

## 2021-11-24 RX ADMIN — BUMETANIDE 2 MG: 1 TABLET ORAL at 08:39

## 2021-11-24 RX ADMIN — HYDROMORPHONE HYDROCHLORIDE 1 MG: 1 INJECTION, SOLUTION INTRAMUSCULAR; INTRAVENOUS; SUBCUTANEOUS at 23:59

## 2021-11-24 RX ADMIN — GABAPENTIN 300 MG: 300 CAPSULE ORAL at 20:10

## 2021-11-24 RX ADMIN — SPIRONOLACTONE 100 MG: 25 TABLET ORAL at 08:39

## 2021-11-24 RX ADMIN — TAMSULOSIN HYDROCHLORIDE 0.4 MG: 0.4 CAPSULE ORAL at 08:40

## 2021-11-24 RX ADMIN — LACTULOSE 30 G: 20 SOLUTION ORAL at 08:39

## 2021-11-24 RX ADMIN — PANTOPRAZOLE SODIUM 40 MG: 40 INJECTION, POWDER, FOR SOLUTION INTRAVENOUS at 08:40

## 2021-11-24 RX ADMIN — SODIUM CHLORIDE 1 G: 900 INJECTION INTRAVENOUS at 20:11

## 2021-11-24 RX ADMIN — HYDROMORPHONE HYDROCHLORIDE 0.5 MG: 1 INJECTION, SOLUTION INTRAMUSCULAR; INTRAVENOUS; SUBCUTANEOUS at 01:22

## 2021-11-24 RX ADMIN — URSODIOL 600 MG: 300 CAPSULE ORAL at 20:08

## 2021-11-24 RX ADMIN — SODIUM CHLORIDE, PRESERVATIVE FREE 10 ML: 5 INJECTION INTRAVENOUS at 08:41

## 2021-11-24 RX ADMIN — TRAZODONE HYDROCHLORIDE 50 MG: 50 TABLET ORAL at 20:10

## 2021-11-24 RX ADMIN — ASPIRIN 81 MG CHEWABLE TABLET 81 MG: 81 TABLET CHEWABLE at 17:34

## 2021-11-24 RX ADMIN — PYRIDOXINE HCL TAB 50 MG 25 MG: 50 TAB at 08:40

## 2021-11-24 RX ADMIN — URSODIOL 600 MG: 300 CAPSULE ORAL at 08:38

## 2021-11-24 RX ADMIN — PANTOPRAZOLE SODIUM 40 MG: 40 INJECTION, POWDER, FOR SOLUTION INTRAVENOUS at 20:02

## 2021-11-24 RX ADMIN — RANOLAZINE 1000 MG: 500 TABLET, EXTENDED RELEASE ORAL at 08:38

## 2021-11-24 RX ADMIN — HYDROMORPHONE HYDROCHLORIDE 1 MG: 1 INJECTION, SOLUTION INTRAMUSCULAR; INTRAVENOUS; SUBCUTANEOUS at 08:40

## 2021-11-24 RX ADMIN — SPIRONOLACTONE 100 MG: 25 TABLET ORAL at 20:09

## 2021-11-24 RX ADMIN — SODIUM PHOSPHATE 1 ENEMA: 7; 19 ENEMA RECTAL at 13:20

## 2021-11-24 RX ADMIN — RIFAXIMIN 550 MG: 550 TABLET ORAL at 20:10

## 2021-11-24 RX ADMIN — HYDROMORPHONE HYDROCHLORIDE 0.5 MG: 1 INJECTION, SOLUTION INTRAMUSCULAR; INTRAVENOUS; SUBCUTANEOUS at 06:48

## 2021-11-24 RX ADMIN — OLANZAPINE 10 MG: 5 TABLET, FILM COATED ORAL at 20:10

## 2021-11-24 RX ADMIN — HYDROMORPHONE HYDROCHLORIDE 1 MG: 1 INJECTION, SOLUTION INTRAMUSCULAR; INTRAVENOUS; SUBCUTANEOUS at 11:15

## 2021-11-24 RX ADMIN — ROSUVASTATIN CALCIUM 20 MG: 20 TABLET, COATED ORAL at 20:09

## 2021-11-24 RX ADMIN — Medication 100 MG: at 08:39

## 2021-11-24 RX ADMIN — OCTREOTIDE ACETATE 50 MCG: 50 INJECTION, SOLUTION INTRAVENOUS; SUBCUTANEOUS at 01:24

## 2021-11-25 LAB
ALBUMIN SERPL-MCNC: 4.4 G/DL (ref 3.5–5.2)
ALBUMIN/GLOB SERPL: 2 G/DL
ALP SERPL-CCNC: 134 U/L (ref 39–117)
ALT SERPL W P-5'-P-CCNC: 27 U/L (ref 1–33)
ANION GAP SERPL CALCULATED.3IONS-SCNC: 12 MMOL/L (ref 5–15)
AST SERPL-CCNC: 37 U/L (ref 1–32)
BILIRUB SERPL-MCNC: 0.7 MG/DL (ref 0–1.2)
BUN SERPL-MCNC: 13 MG/DL (ref 6–20)
BUN/CREAT SERPL: 12.6 (ref 7–25)
CALCIUM SPEC-SCNC: 9 MG/DL (ref 8.6–10.5)
CHLORIDE SERPL-SCNC: 99 MMOL/L (ref 98–107)
CO2 SERPL-SCNC: 24 MMOL/L (ref 22–29)
CREAT SERPL-MCNC: 1.03 MG/DL (ref 0.57–1)
DEPRECATED RDW RBC AUTO: 42.8 FL (ref 37–54)
ERYTHROCYTE [DISTWIDTH] IN BLOOD BY AUTOMATED COUNT: 13.1 % (ref 12.3–15.4)
GFR SERPL CREATININE-BSD FRML MDRD: 58 ML/MIN/1.73
GLOBULIN UR ELPH-MCNC: 2.2 GM/DL
GLUCOSE SERPL-MCNC: 90 MG/DL (ref 65–99)
HCT VFR BLD AUTO: 36.5 % (ref 34–46.6)
HCT VFR BLD AUTO: 36.5 % (ref 34–46.6)
HGB BLD-MCNC: 12.7 G/DL (ref 12–15.9)
HGB BLD-MCNC: 12.7 G/DL (ref 12–15.9)
MCH RBC QN AUTO: 31.1 PG (ref 26.6–33)
MCHC RBC AUTO-ENTMCNC: 34.8 G/DL (ref 31.5–35.7)
MCV RBC AUTO: 89.2 FL (ref 79–97)
PLATELET # BLD AUTO: 124 10*3/MM3 (ref 140–450)
PMV BLD AUTO: 10.8 FL (ref 6–12)
POTASSIUM SERPL-SCNC: 4 MMOL/L (ref 3.5–5.2)
PROT SERPL-MCNC: 6.6 G/DL (ref 6–8.5)
RBC # BLD AUTO: 4.09 10*6/MM3 (ref 3.77–5.28)
SODIUM SERPL-SCNC: 135 MMOL/L (ref 136–145)
WBC NRBC COR # BLD: 9.72 10*3/MM3 (ref 3.4–10.8)

## 2021-11-25 PROCEDURE — 99233 SBSQ HOSP IP/OBS HIGH 50: CPT | Performed by: HOSPITALIST

## 2021-11-25 PROCEDURE — 99232 SBSQ HOSP IP/OBS MODERATE 35: CPT | Performed by: INTERNAL MEDICINE

## 2021-11-25 PROCEDURE — 85027 COMPLETE CBC AUTOMATED: CPT | Performed by: HOSPITALIST

## 2021-11-25 PROCEDURE — 25010000002 CEFTRIAXONE PER 250 MG: Performed by: INTERNAL MEDICINE

## 2021-11-25 PROCEDURE — 85014 HEMATOCRIT: CPT | Performed by: PHYSICIAN ASSISTANT

## 2021-11-25 PROCEDURE — 85018 HEMOGLOBIN: CPT | Performed by: PHYSICIAN ASSISTANT

## 2021-11-25 PROCEDURE — 25010000002 HYDROMORPHONE 1 MG/ML SOLUTION: Performed by: HOSPITALIST

## 2021-11-25 PROCEDURE — 80053 COMPREHEN METABOLIC PANEL: CPT | Performed by: HOSPITALIST

## 2021-11-25 PROCEDURE — 99231 SBSQ HOSP IP/OBS SF/LOW 25: CPT | Performed by: HOSPITALIST

## 2021-11-25 RX ADMIN — PYRIDOXINE HCL TAB 50 MG 25 MG: 50 TAB at 08:46

## 2021-11-25 RX ADMIN — ROSUVASTATIN CALCIUM 20 MG: 20 TABLET, COATED ORAL at 20:02

## 2021-11-25 RX ADMIN — RANOLAZINE 1000 MG: 500 TABLET, EXTENDED RELEASE ORAL at 20:19

## 2021-11-25 RX ADMIN — SPIRONOLACTONE 100 MG: 25 TABLET ORAL at 16:47

## 2021-11-25 RX ADMIN — PANTOPRAZOLE SODIUM 40 MG: 40 INJECTION, POWDER, FOR SOLUTION INTRAVENOUS at 08:37

## 2021-11-25 RX ADMIN — SPIRONOLACTONE 100 MG: 25 TABLET ORAL at 08:37

## 2021-11-25 RX ADMIN — SPIRONOLACTONE 100 MG: 25 TABLET ORAL at 20:01

## 2021-11-25 RX ADMIN — Medication 100 MG: at 08:37

## 2021-11-25 RX ADMIN — RIFAXIMIN 550 MG: 550 TABLET ORAL at 08:38

## 2021-11-25 RX ADMIN — ASPIRIN 81 MG CHEWABLE TABLET 81 MG: 81 TABLET CHEWABLE at 08:37

## 2021-11-25 RX ADMIN — RIFAXIMIN 550 MG: 550 TABLET ORAL at 20:02

## 2021-11-25 RX ADMIN — URSODIOL 600 MG: 300 CAPSULE ORAL at 08:46

## 2021-11-25 RX ADMIN — BUMETANIDE 2 MG: 1 TABLET ORAL at 08:38

## 2021-11-25 RX ADMIN — SODIUM CHLORIDE 1 G: 900 INJECTION INTRAVENOUS at 20:09

## 2021-11-25 RX ADMIN — TRAZODONE HYDROCHLORIDE 50 MG: 50 TABLET ORAL at 20:02

## 2021-11-25 RX ADMIN — TAMSULOSIN HYDROCHLORIDE 0.4 MG: 0.4 CAPSULE ORAL at 08:38

## 2021-11-25 RX ADMIN — HYDROMORPHONE HYDROCHLORIDE 1 MG: 1 INJECTION, SOLUTION INTRAMUSCULAR; INTRAVENOUS; SUBCUTANEOUS at 19:55

## 2021-11-25 RX ADMIN — HYDROMORPHONE HYDROCHLORIDE 1 MG: 1 INJECTION, SOLUTION INTRAMUSCULAR; INTRAVENOUS; SUBCUTANEOUS at 16:47

## 2021-11-25 RX ADMIN — HYDROMORPHONE HYDROCHLORIDE 1 MG: 1 INJECTION, SOLUTION INTRAMUSCULAR; INTRAVENOUS; SUBCUTANEOUS at 08:37

## 2021-11-25 RX ADMIN — PANTOPRAZOLE SODIUM 40 MG: 40 INJECTION, POWDER, FOR SOLUTION INTRAVENOUS at 20:02

## 2021-11-25 RX ADMIN — URSODIOL 600 MG: 300 CAPSULE ORAL at 20:19

## 2021-11-25 RX ADMIN — SODIUM CHLORIDE, PRESERVATIVE FREE 10 ML: 5 INJECTION INTRAVENOUS at 20:08

## 2021-11-25 RX ADMIN — GABAPENTIN 300 MG: 300 CAPSULE ORAL at 20:02

## 2021-11-25 RX ADMIN — RANOLAZINE 1000 MG: 500 TABLET, EXTENDED RELEASE ORAL at 08:45

## 2021-11-25 RX ADMIN — CLOPIDOGREL BISULFATE 75 MG: 75 TABLET ORAL at 08:38

## 2021-11-25 RX ADMIN — FLUOXETINE HYDROCHLORIDE 40 MG: 20 CAPSULE ORAL at 08:38

## 2021-11-25 RX ADMIN — HYDROMORPHONE HYDROCHLORIDE 1 MG: 1 INJECTION, SOLUTION INTRAMUSCULAR; INTRAVENOUS; SUBCUTANEOUS at 12:39

## 2021-11-25 RX ADMIN — SODIUM CHLORIDE, PRESERVATIVE FREE 10 ML: 5 INJECTION INTRAVENOUS at 08:38

## 2021-11-25 RX ADMIN — OLANZAPINE 10 MG: 5 TABLET, FILM COATED ORAL at 20:02

## 2021-11-26 ENCOUNTER — ANESTHESIA (OUTPATIENT)
Dept: GASTROENTEROLOGY | Facility: HOSPITAL | Age: 46
End: 2021-11-26

## 2021-11-26 ENCOUNTER — ANESTHESIA EVENT (OUTPATIENT)
Dept: GASTROENTEROLOGY | Facility: HOSPITAL | Age: 46
End: 2021-11-26

## 2021-11-26 ENCOUNTER — APPOINTMENT (OUTPATIENT)
Dept: GENERAL RADIOLOGY | Facility: HOSPITAL | Age: 46
End: 2021-11-26

## 2021-11-26 LAB
ALBUMIN SERPL-MCNC: 3.4 G/DL (ref 3.5–5.2)
ALBUMIN/GLOB SERPL: 1.5 G/DL
ALP SERPL-CCNC: 103 U/L (ref 39–117)
ALT SERPL W P-5'-P-CCNC: 18 U/L (ref 1–33)
ANION GAP SERPL CALCULATED.3IONS-SCNC: 9 MMOL/L (ref 5–15)
AST SERPL-CCNC: 25 U/L (ref 1–32)
BILIRUB SERPL-MCNC: 0.5 MG/DL (ref 0–1.2)
BUN SERPL-MCNC: 9 MG/DL (ref 6–20)
BUN/CREAT SERPL: 11.3 (ref 7–25)
CALCIUM SPEC-SCNC: 8.4 MG/DL (ref 8.6–10.5)
CHLORIDE SERPL-SCNC: 103 MMOL/L (ref 98–107)
CO2 SERPL-SCNC: 23 MMOL/L (ref 22–29)
CREAT SERPL-MCNC: 0.8 MG/DL (ref 0.57–1)
DEPRECATED RDW RBC AUTO: 42.6 FL (ref 37–54)
ERYTHROCYTE [DISTWIDTH] IN BLOOD BY AUTOMATED COUNT: 13.1 % (ref 12.3–15.4)
GFR SERPL CREATININE-BSD FRML MDRD: 77 ML/MIN/1.73
GLOBULIN UR ELPH-MCNC: 2.3 GM/DL
GLUCOSE SERPL-MCNC: 109 MG/DL (ref 65–99)
HCT VFR BLD AUTO: 33.2 % (ref 34–46.6)
HGB BLD-MCNC: 11.5 G/DL (ref 12–15.9)
MCH RBC QN AUTO: 30.7 PG (ref 26.6–33)
MCHC RBC AUTO-ENTMCNC: 34.6 G/DL (ref 31.5–35.7)
MCV RBC AUTO: 88.8 FL (ref 79–97)
PLATELET # BLD AUTO: 101 10*3/MM3 (ref 140–450)
PMV BLD AUTO: 10.9 FL (ref 6–12)
POTASSIUM SERPL-SCNC: 4 MMOL/L (ref 3.5–5.2)
PROT SERPL-MCNC: 5.7 G/DL (ref 6–8.5)
RBC # BLD AUTO: 3.74 10*6/MM3 (ref 3.77–5.28)
SODIUM SERPL-SCNC: 135 MMOL/L (ref 136–145)
WBC NRBC COR # BLD: 9.42 10*3/MM3 (ref 3.4–10.8)

## 2021-11-26 PROCEDURE — 25010000002 PROPOFOL 10 MG/ML EMULSION: Performed by: NURSE ANESTHETIST, CERTIFIED REGISTERED

## 2021-11-26 PROCEDURE — 25010000002 DEXAMETHASONE PER 1 MG: Performed by: NURSE ANESTHETIST, CERTIFIED REGISTERED

## 2021-11-26 PROCEDURE — 85027 COMPLETE CBC AUTOMATED: CPT | Performed by: HOSPITALIST

## 2021-11-26 PROCEDURE — 25010000002 HYDROMORPHONE 1 MG/ML SOLUTION: Performed by: HOSPITALIST

## 2021-11-26 PROCEDURE — 88305 TISSUE EXAM BY PATHOLOGIST: CPT | Performed by: INTERNAL MEDICINE

## 2021-11-26 PROCEDURE — 43239 EGD BIOPSY SINGLE/MULTIPLE: CPT | Performed by: INTERNAL MEDICINE

## 2021-11-26 PROCEDURE — 80053 COMPREHEN METABOLIC PANEL: CPT | Performed by: HOSPITALIST

## 2021-11-26 PROCEDURE — 25010000002 ONDANSETRON PER 1 MG: Performed by: NURSE ANESTHETIST, CERTIFIED REGISTERED

## 2021-11-26 PROCEDURE — 74018 RADEX ABDOMEN 1 VIEW: CPT

## 2021-11-26 PROCEDURE — 99232 SBSQ HOSP IP/OBS MODERATE 35: CPT | Performed by: HOSPITALIST

## 2021-11-26 PROCEDURE — 25010000002 HYDROMORPHONE 1 MG/ML SOLUTION: Performed by: INTERNAL MEDICINE

## 2021-11-26 PROCEDURE — 25010000002 SUCCINYLCHOLINE PER 20 MG: Performed by: NURSE ANESTHETIST, CERTIFIED REGISTERED

## 2021-11-26 PROCEDURE — 0DB68ZX EXCISION OF STOMACH, VIA NATURAL OR ARTIFICIAL OPENING ENDOSCOPIC, DIAGNOSTIC: ICD-10-PCS | Performed by: INTERNAL MEDICINE

## 2021-11-26 RX ORDER — PANTOPRAZOLE SODIUM 40 MG/1
40 TABLET, DELAYED RELEASE ORAL
Status: DISCONTINUED | OUTPATIENT
Start: 2021-11-27 | End: 2021-12-06 | Stop reason: HOSPADM

## 2021-11-26 RX ORDER — ROCURONIUM BROMIDE 10 MG/ML
INJECTION, SOLUTION INTRAVENOUS AS NEEDED
Status: DISCONTINUED | OUTPATIENT
Start: 2021-11-26 | End: 2021-11-26 | Stop reason: SURG

## 2021-11-26 RX ORDER — SODIUM CHLORIDE, SODIUM LACTATE, POTASSIUM CHLORIDE, CALCIUM CHLORIDE 600; 310; 30; 20 MG/100ML; MG/100ML; MG/100ML; MG/100ML
100 INJECTION, SOLUTION INTRAVENOUS CONTINUOUS
Status: DISCONTINUED | OUTPATIENT
Start: 2021-11-26 | End: 2021-11-26

## 2021-11-26 RX ORDER — FAMOTIDINE 20 MG/1
20 TABLET, FILM COATED ORAL ONCE
Status: DISCONTINUED | OUTPATIENT
Start: 2021-11-26 | End: 2021-11-26

## 2021-11-26 RX ORDER — MEPERIDINE HYDROCHLORIDE 50 MG/ML
12.5 INJECTION INTRAMUSCULAR; INTRAVENOUS; SUBCUTANEOUS
Status: DISCONTINUED | OUTPATIENT
Start: 2021-11-26 | End: 2021-11-26

## 2021-11-26 RX ORDER — CYCLOBENZAPRINE HCL 10 MG
5 TABLET ORAL 3 TIMES DAILY PRN
Status: DISCONTINUED | OUTPATIENT
Start: 2021-11-26 | End: 2021-12-01

## 2021-11-26 RX ORDER — FAMOTIDINE 10 MG/ML
20 INJECTION, SOLUTION INTRAVENOUS ONCE
Status: DISCONTINUED | OUTPATIENT
Start: 2021-11-26 | End: 2021-11-26

## 2021-11-26 RX ORDER — LIDOCAINE HYDROCHLORIDE 10 MG/ML
INJECTION, SOLUTION EPIDURAL; INFILTRATION; INTRACAUDAL; PERINEURAL AS NEEDED
Status: DISCONTINUED | OUTPATIENT
Start: 2021-11-26 | End: 2021-11-26 | Stop reason: SURG

## 2021-11-26 RX ORDER — LACTULOSE 10 G/15ML
10 SOLUTION ORAL 2 TIMES DAILY
Status: DISCONTINUED | OUTPATIENT
Start: 2021-11-26 | End: 2021-12-03

## 2021-11-26 RX ORDER — BUPIVACAINE HCL/0.9 % NACL/PF 0.125 %
PLASTIC BAG, INJECTION (ML) EPIDURAL AS NEEDED
Status: DISCONTINUED | OUTPATIENT
Start: 2021-11-26 | End: 2021-11-26 | Stop reason: SURG

## 2021-11-26 RX ORDER — NALOXONE HCL 0.4 MG/ML
0.4 VIAL (ML) INJECTION AS NEEDED
Status: DISCONTINUED | OUTPATIENT
Start: 2021-11-26 | End: 2021-11-26

## 2021-11-26 RX ORDER — SODIUM CHLORIDE 0.9 % (FLUSH) 0.9 %
10 SYRINGE (ML) INJECTION EVERY 12 HOURS SCHEDULED
Status: DISCONTINUED | OUTPATIENT
Start: 2021-11-26 | End: 2021-11-26

## 2021-11-26 RX ORDER — DEXAMETHASONE SODIUM PHOSPHATE 4 MG/ML
INJECTION, SOLUTION INTRA-ARTICULAR; INTRALESIONAL; INTRAMUSCULAR; INTRAVENOUS; SOFT TISSUE AS NEEDED
Status: DISCONTINUED | OUTPATIENT
Start: 2021-11-26 | End: 2021-11-26 | Stop reason: SURG

## 2021-11-26 RX ORDER — PROPOFOL 10 MG/ML
VIAL (ML) INTRAVENOUS AS NEEDED
Status: DISCONTINUED | OUTPATIENT
Start: 2021-11-26 | End: 2021-11-26 | Stop reason: SURG

## 2021-11-26 RX ORDER — ONDANSETRON 2 MG/ML
4 INJECTION INTRAMUSCULAR; INTRAVENOUS ONCE AS NEEDED
Status: DISCONTINUED | OUTPATIENT
Start: 2021-11-26 | End: 2021-11-26

## 2021-11-26 RX ORDER — LIDOCAINE HYDROCHLORIDE 10 MG/ML
0.5 INJECTION, SOLUTION EPIDURAL; INFILTRATION; INTRACAUDAL; PERINEURAL ONCE AS NEEDED
Status: DISCONTINUED | OUTPATIENT
Start: 2021-11-26 | End: 2021-11-26

## 2021-11-26 RX ORDER — ONDANSETRON 2 MG/ML
INJECTION INTRAMUSCULAR; INTRAVENOUS AS NEEDED
Status: DISCONTINUED | OUTPATIENT
Start: 2021-11-26 | End: 2021-11-26 | Stop reason: SURG

## 2021-11-26 RX ORDER — SODIUM CHLORIDE, SODIUM LACTATE, POTASSIUM CHLORIDE, CALCIUM CHLORIDE 600; 310; 30; 20 MG/100ML; MG/100ML; MG/100ML; MG/100ML
9 INJECTION, SOLUTION INTRAVENOUS CONTINUOUS
Status: DISCONTINUED | OUTPATIENT
Start: 2021-11-26 | End: 2021-11-26

## 2021-11-26 RX ORDER — SODIUM CHLORIDE 0.9 % (FLUSH) 0.9 %
10 SYRINGE (ML) INJECTION AS NEEDED
Status: DISCONTINUED | OUTPATIENT
Start: 2021-11-26 | End: 2021-11-26

## 2021-11-26 RX ORDER — MIDAZOLAM HYDROCHLORIDE 1 MG/ML
1 INJECTION INTRAMUSCULAR; INTRAVENOUS
Status: DISCONTINUED | OUTPATIENT
Start: 2021-11-26 | End: 2021-11-26

## 2021-11-26 RX ORDER — SODIUM CHLORIDE 9 MG/ML
50 INJECTION, SOLUTION INTRAVENOUS CONTINUOUS
Status: DISCONTINUED | OUTPATIENT
Start: 2021-11-26 | End: 2021-11-26

## 2021-11-26 RX ORDER — SUCCINYLCHOLINE CHLORIDE 20 MG/ML
INJECTION INTRAMUSCULAR; INTRAVENOUS AS NEEDED
Status: DISCONTINUED | OUTPATIENT
Start: 2021-11-26 | End: 2021-11-26 | Stop reason: SURG

## 2021-11-26 RX ORDER — EPHEDRINE SULFATE 50 MG/ML
5 INJECTION, SOLUTION INTRAVENOUS ONCE AS NEEDED
Status: DISCONTINUED | OUTPATIENT
Start: 2021-11-26 | End: 2021-11-26

## 2021-11-26 RX ORDER — LIDOCAINE 50 MG/G
1 PATCH TOPICAL
Status: DISCONTINUED | OUTPATIENT
Start: 2021-11-26 | End: 2021-12-06 | Stop reason: HOSPADM

## 2021-11-26 RX ADMIN — HYDROMORPHONE HYDROCHLORIDE 1 MG: 1 INJECTION, SOLUTION INTRAMUSCULAR; INTRAVENOUS; SUBCUTANEOUS at 19:52

## 2021-11-26 RX ADMIN — HYDROMORPHONE HYDROCHLORIDE 1 MG: 1 INJECTION, SOLUTION INTRAMUSCULAR; INTRAVENOUS; SUBCUTANEOUS at 16:39

## 2021-11-26 RX ADMIN — RANOLAZINE 1000 MG: 500 TABLET, EXTENDED RELEASE ORAL at 21:59

## 2021-11-26 RX ADMIN — PROPOFOL 150 MG: 10 INJECTION, EMULSION INTRAVENOUS at 09:14

## 2021-11-26 RX ADMIN — PYRIDOXINE HCL TAB 50 MG 25 MG: 50 TAB at 10:43

## 2021-11-26 RX ADMIN — OLANZAPINE 10 MG: 5 TABLET, FILM COATED ORAL at 21:10

## 2021-11-26 RX ADMIN — HYDROMORPHONE HYDROCHLORIDE 1 MG: 1 INJECTION, SOLUTION INTRAMUSCULAR; INTRAVENOUS; SUBCUTANEOUS at 14:37

## 2021-11-26 RX ADMIN — CLOPIDOGREL BISULFATE 75 MG: 75 TABLET ORAL at 10:43

## 2021-11-26 RX ADMIN — ROCURONIUM BROMIDE 5 MG: 10 INJECTION, SOLUTION INTRAVENOUS at 09:14

## 2021-11-26 RX ADMIN — SPIRONOLACTONE 100 MG: 25 TABLET ORAL at 10:43

## 2021-11-26 RX ADMIN — PANTOPRAZOLE SODIUM 40 MG: 40 INJECTION, POWDER, FOR SOLUTION INTRAVENOUS at 10:42

## 2021-11-26 RX ADMIN — DEXAMETHASONE SODIUM PHOSPHATE 4 MG: 4 INJECTION, SOLUTION INTRA-ARTICULAR; INTRALESIONAL; INTRAMUSCULAR; INTRAVENOUS; SOFT TISSUE at 09:14

## 2021-11-26 RX ADMIN — BUMETANIDE 2 MG: 1 TABLET ORAL at 10:43

## 2021-11-26 RX ADMIN — ASPIRIN 81 MG CHEWABLE TABLET 81 MG: 81 TABLET CHEWABLE at 10:43

## 2021-11-26 RX ADMIN — LACTULOSE 10 G: 20 SOLUTION ORAL at 21:11

## 2021-11-26 RX ADMIN — RIFAXIMIN 550 MG: 550 TABLET ORAL at 10:43

## 2021-11-26 RX ADMIN — FLUOXETINE HYDROCHLORIDE 40 MG: 20 CAPSULE ORAL at 10:43

## 2021-11-26 RX ADMIN — HYDROMORPHONE HYDROCHLORIDE 1 MG: 1 INJECTION, SOLUTION INTRAMUSCULAR; INTRAVENOUS; SUBCUTANEOUS at 08:38

## 2021-11-26 RX ADMIN — SODIUM CHLORIDE, POTASSIUM CHLORIDE, SODIUM LACTATE AND CALCIUM CHLORIDE: 600; 310; 30; 20 INJECTION, SOLUTION INTRAVENOUS at 09:08

## 2021-11-26 RX ADMIN — Medication 200 MCG: at 09:24

## 2021-11-26 RX ADMIN — SPIRONOLACTONE 100 MG: 25 TABLET ORAL at 14:37

## 2021-11-26 RX ADMIN — GABAPENTIN 300 MG: 300 CAPSULE ORAL at 21:10

## 2021-11-26 RX ADMIN — CYCLOBENZAPRINE 5 MG: 10 TABLET, FILM COATED ORAL at 11:51

## 2021-11-26 RX ADMIN — TRAZODONE HYDROCHLORIDE 50 MG: 50 TABLET ORAL at 21:10

## 2021-11-26 RX ADMIN — Medication 100 MCG: at 09:20

## 2021-11-26 RX ADMIN — LACTULOSE 10 G: 20 SOLUTION ORAL at 11:51

## 2021-11-26 RX ADMIN — HYDROMORPHONE HYDROCHLORIDE 1 MG: 1 INJECTION, SOLUTION INTRAMUSCULAR; INTRAVENOUS; SUBCUTANEOUS at 05:48

## 2021-11-26 RX ADMIN — URSODIOL 600 MG: 300 CAPSULE ORAL at 10:44

## 2021-11-26 RX ADMIN — LIDOCAINE 1 PATCH: 50 PATCH CUTANEOUS at 11:50

## 2021-11-26 RX ADMIN — TAMSULOSIN HYDROCHLORIDE 0.4 MG: 0.4 CAPSULE ORAL at 10:43

## 2021-11-26 RX ADMIN — Medication 100 MG: at 10:43

## 2021-11-26 RX ADMIN — RANOLAZINE 1000 MG: 500 TABLET, EXTENDED RELEASE ORAL at 10:43

## 2021-11-26 RX ADMIN — PANTOPRAZOLE SODIUM 40 MG: 40 INJECTION, POWDER, FOR SOLUTION INTRAVENOUS at 08:38

## 2021-11-26 RX ADMIN — SUCCINYLCHOLINE CHLORIDE 160 MG: 20 INJECTION, SOLUTION INTRAMUSCULAR; INTRAVENOUS at 09:14

## 2021-11-26 RX ADMIN — HYDROMORPHONE HYDROCHLORIDE 1 MG: 1 INJECTION, SOLUTION INTRAMUSCULAR; INTRAVENOUS; SUBCUTANEOUS at 21:59

## 2021-11-26 RX ADMIN — LIDOCAINE HYDROCHLORIDE 50 MG: 10 INJECTION, SOLUTION EPIDURAL; INFILTRATION; INTRACAUDAL; PERINEURAL at 09:14

## 2021-11-26 RX ADMIN — ONDANSETRON 4 MG: 2 INJECTION INTRAMUSCULAR; INTRAVENOUS at 09:14

## 2021-11-26 RX ADMIN — SPIRONOLACTONE 100 MG: 25 TABLET ORAL at 21:09

## 2021-11-26 RX ADMIN — RIFAXIMIN 550 MG: 550 TABLET ORAL at 21:10

## 2021-11-26 RX ADMIN — ROSUVASTATIN CALCIUM 20 MG: 20 TABLET, COATED ORAL at 21:10

## 2021-11-26 RX ADMIN — HYDROMORPHONE HYDROCHLORIDE 1 MG: 1 INJECTION, SOLUTION INTRAMUSCULAR; INTRAVENOUS; SUBCUTANEOUS at 11:51

## 2021-11-26 NOTE — ANESTHESIA PREPROCEDURE EVALUATION
Anesthesia Evaluation     Patient summary reviewed and Nursing notes reviewed   no history of anesthetic complications:  NPO Solid Status: > 8 hours  NPO Liquid Status: > 2 hours           Airway   Mallampati: II  TM distance: >3 FB  Neck ROM: full  No difficulty expected  Dental    (+) edentulous    Pulmonary - negative pulmonary ROS and normal exam    breath sounds clear to auscultation  Cardiovascular - normal exam    ECG reviewed  Rhythm: regular  Rate: normal    (+) hypertension, CAD, cardiac stents     ROS comment: 6/21 Echo:  · The quality of the study is limited due to patient positioning and patient being intubated.  · Left ventricular ejection fraction appears to be 51 - 55%. Left ventricular systolic function is normal.  · Left ventricular diastolic function is consistent with (grade Ia w/high LAP) impaired relaxation.  · Mildly reduced right ventricular systolic function noted.        Neuro/Psych- negative ROS  GI/Hepatic/Renal/Endo    (+)  GERD, GI bleeding (hematemesis - last 11/22/21) upper , liver disease (Alcoholic cirrhosis a/w esophageal varices, ascites),     Musculoskeletal     Abdominal    Substance History   (+) alcohol use (quit x 2 yrs),      OB/GYN          Other   arthritis,                      Anesthesia Plan    ASA 3     general   Rapid sequence(Plan for GETA d/t esophageal varices)  intravenous induction     Anesthetic plan, all risks, benefits, and alternatives have been provided, discussed and informed consent has been obtained with: patient.    Plan discussed with CRNA.

## 2021-11-26 NOTE — ANESTHESIA POSTPROCEDURE EVALUATION
Patient: Timothy Guzman    Procedure Summary     Date: 11/26/21 Room / [facility]    Anesthesia Start: 0908 Anesthesia Stop:     Procedure: ESOPHAGOGASTRODUODENOSCOPY (N/A ) Diagnosis:       Right upper quadrant abdominal pain      (Right upper quadrant abdominal pain [R10.11])    Surgeons: Tyrese Rasmussen MD Provider: Renny Bowers MD    Anesthesia Type: general ASA Status: 3          Anesthesia Type: general    Vitals  Vitals Value Taken Time   BP     Temp 97.2 °F (36.2 °C) 11/26/21 0934   Pulse 74 11/26/21 0935   Resp     SpO2 93 % 11/26/21 0935   Vitals shown include unvalidated device data.        Post Anesthesia Care and Evaluation    Patient location during evaluation: PACU  Patient participation: complete - patient participated  Level of consciousness: awake and alert  Pain score: 0  Pain management: adequate  Airway patency: patent  Anesthetic complications: No anesthetic complications  PONV Status: none  Cardiovascular status: hemodynamically stable and acceptable  Respiratory status: nonlabored ventilation, acceptable and nasal cannula  Hydration status: acceptable

## 2021-11-26 NOTE — ANESTHESIA PROCEDURE NOTES
Airway  Urgency: elective    Date/Time: 11/26/2021 9:14 AM  Airway not difficult    General Information and Staff    Patient location during procedure: OR  CRNA: David Lane CRNA    Indications and Patient Condition  Indications for airway management: airway protection    Preoxygenated: yes  MILS not maintained throughout  Mask difficulty assessment: 1 - vent by mask    Final Airway Details  Final airway type: endotracheal airway      Successful airway: ETT  Cuffed: yes   Successful intubation technique: direct laryngoscopy  Endotracheal tube insertion site: oral  Blade: Romeo  Blade size: 2  ETT size (mm): 7.0  Cormack-Lehane Classification: grade I - full view of glottis  Placement verified by: chest auscultation and capnometry   Cuff volume (mL): 5  Measured from: lips  ETT/EBT  to lips (cm): 20  Number of attempts at approach: 1  Assessment: lips, teeth, and gum same as pre-op and atraumatic intubation    Additional Comments  Negative epigastric sounds, Breath sound equal bilaterally with symmetric chest rise and fall

## 2021-11-27 ENCOUNTER — APPOINTMENT (OUTPATIENT)
Dept: GENERAL RADIOLOGY | Facility: HOSPITAL | Age: 46
End: 2021-11-27

## 2021-11-27 LAB
ALBUMIN SERPL-MCNC: 3.8 G/DL (ref 3.5–5.2)
ALBUMIN/GLOB SERPL: 1.5 G/DL
ALP SERPL-CCNC: 117 U/L (ref 39–117)
ALT SERPL W P-5'-P-CCNC: 17 U/L (ref 1–33)
AMYLASE SERPL-CCNC: 52 U/L (ref 28–100)
ANION GAP SERPL CALCULATED.3IONS-SCNC: 12 MMOL/L (ref 5–15)
AST SERPL-CCNC: 17 U/L (ref 1–32)
BH CV VAS HEPATIC PORTAL VEIN DIAMETER: 1.3 CM
BH CV VAS SMA HEPATIC EDV: 17.5 CM/S
BH CV VAS SMA HEPATIC PSV: 81.5 CM/S
BH CV VAS SMA SPLENIC EDV: 42 CM/S
BH CV VAS SMA SPLENIC PSV: 119 CM/S
BILIRUB SERPL-MCNC: 0.2 MG/DL (ref 0–1.2)
BUN SERPL-MCNC: 10 MG/DL (ref 6–20)
BUN/CREAT SERPL: 12.3 (ref 7–25)
CALCIUM SPEC-SCNC: 9.1 MG/DL (ref 8.6–10.5)
CHLORIDE SERPL-SCNC: 101 MMOL/L (ref 98–107)
CO2 SERPL-SCNC: 22 MMOL/L (ref 22–29)
CREAT SERPL-MCNC: 0.81 MG/DL (ref 0.57–1)
CYTO UR: NORMAL
DEPRECATED RDW RBC AUTO: 42.2 FL (ref 37–54)
ERYTHROCYTE [DISTWIDTH] IN BLOOD BY AUTOMATED COUNT: 13 % (ref 12.3–15.4)
GFR SERPL CREATININE-BSD FRML MDRD: 76 ML/MIN/1.73
GLOBULIN UR ELPH-MCNC: 2.5 GM/DL
GLUCOSE SERPL-MCNC: 122 MG/DL (ref 65–99)
HCT VFR BLD AUTO: 35.2 % (ref 34–46.6)
HGB BLD-MCNC: 12 G/DL (ref 12–15.9)
LAB AP CASE REPORT: NORMAL
LAB AP CLINICAL INFORMATION: NORMAL
LIPASE SERPL-CCNC: 25 U/L (ref 13–60)
MCH RBC QN AUTO: 30.8 PG (ref 26.6–33)
MCHC RBC AUTO-ENTMCNC: 34.1 G/DL (ref 31.5–35.7)
MCV RBC AUTO: 90.3 FL (ref 79–97)
PATH REPORT.FINAL DX SPEC: NORMAL
PATH REPORT.GROSS SPEC: NORMAL
PLATELET # BLD AUTO: 134 10*3/MM3 (ref 140–450)
PMV BLD AUTO: 10.9 FL (ref 6–12)
POTASSIUM SERPL-SCNC: 3.8 MMOL/L (ref 3.5–5.2)
PROT SERPL-MCNC: 6.3 G/DL (ref 6–8.5)
RBC # BLD AUTO: 3.9 10*6/MM3 (ref 3.77–5.28)
SODIUM SERPL-SCNC: 135 MMOL/L (ref 136–145)
WBC NRBC COR # BLD: 10.53 10*3/MM3 (ref 3.4–10.8)

## 2021-11-27 PROCEDURE — 25010000002 HYDROMORPHONE 1 MG/ML SOLUTION: Performed by: INTERNAL MEDICINE

## 2021-11-27 PROCEDURE — 85027 COMPLETE CBC AUTOMATED: CPT | Performed by: HOSPITALIST

## 2021-11-27 PROCEDURE — 99232 SBSQ HOSP IP/OBS MODERATE 35: CPT | Performed by: NURSE PRACTITIONER

## 2021-11-27 PROCEDURE — 82150 ASSAY OF AMYLASE: CPT | Performed by: NURSE PRACTITIONER

## 2021-11-27 PROCEDURE — 83690 ASSAY OF LIPASE: CPT | Performed by: NURSE PRACTITIONER

## 2021-11-27 PROCEDURE — 74018 RADEX ABDOMEN 1 VIEW: CPT

## 2021-11-27 PROCEDURE — 99232 SBSQ HOSP IP/OBS MODERATE 35: CPT | Performed by: HOSPITALIST

## 2021-11-27 PROCEDURE — 80053 COMPREHEN METABOLIC PANEL: CPT | Performed by: HOSPITALIST

## 2021-11-27 RX ORDER — BISACODYL 10 MG
10 SUPPOSITORY, RECTAL RECTAL DAILY
Status: DISCONTINUED | OUTPATIENT
Start: 2021-11-27 | End: 2021-11-30

## 2021-11-27 RX ADMIN — CLOPIDOGREL BISULFATE 75 MG: 75 TABLET ORAL at 08:54

## 2021-11-27 RX ADMIN — TAMSULOSIN HYDROCHLORIDE 0.4 MG: 0.4 CAPSULE ORAL at 08:54

## 2021-11-27 RX ADMIN — HYDROMORPHONE HYDROCHLORIDE 1 MG: 1 INJECTION, SOLUTION INTRAMUSCULAR; INTRAVENOUS; SUBCUTANEOUS at 00:22

## 2021-11-27 RX ADMIN — TRAZODONE HYDROCHLORIDE 50 MG: 50 TABLET ORAL at 21:09

## 2021-11-27 RX ADMIN — HYDROMORPHONE HYDROCHLORIDE 1 MG: 1 INJECTION, SOLUTION INTRAMUSCULAR; INTRAVENOUS; SUBCUTANEOUS at 17:14

## 2021-11-27 RX ADMIN — Medication 100 MG: at 08:55

## 2021-11-27 RX ADMIN — HYDROMORPHONE HYDROCHLORIDE 1 MG: 1 INJECTION, SOLUTION INTRAMUSCULAR; INTRAVENOUS; SUBCUTANEOUS at 19:50

## 2021-11-27 RX ADMIN — GABAPENTIN 300 MG: 300 CAPSULE ORAL at 21:08

## 2021-11-27 RX ADMIN — RANOLAZINE 1000 MG: 500 TABLET, EXTENDED RELEASE ORAL at 21:54

## 2021-11-27 RX ADMIN — RANOLAZINE 1000 MG: 500 TABLET, EXTENDED RELEASE ORAL at 08:54

## 2021-11-27 RX ADMIN — URSODIOL 600 MG: 300 CAPSULE ORAL at 00:22

## 2021-11-27 RX ADMIN — BISACODYL 10 MG: 10 SUPPOSITORY RECTAL at 17:14

## 2021-11-27 RX ADMIN — LACTULOSE 10 G: 20 SOLUTION ORAL at 08:55

## 2021-11-27 RX ADMIN — ROSUVASTATIN CALCIUM 20 MG: 20 TABLET, COATED ORAL at 21:08

## 2021-11-27 RX ADMIN — URSODIOL 600 MG: 300 CAPSULE ORAL at 21:12

## 2021-11-27 RX ADMIN — HYDROMORPHONE HYDROCHLORIDE 1 MG: 1 INJECTION, SOLUTION INTRAMUSCULAR; INTRAVENOUS; SUBCUTANEOUS at 14:36

## 2021-11-27 RX ADMIN — PYRIDOXINE HCL TAB 50 MG 25 MG: 50 TAB at 08:53

## 2021-11-27 RX ADMIN — HYDROMORPHONE HYDROCHLORIDE 1 MG: 1 INJECTION, SOLUTION INTRAMUSCULAR; INTRAVENOUS; SUBCUTANEOUS at 11:53

## 2021-11-27 RX ADMIN — HYDROMORPHONE HYDROCHLORIDE 1 MG: 1 INJECTION, SOLUTION INTRAMUSCULAR; INTRAVENOUS; SUBCUTANEOUS at 08:55

## 2021-11-27 RX ADMIN — SODIUM CHLORIDE, PRESERVATIVE FREE 10 ML: 5 INJECTION INTRAVENOUS at 22:14

## 2021-11-27 RX ADMIN — ASPIRIN 81 MG CHEWABLE TABLET 81 MG: 81 TABLET CHEWABLE at 08:54

## 2021-11-27 RX ADMIN — SPIRONOLACTONE 100 MG: 25 TABLET ORAL at 17:15

## 2021-11-27 RX ADMIN — HYDROMORPHONE HYDROCHLORIDE 1 MG: 1 INJECTION, SOLUTION INTRAMUSCULAR; INTRAVENOUS; SUBCUTANEOUS at 03:06

## 2021-11-27 RX ADMIN — LACTULOSE 10 G: 20 SOLUTION ORAL at 21:09

## 2021-11-27 RX ADMIN — FLUOXETINE HYDROCHLORIDE 40 MG: 20 CAPSULE ORAL at 08:54

## 2021-11-27 RX ADMIN — SPIRONOLACTONE 100 MG: 25 TABLET ORAL at 21:54

## 2021-11-27 RX ADMIN — HYDROMORPHONE HYDROCHLORIDE 1 MG: 1 INJECTION, SOLUTION INTRAMUSCULAR; INTRAVENOUS; SUBCUTANEOUS at 21:54

## 2021-11-27 RX ADMIN — RIFAXIMIN 550 MG: 550 TABLET ORAL at 08:54

## 2021-11-27 RX ADMIN — URSODIOL 600 MG: 300 CAPSULE ORAL at 08:54

## 2021-11-27 RX ADMIN — SODIUM CHLORIDE, PRESERVATIVE FREE 10 ML: 5 INJECTION INTRAVENOUS at 08:59

## 2021-11-27 RX ADMIN — BUMETANIDE 2 MG: 1 TABLET ORAL at 08:54

## 2021-11-27 RX ADMIN — RIFAXIMIN 550 MG: 550 TABLET ORAL at 21:08

## 2021-11-27 RX ADMIN — OLANZAPINE 10 MG: 5 TABLET, FILM COATED ORAL at 21:08

## 2021-11-27 RX ADMIN — PANTOPRAZOLE SODIUM 40 MG: 40 TABLET, DELAYED RELEASE ORAL at 06:49

## 2021-11-27 RX ADMIN — LIDOCAINE 1 PATCH: 50 PATCH CUTANEOUS at 08:53

## 2021-11-27 RX ADMIN — SPIRONOLACTONE 100 MG: 25 TABLET ORAL at 08:54

## 2021-11-28 LAB
QT INTERVAL: 400 MS
QTC INTERVAL: 461 MS

## 2021-11-28 PROCEDURE — 25010000002 HYDROMORPHONE 1 MG/ML SOLUTION: Performed by: INTERNAL MEDICINE

## 2021-11-28 PROCEDURE — 99232 SBSQ HOSP IP/OBS MODERATE 35: CPT | Performed by: HOSPITALIST

## 2021-11-28 RX ORDER — PYRIDOSTIGMINE BROMIDE 60 MG/1
60 TABLET ORAL EVERY 8 HOURS SCHEDULED
Status: DISCONTINUED | OUTPATIENT
Start: 2021-11-28 | End: 2021-11-30

## 2021-11-28 RX ADMIN — TAMSULOSIN HYDROCHLORIDE 0.4 MG: 0.4 CAPSULE ORAL at 08:56

## 2021-11-28 RX ADMIN — HYDROMORPHONE HYDROCHLORIDE 1 MG: 1 INJECTION, SOLUTION INTRAMUSCULAR; INTRAVENOUS; SUBCUTANEOUS at 15:31

## 2021-11-28 RX ADMIN — ASPIRIN 81 MG CHEWABLE TABLET 81 MG: 81 TABLET CHEWABLE at 08:55

## 2021-11-28 RX ADMIN — ROSUVASTATIN CALCIUM 20 MG: 20 TABLET, COATED ORAL at 20:21

## 2021-11-28 RX ADMIN — SODIUM CHLORIDE, PRESERVATIVE FREE 10 ML: 5 INJECTION INTRAVENOUS at 08:57

## 2021-11-28 RX ADMIN — PYRIDOSTIGMINE BROMIDE 60 MG: 60 TABLET ORAL at 13:19

## 2021-11-28 RX ADMIN — HYDROMORPHONE HYDROCHLORIDE 1 MG: 1 INJECTION, SOLUTION INTRAMUSCULAR; INTRAVENOUS; SUBCUTANEOUS at 20:21

## 2021-11-28 RX ADMIN — RIFAXIMIN 550 MG: 550 TABLET ORAL at 08:55

## 2021-11-28 RX ADMIN — URSODIOL 600 MG: 300 CAPSULE ORAL at 08:55

## 2021-11-28 RX ADMIN — PANTOPRAZOLE SODIUM 40 MG: 40 TABLET, DELAYED RELEASE ORAL at 07:41

## 2021-11-28 RX ADMIN — SPIRONOLACTONE 100 MG: 25 TABLET ORAL at 20:21

## 2021-11-28 RX ADMIN — LIDOCAINE 1 PATCH: 50 PATCH CUTANEOUS at 08:55

## 2021-11-28 RX ADMIN — RANOLAZINE 1000 MG: 500 TABLET, EXTENDED RELEASE ORAL at 08:55

## 2021-11-28 RX ADMIN — CLOPIDOGREL BISULFATE 75 MG: 75 TABLET ORAL at 08:56

## 2021-11-28 RX ADMIN — RIFAXIMIN 550 MG: 550 TABLET ORAL at 20:21

## 2021-11-28 RX ADMIN — SPIRONOLACTONE 100 MG: 25 TABLET ORAL at 15:04

## 2021-11-28 RX ADMIN — HYDROMORPHONE HYDROCHLORIDE 1 MG: 1 INJECTION, SOLUTION INTRAMUSCULAR; INTRAVENOUS; SUBCUTANEOUS at 03:33

## 2021-11-28 RX ADMIN — SODIUM CHLORIDE, PRESERVATIVE FREE 10 ML: 5 INJECTION INTRAVENOUS at 13:19

## 2021-11-28 RX ADMIN — BUMETANIDE 2 MG: 1 TABLET ORAL at 08:55

## 2021-11-28 RX ADMIN — PYRIDOXINE HCL TAB 50 MG 25 MG: 50 TAB at 08:56

## 2021-11-28 RX ADMIN — SODIUM CHLORIDE, PRESERVATIVE FREE 10 ML: 5 INJECTION INTRAVENOUS at 17:39

## 2021-11-28 RX ADMIN — HYDROMORPHONE HYDROCHLORIDE 1 MG: 1 INJECTION, SOLUTION INTRAMUSCULAR; INTRAVENOUS; SUBCUTANEOUS at 17:39

## 2021-11-28 RX ADMIN — HYDROMORPHONE HYDROCHLORIDE 1 MG: 1 INJECTION, SOLUTION INTRAMUSCULAR; INTRAVENOUS; SUBCUTANEOUS at 08:55

## 2021-11-28 RX ADMIN — OLANZAPINE 10 MG: 5 TABLET, FILM COATED ORAL at 20:21

## 2021-11-28 RX ADMIN — SPIRONOLACTONE 100 MG: 25 TABLET ORAL at 08:55

## 2021-11-28 RX ADMIN — HYDROMORPHONE HYDROCHLORIDE 1 MG: 1 INJECTION, SOLUTION INTRAMUSCULAR; INTRAVENOUS; SUBCUTANEOUS at 11:04

## 2021-11-28 RX ADMIN — HYDROMORPHONE HYDROCHLORIDE 1 MG: 1 INJECTION, SOLUTION INTRAMUSCULAR; INTRAVENOUS; SUBCUTANEOUS at 00:40

## 2021-11-28 RX ADMIN — FLUOXETINE HYDROCHLORIDE 40 MG: 20 CAPSULE ORAL at 08:56

## 2021-11-28 RX ADMIN — SODIUM CHLORIDE, PRESERVATIVE FREE 10 ML: 5 INJECTION INTRAVENOUS at 20:22

## 2021-11-28 RX ADMIN — LACTULOSE 10 G: 20 SOLUTION ORAL at 20:20

## 2021-11-28 RX ADMIN — URSODIOL 600 MG: 300 CAPSULE ORAL at 20:21

## 2021-11-28 RX ADMIN — LACTULOSE 10 G: 20 SOLUTION ORAL at 08:55

## 2021-11-28 RX ADMIN — GABAPENTIN 300 MG: 300 CAPSULE ORAL at 20:23

## 2021-11-28 RX ADMIN — HYDROMORPHONE HYDROCHLORIDE 1 MG: 1 INJECTION, SOLUTION INTRAMUSCULAR; INTRAVENOUS; SUBCUTANEOUS at 22:28

## 2021-11-28 RX ADMIN — TRAZODONE HYDROCHLORIDE 50 MG: 50 TABLET ORAL at 22:28

## 2021-11-28 RX ADMIN — Medication 100 MG: at 08:56

## 2021-11-28 RX ADMIN — BISACODYL 10 MG: 10 SUPPOSITORY RECTAL at 08:56

## 2021-11-28 RX ADMIN — HYDROMORPHONE HYDROCHLORIDE 1 MG: 1 INJECTION, SOLUTION INTRAMUSCULAR; INTRAVENOUS; SUBCUTANEOUS at 13:19

## 2021-11-28 RX ADMIN — RANOLAZINE 1000 MG: 500 TABLET, EXTENDED RELEASE ORAL at 20:21

## 2021-11-28 RX ADMIN — PYRIDOSTIGMINE BROMIDE 60 MG: 60 TABLET ORAL at 22:28

## 2021-11-29 ENCOUNTER — APPOINTMENT (OUTPATIENT)
Dept: GENERAL RADIOLOGY | Facility: HOSPITAL | Age: 46
End: 2021-11-29

## 2021-11-29 PROCEDURE — 25010000002 HYDROMORPHONE 1 MG/ML SOLUTION: Performed by: HOSPITALIST

## 2021-11-29 PROCEDURE — 99232 SBSQ HOSP IP/OBS MODERATE 35: CPT | Performed by: PHYSICIAN ASSISTANT

## 2021-11-29 PROCEDURE — 25010000002 HYDROMORPHONE 1 MG/ML SOLUTION: Performed by: INTERNAL MEDICINE

## 2021-11-29 PROCEDURE — 99232 SBSQ HOSP IP/OBS MODERATE 35: CPT | Performed by: HOSPITALIST

## 2021-11-29 PROCEDURE — 74018 RADEX ABDOMEN 1 VIEW: CPT

## 2021-11-29 RX ORDER — OXYCODONE HYDROCHLORIDE 5 MG/1
5 TABLET ORAL EVERY 6 HOURS PRN
Status: DISCONTINUED | OUTPATIENT
Start: 2021-11-29 | End: 2021-11-30

## 2021-11-29 RX ADMIN — SPIRONOLACTONE 100 MG: 25 TABLET ORAL at 08:13

## 2021-11-29 RX ADMIN — HYDROMORPHONE HYDROCHLORIDE 1 MG: 1 INJECTION, SOLUTION INTRAMUSCULAR; INTRAVENOUS; SUBCUTANEOUS at 12:37

## 2021-11-29 RX ADMIN — HYDROMORPHONE HYDROCHLORIDE 1 MG: 1 INJECTION, SOLUTION INTRAMUSCULAR; INTRAVENOUS; SUBCUTANEOUS at 08:14

## 2021-11-29 RX ADMIN — Medication 100 MG: at 08:13

## 2021-11-29 RX ADMIN — CLOPIDOGREL BISULFATE 75 MG: 75 TABLET ORAL at 08:13

## 2021-11-29 RX ADMIN — PANTOPRAZOLE SODIUM 40 MG: 40 TABLET, DELAYED RELEASE ORAL at 06:07

## 2021-11-29 RX ADMIN — URSODIOL 600 MG: 300 CAPSULE ORAL at 08:14

## 2021-11-29 RX ADMIN — BISACODYL 10 MG: 10 SUPPOSITORY RECTAL at 08:13

## 2021-11-29 RX ADMIN — ASPIRIN 81 MG CHEWABLE TABLET 81 MG: 81 TABLET CHEWABLE at 08:13

## 2021-11-29 RX ADMIN — FLUOXETINE HYDROCHLORIDE 40 MG: 20 CAPSULE ORAL at 08:13

## 2021-11-29 RX ADMIN — TAMSULOSIN HYDROCHLORIDE 0.4 MG: 0.4 CAPSULE ORAL at 08:13

## 2021-11-29 RX ADMIN — RANOLAZINE 1000 MG: 500 TABLET, EXTENDED RELEASE ORAL at 20:36

## 2021-11-29 RX ADMIN — PYRIDOSTIGMINE BROMIDE 60 MG: 60 TABLET ORAL at 06:07

## 2021-11-29 RX ADMIN — HYDROMORPHONE HYDROCHLORIDE 1 MG: 1 INJECTION, SOLUTION INTRAMUSCULAR; INTRAVENOUS; SUBCUTANEOUS at 10:33

## 2021-11-29 RX ADMIN — HYDROMORPHONE HYDROCHLORIDE 1 MG: 1 INJECTION, SOLUTION INTRAMUSCULAR; INTRAVENOUS; SUBCUTANEOUS at 03:02

## 2021-11-29 RX ADMIN — GABAPENTIN 300 MG: 300 CAPSULE ORAL at 20:36

## 2021-11-29 RX ADMIN — PYRIDOXINE HCL TAB 50 MG 25 MG: 50 TAB at 08:14

## 2021-11-29 RX ADMIN — HYDROMORPHONE HYDROCHLORIDE 1 MG: 1 INJECTION, SOLUTION INTRAMUSCULAR; INTRAVENOUS; SUBCUTANEOUS at 16:36

## 2021-11-29 RX ADMIN — HYDROMORPHONE HYDROCHLORIDE 1 MG: 1 INJECTION, SOLUTION INTRAMUSCULAR; INTRAVENOUS; SUBCUTANEOUS at 00:47

## 2021-11-29 RX ADMIN — ROSUVASTATIN CALCIUM 20 MG: 20 TABLET, COATED ORAL at 20:36

## 2021-11-29 RX ADMIN — HYDROMORPHONE HYDROCHLORIDE 1 MG: 1 INJECTION, SOLUTION INTRAMUSCULAR; INTRAVENOUS; SUBCUTANEOUS at 06:07

## 2021-11-29 RX ADMIN — OXYCODONE 5 MG: 5 TABLET ORAL at 14:21

## 2021-11-29 RX ADMIN — LACTULOSE 10 G: 20 SOLUTION ORAL at 08:13

## 2021-11-29 RX ADMIN — HYDROMORPHONE HYDROCHLORIDE 1 MG: 1 INJECTION, SOLUTION INTRAMUSCULAR; INTRAVENOUS; SUBCUTANEOUS at 20:36

## 2021-11-29 RX ADMIN — SODIUM CHLORIDE, PRESERVATIVE FREE 10 ML: 5 INJECTION INTRAVENOUS at 20:38

## 2021-11-29 RX ADMIN — RIFAXIMIN 550 MG: 550 TABLET ORAL at 20:36

## 2021-11-29 RX ADMIN — BUMETANIDE 2 MG: 1 TABLET ORAL at 08:13

## 2021-11-29 RX ADMIN — PYRIDOSTIGMINE BROMIDE 60 MG: 60 TABLET ORAL at 14:21

## 2021-11-29 RX ADMIN — URSODIOL 600 MG: 300 CAPSULE ORAL at 20:36

## 2021-11-29 RX ADMIN — LIDOCAINE 1 PATCH: 50 PATCH CUTANEOUS at 08:14

## 2021-11-29 RX ADMIN — RIFAXIMIN 550 MG: 550 TABLET ORAL at 08:13

## 2021-11-29 RX ADMIN — SPIRONOLACTONE 100 MG: 25 TABLET ORAL at 16:36

## 2021-11-29 RX ADMIN — OLANZAPINE 10 MG: 5 TABLET, FILM COATED ORAL at 20:36

## 2021-11-29 RX ADMIN — SPIRONOLACTONE 100 MG: 25 TABLET ORAL at 20:36

## 2021-11-29 RX ADMIN — RANOLAZINE 1000 MG: 500 TABLET, EXTENDED RELEASE ORAL at 08:14

## 2021-11-29 RX ADMIN — LACTULOSE 10 G: 20 SOLUTION ORAL at 20:36

## 2021-11-30 ENCOUNTER — APPOINTMENT (OUTPATIENT)
Dept: CT IMAGING | Facility: HOSPITAL | Age: 46
End: 2021-11-30

## 2021-11-30 LAB
ALBUMIN SERPL-MCNC: 4 G/DL (ref 3.5–5.2)
ALBUMIN/GLOB SERPL: 1.4 G/DL
ALP SERPL-CCNC: 117 U/L (ref 39–117)
ALT SERPL W P-5'-P-CCNC: 15 U/L (ref 1–33)
ANION GAP SERPL CALCULATED.3IONS-SCNC: 11 MMOL/L (ref 5–15)
AST SERPL-CCNC: 19 U/L (ref 1–32)
BASOPHILS # BLD AUTO: 0.05 10*3/MM3 (ref 0–0.2)
BASOPHILS NFR BLD AUTO: 0.6 % (ref 0–1.5)
BILIRUB SERPL-MCNC: 0.4 MG/DL (ref 0–1.2)
BUN SERPL-MCNC: 17 MG/DL (ref 6–20)
BUN/CREAT SERPL: 14.7 (ref 7–25)
CALCIUM SPEC-SCNC: 9.7 MG/DL (ref 8.6–10.5)
CHLORIDE SERPL-SCNC: 94 MMOL/L (ref 98–107)
CO2 SERPL-SCNC: 31 MMOL/L (ref 22–29)
CREAT SERPL-MCNC: 1.16 MG/DL (ref 0.57–1)
D-LACTATE SERPL-SCNC: 1.3 MMOL/L (ref 0.5–2)
DEPRECATED RDW RBC AUTO: 42.7 FL (ref 37–54)
EOSINOPHIL # BLD AUTO: 0.11 10*3/MM3 (ref 0–0.4)
EOSINOPHIL NFR BLD AUTO: 1.3 % (ref 0.3–6.2)
ERYTHROCYTE [DISTWIDTH] IN BLOOD BY AUTOMATED COUNT: 13.9 % (ref 12.3–15.4)
GFR SERPL CREATININE-BSD FRML MDRD: 50 ML/MIN/1.73
GLOBULIN UR ELPH-MCNC: 2.8 GM/DL
GLUCOSE SERPL-MCNC: 144 MG/DL (ref 65–99)
HCT VFR BLD AUTO: 40.6 % (ref 34–46.6)
HGB BLD-MCNC: 14.3 G/DL (ref 12–15.9)
IMM GRANULOCYTES # BLD AUTO: 0.31 10*3/MM3 (ref 0–0.05)
IMM GRANULOCYTES NFR BLD AUTO: 3.8 % (ref 0–0.5)
LYMPHOCYTES # BLD AUTO: 1.94 10*3/MM3 (ref 0.7–3.1)
LYMPHOCYTES NFR BLD AUTO: 23.6 % (ref 19.6–45.3)
MCH RBC QN AUTO: 30.7 PG (ref 26.6–33)
MCHC RBC AUTO-ENTMCNC: 35.2 G/DL (ref 31.5–35.7)
MCV RBC AUTO: 87.1 FL (ref 79–97)
MONOCYTES # BLD AUTO: 0.85 10*3/MM3 (ref 0.1–0.9)
MONOCYTES NFR BLD AUTO: 10.4 % (ref 5–12)
NEUTROPHILS NFR BLD AUTO: 4.95 10*3/MM3 (ref 1.7–7)
NEUTROPHILS NFR BLD AUTO: 60.3 % (ref 42.7–76)
NRBC BLD AUTO-RTO: 0.2 /100 WBC (ref 0–0.2)
PLATELET # BLD AUTO: 174 10*3/MM3 (ref 140–450)
PMV BLD AUTO: 10.5 FL (ref 6–12)
POTASSIUM SERPL-SCNC: 3.1 MMOL/L (ref 3.5–5.2)
PROT SERPL-MCNC: 6.8 G/DL (ref 6–8.5)
QT INTERVAL: 434 MS
QTC INTERVAL: 478 MS
RBC # BLD AUTO: 4.66 10*6/MM3 (ref 3.77–5.28)
SODIUM SERPL-SCNC: 136 MMOL/L (ref 136–145)
TROPONIN T SERPL-MCNC: <0.01 NG/ML (ref 0–0.03)
WBC NRBC COR # BLD: 8.21 10*3/MM3 (ref 3.4–10.8)

## 2021-11-30 PROCEDURE — 84484 ASSAY OF TROPONIN QUANT: CPT | Performed by: PHYSICIAN ASSISTANT

## 2021-11-30 PROCEDURE — 80053 COMPREHEN METABOLIC PANEL: CPT | Performed by: PHYSICIAN ASSISTANT

## 2021-11-30 PROCEDURE — 85025 COMPLETE CBC W/AUTO DIFF WBC: CPT | Performed by: PHYSICIAN ASSISTANT

## 2021-11-30 PROCEDURE — 99232 SBSQ HOSP IP/OBS MODERATE 35: CPT | Performed by: HOSPITALIST

## 2021-11-30 PROCEDURE — 99233 SBSQ HOSP IP/OBS HIGH 50: CPT | Performed by: PHYSICIAN ASSISTANT

## 2021-11-30 PROCEDURE — 93005 ELECTROCARDIOGRAM TRACING: CPT | Performed by: PHYSICIAN ASSISTANT

## 2021-11-30 PROCEDURE — 25010000002 HYDROMORPHONE 1 MG/ML SOLUTION: Performed by: HOSPITALIST

## 2021-11-30 PROCEDURE — 93010 ELECTROCARDIOGRAM REPORT: CPT | Performed by: INTERNAL MEDICINE

## 2021-11-30 PROCEDURE — 74176 CT ABD & PELVIS W/O CONTRAST: CPT

## 2021-11-30 PROCEDURE — 83605 ASSAY OF LACTIC ACID: CPT | Performed by: PHYSICIAN ASSISTANT

## 2021-11-30 RX ORDER — MAGNESIUM CARB/ALUMINUM HYDROX 105-160MG
296 TABLET,CHEWABLE ORAL ONCE
Status: COMPLETED | OUTPATIENT
Start: 2021-11-30 | End: 2021-11-30

## 2021-11-30 RX ORDER — OXYCODONE HYDROCHLORIDE 5 MG/1
5 TABLET ORAL ONCE
Status: COMPLETED | OUTPATIENT
Start: 2021-11-30 | End: 2021-11-30

## 2021-11-30 RX ORDER — OXYCODONE HYDROCHLORIDE 5 MG/1
5 TABLET ORAL EVERY 4 HOURS PRN
Status: DISCONTINUED | OUTPATIENT
Start: 2021-11-30 | End: 2021-12-01

## 2021-11-30 RX ORDER — POTASSIUM CHLORIDE 1.5 G/1.77G
40 POWDER, FOR SOLUTION ORAL AS NEEDED
Status: DISCONTINUED | OUTPATIENT
Start: 2021-11-30 | End: 2021-12-06 | Stop reason: HOSPADM

## 2021-11-30 RX ORDER — POTASSIUM CHLORIDE 750 MG/1
40 CAPSULE, EXTENDED RELEASE ORAL AS NEEDED
Status: DISCONTINUED | OUTPATIENT
Start: 2021-11-30 | End: 2021-12-06 | Stop reason: HOSPADM

## 2021-11-30 RX ORDER — POTASSIUM CHLORIDE 7.45 MG/ML
10 INJECTION INTRAVENOUS
Status: DISCONTINUED | OUTPATIENT
Start: 2021-11-30 | End: 2021-12-06 | Stop reason: HOSPADM

## 2021-11-30 RX ADMIN — SPIRONOLACTONE 100 MG: 25 TABLET ORAL at 08:36

## 2021-11-30 RX ADMIN — ROSUVASTATIN CALCIUM 20 MG: 20 TABLET, COATED ORAL at 20:22

## 2021-11-30 RX ADMIN — OXYCODONE 5 MG: 5 TABLET ORAL at 07:59

## 2021-11-30 RX ADMIN — OLANZAPINE 10 MG: 5 TABLET, FILM COATED ORAL at 20:21

## 2021-11-30 RX ADMIN — BISACODYL 10 MG: 10 SUPPOSITORY RECTAL at 08:36

## 2021-11-30 RX ADMIN — PYRIDOSTIGMINE BROMIDE 60 MG: 60 TABLET ORAL at 00:41

## 2021-11-30 RX ADMIN — RIFAXIMIN 550 MG: 550 TABLET ORAL at 20:21

## 2021-11-30 RX ADMIN — Medication 296 ML: at 12:35

## 2021-11-30 RX ADMIN — HYDROMORPHONE HYDROCHLORIDE 1 MG: 1 INJECTION, SOLUTION INTRAMUSCULAR; INTRAVENOUS; SUBCUTANEOUS at 00:41

## 2021-11-30 RX ADMIN — HYDROMORPHONE HYDROCHLORIDE 1 MG: 1 INJECTION, SOLUTION INTRAMUSCULAR; INTRAVENOUS; SUBCUTANEOUS at 04:28

## 2021-11-30 RX ADMIN — LACTULOSE 10 G: 20 SOLUTION ORAL at 08:36

## 2021-11-30 RX ADMIN — RANOLAZINE 1000 MG: 500 TABLET, EXTENDED RELEASE ORAL at 20:21

## 2021-11-30 RX ADMIN — CLOPIDOGREL BISULFATE 75 MG: 75 TABLET ORAL at 08:36

## 2021-11-30 RX ADMIN — Medication 100 MG: at 08:36

## 2021-11-30 RX ADMIN — RIFAXIMIN 550 MG: 550 TABLET ORAL at 08:35

## 2021-11-30 RX ADMIN — OXYCODONE 5 MG: 5 TABLET ORAL at 13:53

## 2021-11-30 RX ADMIN — LACTULOSE 10 G: 20 SOLUTION ORAL at 20:21

## 2021-11-30 RX ADMIN — LINACLOTIDE 290 MCG: 290 CAPSULE, GELATIN COATED ORAL at 12:35

## 2021-11-30 RX ADMIN — PYRIDOSTIGMINE BROMIDE 60 MG: 60 TABLET ORAL at 04:28

## 2021-11-30 RX ADMIN — TRAZODONE HYDROCHLORIDE 50 MG: 50 TABLET ORAL at 20:22

## 2021-11-30 RX ADMIN — URSODIOL 600 MG: 300 CAPSULE ORAL at 20:22

## 2021-11-30 RX ADMIN — BUMETANIDE 2 MG: 1 TABLET ORAL at 08:36

## 2021-11-30 RX ADMIN — POTASSIUM CHLORIDE 40 MEQ: 750 CAPSULE, EXTENDED RELEASE ORAL at 13:53

## 2021-11-30 RX ADMIN — TRAZODONE HYDROCHLORIDE 50 MG: 50 TABLET ORAL at 00:41

## 2021-11-30 RX ADMIN — GABAPENTIN 300 MG: 300 CAPSULE ORAL at 20:22

## 2021-11-30 RX ADMIN — SPIRONOLACTONE 100 MG: 25 TABLET ORAL at 20:21

## 2021-11-30 RX ADMIN — SODIUM CHLORIDE, PRESERVATIVE FREE 10 ML: 5 INJECTION INTRAVENOUS at 08:35

## 2021-11-30 RX ADMIN — OXYCODONE 5 MG: 5 TABLET ORAL at 16:26

## 2021-11-30 RX ADMIN — SPIRONOLACTONE 100 MG: 25 TABLET ORAL at 16:26

## 2021-11-30 RX ADMIN — URSODIOL 600 MG: 300 CAPSULE ORAL at 08:56

## 2021-11-30 RX ADMIN — POTASSIUM CHLORIDE 40 MEQ: 750 CAPSULE, EXTENDED RELEASE ORAL at 18:03

## 2021-11-30 RX ADMIN — HYDROMORPHONE HYDROCHLORIDE 1 MG: 1 INJECTION, SOLUTION INTRAMUSCULAR; INTRAVENOUS; SUBCUTANEOUS at 08:37

## 2021-11-30 RX ADMIN — RANOLAZINE 1000 MG: 500 TABLET, EXTENDED RELEASE ORAL at 08:56

## 2021-11-30 RX ADMIN — FLUOXETINE HYDROCHLORIDE 40 MG: 20 CAPSULE ORAL at 08:35

## 2021-11-30 RX ADMIN — PYRIDOXINE HCL TAB 50 MG 25 MG: 50 TAB at 08:56

## 2021-11-30 RX ADMIN — OXYCODONE 5 MG: 5 TABLET ORAL at 00:44

## 2021-11-30 RX ADMIN — ASPIRIN 81 MG CHEWABLE TABLET 81 MG: 81 TABLET CHEWABLE at 08:36

## 2021-11-30 RX ADMIN — OXYCODONE 5 MG: 5 TABLET ORAL at 20:22

## 2021-11-30 RX ADMIN — LIDOCAINE 1 PATCH: 50 PATCH CUTANEOUS at 08:36

## 2021-11-30 RX ADMIN — PANTOPRAZOLE SODIUM 40 MG: 40 TABLET, DELAYED RELEASE ORAL at 04:28

## 2021-11-30 RX ADMIN — TAMSULOSIN HYDROCHLORIDE 0.4 MG: 0.4 CAPSULE ORAL at 08:35

## 2021-12-01 ENCOUNTER — APPOINTMENT (OUTPATIENT)
Dept: CARDIOLOGY | Facility: HOSPITAL | Age: 46
End: 2021-12-01

## 2021-12-01 LAB — POTASSIUM SERPL-SCNC: 5 MMOL/L (ref 3.5–5.2)

## 2021-12-01 PROCEDURE — 99233 SBSQ HOSP IP/OBS HIGH 50: CPT | Performed by: INTERNAL MEDICINE

## 2021-12-01 PROCEDURE — 25010000002 METHYLNALTREXONE 12 MG/0.6ML SOLUTION: Performed by: PHYSICIAN ASSISTANT

## 2021-12-01 PROCEDURE — 93975 VASCULAR STUDY: CPT

## 2021-12-01 PROCEDURE — 99232 SBSQ HOSP IP/OBS MODERATE 35: CPT | Performed by: INTERNAL MEDICINE

## 2021-12-01 PROCEDURE — 99232 SBSQ HOSP IP/OBS MODERATE 35: CPT | Performed by: PHYSICIAN ASSISTANT

## 2021-12-01 PROCEDURE — 84132 ASSAY OF SERUM POTASSIUM: CPT | Performed by: INTERNAL MEDICINE

## 2021-12-01 RX ORDER — GABAPENTIN 300 MG/1
300 CAPSULE ORAL EVERY 8 HOURS SCHEDULED
Status: DISCONTINUED | OUTPATIENT
Start: 2021-12-01 | End: 2021-12-06 | Stop reason: HOSPADM

## 2021-12-01 RX ORDER — OXYCODONE HYDROCHLORIDE 15 MG/1
7.5 TABLET ORAL EVERY 4 HOURS PRN
Status: DISCONTINUED | OUTPATIENT
Start: 2021-12-01 | End: 2021-12-06 | Stop reason: HOSPADM

## 2021-12-01 RX ORDER — CYCLOBENZAPRINE HCL 10 MG
10 TABLET ORAL 3 TIMES DAILY
Status: DISCONTINUED | OUTPATIENT
Start: 2021-12-01 | End: 2021-12-06 | Stop reason: HOSPADM

## 2021-12-01 RX ORDER — CYCLOBENZAPRINE HCL 10 MG
10 TABLET ORAL EVERY 8 HOURS SCHEDULED
Status: DISCONTINUED | OUTPATIENT
Start: 2021-12-01 | End: 2021-12-01

## 2021-12-01 RX ADMIN — SODIUM CHLORIDE, PRESERVATIVE FREE 10 ML: 5 INJECTION INTRAVENOUS at 09:54

## 2021-12-01 RX ADMIN — OXYCODONE HYDROCHLORIDE 7.5 MG: 15 TABLET ORAL at 20:39

## 2021-12-01 RX ADMIN — GABAPENTIN 300 MG: 300 CAPSULE ORAL at 20:37

## 2021-12-01 RX ADMIN — URSODIOL 600 MG: 300 CAPSULE ORAL at 09:57

## 2021-12-01 RX ADMIN — OXYCODONE HYDROCHLORIDE 7.5 MG: 15 TABLET ORAL at 17:01

## 2021-12-01 RX ADMIN — LIDOCAINE 1 PATCH: 50 PATCH CUTANEOUS at 09:51

## 2021-12-01 RX ADMIN — METHYLNALTREXONE BROMIDE 12 MG: 12 INJECTION, SOLUTION SUBCUTANEOUS at 13:09

## 2021-12-01 RX ADMIN — GABAPENTIN 300 MG: 300 CAPSULE ORAL at 13:08

## 2021-12-01 RX ADMIN — BUMETANIDE 2 MG: 1 TABLET ORAL at 09:52

## 2021-12-01 RX ADMIN — LACTULOSE 10 G: 20 SOLUTION ORAL at 20:36

## 2021-12-01 RX ADMIN — TRAZODONE HYDROCHLORIDE 50 MG: 50 TABLET ORAL at 20:37

## 2021-12-01 RX ADMIN — Medication 100 MG: at 09:52

## 2021-12-01 RX ADMIN — CYCLOBENZAPRINE 10 MG: 10 TABLET, FILM COATED ORAL at 15:50

## 2021-12-01 RX ADMIN — SPIRONOLACTONE 100 MG: 25 TABLET ORAL at 20:37

## 2021-12-01 RX ADMIN — RIFAXIMIN 550 MG: 550 TABLET ORAL at 20:37

## 2021-12-01 RX ADMIN — TAMSULOSIN HYDROCHLORIDE 0.4 MG: 0.4 CAPSULE ORAL at 09:52

## 2021-12-01 RX ADMIN — RANOLAZINE 1000 MG: 500 TABLET, EXTENDED RELEASE ORAL at 20:37

## 2021-12-01 RX ADMIN — PANTOPRAZOLE SODIUM 40 MG: 40 TABLET, DELAYED RELEASE ORAL at 09:53

## 2021-12-01 RX ADMIN — POLYETHYLENE GLYCOL 3350, SODIUM SULFATE ANHYDROUS, SODIUM BICARBONATE, SODIUM CHLORIDE, POTASSIUM CHLORIDE 4000 ML: 236; 22.74; 6.74; 5.86; 2.97 POWDER, FOR SOLUTION ORAL at 15:51

## 2021-12-01 RX ADMIN — LINACLOTIDE 290 MCG: 290 CAPSULE, GELATIN COATED ORAL at 09:57

## 2021-12-01 RX ADMIN — SPIRONOLACTONE 100 MG: 25 TABLET ORAL at 09:53

## 2021-12-01 RX ADMIN — RANOLAZINE 1000 MG: 500 TABLET, EXTENDED RELEASE ORAL at 09:52

## 2021-12-01 RX ADMIN — PYRIDOXINE HCL TAB 50 MG 25 MG: 50 TAB at 09:52

## 2021-12-01 RX ADMIN — OLANZAPINE 10 MG: 5 TABLET, FILM COATED ORAL at 20:37

## 2021-12-01 RX ADMIN — RIFAXIMIN 550 MG: 550 TABLET ORAL at 09:52

## 2021-12-01 RX ADMIN — CLOPIDOGREL BISULFATE 75 MG: 75 TABLET ORAL at 09:53

## 2021-12-01 RX ADMIN — ASPIRIN 81 MG CHEWABLE TABLET 81 MG: 81 TABLET CHEWABLE at 09:52

## 2021-12-01 RX ADMIN — SPIRONOLACTONE 100 MG: 25 TABLET ORAL at 15:50

## 2021-12-01 RX ADMIN — ROSUVASTATIN CALCIUM 20 MG: 20 TABLET, COATED ORAL at 20:37

## 2021-12-01 RX ADMIN — FLUOXETINE HYDROCHLORIDE 40 MG: 20 CAPSULE ORAL at 09:52

## 2021-12-01 RX ADMIN — LACTULOSE 10 G: 20 SOLUTION ORAL at 09:51

## 2021-12-01 RX ADMIN — CYCLOBENZAPRINE 10 MG: 10 TABLET, FILM COATED ORAL at 20:37

## 2021-12-01 RX ADMIN — OXYCODONE 5 MG: 5 TABLET ORAL at 09:52

## 2021-12-01 RX ADMIN — URSODIOL 600 MG: 300 CAPSULE ORAL at 20:37

## 2021-12-01 RX ADMIN — OXYCODONE HYDROCHLORIDE 7.5 MG: 15 TABLET ORAL at 13:08

## 2021-12-01 NOTE — PROGRESS NOTES
[facility] Medicine Services  PROGRESS NOTE    Patient Name: Timothy Guzman  : 1975  MRN: [id number]    Date of Admission: 2021  Primary Care Physician: Toshia Mitchell APRN    Subjective   Subjective     CC: f/u pain    HPI: Up in bed eating 100 percent of her breakfast potatoes with ketchup but at same time complaining of nausea. Asking for her IV dilaudid back but also asking when she can go home.    ROS:  Gen- No fevers, chills  CV- No chest pain, palpitations  Resp- No cough, dyspnea  GI- No N/V/D    Objective   Objective     Vital Signs:   Temp:  [97.9 °F (36.6 °C)-98.9 °F (37.2 °C)] 98.4 °F (36.9 °C)  Heart Rate:  [] 71  Resp:  [16-18] 18  BP: (103-124)/(52-75) 103/52     Physical Exam:  Constitutional: No acute distress, awake, alert  HENT: NCAT, mucous membranes moist  Respiratory: Clear to auscultation bilaterally, respiratory effort normal   Cardiovascular: RRR, no murmurs, rubs, or gallops  Gastrointestinal: Positive bowel sounds, soft, nontender, RUQ TTP  Musculoskeletal: Right rib posterior TTP.  Psychiatric: Appropriate affect, cooperative  Neurologic: Oriented x 3, strength symmetric in all extremities, Cranial Nerves grossly intact to confrontation, speech clear  Skin: No rashes    Results Reviewed:  LAB RESULTS:      Lab 21  1403 21  0859 21  0429 21  0459 21  2323   WBC 8.21 10.53 9.42 9.72  --    HEMOGLOBIN 14.3 12.0 11.5* 12.7  12.7 14.0   HEMATOCRIT 40.6 35.2 33.2* 36.5  36.5 41.2   PLATELETS 174 134* 101* 124*  --    NEUTROS ABS 4.95  --   --   --   --    IMMATURE GRANS (ABS) 0.31*  --   --   --   --    LYMPHS ABS 1.94  --   --   --   --    MONOS ABS 0.85  --   --   --   --    EOS ABS 0.11  --   --   --   --    MCV 87.1 90.3 88.8 89.2  --    LACTATE 1.3  --   --   --   --          Lab 21  0806 21  1140 21  0859 21  0429 21  1257   SODIUM  --  136 135* 135* 135*   POTASSIUM 5.0  3.1* 3.8 4.0 4.0   CHLORIDE  --  94* 101 103 99   CO2  --  31.0* 22.0 23.0 24.0   ANION GAP  --  11.0 12.0 9.0 12.0   BUN  --  17 10 9 13   CREATININE  --  1.16* 0.81 0.80 1.03*   GLUCOSE  --  144* 122* 109* 90   CALCIUM  --  9.7 9.1 8.4* 9.0         Lab 11/30/21  1140 11/27/21  0859 11/26/21  0429 11/25/21  1257   TOTAL PROTEIN 6.8 6.3 5.7* 6.6   ALBUMIN 4.00 3.80 3.40* 4.40   GLOBULIN 2.8 2.5 2.3 2.2   ALT (SGPT) 15 17 18 27   AST (SGOT) 19 17 25 37*   BILIRUBIN 0.4 0.2 0.5 0.7   ALK PHOS 117 117 103 134*   AMYLASE  --  52  --   --    LIPASE  --  25  --   --          Lab 11/30/21  1140   TROPONIN T <0.010                 Brief Urine Lab Results  (Last result in the past 365 days)      Color   Clarity   Blood   Leuk Est   Nitrite   Protein   CREAT   Urine HCG        11/23/21 1825               Negative             Microbiology Results Abnormal     Procedure Component Value - Date/Time    COVID PRE-OP / PRE-PROCEDURE SCREENING ORDER (NO ISOLATION) - Swab, Nasopharynx [[id number]]  (Normal) Collected: 11/23/21 2155    Lab Status: Final result Specimen: Swab from Nasopharynx Updated: 11/23/21 2227    Narrative:      The following orders were created for panel order COVID PRE-OP / PRE-PROCEDURE SCREENING ORDER (NO ISOLATION) - Swab, Nasopharynx.  Procedure                               Abnormality         Status                     ---------                               -----------         ------                     COVID-19, ABBOTT IN-HOUS...[[id number]]  Normal              Final result                 Please view results for these tests on the individual orders.    COVID-19, ABBOTT IN-HOUSE,NASAL Swab (NO TRANSPORT MEDIA) 2 HR TAT - Swab, Nasopharynx [[id number]]  (Normal) Collected: 11/23/21 2155    Lab Status: Final result Specimen: Swab from Nasopharynx Updated: 11/23/21 2227     COVID19 Presumptive Negative    Narrative:      Fact sheet for providers: https://www.fda.gov/media/660477/download     Fact sheet for  patients: https://www.[place] [employer].gov/media/882232/download    Test performed by PCR.  If inconsistent with clinical signs and symptoms patient should be tested with different authorized molecular test.        Personally reviewed CT A/P showing large stool burden. Agree with interpretation.    CT Abdomen Pelvis Without Contrast    Result Date: 12/1/2021  EXAMINATION: CT ABDOMEN PELVIS WO CONTRAST-  INDICATION: Abdominal pain; R10.11-Right upper quadrant pain; K92.0-Hematemesis  TECHNIQUE: Noncontrast CT of the abdomen and pelvis  COMPARISON: CT abdomen and pelvis 11/23/2021  FINDINGS:  Linear atelectasis at the left lung base with unchanged 6 mm solid pleural-based nodule in the left lower lobe. The remainder of the lower thorax appears unremarkable. Unremarkable appearance of the liver. The gallbladder surgically absent. Normal appearance of the bile ducts. Unremarkable appearance of the pancreas, spleen, adrenal glands, kidneys, ureters, and bladder. Unremarkable appearance of the uterus and adnexa. No evidence of bowel obstruction. There is increased moderate colonic stool burden. Unremarkable appearance of the stomach, duodenum, and small bowel. The appendix is surgically absent. Unremarkable noncontrast appearance of the vasculature. There is no abdominopelvic adenopathy. There is no conspicuous intra-abdominal fat stranding, pneumoperitoneum, or free fluid. Unchanged small fat-containing ventral abdominal hernia with otherwise unremarkable appearance of the body wall soft tissues. No acute osseous findings. Bilateral total hip arthroplasties. Chronic bilateral lower rib deformities.      Impression:  Interval increase in now moderate colonic stool burden. No acute abdominopelvic findings or other significant interval change from prior CT.   This report was finalized on 12/1/2021 9:33 AM by Fahad Sandoval MD.      XR Abdomen KUB    Result Date: 11/29/2021  EXAMINATION: XR ABDOMEN KUB-11/29/2021:  INDICATION: Abdominal  pain; R10.11-Right upper quadrant pain; K92.0-Hematemesis.  COMPARISON: 11/27/2021.  FINDINGS: KUB reveals stool seen scattered diffusely throughout the colon. Surgical clips in right upper quadrant from prior cholecystectomy. Degenerative changes seen within the spine. Hardware is seen in the hips bilaterally.      Impression: Moderate stool seen scattered diffusely throughout the colon suggesting clinical presentation of constipation with no evidence of obvious obstruction or gross free intraperitoneal air.  D:  11/29/2021 E:  11/29/2021          Results for orders placed during the hospital encounter of 06/18/21    Adult Transthoracic Echo Complete W/ Cont if Necessary Per Protocol    Interpretation Summary  · The quality of the study is limited due to patient positioning and patient being intubated.  · Left ventricular ejection fraction appears to be 51 - 55%. Left ventricular systolic function is normal.  · Left ventricular diastolic function is consistent with (grade Ia w/high LAP) impaired relaxation.  · Mildly reduced right ventricular systolic function noted.      I have reviewed the medications:  Scheduled Meds:GI cocktail, , Oral, Once  aspirin, 81 mg, Oral, Daily  bumetanide, 2 mg, Oral, Daily  clopidogrel, 75 mg, Oral, Daily  cyclobenzaprine, 10 mg, Oral, TID  FLUoxetine, 40 mg, Oral, Daily  gabapentin, 300 mg, Oral, Q8H  lactulose, 10 g, Oral, BID  lidocaine, 1 patch, Transdermal, Q24H  linaclotide, 290 mcg, Oral, Daily  methylnaltrexone, 12 mg, Subcutaneous, Once  OLANZapine, 10 mg, Oral, Nightly  pantoprazole, 40 mg, Oral, Q AM  polyethylene glycol, 4,000 mL, Oral, Once  ranolazine, 1,000 mg, Oral, BID  riFAXIMin, 550 mg, Oral, Q12H  rosuvastatin, 20 mg, Oral, Nightly  sodium chloride, 10 mL, Intravenous, Q12H  spironolactone, 100 mg, Oral, TID  tamsulosin, 0.4 mg, Oral, Daily  thiamine, 100 mg, Oral, Daily  traZODone, 50 mg, Oral, Nightly  ursodiol, 600 mg, Oral, BID  vitamin B-6, 25 mg, Oral,  Daily      Continuous Infusions:   PRN Meds:.ipratropium-albuterol  •  melatonin  •  naloxone  •  ondansetron  •  oxyCODONE  •  potassium chloride **OR** potassium chloride **OR** potassium chloride  •  [COMPLETED] Insert peripheral IV **AND** sodium chloride    Assessment/Plan   Assessment & Plan     Active Hospital Problems    Diagnosis  POA   • **Coffee ground emesis [K92.0]  Yes   • Lactic acidosis [E87.2]  Yes   • Intractable abdominal pain [R10.9]  Yes   • Right upper quadrant abdominal pain [R10.11]  Yes   • Chronic pancreatitis (HCC) [K86.1]  Yes   • Anxiety and depression [F41.9, F32.A]  Yes   • Alcoholism (HCC) [F10.20]  Yes   • CAD (coronary artery disease) [I25.10]  Yes   • GERD (gastroesophageal reflux disease) [K21.9]  Yes   • History of esophageal varices [Z87.19]  Not Applicable   • Alcoholic cirrhosis of liver without ascites (HCC) [K70.30]  Yes      Resolved Hospital Problems   No resolved problems to display.        Brief Hospital Course to date:  Timothy Guzman is a 45 y.o. female with history of CAD/STEMI s/p ANGELICA, and cirrhosis and varices here with abdominal pain and coffee ground emesis. This is my first day assessing patient's active medical issues.     Abdominal pain  Coffee ground emesis   Hx of esophageal varices  -Suspect majority of her pain is functional/msk. Doesn't seem c/w gallbladder disease based on exam. Cardiology has seen and felt unlikely to be cardiac.   -GI following, EGD unremarkable. D/w PA today. Providing Relastor. If unsuccessful may need 1/2 bowel prep.  -Continue PPI  -Remains on ASA/plavix for now  -Continue lactulose/rifaximin  -No IV narcotics. Increase her PO narcotics slightly. Increase Gabapentin. Continue scheduled lidocaine/flexeril.      Hx of CAD s/p STEMI  -s/p ANGELICA, last in 7/21  -Cardiology has seen, recs med mgmt.      Anx/Depression  -trazodone/fluoxetine     This patient's problems and plans were partially entered by my partner and updated as appropriate  by me 12/01/21.    DVT prophylaxis:  Mechanical DVT prophylaxis orders are present.       AM-PAC 6 Clicks Score (PT): 24 (11/27/21 0854)    Disposition: I expect the patient to be discharged home tomorrow.    CODE STATUS:   Code Status and Medical Interventions:   Ordered at: 11/23/21 2043     Level Of Support Discussed With:    Patient     Code Status (Patient has no pulse and is not breathing):    CPR (Attempt to Resuscitate)     Medical Interventions (Patient has pulse or is breathing):    Full Support       Alexa Lomeli II, DO  12/01/21

## 2021-12-01 NOTE — PROGRESS NOTES
"[facility] Progress Note     LOS: 8 days   Patient Care Team:  Toshia Mitchell APRN as PCP - General (Family Medicine)  Nelson Yip APRN as Nurse Practitioner (Nurse Practitioner)  PCP:  Toshia Mitchell APRN    Chief Complaint: Follow-up on abdominal pain, history of CAD    Subjective:   Asked to reevaluate this patient given her history of coronary artery disease.  Her current complaint is a right upper quadrant abdominal pain that has been going on for about 6 days.  The patient cannot recall symptoms prior to her 1st stent in the setting of NSTEMI, this is because she had presented with cardiac arrest.  When she required the 2nd PCI in July she had a lot of central and substernal chest pressure.  Current complaints feel different to her.  The pain is very reproducible with abdominal palpation.      Review of Systems:   All systems have been reviewed and are negative with the exception of those mentioned above.      Objective:    Vital Sign Min/Max for last 24 hours  Temp  Min: 97.9 °F (36.6 °C)  Max: 98.9 °F (37.2 °C)   BP  Min: 103/52  Max: 124/75   Pulse  Min: 66  Max: 103   Resp  Min: 16  Max: 18   SpO2  Min: 98 %  Max: 98 %   No data recorded   Weight  Min: 97.1 kg (214 lb)  Max: 97.1 kg (214 lb)     Flowsheet Rows      First Filed Value   Admission Height 160 cm (63\") Documented at 11/23/2021 1628   Admission Weight 88 kg (194 lb) Documented at 11/23/2021 1628          Telemetry: Sinus rhythm    EKG from yesterday reviewed, no acute changes.      Intake/Output Summary (Last 24 hours) at 12/1/2021 1215  Last data filed at 11/30/2021 2345  Gross per 24 hour   Intake 1080 ml   Output --   Net 1080 ml     Intake & Output (last 3 days)       11/28 0701  11/29 0700 11/29 0701 11/30 0700 11/30 0701  12/01 0700 12/01 0701  12/02 0700    P.O. 5646 639 3502     Total Intake(mL/kg) 1440 (14.8) 840 (8.7) 1440 (14.8)     Net +1440 +840 +1440       "       Urine Unmeasured Occurrence 6 x 5 x             Physical Exam:  Constitutional:       Appearance: Healthy appearance. Not in distress.      Comments: Patient was sleeping when I entered room   Pulmonary:      Effort: Pulmonary effort is normal.      Breath sounds: No rales.   Chest:      Chest wall: Not tender to palpatation.   Cardiovascular:      Regular rhythm.      Murmurs: There is no murmur.   Pulses:     Intact distal pulses.   Edema:     Peripheral edema absent.   Abdominal:      Palpations: Abdomen is soft.      Comments: Very tender to palpation right upper quadrant right flank   Skin:     General: Skin is warm and dry.          LABS/DIAGNOSTIC DATA:  Results from last 7 days   Lab Units 11/30/21  1403 11/27/21  0859 11/26/21  0429   WBC 10*3/mm3 8.21 10.53 9.42   HEMOGLOBIN g/dL 14.3 12.0 11.5*   HEMATOCRIT % 40.6 35.2 33.2*   PLATELETS 10*3/mm3 174 134* 101*     Lab Results   Lab Value Date/Time    TROPONINT <0.010 11/30/2021 1140    TROPONINT <0.010 11/23/2021 1635    TROPONINT <0.010 09/04/2021 0108    TROPONINT <0.010 09/03/2021 1912    TROPONINT <0.010 07/16/2021 2209    TROPONINT <0.010 07/15/2021 2310    TROPONINT <0.010 07/13/2021 0436    TROPONINT <0.010 07/12/2021 2329    TROPONINT <0.010 07/12/2021 2208    TROPONINT 5.990 (C) 06/19/2021 1148    TROPONINT 5.130 (C) 06/19/2021 0542         Results from last 7 days   Lab Units 12/01/21  0806 11/30/21  1140 11/27/21  0859 11/26/21  0429 11/26/21  0429   SODIUM mmol/L  --  136 135*  --  135*   POTASSIUM mmol/L 5.0 3.1* 3.8   < > 4.0   CHLORIDE mmol/L  --  94* 101  --  103   CO2 mmol/L  --  31.0* 22.0  --  23.0   BUN mg/dL  --  17 10  --  9   CREATININE mg/dL  --  1.16* 0.81  --  0.80   CALCIUM mg/dL  --  9.7 9.1  --  8.4*   BILIRUBIN mg/dL  --  0.4 0.2  --  0.5   ALK PHOS U/L  --  117 117  --  103   ALT (SGPT) U/L  --  15 17  --  18   AST (SGOT) U/L  --  19 17  --  25   GLUCOSE mg/dL  --  144* 122*  --  109*    < > = values in this interval  not displayed.                       Medication Review:   GI cocktail, , Oral, Once  aspirin, 81 mg, Oral, Daily  bumetanide, 2 mg, Oral, Daily  clopidogrel, 75 mg, Oral, Daily  FLUoxetine, 40 mg, Oral, Daily  gabapentin, 300 mg, Oral, Nightly  lactulose, 10 g, Oral, BID  lidocaine, 1 patch, Transdermal, Q24H  linaclotide, 290 mcg, Oral, Daily  methylnaltrexone, 12 mg, Subcutaneous, Once  OLANZapine, 10 mg, Oral, Nightly  pantoprazole, 40 mg, Oral, Q AM  polyethylene glycol, 4,000 mL, Oral, Once  ranolazine, 1,000 mg, Oral, BID  riFAXIMin, 550 mg, Oral, Q12H  rosuvastatin, 20 mg, Oral, Nightly  sodium chloride, 10 mL, Intravenous, Q12H  spironolactone, 100 mg, Oral, TID  tamsulosin, 0.4 mg, Oral, Daily  thiamine, 100 mg, Oral, Daily  traZODone, 50 mg, Oral, Nightly  ursodiol, 600 mg, Oral, BID  vitamin B-6, 25 mg, Oral, Daily               Coffee ground emesis    Alcoholic cirrhosis of liver without ascites (HCC)    History of esophageal varices    GERD (gastroesophageal reflux disease)    CAD (coronary artery disease)    Chronic pancreatitis (HCC)    Anxiety and depression    Alcoholism (HCC)    Lactic acidosis    Intractable abdominal pain    Right upper quadrant abdominal pain      Assessment/Plan:  ASSESSMENT:  1. CAD  2. Coffee-ground emesis  3. Right upper quadrant pain  4. Esophageal varices  5. Alcoholic cirrhosis  6. Severe contrast dye allergy     PLAN:  1.  Patient's EGD showed normal esophagus, mild gastritis.  She has been continued on ASA and plavix.  2.  .Continue statin, ranolazine  3.  Patient had EKG repeated yesterday showing no acute changes, troponin remains negative, current RUQ pain reproducible with palpation appears non-cardiac in nature.  Would not recommend further ischemic evaluation currently.    4.  Results of mesenteric duplex pending, constipation regimen being adjusted by GI team, no acute findings on CT A/P.  5.  Will follow peripherally, please call with questions.  Discussed with  Ivana Murrell MD [facility]  12/01/21

## 2021-12-01 NOTE — CASE MANAGEMENT/SOCIAL WORK
Continued Stay Note  [facility]     Patient Name: Timothy Guzman  MRN: [id number]  Today's Date: 12/1/2021    Admit Date: 11/23/2021     Discharge Plan     Row Name 12/01/21 1659       Plan    Plan discharge plan    Plan Comments Plan is home with family at discharge. Pt denies discharge needs. CM will cont to follow.    Final Discharge Disposition Code 01 - home or self-care               Discharge Codes    No documentation.               Expected Discharge Date and Time     Expected Discharge Date Expected Discharge Time    Dec 2, 2021             Elaine Borjas RN
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
## 2021-12-02 PROCEDURE — 99232 SBSQ HOSP IP/OBS MODERATE 35: CPT | Performed by: INTERNAL MEDICINE

## 2021-12-02 RX ORDER — MINERAL OIL 100 G/100G
1 OIL RECTAL ONCE
Status: COMPLETED | OUTPATIENT
Start: 2021-12-02 | End: 2021-12-02

## 2021-12-02 RX ADMIN — Medication 100 MG: at 09:15

## 2021-12-02 RX ADMIN — SPIRONOLACTONE 100 MG: 25 TABLET ORAL at 21:06

## 2021-12-02 RX ADMIN — LACTULOSE 10 G: 20 SOLUTION ORAL at 09:15

## 2021-12-02 RX ADMIN — SODIUM CHLORIDE, PRESERVATIVE FREE 10 ML: 5 INJECTION INTRAVENOUS at 09:16

## 2021-12-02 RX ADMIN — CYCLOBENZAPRINE 10 MG: 10 TABLET, FILM COATED ORAL at 09:15

## 2021-12-02 RX ADMIN — SPIRONOLACTONE 100 MG: 25 TABLET ORAL at 16:24

## 2021-12-02 RX ADMIN — TRAZODONE HYDROCHLORIDE 50 MG: 50 TABLET ORAL at 21:06

## 2021-12-02 RX ADMIN — FLUOXETINE HYDROCHLORIDE 40 MG: 20 CAPSULE ORAL at 09:15

## 2021-12-02 RX ADMIN — RIFAXIMIN 550 MG: 550 TABLET ORAL at 09:14

## 2021-12-02 RX ADMIN — PYRIDOXINE HCL TAB 50 MG 25 MG: 50 TAB at 09:16

## 2021-12-02 RX ADMIN — CYCLOBENZAPRINE 10 MG: 10 TABLET, FILM COATED ORAL at 21:05

## 2021-12-02 RX ADMIN — ASPIRIN 81 MG CHEWABLE TABLET 81 MG: 81 TABLET CHEWABLE at 09:15

## 2021-12-02 RX ADMIN — OXYCODONE HYDROCHLORIDE 7.5 MG: 15 TABLET ORAL at 16:24

## 2021-12-02 RX ADMIN — RIFAXIMIN 550 MG: 550 TABLET ORAL at 21:06

## 2021-12-02 RX ADMIN — LINACLOTIDE 290 MCG: 290 CAPSULE, GELATIN COATED ORAL at 09:15

## 2021-12-02 RX ADMIN — LIDOCAINE 1 PATCH: 50 PATCH CUTANEOUS at 09:14

## 2021-12-02 RX ADMIN — PANTOPRAZOLE SODIUM 40 MG: 40 TABLET, DELAYED RELEASE ORAL at 06:36

## 2021-12-02 RX ADMIN — SPIRONOLACTONE 100 MG: 25 TABLET ORAL at 09:14

## 2021-12-02 RX ADMIN — CLOPIDOGREL BISULFATE 75 MG: 75 TABLET ORAL at 09:15

## 2021-12-02 RX ADMIN — OXYCODONE HYDROCHLORIDE 7.5 MG: 15 TABLET ORAL at 00:35

## 2021-12-02 RX ADMIN — OLANZAPINE 10 MG: 5 TABLET, FILM COATED ORAL at 21:05

## 2021-12-02 RX ADMIN — GABAPENTIN 300 MG: 300 CAPSULE ORAL at 16:24

## 2021-12-02 RX ADMIN — LACTULOSE 10 G: 20 SOLUTION ORAL at 21:05

## 2021-12-02 RX ADMIN — OXYCODONE HYDROCHLORIDE 7.5 MG: 15 TABLET ORAL at 06:42

## 2021-12-02 RX ADMIN — GABAPENTIN 300 MG: 300 CAPSULE ORAL at 06:36

## 2021-12-02 RX ADMIN — BUMETANIDE 2 MG: 1 TABLET ORAL at 09:15

## 2021-12-02 RX ADMIN — CYCLOBENZAPRINE 10 MG: 10 TABLET, FILM COATED ORAL at 16:24

## 2021-12-02 RX ADMIN — MINERAL OIL 1 ENEMA: 100 ENEMA RECTAL at 18:27

## 2021-12-02 RX ADMIN — GABAPENTIN 300 MG: 300 CAPSULE ORAL at 21:06

## 2021-12-02 RX ADMIN — RANOLAZINE 1000 MG: 500 TABLET, EXTENDED RELEASE ORAL at 21:08

## 2021-12-02 RX ADMIN — URSODIOL 600 MG: 300 CAPSULE ORAL at 21:08

## 2021-12-02 RX ADMIN — OXYCODONE HYDROCHLORIDE 7.5 MG: 15 TABLET ORAL at 11:57

## 2021-12-02 RX ADMIN — OXYCODONE HYDROCHLORIDE 7.5 MG: 15 TABLET ORAL at 21:06

## 2021-12-02 RX ADMIN — ROSUVASTATIN CALCIUM 20 MG: 20 TABLET, COATED ORAL at 21:05

## 2021-12-02 RX ADMIN — TAMSULOSIN HYDROCHLORIDE 0.4 MG: 0.4 CAPSULE ORAL at 09:15

## 2021-12-02 RX ADMIN — RANOLAZINE 1000 MG: 500 TABLET, EXTENDED RELEASE ORAL at 09:16

## 2021-12-02 RX ADMIN — URSODIOL 600 MG: 300 CAPSULE ORAL at 09:15
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
## 2021-12-02 NOTE — PROGRESS NOTES
[facility] Medicine Services  PROGRESS NOTE    Patient Name: Timothy Guzman  : 1975  MRN: [id number]    Date of Admission: 2021  Primary Care Physician: Toshia Mitchell APRN    Subjective   Subjective     CC: f/u abd pain    HPI: Up in bed eating. Drank 2L bowel prep w/out bm thus far.     ROS:  Gen- No fevers, chills  CV- No chest pain, palpitations  Resp- No cough, dyspnea  GI- No N/V/D,    Objective   Objective     Vital Signs:   Temp:  [97.4 °F (36.3 °C)-98 °F (36.7 °C)] 98 °F (36.7 °C)  Heart Rate:  [75-81] 75  Resp:  [16-18] 18  BP: (114-130)/(75-77) 130/77     Physical Exam:  Constitutional: No acute distress, awake, alert  HENT: NCAT, mucous membranes moist  Respiratory: Clear to auscultation bilaterally, respiratory effort normal   Cardiovascular: RRR, no murmurs, rubs, or gallops  Gastrointestinal: Positive bowel sounds, soft, nontender, nondistended, obese  Musculoskeletal: No bilateral ankle edema  Psychiatric: Appropriate affect, cooperative  Neurologic: Oriented x 3, strength symmetric in all extremities, Cranial Nerves grossly intact to confrontation, speech clear  Skin: No rashes    Results Reviewed:  LAB RESULTS:      Lab 21  1403 21  0859 21  0429   WBC 8.21 10.53 9.42   HEMOGLOBIN 14.3 12.0 11.5*   HEMATOCRIT 40.6 35.2 33.2*   PLATELETS 174 134* 101*   NEUTROS ABS 4.95  --   --    IMMATURE GRANS (ABS) 0.31*  --   --    LYMPHS ABS 1.94  --   --    MONOS ABS 0.85  --   --    EOS ABS 0.11  --   --    MCV 87.1 90.3 88.8   LACTATE 1.3  --   --          Lab 21  0806 21  1140 21  0859 21  0429 21  1257   SODIUM  --  136 135* 135* 135*   POTASSIUM 5.0 3.1* 3.8 4.0 4.0   CHLORIDE  --  94* 101 103 99   CO2  --  31.0* 22.0 23.0 24.0   ANION GAP  --  11.0 12.0 9.0 12.0   BUN  --  17 10 9 13   CREATININE  --  1.16* 0.81 0.80 1.03*   GLUCOSE  --  144* 122* 109* 90   CALCIUM  --  9.7 9.1 8.4* 9.0         Lab  11/30/21  1140 11/27/21  0859 11/26/21  0429 11/25/21  1257   TOTAL PROTEIN 6.8 6.3 5.7* 6.6   ALBUMIN 4.00 3.80 3.40* 4.40   GLOBULIN 2.8 2.5 2.3 2.2   ALT (SGPT) 15 17 18 27   AST (SGOT) 19 17 25 37*   BILIRUBIN 0.4 0.2 0.5 0.7   ALK PHOS 117 117 103 134*   AMYLASE  --  52  --   --    LIPASE  --  25  --   --          Lab 11/30/21  1140   TROPONIN T <0.010                 Brief Urine Lab Results  (Last result in the past 365 days)      Color   Clarity   Blood   Leuk Est   Nitrite   Protein   CREAT   Urine HCG        11/23/21 1825               Negative             Microbiology Results Abnormal     Procedure Component Value - Date/Time    COVID PRE-OP / PRE-PROCEDURE SCREENING ORDER (NO ISOLATION) - Swab, Nasopharynx [[id number]]  (Normal) Collected: 11/23/21 2155    Lab Status: Final result Specimen: Swab from Nasopharynx Updated: 11/23/21 2227    Narrative:      The following orders were created for panel order COVID PRE-OP / PRE-PROCEDURE SCREENING ORDER (NO ISOLATION) - Swab, Nasopharynx.  Procedure                               Abnormality         Status                     ---------                               -----------         ------                     COVID-19, ABBOTT IN-HOUS...[[id number]]  Normal              Final result                 Please view results for these tests on the individual orders.    COVID-19, ABBOTT IN-HOUSE,NASAL Swab (NO TRANSPORT MEDIA) 2 HR TAT - Swab, Nasopharynx [[id number]]  (Normal) Collected: 11/23/21 2155    Lab Status: Final result Specimen: Swab from Nasopharynx Updated: 11/23/21 2227     COVID19 Presumptive Negative    Narrative:      Fact sheet for providers: https://www.fda.gov/media/680952/download     Fact sheet for patients: https://www.fda.gov/media/228613/download    Test performed by PCR.  If inconsistent with clinical signs and symptoms patient should be tested with different authorized molecular test.          CT Abdomen Pelvis Without Contrast    Result Date:  12/1/2021  EXAMINATION: CT ABDOMEN PELVIS WO CONTRAST-  INDICATION: Abdominal pain; R10.11-Right upper quadrant pain; K92.0-Hematemesis  TECHNIQUE: Noncontrast CT of the abdomen and pelvis  COMPARISON: CT abdomen and pelvis 11/23/2021  FINDINGS:  Linear atelectasis at the left lung base with unchanged 6 mm solid pleural-based nodule in the left lower lobe. The remainder of the lower thorax appears unremarkable. Unremarkable appearance of the liver. The gallbladder surgically absent. Normal appearance of the bile ducts. Unremarkable appearance of the pancreas, spleen, adrenal glands, kidneys, ureters, and bladder. Unremarkable appearance of the uterus and adnexa. No evidence of bowel obstruction. There is increased moderate colonic stool burden. Unremarkable appearance of the stomach, duodenum, and small bowel. The appendix is surgically absent. Unremarkable noncontrast appearance of the vasculature. There is no abdominopelvic adenopathy. There is no conspicuous intra-abdominal fat stranding, pneumoperitoneum, or free fluid. Unchanged small fat-containing ventral abdominal hernia with otherwise unremarkable appearance of the body wall soft tissues. No acute osseous findings. Bilateral total hip arthroplasties. Chronic bilateral lower rib deformities.      Impression:  Interval increase in now moderate colonic stool burden. No acute abdominopelvic findings or other significant interval change from prior CT.   This report was finalized on 12/1/2021 9:33 AM by Fahad Sandoval MD.        Results for orders placed during the hospital encounter of 06/18/21    Adult Transthoracic Echo Complete W/ Cont if Necessary Per Protocol    Interpretation Summary  · The quality of the study is limited due to patient positioning and patient being intubated.  · Left ventricular ejection fraction appears to be 51 - 55%. Left ventricular systolic function is normal.  · Left ventricular diastolic function is consistent with (grade Ia  w/high LAP) impaired relaxation.  · Mildly reduced right ventricular systolic function noted.      I have reviewed the medications:  Scheduled Meds:GI cocktail, , Oral, Once  aspirin, 81 mg, Oral, Daily  bumetanide, 2 mg, Oral, Daily  clopidogrel, 75 mg, Oral, Daily  cyclobenzaprine, 10 mg, Oral, TID  FLUoxetine, 40 mg, Oral, Daily  gabapentin, 300 mg, Oral, Q8H  lactulose, 10 g, Oral, BID  lidocaine, 1 patch, Transdermal, Q24H  linaclotide, 290 mcg, Oral, Daily  OLANZapine, 10 mg, Oral, Nightly  pantoprazole, 40 mg, Oral, Q AM  ranolazine, 1,000 mg, Oral, BID  riFAXIMin, 550 mg, Oral, Q12H  rosuvastatin, 20 mg, Oral, Nightly  sodium chloride, 10 mL, Intravenous, Q12H  spironolactone, 100 mg, Oral, TID  tamsulosin, 0.4 mg, Oral, Daily  thiamine, 100 mg, Oral, Daily  traZODone, 50 mg, Oral, Nightly  ursodiol, 600 mg, Oral, BID  vitamin B-6, 25 mg, Oral, Daily      Continuous Infusions:   PRN Meds:.ipratropium-albuterol  •  melatonin  •  naloxone  •  ondansetron  •  oxyCODONE  •  potassium chloride **OR** potassium chloride **OR** potassium chloride  •  [COMPLETED] Insert peripheral IV **AND** sodium chloride    Assessment/Plan   Assessment & Plan     Active Hospital Problems    Diagnosis  POA   • **Coffee ground emesis [K92.0]  Yes   • Lactic acidosis [E87.2]  Yes   • Intractable abdominal pain [R10.9]  Yes   • Right upper quadrant abdominal pain [R10.11]  Yes   • Chronic pancreatitis (HCC) [K86.1]  Yes   • Anxiety and depression [F41.9, F32.A]  Yes   • Alcoholism (HCC) [F10.20]  Yes   • CAD (coronary artery disease) [I25.10]  Yes   • GERD (gastroesophageal reflux disease) [K21.9]  Yes   • History of esophageal varices [Z87.19]  Not Applicable   • Alcoholic cirrhosis of liver without ascites (HCC) [K70.30]  Yes      Resolved Hospital Problems   No resolved problems to display.        Brief Hospital Course to date:  Timothy Guzman is a 45 y.o. female with history of CAD/STEMI s/p ANGELICA, and cirrhosis and varices here  with abdominal pain and coffee ground emesis.      Abdominal pain  Coffee ground emesis   Hx of esophageal varices  Constipation  -Suspect majority of her pain is related to constipation. Her evaluation has been negative thus far aside from large stool burden noted on CT. Cardiology has seen and felt unlikely to be cardiac.   -GI following, EGD unremarkable. D/w PA. Provided Relastor w/out BM. Instructed her to drink remainder of bowel prep.   -Continue PPI  -Remains on ASA/plavix for now  -Continue lactulose/rifaximin  -No IV narcotics, no further increases in PO narcotics. Increased Gabapentin. Continue scheduled lidocaine/flexeril.      Hx of CAD s/p STEMI  -s/p ANGELICA, last in 7/21  -Cardiology has seen, recs med mgmt.      Anx/Depression  -trazodone/fluoxetine    This patient's problems and plans were partially entered by my partner and updated as appropriate by me 12/02/21.    DVT prophylaxis:  Mechanical DVT prophylaxis orders are present.       AM-PAC 6 Clicks Score (PT): 24 (12/02/21 0800)    Disposition: I expect the patient to be discharged home once she has BM.    CODE STATUS:   Code Status and Medical Interventions:   Ordered at: 11/23/21 2043     Level Of Support Discussed With:    Patient     Code Status (Patient has no pulse and is not breathing):    CPR (Attempt to Resuscitate)     Medical Interventions (Patient has pulse or is breathing):    Full Support       Alexa Lomeli II, DO  12/02/21

## 2021-12-03 ENCOUNTER — APPOINTMENT (OUTPATIENT)
Dept: GENERAL RADIOLOGY | Facility: HOSPITAL | Age: 46
End: 2021-12-03

## 2021-12-03 LAB — AMMONIA BLD-SCNC: 36 UMOL/L (ref 11–51)

## 2021-12-03 PROCEDURE — 99233 SBSQ HOSP IP/OBS HIGH 50: CPT | Performed by: INTERNAL MEDICINE

## 2021-12-03 PROCEDURE — 25010000002 METHYLNALTREXONE 12 MG/0.6ML SOLUTION: Performed by: NURSE PRACTITIONER

## 2021-12-03 PROCEDURE — 99232 SBSQ HOSP IP/OBS MODERATE 35: CPT | Performed by: NURSE PRACTITIONER

## 2021-12-03 PROCEDURE — 74018 RADEX ABDOMEN 1 VIEW: CPT

## 2021-12-03 PROCEDURE — 82140 ASSAY OF AMMONIA: CPT | Performed by: NURSE PRACTITIONER

## 2021-12-03 RX ORDER — LACTULOSE 10 G/15ML
20 SOLUTION ORAL 3 TIMES DAILY
Status: DISCONTINUED | OUTPATIENT
Start: 2021-12-03 | End: 2021-12-06 | Stop reason: HOSPADM

## 2021-12-03 RX ORDER — LACTULOSE 10 G/15ML
300 SOLUTION ORAL ONCE
Status: COMPLETED | OUTPATIENT
Start: 2021-12-03 | End: 2021-12-03

## 2021-12-03 RX ADMIN — METHYLNALTREXONE BROMIDE 12 MG: 12 INJECTION, SOLUTION SUBCUTANEOUS at 17:17

## 2021-12-03 RX ADMIN — GABAPENTIN 300 MG: 300 CAPSULE ORAL at 21:38

## 2021-12-03 RX ADMIN — CLOPIDOGREL BISULFATE 75 MG: 75 TABLET ORAL at 09:54

## 2021-12-03 RX ADMIN — RANOLAZINE 1000 MG: 500 TABLET, EXTENDED RELEASE ORAL at 09:55

## 2021-12-03 RX ADMIN — POLYETHYLENE GLYCOL 3350, SODIUM SULFATE ANHYDROUS, SODIUM BICARBONATE, SODIUM CHLORIDE, POTASSIUM CHLORIDE 4000 ML: 236; 22.74; 6.74; 5.86; 2.97 POWDER, FOR SOLUTION ORAL at 17:18

## 2021-12-03 RX ADMIN — LACTULOSE 20 G: 20 SOLUTION ORAL at 09:53

## 2021-12-03 RX ADMIN — URSODIOL 600 MG: 300 CAPSULE ORAL at 21:39

## 2021-12-03 RX ADMIN — SPIRONOLACTONE 100 MG: 25 TABLET ORAL at 21:38

## 2021-12-03 RX ADMIN — LACTULOSE 20 G: 20 SOLUTION ORAL at 21:38

## 2021-12-03 RX ADMIN — ROSUVASTATIN CALCIUM 20 MG: 20 TABLET, COATED ORAL at 21:39

## 2021-12-03 RX ADMIN — SPIRONOLACTONE 100 MG: 25 TABLET ORAL at 09:54

## 2021-12-03 RX ADMIN — PYRIDOXINE HCL TAB 50 MG 25 MG: 50 TAB at 09:55

## 2021-12-03 RX ADMIN — LACTULOSE 20 G: 20 SOLUTION ORAL at 17:17

## 2021-12-03 RX ADMIN — RIFAXIMIN 550 MG: 550 TABLET ORAL at 21:38

## 2021-12-03 RX ADMIN — CYCLOBENZAPRINE 10 MG: 10 TABLET, FILM COATED ORAL at 09:54

## 2021-12-03 RX ADMIN — RANOLAZINE 1000 MG: 500 TABLET, EXTENDED RELEASE ORAL at 21:39

## 2021-12-03 RX ADMIN — SPIRONOLACTONE 100 MG: 25 TABLET ORAL at 17:17

## 2021-12-03 RX ADMIN — SODIUM CHLORIDE, PRESERVATIVE FREE 10 ML: 5 INJECTION INTRAVENOUS at 09:55

## 2021-12-03 RX ADMIN — GABAPENTIN 300 MG: 300 CAPSULE ORAL at 05:37

## 2021-12-03 RX ADMIN — PANTOPRAZOLE SODIUM 40 MG: 40 TABLET, DELAYED RELEASE ORAL at 05:37

## 2021-12-03 RX ADMIN — OXYCODONE HYDROCHLORIDE 7.5 MG: 15 TABLET ORAL at 14:10

## 2021-12-03 RX ADMIN — OLANZAPINE 10 MG: 5 TABLET, FILM COATED ORAL at 21:39

## 2021-12-03 RX ADMIN — OXYCODONE HYDROCHLORIDE 7.5 MG: 15 TABLET ORAL at 01:53

## 2021-12-03 RX ADMIN — TRAZODONE HYDROCHLORIDE 50 MG: 50 TABLET ORAL at 21:38

## 2021-12-03 RX ADMIN — OXYCODONE HYDROCHLORIDE 7.5 MG: 15 TABLET ORAL at 18:34

## 2021-12-03 RX ADMIN — LIDOCAINE 1 PATCH: 50 PATCH CUTANEOUS at 09:54

## 2021-12-03 RX ADMIN — LINACLOTIDE 290 MCG: 290 CAPSULE, GELATIN COATED ORAL at 09:54

## 2021-12-03 RX ADMIN — OXYCODONE HYDROCHLORIDE 7.5 MG: 15 TABLET ORAL at 09:54

## 2021-12-03 RX ADMIN — Medication 100 MG: at 09:54

## 2021-12-03 RX ADMIN — BUMETANIDE 2 MG: 1 TABLET ORAL at 09:54

## 2021-12-03 RX ADMIN — TAMSULOSIN HYDROCHLORIDE 0.4 MG: 0.4 CAPSULE ORAL at 09:53

## 2021-12-03 RX ADMIN — CYCLOBENZAPRINE 10 MG: 10 TABLET, FILM COATED ORAL at 21:39

## 2021-12-03 RX ADMIN — FLUOXETINE HYDROCHLORIDE 40 MG: 20 CAPSULE ORAL at 09:54

## 2021-12-03 RX ADMIN — ASPIRIN 81 MG CHEWABLE TABLET 81 MG: 81 TABLET CHEWABLE at 09:54

## 2021-12-03 RX ADMIN — GABAPENTIN 300 MG: 300 CAPSULE ORAL at 14:10

## 2021-12-03 RX ADMIN — LACTULOSE 300 ML: 10 SOLUTION ORAL at 13:43

## 2021-12-03 RX ADMIN — URSODIOL 600 MG: 300 CAPSULE ORAL at 09:55

## 2021-12-03 RX ADMIN — RIFAXIMIN 550 MG: 550 TABLET ORAL at 09:54

## 2021-12-03 RX ADMIN — CYCLOBENZAPRINE 10 MG: 10 TABLET, FILM COATED ORAL at 17:17

## 2021-12-03 RX ADMIN — OXYCODONE HYDROCHLORIDE 7.5 MG: 15 TABLET ORAL at 05:37
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
## 2021-12-03 NOTE — CASE MANAGEMENT/SOCIAL WORK
Continued Stay Note  [facility]     Patient Name: Timothy Guzman  MRN: [id number]  Today's Date: 12/3/2021    Admit Date: 11/23/2021     Discharge Plan     Row Name 12/03/21 1543       Plan    Plan discharge plan    Plan Comments CM spoke with pt in room and plans remains home with family at discharge. Pt denies discharge needs. CM will cont to follow.    Final Discharge Disposition Code 01 - home or self-care               Discharge Codes    No documentation.               Expected Discharge Date and Time     Expected Discharge Date Expected Discharge Time    Dec 2, 2021             Elaine Borjas RN

## 2021-12-03 NOTE — PROGRESS NOTES
"    [facility] Medicine Services  PROGRESS NOTE    Patient Name: Timothy Guzman  : 1975  MRN: [id number]    Date of Admission: 2021  Primary Care Physician: Toshia Mitchell, ISSAC    Subjective   Subjective     CC: f/u abd pain    HPI: BM x 2 this am but now feels like she is developing hepatic encephalopathy. Feels \"foggy headed.\"     ROS:  Gen- No fevers, chills  CV- No chest pain, palpitations  Resp- No cough, dyspnea  GI- No N/V/D, abd pain     Objective   Objective     Vital Signs:   Temp:  [98.4 °F (36.9 °C)] 98.4 °F (36.9 °C)  Heart Rate:  [96-97] 96  Resp:  [18] 18  BP: (124-132)/(68-89) 124/68     Physical Exam:  Constitutional: No acute distress, awake, alert  HENT: NCAT, mucous membranes moist  Respiratory: Clear to auscultation bilaterally, respiratory effort normal   Cardiovascular: RRR, no murmurs, rubs, or gallops  Gastrointestinal: Positive bowel sounds, soft, nontender, nondistended, obese  Musculoskeletal: No bilateral ankle edema  Psychiatric: Appropriate affect, cooperative  Neurologic: Oriented x 3, strength symmetric in all extremities, Cranial Nerves grossly intact to confrontation, speech clear  Skin: No rashes    Results Reviewed:  LAB RESULTS:      Lab 21  1403 21  0859   WBC 8.21 10.53   HEMOGLOBIN 14.3 12.0   HEMATOCRIT 40.6 35.2   PLATELETS 174 134*   NEUTROS ABS 4.95  --    IMMATURE GRANS (ABS) 0.31*  --    LYMPHS ABS 1.94  --    MONOS ABS 0.85  --    EOS ABS 0.11  --    MCV 87.1 90.3   LACTATE 1.3  --          Lab 21  0806 21  1140 21  0859   SODIUM  --  136 135*   POTASSIUM 5.0 3.1* 3.8   CHLORIDE  --  94* 101   CO2  --  31.0* 22.0   ANION GAP  --  11.0 12.0   BUN  --  17 10   CREATININE  --  1.16* 0.81   GLUCOSE  --  144* 122*   CALCIUM  --  9.7 9.1         Lab 21  1140 21  0859   TOTAL PROTEIN 6.8 6.3   ALBUMIN 4.00 3.80   GLOBULIN 2.8 2.5   ALT (SGPT) 15 17   AST (SGOT) 19 17   BILIRUBIN 0.4 0.2 "   ALK PHOS 117 117   AMYLASE  --  52   LIPASE  --  25         Lab 11/30/21  1140   TROPONIN T <0.010                 Brief Urine Lab Results  (Last result in the past 365 days)      Color   Clarity   Blood   Leuk Est   Nitrite   Protein   CREAT   Urine HCG        11/23/21 1825               Negative             Microbiology Results Abnormal     Procedure Component Value - Date/Time    COVID PRE-OP / PRE-PROCEDURE SCREENING ORDER (NO ISOLATION) - Swab, Nasopharynx [[id number]]  (Normal) Collected: 11/23/21 2155    Lab Status: Final result Specimen: Swab from Nasopharynx Updated: 11/23/21 2227    Narrative:      The following orders were created for panel order COVID PRE-OP / PRE-PROCEDURE SCREENING ORDER (NO ISOLATION) - Swab, Nasopharynx.  Procedure                               Abnormality         Status                     ---------                               -----------         ------                     COVID-19, ABBOTT IN-HOUS...[[id number]]  Normal              Final result                 Please view results for these tests on the individual orders.    COVID-19, ABBOTT IN-HOUSE,NASAL Swab (NO TRANSPORT MEDIA) 2 HR TAT - Swab, Nasopharynx [[id number]]  (Normal) Collected: 11/23/21 2155    Lab Status: Final result Specimen: Swab from Nasopharynx Updated: 11/23/21 2227     COVID19 Presumptive Negative    Narrative:      Fact sheet for providers: https://www.fda.gov/media/102268/download     Fact sheet for patients: https://www.fda.gov/media/298384/download    Test performed by PCR.  If inconsistent with clinical signs and symptoms patient should be tested with different authorized molecular test.          JASSON personally reviewed showing stool burden. Agree with interpretation.    Results for orders placed during the hospital encounter of 06/18/21    Adult Transthoracic Echo Complete W/ Cont if Necessary Per Protocol    Interpretation Summary  · The quality of the study is limited due to patient positioning and  patient being intubated.  · Left ventricular ejection fraction appears to be 51 - 55%. Left ventricular systolic function is normal.  · Left ventricular diastolic function is consistent with (grade Ia w/high LAP) impaired relaxation.  · Mildly reduced right ventricular systolic function noted.      I have reviewed the medications:  Scheduled Meds:GI cocktail, , Oral, Once  aspirin, 81 mg, Oral, Daily  bumetanide, 2 mg, Oral, Daily  clopidogrel, 75 mg, Oral, Daily  cyclobenzaprine, 10 mg, Oral, TID  FLUoxetine, 40 mg, Oral, Daily  gabapentin, 300 mg, Oral, Q8H  lactulose, 20 g, Oral, TID  lactulose, 300 mL, Rectal, Once  lidocaine, 1 patch, Transdermal, Q24H  linaclotide, 290 mcg, Oral, Daily  OLANZapine, 10 mg, Oral, Nightly  pantoprazole, 40 mg, Oral, Q AM  ranolazine, 1,000 mg, Oral, BID  riFAXIMin, 550 mg, Oral, Q12H  rosuvastatin, 20 mg, Oral, Nightly  sodium chloride, 10 mL, Intravenous, Q12H  spironolactone, 100 mg, Oral, TID  tamsulosin, 0.4 mg, Oral, Daily  thiamine, 100 mg, Oral, Daily  traZODone, 50 mg, Oral, Nightly  ursodiol, 600 mg, Oral, BID  vitamin B-6, 25 mg, Oral, Daily      Continuous Infusions:   PRN Meds:.ipratropium-albuterol  •  melatonin  •  naloxone  •  ondansetron  •  oxyCODONE  •  potassium chloride **OR** potassium chloride **OR** potassium chloride  •  [COMPLETED] Insert peripheral IV **AND** sodium chloride    Assessment/Plan   Assessment & Plan     Active Hospital Problems    Diagnosis  POA   • **Coffee ground emesis [K92.0]  Yes   • Lactic acidosis [E87.2]  Yes   • Intractable abdominal pain [R10.9]  Yes   • Right upper quadrant abdominal pain [R10.11]  Yes   • Chronic pancreatitis (HCC) [K86.1]  Yes   • Anxiety and depression [F41.9, F32.A]  Yes   • Alcoholism (HCC) [F10.20]  Yes   • CAD (coronary artery disease) [I25.10]  Yes   • GERD (gastroesophageal reflux disease) [K21.9]  Yes   • History of esophageal varices [Z87.19]  Not Applicable   • Alcoholic cirrhosis of liver without  ascites (HCC) [K70.30]  Yes      Resolved Hospital Problems   No resolved problems to display.        Brief Hospital Course to date:  Timothy Guzman is a 45 y.o. female with history of CAD/STEMI s/p ANGELICA, and cirrhosis and varices here with abdominal pain and coffee ground emesis.      Abdominal pain  Coffee ground emesis   Cirrhosis w/ hx of esophageal varices  Constipation  -Suspect majority of her pain is related to constipation. Her evaluation has been negative thus far aside from large stool burden noted on CT. Cardiology has seen and felt unlikely to be cardiac. I understand from nursing that yesterday she felt well enough to online date while in room and had visitor drive from over 100 miles away to meet her. Prior to his arrival she felt well enough to clean up and put on makeup. Once improved from todays events will plan to d/c.  -GI following, EGD unremarkable. D/w PA. Provided Relastor w/out BM. Has had 2 bms measuring 400cc per nursing following enema. Will give lactulose enema and increase her PO lactulose.  -Continue PPI  -Remains on ASA/plavix for now  -Continue rifaximin  -No IV narcotics, no further increases in PO narcotics. Increased Gabapentin. Continue scheduled lidocaine/flexeril.      Hx of CAD s/p STEMI  -s/p ANGELICA, last in 7/21  -Cardiology has seen, recs med mgmt.      Anx/Depression  -trazodone/fluoxetine     This patient's problems and plans were partially entered by my partner and updated as appropriate by me 12/03/21.    DVT prophylaxis:  Mechanical DVT prophylaxis orders are present.       AM-PAC 6 Clicks Score (PT): 24 (12/03/21 0800)    Disposition: I expect the patient to be discharged TBD.    CODE STATUS:   Code Status and Medical Interventions:   Ordered at: 11/23/21 2043     Level Of Support Discussed With:    Patient     Code Status (Patient has no pulse and is not breathing):    CPR (Attempt to Resuscitate)     Medical Interventions (Patient has pulse or is breathing):    Full  Support       Alexa Lomeli II, DO  12/03/21

## 2021-12-04 LAB
BH CV VAS SMA 1ST PP TIME: 15 MIN
BH CV VAS SMA 2ND PP TIME: 30 MIN
BH CV VAS SMA 3RD PP TIME: 45 MIN
BH CV VAS SMA AORTA EDV: 19.6 CM/S
BH CV VAS SMA AORTA PSV: 138 CM/S
BH CV VAS SMA CELIAC MID EDV: 17.9 CM/S
BH CV VAS SMA CELIAC MID PSV: 111 CM/S
BH CV VAS SMA CELIAC ORIGIN EDV: 38.5 CM/S
BH CV VAS SMA CELIAC ORIGIN PSV: 150 CM/S
BH CV VAS SMA CELIAC PROX EDV: 23.4 CM/S
BH CV VAS SMA CELIAC PROX PSV: 131 CM/S
BH CV VAS SMA HEPATIC EDV: 36.9 CM/S
BH CV VAS SMA HEPATIC PSV: 124 CM/S
BH CV VAS SMA IMA EDV: 18.3 CM/S
BH CV VAS SMA IMA PSV: 246 CM/S
BH CV VAS SMA ORIGIN EDV: 27.2 CM/S
BH CV VAS SMA ORIGIN PSV: 195 CM/S
BH CV VAS SMA SMA DIST EDV: 20.6 CM/S
BH CV VAS SMA SMA DIST PSV: 104 CM/S
BH CV VAS SMA SMA MID EDV: 26.2 CM/S
BH CV VAS SMA SMA MID PSV: 155 CM/S
BH CV VAS SMA SMA PROX EDV: 37.2 CM/S
BH CV VAS SMA SMA PROX PSV: 207 CM/S
BH CV VAS SMA SPLENIC EDV: 14.9 CM/S
BH CV VAS SMA SPLENIC PSV: 92.9 CM/S

## 2021-12-04 PROCEDURE — 99232 SBSQ HOSP IP/OBS MODERATE 35: CPT | Performed by: INTERNAL MEDICINE

## 2021-12-04 RX ADMIN — OXYCODONE HYDROCHLORIDE 7.5 MG: 15 TABLET ORAL at 18:51

## 2021-12-04 RX ADMIN — LACTULOSE 20 G: 20 SOLUTION ORAL at 16:14

## 2021-12-04 RX ADMIN — LACTULOSE 20 G: 20 SOLUTION ORAL at 10:23

## 2021-12-04 RX ADMIN — SPIRONOLACTONE 100 MG: 25 TABLET ORAL at 10:23

## 2021-12-04 RX ADMIN — OXYCODONE HYDROCHLORIDE 7.5 MG: 15 TABLET ORAL at 06:32

## 2021-12-04 RX ADMIN — OXYCODONE HYDROCHLORIDE 7.5 MG: 15 TABLET ORAL at 02:02

## 2021-12-04 RX ADMIN — SODIUM CHLORIDE, PRESERVATIVE FREE 10 ML: 5 INJECTION INTRAVENOUS at 10:24

## 2021-12-04 RX ADMIN — Medication 100 MG: at 10:24

## 2021-12-04 RX ADMIN — RANOLAZINE 1000 MG: 500 TABLET, EXTENDED RELEASE ORAL at 21:21

## 2021-12-04 RX ADMIN — SODIUM CHLORIDE, PRESERVATIVE FREE 10 ML: 5 INJECTION INTRAVENOUS at 21:22

## 2021-12-04 RX ADMIN — SPIRONOLACTONE 100 MG: 25 TABLET ORAL at 21:21

## 2021-12-04 RX ADMIN — FLUOXETINE HYDROCHLORIDE 40 MG: 20 CAPSULE ORAL at 10:25

## 2021-12-04 RX ADMIN — RIFAXIMIN 550 MG: 550 TABLET ORAL at 21:20

## 2021-12-04 RX ADMIN — GABAPENTIN 300 MG: 300 CAPSULE ORAL at 06:32

## 2021-12-04 RX ADMIN — TRAZODONE HYDROCHLORIDE 50 MG: 50 TABLET ORAL at 21:20

## 2021-12-04 RX ADMIN — CLOPIDOGREL BISULFATE 75 MG: 75 TABLET ORAL at 10:24

## 2021-12-04 RX ADMIN — LACTULOSE 20 G: 20 SOLUTION ORAL at 21:21

## 2021-12-04 RX ADMIN — BUMETANIDE 2 MG: 1 TABLET ORAL at 10:24

## 2021-12-04 RX ADMIN — SPIRONOLACTONE 100 MG: 25 TABLET ORAL at 16:14

## 2021-12-04 RX ADMIN — PANTOPRAZOLE SODIUM 40 MG: 40 TABLET, DELAYED RELEASE ORAL at 06:32

## 2021-12-04 RX ADMIN — OLANZAPINE 10 MG: 5 TABLET, FILM COATED ORAL at 21:21

## 2021-12-04 RX ADMIN — CYCLOBENZAPRINE 10 MG: 10 TABLET, FILM COATED ORAL at 10:23

## 2021-12-04 RX ADMIN — OXYCODONE HYDROCHLORIDE 7.5 MG: 15 TABLET ORAL at 10:30

## 2021-12-04 RX ADMIN — RIFAXIMIN 550 MG: 550 TABLET ORAL at 10:24

## 2021-12-04 RX ADMIN — CYCLOBENZAPRINE 10 MG: 10 TABLET, FILM COATED ORAL at 16:14

## 2021-12-04 RX ADMIN — GABAPENTIN 300 MG: 300 CAPSULE ORAL at 21:20

## 2021-12-04 RX ADMIN — LINACLOTIDE 290 MCG: 290 CAPSULE, GELATIN COATED ORAL at 10:23

## 2021-12-04 RX ADMIN — ASPIRIN 81 MG CHEWABLE TABLET 81 MG: 81 TABLET CHEWABLE at 10:24

## 2021-12-04 RX ADMIN — ROSUVASTATIN CALCIUM 20 MG: 20 TABLET, COATED ORAL at 21:20

## 2021-12-04 RX ADMIN — LIDOCAINE 1 PATCH: 50 PATCH CUTANEOUS at 10:23

## 2021-12-04 RX ADMIN — TAMSULOSIN HYDROCHLORIDE 0.4 MG: 0.4 CAPSULE ORAL at 10:24

## 2021-12-04 RX ADMIN — OXYCODONE HYDROCHLORIDE 7.5 MG: 15 TABLET ORAL at 23:02

## 2021-12-04 RX ADMIN — GABAPENTIN 300 MG: 300 CAPSULE ORAL at 14:35

## 2021-12-04 RX ADMIN — PYRIDOXINE HCL TAB 50 MG 25 MG: 50 TAB at 10:24

## 2021-12-04 RX ADMIN — CYCLOBENZAPRINE 10 MG: 10 TABLET, FILM COATED ORAL at 21:21

## 2021-12-04 RX ADMIN — RANOLAZINE 1000 MG: 500 TABLET, EXTENDED RELEASE ORAL at 10:23

## 2021-12-04 RX ADMIN — OXYCODONE HYDROCHLORIDE 7.5 MG: 15 TABLET ORAL at 14:35

## 2021-12-04 RX ADMIN — URSODIOL 600 MG: 300 CAPSULE ORAL at 10:23

## 2021-12-04 RX ADMIN — URSODIOL 600 MG: 300 CAPSULE ORAL at 21:23

## 2021-12-04 NOTE — PROGRESS NOTES
"    [facility] Medicine Services  PROGRESS NOTE    Patient Name: Timothy Guzman  : 1975  MRN: [id number]    Date of Admission: 2021  Primary Care Physician: Toshia Mitchell APRN    Subjective   Subjective     CC: f/u constipation    HPI: Up in bed. Had bm x 2 yesterday but says she feels \"terrible.\" Refusing to leave hospital today.    ROS:  Gen- No fevers, chills  CV- No chest pain, palpitations  Resp- No cough, dyspnea  GI- No N/V/D, abd pain     Objective   Objective     Vital Signs:   Temp:  [96.9 °F (36.1 °C)-97.9 °F (36.6 °C)] 97.8 °F (36.6 °C)  Heart Rate:  [65-99] 74  Resp:  [14-18] 18  BP: ()/(48-78) 105/70     Physical Exam:  Constitutional: No acute distress, awake, alert  HENT: NCAT, mucous membranes moist  Respiratory: Clear to auscultation bilaterally, respiratory effort normal   Cardiovascular: RRR, no murmurs, rubs, or gallops  Gastrointestinal: Positive bowel sounds, soft, nontender, nondistended  Musculoskeletal: No bilateral ankle edema  Psychiatric: Appropriate affect, cooperative  Neurologic: Oriented x 3, strength symmetric in all extremities, Cranial Nerves grossly intact to confrontation, speech clear  Skin: No rashes    Results Reviewed:  LAB RESULTS:      Lab 21  1403   WBC 8.21   HEMOGLOBIN 14.3   HEMATOCRIT 40.6   PLATELETS 174   NEUTROS ABS 4.95   IMMATURE GRANS (ABS) 0.31*   LYMPHS ABS 1.94   MONOS ABS 0.85   EOS ABS 0.11   MCV 87.1   LACTATE 1.3         Lab 21  0806 21  1140   SODIUM  --  136   POTASSIUM 5.0 3.1*   CHLORIDE  --  94*   CO2  --  31.0*   ANION GAP  --  11.0   BUN  --  17   CREATININE  --  1.16*   GLUCOSE  --  144*   CALCIUM  --  9.7         Lab 21  1140   TOTAL PROTEIN 6.8   ALBUMIN 4.00   GLOBULIN 2.8   ALT (SGPT) 15   AST (SGOT) 19   BILIRUBIN 0.4   ALK PHOS 117         Lab 21  1140   TROPONIN T <0.010                 Brief Urine Lab Results  (Last result in the past 365 days)      Color  "  Clarity   Blood   Leuk Est   Nitrite   Protein   CREAT   Urine HCG        11/23/21 1825               Negative             Microbiology Results Abnormal     Procedure Component Value - Date/Time    COVID PRE-OP / PRE-PROCEDURE SCREENING ORDER (NO ISOLATION) - Swab, Nasopharynx [[id number]]  (Normal) Collected: 11/23/21 2155    Lab Status: Final result Specimen: Swab from Nasopharynx Updated: 11/23/21 2227    Narrative:      The following orders were created for panel order COVID PRE-OP / PRE-PROCEDURE SCREENING ORDER (NO ISOLATION) - Swab, Nasopharynx.  Procedure                               Abnormality         Status                     ---------                               -----------         ------                     COVID-19, ABBOTT IN-HOUS...[[id number]]  Normal              Final result                 Please view results for these tests on the individual orders.    COVID-19, ABBOTT IN-HOUSE,NASAL Swab (NO TRANSPORT MEDIA) 2 HR TAT - Swab, Nasopharynx [[id number]]  (Normal) Collected: 11/23/21 2155    Lab Status: Final result Specimen: Swab from Nasopharynx Updated: 11/23/21 2227     COVID19 Presumptive Negative    Narrative:      Fact sheet for providers: https://www.fda.gov/media/569976/download     Fact sheet for patients: https://www.fda.gov/media/067295/download    Test performed by PCR.  If inconsistent with clinical signs and symptoms patient should be tested with different authorized molecular test.          XR Abdomen KUB    Result Date: 12/3/2021  EXAMINATION: XR ABDOMEN KUB-12/03/2021:  INDICATION: ABDOMINAL PAIN, CONSTIPATION; R10.11-Right upper quadrant pain; K92.0-Hematemesis.  COMPARISON: 11/29/2021.  FINDINGS: KUB reveals mild stool seen scattered diffusely throughout the descending and transverse colon. Degenerative changes seen within the spine. No obvious obstruction or gross free intraperitoneal air. Hardware is seen in the hips bilaterally.      Impression: Stool seen scattered within  the transverse and descending colon with no obvious obstruction or gross free intraperitoneal air. Mild constipation identified somewhat improved in the interval. Hardware is seen in the hips bilaterally.  D:  12/03/2021 E:  12/03/2021         Results for orders placed during the hospital encounter of 06/18/21    Adult Transthoracic Echo Complete W/ Cont if Necessary Per Protocol    Interpretation Summary  · The quality of the study is limited due to patient positioning and patient being intubated.  · Left ventricular ejection fraction appears to be 51 - 55%. Left ventricular systolic function is normal.  · Left ventricular diastolic function is consistent with (grade Ia w/high LAP) impaired relaxation.  · Mildly reduced right ventricular systolic function noted.      I have reviewed the medications:  Scheduled Meds:GI cocktail, , Oral, Once  aspirin, 81 mg, Oral, Daily  bumetanide, 2 mg, Oral, Daily  clopidogrel, 75 mg, Oral, Daily  cyclobenzaprine, 10 mg, Oral, TID  FLUoxetine, 40 mg, Oral, Daily  gabapentin, 300 mg, Oral, Q8H  lactulose, 20 g, Oral, TID  lidocaine, 1 patch, Transdermal, Q24H  linaclotide, 290 mcg, Oral, Daily  methylnaltrexone, 12 mg, Subcutaneous, Every Other Day  OLANZapine, 10 mg, Oral, Nightly  pantoprazole, 40 mg, Oral, Q AM  ranolazine, 1,000 mg, Oral, BID  riFAXIMin, 550 mg, Oral, Q12H  rosuvastatin, 20 mg, Oral, Nightly  sodium chloride, 10 mL, Intravenous, Q12H  spironolactone, 100 mg, Oral, TID  tamsulosin, 0.4 mg, Oral, Daily  thiamine, 100 mg, Oral, Daily  traZODone, 50 mg, Oral, Nightly  ursodiol, 600 mg, Oral, BID  vitamin B-6, 25 mg, Oral, Daily      Continuous Infusions:   PRN Meds:.ipratropium-albuterol  •  melatonin  •  naloxone  •  ondansetron  •  oxyCODONE  •  potassium chloride **OR** potassium chloride **OR** potassium chloride  •  [COMPLETED] Insert peripheral IV **AND** sodium chloride    Assessment/Plan   Assessment & Plan     Active Hospital Problems    Diagnosis  POA    • **Coffee ground emesis [K92.0]  Yes   • Lactic acidosis [E87.2]  Yes   • Intractable abdominal pain [R10.9]  Yes   • Right upper quadrant abdominal pain [R10.11]  Yes   • Chronic pancreatitis (HCC) [K86.1]  Yes   • Anxiety and depression [F41.9, F32.A]  Yes   • Alcoholism (HCC) [F10.20]  Yes   • CAD (coronary artery disease) [I25.10]  Yes   • GERD (gastroesophageal reflux disease) [K21.9]  Yes   • History of esophageal varices [Z87.19]  Not Applicable   • Alcoholic cirrhosis of liver without ascites (HCC) [K70.30]  Yes      Resolved Hospital Problems   No resolved problems to display.        Brief Hospital Course to date:  Timothy Guzman is a 45 y.o. female with history of CAD/STEMI s/p ANGELICA, and cirrhosis and varices here with abdominal pain and coffee ground emesis. Majority of her issues are likely due to constipation which has finally be resolved. She will be discharged tomorrow.     Abdominal pain  Coffee ground emesis   Cirrhosis w/ hx of esophageal varices  Constipation  -Suspect majority of her pain is related to constipation. Her evaluation has been negative thus far aside from large stool burden noted on CT. Cardiology has seen and felt unlikely to be cardiac. I understand from nursing that yesterday she felt well enough to online date while in room and had visitor drive from over 100 miles away to meet her. Prior to his arrival she felt well enough to clean up and put on makeup.   -GI following, EGD unremarkable. Has provided relastor and golytely x 2. She has also received lactulose enema finally with BM x 2.   -Continue PPI  -Remains on ASA/plavix for now  -Continue rifaximin  -No IV narcotics, no further increases in PO narcotics. Increased Gabapentin. Continue scheduled lidocaine/flexeril.   -I discussed discharge with her today but she refused - says she didn't feel well and isnt comfortable with this. I reinforced to her that she will absolutely being discharged in am barring any setbacks.     Hx  of CAD s/p STEMI  -s/p ANGELICA, last in 7/21  -Cardiology has seen, recs med mgmt.      Anx/Depression  -Trazodone/fluoxetine     This patient's problems and plans were partially entered by my partner and updated as appropriate by me 12/04/21.     DVT prophylaxis:  Mechanical DVT prophylaxis orders are present.       AM-PAC 6 Clicks Score (PT): 24 (12/04/21 0800)    Disposition: I expect the patient to be discharged home tomorrow.    CODE STATUS:   Code Status and Medical Interventions:   Ordered at: 11/23/21 2043     Level Of Support Discussed With:    Patient     Code Status (Patient has no pulse and is not breathing):    CPR (Attempt to Resuscitate)     Medical Interventions (Patient has pulse or is breathing):    Full Support       Alexa Lomeli II, DO  12/04/21

## 2021-12-05 PROCEDURE — 25010000002 METHYLNALTREXONE 12 MG/0.6ML SOLUTION: Performed by: NURSE PRACTITIONER

## 2021-12-05 PROCEDURE — 99233 SBSQ HOSP IP/OBS HIGH 50: CPT | Performed by: FAMILY MEDICINE

## 2021-12-05 RX ADMIN — OXYCODONE HYDROCHLORIDE 7.5 MG: 15 TABLET ORAL at 17:35

## 2021-12-05 RX ADMIN — CLOPIDOGREL BISULFATE 75 MG: 75 TABLET ORAL at 08:57

## 2021-12-05 RX ADMIN — GABAPENTIN 300 MG: 300 CAPSULE ORAL at 13:35

## 2021-12-05 RX ADMIN — PYRIDOXINE HCL TAB 50 MG 25 MG: 50 TAB at 08:57

## 2021-12-05 RX ADMIN — RIFAXIMIN 550 MG: 550 TABLET ORAL at 09:03

## 2021-12-05 RX ADMIN — SPIRONOLACTONE 100 MG: 25 TABLET ORAL at 16:05

## 2021-12-05 RX ADMIN — CYCLOBENZAPRINE 10 MG: 10 TABLET, FILM COATED ORAL at 21:33

## 2021-12-05 RX ADMIN — SODIUM CHLORIDE, PRESERVATIVE FREE 10 ML: 5 INJECTION INTRAVENOUS at 09:11

## 2021-12-05 RX ADMIN — CYCLOBENZAPRINE 10 MG: 10 TABLET, FILM COATED ORAL at 16:05

## 2021-12-05 RX ADMIN — PANTOPRAZOLE SODIUM 40 MG: 40 TABLET, DELAYED RELEASE ORAL at 05:40

## 2021-12-05 RX ADMIN — OXYCODONE HYDROCHLORIDE 7.5 MG: 15 TABLET ORAL at 03:15

## 2021-12-05 RX ADMIN — METHYLNALTREXONE BROMIDE 12 MG: 12 INJECTION, SOLUTION SUBCUTANEOUS at 08:55

## 2021-12-05 RX ADMIN — OLANZAPINE 10 MG: 5 TABLET, FILM COATED ORAL at 21:33

## 2021-12-05 RX ADMIN — LACTULOSE 20 G: 20 SOLUTION ORAL at 21:33

## 2021-12-05 RX ADMIN — LIDOCAINE 1 PATCH: 50 PATCH CUTANEOUS at 08:58

## 2021-12-05 RX ADMIN — FLUOXETINE HYDROCHLORIDE 40 MG: 20 CAPSULE ORAL at 08:56

## 2021-12-05 RX ADMIN — LINACLOTIDE 290 MCG: 290 CAPSULE, GELATIN COATED ORAL at 09:19

## 2021-12-05 RX ADMIN — GABAPENTIN 300 MG: 300 CAPSULE ORAL at 05:40

## 2021-12-05 RX ADMIN — GABAPENTIN 300 MG: 300 CAPSULE ORAL at 21:33

## 2021-12-05 RX ADMIN — RANOLAZINE 1000 MG: 500 TABLET, EXTENDED RELEASE ORAL at 21:35

## 2021-12-05 RX ADMIN — LACTULOSE 20 G: 20 SOLUTION ORAL at 16:05

## 2021-12-05 RX ADMIN — ROSUVASTATIN CALCIUM 20 MG: 20 TABLET, COATED ORAL at 21:33

## 2021-12-05 RX ADMIN — TAMSULOSIN HYDROCHLORIDE 0.4 MG: 0.4 CAPSULE ORAL at 08:57

## 2021-12-05 RX ADMIN — RANOLAZINE 1000 MG: 500 TABLET, EXTENDED RELEASE ORAL at 08:58

## 2021-12-05 RX ADMIN — URSODIOL 600 MG: 300 CAPSULE ORAL at 21:35

## 2021-12-05 RX ADMIN — ASPIRIN 81 MG CHEWABLE TABLET 81 MG: 81 TABLET CHEWABLE at 08:56

## 2021-12-05 RX ADMIN — SPIRONOLACTONE 100 MG: 25 TABLET ORAL at 08:56

## 2021-12-05 RX ADMIN — BUMETANIDE 2 MG: 1 TABLET ORAL at 08:57

## 2021-12-05 RX ADMIN — URSODIOL 600 MG: 300 CAPSULE ORAL at 08:58

## 2021-12-05 RX ADMIN — CYCLOBENZAPRINE 10 MG: 10 TABLET, FILM COATED ORAL at 08:56

## 2021-12-05 RX ADMIN — Medication 100 MG: at 08:56

## 2021-12-05 RX ADMIN — LACTULOSE 20 G: 20 SOLUTION ORAL at 08:55

## 2021-12-05 RX ADMIN — OXYCODONE HYDROCHLORIDE 7.5 MG: 15 TABLET ORAL at 13:35

## 2021-12-05 RX ADMIN — OXYCODONE HYDROCHLORIDE 7.5 MG: 15 TABLET ORAL at 09:01

## 2021-12-05 RX ADMIN — SPIRONOLACTONE 100 MG: 25 TABLET ORAL at 21:33

## 2021-12-05 RX ADMIN — TRAZODONE HYDROCHLORIDE 50 MG: 50 TABLET ORAL at 21:33

## 2021-12-05 RX ADMIN — OXYCODONE HYDROCHLORIDE 7.5 MG: 15 TABLET ORAL at 21:33

## 2021-12-05 NOTE — PROGRESS NOTES
[facility] Medicine Services  PROGRESS NOTE    Patient Name: Timothy Guzman  : 1975  MRN: [id number]    Date of Admission: 2021  Primary Care Physician: Toshia Mitchell APRN    Subjective   Subjective     CC:  F/U constipation     HPI:  Patient seen and examined. RN notes reviewed. No acute events overnight. Pt ate 2 biscuits and jelly this AM. Had BM  Yesterday. Asks to stay one more day.     ROS:  Gen- No fevers, chills  CV- No chest pain, palpitations  Resp- No cough, dyspnea  GI- No N/V/D, +chronic abd pain    Objective   Objective     Vital Signs:   Temp:  [97.8 °F (36.6 °C)-98.3 °F (36.8 °C)] 97.8 °F (36.6 °C)  Heart Rate:  [74-81] 81  Resp:  [18] 18  BP: ()/(67-77) 118/77     Physical Exam:  Constitutional: No acute distress, awake, alert  HENT: NCAT, mucous membranes moist  Respiratory: Clear to auscultation bilaterally, respiratory effort normal   Cardiovascular: RRR, no murmurs, rubs, or gallops  Gastrointestinal: Positive bowel sounds, soft, nontender, nondistended  Musculoskeletal: No bilateral ankle edema  Psychiatric: Appropriate affect, cooperative  Neurologic: Oriented x 3, speech clear  Skin: No rashes    Results Reviewed:  LAB RESULTS:      Lab 21  1403   WBC 8.21   HEMOGLOBIN 14.3   HEMATOCRIT 40.6   PLATELETS 174   NEUTROS ABS 4.95   IMMATURE GRANS (ABS) 0.31*   LYMPHS ABS 1.94   MONOS ABS 0.85   EOS ABS 0.11   MCV 87.1   LACTATE 1.3         Lab 21  0806 21  1140   SODIUM  --  136   POTASSIUM 5.0 3.1*   CHLORIDE  --  94*   CO2  --  31.0*   ANION GAP  --  11.0   BUN  --  17   CREATININE  --  1.16*   GLUCOSE  --  144*   CALCIUM  --  9.7         Lab 21  1140   TOTAL PROTEIN 6.8   ALBUMIN 4.00   GLOBULIN 2.8   ALT (SGPT) 15   AST (SGOT) 19   BILIRUBIN 0.4   ALK PHOS 117         Lab 21  1140   TROPONIN T <0.010                 Brief Urine Lab Results  (Last result in the past 365 days)      Color   Clarity   Blood    Leuk Est   Nitrite   Protein   CREAT   Urine HCG        11/23/21 1825               Negative             Microbiology Results Abnormal     Procedure Component Value - Date/Time    COVID PRE-OP / PRE-PROCEDURE SCREENING ORDER (NO ISOLATION) - Swab, Nasopharynx [[id number]]  (Normal) Collected: 11/23/21 2155    Lab Status: Final result Specimen: Swab from Nasopharynx Updated: 11/23/21 2227    Narrative:      The following orders were created for panel order COVID PRE-OP / PRE-PROCEDURE SCREENING ORDER (NO ISOLATION) - Swab, Nasopharynx.  Procedure                               Abnormality         Status                     ---------                               -----------         ------                     COVID-19, ABBOTT IN-HOUS...[[id number]]  Normal              Final result                 Please view results for these tests on the individual orders.    COVID-19, ABBOTT IN-HOUSE,NASAL Swab (NO TRANSPORT MEDIA) 2 HR TAT - Swab, Nasopharynx [[id number]]  (Normal) Collected: 11/23/21 2155    Lab Status: Final result Specimen: Swab from Nasopharynx Updated: 11/23/21 2227     COVID19 Presumptive Negative    Narrative:      Fact sheet for providers: https://www.fda.gov/media/193787/download     Fact sheet for patients: https://www.fda.gov/media/384048/download    Test performed by PCR.  If inconsistent with clinical signs and symptoms patient should be tested with different authorized molecular test.          No radiology results from the last 24 hrs    Results for orders placed during the hospital encounter of 06/18/21    Adult Transthoracic Echo Complete W/ Cont if Necessary Per Protocol    Interpretation Summary  · The quality of the study is limited due to patient positioning and patient being intubated.  · Left ventricular ejection fraction appears to be 51 - 55%. Left ventricular systolic function is normal.  · Left ventricular diastolic function is consistent with (grade Ia w/high LAP) impaired relaxation.  ·  Mildly reduced right ventricular systolic function noted.      I have reviewed the medications:  Scheduled Meds:GI cocktail, , Oral, Once  aspirin, 81 mg, Oral, Daily  bumetanide, 2 mg, Oral, Daily  clopidogrel, 75 mg, Oral, Daily  cyclobenzaprine, 10 mg, Oral, TID  FLUoxetine, 40 mg, Oral, Daily  gabapentin, 300 mg, Oral, Q8H  lactulose, 20 g, Oral, TID  lidocaine, 1 patch, Transdermal, Q24H  linaclotide, 290 mcg, Oral, Daily  methylnaltrexone, 12 mg, Subcutaneous, Every Other Day  OLANZapine, 10 mg, Oral, Nightly  pantoprazole, 40 mg, Oral, Q AM  ranolazine, 1,000 mg, Oral, BID  riFAXIMin, 550 mg, Oral, Q12H  rosuvastatin, 20 mg, Oral, Nightly  sodium chloride, 10 mL, Intravenous, Q12H  spironolactone, 100 mg, Oral, TID  tamsulosin, 0.4 mg, Oral, Daily  thiamine, 100 mg, Oral, Daily  traZODone, 50 mg, Oral, Nightly  ursodiol, 600 mg, Oral, BID  vitamin B-6, 25 mg, Oral, Daily      Continuous Infusions:   PRN Meds:.ipratropium-albuterol  •  melatonin  •  naloxone  •  ondansetron  •  oxyCODONE  •  potassium chloride **OR** potassium chloride **OR** potassium chloride  •  [COMPLETED] Insert peripheral IV **AND** sodium chloride    Assessment/Plan   Assessment & Plan     Active Hospital Problems    Diagnosis  POA   • **Coffee ground emesis [K92.0]  Yes   • Lactic acidosis [E87.2]  Yes   • Intractable abdominal pain [R10.9]  Yes   • Right upper quadrant abdominal pain [R10.11]  Yes   • Chronic pancreatitis (HCC) [K86.1]  Yes   • Anxiety and depression [F41.9, F32.A]  Yes   • Alcoholism (HCC) [F10.20]  Yes   • CAD (coronary artery disease) [I25.10]  Yes   • GERD (gastroesophageal reflux disease) [K21.9]  Yes   • History of esophageal varices [Z87.19]  Not Applicable   • Alcoholic cirrhosis of liver without ascites (HCC) [K70.30]  Yes      Resolved Hospital Problems   No resolved problems to display.        Brief Hospital Course to date:  Timothy R Thomas is a 45 y.o. female with history of CAD/STEMI s/p ANGELICA, and cirrhosis  and varices here with abdominal pain and coffee ground emesis. Majority of her issues are likely due to constipation which has resolved.     This patient's problems and plans were partially entered by my partner and updated as appropriate by me 12/05/21.  All problems are new to me today      Abdominal pain  Coffee ground emesis   Cirrhosis w/ hx of esophageal varices  Constipation  -Suspect majority of her pain is related to constipation. Her evaluation has been negative thus far aside from large stool burden noted on CT. Cardiology has seen and felt unlikely to be cardiac.   -Per my partner, she felt well enough to online date while in room and had visitor drive from over 100 miles away to meet her. Prior to his arrival she felt well enough to clean up and put on makeup.   -GI following, EGD unremarkable. Has provided relastor and golytely x 2. She also received lactulose enema finally with BM x 2.   -Continue PPI  -Remains on ASA/plavix for now  -Continue rifaximin  -No IV narcotics, no further increases in PO narcotics. Continue Gabapentin. Continue scheduled lidocaine/flexeril.      Hx of CAD s/p STEMI  -s/p ANGELICA, last in 7/21  -Cardiology has seen, recs med mgmt.      Anx/Depression  -Trazodone/fluoxetine    DVT prophylaxis:  Mechanical DVT prophylaxis orders are present.       AM-PAC 6 Clicks Score (PT): 24 (12/04/21 2000)    Disposition: I expect the patient to be discharged TOMORROW.     CODE STATUS:   Code Status and Medical Interventions:   Ordered at: 11/23/21 2043     Level Of Support Discussed With:    Patient     Code Status (Patient has no pulse and is not breathing):    CPR (Attempt to Resuscitate)     Medical Interventions (Patient has pulse or is breathing):    Full Support       Karen Elena, DO  12/05/21

## 2021-12-06 VITALS
SYSTOLIC BLOOD PRESSURE: 119 MMHG | TEMPERATURE: 98.3 F | DIASTOLIC BLOOD PRESSURE: 80 MMHG | WEIGHT: 214 LBS | HEIGHT: 63 IN | RESPIRATION RATE: 18 BRPM | BODY MASS INDEX: 37.92 KG/M2 | OXYGEN SATURATION: 97 % | HEART RATE: 87 BPM

## 2021-12-06 PROBLEM — R10.9 INTRACTABLE ABDOMINAL PAIN: Status: RESOLVED | Noted: 2021-11-23 | Resolved: 2021-12-06

## 2021-12-06 PROBLEM — R10.11 RIGHT UPPER QUADRANT ABDOMINAL PAIN: Status: RESOLVED | Noted: 2021-11-23 | Resolved: 2021-12-06

## 2021-12-06 PROBLEM — E87.20 LACTIC ACIDOSIS: Status: RESOLVED | Noted: 2021-11-23 | Resolved: 2021-12-06

## 2021-12-06 PROBLEM — K92.0 COFFEE GROUND EMESIS: Status: RESOLVED | Noted: 2021-11-23 | Resolved: 2021-12-06

## 2021-12-06 PROCEDURE — 99239 HOSP IP/OBS DSCHRG MGMT >30: CPT | Performed by: FAMILY MEDICINE

## 2021-12-06 RX ORDER — CYCLOBENZAPRINE HCL 10 MG
10 TABLET ORAL 3 TIMES DAILY PRN
Qty: 90 TABLET | Refills: 0 | Status: SHIPPED | OUTPATIENT
Start: 2021-12-06 | End: 2022-01-12

## 2021-12-06 RX ORDER — DOCUSATE SODIUM 100 MG/1
100 CAPSULE, LIQUID FILLED ORAL 2 TIMES DAILY
Qty: 60 CAPSULE | Refills: 0 | Status: SHIPPED | OUTPATIENT
Start: 2021-12-06

## 2021-12-06 RX ORDER — BISACODYL 10 MG
10 SUPPOSITORY, RECTAL RECTAL DAILY PRN
Qty: 30 SUPPOSITORY | Refills: 0 | Status: SHIPPED | OUTPATIENT
Start: 2021-12-06

## 2021-12-06 RX ORDER — LACTULOSE 10 G/15ML
30 SOLUTION ORAL 3 TIMES DAILY
Qty: 1892 ML | Refills: 0 | Status: SHIPPED | OUTPATIENT
Start: 2021-12-06

## 2021-12-06 RX ADMIN — TAMSULOSIN HYDROCHLORIDE 0.4 MG: 0.4 CAPSULE ORAL at 07:54

## 2021-12-06 RX ADMIN — LIDOCAINE 1 PATCH: 50 PATCH CUTANEOUS at 07:52

## 2021-12-06 RX ADMIN — LINACLOTIDE 290 MCG: 290 CAPSULE, GELATIN COATED ORAL at 07:53

## 2021-12-06 RX ADMIN — LACTULOSE 20 G: 20 SOLUTION ORAL at 07:54

## 2021-12-06 RX ADMIN — RANOLAZINE 1000 MG: 500 TABLET, EXTENDED RELEASE ORAL at 07:56

## 2021-12-06 RX ADMIN — PYRIDOXINE HCL TAB 50 MG 25 MG: 50 TAB at 13:43

## 2021-12-06 RX ADMIN — SPIRONOLACTONE 100 MG: 25 TABLET ORAL at 07:57

## 2021-12-06 RX ADMIN — GABAPENTIN 300 MG: 300 CAPSULE ORAL at 05:35

## 2021-12-06 RX ADMIN — OXYCODONE HYDROCHLORIDE 7.5 MG: 15 TABLET ORAL at 09:27

## 2021-12-06 RX ADMIN — FLUOXETINE HYDROCHLORIDE 40 MG: 20 CAPSULE ORAL at 07:58

## 2021-12-06 RX ADMIN — BUMETANIDE 2 MG: 1 TABLET ORAL at 07:54

## 2021-12-06 RX ADMIN — OXYCODONE HYDROCHLORIDE 7.5 MG: 15 TABLET ORAL at 01:30

## 2021-12-06 RX ADMIN — Medication 100 MG: at 07:57

## 2021-12-06 RX ADMIN — OXYCODONE HYDROCHLORIDE 7.5 MG: 15 TABLET ORAL at 13:43

## 2021-12-06 RX ADMIN — CYCLOBENZAPRINE 10 MG: 10 TABLET, FILM COATED ORAL at 07:54

## 2021-12-06 RX ADMIN — GABAPENTIN 300 MG: 300 CAPSULE ORAL at 13:43

## 2021-12-06 RX ADMIN — CLOPIDOGREL BISULFATE 75 MG: 75 TABLET ORAL at 07:58

## 2021-12-06 RX ADMIN — OXYCODONE HYDROCHLORIDE 7.5 MG: 15 TABLET ORAL at 05:35

## 2021-12-06 RX ADMIN — ASPIRIN 81 MG CHEWABLE TABLET 81 MG: 81 TABLET CHEWABLE at 07:57

## 2021-12-06 RX ADMIN — PANTOPRAZOLE SODIUM 40 MG: 40 TABLET, DELAYED RELEASE ORAL at 05:35

## 2021-12-06 RX ADMIN — URSODIOL 600 MG: 300 CAPSULE ORAL at 07:57

## 2021-12-06 NOTE — DISCHARGE SUMMARY
[facility] Medicine Services  DISCHARGE SUMMARY    Patient Name: Timothy Guzman  : 1975  MRN: [id number]    Date of Admission: 2021  4:34 PM  Date of Discharge:  2021  Primary Care Physician: Toshia Mitchell APRN    Consults     Date and Time Order Name Status Description    2021  9:23 PM Inpatient Gastroenterology Consult Completed     2021  9:23 PM Inpatient Cardiology Consult Completed           Hospital Course     Presenting Problem:   Right upper quadrant abdominal pain [R10.11]    Active Hospital Problems    Diagnosis  POA   • Chronic pancreatitis (HCC) [K86.1]  Yes   • Anxiety and depression [F41.9, F32.A]  Yes   • Alcoholism (HCC) [F10.20]  Yes   • CAD (coronary artery disease) [I25.10]  Yes   • GERD (gastroesophageal reflux disease) [K21.9]  Yes   • History of esophageal varices [Z87.19]  Not Applicable   • Alcoholic cirrhosis of liver without ascites (HCC) [K70.30]  Yes      Resolved Hospital Problems    Diagnosis Date Resolved POA   • **Coffee ground emesis [K92.0] 2021 Yes   • Lactic acidosis [E87.2] 2021 Yes   • Intractable abdominal pain [R10.9] 2021 Yes   • Right upper quadrant abdominal pain [R10.11] 2021 Yes          Hospital Course:  Timothy Guzman is a 45 y.o. female with history of CAD/STEMI s/p ANGELICA, and cirrhosis and varices here with abdominal pain and coffee ground emesis. Majority of her issues are likely due to constipation which has resolved.     Abdominal pain  Coffee ground emesis   Cirrhosis w/ hx of esophageal varices  Constipation  -Suspect majority of her pain is related to constipation. Her evaluation has been negative thus far aside from large stool burden noted on CT.   -Cardiology has seen and felt unlikely to be cardiac.   -GI has signed off, EGD unremarkable.   -Has had BM   -Continue PPI  -Remains on ASA/plavix for now  -Continue rifaximin  -Continue Gabapentin. Continue flexeril.   -Of  note, patient does have a lot of pain medication seeking behavior. See nursing note.   -Suspect that her abdominal pain is chronic in nature and that she will unlikely ever be completely pain free.      Hx of CAD s/p STEMI  -s/p ANGELICA, last in 7/21  -Cardiology has seen, recs med mgmt.      Anx/Depression  -Trazodone/fluoxetine    Discharge Follow Up Recommendations for outpatient labs/diagnostics:  -PCP 3-5 days  -[facility], GI 2 weeks     Day of Discharge     HPI:   Patient seen and examined. RN notes reviewed. No acute events overnight. Patient has had BM. Still with chronic abdominal pain.     Review of Systems  Gen- No fevers, chills  CV- No chest pain, palpitations  Resp- No cough, dyspnea  GI- No N/V/D, +chronic abd pain    Vital Signs:   Temp:  [97.5 °F (36.4 °C)-98.6 °F (37 °C)] 97.5 °F (36.4 °C)  Heart Rate:  [65-90] 84  Resp:  [18-20] 18  BP: (101-142)/(55-87) 114/68     Physical Exam:  Constitutional: No acute distress, awake, alert  HENT: NCAT, mucous membranes moist  Respiratory: Clear to auscultation bilaterally, respiratory effort normal   Cardiovascular: RRR, no murmurs, rubs, or gallops  Gastrointestinal: Positive bowel sounds, soft, nontender, nondistended  Musculoskeletal: No bilateral ankle edema  Psychiatric: Appropriate affect, cooperative  Neurologic: Oriented x 3,  speech clear  Skin: No rashes    Pertinent  and/or Most Recent Results     LAB RESULTS:      Lab 11/30/21  1403   WBC 8.21   HEMOGLOBIN 14.3   HEMATOCRIT 40.6   PLATELETS 174   NEUTROS ABS 4.95   IMMATURE GRANS (ABS) 0.31*   LYMPHS ABS 1.94   MONOS ABS 0.85   EOS ABS 0.11   MCV 87.1   LACTATE 1.3         Lab 12/01/21  0806 11/30/21  1140   SODIUM  --  136   POTASSIUM 5.0 3.1*   CHLORIDE  --  94*   CO2  --  31.0*   ANION GAP  --  11.0   BUN  --  17   CREATININE  --  1.16*   GLUCOSE  --  144*   CALCIUM  --  9.7         Lab 11/30/21  1140   TOTAL PROTEIN 6.8   ALBUMIN 4.00   GLOBULIN 2.8   ALT (SGPT) 15   AST (SGOT) 19    BILIRUBIN 0.4   ALK PHOS 117         Lab 11/30/21  1140   TROPONIN T <0.010                 Brief Urine Lab Results  (Last result in the past 365 days)      Color   Clarity   Blood   Leuk Est   Nitrite   Protein   CREAT   Urine HCG        11/23/21 1825               Negative           Microbiology Results (last 10 days)     ** No results found for the last 240 hours. **          CT Abdomen Pelvis Without Contrast    Result Date: 12/1/2021  EXAMINATION: CT ABDOMEN PELVIS WO CONTRAST-  INDICATION: Abdominal pain; R10.11-Right upper quadrant pain; K92.0-Hematemesis  TECHNIQUE: Noncontrast CT of the abdomen and pelvis  COMPARISON: CT abdomen and pelvis 11/23/2021  FINDINGS:  Linear atelectasis at the left lung base with unchanged 6 mm solid pleural-based nodule in the left lower lobe. The remainder of the lower thorax appears unremarkable. Unremarkable appearance of the liver. The gallbladder surgically absent. Normal appearance of the bile ducts. Unremarkable appearance of the pancreas, spleen, adrenal glands, kidneys, ureters, and bladder. Unremarkable appearance of the uterus and adnexa. No evidence of bowel obstruction. There is increased moderate colonic stool burden. Unremarkable appearance of the stomach, duodenum, and small bowel. The appendix is surgically absent. Unremarkable noncontrast appearance of the vasculature. There is no abdominopelvic adenopathy. There is no conspicuous intra-abdominal fat stranding, pneumoperitoneum, or free fluid. Unchanged small fat-containing ventral abdominal hernia with otherwise unremarkable appearance of the body wall soft tissues. No acute osseous findings. Bilateral total hip arthroplasties. Chronic bilateral lower rib deformities.       Interval increase in now moderate colonic stool burden. No acute abdominopelvic findings or other significant interval change from prior CT.   This report was finalized on 12/1/2021 9:33 AM by Fahad Sandoval MD.      XR Abdomen  KUB    Result Date: 12/3/2021  EXAMINATION: XR ABDOMEN KUB-12/03/2021:  INDICATION: ABDOMINAL PAIN, CONSTIPATION; R10.11-Right upper quadrant pain; K92.0-Hematemesis.  COMPARISON: 11/29/2021.  FINDINGS: KUB reveals mild stool seen scattered diffusely throughout the descending and transverse colon. Degenerative changes seen within the spine. No obvious obstruction or gross free intraperitoneal air. Hardware is seen in the hips bilaterally.      Stool seen scattered within the transverse and descending colon with no obvious obstruction or gross free intraperitoneal air. Mild constipation identified somewhat improved in the interval. Hardware is seen in the hips bilaterally.  D:  12/03/2021 E:  12/03/2021       XR Abdomen KUB    Result Date: 12/1/2021  EXAMINATION: XR ABDOMEN KUB-11/29/2021:  INDICATION: Abdominal pain; R10.11-Right upper quadrant pain; K92.0-Hematemesis.  COMPARISON: 11/27/2021.  FINDINGS: KUB reveals stool seen scattered diffusely throughout the colon. Surgical clips in right upper quadrant from prior cholecystectomy. Degenerative changes seen within the spine. Hardware is seen in the hips bilaterally.      Moderate stool seen scattered diffusely throughout the colon suggesting clinical presentation of constipation with no evidence of obvious obstruction or gross free intraperitoneal air.  D:  11/29/2021 E:  11/29/2021  This report was finalized on 12/1/2021 4:51 PM by Dr. Elin Pretty MD.      XR Abdomen KUB    Result Date: 11/29/2021  EXAMINATION: XR ABDOMEN KUB- 11/27/2021  INDICATION: Abdominal pain, constipation; R10.11-Right upper quadrant pain; K92.0-Hematemesis  COMPARISON: 11/26/2021  FINDINGS: KUB reveals stool seen scattered diffusely throughout the colon. Surgical clips in the right upper quadrant from prior cholecystectomy. No abnormal mass or calcification seen within the abdomen.        Stool scattered throughout the colon suggesting clinical presentation of constipation. No  evidence of obvious obstruction or gross free intraperitoneal air.    D: 11/27/2021 E: 11/29/2021  This report was finalized on 11/29/2021 3:51 PM by Dr. Elin Pretty MD.      XR Abdomen KUB    Result Date: 11/29/2021  EXAMINATION: XR ABDOMEN KUB- 11/26/2021  INDICATION: R10.11-Right upper quadrant pain; K92.0-Hematemesis  COMPARISON: 11/24/2021  FINDINGS: KUB reveals bowel gas pattern to be nonspecific. Surgical clips in the right upper quadrant from prior cholecystectomy. There has been significant improvement seen in the bowel gas pattern when compared to the prior study. Air identified within the rectal vault. There is hardware identified in the hips bilaterally.        Significant improvement seen in the appearance of the bowel gas pattern when compared to the prior study with no signs of obvious obstruction. No gross free intraperitoneal air.    D: 11/26/2021 E: 11/26/2021  This report was finalized on 11/29/2021 3:48 PM by Dr. Elin Pretty MD.      Duplex Mesenteric Complete CAR    Result Date: 12/4/2021  EXAMINATION: DUPLEX SMA COMPLETE CAR-12/01/2021:  INDICATION: Generalized abdominal pain.  COMPARISON: Unenhanced CT scan of 11/30/2021.  FINDINGS: The study is considered technically adequate, although the patient is unable to suspend respiration during the exam. All portions of the celiac axis and SMA are visualized. Peak systolic celiac axis velocity is 150 cm/s at its origin. Peak systolic SMA velocity is 207 cm/s proximally, both considered within normal limits. Review of the CT study, on the sagittal images shows no obvious celiac axis stenosis, and only a suggestion of mild-to-moderate celiac axis origin narrowing.      No evidence of hemodynamically significant celiac axis or SMA stenosis.  D:  12/03/2021 E:  12/03/2021    This report was finalized on 12/4/2021 3:34 PM by Dr. Bo Gardner MD.        Results for orders placed during the hospital encounter of 11/23/21    Duplex Mesenteric  Complete CAR    Interpretation Summary  EXAMINATION: DUPLEX SMA COMPLETE CAR-12/01/2021:    INDICATION: Generalized abdominal pain.    COMPARISON: Unenhanced CT scan of 11/30/2021.    FINDINGS: The study is considered technically adequate, although the  patient is unable to suspend respiration during the exam. All portions  of the celiac axis and SMA are visualized. Peak systolic celiac axis  velocity is 150 cm/s at its origin. Peak systolic SMA velocity is 207  cm/s proximally, both considered within normal limits. Review of the CT  study, on the sagittal images shows no obvious celiac axis stenosis, and  only a suggestion of mild-to-moderate celiac axis origin narrowing.    Impression  No evidence of hemodynamically significant celiac axis or  SMA stenosis.    D:  12/03/2021  E:  12/03/2021        This report was finalized on 12/4/2021 3:34 PM by Dr. Bo Gardner MD.      Results for orders placed during the hospital encounter of 11/23/21    Duplex Mesenteric Complete CAR    Interpretation Summary  EXAMINATION: DUPLEX SMA COMPLETE CAR-12/01/2021:    INDICATION: Generalized abdominal pain.    COMPARISON: Unenhanced CT scan of 11/30/2021.    FINDINGS: The study is considered technically adequate, although the  patient is unable to suspend respiration during the exam. All portions  of the celiac axis and SMA are visualized. Peak systolic celiac axis  velocity is 150 cm/s at its origin. Peak systolic SMA velocity is 207  cm/s proximally, both considered within normal limits. Review of the CT  study, on the sagittal images shows no obvious celiac axis stenosis, and  only a suggestion of mild-to-moderate celiac axis origin narrowing.    Impression  No evidence of hemodynamically significant celiac axis or  SMA stenosis.    D:  12/03/2021  E:  12/03/2021        This report was finalized on 12/4/2021 3:34 PM by Dr. Bo Gardner MD.      Results for orders placed during the hospital encounter of 06/18/21    Adult Transthoracic  Echo Complete W/ Cont if Necessary Per Protocol    Interpretation Summary  · The quality of the study is limited due to patient positioning and patient being intubated.  · Left ventricular ejection fraction appears to be 51 - 55%. Left ventricular systolic function is normal.  · Left ventricular diastolic function is consistent with (grade Ia w/high LAP) impaired relaxation.  · Mildly reduced right ventricular systolic function noted.      Plan for Follow-up of Pending Labs/Results: Inbox     Discharge Details        Discharge Medications      New Medications      Instructions Start Date   bisacodyl 10 MG suppository  Commonly known as: DULCOLAX   10 mg, Rectal, Daily PRN      cyclobenzaprine 10 MG tablet  Commonly known as: FLEXERIL   10 mg, Oral, 3 Times Daily PRN      linaclotide 290 MCG capsule capsule  Commonly known as: LINZESS   290 mcg, Oral, Daily      oxyCODONE HCl 7.5 MG tablet   7.5 mg, Oral, Every 4 Hours PRN         Changes to Medications      Instructions Start Date   docusate sodium 100 MG capsule  Commonly known as: COLACE  What changed: Another medication with the same name was added. Make sure you understand how and when to take each.   100 mg, Oral, 2 Times Daily PRN      docusate sodium 100 MG capsule  Commonly known as: Colace  What changed: You were already taking a medication with the same name, and this prescription was added. Make sure you understand how and when to take each.   100 mg, Oral, 2 Times Daily      spironolactone 100 MG tablet  Commonly known as: ALDACTONE  What changed: Another medication with the same name was removed. Continue taking this medication, and follow the directions you see here.   100 mg, Oral, 3 Times Daily         Continue These Medications      Instructions Start Date   aspirin 81 MG EC tablet   81 mg, Oral, Daily      bumetanide 1 MG tablet  Commonly known as: BUMEX   2 mg, Oral, Daily      clopidogrel 75 MG tablet  Commonly known as: PLAVIX   75 mg, Oral,  Daily      FeroSul 325 (65 FE) MG tablet  Generic drug: ferrous sulfate   1 tablet, Oral, Daily      FLUoxetine 40 MG capsule  Commonly known as: PROzac   40 mg, Oral, Daily      folic acid 1 MG tablet  Commonly known as: FOLVITE   1 mg, Oral, Daily      gabapentin 300 MG capsule  Commonly known as: NEURONTIN   300 mg, Oral, Nightly      ipratropium-albuterol 0.5-2.5 mg/3 ml nebulizer  Commonly known as: DUO-NEB   3 mL, Nebulization, Every 6 Hours PRN      lactulose 10 GM/15ML solution  Commonly known as: CHRONULAC   30 g, Oral, 3 Times Daily      magnesium oxide 400 (241.3 Mg) MG tablet tablet  Commonly known as: MAGOX   400 mg, Oral, Every Evening      Melatonin 10 MG tablet   1 tablet, Oral, Nightly      Multivitamin tablet tablet  Generic drug: multivitamin   No dose, route, or frequency recorded.      OLANZapine 10 MG tablet  Commonly known as: zyPREXA   10 mg, Oral, Nightly      ondansetron 4 MG tablet  Commonly known as: Zofran   4 mg, Oral, Every 8 Hours PRN      pantoprazole 40 MG EC tablet  Commonly known as: PROTONIX   40 mg, Oral, Daily      potassium chloride 20 MEQ CR tablet  Commonly known as: K-DUR,KLOR-CON   1 tablet, Oral, Daily      potassium chloride ER 20 MEQ tablet controlled-release ER tablet  Commonly known as: K-TAB   No dose, route, or frequency recorded.      ranolazine 1000 MG 12 hr tablet  Commonly known as: RANEXA   1,000 mg, Oral, 2 Times Daily      rosuvastatin 20 MG tablet  Commonly known as: CRESTOR   20 mg, Oral, Nightly      tamsulosin 0.4 MG capsule 24 hr capsule  Commonly known as: FLOMAX   0.4 mg, Oral, Daily      thiamine 100 MG tablet tablet  Commonly known as: VITAMIN B-1   100 mg      traZODone 50 MG tablet  Commonly known as: DESYREL   50 mg, Oral, Nightly      ursodiol 300 MG capsule  Commonly known as: ACTIGALL   600 mg, Oral, 2 Times Daily      vitamin b complex capsule capsule   1 capsule, Oral, Daily      vitamin B-6 25 MG tablet  Commonly known as: PYRIDOXINE   25  mg, Oral, Daily      Xifaxan 550 MG tablet  Generic drug: riFAXIMin   550 mg, Oral, Every 12 Hours Scheduled             Allergies   Allergen Reactions   • Contrast Dye Anaphylaxis     6/18 Pt coded after receiving contrast for a CT scan. Was premedicated. Was having an MI at the same time. Immediately underwent heart cath with contrast without additional allergic reaction  7/15 Pt premedicated with prednisone/Benadryl for heart cath. Still with anaphylactic-like reaction with drop in BP and hypoxia. Treated 95% stenosis with minimal dye and supported with epinephrine, solumedrol, NRB   • Haloperidol Hallucinations   • Nsaids GI Bleeding     Other reaction(s): Bleeding, VOMITING   • Tylenol [Acetaminophen] Other (See Comments)     CIRRHOSIS         Discharge Disposition:  Home or Self Care    Diet:  Hospital:  Diet Order   Procedures   • Diet Regular; [place]       Activity:      Restrictions or Other Recommendations:       CODE STATUS:    Code Status and Medical Interventions:   Ordered at: 11/23/21 2043     Level Of Support Discussed With:    Patient     Code Status (Patient has no pulse and is not breathing):    CPR (Attempt to Resuscitate)     Medical Interventions (Patient has pulse or is breathing):    Full Support       Future Appointments   Date Time Provider Department Center   1/12/2022 10:00 AM Sapphire Ambriz PA-C [facility]   4/4/2022  9:15 AM Vikram Gonzales MD [facility]   5/12/2022  8:30 AM Nelson Yip APRN [id number] [facility]       Additional Instructions for the Follow-ups that You Need to Schedule     Discharge Follow-up with PCP   As directed       Currently Documented PCP:    Toshia Mitchell APRN    PCP Phone Number:    [phone]     Follow Up Details: 3-5 days         Discharge Follow-up with Specified Provider: [facility]; 2 Weeks   As directed      To: [facility]    Follow Up: 2 Weeks                     Karen Elena,  DO  12/06/21      Time Spent on Discharge:  I spent  55  minutes on this discharge activity which included: face-to-face encounter with the patient, reviewing the data in the system, coordination of the care with the nursing staff as well as consultants, documentation, and entering orders.

## 2021-12-06 NOTE — PLAN OF CARE
Goal Outcome Evaluation:  Plan of Care Reviewed With: patient        Progress: no change  Outcome Summary: VSS.  Pt having BM's.  Pt writing down times of pain medication administration and calling out for it frequently.  No visable signs of pain noted.  Pt calling out frequently for numerous snacks.  Will continue to monitor.  
Goal Outcome Evaluation:            VSS. Pain better controlled. Pt states she had bowel movements throughout the day. Sleeping well. Anticipate discharge. Will continue to monitor.      
Goal Outcome Evaluation:         VSS. Pain well controlled with PRN medication. Pt drank 2L bowel prep. Encouraged ambulation this morning, pt walking the halls. Good PO intake. Will continue to monitor.         
Goal Outcome Evaluation:      Pt being discharged today. Pt in good spirits. No acute distress noted. Still having pain. Scripts sent to pharmacy. No other requests at this time. Pt alert & oriented per baseline. Parents transporting pt home.            
Goal Outcome Evaluation:      VSS. Pain well controlled. Milk of molasses enema given and bm x1. Will continue to monitor.   
Goal Outcome Evaluation:  Plan of Care Reviewed With: patient        Progress: improving  Outcome Summary: VSS. Patient requesting pain medications every 2 hours. Bed alarm on and patient reeducated mulitple times throughout shift to wait for staff before getting up. Anticipating discharge in morning.  
Goal Outcome Evaluation:  Plan of Care Reviewed With: patient        Progress: no change  Outcome Summary: VSS.  Pt having BM's throughout the night. Pt calling out and requesting pain medication every 2 hours. Pt stated she hoped she gets to go home today.  Will continue to monitor.  
actual

## 2021-12-06 NOTE — CASE MANAGEMENT/SOCIAL WORK
Case Management Discharge Note      Final Note: The plan is home with family. Pt denies discharge needs.         Selected Continued Care - Discharged on 12/6/2021 Admission date: 11/23/2021 - Discharge disposition: Home or Self Care    Destination    No services have been selected for the patient.              Durable Medical Equipment    No services have been selected for the patient.              Dialysis/Infusion    No services have been selected for the patient.              Home Medical Care    No services have been selected for the patient.              Therapy    No services have been selected for the patient.              Community Resources    No services have been selected for the patient.              Community & DME    No services have been selected for the patient.                       Final Discharge Disposition Code: 01 - home or self-care

## 2021-12-07 ENCOUNTER — READMISSION MANAGEMENT (OUTPATIENT)
Dept: CALL CENTER | Facility: HOSPITAL | Age: 46
End: 2021-12-07

## 2021-12-07 NOTE — OUTREACH NOTE
Prep Survey      Responses   [facility] discharged from? [place]   Is LACE score < 7 ? No   Emergency Room discharge w/ pulse ox? No   Eligibility Readm Mgmt   Discharge diagnosis Chronic pancreatitis   Does the patient have one of the following disease processes/diagnoses(primary or secondary)? Other   Does the patient have Home health ordered? No   Is there a DME ordered? No   Prep survey completed? Yes          Starr Arceo RN

## 2021-12-10 ENCOUNTER — READMISSION MANAGEMENT (OUTPATIENT)
Dept: CALL CENTER | Facility: HOSPITAL | Age: 46
End: 2021-12-10

## 2021-12-10 NOTE — OUTREACH NOTE
Medical Week 1 Survey      Responses   [facility] patient discharged from? [place]   Does the patient have one of the following disease processes/diagnoses(primary or secondary)? Other   Week 1 attempt successful? No   Unsuccessful attempts Attempt 1          Bren Andrade RN

## 2021-12-14 ENCOUNTER — READMISSION MANAGEMENT (OUTPATIENT)
Dept: CALL CENTER | Facility: HOSPITAL | Age: 46
End: 2021-12-14

## 2021-12-14 NOTE — OUTREACH NOTE
Medical Week 1 Survey      Responses   [facility] patient discharged from? [place]   Does the patient have one of the following disease processes/diagnoses(primary or secondary)? Other   Week 1 attempt successful? Yes   Call start time 1222   Discharge diagnosis Chronic pancreatitis   Meds reviewed with patient/caregiver? Yes   Is the patient having any side effects they believe may be caused by any medication additions or changes? No   Does the patient have all medications ordered at discharge? No   What is keeping the patient from filling the prescriptions? Prior authorization Issues   Prescription comments states that oxycodone needs PA, pharmacy sent message but has not heard back from the MD.   Is the patient taking all medications as directed (includes completed medication regime)? Yes   Medication comments routed to    Does the patient have a primary care provider?  Yes   Does the patient have an appointment with their PCP within 7 days of discharge? Yes   Has the patient kept scheduled appointments due by today? N/A   Has home health visited the patient within 72 hours of discharge? N/A   Psychosocial issues? No   Did the patient receive a copy of their discharge instructions? Yes   What is the patient's perception of their health status since discharge? Improving   Is the patient/caregiver able to teach back the hierarchy of who to call/visit for symptoms/problems? PCP, Specialist, Home health nurse, Urgent Care, ED, 911 Yes   If the patient is a current smoker, are they able to teach back resources for cessation? Not a smoker   Week 1 call completed? Yes   Wrap up additional comments Improving states that oxycodone without tylenol needs a PA and her pharmacy sent it to the MD, pharmacy has not heard back.  Routed to .          Diana Deng RN

## 2021-12-15 ENCOUNTER — APPOINTMENT (OUTPATIENT)
Dept: NEUROLOGY | Facility: HOSPITAL | Age: 46
End: 2021-12-15

## 2021-12-22 ENCOUNTER — READMISSION MANAGEMENT (OUTPATIENT)
Dept: CALL CENTER | Facility: HOSPITAL | Age: 46
End: 2021-12-22

## 2021-12-22 NOTE — OUTREACH NOTE
"Medical Week 2 Survey      Responses   [facility] patient discharged from? [facility]   Does the patient have one of the following disease processes/diagnoses(primary or secondary)? Other   Week 2 attempt successful? Yes   Call start time 1539   Discharge diagnosis Chronic pancreatitis   Call end time 1542   Meds reviewed with patient/caregiver? Yes   Is the patient taking all medications as directed (includes completed medication regime)? No   What is preventing the patient from taking all medications as directed? Other  [Dad reports, \"she's not real regular\"]   Comments regarding appointments GI appt is on 12/28/21   Does the patient have an appointment with their PCP within 7 days of discharge? No   What is preventing the patient from scheduling follow up appointments within 7 days of discharge? Haven't had time   Nursing Interventions Advised patient to make appointment   Has the patient kept scheduled appointments due by today? N/A   What is the patient's perception of their health status since discharge? Improving   Week 2 Call Completed? Yes          Lindsey Almanza RN  "

## 2021-12-28 ENCOUNTER — OFFICE VISIT (OUTPATIENT)
Dept: GASTROENTEROLOGY | Facility: CLINIC | Age: 46
End: 2021-12-28

## 2021-12-28 VITALS
HEIGHT: 63 IN | DIASTOLIC BLOOD PRESSURE: 94 MMHG | SYSTOLIC BLOOD PRESSURE: 138 MMHG | HEART RATE: 107 BPM | WEIGHT: 208.6 LBS | TEMPERATURE: 97.3 F | BODY MASS INDEX: 36.96 KG/M2

## 2021-12-28 DIAGNOSIS — K59.04 CHRONIC IDIOPATHIC CONSTIPATION: Primary | ICD-10-CM

## 2021-12-28 PROCEDURE — 99213 OFFICE O/P EST LOW 20 MIN: CPT | Performed by: NURSE PRACTITIONER

## 2021-12-28 RX ORDER — PRUCALOPRIDE 2 MG/1
2 TABLET, FILM COATED ORAL DAILY
Qty: 90 TABLET | Refills: 3 | Status: SHIPPED | OUTPATIENT
Start: 2021-12-28 | End: 2022-01-04 | Stop reason: CLARIF

## 2021-12-28 RX ORDER — FLUOXETINE HYDROCHLORIDE 20 MG/1
30 CAPSULE ORAL DAILY
COMMUNITY
End: 2022-01-12

## 2021-12-28 NOTE — PROGRESS NOTES
GASTROENTEROLOGY OFFICE NOTE  Timothy Guzman  [id number]  1975    CARE TEAM  Patient Care Team:  Toshia Mitchell APRN as PCP - General (Family Medicine)  Nelson Yip APRN as Nurse Practitioner (Nurse Practitioner)    Referring Provider: Toshia Mitchell APRN    Chief Complaint   Patient presents with   • Hepatic Disease   • Constipation        HISTORY OF PRESENT ILLNESS:  Timothy Guzman is a 45 y.o. female with history of CAD/STEMI s/p ANGELICA, cirrhosis and varices seen in follow up after recent hospitalization for abdominal pain and coffee ground emesis. Majority of her issues suspected to be due to constipation which has resolved inpatient with stool purge. Hospital evaluation including EGD was negative with the exception of large stool burden noted on CT.     Since discharge from the hospital she continues on Linzess 290 mcg daily and Lactulose, she is not sure how much she is taking but she drinks it straight from the bottle without measuring 2-3 times daily. She is only having a small volume stool every 2-3 days.     She has a history of chronic constipation previously treated with Linzess + Motegrity. She has not been taking Motegrity since June.    She has chronic right upper quadrant abdominal pain that is constant, sharp pain.  It is exacerbated by movement.  She was prescribed oxycodone 7.5 mg, 18 tablets upon discharge from the hospital for this pain.  She has been unable to pick this medicine up as it was called in as a name brand and the cost is too much for her.  I have advised her that I feel this right upper quadrant pain is likely myofascial in nature but could be also related to her constipation.  I have asked her not to fill her narcotic prescription as taking opioids will worsen her constipation.    PAST MEDICAL HISTORY  Past Medical History:   Diagnosis Date   • Acute pancreatitis    • Alcoholism (HCC)    • Arthritis    • Bone necrosis (HCC)    • CAD (coronary artery  disease) 2021   • Cirrhosis (HCC)    • Gastroparesis    • GERD (gastroesophageal reflux disease)    • History of esophageal varices    • History of kidney stones    • Hypertension    • Memory loss    • Pancreatitis         PAST SURGICAL HISTORY  Past Surgical History:   Procedure Laterality Date   • APPENDECTOMY     • CARDIAC CATHETERIZATION N/A 2021    Procedure: Left Heart Cath;  Surgeon: Vikram Gonzales MD;  Location:  [facility];  Service: Cardiovascular;  Laterality: N/A;   • CARDIAC CATHETERIZATION N/A 7/15/2021    Procedure: LEFT HEART CATH;  Surgeon: Vikram Gonzales MD;  Location:  [facility];  Service: Cardiology;  Laterality: N/A;   •  SECTION     • CHOLECYSTECTOMY     • COLONOSCOPY     • COLONOSCOPY N/A 3/31/2020    Procedure: COLONOSCOPY;  Surgeon: Tirso Giles MD;  Location:  [facility];  Service: Gastroenterology;  Laterality: N/A;   • COLONOSCOPY N/A 2021    Procedure: COLONOSCOPY;  Surgeon: Tyrese Rasmussen MD;  Location:  [facility];  Service: Gastroenterology;  Laterality: N/A;   • CORONARY STENT PLACEMENT     • ENDOSCOPY     • ENDOSCOPY     • ENDOSCOPY N/A 2021    Procedure: ESOPHAGOGASTRODUODENOSCOPY AT BEDSIDE;  Surgeon: Tyrese Rasmussen MD;  Location:  [facility];  Service: Gastroenterology;  Laterality: N/A;   • ENDOSCOPY N/A 2021    Procedure: ESOPHAGOGASTRODUODENOSCOPY;  Surgeon: Tyrese Rasmussen MD;  Location:  [facility];  Service: Gastroenterology;  Laterality: N/A;   • ENDOSCOPY N/A 2021    Procedure: ESOPHAGOGASTRODUODENOSCOPY;  Surgeon: Tyrese Rasmussen MD;  Location:  [facility];  Service: Gastroenterology;  Laterality: N/A;   • GALLBLADDER SURGERY     • REPLACEMENT TOTAL HIP LATERAL POSITION Left    • TOTAL KNEE ARTHROPLASTY Left         MEDICATIONS:    Current Outpatient Medications:   •  aspirin 81 MG EC tablet, Take 81 mg by mouth Daily., Disp: , Rfl:   •  B Complex Vitamins  (vitamin b complex) capsule capsule, Take 1 capsule by mouth Daily., Disp: , Rfl:   •  bisacodyl (DULCOLAX) 10 MG suppository, Insert 1 suppository into the rectum Daily As Needed for Constipation., Disp: 30 suppository, Rfl: 0  •  bumetanide (BUMEX) 1 MG tablet, Take 2 tablets by mouth Daily., Disp: 60 tablet, Rfl: 5  •  clopidogrel (PLAVIX) 75 MG tablet, Take 1 tablet by mouth Daily., Disp: 90 tablet, Rfl: 3  •  cyclobenzaprine (FLEXERIL) 10 MG tablet, Take 1 tablet by mouth 3 (Three) Times a Day As Needed for Muscle Spasms., Disp: 90 tablet, Rfl: 0  •  docusate sodium (COLACE) 100 MG capsule, Take 100 mg by mouth 2 (Two) Times a Day As Needed for Constipation., Disp: , Rfl:   •  docusate sodium (Colace) 100 MG capsule, Take 1 capsule by mouth 2 (Two) Times a Day., Disp: 60 capsule, Rfl: 0  •  FLUoxetine (PROzac) 20 MG capsule, Take 30 mg by mouth Daily., Disp: , Rfl:   •  folic acid (FOLVITE) 1 MG tablet, Take 1 tablet by mouth Daily., Disp: 30 tablet, Rfl: 0  •  gabapentin (NEURONTIN) 300 MG capsule, Take 1 capsule by mouth Every Night., Disp: 30 capsule, Rfl: 5  •  ipratropium-albuterol (DUO-NEB) 0.5-2.5 mg/3 ml nebulizer, Take 3 mL by nebulization Every 6 (Six) Hours As Needed for Wheezing or Shortness of Air., Disp: 360 mL, Rfl: 0  •  lactulose (CHRONULAC) 10 GM/15ML solution, Take 45 mL by mouth 3 (Three) Times a Day., Disp: 1892 mL, Rfl: 0  •  linaclotide (LINZESS) 290 MCG capsule capsule, Take 1 capsule by mouth Daily., Disp: 30 capsule, Rfl: 0  •  magnesium oxide (MAGOX) 400 (241.3 Mg) MG tablet tablet, Take 400 mg by mouth Every Evening., Disp: , Rfl:   •  Melatonin 10 MG tablet, Take 1 tablet by mouth Every Night., Disp: , Rfl:   •  Multivitamin tablet tablet, , Disp: , Rfl:   •  OLANZapine (zyPREXA) 10 MG tablet, Take 1 tablet by mouth Every Night., Disp: , Rfl:   •  ondansetron (Zofran) 4 MG tablet, Take 1 tablet by mouth Every 8 (Eight) Hours As Needed for Nausea or Vomiting for up to 30 doses.,  Disp: 30 tablet, Rfl: 0  •  oxyCODONE 7.5 MG tablet, Take 1 tablet by mouth Every 4 (Four) Hours As Needed for Moderate Pain ., Disp: 18 tablet, Rfl: 0  •  pantoprazole (PROTONIX) 40 MG EC tablet, Take 1 tablet by mouth Daily., Disp: 90 tablet, Rfl: 3  •  potassium chloride (K-DUR,KLOR-CON) 20 MEQ CR tablet, Take 1 tablet by mouth Daily., Disp: , Rfl:   •  rosuvastatin (CRESTOR) 20 MG tablet, Take 1 tablet by mouth Every Night., Disp: 90 tablet, Rfl: 3  •  spironolactone (ALDACTONE) 100 MG tablet, Take 1 tablet by mouth 3 (Three) Times a Day., Disp: , Rfl:   •  tamsulosin (FLOMAX) 0.4 MG capsule 24 hr capsule, Take 1 capsule by mouth Daily., Disp: 30 capsule, Rfl: 0  •  thiamine (thiamine) 100 MG tablet tablet, 100 mg., Disp: , Rfl:   •  ursodiol (ACTIGALL) 300 MG capsule, Take 2 capsules by mouth 2 (Two) Times a Day., Disp: 120 capsule, Rfl: 5  •  vitamin B-6 (PYRIDOXINE) 25 MG tablet, Take 25 mg by mouth Daily., Disp: , Rfl:   •  Xifaxan 550 MG tablet, Take 1 tablet by mouth Every 12 (Twelve) Hours., Disp: 180 tablet, Rfl: 3  •  FLUoxetine (PROzac) 40 MG capsule, Take 1 capsule by mouth Daily for 30 days., Disp: 30 capsule, Rfl: 0  •  potassium chloride ER (K-TAB) 20 MEQ tablet controlled-release ER tablet, , Disp: , Rfl:   •  Prucalopride Succinate (Motegrity) 2 MG tablet, Take 1 tablet by mouth Daily., Disp: 90 tablet, Rfl: 3  •  ranolazine (RANEXA) 1000 MG 12 hr tablet, Take 1 tablet by mouth 2 (Two) Times a Day for 30 days., Disp: 60 tablet, Rfl: 0  •  traZODone (DESYREL) 50 MG tablet, Take 1 tablet by mouth Every Night for 30 days., Disp: 30 tablet, Rfl: 0    ALLERGIES  Allergies   Allergen Reactions   • Contrast Dye Anaphylaxis     6/18 Pt coded after receiving contrast for a CT scan. Was premedicated. Was having an MI at the same time. Immediately underwent heart cath with contrast without additional allergic reaction  7/15 Pt premedicated with prednisone/Benadryl for heart cath. Still with  "anaphylactic-like reaction with drop in BP and hypoxia. Treated 95% stenosis with minimal dye and supported with epinephrine, solumedrol, NRB   • Haloperidol Hallucinations   • Nsaids GI Bleeding     Other reaction(s): Bleeding, VOMITING   • Tylenol [Acetaminophen] Other (See Comments)     CIRRHOSIS       FAMILY HISTORY:  Family History   Problem Relation Age of Onset   • Diabetes type II Mother    • Heart disease Father    • Colon cancer Neg Hx    • Colon polyps Neg Hx        SOCIAL HISTORY  Social History     Socioeconomic History   • Marital status:    Tobacco Use   • Smoking status: Former Smoker     Packs/day: 0.50     Types: Electronic Cigarette, Cigarettes     Quit date: 2020     Years since quittin.6   • Smokeless tobacco: Never Used   • Tobacco comment:  ppd    Vaping Use   • Vaping Use: Former   Substance and Sexual Activity   • Alcohol use: Not Currently   • Drug use: No   • Sexual activity: Defer         PHYSICAL EXAM   /94 (BP Location: Left arm, Patient Position: Sitting, Cuff Size: Adult)   Pulse 107   Temp 97.3 °F (36.3 °C) (Temporal)   Ht 160 cm (63\")   Wt 94.6 kg (208 lb 9.6 oz)   BMI 36.95 kg/m²   Physical Exam  Constitutional:       Appearance: Normal appearance.   HENT:      Head: Normocephalic and atraumatic.   Pulmonary:      Effort: Pulmonary effort is normal.   Abdominal:      Tenderness: There is abdominal tenderness in the right upper quadrant and right lower quadrant.   Neurological:      Mental Status: She is alert and oriented to person, place, and time.   Psychiatric:         Mood and Affect: Mood normal.         Thought Content: Thought content normal.         ASSESSMENT / PLAN  1. Chronic idiopathic constipaiton  -Continue Linzess 290 mcg daily  Continue lactulose (diagnosis cirrhosis with hepatic encephalopathy)  -Begin Motegrity 2 mg daily    Return in about 6 months (around 2022) for cirrhosis management.    I discussed the patients findings and " my recommendations with patient    Nelson Yip, APRN

## 2022-01-04 ENCOUNTER — DOCUMENTATION (OUTPATIENT)
Dept: GASTROENTEROLOGY | Facility: CLINIC | Age: 47
End: 2022-01-04

## 2022-01-04 ENCOUNTER — TELEPHONE (OUTPATIENT)
Dept: GASTROENTEROLOGY | Facility: CLINIC | Age: 47
End: 2022-01-04

## 2022-01-04 RX ORDER — LUBIPROSTONE 24 UG/1
24 CAPSULE ORAL 2 TIMES DAILY WITH MEALS
Qty: 60 CAPSULE | Refills: 11 | Status: SHIPPED | OUTPATIENT
Start: 2022-01-04 | End: 2022-03-13 | Stop reason: HOSPADM

## 2022-01-04 NOTE — PROGRESS NOTES
Motegrity denied-requires therapeutic failure or intolerance to to preferred agents: Amitiza, Linzess, Movantik.  Patient has had therapeutic failure to Linzess when treated only with Linzess.  Had much improvement with the addition of Motegrity.  Will change Motegrity to Amitiza.

## 2022-01-04 NOTE — TELEPHONE ENCOUNTER
I called patient and left voice message that her medication (Motegrity) was denied by insurance and a new medication (Amitiza) has been sent to her pharmacist.

## 2022-01-12 ENCOUNTER — OFFICE VISIT (OUTPATIENT)
Dept: NEUROLOGY | Facility: CLINIC | Age: 47
End: 2022-01-12

## 2022-01-12 DIAGNOSIS — M54.9 BACK PAIN, UNSPECIFIED BACK LOCATION, UNSPECIFIED BACK PAIN LATERALITY, UNSPECIFIED CHRONICITY: ICD-10-CM

## 2022-01-12 DIAGNOSIS — R41.3 MEMORY LOSS: Primary | ICD-10-CM

## 2022-01-12 DIAGNOSIS — G62.9 PERIPHERAL POLYNEUROPATHY: ICD-10-CM

## 2022-01-12 PROCEDURE — 99214 OFFICE O/P EST MOD 30 MIN: CPT | Performed by: PHYSICIAN ASSISTANT

## 2022-01-12 RX ORDER — GABAPENTIN 300 MG/1
300 CAPSULE ORAL 3 TIMES DAILY
Qty: 90 CAPSULE | Refills: 5 | Status: SHIPPED | OUTPATIENT
Start: 2022-01-12 | End: 2022-04-13

## 2022-01-12 NOTE — PATIENT INSTRUCTIONS
Peripheral Neuropathy  Peripheral neuropathy is a type of nerve damage. It affects nerves that carry signals between the spinal cord and the arms, legs, and the rest of the body (peripheral nerves). It does not affect nerves in the spinal cord or brain. In peripheral neuropathy, one nerve or a group of nerves may be damaged. Peripheral neuropathy is a broad category that includes many specific nerve disorders, like diabetic neuropathy, hereditary neuropathy, and carpal tunnel syndrome.  What are the causes?  This condition may be caused by:  · Diabetes. This is the most common cause of peripheral neuropathy.  · Nerve injury.  · Pressure or stress on a nerve that lasts a long time.  · Lack (deficiency) of B vitamins. This can result from alcoholism, poor diet, or a restricted diet.  · Infections.  · Autoimmune diseases, such as rheumatoid arthritis and systemic lupus erythematosus.  · Nerve diseases that are passed from parent to child (inherited).  · Some medicines, such as cancer medicines (chemotherapy).  · Poisonous (toxic) substances, such as lead and mercury.  · Too little blood flowing to the legs.  · Kidney disease.  · Thyroid disease.  In some cases, the cause of this condition is not known.  What are the signs or symptoms?  Symptoms of this condition depend on which of your nerves is damaged. Common symptoms include:  · Loss of feeling (numbness) in the feet, hands, or both.  · Tingling in the feet, hands, or both.  · Burning pain.  · Very sensitive skin.  · Weakness.  · Not being able to move a part of the body (paralysis).  · Muscle twitching.  · Clumsiness or poor coordination.  · Loss of balance.  · Not being able to control your bladder.  · Feeling dizzy.  · Sexual problems.  How is this diagnosed?  Diagnosing and finding the cause of peripheral neuropathy can be difficult. Your health care provider will take your medical history and do a physical exam. A neurological exam will also be done. This  involves checking things that are affected by your brain, spinal cord, and nerves (nervous system). For example, your health care provider will check your reflexes, how you move, and what you can feel.  You may have other tests, such as:  · Blood tests.  · Electromyogram (EMG) and nerve conduction tests. These tests check nerve function and how well the nerves are controlling the muscles.  · Imaging tests, such as CT scans or MRI to rule out other causes of your symptoms.  · Removing a small piece of nerve to be examined in a lab (nerve biopsy). This is rare.  · Removing and examining a small amount of the fluid that surrounds the brain and spinal cord (lumbar puncture). This is rare.  How is this treated?  Treatment for this condition may involve:  · Treating the underlying cause of the neuropathy, such as diabetes, kidney disease, or vitamin deficiencies.  · Stopping medicines that can cause neuropathy, such as chemotherapy.  · Medicine to relieve pain. Medicines may include:  ? Prescription or over-the-counter pain medicine.  ? Antiseizure medicine.  ? Antidepressants.  ? Pain-relieving patches that are applied to painful areas of skin.  · Surgery to relieve pressure on a nerve or to destroy a nerve that is causing pain.  · Physical therapy to help improve movement and balance.  · Devices to help you move around (assistive devices).  Follow these instructions at home:  Medicines  · Take over-the-counter and prescription medicines only as told by your health care provider. Do not take any other medicines without first asking your health care provider.  · Do not drive or use heavy machinery while taking prescription pain medicine.  Lifestyle    · Do not use any products that contain nicotine or tobacco, such as cigarettes and e-cigarettes. Smoking keeps blood from reaching damaged nerves. If you need help quitting, ask your health care provider.  · Avoid or limit alcohol. Too much alcohol can cause a vitamin B  deficiency, and vitamin B is needed for healthy nerves.  · Eat a healthy diet. This includes:  ? Eating foods that are high in fiber, such as fresh fruits and vegetables, whole grains, and beans.  ? Limiting foods that are high in fat and processed sugars, such as fried or sweet foods.    General instructions    · If you have diabetes, work closely with your health care provider to keep your blood sugar under control.  · If you have numbness in your feet:  ? Check every day for signs of injury or infection. Watch for redness, warmth, and swelling.  ? Wear padded socks and comfortable shoes. These help protect your feet.  · Develop a good support system. Living with peripheral neuropathy can be stressful. Consider talking with a mental health specialist or joining a support group.  · Use assistive devices and attend physical therapy as told by your health care provider. This may include using a walker or a cane.  · Keep all follow-up visits as told by your health care provider. This is important.    Contact a health care provider if:  · You have new signs or symptoms of peripheral neuropathy.  · You are struggling emotionally from dealing with peripheral neuropathy.  · Your pain is not well-controlled.  Get help right away if:  · You have an injury or infection that is not healing normally.  · You develop new weakness in an arm or leg.  · You fall frequently.  Summary  · Peripheral neuropathy is when the nerves in the arms, or legs are damaged, resulting in numbness, weakness, or pain.  · There are many causes of peripheral neuropathy, including diabetes, pinched nerves, vitamin deficiencies, autoimmune disease, and hereditary conditions.  · Diagnosing and finding the cause of peripheral neuropathy can be difficult. Your health care provider will take your medical history, do a physical exam, and do tests, including blood tests and nerve function tests.  · Treatment involves treating the underlying cause of the  neuropathy and taking medicines to help control pain. Physical therapy and assistive devices may also help.  This information is not intended to replace advice given to you by your health care provider. Make sure you discuss any questions you have with your health care provider.  Document Revised: 11/30/2018 Document Reviewed: 02/26/2018  [employer] Patient Education © 2021 [employer]        Neuropsychological Examination  A neuropsychological examination is a series of standardized tests that measure a person's knowledge, thinking, language, behaviors, and memory. The exam may be done with people who have had a brain injury or trauma, or who have a disease or condition that affects the brain. The results will help your health care provider diagnose any problems and create a treatment or rehabilitation plan.  There are several areas, or modules, to the exam. A trained examiner will administer the exam and explain how to complete each module.  Tell a health care provider about:  · All medicines you are taking, including vitamins, herbs, eye drops, creams, and over-the-counter medicines.  · Any medical conditions you have, including recent injuries.  · Any other concerns or behaviors that might affect your ability to take the exam.  · Any surgeries you have had.  What happens before the procedure?    · Ask your health care provider about:  ? Changing or stopping your regular medicines. This is especially important if you are taking any medicines that affect your memory or ability to concentrate.  ? Taking over-the-counter medicines, vitamins, herbs, and supplements.  ? Bringing a list of current medicines to the exam, and whether any of the medicines will affect exam results.  · Ask a family member or friend to come to the test if you have trouble remembering family history or any other details of your condition.  · Prepare any records of previous neurological testing, including CT or MRI scans, to bring to the  exam.  · Get a good night's rest before the exam.  · Eat a nutritious meal prior to the exam.  · Do not drink alcohol for 24 hours before the exam.  · Let the examiner know about any other concerns or behaviors that might affect your ability to take the exam.  What happens during the procedure?  · You will arrive at an outpatient clinic or a hospital.  · You will sit at a table for most of the exam.  · You will answer questions, make choices, read and write, and complete simple tasks. You might also use a computer-guided lesson.  · Your examiner will give you the tests that are most closely related to your disease or symptoms. The exam may take several hours, depending on the areas being tested.  · You will be given breaks when needed. The exam may be split into more than one session.  The procedure may vary among health care providers and hospitals.  What happens after the procedure?  Ask your health care provider, or the department that is doing the test:  · When will my results be ready?  · How will I get my results?  · What are my treatment options?  · What other tests do I need?  · What are my next steps?  You may go home after the exam.  Summary  · A neuropsychological examination is a series of standardized tests that measure a person's knowledge, thinking, language, behaviors, and memory. The exam may be done with people who have had a brain injury or trauma, or who have a disease or condition that affects the brain.  · The results will help your health care provider diagnose any problems and create a treatment or rehabilitation plan.  · Before the test, be sure to talk to your health care provider about any medicines you are taking, especially medicines that may affect your memory or ability to concentrate.  · Talk with your health care provider about what your results mean.  This information is not intended to replace advice given to you by your health care provider. Make sure you discuss any questions you  have with your health care provider.  Document Revised: 01/03/2019 Document Reviewed: 01/03/2019  [employer] Patient Education © 2021 [employer]

## 2022-01-12 NOTE — PROGRESS NOTES
Subjective       Chief Complaint: memory loss      History of Present Illness   Timothy Guzman is a 46 y.o. female with past medical history significant for liver cirrhosis, esophageal varices, pancreatitis, and hypertension, who comes to clinic today for evaluation of memory loss. She initially noted symptoms since in 6/21 when she was hospitalized at [facility] for a STEMI. While receiving IV contrast in the ED, she developed PEA arrest for several minutes. Since this time, she has noted forgetfulness  and word-finding difficulties. This has remained static over time. Additional symptoms have included impairments in short term memory. There have been associated  symptoms of depression . She denies impairments in ADL's. She manages her medications and finances. She is currently residing with family.      She also reports significant back pain as well as constant burning, numbness, and tingling in her feet bilaterally. This has worsened over time.      She has a history of alcohol abuse, though notes that she has been sober since 2019.     She has worked SLP for cognitive rehabilitation and PT/OT for her back pain.     Prior evaluation has included screening blood work  and an MRI of the brain as well as an EEG which were unremarkable . An MRI of the lumbar spine showed a small synovial cyst posteriorly on the right creating moderate central spinal canal stenosis and mass effect posteriorly on the rightward aspect of the thecal sac. A subsequent EMG showed a mild axonal sensorimotor neuropathy without any evidence of a radiculopathy.    Today: Since her last visit in 9/21, she feels that her memory has somewhat worsened. She notes intermittent episodes of confusion. Her lower extremity pain is worse. GBP 300mg nightly is somewhat beneficial.       I have reviewed and confirmed the past family, social and medical history as accurate on 1/12/22.     Review of Systems   Constitutional: Negative.    HENT: Negative.    Eyes:  Negative.    Respiratory: Negative.    Cardiovascular: Negative.    Gastrointestinal: Negative.    Endocrine: Negative.    Genitourinary: Negative.    Musculoskeletal: Negative.    Skin: Negative.    Allergic/Immunologic: Negative.    Hematological: Negative.        Objective      General appearance today is normal.         Physical Exam  Neurological:      Coordination: Finger-Nose-Finger Test normal.      Gait: Gait is intact.   Psychiatric:         Speech: Speech normal.          Neurologic Exam     Mental Status   Oriented to person.   Oriented to place.   Oriented to time. Disoriented to date and day.   Registration: recalls 3 of 3 objects. Recall at 5 minutes: recalls 3 of 3 objects. Follows 2 step commands.   Attention: normal.   Speech: speech is normal   Level of consciousness: alert  Able to name object. Able to read. Able to repeat. Able to write. Normal comprehension.     Cranial Nerves   Cranial nerves II through XII intact.     Motor Exam   Muscle bulk: normal  Overall muscle tone: normal    Strength   Strength 5/5 except as noted. Mild weakness on left      Sensory Exam   decreased sensation to light touch on left      Gait, Coordination, and Reflexes     Gait  Gait: normal    Coordination   Finger to nose coordination: normal    Tremor   Resting tremor: absent        Results  MMSE=26      Assessment/Plan   Diagnoses and all orders for this visit:    1. Memory loss (Primary)  -     Ambulatory Referral to Neuropsychology    2. Back pain, unspecified back location, unspecified back pain laterality, unspecified chronicity  -     gabapentin (NEURONTIN) 300 MG capsule; Take 1 capsule by mouth 3 (Three) Times a Day.  Dispense: 90 capsule; Refill: 5    3. Peripheral polyneuropathy          Discussion/Summary   Timothy Guzman returns to clinic today for evaluation of cognitive impairment, likely related to hypoxic injury and her history of alcoholic cirrhosis as well as peripheral neuropathy. I again reviewed  her current status and treatment options. It was elected to obtain neuropsychological testing . After discussing potential treatment options, it was elected to increase her GBP to 300mg TID. She will then follow up in 3-4 months , or sooner if needed.   Total time of visit today: 30 minutes. As part of this visit I reviewed prior lab results, reviewed radiology results and reviewed radiology images. I also discussed diagnosis, prognosis, diagnostic testing, evaluation, current status, treatment options and management as discussed above.     Current outpatient and discharge medications have been reconciled for the patient.  Reviewed by: SONIA Brooks PA-C

## 2022-01-14 ENCOUNTER — TELEPHONE (OUTPATIENT)
Dept: GASTROENTEROLOGY | Facility: CLINIC | Age: 47
End: 2022-01-14

## 2022-01-14 NOTE — TELEPHONE ENCOUNTER
I returned patients phone call regarding vomiting and lethargy. Patient stated she felt better and had a bowel movement last night. I advised her that [facility] was out of office for the day and if she felt confused and had any more symptoms to go to the ER. Patient verbalized understanding and had no further questions.

## 2022-01-17 ENCOUNTER — TELEPHONE (OUTPATIENT)
Dept: GASTROENTEROLOGY | Facility: CLINIC | Age: 47
End: 2022-01-17

## 2022-01-17 NOTE — TELEPHONE ENCOUNTER
----- Message from ISSAC Ashraf sent at 1/17/2022  6:52 AM EST -----  Please call and check on her, this message was sent to me Friday after I left and I am just seeing it this morning-Monday.  Hopefully, she went to the emergency room if she continued to feel bad over the weekend.  She can always take more lactulose to ensure she is having 2-3 bowel movements daily, this will help with keeping her ammonia level down.  ----- Message -----  From: Erin Quintana LPN  Sent: 1/14/2022   2:59 PM EST  To: ISSAC Ashraf    Patient states she feels like her ammonia levels are up. She has been vomiting and was lethargic just wants to know what she should do? She said it has been about five hours since she last vomited. She did have a regular bowel movement in the night. She states she took more Lactalose than prescribed prior to the bowel movement.

## 2022-01-17 NOTE — TELEPHONE ENCOUNTER
I called patient to check on her. Patient verbalized she felt better. I advised patient she could increase the Lactalose and if she had any symptoms of confusion to go to the nearest ER per [facility]. Patient also given options for appointments with [facility] today but declined.

## 2022-01-29 ENCOUNTER — APPOINTMENT (OUTPATIENT)
Dept: CT IMAGING | Facility: HOSPITAL | Age: 47
End: 2022-01-29

## 2022-01-29 ENCOUNTER — HOSPITAL ENCOUNTER (INPATIENT)
Facility: HOSPITAL | Age: 47
LOS: 4 days | Discharge: HOME | End: 2022-02-02
Attending: EMERGENCY MEDICINE | Admitting: INTERNAL MEDICINE

## 2022-01-29 DIAGNOSIS — K70.30 ALCOHOLIC CIRRHOSIS, UNSPECIFIED WHETHER ASCITES PRESENT: ICD-10-CM

## 2022-01-29 DIAGNOSIS — A41.9 SEPSIS, DUE TO UNSPECIFIED ORGANISM, UNSPECIFIED WHETHER ACUTE ORGAN DYSFUNCTION PRESENT: Primary | ICD-10-CM

## 2022-01-29 DIAGNOSIS — I85.01 BLEEDING ESOPHAGEAL VARICES, UNSPECIFIED ESOPHAGEAL VARICES TYPE: ICD-10-CM

## 2022-01-29 DIAGNOSIS — N17.9 AKI (ACUTE KIDNEY INJURY): ICD-10-CM

## 2022-01-29 PROBLEM — R65.10 SIRS (SYSTEMIC INFLAMMATORY RESPONSE SYNDROME) (HCC): Status: ACTIVE | Noted: 2022-01-29

## 2022-01-29 PROBLEM — D72.829 LEUKOCYTOSIS: Status: ACTIVE | Noted: 2022-01-29

## 2022-01-29 PROBLEM — E87.1 HYPONATREMIA: Status: ACTIVE | Noted: 2022-01-29

## 2022-01-29 LAB
ABO GROUP BLD: NORMAL
ALBUMIN SERPL-MCNC: 4.5 G/DL (ref 3.5–5.2)
ALBUMIN/GLOB SERPL: 1.5 G/DL
ALP SERPL-CCNC: 103 U/L (ref 39–117)
ALT SERPL W P-5'-P-CCNC: 17 U/L (ref 1–33)
AMMONIA BLD-SCNC: 28 UMOL/L (ref 11–51)
AMPHET+METHAMPHET UR QL: NEGATIVE
AMPHETAMINES UR QL: NEGATIVE
ANION GAP SERPL CALCULATED.3IONS-SCNC: 15 MMOL/L (ref 5–15)
AST SERPL-CCNC: 16 U/L (ref 1–32)
BACTERIA UR QL AUTO: ABNORMAL /HPF
BARBITURATES UR QL SCN: NEGATIVE
BASOPHILS # BLD AUTO: 0.06 10*3/MM3 (ref 0–0.2)
BASOPHILS NFR BLD AUTO: 0.2 % (ref 0–1.5)
BENZODIAZ UR QL SCN: NEGATIVE
BILIRUB SERPL-MCNC: 0.4 MG/DL (ref 0–1.2)
BILIRUB UR QL STRIP: ABNORMAL
BLD GP AB SCN SERPL QL: NEGATIVE
BUN SERPL-MCNC: 28 MG/DL (ref 6–20)
BUN/CREAT SERPL: 13.1 (ref 7–25)
BUPRENORPHINE SERPL-MCNC: NEGATIVE NG/ML
CALCIUM SPEC-SCNC: 8.9 MG/DL (ref 8.6–10.5)
CANNABINOIDS SERPL QL: NEGATIVE
CHLORIDE SERPL-SCNC: 91 MMOL/L (ref 98–107)
CLARITY UR: ABNORMAL
CO2 SERPL-SCNC: 27 MMOL/L (ref 22–29)
COCAINE UR QL: NEGATIVE
COLOR UR: ABNORMAL
CREAT SERPL-MCNC: 2.13 MG/DL (ref 0.57–1)
D-LACTATE SERPL-SCNC: 4 MMOL/L (ref 0.5–2)
D-LACTATE SERPL-SCNC: 4.4 MMOL/L (ref 0.5–2)
DEPRECATED RDW RBC AUTO: 42.7 FL (ref 37–54)
EOSINOPHIL # BLD AUTO: 0.01 10*3/MM3 (ref 0–0.4)
EOSINOPHIL NFR BLD AUTO: 0 % (ref 0.3–6.2)
ERYTHROCYTE [DISTWIDTH] IN BLOOD BY AUTOMATED COUNT: 13.2 % (ref 12.3–15.4)
FLUAV SUBTYP SPEC NAA+PROBE: NOT DETECTED
FLUBV RNA ISLT QL NAA+PROBE: NOT DETECTED
GFR SERPL CREATININE-BSD FRML MDRD: 25 ML/MIN/1.73
GLOBULIN UR ELPH-MCNC: 3 GM/DL
GLUCOSE SERPL-MCNC: 115 MG/DL (ref 65–99)
GLUCOSE UR STRIP-MCNC: NEGATIVE MG/DL
HCT VFR BLD AUTO: 54.1 % (ref 34–46.6)
HGB BLD-MCNC: 18.6 G/DL (ref 12–15.9)
HGB UR QL STRIP.AUTO: NEGATIVE
HOLD SPECIMEN: NORMAL
HYALINE CASTS UR QL AUTO: ABNORMAL /LPF
IMM GRANULOCYTES # BLD AUTO: 0.23 10*3/MM3 (ref 0–0.05)
IMM GRANULOCYTES NFR BLD AUTO: 0.9 % (ref 0–0.5)
INR PPP: 1.12 (ref 0.85–1.16)
KETONES UR QL STRIP: ABNORMAL
LEUKOCYTE ESTERASE UR QL STRIP.AUTO: ABNORMAL
LIPASE SERPL-CCNC: 20 U/L (ref 13–60)
LYMPHOCYTES # BLD AUTO: 2.76 10*3/MM3 (ref 0.7–3.1)
LYMPHOCYTES NFR BLD AUTO: 11.2 % (ref 19.6–45.3)
MCH RBC QN AUTO: 30.7 PG (ref 26.6–33)
MCHC RBC AUTO-ENTMCNC: 34.4 G/DL (ref 31.5–35.7)
MCV RBC AUTO: 89.4 FL (ref 79–97)
METHADONE UR QL SCN: NEGATIVE
MONOCYTES # BLD AUTO: 1.72 10*3/MM3 (ref 0.1–0.9)
MONOCYTES NFR BLD AUTO: 7 % (ref 5–12)
NEUTROPHILS NFR BLD AUTO: 19.88 10*3/MM3 (ref 1.7–7)
NEUTROPHILS NFR BLD AUTO: 80.7 % (ref 42.7–76)
NITRITE UR QL STRIP: NEGATIVE
NRBC BLD AUTO-RTO: 0 /100 WBC (ref 0–0.2)
OPIATES UR QL: POSITIVE
OXYCODONE UR QL SCN: NEGATIVE
PCP UR QL SCN: NEGATIVE
PH UR STRIP.AUTO: <=5 [PH] (ref 5–8)
PLATELET # BLD AUTO: 271 10*3/MM3 (ref 140–450)
PMV BLD AUTO: 10.2 FL (ref 6–12)
POTASSIUM SERPL-SCNC: 3.9 MMOL/L (ref 3.5–5.2)
PROCALCITONIN SERPL-MCNC: 0.15 NG/ML (ref 0–0.25)
PROPOXYPH UR QL: NEGATIVE
PROT SERPL-MCNC: 7.5 G/DL (ref 6–8.5)
PROT UR QL STRIP: ABNORMAL
PROTHROMBIN TIME: 14.1 SECONDS (ref 11.4–14.4)
RBC # BLD AUTO: 6.05 10*6/MM3 (ref 3.77–5.28)
RBC # UR STRIP: ABNORMAL /HPF
REF LAB TEST METHOD: ABNORMAL
RH BLD: POSITIVE
SARS-COV-2 RNA PNL SPEC NAA+PROBE: NOT DETECTED
SODIUM SERPL-SCNC: 133 MMOL/L (ref 136–145)
SP GR UR STRIP: 1.02 (ref 1–1.03)
SQUAMOUS #/AREA URNS HPF: ABNORMAL /HPF
T&S EXPIRATION DATE: NORMAL
TRICYCLICS UR QL SCN: NEGATIVE
TROPONIN T SERPL-MCNC: <0.01 NG/ML (ref 0–0.03)
UROBILINOGEN UR QL STRIP: ABNORMAL
WBC # UR STRIP: ABNORMAL /HPF
WBC NRBC COR # BLD: 24.66 10*3/MM3 (ref 3.4–10.8)
WHOLE BLOOD HOLD SPECIMEN: NORMAL
WHOLE BLOOD HOLD SPECIMEN: NORMAL

## 2022-01-29 PROCEDURE — 83605 ASSAY OF LACTIC ACID: CPT | Performed by: NURSE PRACTITIONER

## 2022-01-29 PROCEDURE — 25010000002 MORPHINE PER 10 MG: Performed by: EMERGENCY MEDICINE

## 2022-01-29 PROCEDURE — 25010000002 HYDROMORPHONE 1 MG/ML SOLUTION: Performed by: EMERGENCY MEDICINE

## 2022-01-29 PROCEDURE — 93005 ELECTROCARDIOGRAM TRACING: CPT | Performed by: NURSE PRACTITIONER

## 2022-01-29 PROCEDURE — 87636 SARSCOV2 & INF A&B AMP PRB: CPT | Performed by: NURSE PRACTITIONER

## 2022-01-29 PROCEDURE — 85025 COMPLETE CBC W/AUTO DIFF WBC: CPT | Performed by: EMERGENCY MEDICINE

## 2022-01-29 PROCEDURE — 25010000002 ONDANSETRON PER 1 MG: Performed by: EMERGENCY MEDICINE

## 2022-01-29 PROCEDURE — 80053 COMPREHEN METABOLIC PANEL: CPT | Performed by: EMERGENCY MEDICINE

## 2022-01-29 PROCEDURE — 84484 ASSAY OF TROPONIN QUANT: CPT | Performed by: NURSE PRACTITIONER

## 2022-01-29 PROCEDURE — 74176 CT ABD & PELVIS W/O CONTRAST: CPT

## 2022-01-29 PROCEDURE — 25010000002 OCTREOTIDE PER 25 MCG: Performed by: EMERGENCY MEDICINE

## 2022-01-29 PROCEDURE — 25010000002 HYDROMORPHONE PER 4 MG: Performed by: EMERGENCY MEDICINE

## 2022-01-29 PROCEDURE — 99223 1ST HOSP IP/OBS HIGH 75: CPT | Performed by: HOSPITALIST

## 2022-01-29 PROCEDURE — 86850 RBC ANTIBODY SCREEN: CPT | Performed by: EMERGENCY MEDICINE

## 2022-01-29 PROCEDURE — 80306 DRUG TEST PRSMV INSTRMNT: CPT | Performed by: NURSE PRACTITIONER

## 2022-01-29 PROCEDURE — 81001 URINALYSIS AUTO W/SCOPE: CPT | Performed by: NURSE PRACTITIONER

## 2022-01-29 PROCEDURE — 87040 BLOOD CULTURE FOR BACTERIA: CPT | Performed by: NURSE PRACTITIONER

## 2022-01-29 PROCEDURE — 82140 ASSAY OF AMMONIA: CPT | Performed by: NURSE PRACTITIONER

## 2022-01-29 PROCEDURE — 86901 BLOOD TYPING SEROLOGIC RH(D): CPT | Performed by: EMERGENCY MEDICINE

## 2022-01-29 PROCEDURE — 99284 EMERGENCY DEPT VISIT MOD MDM: CPT

## 2022-01-29 PROCEDURE — 25010000002 HYDROMORPHONE PER 4 MG: Performed by: NURSE PRACTITIONER

## 2022-01-29 PROCEDURE — 83690 ASSAY OF LIPASE: CPT | Performed by: NURSE PRACTITIONER

## 2022-01-29 PROCEDURE — 86900 BLOOD TYPING SEROLOGIC ABO: CPT | Performed by: EMERGENCY MEDICINE

## 2022-01-29 PROCEDURE — 85610 PROTHROMBIN TIME: CPT | Performed by: NURSE PRACTITIONER

## 2022-01-29 PROCEDURE — 84145 PROCALCITONIN (PCT): CPT | Performed by: NURSE PRACTITIONER

## 2022-01-29 PROCEDURE — 36415 COLL VENOUS BLD VENIPUNCTURE: CPT

## 2022-01-29 RX ORDER — SODIUM CHLORIDE 0.9 % (FLUSH) 0.9 %
10 SYRINGE (ML) INJECTION EVERY 12 HOURS SCHEDULED
Status: DISCONTINUED | OUTPATIENT
Start: 2022-01-29 | End: 2022-02-02 | Stop reason: HOSPADM

## 2022-01-29 RX ORDER — ONDANSETRON 4 MG/1
4 TABLET, FILM COATED ORAL EVERY 8 HOURS PRN
Status: DISCONTINUED | OUTPATIENT
Start: 2022-01-29 | End: 2022-01-31

## 2022-01-29 RX ORDER — OLANZAPINE 5 MG/1
10 TABLET ORAL NIGHTLY
Status: DISCONTINUED | OUTPATIENT
Start: 2022-01-29 | End: 2022-02-02 | Stop reason: HOSPADM

## 2022-01-29 RX ORDER — CLOPIDOGREL BISULFATE 75 MG/1
75 TABLET ORAL DAILY
Status: DISCONTINUED | OUTPATIENT
Start: 2022-01-30 | End: 2022-02-02 | Stop reason: HOSPADM

## 2022-01-29 RX ORDER — ONDANSETRON 2 MG/ML
4 INJECTION INTRAMUSCULAR; INTRAVENOUS ONCE
Status: COMPLETED | OUTPATIENT
Start: 2022-01-29 | End: 2022-01-29

## 2022-01-29 RX ORDER — DOCUSATE SODIUM 100 MG/1
100 CAPSULE, LIQUID FILLED ORAL 2 TIMES DAILY
Status: DISCONTINUED | OUTPATIENT
Start: 2022-01-29 | End: 2022-02-02 | Stop reason: HOSPADM

## 2022-01-29 RX ORDER — HYDROMORPHONE HYDROCHLORIDE 1 MG/ML
0.5 INJECTION, SOLUTION INTRAMUSCULAR; INTRAVENOUS; SUBCUTANEOUS ONCE
Status: COMPLETED | OUTPATIENT
Start: 2022-01-29 | End: 2022-01-29

## 2022-01-29 RX ORDER — HYDROMORPHONE HYDROCHLORIDE 1 MG/ML
0.25 INJECTION, SOLUTION INTRAMUSCULAR; INTRAVENOUS; SUBCUTANEOUS
Status: DISCONTINUED | OUTPATIENT
Start: 2022-01-29 | End: 2022-01-30

## 2022-01-29 RX ORDER — LUBIPROSTONE 24 UG/1
24 CAPSULE ORAL 2 TIMES DAILY WITH MEALS
Status: DISCONTINUED | OUTPATIENT
Start: 2022-01-29 | End: 2022-02-02 | Stop reason: HOSPADM

## 2022-01-29 RX ORDER — OCTREOTIDE ACETATE 50 UG/ML
50 INJECTION, SOLUTION INTRAVENOUS; SUBCUTANEOUS ONCE
Status: COMPLETED | OUTPATIENT
Start: 2022-01-29 | End: 2022-01-29

## 2022-01-29 RX ORDER — SODIUM CHLORIDE 0.9 % (FLUSH) 0.9 %
10 SYRINGE (ML) INJECTION AS NEEDED
Status: DISCONTINUED | OUTPATIENT
Start: 2022-01-29 | End: 2022-02-02 | Stop reason: HOSPADM

## 2022-01-29 RX ORDER — RANOLAZINE 500 MG/1
1000 TABLET, EXTENDED RELEASE ORAL 2 TIMES DAILY
Status: DISCONTINUED | OUTPATIENT
Start: 2022-01-29 | End: 2022-02-02 | Stop reason: HOSPADM

## 2022-01-29 RX ORDER — GABAPENTIN 300 MG/1
300 CAPSULE ORAL 3 TIMES DAILY
Status: DISCONTINUED | OUTPATIENT
Start: 2022-01-29 | End: 2022-02-02 | Stop reason: HOSPADM

## 2022-01-29 RX ORDER — CHOLECALCIFEROL (VITAMIN D3) 125 MCG
10 CAPSULE ORAL NIGHTLY
Status: DISCONTINUED | OUTPATIENT
Start: 2022-01-29 | End: 2022-02-02 | Stop reason: HOSPADM

## 2022-01-29 RX ORDER — URSODIOL 300 MG/1
600 CAPSULE ORAL 2 TIMES DAILY
Status: DISCONTINUED | OUTPATIENT
Start: 2022-01-29 | End: 2022-02-02 | Stop reason: HOSPADM

## 2022-01-29 RX ORDER — NALOXONE HCL 0.4 MG/ML
0.4 VIAL (ML) INJECTION
Status: DISCONTINUED | OUTPATIENT
Start: 2022-01-29 | End: 2022-01-30

## 2022-01-29 RX ORDER — FLUOXETINE HYDROCHLORIDE 20 MG/1
40 CAPSULE ORAL DAILY
Status: DISCONTINUED | OUTPATIENT
Start: 2022-01-30 | End: 2022-02-02 | Stop reason: HOSPADM

## 2022-01-29 RX ORDER — TAMSULOSIN HYDROCHLORIDE 0.4 MG/1
0.4 CAPSULE ORAL NIGHTLY
Status: DISCONTINUED | OUTPATIENT
Start: 2022-01-29 | End: 2022-02-02 | Stop reason: HOSPADM

## 2022-01-29 RX ORDER — ROSUVASTATIN CALCIUM 20 MG/1
20 TABLET, COATED ORAL NIGHTLY
Status: DISCONTINUED | OUTPATIENT
Start: 2022-01-29 | End: 2022-02-02 | Stop reason: HOSPADM

## 2022-01-29 RX ORDER — LACTULOSE 10 G/15ML
30 SOLUTION ORAL 3 TIMES DAILY
Status: DISCONTINUED | OUTPATIENT
Start: 2022-01-29 | End: 2022-02-02 | Stop reason: HOSPADM

## 2022-01-29 RX ORDER — POTASSIUM CHLORIDE 750 MG/1
20 CAPSULE, EXTENDED RELEASE ORAL DAILY
Status: DISCONTINUED | OUTPATIENT
Start: 2022-01-30 | End: 2022-02-02 | Stop reason: HOSPADM

## 2022-01-29 RX ORDER — SODIUM CHLORIDE 9 MG/ML
125 INJECTION, SOLUTION INTRAVENOUS CONTINUOUS
Status: ACTIVE | OUTPATIENT
Start: 2022-01-29 | End: 2022-01-30

## 2022-01-29 RX ORDER — ASPIRIN 81 MG/1
81 TABLET ORAL DAILY
Status: DISCONTINUED | OUTPATIENT
Start: 2022-01-30 | End: 2022-02-02 | Stop reason: HOSPADM

## 2022-01-29 RX ORDER — BISACODYL 10 MG
10 SUPPOSITORY, RECTAL RECTAL DAILY PRN
Status: DISCONTINUED | OUTPATIENT
Start: 2022-01-29 | End: 2022-02-02 | Stop reason: HOSPADM

## 2022-01-29 RX ORDER — TRAZODONE HYDROCHLORIDE 50 MG/1
50 TABLET ORAL NIGHTLY
Status: DISCONTINUED | OUTPATIENT
Start: 2022-01-29 | End: 2022-02-02 | Stop reason: HOSPADM

## 2022-01-29 RX ORDER — MORPHINE SULFATE 4 MG/ML
4 INJECTION, SOLUTION INTRAMUSCULAR; INTRAVENOUS ONCE
Status: COMPLETED | OUTPATIENT
Start: 2022-01-29 | End: 2022-01-29

## 2022-01-29 RX ADMIN — ROSUVASTATIN CALCIUM 20 MG: 20 TABLET, FILM COATED ORAL at 20:43

## 2022-01-29 RX ADMIN — TRAZODONE HYDROCHLORIDE 50 MG: 50 TABLET ORAL at 20:43

## 2022-01-29 RX ADMIN — RANOLAZINE 1000 MG: 500 TABLET, FILM COATED, EXTENDED RELEASE ORAL at 20:43

## 2022-01-29 RX ADMIN — RIFAXIMIN 550 MG: 550 TABLET ORAL at 20:43

## 2022-01-29 RX ADMIN — LACTULOSE 30 G: 20 SOLUTION ORAL at 20:43

## 2022-01-29 RX ADMIN — HYDROMORPHONE HYDROCHLORIDE 0.5 MG: 1 INJECTION, SOLUTION INTRAMUSCULAR; INTRAVENOUS; SUBCUTANEOUS at 15:04

## 2022-01-29 RX ADMIN — OCTREOTIDE ACETATE 50 MCG: 50 INJECTION, SOLUTION INTRAVENOUS; SUBCUTANEOUS at 14:40

## 2022-01-29 RX ADMIN — LUBIPROSTONE 24 MCG: 24 CAPSULE, GELATIN COATED ORAL at 20:41

## 2022-01-29 RX ADMIN — DOCUSATE SODIUM 100 MG: 100 CAPSULE, LIQUID FILLED ORAL at 20:43

## 2022-01-29 RX ADMIN — HYDROMORPHONE HYDROCHLORIDE 0.5 MG: 1 INJECTION, SOLUTION INTRAMUSCULAR; INTRAVENOUS; SUBCUTANEOUS at 16:50

## 2022-01-29 RX ADMIN — OLANZAPINE 10 MG: 5 TABLET, FILM COATED ORAL at 20:41

## 2022-01-29 RX ADMIN — MORPHINE SULFATE 4 MG: 4 INJECTION, SOLUTION INTRAMUSCULAR; INTRAVENOUS at 14:01

## 2022-01-29 RX ADMIN — GABAPENTIN 300 MG: 300 CAPSULE ORAL at 20:42

## 2022-01-29 RX ADMIN — ONDANSETRON 4 MG: 2 INJECTION INTRAMUSCULAR; INTRAVENOUS at 13:58

## 2022-01-29 RX ADMIN — HYDROMORPHONE HYDROCHLORIDE 0.25 MG: 1 INJECTION, SOLUTION INTRAMUSCULAR; INTRAVENOUS; SUBCUTANEOUS at 20:43

## 2022-01-29 RX ADMIN — URSODIOL 600 MG: 300 CAPSULE ORAL at 20:41

## 2022-01-29 RX ADMIN — TAMSULOSIN HYDROCHLORIDE 0.4 MG: 0.4 CAPSULE ORAL at 20:43

## 2022-01-29 RX ADMIN — OCTREOTIDE ACETATE 25 MCG/HR: 500 INJECTION, SOLUTION INTRAVENOUS; SUBCUTANEOUS at 14:42

## 2022-01-29 RX ADMIN — Medication 400 MG: at 20:43

## 2022-01-29 RX ADMIN — Medication 10 MG: at 20:42

## 2022-01-29 RX ADMIN — SODIUM CHLORIDE, PRESERVATIVE FREE 10 ML: 5 INJECTION INTRAVENOUS at 20:44

## 2022-01-29 RX ADMIN — SODIUM CHLORIDE 500 ML: 9 INJECTION, SOLUTION INTRAVENOUS at 18:15

## 2022-01-29 RX ADMIN — SODIUM CHLORIDE 1000 ML: 9 INJECTION, SOLUTION INTRAVENOUS at 14:00

## 2022-01-29 RX ADMIN — SODIUM CHLORIDE 75 ML/HR: 9 INJECTION, SOLUTION INTRAVENOUS at 21:22

## 2022-01-30 ENCOUNTER — APPOINTMENT (OUTPATIENT)
Dept: GENERAL RADIOLOGY | Facility: HOSPITAL | Age: 47
End: 2022-01-30

## 2022-01-30 LAB
ANION GAP SERPL CALCULATED.3IONS-SCNC: 10 MMOL/L (ref 5–15)
BASOPHILS # BLD AUTO: 0.02 10*3/MM3 (ref 0–0.2)
BASOPHILS NFR BLD AUTO: 0.3 % (ref 0–1.5)
BILIRUB UR QL STRIP: NEGATIVE
BUN SERPL-MCNC: 18 MG/DL (ref 6–20)
BUN/CREAT SERPL: 12.5 (ref 7–25)
CALCIUM SPEC-SCNC: 7.4 MG/DL (ref 8.6–10.5)
CHLORIDE SERPL-SCNC: 102 MMOL/L (ref 98–107)
CLARITY UR: CLEAR
CO2 SERPL-SCNC: 24 MMOL/L (ref 22–29)
COLOR UR: YELLOW
CREAT SERPL-MCNC: 1.44 MG/DL (ref 0.57–1)
D-LACTATE SERPL-SCNC: 2.2 MMOL/L (ref 0.5–2)
D-LACTATE SERPL-SCNC: 2.6 MMOL/L (ref 0.5–2)
DEPRECATED RDW RBC AUTO: 47.8 FL (ref 37–54)
EOSINOPHIL # BLD AUTO: 0.02 10*3/MM3 (ref 0–0.4)
EOSINOPHIL NFR BLD AUTO: 0.3 % (ref 0.3–6.2)
ERYTHROCYTE [DISTWIDTH] IN BLOOD BY AUTOMATED COUNT: 13.6 % (ref 12.3–15.4)
ETHANOL BLD-MCNC: <10 MG/DL (ref 0–10)
FERRITIN SERPL-MCNC: 129.2 NG/ML (ref 13–150)
GFR SERPL CREATININE-BSD FRML MDRD: 39 ML/MIN/1.73
GLUCOSE SERPL-MCNC: 218 MG/DL (ref 65–99)
GLUCOSE UR STRIP-MCNC: NEGATIVE MG/DL
HCT VFR BLD AUTO: 34.8 % (ref 34–46.6)
HCT VFR BLD AUTO: 36.6 % (ref 34–46.6)
HGB BLD-MCNC: 11.4 G/DL (ref 12–15.9)
HGB BLD-MCNC: 11.9 G/DL (ref 12–15.9)
HGB UR QL STRIP.AUTO: NEGATIVE
IMM GRANULOCYTES # BLD AUTO: 0.03 10*3/MM3 (ref 0–0.05)
IMM GRANULOCYTES NFR BLD AUTO: 0.4 % (ref 0–0.5)
IRON 24H UR-MRATE: 137 MCG/DL (ref 37–145)
IRON SATN MFR SERPL: 37 % (ref 20–50)
KETONES UR QL STRIP: NEGATIVE
LEUKOCYTE ESTERASE UR QL STRIP.AUTO: NEGATIVE
LYMPHOCYTES # BLD AUTO: 1.85 10*3/MM3 (ref 0.7–3.1)
LYMPHOCYTES NFR BLD AUTO: 27 % (ref 19.6–45.3)
MCH RBC QN AUTO: 31.1 PG (ref 26.6–33)
MCHC RBC AUTO-ENTMCNC: 32.8 G/DL (ref 31.5–35.7)
MCV RBC AUTO: 95.1 FL (ref 79–97)
MONOCYTES # BLD AUTO: 0.83 10*3/MM3 (ref 0.1–0.9)
MONOCYTES NFR BLD AUTO: 12.1 % (ref 5–12)
NEUTROPHILS NFR BLD AUTO: 4.11 10*3/MM3 (ref 1.7–7)
NEUTROPHILS NFR BLD AUTO: 59.9 % (ref 42.7–76)
NITRITE UR QL STRIP: NEGATIVE
NRBC BLD AUTO-RTO: 0 /100 WBC (ref 0–0.2)
PH UR STRIP.AUTO: 6.5 [PH] (ref 5–8)
PLATELET # BLD AUTO: 116 10*3/MM3 (ref 140–450)
PMV BLD AUTO: 10.6 FL (ref 6–12)
POTASSIUM SERPL-SCNC: 4 MMOL/L (ref 3.5–5.2)
PROT UR QL STRIP: NEGATIVE
RBC # BLD AUTO: 3.66 10*6/MM3 (ref 3.77–5.28)
SODIUM SERPL-SCNC: 136 MMOL/L (ref 136–145)
SP GR UR STRIP: 1.02 (ref 1–1.03)
TIBC SERPL-MCNC: 373 MCG/DL (ref 298–536)
TRANSFERRIN SERPL-MCNC: 250 MG/DL (ref 200–360)
UROBILINOGEN UR QL STRIP: NORMAL
WBC NRBC COR # BLD: 6.86 10*3/MM3 (ref 3.4–10.8)

## 2022-01-30 PROCEDURE — 85018 HEMOGLOBIN: CPT | Performed by: INTERNAL MEDICINE

## 2022-01-30 PROCEDURE — 25010000002 HYDROMORPHONE PER 4 MG: Performed by: NURSE PRACTITIONER

## 2022-01-30 PROCEDURE — 25010000002 HYDROMORPHONE PER 4 MG: Performed by: INTERNAL MEDICINE

## 2022-01-30 PROCEDURE — 83540 ASSAY OF IRON: CPT | Performed by: HOSPITALIST

## 2022-01-30 PROCEDURE — 82077 ASSAY SPEC XCP UR&BREATH IA: CPT | Performed by: INTERNAL MEDICINE

## 2022-01-30 PROCEDURE — 99223 1ST HOSP IP/OBS HIGH 75: CPT | Performed by: NURSE PRACTITIONER

## 2022-01-30 PROCEDURE — 85014 HEMATOCRIT: CPT | Performed by: INTERNAL MEDICINE

## 2022-01-30 PROCEDURE — 82728 ASSAY OF FERRITIN: CPT | Performed by: HOSPITALIST

## 2022-01-30 PROCEDURE — 83605 ASSAY OF LACTIC ACID: CPT | Performed by: NURSE PRACTITIONER

## 2022-01-30 PROCEDURE — 25010000002 CEFTRIAXONE PER 250 MG: Performed by: INTERNAL MEDICINE

## 2022-01-30 PROCEDURE — 80048 BASIC METABOLIC PNL TOTAL CA: CPT | Performed by: NURSE PRACTITIONER

## 2022-01-30 PROCEDURE — 81025 URINE PREGNANCY TEST: CPT | Performed by: INTERNAL MEDICINE

## 2022-01-30 PROCEDURE — 25010000002 OCTREOTIDE PER 25 MCG: Performed by: NURSE PRACTITIONER

## 2022-01-30 PROCEDURE — 81003 URINALYSIS AUTO W/O SCOPE: CPT | Performed by: NURSE PRACTITIONER

## 2022-01-30 PROCEDURE — 85025 COMPLETE CBC W/AUTO DIFF WBC: CPT | Performed by: NURSE PRACTITIONER

## 2022-01-30 PROCEDURE — 71046 X-RAY EXAM CHEST 2 VIEWS: CPT

## 2022-01-30 PROCEDURE — 84466 ASSAY OF TRANSFERRIN: CPT | Performed by: HOSPITALIST

## 2022-01-30 PROCEDURE — 99233 SBSQ HOSP IP/OBS HIGH 50: CPT | Performed by: INTERNAL MEDICINE

## 2022-01-30 RX ORDER — OXYCODONE HYDROCHLORIDE 5 MG/1
5 TABLET ORAL EVERY 6 HOURS PRN
Status: DISCONTINUED | OUTPATIENT
Start: 2022-01-30 | End: 2022-02-02 | Stop reason: HOSPADM

## 2022-01-30 RX ORDER — PANTOPRAZOLE SODIUM 40 MG/10ML
40 INJECTION, POWDER, LYOPHILIZED, FOR SOLUTION INTRAVENOUS
Status: DISCONTINUED | OUTPATIENT
Start: 2022-01-30 | End: 2022-01-31 | Stop reason: ALTCHOICE

## 2022-01-30 RX ORDER — HYDROMORPHONE HYDROCHLORIDE 1 MG/ML
0.25 INJECTION, SOLUTION INTRAMUSCULAR; INTRAVENOUS; SUBCUTANEOUS
Status: DISCONTINUED | OUTPATIENT
Start: 2022-01-30 | End: 2022-01-31

## 2022-01-30 RX ADMIN — Medication 10 MG: at 19:54

## 2022-01-30 RX ADMIN — HYDROMORPHONE HYDROCHLORIDE 0.25 MG: 1 INJECTION, SOLUTION INTRAMUSCULAR; INTRAVENOUS; SUBCUTANEOUS at 21:44

## 2022-01-30 RX ADMIN — GABAPENTIN 300 MG: 300 CAPSULE ORAL at 17:07

## 2022-01-30 RX ADMIN — FLUOXETINE HYDROCHLORIDE 40 MG: 20 CAPSULE ORAL at 08:43

## 2022-01-30 RX ADMIN — OCTREOTIDE ACETATE 25 MCG/HR: 500 INJECTION, SOLUTION INTRAVENOUS; SUBCUTANEOUS at 08:07

## 2022-01-30 RX ADMIN — HYDROMORPHONE HYDROCHLORIDE 0.25 MG: 1 INJECTION, SOLUTION INTRAMUSCULAR; INTRAVENOUS; SUBCUTANEOUS at 14:47

## 2022-01-30 RX ADMIN — HYDROMORPHONE HYDROCHLORIDE 0.25 MG: 1 INJECTION, SOLUTION INTRAMUSCULAR; INTRAVENOUS; SUBCUTANEOUS at 07:15

## 2022-01-30 RX ADMIN — URSODIOL 600 MG: 300 CAPSULE ORAL at 08:53

## 2022-01-30 RX ADMIN — Medication 400 MG: at 17:07

## 2022-01-30 RX ADMIN — LACTULOSE 30 G: 20 SOLUTION ORAL at 08:42

## 2022-01-30 RX ADMIN — TRAZODONE HYDROCHLORIDE 50 MG: 50 TABLET ORAL at 19:54

## 2022-01-30 RX ADMIN — HYDROMORPHONE HYDROCHLORIDE 0.25 MG: 1 INJECTION, SOLUTION INTRAMUSCULAR; INTRAVENOUS; SUBCUTANEOUS at 00:21

## 2022-01-30 RX ADMIN — SODIUM CHLORIDE 1 G: 900 INJECTION INTRAVENOUS at 08:42

## 2022-01-30 RX ADMIN — GABAPENTIN 300 MG: 300 CAPSULE ORAL at 19:55

## 2022-01-30 RX ADMIN — HYDROMORPHONE HYDROCHLORIDE 0.25 MG: 1 INJECTION, SOLUTION INTRAMUSCULAR; INTRAVENOUS; SUBCUTANEOUS at 19:49

## 2022-01-30 RX ADMIN — RIFAXIMIN 550 MG: 550 TABLET ORAL at 08:42

## 2022-01-30 RX ADMIN — RANOLAZINE 1000 MG: 500 TABLET, FILM COATED, EXTENDED RELEASE ORAL at 08:42

## 2022-01-30 RX ADMIN — URSODIOL 600 MG: 300 CAPSULE ORAL at 19:55

## 2022-01-30 RX ADMIN — LACTULOSE 30 G: 20 SOLUTION ORAL at 17:07

## 2022-01-30 RX ADMIN — TAMSULOSIN HYDROCHLORIDE 0.4 MG: 0.4 CAPSULE ORAL at 19:55

## 2022-01-30 RX ADMIN — POTASSIUM CHLORIDE 20 MEQ: 10 CAPSULE, COATED, EXTENDED RELEASE ORAL at 08:42

## 2022-01-30 RX ADMIN — GABAPENTIN 300 MG: 300 CAPSULE ORAL at 08:42

## 2022-01-30 RX ADMIN — LUBIPROSTONE 24 MCG: 24 CAPSULE, GELATIN COATED ORAL at 17:09

## 2022-01-30 RX ADMIN — PANTOPRAZOLE SODIUM 40 MG: 40 INJECTION, POWDER, LYOPHILIZED, FOR SOLUTION INTRAVENOUS at 10:52

## 2022-01-30 RX ADMIN — DOCUSATE SODIUM 100 MG: 100 CAPSULE, LIQUID FILLED ORAL at 19:54

## 2022-01-30 RX ADMIN — CLOPIDOGREL BISULFATE 75 MG: 75 TABLET ORAL at 08:43

## 2022-01-30 RX ADMIN — SODIUM CHLORIDE 125 ML/HR: 9 INJECTION, SOLUTION INTRAVENOUS at 07:15

## 2022-01-30 RX ADMIN — ROSUVASTATIN CALCIUM 20 MG: 20 TABLET, FILM COATED ORAL at 19:54

## 2022-01-30 RX ADMIN — HYDROMORPHONE HYDROCHLORIDE 0.25 MG: 1 INJECTION, SOLUTION INTRAMUSCULAR; INTRAVENOUS; SUBCUTANEOUS at 04:36

## 2022-01-30 RX ADMIN — RIFAXIMIN 550 MG: 550 TABLET ORAL at 19:55

## 2022-01-30 RX ADMIN — HYDROMORPHONE HYDROCHLORIDE 0.25 MG: 1 INJECTION, SOLUTION INTRAMUSCULAR; INTRAVENOUS; SUBCUTANEOUS at 10:53

## 2022-01-30 RX ADMIN — SODIUM CHLORIDE, PRESERVATIVE FREE 10 ML: 5 INJECTION INTRAVENOUS at 19:56

## 2022-01-30 RX ADMIN — SODIUM CHLORIDE, PRESERVATIVE FREE 10 ML: 5 INJECTION INTRAVENOUS at 08:47

## 2022-01-30 RX ADMIN — LACTULOSE 30 G: 20 SOLUTION ORAL at 19:55

## 2022-01-30 RX ADMIN — LUBIPROSTONE 24 MCG: 24 CAPSULE, GELATIN COATED ORAL at 08:53

## 2022-01-30 RX ADMIN — PANTOPRAZOLE SODIUM 40 MG: 40 INJECTION, POWDER, LYOPHILIZED, FOR SOLUTION INTRAVENOUS at 17:08

## 2022-01-30 RX ADMIN — HYDROMORPHONE HYDROCHLORIDE 0.25 MG: 1 INJECTION, SOLUTION INTRAMUSCULAR; INTRAVENOUS; SUBCUTANEOUS at 17:07

## 2022-01-30 RX ADMIN — DOCUSATE SODIUM 100 MG: 100 CAPSULE, LIQUID FILLED ORAL at 08:43

## 2022-01-30 RX ADMIN — RANOLAZINE 1000 MG: 500 TABLET, FILM COATED, EXTENDED RELEASE ORAL at 19:55

## 2022-01-30 RX ADMIN — OXYCODONE 5 MG: 5 TABLET ORAL at 13:07

## 2022-01-30 RX ADMIN — OLANZAPINE 10 MG: 5 TABLET, FILM COATED ORAL at 19:54

## 2022-01-30 RX ADMIN — ASPIRIN 81 MG: 81 TABLET, COATED ORAL at 08:42

## 2022-01-31 LAB
ALBUMIN SERPL-MCNC: 3.1 G/DL (ref 3.5–5.2)
ALBUMIN/GLOB SERPL: 1.6 G/DL
ALP SERPL-CCNC: 69 U/L (ref 39–117)
ALT SERPL W P-5'-P-CCNC: 11 U/L (ref 1–33)
ANION GAP SERPL CALCULATED.3IONS-SCNC: 7 MMOL/L (ref 5–15)
AST SERPL-CCNC: 12 U/L (ref 1–32)
BILIRUB SERPL-MCNC: 0.2 MG/DL (ref 0–1.2)
BUN SERPL-MCNC: 6 MG/DL (ref 6–20)
BUN/CREAT SERPL: 6.8 (ref 7–25)
CALCIUM SPEC-SCNC: 7.5 MG/DL (ref 8.6–10.5)
CHLORIDE SERPL-SCNC: 107 MMOL/L (ref 98–107)
CO2 SERPL-SCNC: 23 MMOL/L (ref 22–29)
CREAT SERPL-MCNC: 0.88 MG/DL (ref 0.57–1)
DEPRECATED RDW RBC AUTO: 48.3 FL (ref 37–54)
ERYTHROCYTE [DISTWIDTH] IN BLOOD BY AUTOMATED COUNT: 13.3 % (ref 12.3–15.4)
GFR SERPL CREATININE-BSD FRML MDRD: 69 ML/MIN/1.73
GLOBULIN UR ELPH-MCNC: 2 GM/DL
GLUCOSE SERPL-MCNC: 133 MG/DL (ref 65–99)
HCT VFR BLD AUTO: 34.1 % (ref 34–46.6)
HCT VFR BLD AUTO: 34.7 % (ref 34–46.6)
HGB BLD-MCNC: 11.1 G/DL (ref 12–15.9)
HGB BLD-MCNC: 11.4 G/DL (ref 12–15.9)
MCH RBC QN AUTO: 31.4 PG (ref 26.6–33)
MCHC RBC AUTO-ENTMCNC: 32 G/DL (ref 31.5–35.7)
MCV RBC AUTO: 98 FL (ref 79–97)
PLATELET # BLD AUTO: 104 10*3/MM3 (ref 140–450)
PMV BLD AUTO: 10.4 FL (ref 6–12)
POTASSIUM SERPL-SCNC: 4.1 MMOL/L (ref 3.5–5.2)
PROT SERPL-MCNC: 5.1 G/DL (ref 6–8.5)
RBC # BLD AUTO: 3.54 10*6/MM3 (ref 3.77–5.28)
SODIUM SERPL-SCNC: 137 MMOL/L (ref 136–145)
WBC NRBC COR # BLD: 4.22 10*3/MM3 (ref 3.4–10.8)

## 2022-01-31 PROCEDURE — 25010000002 HYDROMORPHONE PER 4 MG: Performed by: INTERNAL MEDICINE

## 2022-01-31 PROCEDURE — 80053 COMPREHEN METABOLIC PANEL: CPT | Performed by: INTERNAL MEDICINE

## 2022-01-31 PROCEDURE — 25010000002 CEFTRIAXONE PER 250 MG: Performed by: INTERNAL MEDICINE

## 2022-01-31 PROCEDURE — 99232 SBSQ HOSP IP/OBS MODERATE 35: CPT | Performed by: INTERNAL MEDICINE

## 2022-01-31 PROCEDURE — 85018 HEMOGLOBIN: CPT | Performed by: INTERNAL MEDICINE

## 2022-01-31 PROCEDURE — 85014 HEMATOCRIT: CPT | Performed by: INTERNAL MEDICINE

## 2022-01-31 PROCEDURE — 85027 COMPLETE CBC AUTOMATED: CPT | Performed by: INTERNAL MEDICINE

## 2022-01-31 RX ORDER — PANTOPRAZOLE SODIUM 40 MG/1
40 TABLET, DELAYED RELEASE ORAL
Status: DISCONTINUED | OUTPATIENT
Start: 2022-01-31 | End: 2022-02-02 | Stop reason: HOSPADM

## 2022-01-31 RX ORDER — HYDROMORPHONE HYDROCHLORIDE 1 MG/ML
0.25 INJECTION, SOLUTION INTRAMUSCULAR; INTRAVENOUS; SUBCUTANEOUS EVERY 4 HOURS PRN
Status: DISCONTINUED | OUTPATIENT
Start: 2022-01-31 | End: 2022-02-01

## 2022-01-31 RX ORDER — ONDANSETRON 2 MG/ML
4 INJECTION INTRAMUSCULAR; INTRAVENOUS EVERY 6 HOURS PRN
Status: DISCONTINUED | OUTPATIENT
Start: 2022-01-31 | End: 2022-02-02 | Stop reason: HOSPADM

## 2022-01-31 RX ADMIN — HYDROMORPHONE HYDROCHLORIDE 0.25 MG: 1 INJECTION, SOLUTION INTRAMUSCULAR; INTRAVENOUS; SUBCUTANEOUS at 22:05

## 2022-01-31 RX ADMIN — CLOPIDOGREL BISULFATE 75 MG: 75 TABLET ORAL at 08:13

## 2022-01-31 RX ADMIN — SODIUM CHLORIDE, PRESERVATIVE FREE 10 ML: 5 INJECTION INTRAVENOUS at 22:08

## 2022-01-31 RX ADMIN — PHENOL 2 SPRAY: 1.4 SPRAY ORAL at 17:51

## 2022-01-31 RX ADMIN — RANOLAZINE 1000 MG: 500 TABLET, FILM COATED, EXTENDED RELEASE ORAL at 22:06

## 2022-01-31 RX ADMIN — TRAZODONE HYDROCHLORIDE 50 MG: 50 TABLET ORAL at 22:07

## 2022-01-31 RX ADMIN — OXYCODONE 5 MG: 5 TABLET ORAL at 23:44

## 2022-01-31 RX ADMIN — HYDROMORPHONE HYDROCHLORIDE 0.25 MG: 1 INJECTION, SOLUTION INTRAMUSCULAR; INTRAVENOUS; SUBCUTANEOUS at 00:37

## 2022-01-31 RX ADMIN — OXYCODONE 5 MG: 5 TABLET ORAL at 16:15

## 2022-01-31 RX ADMIN — SODIUM CHLORIDE, PRESERVATIVE FREE 10 ML: 5 INJECTION INTRAVENOUS at 08:15

## 2022-01-31 RX ADMIN — TAMSULOSIN HYDROCHLORIDE 0.4 MG: 0.4 CAPSULE ORAL at 22:07

## 2022-01-31 RX ADMIN — DOCUSATE SODIUM 100 MG: 100 CAPSULE, LIQUID FILLED ORAL at 08:13

## 2022-01-31 RX ADMIN — RANOLAZINE 1000 MG: 500 TABLET, FILM COATED, EXTENDED RELEASE ORAL at 08:13

## 2022-01-31 RX ADMIN — HYDROMORPHONE HYDROCHLORIDE 0.25 MG: 1 INJECTION, SOLUTION INTRAMUSCULAR; INTRAVENOUS; SUBCUTANEOUS at 04:11

## 2022-01-31 RX ADMIN — OXYCODONE 5 MG: 5 TABLET ORAL at 02:30

## 2022-01-31 RX ADMIN — ASPIRIN 81 MG: 81 TABLET, COATED ORAL at 08:13

## 2022-01-31 RX ADMIN — ROSUVASTATIN CALCIUM 20 MG: 20 TABLET, FILM COATED ORAL at 22:07

## 2022-01-31 RX ADMIN — PANTOPRAZOLE SODIUM 40 MG: 40 INJECTION, POWDER, LYOPHILIZED, FOR SOLUTION INTRAVENOUS at 08:14

## 2022-01-31 RX ADMIN — LACTULOSE 30 G: 20 SOLUTION ORAL at 22:07

## 2022-01-31 RX ADMIN — POTASSIUM CHLORIDE 20 MEQ: 10 CAPSULE, COATED, EXTENDED RELEASE ORAL at 08:13

## 2022-01-31 RX ADMIN — PANTOPRAZOLE SODIUM 40 MG: 40 TABLET, DELAYED RELEASE ORAL at 16:15

## 2022-01-31 RX ADMIN — OXYCODONE 5 MG: 5 TABLET ORAL at 10:16

## 2022-01-31 RX ADMIN — HYDROMORPHONE HYDROCHLORIDE 0.25 MG: 1 INJECTION, SOLUTION INTRAMUSCULAR; INTRAVENOUS; SUBCUTANEOUS at 12:25

## 2022-01-31 RX ADMIN — FLUOXETINE HYDROCHLORIDE 40 MG: 20 CAPSULE ORAL at 08:13

## 2022-01-31 RX ADMIN — OLANZAPINE 10 MG: 5 TABLET, FILM COATED ORAL at 22:05

## 2022-01-31 RX ADMIN — LUBIPROSTONE 24 MCG: 24 CAPSULE, GELATIN COATED ORAL at 08:13

## 2022-01-31 RX ADMIN — HYDROMORPHONE HYDROCHLORIDE 0.25 MG: 1 INJECTION, SOLUTION INTRAMUSCULAR; INTRAVENOUS; SUBCUTANEOUS at 08:14

## 2022-01-31 RX ADMIN — LUBIPROSTONE 24 MCG: 24 CAPSULE, GELATIN COATED ORAL at 17:42

## 2022-01-31 RX ADMIN — Medication 10 MG: at 22:06

## 2022-01-31 RX ADMIN — SODIUM CHLORIDE 1 G: 900 INJECTION INTRAVENOUS at 08:14

## 2022-01-31 RX ADMIN — LACTULOSE 30 G: 20 SOLUTION ORAL at 16:16

## 2022-01-31 RX ADMIN — LACTULOSE 30 G: 20 SOLUTION ORAL at 08:12

## 2022-01-31 RX ADMIN — URSODIOL 600 MG: 300 CAPSULE ORAL at 08:13

## 2022-01-31 RX ADMIN — GABAPENTIN 300 MG: 300 CAPSULE ORAL at 22:07

## 2022-01-31 RX ADMIN — GABAPENTIN 300 MG: 300 CAPSULE ORAL at 16:15

## 2022-01-31 RX ADMIN — DOCUSATE SODIUM 100 MG: 100 CAPSULE, LIQUID FILLED ORAL at 22:06

## 2022-01-31 RX ADMIN — RIFAXIMIN 550 MG: 550 TABLET ORAL at 08:13

## 2022-01-31 RX ADMIN — HYDROMORPHONE HYDROCHLORIDE 0.25 MG: 1 INJECTION, SOLUTION INTRAMUSCULAR; INTRAVENOUS; SUBCUTANEOUS at 17:42

## 2022-01-31 RX ADMIN — RIFAXIMIN 550 MG: 550 TABLET ORAL at 22:07

## 2022-01-31 RX ADMIN — GABAPENTIN 300 MG: 300 CAPSULE ORAL at 08:13

## 2022-01-31 RX ADMIN — URSODIOL 600 MG: 300 CAPSULE ORAL at 22:05

## 2022-01-31 RX ADMIN — Medication 400 MG: at 16:15

## 2022-02-01 LAB
ANION GAP SERPL CALCULATED.3IONS-SCNC: 15 MMOL/L (ref 5–15)
B PARAPERT DNA SPEC QL NAA+PROBE: NOT DETECTED
B PERT DNA SPEC QL NAA+PROBE: NOT DETECTED
BUN SERPL-MCNC: 6 MG/DL (ref 6–20)
BUN/CREAT SERPL: 5.2 (ref 7–25)
C PNEUM DNA NPH QL NAA+NON-PROBE: NOT DETECTED
CALCIUM SPEC-SCNC: 7.9 MG/DL (ref 8.6–10.5)
CHLORIDE SERPL-SCNC: 109 MMOL/L (ref 98–107)
CO2 SERPL-SCNC: 16 MMOL/L (ref 22–29)
CREAT SERPL-MCNC: 1.16 MG/DL (ref 0.57–1)
DEPRECATED RDW RBC AUTO: 46.5 FL (ref 37–54)
ERYTHROCYTE [DISTWIDTH] IN BLOOD BY AUTOMATED COUNT: 13.3 % (ref 12.3–15.4)
FLUAV SUBTYP SPEC NAA+PROBE: NOT DETECTED
FLUBV RNA ISLT QL NAA+PROBE: NOT DETECTED
GFR SERPL CREATININE-BSD FRML MDRD: 50 ML/MIN/1.73
GLUCOSE SERPL-MCNC: 169 MG/DL (ref 65–99)
HADV DNA SPEC NAA+PROBE: NOT DETECTED
HCOV 229E RNA SPEC QL NAA+PROBE: NOT DETECTED
HCOV HKU1 RNA SPEC QL NAA+PROBE: NOT DETECTED
HCOV NL63 RNA SPEC QL NAA+PROBE: NOT DETECTED
HCOV OC43 RNA SPEC QL NAA+PROBE: NOT DETECTED
HCT VFR BLD AUTO: 35.9 % (ref 34–46.6)
HGB BLD-MCNC: 11.8 G/DL (ref 12–15.9)
HMPV RNA NPH QL NAA+NON-PROBE: NOT DETECTED
HPIV1 RNA ISLT QL NAA+PROBE: NOT DETECTED
HPIV2 RNA SPEC QL NAA+PROBE: NOT DETECTED
HPIV3 RNA NPH QL NAA+PROBE: NOT DETECTED
HPIV4 P GENE NPH QL NAA+PROBE: NOT DETECTED
M PNEUMO IGG SER IA-ACNC: NOT DETECTED
MCH RBC QN AUTO: 31.3 PG (ref 26.6–33)
MCHC RBC AUTO-ENTMCNC: 32.9 G/DL (ref 31.5–35.7)
MCV RBC AUTO: 95.2 FL (ref 79–97)
PLATELET # BLD AUTO: 94 10*3/MM3 (ref 140–450)
PMV BLD AUTO: 10.6 FL (ref 6–12)
POTASSIUM SERPL-SCNC: 4.5 MMOL/L (ref 3.5–5.2)
RBC # BLD AUTO: 3.77 10*6/MM3 (ref 3.77–5.28)
RHINOVIRUS RNA SPEC NAA+PROBE: NOT DETECTED
RSV RNA NPH QL NAA+NON-PROBE: NOT DETECTED
SARS-COV-2 RNA NPH QL NAA+NON-PROBE: NOT DETECTED
SODIUM SERPL-SCNC: 140 MMOL/L (ref 136–145)
WBC NRBC COR # BLD: 5.6 10*3/MM3 (ref 3.4–10.8)

## 2022-02-01 PROCEDURE — 25010000002 HYDROMORPHONE PER 4 MG: Performed by: INTERNAL MEDICINE

## 2022-02-01 PROCEDURE — 0202U NFCT DS 22 TRGT SARS-COV-2: CPT | Performed by: INTERNAL MEDICINE

## 2022-02-01 PROCEDURE — 99232 SBSQ HOSP IP/OBS MODERATE 35: CPT | Performed by: INTERNAL MEDICINE

## 2022-02-01 PROCEDURE — 85027 COMPLETE CBC AUTOMATED: CPT | Performed by: INTERNAL MEDICINE

## 2022-02-01 PROCEDURE — 25010000002 CEFTRIAXONE PER 250 MG: Performed by: INTERNAL MEDICINE

## 2022-02-01 PROCEDURE — 80048 BASIC METABOLIC PNL TOTAL CA: CPT | Performed by: INTERNAL MEDICINE

## 2022-02-01 RX ORDER — HYDROMORPHONE HYDROCHLORIDE 1 MG/ML
0.25 INJECTION, SOLUTION INTRAMUSCULAR; INTRAVENOUS; SUBCUTANEOUS 3 TIMES DAILY PRN
Status: DISCONTINUED | OUTPATIENT
Start: 2022-02-01 | End: 2022-02-02

## 2022-02-01 RX ADMIN — RANOLAZINE 1000 MG: 500 TABLET, FILM COATED, EXTENDED RELEASE ORAL at 08:45

## 2022-02-01 RX ADMIN — DOCUSATE SODIUM 100 MG: 100 CAPSULE, LIQUID FILLED ORAL at 08:43

## 2022-02-01 RX ADMIN — DOCUSATE SODIUM 100 MG: 100 CAPSULE, LIQUID FILLED ORAL at 21:52

## 2022-02-01 RX ADMIN — SODIUM CHLORIDE, PRESERVATIVE FREE 10 ML: 5 INJECTION INTRAVENOUS at 08:46

## 2022-02-01 RX ADMIN — LACTULOSE 30 G: 20 SOLUTION ORAL at 08:44

## 2022-02-01 RX ADMIN — LUBIPROSTONE 24 MCG: 24 CAPSULE, GELATIN COATED ORAL at 10:57

## 2022-02-01 RX ADMIN — OLANZAPINE 10 MG: 5 TABLET, FILM COATED ORAL at 21:53

## 2022-02-01 RX ADMIN — URSODIOL 600 MG: 300 CAPSULE ORAL at 10:57

## 2022-02-01 RX ADMIN — TRAZODONE HYDROCHLORIDE 50 MG: 50 TABLET ORAL at 21:53

## 2022-02-01 RX ADMIN — OXYCODONE 5 MG: 5 TABLET ORAL at 23:21

## 2022-02-01 RX ADMIN — CLOPIDOGREL BISULFATE 75 MG: 75 TABLET ORAL at 08:43

## 2022-02-01 RX ADMIN — OXYCODONE 5 MG: 5 TABLET ORAL at 08:58

## 2022-02-01 RX ADMIN — GABAPENTIN 300 MG: 300 CAPSULE ORAL at 21:53

## 2022-02-01 RX ADMIN — HYDROMORPHONE HYDROCHLORIDE 0.25 MG: 1 INJECTION, SOLUTION INTRAMUSCULAR; INTRAVENOUS; SUBCUTANEOUS at 18:50

## 2022-02-01 RX ADMIN — URSODIOL 600 MG: 300 CAPSULE ORAL at 21:53

## 2022-02-01 RX ADMIN — HYDROMORPHONE HYDROCHLORIDE 0.25 MG: 1 INJECTION, SOLUTION INTRAMUSCULAR; INTRAVENOUS; SUBCUTANEOUS at 02:14

## 2022-02-01 RX ADMIN — SODIUM CHLORIDE 1 G: 900 INJECTION INTRAVENOUS at 08:42

## 2022-02-01 RX ADMIN — LACTULOSE 30 G: 20 SOLUTION ORAL at 21:52

## 2022-02-01 RX ADMIN — GABAPENTIN 300 MG: 300 CAPSULE ORAL at 16:35

## 2022-02-01 RX ADMIN — PANTOPRAZOLE SODIUM 40 MG: 40 TABLET, DELAYED RELEASE ORAL at 08:43

## 2022-02-01 RX ADMIN — SODIUM CHLORIDE, PRESERVATIVE FREE 10 ML: 5 INJECTION INTRAVENOUS at 21:53

## 2022-02-01 RX ADMIN — Medication 10 MG: at 21:53

## 2022-02-01 RX ADMIN — LACTULOSE 30 G: 20 SOLUTION ORAL at 16:34

## 2022-02-01 RX ADMIN — FLUOXETINE HYDROCHLORIDE 40 MG: 20 CAPSULE ORAL at 08:43

## 2022-02-01 RX ADMIN — OXYCODONE 5 MG: 5 TABLET ORAL at 16:34

## 2022-02-01 RX ADMIN — POTASSIUM CHLORIDE 20 MEQ: 10 CAPSULE, COATED, EXTENDED RELEASE ORAL at 08:43

## 2022-02-01 RX ADMIN — HYDROMORPHONE HYDROCHLORIDE 0.25 MG: 1 INJECTION, SOLUTION INTRAMUSCULAR; INTRAVENOUS; SUBCUTANEOUS at 10:57

## 2022-02-01 RX ADMIN — ASPIRIN 81 MG: 81 TABLET, COATED ORAL at 08:43

## 2022-02-01 RX ADMIN — GABAPENTIN 300 MG: 300 CAPSULE ORAL at 08:43

## 2022-02-01 RX ADMIN — RANOLAZINE 1000 MG: 500 TABLET, FILM COATED, EXTENDED RELEASE ORAL at 21:53

## 2022-02-01 RX ADMIN — Medication 400 MG: at 16:34

## 2022-02-01 RX ADMIN — ROSUVASTATIN CALCIUM 20 MG: 20 TABLET, FILM COATED ORAL at 21:52

## 2022-02-01 RX ADMIN — TAMSULOSIN HYDROCHLORIDE 0.4 MG: 0.4 CAPSULE ORAL at 21:52

## 2022-02-01 NOTE — CASE MANAGEMENT/SOCIAL WORK
Continued Stay Note   [facility]     Patient Name: Timothy Guzman  MRN: [id number]  Today's Date: 2/1/2022    Admit Date: 1/29/2022     Discharge Plan     Row Name 02/01/22 0840       Plan    Plan Home with family    Patient/Family in Agreement with Plan yes    Plan Comments Spoke with patient at bedside. Plan is home with parents. Patient denies any discharge needs at this time. CM will continue to follow.               Discharge Codes    No documentation.                     Keshawn Harper RN

## 2022-02-02 ENCOUNTER — READMISSION MANAGEMENT (OUTPATIENT)
Dept: CALL CENTER | Facility: HOSPITAL | Age: 47
End: 2022-02-02

## 2022-02-02 ENCOUNTER — APPOINTMENT (OUTPATIENT)
Dept: GENERAL RADIOLOGY | Facility: HOSPITAL | Age: 47
End: 2022-02-02

## 2022-02-02 VITALS
RESPIRATION RATE: 16 BRPM | WEIGHT: 186 LBS | OXYGEN SATURATION: 96 % | HEIGHT: 63 IN | BODY MASS INDEX: 32.96 KG/M2 | HEART RATE: 78 BPM | DIASTOLIC BLOOD PRESSURE: 56 MMHG | TEMPERATURE: 98.2 F | SYSTOLIC BLOOD PRESSURE: 122 MMHG

## 2022-02-02 LAB
ANION GAP SERPL CALCULATED.3IONS-SCNC: 7 MMOL/L (ref 5–15)
B-HCG UR QL: NEGATIVE
BUN SERPL-MCNC: 7 MG/DL (ref 6–20)
BUN/CREAT SERPL: 7.5 (ref 7–25)
CALCIUM SPEC-SCNC: 8.1 MG/DL (ref 8.6–10.5)
CHLORIDE SERPL-SCNC: 108 MMOL/L (ref 98–107)
CO2 SERPL-SCNC: 25 MMOL/L (ref 22–29)
CREAT SERPL-MCNC: 0.93 MG/DL (ref 0.57–1)
GFR SERPL CREATININE-BSD FRML MDRD: 65 ML/MIN/1.73
GLUCOSE SERPL-MCNC: 103 MG/DL (ref 65–99)
HCT VFR BLD AUTO: 38.3 % (ref 34–46.6)
HGB BLD-MCNC: 12.7 G/DL (ref 12–15.9)
POTASSIUM SERPL-SCNC: 4.5 MMOL/L (ref 3.5–5.2)
QT INTERVAL: 358 MS
QTC INTERVAL: 459 MS
SODIUM SERPL-SCNC: 140 MMOL/L (ref 136–145)

## 2022-02-02 PROCEDURE — 99239 HOSP IP/OBS DSCHRG MGMT >30: CPT | Performed by: INTERNAL MEDICINE

## 2022-02-02 PROCEDURE — 85018 HEMOGLOBIN: CPT | Performed by: INTERNAL MEDICINE

## 2022-02-02 PROCEDURE — 25010000002 HYDROMORPHONE PER 4 MG: Performed by: INTERNAL MEDICINE

## 2022-02-02 PROCEDURE — 80048 BASIC METABOLIC PNL TOTAL CA: CPT | Performed by: INTERNAL MEDICINE

## 2022-02-02 PROCEDURE — 25010000002 METHYLNALTREXONE 12 MG/0.6ML SOLUTION: Performed by: INTERNAL MEDICINE

## 2022-02-02 PROCEDURE — 85014 HEMATOCRIT: CPT | Performed by: INTERNAL MEDICINE

## 2022-02-02 PROCEDURE — 74018 RADEX ABDOMEN 1 VIEW: CPT

## 2022-02-02 RX ORDER — PANTOPRAZOLE SODIUM 40 MG/1
40 TABLET, DELAYED RELEASE ORAL
Qty: 28 TABLET | Refills: 0 | Status: SHIPPED | OUTPATIENT
Start: 2022-02-02

## 2022-02-02 RX ORDER — MAGNESIUM CARB/ALUMINUM HYDROX 105-160MG
150 TABLET,CHEWABLE ORAL ONCE AS NEEDED
Status: DISCONTINUED | OUTPATIENT
Start: 2022-02-02 | End: 2022-02-02 | Stop reason: HOSPADM

## 2022-02-02 RX ADMIN — POTASSIUM CHLORIDE 20 MEQ: 10 CAPSULE, COATED, EXTENDED RELEASE ORAL at 08:01

## 2022-02-02 RX ADMIN — GABAPENTIN 300 MG: 300 CAPSULE ORAL at 15:41

## 2022-02-02 RX ADMIN — BISACODYL 10 MG: 10 SUPPOSITORY RECTAL at 09:38

## 2022-02-02 RX ADMIN — DOCUSATE SODIUM 100 MG: 100 CAPSULE, LIQUID FILLED ORAL at 08:03

## 2022-02-02 RX ADMIN — GABAPENTIN 300 MG: 300 CAPSULE ORAL at 08:02

## 2022-02-02 RX ADMIN — PANTOPRAZOLE SODIUM 40 MG: 40 TABLET, DELAYED RELEASE ORAL at 16:25

## 2022-02-02 RX ADMIN — ASPIRIN 81 MG: 81 TABLET, COATED ORAL at 08:03

## 2022-02-02 RX ADMIN — PANTOPRAZOLE SODIUM 40 MG: 40 TABLET, DELAYED RELEASE ORAL at 08:03

## 2022-02-02 RX ADMIN — CLOPIDOGREL BISULFATE 75 MG: 75 TABLET ORAL at 08:03

## 2022-02-02 RX ADMIN — HYDROMORPHONE HYDROCHLORIDE 0.25 MG: 1 INJECTION, SOLUTION INTRAMUSCULAR; INTRAVENOUS; SUBCUTANEOUS at 06:27

## 2022-02-02 RX ADMIN — FLUOXETINE HYDROCHLORIDE 40 MG: 20 CAPSULE ORAL at 08:03

## 2022-02-02 RX ADMIN — Medication 400 MG: at 16:24

## 2022-02-02 RX ADMIN — LUBIPROSTONE 24 MCG: 24 CAPSULE, GELATIN COATED ORAL at 08:04

## 2022-02-02 RX ADMIN — LACTULOSE 30 G: 20 SOLUTION ORAL at 15:41

## 2022-02-02 RX ADMIN — URSODIOL 600 MG: 300 CAPSULE ORAL at 08:02

## 2022-02-02 RX ADMIN — OXYCODONE 5 MG: 5 TABLET ORAL at 13:33

## 2022-02-02 RX ADMIN — MAGNESIUM HYDROXIDE 10 ML: 2400 SUSPENSION ORAL at 13:33

## 2022-02-02 RX ADMIN — LACTULOSE 30 G: 20 SOLUTION ORAL at 08:04

## 2022-02-02 RX ADMIN — OXYCODONE 5 MG: 5 TABLET ORAL at 08:03

## 2022-02-02 RX ADMIN — METHYLNALTREXONE BROMIDE 12 MG: 12 INJECTION, SOLUTION SUBCUTANEOUS at 12:18

## 2022-02-02 RX ADMIN — SODIUM CHLORIDE, PRESERVATIVE FREE 10 ML: 5 INJECTION INTRAVENOUS at 08:06

## 2022-02-02 RX ADMIN — RANOLAZINE 1000 MG: 500 TABLET, FILM COATED, EXTENDED RELEASE ORAL at 08:02

## 2022-02-02 NOTE — PLAN OF CARE
Goal Outcome Evaluation:  Plan of Care Reviewed With: patient        Progress: no change  Outcome Summary: pt is alert and oriented. she has remained on room air. she calls frequently for po and iv pain meds. she gets up to the bathroom by herself. pain in the epigastric area under left rib. pt did sleep during the night.

## 2022-02-02 NOTE — CASE MANAGEMENT/SOCIAL WORK
Continued Stay Note  [facility]     Patient Name: Timothy Guzman  MRN: [id number]  Today's Date: 2/2/2022    Admit Date: 1/29/2022     Discharge Plan     Row Name 02/02/22 0840       Plan    Plan Home with family    Patient/Family in Agreement with Plan yes    Plan Comments Spoke with patient at bedside. Plan is home with parents. Patient denies any discharge needs at this time. CM will continue to follow.    Final Discharge Disposition Code 01 - home or self-care               Discharge Codes    No documentation.                     Keshawn Harper RN

## 2022-02-02 NOTE — CASE MANAGEMENT/SOCIAL WORK
Continued Stay Note  [facility]     Patient Name: Timothy Guzman  MRN: [id number]  Today's Date: 2/2/2022    Admit Date: 1/29/2022     Discharge Plan     Row Name 02/02/22 1450       Plan    Plan Home with family    Final Discharge Disposition Code 01 - home or self-care    Final Note Plan is home with family. Parents will transport. Patient denies any discharge needs.               Discharge Codes    No documentation.               Expected Discharge Date and Time     Expected Discharge Date Expected Discharge Time    Feb 2, 2022             Keshawn Harper RN

## 2022-02-02 NOTE — NURSING NOTE
Received call from Charge RN inquiring about patient's ride. Nurse discussed with charge that patient's discharge order indicated patient needed to have BM or feel like the need to have bm. RN also reported that MD had been notified about patient's complaint of pain now radiating to back/shoulder blade. Charge RN contacted MD then returned call to this nurse to report MD is not planning to give patient any additional narcotics and to proceed with discharge. Nurse informed the patient at bedside and patient contacting parents for ride home.

## 2022-02-02 NOTE — DISCHARGE SUMMARY
[facility] Medicine Services  DISCHARGE SUMMARY    Patient Name: Timothy Guzman  : 1975  MRN: [id number]    Date of Admission: 2022  1:19 PM  Date of Discharge: 2022  Primary Care Physician: Toshia Mitchell APRN    Consults     Date and Time Order Name Status Description    2022  7:51 PM Inpatient Gastroenterology Consult Completed           Hospital Course     Presenting Problem:   Sepsis, due to unspecified organism, unspecified whether acute organ dysfunction present (HCC) [A41.9]    Active Hospital Problems    Diagnosis  POA   • **Hematemesis [K92.0]  Yes   • SIRS (systemic inflammatory response syndrome) (HCC) [R65.10]  Yes   • Leukocytosis [D72.829]  Yes   • BRIANNA (acute kidney injury) (HCC) [N17.9]  Yes   • Hyponatremia [E87.1]  Yes   • Lactic acidosis [E87.2]  Yes   • CAD (coronary artery disease) [I25.10]  Yes   • History of esophageal varices [Z87.19]  Not Applicable   • Alcoholic cirrhosis of liver without ascites (HCC) [K70.30]  Yes      Resolved Hospital Problems   No resolved problems to display.          Hospital Course:  Timothy Guzman is a 46 y.o. female with CAD s/p stenting summer 2021, alcoholic cirrhosis, questionable Hx PBC, prior esophageal varice on EGD with follow-up EGD not demonstrating varices.  She presented to hospital with several days of epigastric abdominal pain, nausea, vomiting which progressed to blood-tinged emesis.  She was admitted to the hospital for evaluation of concern for variceal bleed, she was started on empiric CTX for SBP PPx, CT scan of the abdomen did not show any acute process, and her troponins were negative.  GI consultation was obtained who suspected Claudia-Arce tear and recommended conservative care.  Her H&H has remained stable and in fact trended up.  Nursing has made multiple reports of her refusing oral pain medication, and allegedly setting an alarm on her phone set to the frequency she can have IV  narcotics, calling out for pain and falling asleep before nursing can bring medications.  She has been cleared for discharge by GI and will need follow-up with her PCP in 1-2 weeks.    Acute epigastric abdominal pain with nausea/vomiting  Hematemesis  Hepatic cirrhosis (see EtOH/??PBC) with known varices  -Continue home lactulose/rifaximin  -Continue bid po ppi  -GI has seen, suspect Claudia-Arce tear, defer endoscopy as last EGD did not demonstrate varices (prior EGD with grade 1 varices)  -H&H remained stable/uptrending  -Repeatedly calling out for narcotics, reportedly setting phone alarm to wake herself up and ask for narcotics and falling back asleep before nursing can come to the room, CT of the A/P was unremarkable, labs stable     Acute kidney injury, improved  -Improved with IV volume resuscitation     CAD s/p stenting  -LHC with stenting of the circumflex 6/18/21 in the setting of acute MI with subsequent in-stent stenosis requiring repeat LHC and repeat stenting 7/15/21; patient had anaphylactic-like event during second stent requiring transition to the ICU  -Continue aspirin, clopidogrel, ranolazine, rosuvastatin     Prior Hx alcoholism      Discharge Follow Up Recommendations for outpatient labs/diagnostics:  PCP 1 to 2 weeks    Day of Discharge     HPI:   Feels that her belly is full and reporting that she has not had a bowel movement since admission, discussed with nursing and she has a documented bowel movement around her second day.  KUB with stool burden and no other acute process.    Review of Systems  Gen- No fevers, chills  CV- No chest pain, palpitations  Resp- No cough, dyspnea  GI- No N/V/D, abd pain    Vital Signs:   Temp:  [98.2 °F (36.8 °C)-98.8 °F (37.1 °C)] 98.2 °F (36.8 °C)  Heart Rate:  [72-92] 81  Resp:  [16-20] 16  BP: (105-122)/(56-65) 122/56      Physical Exam:  Constitutional: Awake, lethargic (he has to be woken up), laying in bed in no acute distress  HENT: NCAT, mucous  membranes moist  Respiratory: Clear to auscultation bilaterally, respiratory effort normal   Cardiovascular: RRR, no murmurs, rubs, or gallops, palpable radial pulses  Gastrointestinal: Positive bowel sounds, soft, trace diffuse tenderness, nondistended  Musculoskeletal: No bilateral ankle edema  Psychiatric: Appropriate affect, cooperative  Neurologic: Speech clear, moving all extremities spontaneously    Pertinent  and/or Most Recent Results     LAB RESULTS:      Lab 02/02/22  0814 02/01/22  1348 01/31/22 2025 01/31/22  0945 01/30/22  1947 01/30/22  0953 01/30/22  0953 01/30/22  0423 01/29/22  2132 01/29/22  1530 01/29/22  1253 01/29/22  1253   WBC  --  5.60  --  4.22  --   --  6.86  --   --   --   --  24.66*   HEMOGLOBIN 12.7 11.8* 11.4* 11.1* 11.9*   < > 11.4*  --   --   --    < > 18.6*   HEMATOCRIT 38.3 35.9 34.1 34.7 36.6   < > 34.8  --   --   --    < > 54.1*   PLATELETS  --  94*  --  104*  --   --  116*  --   --   --   --  271   NEUTROS ABS  --   --   --   --   --   --  4.11  --   --   --   --  19.88*   IMMATURE GRANS (ABS)  --   --   --   --   --   --  0.03  --   --   --   --  0.23*   LYMPHS ABS  --   --   --   --   --   --  1.85  --   --   --   --  2.76   MONOS ABS  --   --   --   --   --   --  0.83  --   --   --   --  1.72*   EOS ABS  --   --   --   --   --   --  0.02  --   --   --   --  0.01   MCV  --  95.2  --  98.0*  --   --  95.1  --   --   --   --  89.4   PROCALCITONIN  --   --   --   --   --   --   --   --   --  0.15  --   --    LACTATE  --   --   --   --   --   --  2.6* 2.2* 4.4*  --   --  4.0*   PROTIME  --   --   --   --   --   --   --   --   --   --   --  14.1    < > = values in this interval not displayed.         Lab 02/02/22  0814 02/01/22  1348 01/31/22  0945 01/30/22  0423 01/29/22  1530   SODIUM 140 140 137 136 133*   POTASSIUM 4.5 4.5 4.1 4.0 3.9   CHLORIDE 108* 109* 107 102 91*   CO2 25.0 16.0* 23.0 24.0 27.0   ANION GAP 7.0 15.0 7.0 10.0 15.0   BUN 7 6 6 18 28*   CREATININE 0.93 1.16*  0.88 1.44* 2.13*   GLUCOSE 103* 169* 133* 218* 115*   CALCIUM 8.1* 7.9* 7.5* 7.4* 8.9         Lab 01/31/22  0945 01/29/22  1530   TOTAL PROTEIN 5.1* 7.5   ALBUMIN 3.10* 4.50   GLOBULIN 2.0 3.0   ALT (SGPT) 11 17   AST (SGOT) 12 16   BILIRUBIN 0.2 0.4   ALK PHOS 69 103   LIPASE  --  20         Lab 01/29/22  1530 01/29/22  1253   TROPONIN T <0.010  --    PROTIME  --  14.1   INR  --  1.12             Lab 01/30/22  0423 01/29/22  1530   IRON 137  --    IRON SATURATION 37  --    TIBC 373  --    TRANSFERRIN 250  --    FERRITIN 129.20  --    ABO TYPING  --  A   RH TYPING  --  Positive   ANTIBODY SCREEN  --  Negative         Brief Urine Lab Results  (Last result in the past 365 days)      Color   Clarity   Blood   Leuk Est   Nitrite   Protein   CREAT   Urine HCG        01/30/22 1302               Negative       01/30/22 1302 Yellow   Clear   Negative   Negative   Negative   Negative               Microbiology Results (last 10 days)     Procedure Component Value - Date/Time    Respiratory Panel PCR w/COVID-19(SARS-CoV-2) AP[facility]NA/[facility]/ANTOINE/PAD/COR/MAD/OLIVER In-House, NP Swab in UTM/VTM, 3-4 HR TAT - Swab, Nasopharynx [[id number]]  (Normal) Collected: 02/01/22 1429    Lab Status: Final result Specimen: Swab from Nasopharynx Updated: 02/01/22 1519     ADENOVIRUS, PCR Not Detected     Coronavirus 229E Not Detected     Coronavirus HKU1 Not Detected     Coronavirus NL63 Not Detected     Coronavirus OC43 Not Detected     COVID19 Not Detected     Human Metapneumovirus Not Detected     Human Rhinovirus/Enterovirus Not Detected     Influenza A PCR Not Detected     Influenza B PCR Not Detected     Parainfluenza Virus 1 Not Detected     Parainfluenza Virus 2 Not Detected     Parainfluenza Virus 3 Not Detected     Parainfluenza Virus 4 Not Detected     RSV, PCR Not Detected     Bordetella pertussis pcr Not Detected     Bordetella parapertussis PCR Not Detected     Chlamydophila pneumoniae PCR Not Detected     Mycoplasma pneumo by PCR Not Detected     Narrative:      In the setting of a positive respiratory panel with a viral infection PLUS a negative procalcitonin without other underlying concern for bacterial infection, consider observing off antibiotics or discontinuation of antibiotics and continue supportive care. If the respiratory panel is positive for atypical bacterial infection (Bordetella pertussis, Chlamydophila pneumoniae, or Mycoplasma pneumoniae), consider antibiotic de-escalation to target atypical bacterial infection.    COVID PRE-OP / PRE-PROCEDURE SCREENING ORDER (NO ISOLATION) - Swab, Nasopharynx [[id number]]  (Normal) Collected: 01/29/22 1539    Lab Status: Final result Specimen: Swab from Nasopharynx Updated: 01/29/22 1605    Narrative:      The following orders were created for panel order COVID PRE-OP / PRE-PROCEDURE SCREENING ORDER (NO ISOLATION) - Swab, Nasopharynx.  Procedure                               Abnormality         Status                     ---------                               -----------         ------                     COVID-19 and FLU A/B PCR...[[id number]]  Normal              Final result                 Please view results for these tests on the individual orders.    COVID-19 and FLU A/B PCR - Swab, Nasopharynx [[id number]]  (Normal) Collected: 01/29/22 1539    Lab Status: Final result Specimen: Swab from Nasopharynx Updated: 01/29/22 1605     COVID19 Not Detected     Influenza A PCR Not Detected     Influenza B PCR Not Detected    Narrative:      Fact sheet for providers: https://www.fda.gov/media/343325/download    Fact sheet for patients: https://www.fda.gov/media/547895/download    Test performed by PCR.    Blood Culture - Blood, Wrist, Right [[id number]]  (Normal) Collected: 01/29/22 1530    Lab Status: Preliminary result Specimen: Blood from Wrist, Right Updated: 02/02/22 1600     Blood Culture No growth at 4 days    Blood Culture - Blood, Arm, Right [[id number]]  (Normal) Collected: 01/29/22 1445    Lab  Status: Preliminary result Specimen: Blood from Arm, Right Updated: 02/02/22 1600     Blood Culture No growth at 4 days    Narrative:      Aerobic bottle only            CT Abdomen Pelvis Without Contrast    Result Date: 1/29/2022  EXAMINATION: CT ABDOMEN PELVIS WO CONTRAST-  INDICATION: abd pain, upper abd h/o cirrhosis, h/o varices, h/o pancreatitis  TECHNIQUE: 5 mm unenhanced images through the abdomen and pelvis  The radiation dose reduction device was turned on for each scan per the ALARA (As Low as Reasonably Achievable) protocol.  COMPARISON: 11/30/2021 abdomen pelvis CT scan  FINDINGS: Previous study from 11/30/2021 showed fecal stasis but no acute disease.  Today's study shows the included lower lungs to appear clear except for a tiny scarlike nodule replacing the more extensive linear atelectasis on the prior study. Clips are seen in the gallbladder fossa. There is diffuse fatty liver change. Only only a subtle suggestion of liver capsular nodularity is visible. The spleen is not enlarged. No significant abnormalities are seen of the pancreas, adrenal glands, or kidneys for a non-IV contrast enhanced exam. No upper abdominal adenopathy, ascites, or acute inflammatory change is seen. There is focal rectus diastases at the level of the umbilicus, but no obvious true hernia.  Bowel loops are normal in caliber and appearance. The terminal ileum and cecum appear grossly normal. The appendix by history is surgically absent. Uterus is premenopausal in size. The ovaries are not enlarged. No intrapelvic free fluid or acute inflammatory change is seen. The bladder is decompressed. No mass or adenopathy is seen. Bony structures appear to be intact. There are bilateral hip prostheses.        Diffuse fatty liver change. No evidence of acute intra-abdominal or intrapelvic disease is seen.   This report was finalized on 1/29/2022 3:13 PM by Dr. Bo Gardner MD.      XR Abdomen KUB    Result Date: 2/2/2022  DATE OF EXAM:  2/2/2022 9:08 AM  PROCEDURE: XR ABDOMEN KUB-  INDICATIONS: Refractory constipation; A41.9-Sepsis, unspecified organism; K70.30-Alcoholic cirrhosis of liver without ascites; I85.01-Esophageal varices with bleeding; N17.9-Acute kidney failure, unspecified  COMPARISON: 12/3/2021  TECHNIQUE: Single radiographic view of the abdomen was obtained.  FINDINGS: Abdominal bowel gas pattern is within normal limits.  There is a moderate amount stool throughout the transverse and descending portions of the colon.  Moderate stool is also seen within the rectosigmoid colon.  Overall findings are similar prior exam.  No evidence of bowel obstruction.  No abnormal calcic aerations overlie the abdomen or pelvis.  There are surgical clips of the right upper quadrant from prior cholecystectomy.  Patient appears to be status post bilateral tubal ligation.  Patient is also status post bilateral total hip arthroplasty.        1.  Moderate amount stool throughout the colon similar to prior exam. No evidence of bowel obstruction.  This report was finalized on 2/2/2022 9:23 AM by Nahid Quintana MD.      XR Chest PA & Lateral    Result Date: 1/30/2022  EXAMINATION: XR CHEST PA AND LATERAL-  INDICATION: leukocytosis, SIRS; A41.9-Sepsis, unspecified organism; K70.30-Alcoholic cirrhosis of liver without ascites; I85.01-Esophageal varices with bleeding; N17.9-Acute kidney failure, unspecified  TECHNIQUE: 2 views of the chest  COMPARISON: 9/30/2021  FINDINGS:  Normal cardiomediastinal silhouette. The lungs are clear without focal consolidation. No pleural effusion. No pneumothorax. No acute osseous findings.       No acute cardiopulmonary findings.  This report was finalized on 1/30/2022 7:33 AM by Fahad Sandoval MD.        Results for orders placed during the hospital encounter of 11/23/21    Duplex Mesenteric Complete CAR    Interpretation Summary  EXAMINATION: DUPLEX SMA COMPLETE CAR-12/01/2021:    INDICATION: Generalized abdominal  pain.    COMPARISON: Unenhanced CT scan of 11/30/2021.    FINDINGS: The study is considered technically adequate, although the  patient is unable to suspend respiration during the exam. All portions  of the celiac axis and SMA are visualized. Peak systolic celiac axis  velocity is 150 cm/s at its origin. Peak systolic SMA velocity is 207  cm/s proximally, both considered within normal limits. Review of the CT  study, on the sagittal images shows no obvious celiac axis stenosis, and  only a suggestion of mild-to-moderate celiac axis origin narrowing.    Impression  No evidence of hemodynamically significant celiac axis or  SMA stenosis.    D:  12/03/2021  E:  12/03/2021        This report was finalized on 12/4/2021 3:34 PM by Dr. Bo Gardner MD.      Results for orders placed during the hospital encounter of 11/23/21    Duplex Mesenteric Complete CAR    Interpretation Summary  EXAMINATION: DUPLEX SMA COMPLETE CAR-12/01/2021:    INDICATION: Generalized abdominal pain.    COMPARISON: Unenhanced CT scan of 11/30/2021.    FINDINGS: The study is considered technically adequate, although the  patient is unable to suspend respiration during the exam. All portions  of the celiac axis and SMA are visualized. Peak systolic celiac axis  velocity is 150 cm/s at its origin. Peak systolic SMA velocity is 207  cm/s proximally, both considered within normal limits. Review of the CT  study, on the sagittal images shows no obvious celiac axis stenosis, and  only a suggestion of mild-to-moderate celiac axis origin narrowing.    Impression  No evidence of hemodynamically significant celiac axis or  SMA stenosis.    D:  12/03/2021  E:  12/03/2021        This report was finalized on 12/4/2021 3:34 PM by Dr. Bo Gardner MD.      Results for orders placed during the hospital encounter of 06/18/21    Adult Transthoracic Echo Complete W/ Cont if Necessary Per Protocol    Interpretation Summary  · The quality of the study is limited due to  patient positioning and patient being intubated.  · Left ventricular ejection fraction appears to be 51 - 55%. Left ventricular systolic function is normal.  · Left ventricular diastolic function is consistent with (grade Ia w/high LAP) impaired relaxation.  · Mildly reduced right ventricular systolic function noted.      Pending Labs     Order Current Status    Blood Culture - Blood, Arm, Right Preliminary result    Blood Culture - Blood, Wrist, Right Preliminary result        Discharge Details        Discharge Medications      New Medications      Instructions Start Date   pantoprazole 40 MG EC tablet  Commonly known as: PROTONIX   40 mg, Oral, 2 Times Daily Before Meals         Continue These Medications      Instructions Start Date   aspirin 81 MG EC tablet   81 mg, Oral, Daily      bisacodyl 10 MG suppository  Commonly known as: DULCOLAX   10 mg, Rectal, Daily PRN      bumetanide 1 MG tablet  Commonly known as: BUMEX   2 mg, Oral, Daily      clopidogrel 75 MG tablet  Commonly known as: PLAVIX   75 mg, Oral, Daily      docusate sodium 100 MG capsule  Commonly known as: Colace   100 mg, Oral, 2 Times Daily      FLUoxetine 40 MG capsule  Commonly known as: PROzac   40 mg, Oral, Daily      gabapentin 300 MG capsule  Commonly known as: NEURONTIN   300 mg, Oral, 3 Times Daily      lactulose 10 GM/15ML solution  Commonly known as: CHRONULAC   30 g, Oral, 3 Times Daily      lubiprostone 24 MCG capsule  Commonly known as: Amitiza   24 mcg, Oral, 2 Times Daily With Meals      magnesium oxide 400 (241.3 Mg) MG tablet tablet  Commonly known as: MAGOX   400 mg, Oral, Every Evening      Melatonin 10 MG tablet   1 tablet, Oral, Nightly      Multivitamin tablet tablet  Generic drug: multivitamin   No dose, route, or frequency recorded.      OLANZapine 10 MG tablet  Commonly known as: zyPREXA   10 mg, Oral, Nightly      ondansetron 4 MG tablet  Commonly known as: Zofran   4 mg, Oral, Every 8 Hours PRN      potassium chloride 20  MEQ CR tablet  Commonly known as: K-DUR,KLOR-CON   1 tablet, Oral, Daily      ranolazine 1000 MG 12 hr tablet  Commonly known as: RANEXA   1,000 mg, Oral, 2 Times Daily      rosuvastatin 20 MG tablet  Commonly known as: CRESTOR   20 mg, Oral, Nightly      spironolactone 100 MG tablet  Commonly known as: ALDACTONE   100 mg, Oral, 3 Times Daily      tamsulosin 0.4 MG capsule 24 hr capsule  Commonly known as: FLOMAX   0.4 mg, Oral, Daily      traZODone 50 MG tablet  Commonly known as: DESYREL   50 mg, Oral, Nightly      ursodiol 300 MG capsule  Commonly known as: ACTIGALL   600 mg, Oral, 2 Times Daily      vitamin b complex capsule capsule   1 capsule, Oral, Daily      vitamin B-6 25 MG tablet  Commonly known as: PYRIDOXINE   25 mg, Oral, Daily      Xifaxan 550 MG tablet  Generic drug: riFAXIMin   550 mg, Oral, Every 12 Hours Scheduled             Allergies   Allergen Reactions   • Contrast Dye Anaphylaxis     6/18 Pt coded after receiving contrast for a CT scan. Was premedicated. Was having an MI at the same time. Immediately underwent heart cath with contrast without additional allergic reaction  7/15 Pt premedicated with prednisone/Benadryl for heart cath. Still with anaphylactic-like reaction with drop in BP and hypoxia. Treated 95% stenosis with minimal dye and supported with epinephrine, solumedrol, NRB   • Haloperidol Hallucinations   • Nsaids GI Bleeding     Other reaction(s): Bleeding, VOMITING   • Tylenol [Acetaminophen] Other (See Comments)     CIRRHOSIS         Discharge Disposition:  Home or Self Care    Diet:  Hospital:  Diet Order   Procedures   • Diet Regular; GI Soft          CODE STATUS:    Code Status and Medical Interventions:   Ordered at: 01/29/22 1232     Level Of Support Discussed With:    Patient     Code Status (Patient has no pulse and is not breathing):    CPR (Attempt to Resuscitate)     Medical Interventions (Patient has pulse or is breathing):    Full Support       Future Appointments    Date Time Provider Department [place]   4/4/2022  9:15 AM Vikram Gonzales MD [facility]   4/13/2022 10:00 AM Sapphire Ambriz PA-C [facility]   5/12/2022  8:30 AM Nelson Yip APRN [facility] [address] [facility]       Additional Instructions for the Follow-ups that You Need to Schedule     Discharge Follow-up with PCP   As directed       Currently Documented PCP:    Toshia Mitchell APRN    PCP Phone Number:    [phone]     Follow Up Details: 1 week         Discharge Follow-up with Specified Provider: Keep previously scheduled appointment with [facility] for cirrhosis monitoring   As directed      To: Keep previously scheduled appointment with [facility] for cirrhosis monitoring                     Haroldo Hillman DO  02/02/22      Time Spent on Discharge:  I spent  32  minutes on this discharge activity which included: face-to-face encounter with the patient, reviewing the data in the system, coordination of the care with the nursing staff as well as consultants, documentation, and entering orders.

## 2022-02-03 LAB
BACTERIA SPEC AEROBE CULT: NORMAL
BACTERIA SPEC AEROBE CULT: NORMAL

## 2022-02-03 NOTE — OUTREACH NOTE
Prep Survey      Responses   [facility] facility patient discharged from? [place]   Is LACE score < 7 ? No   Emergency Room discharge w/ pulse ox? No   Eligibility Readm Mgmt   Discharge diagnosis Sepsis   Does the patient have one of the following disease processes/diagnoses(primary or secondary)? Sepsis   Does the patient have Home health ordered? No   Is there a DME ordered? No   Prep survey completed? Yes          Urmila Pace RN        
no

## 2022-02-07 ENCOUNTER — READMISSION MANAGEMENT (OUTPATIENT)
Dept: CALL CENTER | Facility: HOSPITAL | Age: 47
End: 2022-02-07

## 2022-02-08 ENCOUNTER — PRIOR AUTHORIZATION (OUTPATIENT)
Dept: GASTROENTEROLOGY | Facility: CLINIC | Age: 47
End: 2022-02-08

## 2022-02-15 ENCOUNTER — READMISSION MANAGEMENT (OUTPATIENT)
Dept: CALL CENTER | Facility: HOSPITAL | Age: 47
End: 2022-02-15

## 2022-02-15 NOTE — OUTREACH NOTE
Sepsis Week 2 Survey      Responses   [facility] patient discharged from? [place]   Does the patient have one of the following disease processes/diagnoses(primary or secondary)? Sepsis   Week 2 attempt successful? Yes   Call start time 1227   Call end time 1228   Discharge diagnosis Sepsis   Is the patient taking all medications as directed (includes completed medication regime)? Yes   Has the patient kept scheduled appointments due by today? Yes   Has home health visited the patient within 72 hours of discharge? N/A   Psychosocial issues? No   Did the patient receive a copy of their discharge instructions? Yes   What is the patient's perception of their health status since discharge? Improving   Nursing interventions Nurse provided patient education   Is the patient/caregiver able to teach back Sepsis? S - Shivering,fever or very cold   Nursing interventions Nurse provided reassurance to patient   Is patient/caregiver able to teach back steps to recovery at home? Rest and regain strength,  Set small, achievable goals for return to baseline health,  Eat a balanced diet   Is the patient/caregiver able to teach back signs and symptoms of worsening condition: Fever   If the patient is a current smoker, are they able to teach back resources for cessation? Not a smoker   Is the patient/caregiver able to teach back the hierarchy of who to call/visit for symptoms/problems? PCP, Specialist, Home health nurse, Urgent Care, ED, 911 Yes   Week 2 call completed? Yes   Wrap up additional comments Pt states she is improving, no needs identified.           Ramona Luis RN

## 2022-02-23 ENCOUNTER — READMISSION MANAGEMENT (OUTPATIENT)
Dept: CALL CENTER | Facility: HOSPITAL | Age: 47
End: 2022-02-23

## 2022-02-23 NOTE — OUTREACH NOTE
Sepsis Week 3 Survey      Responses   [facility] patient discharged from? [place]   Does the patient have one of the following disease processes/diagnoses(primary or secondary)? Sepsis   Week 3 attempt successful? Yes   Call start time 1419   Call end time 1425   Discharge diagnosis Sepsis   Meds reviewed with patient/caregiver? Yes   Is the patient having any side effects they believe may be caused by any medication additions or changes? No   Does the patient have all medications related to this admission filled (includes all antibiotics, inhalers, nebulizers,steroids,etc.) Yes   Is the patient taking all medications as directed (includes completed medication regime)? Yes   Does the patient have a primary care provider?  Yes   Does the patient have an appointment with their PCP within 7 days of discharge? Yes   Has the patient kept scheduled appointments due by today? Yes   Psychosocial issues? No   Did the patient receive a copy of their discharge instructions? Yes   Nursing interventions Reviewed instructions with patient   What is the patient's perception of their health status since discharge? Same   Nursing interventions Nurse provided patient education   Is the patient/caregiver able to teach back Sepsis? S - Shivering,fever or very cold,  S - Short of breath   Nursing interventions Nurse provided reassurance to patient   Is patient/caregiver able to teach back steps to recovery at home? Rest and regain strength,  Set small, achievable goals for return to baseline health,  Eat a balanced diet   Is the patient/caregiver able to teach back signs and symptoms of worsening condition: Fever   If the patient is a current smoker, are they able to teach back resources for cessation? Not a smoker   Is the patient/caregiver able to teach back the hierarchy of who to call/visit for symptoms/problems? PCP, Specialist, Home health nurse, Urgent Care, ED, 911 Yes   Wrap up additional comments pt stted not having a  good day today. Is calling her PCP for appt.          Stephanie Marques RN

## 2022-02-23 NOTE — OUTREACH NOTE
Sepsis Week 3 Survey      Responses   [facility] patient discharged from? [place]   Does the patient have one of the following disease processes/diagnoses(primary or secondary)? Sepsis   Week 3 attempt successful? Yes   Call start time 1419   Call end time 1425   Discharge diagnosis Sepsis   Meds reviewed with patient/caregiver? Yes   Is the patient having any side effects they believe may be caused by any medication additions or changes? No   Does the patient have all medications related to this admission filled (includes all antibiotics, inhalers, nebulizers,steroids,etc.) Yes   Is the patient taking all medications as directed (includes completed medication regime)? Yes   Does the patient have a primary care provider?  Yes   Does the patient have an appointment with their PCP within 7 days of discharge? Yes   Has the patient kept scheduled appointments due by today? Yes   Psychosocial issues? No   Did the patient receive a copy of their discharge instructions? Yes   Nursing interventions Reviewed instructions with patient   What is the patient's perception of their health status since discharge? Same   Nursing interventions Nurse provided patient education   Is the patient/caregiver able to teach back Sepsis? S - Shivering,fever or very cold,  S - Short of breath   Nursing interventions Nurse provided reassurance to patient   Is patient/caregiver able to teach back steps to recovery at home? Rest and regain strength,  Set small, achievable goals for return to baseline health,  Eat a balanced diet   Is the patient/caregiver able to teach back signs and symptoms of worsening condition: Fever   If the patient is a current smoker, are they able to teach back resources for cessation? Not a smoker   Is the patient/caregiver able to teach back the hierarchy of who to call/visit for symptoms/problems? PCP, Specialist, Home health nurse, Urgent Care, ED, 911 Yes   Wrap up additional comments pt stted not having a  good day today. Is calling her PCP for appt.          Stephanie Marques RN

## 2022-03-03 ENCOUNTER — READMISSION MANAGEMENT (OUTPATIENT)
Dept: CALL CENTER | Facility: HOSPITAL | Age: 47
End: 2022-03-03

## 2022-03-03 NOTE — OUTREACH NOTE
Sepsis Week 4 Survey      Responses   [facility] patient discharged from? [place]   Does the patient have one of the following disease processes/diagnoses(primary or secondary)? Sepsis   Week 4 attempt successful? No          Ivana Hudson RN

## 2022-03-07 ENCOUNTER — APPOINTMENT (OUTPATIENT)
Dept: CT IMAGING | Facility: HOSPITAL | Age: 47
End: 2022-03-07

## 2022-03-07 ENCOUNTER — HOSPITAL ENCOUNTER (INPATIENT)
Facility: HOSPITAL | Age: 47
LOS: 5 days | Discharge: HOME | End: 2022-03-12
Attending: EMERGENCY MEDICINE | Admitting: FAMILY MEDICINE

## 2022-03-07 DIAGNOSIS — E86.0 ACUTE DEHYDRATION: ICD-10-CM

## 2022-03-07 DIAGNOSIS — E87.20 LACTIC ACIDOSIS: ICD-10-CM

## 2022-03-07 DIAGNOSIS — R11.2 NAUSEA AND VOMITING IN ADULT: Primary | ICD-10-CM

## 2022-03-07 PROBLEM — K74.60 CIRRHOSIS OF LIVER: Status: RESOLVED | Noted: 2021-07-13 | Resolved: 2022-03-07

## 2022-03-07 PROBLEM — I47.1 SVT (SUPRAVENTRICULAR TACHYCARDIA): Status: RESOLVED | Noted: 2021-06-18 | Resolved: 2022-03-07

## 2022-03-07 PROBLEM — K92.0 HEMATEMESIS: Status: RESOLVED | Noted: 2021-11-23 | Resolved: 2022-03-07

## 2022-03-07 PROBLEM — S22.49XA RIB FRACTURES: Status: RESOLVED | Noted: 2021-07-13 | Resolved: 2022-03-07

## 2022-03-07 PROBLEM — R65.10 SIRS (SYSTEMIC INFLAMMATORY RESPONSE SYNDROME) (HCC): Status: RESOLVED | Noted: 2022-01-29 | Resolved: 2022-03-07

## 2022-03-07 PROBLEM — I47.10 SVT (SUPRAVENTRICULAR TACHYCARDIA): Status: RESOLVED | Noted: 2021-06-18 | Resolved: 2022-03-07

## 2022-03-07 LAB
ABO GROUP BLD: NORMAL
ALBUMIN SERPL-MCNC: 5.1 G/DL (ref 3.5–5.2)
ALBUMIN/GLOB SERPL: 1.5 G/DL
ALP SERPL-CCNC: 118 U/L (ref 39–117)
ALT SERPL W P-5'-P-CCNC: 26 U/L (ref 1–33)
ANION GAP SERPL CALCULATED.3IONS-SCNC: 24 MMOL/L (ref 5–15)
AST SERPL-CCNC: 25 U/L (ref 1–32)
B-HCG UR QL: NEGATIVE
BASOPHILS # BLD AUTO: 0.04 10*3/MM3 (ref 0–0.2)
BASOPHILS NFR BLD AUTO: 0.2 % (ref 0–1.5)
BILIRUB SERPL-MCNC: 0.6 MG/DL (ref 0–1.2)
BILIRUB UR QL STRIP: NEGATIVE
BLD GP AB SCN SERPL QL: NEGATIVE
BUN SERPL-MCNC: 20 MG/DL (ref 6–20)
BUN/CREAT SERPL: 11.4 (ref 7–25)
CALCIUM SPEC-SCNC: 9.9 MG/DL (ref 8.6–10.5)
CHLORIDE SERPL-SCNC: 89 MMOL/L (ref 98–107)
CLARITY UR: CLEAR
CO2 SERPL-SCNC: 22 MMOL/L (ref 22–29)
COLOR UR: YELLOW
CREAT SERPL-MCNC: 1.76 MG/DL (ref 0.57–1)
D DIMER PPP FEU-MCNC: 1.2 MCGFEU/ML (ref 0–0.56)
D-LACTATE SERPL-SCNC: 5.5 MMOL/L (ref 0.5–2)
D-LACTATE SERPL-SCNC: 6.5 MMOL/L (ref 0.5–2)
DEPRECATED RDW RBC AUTO: 39.2 FL (ref 37–54)
EGFRCR SERPLBLD CKD-EPI 2021: 35.6 ML/MIN/1.73
EOSINOPHIL # BLD AUTO: 0 10*3/MM3 (ref 0–0.4)
EOSINOPHIL NFR BLD AUTO: 0 % (ref 0.3–6.2)
ERYTHROCYTE [DISTWIDTH] IN BLOOD BY AUTOMATED COUNT: 12.7 % (ref 12.3–15.4)
EXPIRATION DATE: NORMAL
FLUAV RNA RESP QL NAA+PROBE: NOT DETECTED
FLUBV RNA RESP QL NAA+PROBE: NOT DETECTED
GLOBULIN UR ELPH-MCNC: 3.3 GM/DL
GLUCOSE SERPL-MCNC: 163 MG/DL (ref 65–99)
GLUCOSE UR STRIP-MCNC: NEGATIVE MG/DL
HCT VFR BLD AUTO: 47.7 % (ref 34–46.6)
HGB BLD-MCNC: 17.1 G/DL (ref 12–15.9)
HGB UR QL STRIP.AUTO: NEGATIVE
HOLD SPECIMEN: NORMAL
IMM GRANULOCYTES # BLD AUTO: 0.07 10*3/MM3 (ref 0–0.05)
IMM GRANULOCYTES NFR BLD AUTO: 0.4 % (ref 0–0.5)
INTERNAL NEGATIVE CONTROL: NEGATIVE
INTERNAL POSITIVE CONTROL: POSITIVE
KETONES UR QL STRIP: NEGATIVE
LEUKOCYTE ESTERASE UR QL STRIP.AUTO: NEGATIVE
LIPASE SERPL-CCNC: 19 U/L (ref 13–60)
LYMPHOCYTES # BLD AUTO: 1.82 10*3/MM3 (ref 0.7–3.1)
LYMPHOCYTES NFR BLD AUTO: 11.3 % (ref 19.6–45.3)
Lab: NORMAL
MCH RBC QN AUTO: 30.8 PG (ref 26.6–33)
MCHC RBC AUTO-ENTMCNC: 35.8 G/DL (ref 31.5–35.7)
MCV RBC AUTO: 85.8 FL (ref 79–97)
MONOCYTES # BLD AUTO: 1.27 10*3/MM3 (ref 0.1–0.9)
MONOCYTES NFR BLD AUTO: 7.9 % (ref 5–12)
NEUTROPHILS NFR BLD AUTO: 12.87 10*3/MM3 (ref 1.7–7)
NEUTROPHILS NFR BLD AUTO: 80.2 % (ref 42.7–76)
NITRITE UR QL STRIP: NEGATIVE
NRBC BLD AUTO-RTO: 0 /100 WBC (ref 0–0.2)
PH UR STRIP.AUTO: 6.5 [PH] (ref 5–8)
PLATELET # BLD AUTO: 233 10*3/MM3 (ref 140–450)
PMV BLD AUTO: 10.1 FL (ref 6–12)
POTASSIUM SERPL-SCNC: 4 MMOL/L (ref 3.5–5.2)
PROCALCITONIN SERPL-MCNC: 0.13 NG/ML (ref 0–0.25)
PROT SERPL-MCNC: 8.4 G/DL (ref 6–8.5)
PROT UR QL STRIP: NEGATIVE
QT INTERVAL: 284 MS
QTC INTERVAL: 436 MS
RBC # BLD AUTO: 5.56 10*6/MM3 (ref 3.77–5.28)
RH BLD: POSITIVE
SARS-COV-2 RNA RESP QL NAA+PROBE: NOT DETECTED
SODIUM SERPL-SCNC: 135 MMOL/L (ref 136–145)
SP GR UR STRIP: 1.01 (ref 1–1.03)
T&S EXPIRATION DATE: NORMAL
TROPONIN T SERPL-MCNC: <0.01 NG/ML (ref 0–0.03)
UROBILINOGEN UR QL STRIP: NORMAL
WBC NRBC COR # BLD: 16.07 10*3/MM3 (ref 3.4–10.8)
WHOLE BLOOD HOLD SPECIMEN: NORMAL
WHOLE BLOOD HOLD SPECIMEN: NORMAL

## 2022-03-07 PROCEDURE — 80053 COMPREHEN METABOLIC PANEL: CPT | Performed by: EMERGENCY MEDICINE

## 2022-03-07 PROCEDURE — 25010000002 MORPHINE PER 10 MG: Performed by: EMERGENCY MEDICINE

## 2022-03-07 PROCEDURE — 81025 URINE PREGNANCY TEST: CPT | Performed by: EMERGENCY MEDICINE

## 2022-03-07 PROCEDURE — 83605 ASSAY OF LACTIC ACID: CPT | Performed by: EMERGENCY MEDICINE

## 2022-03-07 PROCEDURE — 86850 RBC ANTIBODY SCREEN: CPT | Performed by: EMERGENCY MEDICINE

## 2022-03-07 PROCEDURE — 86901 BLOOD TYPING SEROLOGIC RH(D): CPT | Performed by: EMERGENCY MEDICINE

## 2022-03-07 PROCEDURE — 85379 FIBRIN DEGRADATION QUANT: CPT | Performed by: INTERNAL MEDICINE

## 2022-03-07 PROCEDURE — 99223 1ST HOSP IP/OBS HIGH 75: CPT | Performed by: INTERNAL MEDICINE

## 2022-03-07 PROCEDURE — 25010000002 HYDROMORPHONE 1 MG/ML SOLUTION: Performed by: INTERNAL MEDICINE

## 2022-03-07 PROCEDURE — 25010000002 MORPHINE PER 10 MG: Performed by: INTERNAL MEDICINE

## 2022-03-07 PROCEDURE — 74176 CT ABD & PELVIS W/O CONTRAST: CPT

## 2022-03-07 PROCEDURE — 93005 ELECTROCARDIOGRAM TRACING: CPT | Performed by: EMERGENCY MEDICINE

## 2022-03-07 PROCEDURE — 84484 ASSAY OF TROPONIN QUANT: CPT | Performed by: EMERGENCY MEDICINE

## 2022-03-07 PROCEDURE — 99284 EMERGENCY DEPT VISIT MOD MDM: CPT

## 2022-03-07 PROCEDURE — 84145 PROCALCITONIN (PCT): CPT | Performed by: INTERNAL MEDICINE

## 2022-03-07 PROCEDURE — 87636 SARSCOV2 & INF A&B AMP PRB: CPT | Performed by: EMERGENCY MEDICINE

## 2022-03-07 PROCEDURE — 25010000002 ONDANSETRON PER 1 MG: Performed by: EMERGENCY MEDICINE

## 2022-03-07 PROCEDURE — 85025 COMPLETE CBC W/AUTO DIFF WBC: CPT | Performed by: EMERGENCY MEDICINE

## 2022-03-07 PROCEDURE — 86900 BLOOD TYPING SEROLOGIC ABO: CPT | Performed by: EMERGENCY MEDICINE

## 2022-03-07 PROCEDURE — 81003 URINALYSIS AUTO W/O SCOPE: CPT | Performed by: EMERGENCY MEDICINE

## 2022-03-07 PROCEDURE — 25010000002 METOCLOPRAMIDE PER 10 MG: Performed by: EMERGENCY MEDICINE

## 2022-03-07 PROCEDURE — 87040 BLOOD CULTURE FOR BACTERIA: CPT | Performed by: EMERGENCY MEDICINE

## 2022-03-07 PROCEDURE — 83690 ASSAY OF LIPASE: CPT | Performed by: EMERGENCY MEDICINE

## 2022-03-07 PROCEDURE — 25010000002 HYDROMORPHONE PER 4 MG: Performed by: INTERNAL MEDICINE

## 2022-03-07 PROCEDURE — P9612 CATHETERIZE FOR URINE SPEC: HCPCS

## 2022-03-07 RX ORDER — PROMETHAZINE HYDROCHLORIDE 12.5 MG/1
12.5 TABLET ORAL EVERY 6 HOURS PRN
Status: DISCONTINUED | OUTPATIENT
Start: 2022-03-07 | End: 2022-03-09

## 2022-03-07 RX ORDER — ONDANSETRON 2 MG/ML
4 INJECTION INTRAMUSCULAR; INTRAVENOUS ONCE
Status: COMPLETED | OUTPATIENT
Start: 2022-03-07 | End: 2022-03-07

## 2022-03-07 RX ORDER — MORPHINE SULFATE 2 MG/ML
2 INJECTION, SOLUTION INTRAMUSCULAR; INTRAVENOUS
Status: DISCONTINUED | OUTPATIENT
Start: 2022-03-07 | End: 2022-03-07

## 2022-03-07 RX ORDER — DEXTROSE, SODIUM CHLORIDE, AND POTASSIUM CHLORIDE 5; .45; .15 G/100ML; G/100ML; G/100ML
100 INJECTION INTRAVENOUS CONTINUOUS
Status: DISCONTINUED | OUTPATIENT
Start: 2022-03-07 | End: 2022-03-09

## 2022-03-07 RX ORDER — LORAZEPAM 2 MG/ML
0.5 INJECTION INTRAMUSCULAR EVERY 6 HOURS PRN
Status: DISCONTINUED | OUTPATIENT
Start: 2022-03-07 | End: 2022-03-07

## 2022-03-07 RX ORDER — LORAZEPAM 2 MG/ML
0.5 INJECTION INTRAMUSCULAR EVERY 4 HOURS PRN
Status: DISCONTINUED | OUTPATIENT
Start: 2022-03-07 | End: 2022-03-11

## 2022-03-07 RX ORDER — SODIUM CHLORIDE 9 MG/ML
10 INJECTION INTRAVENOUS AS NEEDED
Status: DISCONTINUED | OUTPATIENT
Start: 2022-03-07 | End: 2022-03-13 | Stop reason: HOSPADM

## 2022-03-07 RX ORDER — METOCLOPRAMIDE HYDROCHLORIDE 5 MG/ML
10 INJECTION INTRAMUSCULAR; INTRAVENOUS ONCE
Status: COMPLETED | OUTPATIENT
Start: 2022-03-07 | End: 2022-03-07

## 2022-03-07 RX ORDER — HYDROMORPHONE HYDROCHLORIDE 1 MG/ML
0.5 INJECTION, SOLUTION INTRAMUSCULAR; INTRAVENOUS; SUBCUTANEOUS
Status: DISCONTINUED | OUTPATIENT
Start: 2022-03-07 | End: 2022-03-08

## 2022-03-07 RX ORDER — MORPHINE SULFATE 2 MG/ML
2 INJECTION, SOLUTION INTRAMUSCULAR; INTRAVENOUS
Status: COMPLETED | OUTPATIENT
Start: 2022-03-07 | End: 2022-03-07

## 2022-03-07 RX ORDER — PANTOPRAZOLE SODIUM 40 MG/10ML
40 INJECTION, POWDER, LYOPHILIZED, FOR SOLUTION INTRAVENOUS
Status: DISCONTINUED | OUTPATIENT
Start: 2022-03-07 | End: 2022-03-09

## 2022-03-07 RX ORDER — PROMETHAZINE HYDROCHLORIDE 12.5 MG/1
12.5 SUPPOSITORY RECTAL EVERY 6 HOURS PRN
Status: DISCONTINUED | OUTPATIENT
Start: 2022-03-07 | End: 2022-03-09

## 2022-03-07 RX ADMIN — MORPHINE SULFATE 2 MG: 2 INJECTION, SOLUTION INTRAMUSCULAR; INTRAVENOUS at 17:27

## 2022-03-07 RX ADMIN — HYDROMORPHONE HYDROCHLORIDE 1 MG: 1 INJECTION, SOLUTION INTRAMUSCULAR; INTRAVENOUS; SUBCUTANEOUS at 23:16

## 2022-03-07 RX ADMIN — POTASSIUM CHLORIDE, DEXTROSE MONOHYDRATE AND SODIUM CHLORIDE 100 ML/HR: 150; 5; 450 INJECTION, SOLUTION INTRAVENOUS at 23:16

## 2022-03-07 RX ADMIN — HYDROMORPHONE HYDROCHLORIDE 0.5 MG: 1 INJECTION, SOLUTION INTRAMUSCULAR; INTRAVENOUS; SUBCUTANEOUS at 21:19

## 2022-03-07 RX ADMIN — ONDANSETRON 4 MG: 2 INJECTION INTRAMUSCULAR; INTRAVENOUS at 17:24

## 2022-03-07 RX ADMIN — MORPHINE SULFATE 2 MG: 2 INJECTION, SOLUTION INTRAMUSCULAR; INTRAVENOUS at 18:50

## 2022-03-07 RX ADMIN — MORPHINE SULFATE 2 MG: 2 INJECTION, SOLUTION INTRAMUSCULAR; INTRAVENOUS at 16:03

## 2022-03-07 RX ADMIN — PANTOPRAZOLE SODIUM 40 MG: 40 INJECTION, POWDER, LYOPHILIZED, FOR SOLUTION INTRAVENOUS at 19:42

## 2022-03-07 RX ADMIN — METOCLOPRAMIDE 10 MG: 5 INJECTION, SOLUTION INTRAMUSCULAR; INTRAVENOUS at 18:52

## 2022-03-07 RX ADMIN — MORPHINE SULFATE 2 MG: 2 INJECTION, SOLUTION INTRAMUSCULAR; INTRAVENOUS at 19:43

## 2022-03-07 RX ADMIN — SODIUM CHLORIDE 1000 ML: 9 INJECTION, SOLUTION INTRAVENOUS at 15:40

## 2022-03-07 RX ADMIN — ONDANSETRON 4 MG: 2 INJECTION INTRAMUSCULAR; INTRAVENOUS at 15:59

## 2022-03-07 NOTE — ED PROVIDER NOTES
Subjective   46-year-old female presents with complaint of epigastric abdominal pain with associated nausea vomiting.  She reports that the symptoms have been present now for the last couple days.  She also reports that she has been vomiting up blood.  She denies passing any blood in her stool.  She does state that she has known alcoholic cirrhosis and also reports a previous history of esophageal varices.  From record review the patient had a very similar presentation this that required recent admission.  Also from record review she has a previous EGD that showed esophageal varices and a more recent EGD that did not show esophageal varices.  The patient's most recent admission at the end of January month and a half ago they did not do a repeat EGD and felt like a Claudia-Arce tear was contributing to the hematemesis at that given time.  The patient was also felt to have dehydration at that given time.  Per record review at that given time they were concerned about excessive IV narcotic use as well.  He denies any current fever and is afebrile upon my presentation.  She also reports a previous history of pancreatitis and states that this feels similar to her previous episodes of pancreatitis.  Surgeries include appendectomy, cholecystectomy, and .  She denies any current diarrhea.  She denies taking any medication prior to arrival.  No other acute complaints.          Review of Systems   Constitutional: Positive for appetite change and fatigue. Negative for chills and fever.   HENT: Negative for congestion, ear pain, postnasal drip, sinus pressure and sore throat.    Eyes: Negative for pain, redness and visual disturbance.   Respiratory: Negative for cough, chest tightness and shortness of breath.    Cardiovascular: Negative for chest pain, palpitations and leg swelling.   Gastrointestinal: Positive for abdominal pain, nausea and vomiting. Negative for anal bleeding, blood in stool and diarrhea.    Endocrine: Negative for polydipsia and polyuria.   Genitourinary: Negative for difficulty urinating, dysuria, frequency and urgency.   Musculoskeletal: Negative for arthralgias, back pain and neck pain.   Skin: Negative for pallor and rash.   Allergic/Immunologic: Negative for environmental allergies and immunocompromised state.   Neurological: Negative for dizziness, weakness and headaches.   Hematological: Negative for adenopathy.   Psychiatric/Behavioral: Negative for confusion, self-injury and suicidal ideas. The patient is not nervous/anxious.    All other systems reviewed and are negative.      Past Medical History:   Diagnosis Date   • Acute pancreatitis    • Alcoholism (HCC)     sober 2.5 years   • Arthritis    • Bone necrosis (HCC)    • CAD (coronary artery disease) 7/13/2021   • Cirrhosis (HCC)    • Gastroparesis    • GERD (gastroesophageal reflux disease)    • History of esophageal varices    • History of kidney stones    • Hypertension    • Memory loss    • Pancreatitis        Allergies   Allergen Reactions   • Contrast Dye Anaphylaxis     6/18 Pt coded after receiving contrast for a CT scan. Was premedicated. Was having an MI at the same time. Immediately underwent heart cath with contrast without additional allergic reaction  7/15 Pt premedicated with prednisone/Benadryl for heart cath. Still with anaphylactic-like reaction with drop in BP and hypoxia. Treated 95% stenosis with minimal dye and supported with epinephrine, solumedrol, NRB   • Haloperidol Hallucinations   • Nsaids GI Bleeding     Other reaction(s): Bleeding, VOMITING   • Tylenol [Acetaminophen] Other (See Comments)     CIRRHOSIS       Past Surgical History:   Procedure Laterality Date   • ANKLE SURGERY Right    • APPENDECTOMY     • CARDIAC CATHETERIZATION N/A 6/18/2021    Procedure: Left Heart Cath;  Surgeon: Vikram Gonzales MD;  Location: [facility] CATH INVASIVE LOCATION;  Service: Cardiovascular;  Laterality: N/A;   • CARDIAC  CATHETERIZATION N/A 7/15/2021    Procedure: LEFT HEART CATH;  Surgeon: Vikram Gonzales MD;  Location:  [facility] CATH INVASIVE LOCATION;  Service: Cardiology;  Laterality: N/A;   •  SECTION      x2   • CHOLECYSTECTOMY     • COLONOSCOPY     • COLONOSCOPY N/A 3/31/2020    Procedure: COLONOSCOPY;  Surgeon: Tirso Giles MD;  Location:  [facility] ENDOSCOPY;  Service: Gastroenterology;  Laterality: N/A;   • COLONOSCOPY N/A 2021    Procedure: COLONOSCOPY;  Surgeon: Tyrese Rasmussen MD;  Location:  [facility] ENDOSCOPY;  Service: Gastroenterology;  Laterality: N/A;   • CORONARY STENT PLACEMENT     • ENDOSCOPY     • ENDOSCOPY     • ENDOSCOPY N/A 2021    Procedure: ESOPHAGOGASTRODUODENOSCOPY AT BEDSIDE;  Surgeon: Tyrese Rasmussen MD;  Location:  [facility];  Service: Gastroenterology;  Laterality: N/A;   • ENDOSCOPY N/A 2021    Procedure: ESOPHAGOGASTRODUODENOSCOPY;  Surgeon: Tyrese Rasmussen MD;  Location:  [facility];  Service: Gastroenterology;  Laterality: N/A;   • ENDOSCOPY N/A 2021    Procedure: ESOPHAGOGASTRODUODENOSCOPY;  Surgeon: Tyrese Rasmussen MD;  Location:  [facility];  Service: Gastroenterology;  Laterality: N/A;   • REPLACEMENT TOTAL HIP LATERAL POSITION Left    • TOTAL HIP ARTHROPLASTY Right     x2   • TOTAL KNEE ARTHROPLASTY Left        Family History   Problem Relation Age of Onset   • Diabetes type II Mother    • Breast cancer Mother    • Heart disease Father    • Hypertension Brother    • ADD / ADHD Child    • Autism Child    • Asperger's syndrome Child    • Colon cancer Neg Hx    • Colon polyps Neg Hx        Social History     Socioeconomic History   • Marital status:    Tobacco Use   • Smoking status: Former Smoker     Packs/day: 0.50     Types: Electronic Cigarette, Cigarettes     Quit date: 2021     Years since quittin.6   • Smokeless tobacco: Never Used   Vaping Use   • Vaping Use: Former   Substance and Sexual Activity   • Alcohol use: Not  Currently   • Drug use: No   • Sexual activity: Defer           Objective   Physical Exam  Vitals and nursing note reviewed.   Constitutional:       General: She is in acute distress.      Appearance: Normal appearance. She is well-developed. She is obese. She is ill-appearing. She is not toxic-appearing or diaphoretic.      Comments: Patient appears in pain, tachycardic.   HENT:      Head: Normocephalic and atraumatic.      Right Ear: External ear normal.      Left Ear: External ear normal.      Nose: Nose normal.   Eyes:      General: Lids are normal.      Pupils: Pupils are equal, round, and reactive to light.   Neck:      Trachea: No tracheal deviation.   Cardiovascular:      Rate and Rhythm: Regular rhythm. Tachycardia present.      Pulses: No decreased pulses.      Heart sounds: Normal heart sounds. No murmur heard.    No friction rub. No gallop.   Pulmonary:      Effort: Pulmonary effort is normal. No respiratory distress.      Breath sounds: Normal breath sounds. No decreased breath sounds, wheezing, rhonchi or rales.   Abdominal:      General: Bowel sounds are normal.      Palpations: Abdomen is soft.      Tenderness: There is abdominal tenderness in the epigastric area. There is no guarding or rebound.       Musculoskeletal:         General: No deformity. Normal range of motion.      Cervical back: Normal range of motion and neck supple.   Lymphadenopathy:      Cervical: No cervical adenopathy.   Skin:     General: Skin is warm and dry.      Findings: No rash.   Neurological:      Mental Status: She is alert and oriented to person, place, and time.      Cranial Nerves: No cranial nerve deficit.      Sensory: No sensory deficit.   Psychiatric:         Speech: Speech normal.         Behavior: Behavior normal.         Thought Content: Thought content normal.         Judgment: Judgment normal.         Critical Care  Performed by: Jessika Daniel MD  Authorized by: Jessika Daniel MD     Critical  care provider statement:     Critical care time (minutes):  35    Critical care time was exclusive of:  Separately billable procedures and treating other patients    Critical care was necessary to treat or prevent imminent or life-threatening deterioration of the following conditions:  Dehydration    Critical care was time spent personally by me on the following activities:  Development of treatment plan with patient or surrogate, discussions with consultants, evaluation of patient's response to treatment, examination of patient, ordering and performing treatments and interventions, ordering and review of laboratory studies, ordering and review of radiographic studies, pulse oximetry, re-evaluation of patient's condition, review of old charts and obtaining history from patient or surrogate               ED Course                                                 MDM  Number of Diagnoses or Management Options  Acute dehydration: new and requires workup  Lactic acidosis: new and requires workup  Nausea and vomiting in adult: new and requires workup  Diagnosis management comments: Of the acute abnormality identified on CT scan of the abdomen pelvis.  No signs of acute pancreatitis.  Lipase is normal.    Lab evaluation shows elevated white blood cell count and elevated lactic acid level.  2 L of IV fluid have been ordered at this time.  Pain medication nausea medication has also been ordered.    I discussed the patient with the hospitalist, Dr. Camargo.  The hospital service will consult on the patient to determine status of admission.       Amount and/or Complexity of Data Reviewed  Clinical lab tests: ordered and reviewed  Tests in the radiology section of CPT®: ordered and reviewed  Decide to obtain previous medical records or to obtain history from someone other than the patient: yes  Review and summarize past medical records: yes  Discuss the patient with other providers: yes  Independent visualization of images,  tracings, or specimens: yes        Final diagnoses:   Nausea and vomiting in adult   Acute dehydration   Lactic acidosis       ED Disposition  ED Disposition     ED Disposition   Decision to Admit    Condition   --    Comment   Level of Care: Telemetry [5]   Diagnosis: Nausea and vomiting in adult [[id number]]   Admitting Physician: KIA HAYWOOD [[id number]]   Attending Physician: KIA HAYWOOD [[id number]]   Certification: I Certify That Inpatient Hospital Services Are Medically Necessary For Greater Than 2 Midnights               No follow-up provider specified.       Medication List      No changes were made to your prescriptions during this visit.          Jessika Daniel MD  03/07/22 [id number]

## 2022-03-08 LAB
ALBUMIN SERPL-MCNC: 4.1 G/DL (ref 3.5–5.2)
ALBUMIN/GLOB SERPL: 1.6 G/DL
ALP SERPL-CCNC: 87 U/L (ref 39–117)
ALT SERPL W P-5'-P-CCNC: 18 U/L (ref 1–33)
ANION GAP SERPL CALCULATED.3IONS-SCNC: 14 MMOL/L (ref 5–15)
AST SERPL-CCNC: 20 U/L (ref 1–32)
BASOPHILS # BLD AUTO: 0.04 10*3/MM3 (ref 0–0.2)
BASOPHILS NFR BLD AUTO: 0.2 % (ref 0–1.5)
BILIRUB SERPL-MCNC: 0.6 MG/DL (ref 0–1.2)
BUN SERPL-MCNC: 19 MG/DL (ref 6–20)
BUN/CREAT SERPL: 13.3 (ref 7–25)
CALCIUM SPEC-SCNC: 8.3 MG/DL (ref 8.6–10.5)
CHLORIDE SERPL-SCNC: 96 MMOL/L (ref 98–107)
CO2 SERPL-SCNC: 25 MMOL/L (ref 22–29)
CREAT SERPL-MCNC: 1.43 MG/DL (ref 0.57–1)
D-LACTATE SERPL-SCNC: 2.5 MMOL/L (ref 0.5–2)
DEPRECATED RDW RBC AUTO: 43.2 FL (ref 37–54)
EGFRCR SERPLBLD CKD-EPI 2021: 45.6 ML/MIN/1.73
EOSINOPHIL # BLD AUTO: 0.01 10*3/MM3 (ref 0–0.4)
EOSINOPHIL NFR BLD AUTO: 0.1 % (ref 0.3–6.2)
ERYTHROCYTE [DISTWIDTH] IN BLOOD BY AUTOMATED COUNT: 12.8 % (ref 12.3–15.4)
GLOBULIN UR ELPH-MCNC: 2.5 GM/DL
GLUCOSE SERPL-MCNC: 131 MG/DL (ref 65–99)
HCT VFR BLD AUTO: 40.2 % (ref 34–46.6)
HGB BLD-MCNC: 13.5 G/DL (ref 12–15.9)
IMM GRANULOCYTES # BLD AUTO: 0.05 10*3/MM3 (ref 0–0.05)
IMM GRANULOCYTES NFR BLD AUTO: 0.3 % (ref 0–0.5)
LIPASE SERPL-CCNC: 13 U/L (ref 13–60)
LYMPHOCYTES # BLD AUTO: 3.68 10*3/MM3 (ref 0.7–3.1)
LYMPHOCYTES NFR BLD AUTO: 21.7 % (ref 19.6–45.3)
MCH RBC QN AUTO: 31 PG (ref 26.6–33)
MCHC RBC AUTO-ENTMCNC: 33.6 G/DL (ref 31.5–35.7)
MCV RBC AUTO: 92.4 FL (ref 79–97)
MONOCYTES # BLD AUTO: 1.86 10*3/MM3 (ref 0.1–0.9)
MONOCYTES NFR BLD AUTO: 11 % (ref 5–12)
NEUTROPHILS NFR BLD AUTO: 11.33 10*3/MM3 (ref 1.7–7)
NEUTROPHILS NFR BLD AUTO: 66.7 % (ref 42.7–76)
NRBC BLD AUTO-RTO: 0 /100 WBC (ref 0–0.2)
PLATELET # BLD AUTO: 180 10*3/MM3 (ref 140–450)
PMV BLD AUTO: 10 FL (ref 6–12)
POTASSIUM SERPL-SCNC: 3.7 MMOL/L (ref 3.5–5.2)
PROT SERPL-MCNC: 6.6 G/DL (ref 6–8.5)
RBC # BLD AUTO: 4.35 10*6/MM3 (ref 3.77–5.28)
SODIUM SERPL-SCNC: 135 MMOL/L (ref 136–145)
WBC NRBC COR # BLD: 16.97 10*3/MM3 (ref 3.4–10.8)

## 2022-03-08 PROCEDURE — 83605 ASSAY OF LACTIC ACID: CPT | Performed by: INTERNAL MEDICINE

## 2022-03-08 PROCEDURE — 99233 SBSQ HOSP IP/OBS HIGH 50: CPT | Performed by: FAMILY MEDICINE

## 2022-03-08 PROCEDURE — 25010000002 HYDROMORPHONE PER 4 MG: Performed by: FAMILY MEDICINE

## 2022-03-08 PROCEDURE — 25010000002 HYDROMORPHONE PER 4 MG: Performed by: INTERNAL MEDICINE

## 2022-03-08 PROCEDURE — 85025 COMPLETE CBC W/AUTO DIFF WBC: CPT | Performed by: INTERNAL MEDICINE

## 2022-03-08 PROCEDURE — 25010000002 LORAZEPAM PER 2 MG: Performed by: INTERNAL MEDICINE

## 2022-03-08 PROCEDURE — 63710000001 PROMETHAZINE PER 12.5 MG: Performed by: INTERNAL MEDICINE

## 2022-03-08 PROCEDURE — 80053 COMPREHEN METABOLIC PANEL: CPT | Performed by: INTERNAL MEDICINE

## 2022-03-08 PROCEDURE — 83690 ASSAY OF LIPASE: CPT | Performed by: INTERNAL MEDICINE

## 2022-03-08 RX ORDER — HYDROMORPHONE HYDROCHLORIDE 1 MG/ML
0.5 INJECTION, SOLUTION INTRAMUSCULAR; INTRAVENOUS; SUBCUTANEOUS EVERY 4 HOURS PRN
Status: DISCONTINUED | OUTPATIENT
Start: 2022-03-08 | End: 2022-03-10

## 2022-03-08 RX ADMIN — LORAZEPAM 0.5 MG: 2 INJECTION INTRAMUSCULAR; INTRAVENOUS at 01:27

## 2022-03-08 RX ADMIN — HYDROMORPHONE HYDROCHLORIDE 0.5 MG: 1 INJECTION, SOLUTION INTRAMUSCULAR; INTRAVENOUS; SUBCUTANEOUS at 08:55

## 2022-03-08 RX ADMIN — HYDROMORPHONE HYDROCHLORIDE 0.5 MG: 1 INJECTION, SOLUTION INTRAMUSCULAR; INTRAVENOUS; SUBCUTANEOUS at 03:19

## 2022-03-08 RX ADMIN — PROMETHAZINE HYDROCHLORIDE 12.5 MG: 12.5 TABLET ORAL at 00:14

## 2022-03-08 RX ADMIN — HYDROMORPHONE HYDROCHLORIDE 0.5 MG: 1 INJECTION, SOLUTION INTRAMUSCULAR; INTRAVENOUS; SUBCUTANEOUS at 17:10

## 2022-03-08 RX ADMIN — POTASSIUM CHLORIDE, DEXTROSE MONOHYDRATE AND SODIUM CHLORIDE 100 ML/HR: 150; 5; 450 INJECTION, SOLUTION INTRAVENOUS at 17:43

## 2022-03-08 RX ADMIN — PANTOPRAZOLE SODIUM 40 MG: 40 INJECTION, POWDER, LYOPHILIZED, FOR SOLUTION INTRAVENOUS at 05:59

## 2022-03-08 RX ADMIN — HYDROMORPHONE HYDROCHLORIDE 0.5 MG: 1 INJECTION, SOLUTION INTRAMUSCULAR; INTRAVENOUS; SUBCUTANEOUS at 12:57

## 2022-03-08 RX ADMIN — HYDROMORPHONE HYDROCHLORIDE 0.5 MG: 1 INJECTION, SOLUTION INTRAMUSCULAR; INTRAVENOUS; SUBCUTANEOUS at 05:59

## 2022-03-08 RX ADMIN — LORAZEPAM 0.5 MG: 2 INJECTION INTRAMUSCULAR; INTRAVENOUS at 23:03

## 2022-03-08 RX ADMIN — PROMETHAZINE HYDROCHLORIDE 12.5 MG: 12.5 TABLET ORAL at 05:59

## 2022-03-08 RX ADMIN — LORAZEPAM 0.5 MG: 2 INJECTION INTRAMUSCULAR; INTRAVENOUS at 12:56

## 2022-03-08 RX ADMIN — HYDROMORPHONE HYDROCHLORIDE 0.5 MG: 1 INJECTION, SOLUTION INTRAMUSCULAR; INTRAVENOUS; SUBCUTANEOUS at 21:16

## 2022-03-08 RX ADMIN — LORAZEPAM 0.5 MG: 2 INJECTION INTRAMUSCULAR; INTRAVENOUS at 17:11

## 2022-03-08 RX ADMIN — LORAZEPAM 0.5 MG: 2 INJECTION INTRAMUSCULAR; INTRAVENOUS at 08:55

## 2022-03-08 NOTE — PAYOR COMM NOTE
"JUICE FONTANEZ RN  UTILIZATION REVIEW  P:  [phone]  F:  [phone]      Notification of INPT admission on 3/7/22.                  Bipin Guzman (46 y.o. Female)             Date of Birth   1975    Social Security Number       Address   [address] [facility] [address]    Home Phone   [phone]    MRN   [id number]       Roman [employer]   Jehovah's witness    Marital Status                               Admission Date   3/7/22    Admission Type   Emergency    Admitting Provider   Karen Elena DO    Attending Provider   Karen Elena DO    Department, Room/Bed   [facility] 6B, N628/1       Discharge Date       Discharge Disposition       Discharge Destination                               Attending Provider: Karen Elena DO    Allergies: Contrast Dye, Haloperidol, Nsaids, Tylenol [Acetaminophen]    Isolation: None   Infection: None   Code Status: CPR   Advance Care Planning Activity    Ht: 157.5 cm (62\")   Wt: 81.6 kg (180 lb)    Admission Cmt: None   Principal Problem: None                Active Insurance as of 3/7/2022     Primary Coverage     Payor Plan Insurance Group Employer/Plan Group    [facility] JACQUES [id number]     Payor Plan Address Payor Plan Phone Number Payor Plan Fax Number Effective Dates    [address]   2021 - None Entered    [facility] [id number]       Subscriber Name Subscriber Birth Date Member ID       BIPIN GUZMAN 1975 [id number]                 Emergency Contacts      (Rel.) Home Phone Work Phone Mobile Phone    devonyanci hermosillo (Mother) -- -- [phone]    FREDO GUZMAN (Father) -- -- [phone]               History & Physical      Ivana Camargo MD at 22              [facility] Medicine Services  HISTORY AND PHYSICAL    Patient Name: Bipin Guzman  : 1975  MRN: [id number]  Primary Care Physician: Toshia Mitchell, " APRN    Subjective   Subjective     Chief Complaint:intractable vomiting and epigastric pain    HPI:  Timothy Guzman is a 46 y.o. female with HO alcoholic pancreatitis and cirrhosis, prior gastritis, cardiac arrest, gastroparesis, CAD with stent , bilateral AVN who presented to the ED with epigastric pain and several days of vomiting. She has had blood in her emesis but not in her stool.  Patient reports constant upper abdominal pain for several days and vomiting non stop for 2 days, no BM. No fever or chills  She has had blood in her emesis.  Review of Systems   Patient denies weight loss, headaches, changes in vision, fever, chills, sore throat, shortness of breath, cough, diarrhea,  change in urine output or habits, joint pain, rash, itching or bleeding.    Otherwise complete ROS is negative except as mentioned in the HPI.    Personal History     Past Medical History:   Diagnosis Date   • Acute pancreatitis    • Alcoholism (HCC)     sober 2.5 years   • Anaphylaxis    • Arthritis    • Bone necrosis (HCC)    • CAD (coronary artery disease) 2021   • Cardiac arrest (HCC)    • Cirrhosis (HCC)    • Cirrhosis of liver (HCC) 2021   • Gastroparesis    • GERD (gastroesophageal reflux disease)    • History of esophageal varices    • History of kidney stones    • Hypertension    • Memory loss    • Pancreatitis    • Rib fractures 2021   • SVT (supraventricular tachycardia) (HCC) 2021       Past Surgical History:   Procedure Laterality Date   • ANKLE SURGERY Right    • APPENDECTOMY     • CARDIAC CATHETERIZATION N/A 2021    Procedure: Left Heart Cath;  Surgeon: Vikram Gonzales MD;  Location:  [facility] CATH INVASIVE LOCATION;  Service: Cardiovascular;  Laterality: N/A;   • CARDIAC CATHETERIZATION N/A 7/15/2021    Procedure: LEFT HEART CATH;  Surgeon: Vikram Gonzales MD;  Location: [facility] CATH INVASIVE LOCATION;  Service: Cardiology;  Laterality: N/A;   •  SECTION      x2   • CHOLECYSTECTOMY     •  COLONOSCOPY     • COLONOSCOPY N/A 3/31/2020    Procedure: COLONOSCOPY;  Surgeon: Tirso Giles MD;  Location:  [facility];  Service: Gastroenterology;  Laterality: N/A;   • COLONOSCOPY N/A 9/5/2021    Procedure: COLONOSCOPY;  Surgeon: Tyrese Rasmussen MD;  Location:  [facility];  Service: Gastroenterology;  Laterality: N/A;   • CORONARY STENT PLACEMENT     • ENDOSCOPY     • ENDOSCOPY     • ENDOSCOPY N/A 6/19/2021    Procedure: ESOPHAGOGASTRODUODENOSCOPY AT BEDSIDE;  Surgeon: Tyrese Rasmussen MD;  Location:  [facility];  Service: Gastroenterology;  Laterality: N/A;   • ENDOSCOPY N/A 9/5/2021    Procedure: ESOPHAGOGASTRODUODENOSCOPY;  Surgeon: Tyrese Rasmussen MD;  Location:  [facility];  Service: Gastroenterology;  Laterality: N/A;   • ENDOSCOPY N/A 11/26/2021    Procedure: ESOPHAGOGASTRODUODENOSCOPY;  Surgeon: Tyrese Rasmussen MD;  Location:  [facility];  Service: Gastroenterology;  Laterality: N/A;   • REPLACEMENT TOTAL HIP LATERAL POSITION Left    • TOTAL HIP ARTHROPLASTY Right     x2   • TOTAL KNEE ARTHROPLASTY Left        Family History: family history includes ADD / ADHD in her child; Asperger's syndrome in her child; Autism in her child; Breast cancer in her mother; Diabetes type II in her mother; Heart disease in her father; Hypertension in her brother.     Social History:  reports that she quit smoking about 8 months ago. Her smoking use included electronic cigarette and cigarettes. She smoked 0.50 packs per day. She has never used smokeless tobacco. She reports previous alcohol use. She reports that she does not use drugs.  She is still sober, living with her parents, not working, she has 2 kids  Medications:  FLUoxetine, Melatonin, OLANZapine, aspirin, bisacodyl, bumetanide, clopidogrel, docusate sodium, gabapentin, lactulose, lubiprostone, magnesium oxide, multivitamin, ondansetron, pantoprazole, potassium chloride, ranolazine, riFAXIMin, rosuvastatin, spironolactone,  tamsulosin, traZODone, ursodiol, vitamin B-6, and vitamin b complex      Allergies   Allergen Reactions   • Contrast Dye Anaphylaxis     6/18 Pt coded after receiving contrast for a CT scan. Was premedicated. Was having an MI at the same time. Immediately underwent heart cath with contrast without additional allergic reaction  7/15 Pt premedicated with prednisone/Benadryl for heart cath. Still with anaphylactic-like reaction with drop in BP and hypoxia. Treated 95% stenosis with minimal dye and supported with epinephrine, solumedrol, NRB   • Haloperidol Hallucinations   • Nsaids GI Bleeding     Other reaction(s): Bleeding, VOMITING   • Tylenol [Acetaminophen] Other (See Comments)     CIRRHOSIS       Objective   Objective     Vital Signs:   Temp:  [97.2 °F (36.2 °C)-98.9 °F (37.2 °C)] 97.2 °F (36.2 °C)  Heart Rate:  [] 130  Resp:  [16-19] 19  BP: (120-136)/(70-96) 120/70    Patient is alert and talkative, miserable, dry and vomiting  Neck is without mass or JVD  Heart is Reg wo murmur  Lungs are clear wo wheeze or crackle  Abd is soft tender upper RUQ  MAEW  Skin is without rash or jaundice  Neurologic exam is nonfocal   Mood is appropriate      Result Review:  I have personally reviewed the results from the time of this admission   to 3/7/2022 23:50 EST and agree with these findings:  [x]  Laboratory  [x]  Microbiology  [x]  Radiology  [x]  EKG/Telemetry   []  Cardiology/Vascular   [x]  Pathology  [x]  Old records  []  Other:  Most notable findings include:high wbc, high hgb, elevated lactic acid, creatinine      LAB RESULTS:      Lab 03/07/22  1826 03/07/22  1525   WBC  --  16.07*   HEMOGLOBIN  --  17.1*   HEMATOCRIT  --  47.7*   PLATELETS  --  233   NEUTROS ABS  --  12.87*   IMMATURE GRANS (ABS)  --  0.07*   LYMPHS ABS  --  1.82   MONOS ABS  --  1.27*   EOS ABS  --  0.00   MCV  --  85.8   PROCALCITONIN  --  0.13   LACTATE 5.5* 6.5*   D DIMER QUANT  --  1.20*         Lab 03/07/22  1525   SODIUM 135*    POTASSIUM 4.0   CHLORIDE 89*   CO2 22.0   ANION GAP 24.0*   BUN 20   CREATININE 1.76*   EGFR 35.6*   GLUCOSE 163*   CALCIUM 9.9         Lab 03/07/22  1525   TOTAL PROTEIN 8.4   ALBUMIN 5.10   GLOBULIN 3.3   ALT (SGPT) 26   AST (SGOT) 25   BILIRUBIN 0.6   ALK PHOS 118*   LIPASE 19         Lab 03/07/22  1525   TROPONIN T <0.010             Lab 03/07/22  1825   ABO TYPING A   RH TYPING Positive   ANTIBODY SCREEN Negative         Brief Urine Lab Results  (Last result in the past 365 days)      Color   Clarity   Blood   Leuk Est   Nitrite   Protein   CREAT   Urine HCG        03/07/22 1847               Negative           COVID19   Date Value Ref Range Status   03/07/2022 Not Detected Not Detected - Ref. Range Final       CT Abdomen Pelvis Without Contrast    Result Date: 3/7/2022  DATE OF EXAM: 3/7/2022 5:30 PM  PROCEDURE: CT ABDOMEN PELVIS WO CONTRAST-  INDICATIONS: abdominal pain/cirrhosis/history of pancreatitis  COMPARISON: CT abdomen and pelvis 1/29/2022  TECHNIQUE: Routine transaxial slices were obtained through the abdomen and pelvis without the administration of intravenous contrast. Reconstructed coronal and sagittal images were also obtained. Automated exposure control and iterative construction methods were used.  The radiation dose reduction device was turned on for each scan per the ALARA (As Low as Reasonably Achievable) protocol.  FINDINGS: The lung bases are clear. Redemonstration of subtle liver nodularity compatible with provided history of cirrhosis. No focal hepatic lesion on this noncontrast exam. The gallbladder is surgically absent. Unremarkable appearance of the bile ducts. No splenomegaly. Allowing for some respiratory motion artifact, there is normal appearance of the pancreas without definite peripancreatic fat stranding. No evidence of peripancreatic fluid collection. No pancreatic ductal dilatation. Unremarkable appearance of the adrenal glands, kidneys, ureters, and bladder. Unremarkable  appearance of the uterus and adnexa with bilateral adnexal clips. Evaluation of the lower pelvis is somewhat limited owing to metallic streak artifact from bilateral hip arthroplasties. Redundant sigmoid colon. No bowel obstruction. The appendix is not visualized, possibly surgically absent. Unremarkable appearance of the terminal ileum. No free intra-abdominal fluid or pneumoperitoneum. No conspicuous intra-abdominal fat stranding. No lymphadenopathy. Unremarkable appearance of partially imaged bilateral hip arthroplasties. No acute osseous findings.      Impression: No acute abdominopelvic findings. Specifically, unremarkable noncontrast appearance of the pancreas allowing for some respiratory motion artifact limiting assessment in this region.  Redemonstration of subtle liver nodularity suggestive of cirrhosis, without findings to suggest decompensated cirrhosis/portal hypertension.  This report was finalized on 3/7/2022 5:58 PM by Fahad Sandoval MD.        Results for orders placed during the hospital encounter of 06/18/21    Adult Transthoracic Echo Complete W/ Cont if Necessary Per Protocol    Interpretation Summary  · The quality of the study is limited due to patient positioning and patient being intubated.  · Left ventricular ejection fraction appears to be 51 - 55%. Left ventricular systolic function is normal.  · Left ventricular diastolic function is consistent with (grade Ia w/high LAP) impaired relaxation.  · Mildly reduced right ventricular systolic function noted.      Current COVID Risks are:  [] Fever []  Cough [] Shortness of breath [] Change in taste or smell  [] Exposure to COVID patient  [] High risk facility   [x]  NONE  The patient has started, but not completed, their COVID-19 vaccination series.    Assessment/Plan   Assessment / Plan       Alcoholic cirrhosis of liver without ascites (HCC)    History of esophageal varices    CAD (coronary artery disease)    Chronic pancreatitis (HCC)     Lactic acidosis    Leukocytosis    Nausea and vomiting in adult      Assessment & Plan:  Timothy Guzman is a 46 y.o. female with HO alcoholic pancreatitis and cirrhosis, prior gastritis, cardiac arrest, gastroparesis, CAD with stent 7/21, bilateral AVN who presented to the ED with epigastric pain and several days of vomiting.    Intractable nausea, vomiting, lactic acidosis  -aggressive IVFs  -PRN ativan, zofran for nausea  -GI consult  - Pain meds  -PPI    History of alcoholism  Pancreatitis  Cirrhosis  History of CAD with cardiac arrest related to contrast dye        DVT prophylaxis:  Mechanical DVT prophylaxis orders are present.    CODE STATUS:FULL CODE  Code Status (Patient has no pulse and is not breathing): CPR (Attempt to Resuscitate)  Medical Interventions (Patient has pulse or is breathing): Full Support      Admission Status: INPATIENT status .      Electronically signed by Ivana Camargo MD 03/07/22 23:50 EST            Electronically signed by Ivana Camargo MD at 03/07/22 9341

## 2022-03-08 NOTE — H&P
[facility] Medicine Services  HISTORY AND PHYSICAL    Patient Name: Timothy Guzman  : 1975  MRN: [id number]  Primary Care Physician: Toshia Mitchell APRN    Subjective   Subjective     Chief Complaint:intractable vomiting and epigastric pain    HPI:  Timothy Guzman is a 46 y.o. female with HO alcoholic pancreatitis and cirrhosis, prior gastritis, cardiac arrest, gastroparesis, CAD with stent , bilateral AVN who presented to the ED with epigastric pain and several days of vomiting. She has had blood in her emesis but not in her stool.  Patient reports constant upper abdominal pain for several days and vomiting non stop for 2 days, no BM. No fever or chills  She has had blood in her emesis.  Review of Systems   Patient denies weight loss, headaches, changes in vision, fever, chills, sore throat, shortness of breath, cough, diarrhea,  change in urine output or habits, joint pain, rash, itching or bleeding.    Otherwise complete ROS is negative except as mentioned in the HPI.    Personal History     Past Medical History:   Diagnosis Date   • Acute pancreatitis    • Alcoholism (HCC)     sober 2.5 years   • Anaphylaxis    • Arthritis    • Bone necrosis (HCC)    • CAD (coronary artery disease) 2021   • Cardiac arrest (HCC)    • Cirrhosis (HCC)    • Cirrhosis of liver (HCC) 2021   • Gastroparesis    • GERD (gastroesophageal reflux disease)    • History of esophageal varices    • History of kidney stones    • Hypertension    • Memory loss    • Pancreatitis    • Rib fractures 2021   • SVT (supraventricular tachycardia) (HCC) 2021       Past Surgical History:   Procedure Laterality Date   • ANKLE SURGERY Right    • APPENDECTOMY     • CARDIAC CATHETERIZATION N/A 2021    Procedure: Left Heart Cath;  Surgeon: Vikram Gonzales MD;  Location: [facility] CATH INVASIVE LOCATION;  Service: Cardiovascular;  Laterality: N/A;   • CARDIAC CATHETERIZATION N/A 7/15/2021     Procedure: LEFT HEART CATH;  Surgeon: Vikram Gonzales MD;  Location:  [facility] CATH INVASIVE LOCATION;  Service: Cardiology;  Laterality: N/A;   •  SECTION      x2   • CHOLECYSTECTOMY     • COLONOSCOPY     • COLONOSCOPY N/A 3/31/2020    Procedure: COLONOSCOPY;  Surgeon: Tirso Giles MD;  Location:  [facility];  Service: Gastroenterology;  Laterality: N/A;   • COLONOSCOPY N/A 2021    Procedure: COLONOSCOPY;  Surgeon: Tyrese Rasmussen MD;  Location:  [facility];  Service: Gastroenterology;  Laterality: N/A;   • CORONARY STENT PLACEMENT     • ENDOSCOPY     • ENDOSCOPY     • ENDOSCOPY N/A 2021    Procedure: ESOPHAGOGASTRODUODENOSCOPY AT BEDSIDE;  Surgeon: Tyrese Rasmussen MD;  Location:  [facility];  Service: Gastroenterology;  Laterality: N/A;   • ENDOSCOPY N/A 2021    Procedure: ESOPHAGOGASTRODUODENOSCOPY;  Surgeon: Tyrese Rasmussen MD;  Location:  [facility];  Service: Gastroenterology;  Laterality: N/A;   • ENDOSCOPY N/A 2021    Procedure: ESOPHAGOGASTRODUODENOSCOPY;  Surgeon: Tyrese Rasmussen MD;  Location:  [facility];  Service: Gastroenterology;  Laterality: N/A;   • REPLACEMENT TOTAL HIP LATERAL POSITION Left    • TOTAL HIP ARTHROPLASTY Right     x2   • TOTAL KNEE ARTHROPLASTY Left        Family History: family history includes ADD / ADHD in her child; Asperger's syndrome in her child; Autism in her child; Breast cancer in her mother; Diabetes type II in her mother; Heart disease in her father; Hypertension in her brother.     Social History:  reports that she quit smoking about 8 months ago. Her smoking use included electronic cigarette and cigarettes. She smoked 0.50 packs per day. She has never used smokeless tobacco. She reports previous alcohol use. She reports that she does not use drugs.  She is still sober, living with her parents, not working, she has 2 kids  Medications:  FLUoxetine, Melatonin, OLANZapine, aspirin, bisacodyl, bumetanide,  clopidogrel, docusate sodium, gabapentin, lactulose, lubiprostone, magnesium oxide, multivitamin, ondansetron, pantoprazole, potassium chloride, ranolazine, riFAXIMin, rosuvastatin, spironolactone, tamsulosin, traZODone, ursodiol, vitamin B-6, and vitamin b complex      Allergies   Allergen Reactions   • Contrast Dye Anaphylaxis     6/18 Pt coded after receiving contrast for a CT scan. Was premedicated. Was having an MI at the same time. Immediately underwent heart cath with contrast without additional allergic reaction  7/15 Pt premedicated with prednisone/Benadryl for heart cath. Still with anaphylactic-like reaction with drop in BP and hypoxia. Treated 95% stenosis with minimal dye and supported with epinephrine, solumedrol, NRB   • Haloperidol Hallucinations   • Nsaids GI Bleeding     Other reaction(s): Bleeding, VOMITING   • Tylenol [Acetaminophen] Other (See Comments)     CIRRHOSIS       Objective   Objective     Vital Signs:   Temp:  [97.2 °F (36.2 °C)-98.9 °F (37.2 °C)] 97.2 °F (36.2 °C)  Heart Rate:  [] 130  Resp:  [16-19] 19  BP: (120-136)/(70-96) 120/70    Patient is alert and talkative, miserable, dry and vomiting  Neck is without mass or JVD  Heart is Reg wo murmur  Lungs are clear wo wheeze or crackle  Abd is soft tender upper RUQ  MAEW  Skin is without rash or jaundice  Neurologic exam is nonfocal   Mood is appropriate      Result Review:  I have personally reviewed the results from the time of this admission   to 3/7/2022 23:50 EST and agree with these findings:  [x]  Laboratory  [x]  Microbiology  [x]  Radiology  [x]  EKG/Telemetry   []  Cardiology/Vascular   [x]  Pathology  [x]  Old records  []  Other:  Most notable findings include:high wbc, high hgb, elevated lactic acid, creatinine      LAB RESULTS:      Lab 03/07/22  1826 03/07/22  1525   WBC  --  16.07*   HEMOGLOBIN  --  17.1*   HEMATOCRIT  --  47.7*   PLATELETS  --  233   NEUTROS ABS  --  12.87*   IMMATURE GRANS (ABS)  --  0.07*    LYMPHS ABS  --  1.82   MONOS ABS  --  1.27*   EOS ABS  --  0.00   MCV  --  85.8   PROCALCITONIN  --  0.13   LACTATE 5.5* 6.5*   D DIMER QUANT  --  1.20*         Lab 03/07/22  1525   SODIUM 135*   POTASSIUM 4.0   CHLORIDE 89*   CO2 22.0   ANION GAP 24.0*   BUN 20   CREATININE 1.76*   EGFR 35.6*   GLUCOSE 163*   CALCIUM 9.9         Lab 03/07/22  1525   TOTAL PROTEIN 8.4   ALBUMIN 5.10   GLOBULIN 3.3   ALT (SGPT) 26   AST (SGOT) 25   BILIRUBIN 0.6   ALK PHOS 118*   LIPASE 19         Lab 03/07/22  1525   TROPONIN T <0.010             Lab 03/07/22  1825   ABO TYPING A   RH TYPING Positive   ANTIBODY SCREEN Negative         Brief Urine Lab Results  (Last result in the past 365 days)      Color   Clarity   Blood   Leuk Est   Nitrite   Protein   CREAT   Urine HCG        03/07/22 1847               Negative           COVID19   Date Value Ref Range Status   03/07/2022 Not Detected Not Detected - Ref. Range Final       CT Abdomen Pelvis Without Contrast    Result Date: 3/7/2022  DATE OF EXAM: 3/7/2022 5:30 PM  PROCEDURE: CT ABDOMEN PELVIS WO CONTRAST-  INDICATIONS: abdominal pain/cirrhosis/history of pancreatitis  COMPARISON: CT abdomen and pelvis 1/29/2022  TECHNIQUE: Routine transaxial slices were obtained through the abdomen and pelvis without the administration of intravenous contrast. Reconstructed coronal and sagittal images were also obtained. Automated exposure control and iterative construction methods were used.  The radiation dose reduction device was turned on for each scan per the ALARA (As Low as Reasonably Achievable) protocol.  FINDINGS: The lung bases are clear. Redemonstration of subtle liver nodularity compatible with provided history of cirrhosis. No focal hepatic lesion on this noncontrast exam. The gallbladder is surgically absent. Unremarkable appearance of the bile ducts. No splenomegaly. Allowing for some respiratory motion artifact, there is normal appearance of the pancreas without definite  peripancreatic fat stranding. No evidence of peripancreatic fluid collection. No pancreatic ductal dilatation. Unremarkable appearance of the adrenal glands, kidneys, ureters, and bladder. Unremarkable appearance of the uterus and adnexa with bilateral adnexal clips. Evaluation of the lower pelvis is somewhat limited owing to metallic streak artifact from bilateral hip arthroplasties. Redundant sigmoid colon. No bowel obstruction. The appendix is not visualized, possibly surgically absent. Unremarkable appearance of the terminal ileum. No free intra-abdominal fluid or pneumoperitoneum. No conspicuous intra-abdominal fat stranding. No lymphadenopathy. Unremarkable appearance of partially imaged bilateral hip arthroplasties. No acute osseous findings.      Impression: No acute abdominopelvic findings. Specifically, unremarkable noncontrast appearance of the pancreas allowing for some respiratory motion artifact limiting assessment in this region.  Redemonstration of subtle liver nodularity suggestive of cirrhosis, without findings to suggest decompensated cirrhosis/portal hypertension.  This report was finalized on 3/7/2022 5:58 PM by Fahad Sandoval MD.        Results for orders placed during the hospital encounter of 06/18/21    Adult Transthoracic Echo Complete W/ Cont if Necessary Per Protocol    Interpretation Summary  · The quality of the study is limited due to patient positioning and patient being intubated.  · Left ventricular ejection fraction appears to be 51 - 55%. Left ventricular systolic function is normal.  · Left ventricular diastolic function is consistent with (grade Ia w/high LAP) impaired relaxation.  · Mildly reduced right ventricular systolic function noted.      Current COVID Risks are:  [] Fever []  Cough [] Shortness of breath [] Change in taste or smell  [] Exposure to COVID patient  [] High risk facility   [x]  NONE  The patient has started, but not completed, their COVID-19 vaccination  series.    Assessment/Plan   Assessment / Plan       Alcoholic cirrhosis of liver without ascites (HCC)    History of esophageal varices    CAD (coronary artery disease)    Chronic pancreatitis (HCC)    Lactic acidosis    Leukocytosis    Nausea and vomiting in adult      Assessment & Plan:  Timothy Guzman is a 46 y.o. female with HO alcoholic pancreatitis and cirrhosis, prior gastritis, cardiac arrest, gastroparesis, CAD with stent 7/21, bilateral AVN who presented to the ED with epigastric pain and several days of vomiting.    Intractable nausea, vomiting, lactic acidosis  -aggressive IVFs  -PRN ativan, zofran for nausea  -GI consult  - Pain meds  -PPI    History of alcoholism  Pancreatitis  Cirrhosis  History of CAD with cardiac arrest related to contrast dye        DVT prophylaxis:  Mechanical DVT prophylaxis orders are present.    CODE STATUS:FULL CODE  Code Status (Patient has no pulse and is not breathing): CPR (Attempt to Resuscitate)  Medical Interventions (Patient has pulse or is breathing): Full Support      Admission Status: INPATIENT status .      Electronically signed by Ivana Camargo MD 03/07/22 23:50 EST

## 2022-03-08 NOTE — PLAN OF CARE
Goal Outcome Evaluation:  Plan of Care Reviewed With: patient        Progress: no change  Outcome Evaluation: New admit. Pt c/o abdominal pain and N/V. Constantly asking for pain medicine and nausea medicine. Dilaudid, Phenergan and Ativan given as ordered. Monitor shows ST with 's. Dr. Camargo notified. Will cont to monitor closely

## 2022-03-08 NOTE — PROGRESS NOTES
[facility] Medicine Services  PROGRESS NOTE    Patient Name: Timothy Guzman  : 1975  MRN: [id number]    Date of Admission: 3/7/2022  Primary Care Physician: Toshia Mitchell APRN    Subjective   Subjective     CC:  F/U abdominal pain    HPI:  Patient seen and examined. RN notes reviewed. No acute events overnight. Continues to have epigastric/RUQ abdominal pain. No further diarrhea since yesterday. No vomiting since admission.     ROS:  Gen- No fevers, chills  CV- No chest pain, palpitations  Resp- No cough, dyspnea  GI- No V/D, +abd pain, +nausea    Objective   Objective     Vital Signs:   Temp:  [96.6 °F (35.9 °C)-98.9 °F (37.2 °C)] 97.8 °F (36.6 °C)  Heart Rate:  [] 122  Resp:  [16-20] 20  BP: (120-139)/(70-96) 139/91     Physical Exam:  Constitutional: No acute distress, awake, alert, appears to be in moderate discomfort   HENT: NCAT, mucous membranes moist  Respiratory: Clear to auscultation bilaterally, respiratory effort normal   Cardiovascular: Sinus tachycardia, no murmurs, rubs, or gallops  Gastrointestinal: Positive bowel sounds, soft, +TTP epigastric region and RUQ, no R/R/G  Musculoskeletal: No bilateral ankle edema  Psychiatric: Appropriate affect, cooperative  Neurologic: Oriented x 3, speech clear  Skin: No rashes    Results Reviewed:  LAB RESULTS:      Lab 22  0323 03/07/22  1826 22  1525   WBC 16.97*  --  16.07*   HEMOGLOBIN 13.5  --  17.1*   HEMATOCRIT 40.2  --  47.7*   PLATELETS 180  --  233   NEUTROS ABS 11.33*  --  12.87*   IMMATURE GRANS (ABS) 0.05  --  0.07*   LYMPHS ABS 3.68*  --  1.82   MONOS ABS 1.86*  --  1.27*   EOS ABS 0.01  --  0.00   MCV 92.4  --  85.8   PROCALCITONIN  --   --  0.13   LACTATE 2.5* 5.5* 6.5*   D DIMER QUANT  --   --  1.20*         Lab 22  0323 22  1525   SODIUM 135* 135*   POTASSIUM 3.7 4.0   CHLORIDE 96* 89*   CO2 25.0 22.0   ANION GAP 14.0 24.0*   BUN 19 20   CREATININE 1.43* 1.76*   EGFR 45.6*  35.6*   GLUCOSE 131* 163*   CALCIUM 8.3* 9.9         Lab 03/08/22  0323 03/07/22  1525   TOTAL PROTEIN 6.6 8.4   ALBUMIN 4.10 5.10   GLOBULIN 2.5 3.3   ALT (SGPT) 18 26   AST (SGOT) 20 25   BILIRUBIN 0.6 0.6   ALK PHOS 87 118*   LIPASE 13 19         Lab 03/07/22  1525   TROPONIN T <0.010             Lab 03/07/22  1825   ABO TYPING A   RH TYPING Positive   ANTIBODY SCREEN Negative         Brief Urine Lab Results  (Last result in the past 365 days)      Color   Clarity   Blood   Leuk Est   Nitrite   Protein   CREAT   Urine HCG        03/07/22 1847               Negative             Microbiology Results Abnormal     Procedure Component Value - Date/Time    COVID PRE-OP / PRE-PROCEDURE SCREENING ORDER (NO ISOLATION) - Swab, Nasopharynx [[id number]]  (Normal) Collected: 03/07/22 1745    Lab Status: Final result Specimen: Swab from Nasopharynx Updated: 03/07/22 1824    Narrative:      The following orders were created for panel order COVID PRE-OP / PRE-PROCEDURE SCREENING ORDER (NO ISOLATION) - Swab, Nasopharynx.  Procedure                               Abnormality         Status                     ---------                               -----------         ------                     COVID-19 and FLU A/B PCR...[[id number]]  Normal              Final result                 Please view results for these tests on the individual orders.    COVID-19 and FLU A/B PCR - Swab, Nasopharynx [[id number]]  (Normal) Collected: 03/07/22 1745    Lab Status: Final result Specimen: Swab from Nasopharynx Updated: 03/07/22 1824     COVID19 Not Detected     Influenza A PCR Not Detected     Influenza B PCR Not Detected    Narrative:      Fact sheet for providers: https://www.fda.gov/media/150424/download    Fact sheet for patients: https://www.fda.gov/media/789667/download    Test performed by PCR.          CT Abdomen Pelvis Without Contrast    Result Date: 3/7/2022  DATE OF EXAM: 3/7/2022 5:30 PM  PROCEDURE: CT ABDOMEN PELVIS WO CONTRAST-   INDICATIONS: abdominal pain/cirrhosis/history of pancreatitis  COMPARISON: CT abdomen and pelvis 1/29/2022  TECHNIQUE: Routine transaxial slices were obtained through the abdomen and pelvis without the administration of intravenous contrast. Reconstructed coronal and sagittal images were also obtained. Automated exposure control and iterative construction methods were used.  The radiation dose reduction device was turned on for each scan per the ALARA (As Low as Reasonably Achievable) protocol.  FINDINGS: The lung bases are clear. Redemonstration of subtle liver nodularity compatible with provided history of cirrhosis. No focal hepatic lesion on this noncontrast exam. The gallbladder is surgically absent. Unremarkable appearance of the bile ducts. No splenomegaly. Allowing for some respiratory motion artifact, there is normal appearance of the pancreas without definite peripancreatic fat stranding. No evidence of peripancreatic fluid collection. No pancreatic ductal dilatation. Unremarkable appearance of the adrenal glands, kidneys, ureters, and bladder. Unremarkable appearance of the uterus and adnexa with bilateral adnexal clips. Evaluation of the lower pelvis is somewhat limited owing to metallic streak artifact from bilateral hip arthroplasties. Redundant sigmoid colon. No bowel obstruction. The appendix is not visualized, possibly surgically absent. Unremarkable appearance of the terminal ileum. No free intra-abdominal fluid or pneumoperitoneum. No conspicuous intra-abdominal fat stranding. No lymphadenopathy. Unremarkable appearance of partially imaged bilateral hip arthroplasties. No acute osseous findings.      Impression: No acute abdominopelvic findings. Specifically, unremarkable noncontrast appearance of the pancreas allowing for some respiratory motion artifact limiting assessment in this region.  Redemonstration of subtle liver nodularity suggestive of cirrhosis, without findings to suggest  decompensated cirrhosis/portal hypertension.  This report was finalized on 3/7/2022 5:58 PM by Fahad Sandoval MD.        Results for orders placed during the hospital encounter of 06/18/21    Adult Transthoracic Echo Complete W/ Cont if Necessary Per Protocol    Interpretation Summary  · The quality of the study is limited due to patient positioning and patient being intubated.  · Left ventricular ejection fraction appears to be 51 - 55%. Left ventricular systolic function is normal.  · Left ventricular diastolic function is consistent with (grade Ia w/high LAP) impaired relaxation.  · Mildly reduced right ventricular systolic function noted.      I have reviewed the medications:  Scheduled Meds:pantoprazole, 40 mg, Intravenous, Q AM      Continuous Infusions:dextrose 5 % and sodium chloride 0.45 % with KCl 20 mEq/L, 100 mL/hr, Last Rate: 100 mL/hr (03/08/22 0954)      PRN Meds:.HYDROmorphone  •  LORazepam  •  promethazine **OR** promethazine  •  Sodium Chloride (PF)    Assessment/Plan   Assessment & Plan     Active Hospital Problems    Diagnosis  POA   • Nausea and vomiting in adult [R11.2]  Yes   • Leukocytosis [D72.829]  Yes   • Lactic acidosis [E87.2]  Yes   • Chronic pancreatitis (HCC) [K86.1]  Yes   • CAD (coronary artery disease) [I25.10]  Yes   • History of esophageal varices [Z87.19]  Not Applicable   • Alcoholic cirrhosis of liver without ascites (HCC) [K70.30]  Yes      Resolved Hospital Problems   No resolved problems to display.        Brief Hospital Course to date:  Timothy Guzman is a 46 y.o. female with HO alcoholic pancreatitis and cirrhosis, prior gastritis, cardiac arrest, gastroparesis, CAD with stent 7/21, bilateral AVN who presented to the ED with epigastric pain and several days of vomiting.     Intractable nausea, vomiting, lactic acidosis  -Continue IVF  -Anti-emetics as needed  -Lactate improved  -GI to see in consult  -CT negative  -Prior history CCY   -Continue PPI  -Decrease Dilaudid  dosing   -Lipase normal, LFTs normal   -UA negative  -Troponin negative   -Procal negative   -Will check stool studies if diarrhea recurs     Tachycardia  -Sinus tach on EKG  -No CP  - D dimer elevated but O2 sat normal on room air   - Unable to obtain angiogram as allergic to contrast   - Check UDS     Elevated serum Cr  -Improved with IVF     History of alcoholism  History of Pancreatitis  Cirrhosis  History of CAD with cardiac arrest related to contrast dye    DVT prophylaxis:  Mechanical DVT prophylaxis orders are present.        Disposition: I expect the patient to be discharged TBD.     CODE STATUS:   Code Status and Medical Interventions:   Ordered at: 03/07/22 2104     Code Status (Patient has no pulse and is not breathing):    CPR (Attempt to Resuscitate)     Medical Interventions (Patient has pulse or is breathing):    Full Support       Karen Elena, DO  03/08/22

## 2022-03-09 LAB
ALBUMIN SERPL-MCNC: 3.4 G/DL (ref 3.5–5.2)
ALBUMIN/GLOB SERPL: 1.6 G/DL
ALP SERPL-CCNC: 69 U/L (ref 39–117)
ALT SERPL W P-5'-P-CCNC: 16 U/L (ref 1–33)
ANION GAP SERPL CALCULATED.3IONS-SCNC: 8 MMOL/L (ref 5–15)
AST SERPL-CCNC: 19 U/L (ref 1–32)
BASOPHILS # BLD AUTO: 0.02 10*3/MM3 (ref 0–0.2)
BASOPHILS NFR BLD AUTO: 0.6 % (ref 0–1.5)
BILIRUB SERPL-MCNC: 0.4 MG/DL (ref 0–1.2)
BUN SERPL-MCNC: 7 MG/DL (ref 6–20)
BUN/CREAT SERPL: 6.6 (ref 7–25)
CALCIUM SPEC-SCNC: 8.3 MG/DL (ref 8.6–10.5)
CHLORIDE SERPL-SCNC: 108 MMOL/L (ref 98–107)
CO2 SERPL-SCNC: 24 MMOL/L (ref 22–29)
CREAT SERPL-MCNC: 1.06 MG/DL (ref 0.57–1)
D-LACTATE SERPL-SCNC: 2.1 MMOL/L (ref 0.5–2)
DEPRECATED RDW RBC AUTO: 46.7 FL (ref 37–54)
EGFRCR SERPLBLD CKD-EPI 2021: 65.3 ML/MIN/1.73
EOSINOPHIL # BLD AUTO: 0.03 10*3/MM3 (ref 0–0.4)
EOSINOPHIL NFR BLD AUTO: 0.9 % (ref 0.3–6.2)
ERYTHROCYTE [DISTWIDTH] IN BLOOD BY AUTOMATED COUNT: 13.1 % (ref 12.3–15.4)
GLOBULIN UR ELPH-MCNC: 2.1 GM/DL
GLUCOSE SERPL-MCNC: 124 MG/DL (ref 65–99)
HCT VFR BLD AUTO: 34.6 % (ref 34–46.6)
HGB BLD-MCNC: 11.2 G/DL (ref 12–15.9)
IMM GRANULOCYTES # BLD AUTO: 0.01 10*3/MM3 (ref 0–0.05)
IMM GRANULOCYTES NFR BLD AUTO: 0.3 % (ref 0–0.5)
LYMPHOCYTES # BLD AUTO: 1.55 10*3/MM3 (ref 0.7–3.1)
LYMPHOCYTES NFR BLD AUTO: 44.5 % (ref 19.6–45.3)
MCH RBC QN AUTO: 31.2 PG (ref 26.6–33)
MCHC RBC AUTO-ENTMCNC: 32.4 G/DL (ref 31.5–35.7)
MCV RBC AUTO: 96.4 FL (ref 79–97)
MONOCYTES # BLD AUTO: 0.46 10*3/MM3 (ref 0.1–0.9)
MONOCYTES NFR BLD AUTO: 13.2 % (ref 5–12)
NEUTROPHILS NFR BLD AUTO: 1.41 10*3/MM3 (ref 1.7–7)
NEUTROPHILS NFR BLD AUTO: 40.5 % (ref 42.7–76)
NRBC BLD AUTO-RTO: 0 /100 WBC (ref 0–0.2)
PLATELET # BLD AUTO: 93 10*3/MM3 (ref 140–450)
PMV BLD AUTO: 10 FL (ref 6–12)
POTASSIUM SERPL-SCNC: 4 MMOL/L (ref 3.5–5.2)
PROT SERPL-MCNC: 5.5 G/DL (ref 6–8.5)
RBC # BLD AUTO: 3.59 10*6/MM3 (ref 3.77–5.28)
SODIUM SERPL-SCNC: 140 MMOL/L (ref 136–145)
TSH SERPL DL<=0.05 MIU/L-ACNC: 1.6 UIU/ML (ref 0.27–4.2)
WBC NRBC COR # BLD: 3.48 10*3/MM3 (ref 3.4–10.8)

## 2022-03-09 PROCEDURE — 25010000002 METOCLOPRAMIDE PER 10 MG: Performed by: FAMILY MEDICINE

## 2022-03-09 PROCEDURE — 85025 COMPLETE CBC W/AUTO DIFF WBC: CPT | Performed by: FAMILY MEDICINE

## 2022-03-09 PROCEDURE — 25010000002 HYDROMORPHONE PER 4 MG: Performed by: FAMILY MEDICINE

## 2022-03-09 PROCEDURE — 99232 SBSQ HOSP IP/OBS MODERATE 35: CPT | Performed by: FAMILY MEDICINE

## 2022-03-09 PROCEDURE — 83605 ASSAY OF LACTIC ACID: CPT | Performed by: FAMILY MEDICINE

## 2022-03-09 PROCEDURE — 25010000002 METOCLOPRAMIDE PER 10 MG: Performed by: PHYSICIAN ASSISTANT

## 2022-03-09 PROCEDURE — 80053 COMPREHEN METABOLIC PANEL: CPT | Performed by: FAMILY MEDICINE

## 2022-03-09 PROCEDURE — 99222 1ST HOSP IP/OBS MODERATE 55: CPT | Performed by: PHYSICIAN ASSISTANT

## 2022-03-09 PROCEDURE — 25010000002 LORAZEPAM PER 2 MG: Performed by: INTERNAL MEDICINE

## 2022-03-09 PROCEDURE — 84443 ASSAY THYROID STIM HORMONE: CPT | Performed by: PHYSICIAN ASSISTANT

## 2022-03-09 RX ORDER — METOCLOPRAMIDE HYDROCHLORIDE 5 MG/ML
10 INJECTION INTRAMUSCULAR; INTRAVENOUS ONCE
Status: COMPLETED | OUTPATIENT
Start: 2022-03-09 | End: 2022-03-09

## 2022-03-09 RX ORDER — PANTOPRAZOLE SODIUM 40 MG/10ML
40 INJECTION, POWDER, LYOPHILIZED, FOR SOLUTION INTRAVENOUS
Status: DISCONTINUED | OUTPATIENT
Start: 2022-03-09 | End: 2022-03-13 | Stop reason: HOSPADM

## 2022-03-09 RX ORDER — RANOLAZINE 500 MG/1
1000 TABLET, EXTENDED RELEASE ORAL 2 TIMES DAILY
Status: DISCONTINUED | OUTPATIENT
Start: 2022-03-09 | End: 2022-03-13 | Stop reason: HOSPADM

## 2022-03-09 RX ORDER — ROSUVASTATIN CALCIUM 20 MG/1
20 TABLET, COATED ORAL NIGHTLY
Status: DISCONTINUED | OUTPATIENT
Start: 2022-03-09 | End: 2022-03-13 | Stop reason: HOSPADM

## 2022-03-09 RX ORDER — BISACODYL 10 MG
10 SUPPOSITORY, RECTAL RECTAL ONCE
Status: COMPLETED | OUTPATIENT
Start: 2022-03-09 | End: 2022-03-09

## 2022-03-09 RX ORDER — ASPIRIN 81 MG/1
81 TABLET ORAL DAILY
Status: DISCONTINUED | OUTPATIENT
Start: 2022-03-09 | End: 2022-03-13 | Stop reason: HOSPADM

## 2022-03-09 RX ORDER — CLOPIDOGREL BISULFATE 75 MG/1
75 TABLET ORAL DAILY
Status: DISCONTINUED | OUTPATIENT
Start: 2022-03-09 | End: 2022-03-13 | Stop reason: HOSPADM

## 2022-03-09 RX ORDER — GABAPENTIN 300 MG/1
300 CAPSULE ORAL 3 TIMES DAILY
Status: DISCONTINUED | OUTPATIENT
Start: 2022-03-09 | End: 2022-03-13 | Stop reason: HOSPADM

## 2022-03-09 RX ORDER — POLYETHYLENE GLYCOL 3350 17 G/17G
17 POWDER, FOR SOLUTION ORAL 2 TIMES DAILY
Status: DISCONTINUED | OUTPATIENT
Start: 2022-03-10 | End: 2022-03-13 | Stop reason: HOSPADM

## 2022-03-09 RX ORDER — OLANZAPINE 5 MG/1
10 TABLET ORAL NIGHTLY
Status: DISCONTINUED | OUTPATIENT
Start: 2022-03-09 | End: 2022-03-13 | Stop reason: HOSPADM

## 2022-03-09 RX ORDER — METOCLOPRAMIDE HYDROCHLORIDE 5 MG/ML
5 INJECTION INTRAMUSCULAR; INTRAVENOUS
Status: COMPLETED | OUTPATIENT
Start: 2022-03-09 | End: 2022-03-11

## 2022-03-09 RX ADMIN — PANTOPRAZOLE SODIUM 40 MG: 40 INJECTION, POWDER, LYOPHILIZED, FOR SOLUTION INTRAVENOUS at 17:16

## 2022-03-09 RX ADMIN — GABAPENTIN 300 MG: 300 CAPSULE ORAL at 19:55

## 2022-03-09 RX ADMIN — GABAPENTIN 300 MG: 300 CAPSULE ORAL at 15:30

## 2022-03-09 RX ADMIN — POTASSIUM CHLORIDE, DEXTROSE MONOHYDRATE AND SODIUM CHLORIDE 100 ML/HR: 150; 5; 450 INJECTION, SOLUTION INTRAVENOUS at 09:23

## 2022-03-09 RX ADMIN — LORAZEPAM 0.5 MG: 2 INJECTION INTRAMUSCULAR; INTRAVENOUS at 13:59

## 2022-03-09 RX ADMIN — LORAZEPAM 0.5 MG: 2 INJECTION INTRAMUSCULAR; INTRAVENOUS at 20:43

## 2022-03-09 RX ADMIN — HYDROMORPHONE HYDROCHLORIDE 0.5 MG: 1 INJECTION, SOLUTION INTRAMUSCULAR; INTRAVENOUS; SUBCUTANEOUS at 07:31

## 2022-03-09 RX ADMIN — HYDROMORPHONE HYDROCHLORIDE 0.5 MG: 1 INJECTION, SOLUTION INTRAMUSCULAR; INTRAVENOUS; SUBCUTANEOUS at 15:30

## 2022-03-09 RX ADMIN — METOCLOPRAMIDE 5 MG: 5 INJECTION, SOLUTION INTRAMUSCULAR; INTRAVENOUS at 11:06

## 2022-03-09 RX ADMIN — OLANZAPINE 10 MG: 5 TABLET, FILM COATED ORAL at 19:54

## 2022-03-09 RX ADMIN — SODIUM CHLORIDE 500 ML: 9 INJECTION, SOLUTION INTRAVENOUS at 07:31

## 2022-03-09 RX ADMIN — HYDROMORPHONE HYDROCHLORIDE 0.5 MG: 1 INJECTION, SOLUTION INTRAMUSCULAR; INTRAVENOUS; SUBCUTANEOUS at 02:01

## 2022-03-09 RX ADMIN — METOCLOPRAMIDE 5 MG: 5 INJECTION, SOLUTION INTRAMUSCULAR; INTRAVENOUS at 17:16

## 2022-03-09 RX ADMIN — HYDROMORPHONE HYDROCHLORIDE 0.5 MG: 1 INJECTION, SOLUTION INTRAMUSCULAR; INTRAVENOUS; SUBCUTANEOUS at 11:06

## 2022-03-09 RX ADMIN — METOCLOPRAMIDE 10 MG: 5 INJECTION, SOLUTION INTRAMUSCULAR; INTRAVENOUS at 09:19

## 2022-03-09 RX ADMIN — CLOPIDOGREL BISULFATE 75 MG: 75 TABLET ORAL at 13:27

## 2022-03-09 RX ADMIN — BISACODYL 10 MG: 10 SUPPOSITORY RECTAL at 10:28

## 2022-03-09 RX ADMIN — LORAZEPAM 0.5 MG: 2 INJECTION INTRAMUSCULAR; INTRAVENOUS at 07:39

## 2022-03-09 RX ADMIN — RANOLAZINE 1000 MG: 500 TABLET, EXTENDED RELEASE ORAL at 19:55

## 2022-03-09 RX ADMIN — ASPIRIN 81 MG: 81 TABLET, COATED ORAL at 13:27

## 2022-03-09 RX ADMIN — HYDROMORPHONE HYDROCHLORIDE 0.5 MG: 1 INJECTION, SOLUTION INTRAMUSCULAR; INTRAVENOUS; SUBCUTANEOUS at 19:54

## 2022-03-09 RX ADMIN — ROSUVASTATIN 20 MG: 20 TABLET, FILM COATED ORAL at 19:55

## 2022-03-09 RX ADMIN — PANTOPRAZOLE SODIUM 40 MG: 40 INJECTION, POWDER, LYOPHILIZED, FOR SOLUTION INTRAVENOUS at 07:22

## 2022-03-09 NOTE — CONSULTS
[facility]    Referring Provider: Karen Elena DO   PCP: Toshia Mitchell APRN    Reason for Consultation: Intractable nausea and vomiting     Chief complaint: Nausea, vomiting     History of present illness:    Timothy Guzman is a 46 y.o. female who is admitted with nausea and vomiting.  She describes a three day history of nausea, vomiting and epigastric pain.  Pain is sharp, intermittent and radiates to her back at times.   She feels she may have had coffee ground appearing emesis.   She denies melena.    She has history of chronic idiopathic constipation for which she was recently started on Amitiza 24 mcg BID without significant response.  She has not had a bowel movement in several days.    She has previously failed Linzess as well.  She had good response to Motegrity but it was not covered by her insurance.        She is known to our service for history of  liver cirrhosis secondary to previous alcohol use as well as PBC.   She is followed outpatient by my colleague, Nelson DAVENPORT.  The patient takes ursodiol 300 mg BID.   She is also on high dose diuretic regimen of Bumex 2 mg dailyl as well as Spironolactone 300 mg daily.   She also takes Pantoprazole 40 mg daily.  Her last EGD was in November 2021 which showed gastritis.      Labs on arrival show evidence of BRIANNA (Cr 1.76).   CT abdomen is unremarkable without evidence of ascites, pancreatitis nor acute intraabdominal process.        Allergies:  Contrast dye, Haloperidol, Nsaids, and Tylenol [acetaminophen]    Scheduled Meds:  metoclopramide, 10 mg, Intravenous, Once  pantoprazole, 40 mg, Intravenous, Q AM       Infusions:  dextrose 5 % and sodium chloride 0.45 % with KCl 20 mEq/L, 100 mL/hr, Last Rate: 100 mL/hr (03/08/22 1800)      PRN Meds:  HYDROmorphone  •  LORazepam  •  promethazine **OR** promethazine  •  Sodium Chloride (PF)    Home Meds:  Medications Prior to Admission   Medication Sig Dispense Refill Last Dose    • aspirin 81 MG EC tablet Take 81 mg by mouth Daily.      • B Complex Vitamins (vitamin b complex) capsule capsule Take 1 capsule by mouth Daily.      • bisacodyl (DULCOLAX) 10 MG suppository Insert 1 suppository into the rectum Daily As Needed for Constipation. 30 suppository 0    • bumetanide (BUMEX) 1 MG tablet Take 2 tablets by mouth Daily. 60 tablet 5    • clopidogrel (PLAVIX) 75 MG tablet Take 1 tablet by mouth Daily. 90 tablet 3    • docusate sodium (Colace) 100 MG capsule Take 1 capsule by mouth 2 (Two) Times a Day. 60 capsule 0    • FLUoxetine (PROzac) 40 MG capsule Take 1 capsule by mouth Daily for 30 days. 30 capsule 0    • gabapentin (NEURONTIN) 300 MG capsule Take 1 capsule by mouth 3 (Three) Times a Day. 90 capsule 5    • lactulose (CHRONULAC) 10 GM/15ML solution Take 45 mL by mouth 3 (Three) Times a Day. 1892 mL 0    • lubiprostone (Amitiza) 24 MCG capsule Take 1 capsule by mouth 2 (Two) Times a Day With Meals. 60 capsule 11    • magnesium oxide (MAGOX) 400 (241.3 Mg) MG tablet tablet Take 400 mg by mouth Every Evening.      • Melatonin 10 MG tablet Take 1 tablet by mouth Every Night.      • Multivitamin tablet tablet       • OLANZapine (zyPREXA) 10 MG tablet Take 1 tablet by mouth Every Night.      • ondansetron (Zofran) 4 MG tablet Take 1 tablet by mouth Every 8 (Eight) Hours As Needed for Nausea or Vomiting for up to 30 doses. 30 tablet 0    • pantoprazole (PROTONIX) 40 MG EC tablet Take 1 tablet by mouth 2 (Two) Times a Day Before Meals. 28 tablet 0    • potassium chloride (K-DUR,KLOR-CON) 20 MEQ CR tablet Take 1 tablet by mouth Daily.      • ranolazine (RANEXA) 1000 MG 12 hr tablet Take 1 tablet by mouth 2 (Two) Times a Day for 30 days. 60 tablet 0    • rosuvastatin (CRESTOR) 20 MG tablet Take 1 tablet by mouth Every Night. 90 tablet 3    • spironolactone (ALDACTONE) 100 MG tablet Take 1 tablet by mouth 3 (Three) Times a Day.      • tamsulosin (FLOMAX) 0.4 MG capsule 24 hr capsule Take 1  capsule by mouth Daily. 30 capsule 0    • traZODone (DESYREL) 50 MG tablet Take 1 tablet by mouth Every Night for 30 days. 30 tablet 0    • ursodiol (ACTIGALL) 300 MG capsule Take 2 capsules by mouth 2 (Two) Times a Day. 120 capsule 5    • vitamin B-6 (PYRIDOXINE) 25 MG tablet Take 25 mg by mouth Daily.      • Xifaxan 550 MG tablet Take 1 tablet by mouth Every 12 (Twelve) Hours. 180 tablet 3        ROS: Review of Systems   Constitutional: Positive for fatigue.   HENT: Negative.    Eyes: Negative.    Respiratory: Negative.    Cardiovascular: Negative.    Gastrointestinal: Positive for abdominal pain, constipation, nausea and vomiting.   Endocrine: Negative.    Genitourinary: Negative.    Musculoskeletal: Negative.    Skin: Negative.    Allergic/Immunologic: Negative.    Neurological: Positive for weakness.   Hematological: Negative.    Psychiatric/Behavioral: Positive for confusion and decreased concentration.       PAST MED HX:  Past Medical History:   Diagnosis Date   • Acute pancreatitis    • Alcoholism (HCC)     sober 2.5 years   • Anaphylaxis    • Arthritis    • Bone necrosis (HCC)    • CAD (coronary artery disease) 7/13/2021   • Cardiac arrest (HCC)    • Cirrhosis (HCC)    • Cirrhosis of liver (HCC) 7/13/2021   • Gastroparesis    • GERD (gastroesophageal reflux disease)    • History of esophageal varices    • History of kidney stones    • Hypertension    • Memory loss    • Pancreatitis    • Rib fractures 7/13/2021   • SVT (supraventricular tachycardia) (HCC) 6/18/2021       PAST SURG HX:  Past Surgical History:   Procedure Laterality Date   • ANKLE SURGERY Right    • APPENDECTOMY     • CARDIAC CATHETERIZATION N/A 6/18/2021    Procedure: Left Heart Cath;  Surgeon: Vikram Gonzales MD;  Location:  [facility];  Service: Cardiovascular;  Laterality: N/A;   • CARDIAC CATHETERIZATION N/A 7/15/2021    Procedure: LEFT HEART CATH;  Surgeon: Vikram Gonzales MD;  Location:  [facility] LOCATION;   Service: Cardiology;  Laterality: N/A;   •  SECTION      x2   • CHOLECYSTECTOMY     • COLONOSCOPY     • COLONOSCOPY N/A 3/31/2020    Procedure: COLONOSCOPY;  Surgeon: Tirso Giles MD;  Location:  [facility];  Service: Gastroenterology;  Laterality: N/A;   • COLONOSCOPY N/A 2021    Procedure: COLONOSCOPY;  Surgeon: Tyrese Rasmussen MD;  Location:  [facility];  Service: Gastroenterology;  Laterality: N/A;   • CORONARY STENT PLACEMENT     • ENDOSCOPY     • ENDOSCOPY     • ENDOSCOPY N/A 2021    Procedure: ESOPHAGOGASTRODUODENOSCOPY AT BEDSIDE;  Surgeon: Tyrese Rasmussen MD;  Location:  [facility];  Service: Gastroenterology;  Laterality: N/A;   • ENDOSCOPY N/A 2021    Procedure: ESOPHAGOGASTRODUODENOSCOPY;  Surgeon: Tyrese Rasmussen MD;  Location:  [facility];  Service: Gastroenterology;  Laterality: N/A;   • ENDOSCOPY N/A 2021    Procedure: ESOPHAGOGASTRODUODENOSCOPY;  Surgeon: Tyrese Rasmussen MD;  Location:  [facility];  Service: Gastroenterology;  Laterality: N/A;   • REPLACEMENT TOTAL HIP LATERAL POSITION Left    • TOTAL HIP ARTHROPLASTY Right     x2   • TOTAL KNEE ARTHROPLASTY Left        FAM HX:  Family History   Problem Relation Age of Onset   • Diabetes type II Mother    • Breast cancer Mother    • Heart disease Father    • Hypertension Brother    • ADD / ADHD Child    • Autism Child    • Asperger's syndrome Child    • Colon cancer Neg Hx    • Colon polyps Neg Hx        SOC HX:  Social History     Socioeconomic History   • Marital status:    Tobacco Use   • Smoking status: Former Smoker     Packs/day: 0.50     Types: Electronic Cigarette, Cigarettes     Quit date: 2021     Years since quittin.6   • Smokeless tobacco: Never Used   Vaping Use   • Vaping Use: Former   Substance and Sexual Activity   • Alcohol use: Not Currently   • Drug use: No   • Sexual activity: Defer       PHYSICAL EXAM  /74   Pulse 87   Temp 97.4 °F (36.3  "°C) (Oral)   Resp 18   Ht 157.5 cm (62\")   Wt 81.6 kg (180 lb)   SpO2 (!) 89%   BMI 32.92 kg/m²   Wt Readings from Last 3 Encounters:   03/07/22 81.6 kg (180 lb)   01/29/22 84.4 kg (186 lb)   12/28/21 94.6 kg (208 lb 9.6 oz)   ,body mass index is 32.92 kg/m².  Physical Exam  Constitutional:       General: She is not in acute distress.     Appearance: She is not diaphoretic.      Comments: Middle aged female sitting up in bed.   Appears drowsy but awakens easily to voice and is conversant.     HENT:      Head: Normocephalic and atraumatic.      Mouth/Throat:      Mouth: Mucous membranes are moist.   Eyes:      General: No scleral icterus.  Cardiovascular:      Rate and Rhythm: Normal rate and regular rhythm.   Pulmonary:      Effort: Pulmonary effort is normal. No respiratory distress.   Abdominal:      General: Bowel sounds are normal. There is no distension.      Palpations: Abdomen is soft.      Comments: Obese abdomen  Mild tenderness to palpation of the epigastric region   Musculoskeletal:      Right lower leg: No edema.      Left lower leg: No edema.   Skin:     General: Skin is warm and dry.   Neurological:      Mental Status: She is alert and oriented to person, place, and time.      Comments: Drowsy.  No asterixis.     Psychiatric:         Behavior: Behavior is slowed.       Results Review:   I reviewed the patient's new clinical results.    Lab Results   Component Value Date    WBC 3.48 03/09/2022    HGB 11.2 (L) 03/09/2022    HGB 13.5 03/08/2022    HGB 17.1 (H) 03/07/2022    HCT 34.6 03/09/2022    MCV 96.4 03/09/2022    PLT 93 (L) 03/09/2022       Lab Results   Component Value Date    INR 1.12 01/29/2022    INR 0.72 (L) 11/12/2021    INR 1.06 08/24/2021       Lab Results   Component Value Date    GLUCOSE 124 (H) 03/09/2022    BUN 7 03/09/2022    CREATININE 1.06 (H) 03/09/2022    EGFRIFNONA 65 02/02/2022    BCR 6.6 (L) 03/09/2022     03/09/2022    K 4.0 03/09/2022    CO2 24.0 03/09/2022    " CALCIUM 8.3 (L) 03/09/2022    PROTENTOTREF 7.5 09/30/2021    ALBUMIN 3.40 (L) 03/09/2022    ALKPHOS 69 03/09/2022    BILITOT 0.4 03/09/2022    BILIDIR 0.2 06/23/2021    ALT 16 03/09/2022    AST 19 03/09/2022      Latest Reference Range & Units 03/08/22 03:23   Lipase 13 - 60 U/L 13     I personally reviewed her pertinent medical records including most recent progress note with [facility] regarding chronic constipation.  Patient has previously failed response to Linzess.  She had good response to Motegrity but insurance would not cover this.  She was started on Amitiza on 1/4/2022.    I also reviewed her most recent EGD performed by Dr. Rasmussen in November 2021 which showed mild gastritis.  No varices or evidence of portal hypertension seen.       CT abdomen/pelvis (as interpreted by radiologist)  FINDINGS:  The lung bases are clear. Redemonstration of subtle liver nodularity  compatible with provided history of cirrhosis. No focal hepatic lesion  on this noncontrast exam. The gallbladder is surgically absent.  Unremarkable appearance of the bile ducts. No splenomegaly. Allowing for  some respiratory motion artifact, there is normal appearance of the  pancreas without definite peripancreatic fat stranding. No evidence of  peripancreatic fluid collection. No pancreatic ductal dilatation.  Unremarkable appearance of the adrenal glands, kidneys, ureters, and  bladder. Unremarkable appearance of the uterus and adnexa with bilateral  adnexal clips. Evaluation of the lower pelvis is somewhat limited owing  to metallic streak artifact from bilateral hip arthroplasties. Redundant  sigmoid colon. No bowel obstruction. The appendix is not visualized,  possibly surgically absent. Unremarkable appearance of the terminal  ileum. No free intra-abdominal fluid or pneumoperitoneum. No conspicuous  intra-abdominal fat stranding. No lymphadenopathy. Unremarkable  appearance of partially imaged bilateral hip arthroplasties.  No acute  osseous findings.   IMPRESSION:  No acute abdominopelvic findings. Specifically, unremarkable noncontrast  appearance of the pancreas allowing for some respiratory motion artifact  limiting assessment in this region.   Redemonstration of subtle liver nodularity suggestive of cirrhosis,  without findings to suggest decompensated cirrhosis/portal hypertension.    CT images personally reviewed.  No evidence of ascites present.   Mild stool burden throughout.  No evidence of acute pancreatitis.     ASSESSMENTS/PLANS    1. Nausea and vomiting  2. Chronic idiopathic constipation, ongoing  3. Epigastric pain  4. Liver cirrhosis secondary to alcohol and PBC.    5. Acute kidney injury, suspect preprenal.   Improved.     6. History of reported gastroparesis     Patient with known liver cirrhosis and history of gastroparesis presents with three day history of nausea, vomiting and worsening constipation.    She does report possible coffee ground emesis but H&H is stable.  Previous EGD did not show evidence of varices/portal hypertension.      >>> Increase IV Protonix to 40 mg BID     >>> Will trial short course of IV Reglan 5 mg TID while inpatient.   Recommend Mallory Root 500 mg TID before meals at discharge.     >>> Dulcolax suppository x 1   >>> Will start BID Miralax tomorrow.  She has failed response to Amitiza as well as Linzess.   >>> Patient had BRIANNA on admission.  Suspect this is secondary to dehydration.   Hold diuretics for now.   Recommend decreasing dose of diuretics at discharge.     >>> Clear liquid diet   >>> Will check TSH     I discussed the patient's findings and my recommendations with patient    SYED Ryan  03/09/22  09:13 EST

## 2022-03-09 NOTE — PLAN OF CARE
"Goal Outcome Evaluation:   VS WNL, NSR-ST on the monitor, and has remained on room air during the day. AOx4. Advanced patient to GI soft diet for lunch and has tolerated well; home medications resumed. Patient began calling at 0730 for pain medication. RN administered and went over a time schedule with the patient in which her pain medication could be administered. Patient began calling between every 30-45 minutes asking for pain medication. RN explained the time she received it and she could have it every 4 hours; pt stated \"oh I didn't know I am so confused\". Each time in the room to administer patient is sleeping with no pain indicators present. Frequently seeks out staff and medications. No nausea or vomiting this shift. Suppository given with no BM only a \"streak\". Up with the assistance of SBA. Skin clear with no issues, and voiding without problem. Stool sample still needed; no BM to collect. Will continue to monitor.               "

## 2022-03-09 NOTE — PROGRESS NOTES
[facility] Medicine Services  PROGRESS NOTE    Patient Name: Timothy Guzman  : 1975  MRN: [id number]    Date of Admission: 3/7/2022  Primary Care Physician: Toshia Mitchell APRN    Subjective   Subjective     CC:  F/U abdominal pain    HPI:  Patient seen and examined. RN notes reviewed. No acute events overnight. Pt was able to keep down some clear liquids yesterday. Still complains of epigastric, RUQ pain. No diarrhea.     ROS:  Gen- No fevers, chills  CV- No chest pain, palpitations  Resp- No cough, dyspnea  GI- No V/D, +abd pain, +nausea    Objective   Objective     Vital Signs:   Temp:  [97.1 °F (36.2 °C)-98.2 °F (36.8 °C)] 97.8 °F (36.6 °C)  Heart Rate:  [] 74  Resp:  [18-20] 18  BP: (104-129)/(63-93) 121/79     Physical Exam:  Constitutional: No acute distress, awake, alert, chronically ill appearing   HENT: NCAT, mucous membranes moist  Respiratory: Clear to auscultation bilaterally, respiratory effort normal   Cardiovascular: RRR, no murmurs, rubs, or gallops  Gastrointestinal: Positive bowel sounds, soft, +TTP epigastric region and RUQ, no R/R/G  Musculoskeletal: No bilateral ankle edema  Psychiatric: Appropriate affect, cooperative  Neurologic: Oriented x 3, speech clear  Skin: No rashes    Results Reviewed:  LAB RESULTS:      Lab 22  0418 22  0323 22  1826 22  1525   WBC 3.48 16.97*  --  16.07*   HEMOGLOBIN 11.2* 13.5  --  17.1*   HEMATOCRIT 34.6 40.2  --  47.7*   PLATELETS 93* 180  --  233   NEUTROS ABS 1.41* 11.33*  --  12.87*   IMMATURE GRANS (ABS) 0.01 0.05  --  0.07*   LYMPHS ABS 1.55 3.68*  --  1.82   MONOS ABS 0.46 1.86*  --  1.27*   EOS ABS 0.03 0.01  --  0.00   MCV 96.4 92.4  --  85.8   PROCALCITONIN  --   --   --  0.13   LACTATE 2.1* 2.5* 5.5* 6.5*   D DIMER QUANT  --   --   --  1.20*         Lab 22  0418 22  0323 22  1525   SODIUM 140 135* 135*   POTASSIUM 4.0 3.7 4.0   CHLORIDE 108* 96* 89*   CO2 24.0  25.0 22.0   ANION GAP 8.0 14.0 24.0*   BUN 7 19 20   CREATININE 1.06* 1.43* 1.76*   EGFR 65.3 45.6* 35.6*   GLUCOSE 124* 131* 163*   CALCIUM 8.3* 8.3* 9.9   TSH 1.600  --   --          Lab 03/09/22  0418 03/08/22  0323 03/07/22  1525   TOTAL PROTEIN 5.5* 6.6 8.4   ALBUMIN 3.40* 4.10 5.10   GLOBULIN 2.1 2.5 3.3   ALT (SGPT) 16 18 26   AST (SGOT) 19 20 25   BILIRUBIN 0.4 0.6 0.6   ALK PHOS 69 87 118*   LIPASE  --  13 19         Lab 03/07/22  1525   TROPONIN T <0.010             Lab 03/07/22  1825   ABO TYPING A   RH TYPING Positive   ANTIBODY SCREEN Negative         Brief Urine Lab Results  (Last result in the past 365 days)      Color   Clarity   Blood   Leuk Est   Nitrite   Protein   CREAT   Urine HCG        03/07/22 1847               Negative             Microbiology Results Abnormal     Procedure Component Value - Date/Time    Blood Culture - Blood, Arm, Right [[id number]]  (Normal) Collected: 03/07/22 1630    Lab Status: Preliminary result Specimen: Blood from Arm, Right Updated: 03/08/22 1717     Blood Culture No growth at 24 hours    Blood Culture - Blood, Arm, Right [[id number]]  (Normal) Collected: 03/07/22 1645    Lab Status: Preliminary result Specimen: Blood from Arm, Right Updated: 03/08/22 1717     Blood Culture No growth at 24 hours    COVID PRE-OP / PRE-PROCEDURE SCREENING ORDER (NO ISOLATION) - Swab, Nasopharynx [[id number]]  (Normal) Collected: 03/07/22 1745    Lab Status: Final result Specimen: Swab from Nasopharynx Updated: 03/07/22 1824    Narrative:      The following orders were created for panel order COVID PRE-OP / PRE-PROCEDURE SCREENING ORDER (NO ISOLATION) - Swab, Nasopharynx.  Procedure                               Abnormality         Status                     ---------                               -----------         ------                     COVID-19 and FLU A/B PCR...[[id number]]  Normal              Final result                 Please view results for these tests on the individual  orders.    COVID-19 and FLU A/B PCR - Swab, Nasopharynx [[id number]]  (Normal) Collected: 03/07/22 1745    Lab Status: Final result Specimen: Swab from Nasopharynx Updated: 03/07/22 1824     COVID19 Not Detected     Influenza A PCR Not Detected     Influenza B PCR Not Detected    Narrative:      Fact sheet for providers: https://www.fda.gov/media/834862/download    Fact sheet for patients: https://www.fda.gov/media/333520/download    Test performed by PCR.          CT Abdomen Pelvis Without Contrast    Result Date: 3/7/2022  DATE OF EXAM: 3/7/2022 5:30 PM  PROCEDURE: CT ABDOMEN PELVIS WO CONTRAST-  INDICATIONS: abdominal pain/cirrhosis/history of pancreatitis  COMPARISON: CT abdomen and pelvis 1/29/2022  TECHNIQUE: Routine transaxial slices were obtained through the abdomen and pelvis without the administration of intravenous contrast. Reconstructed coronal and sagittal images were also obtained. Automated exposure control and iterative construction methods were used.  The radiation dose reduction device was turned on for each scan per the ALARA (As Low as Reasonably Achievable) protocol.  FINDINGS: The lung bases are clear. Redemonstration of subtle liver nodularity compatible with provided history of cirrhosis. No focal hepatic lesion on this noncontrast exam. The gallbladder is surgically absent. Unremarkable appearance of the bile ducts. No splenomegaly. Allowing for some respiratory motion artifact, there is normal appearance of the pancreas without definite peripancreatic fat stranding. No evidence of peripancreatic fluid collection. No pancreatic ductal dilatation. Unremarkable appearance of the adrenal glands, kidneys, ureters, and bladder. Unremarkable appearance of the uterus and adnexa with bilateral adnexal clips. Evaluation of the lower pelvis is somewhat limited owing to metallic streak artifact from bilateral hip arthroplasties. Redundant sigmoid colon. No bowel obstruction. The appendix is not  visualized, possibly surgically absent. Unremarkable appearance of the terminal ileum. No free intra-abdominal fluid or pneumoperitoneum. No conspicuous intra-abdominal fat stranding. No lymphadenopathy. Unremarkable appearance of partially imaged bilateral hip arthroplasties. No acute osseous findings.      Impression: No acute abdominopelvic findings. Specifically, unremarkable noncontrast appearance of the pancreas allowing for some respiratory motion artifact limiting assessment in this region.  Redemonstration of subtle liver nodularity suggestive of cirrhosis, without findings to suggest decompensated cirrhosis/portal hypertension.  This report was finalized on 3/7/2022 5:58 PM by Fahad Sandoval MD.        Results for orders placed during the hospital encounter of 06/18/21    Adult Transthoracic Echo Complete W/ Cont if Necessary Per Protocol    Interpretation Summary  · The quality of the study is limited due to patient positioning and patient being intubated.  · Left ventricular ejection fraction appears to be 51 - 55%. Left ventricular systolic function is normal.  · Left ventricular diastolic function is consistent with (grade Ia w/high LAP) impaired relaxation.  · Mildly reduced right ventricular systolic function noted.      I have reviewed the medications:  Scheduled Meds:metoclopramide, 5 mg, Intravenous, TID AC  pantoprazole, 40 mg, Intravenous, BID AC  [START ON 3/10/2022] polyethylene glycol, 17 g, Oral, BID      Continuous Infusions:   PRN Meds:.•  HYDROmorphone  •  LORazepam  •  Sodium Chloride (PF)    Assessment/Plan   Assessment & Plan     Active Hospital Problems    Diagnosis  POA   • Nausea and vomiting in adult [R11.2]  Yes   • Leukocytosis [D72.829]  Yes   • Lactic acidosis [E87.2]  Yes   • Chronic pancreatitis (HCC) [K86.1]  Yes   • CAD (coronary artery disease) [I25.10]  Yes   • History of esophageal varices [Z87.19]  Not Applicable   • Alcoholic cirrhosis of liver without ascites (HCC)  [K70.30]  Yes      Resolved Hospital Problems   No resolved problems to display.        Brief Hospital Course to date:  Timothy Guzman is a 46 y.o. female with HO alcoholic pancreatitis and cirrhosis, prior gastritis, cardiac arrest, gastroparesis, CAD with stent 7/21, bilateral AVN who presented to the ED with epigastric pain and several days of vomiting.     Intractable nausea, vomiting, lactic acidosis  Epigastric pain  History of reported gastroparesis   -DC IVF  -Gave 500cc bolus this AM due to lactic 2.1 (which is improved from prior)   -Anti-emetics as needed  -GI following  -Reglan added, 5mg TID while inpatient  -Recommend Ginger Root 500mg TID before meals at DC  -Dulcolax supp x1 today   -Will start BID Miralax tomorrow.  She has failed response to Amitiza as well as Linzess.  -Continue PPI but increase to BID   -Dilaudid PRN   -Lipase normal, LFTs normal   -UA negative  -Troponin negative   -Procal negative   -Will check stool studies if diarrhea recurs     Tachycardia  - Resolved     Elevated serum Cr  -Improved with IVF  -Suspect pre-renal due to vomiting  -Hold home diuretics, may need to decrease dose on DC      History of alcoholism  History of Pancreatitis  Cirrhosis  History of CAD with cardiac arrest related to contrast dye    DVT prophylaxis:  Mechanical DVT prophylaxis orders are present.   AM-PAC 6 Clicks Score (PT): 24 (03/08/22 2000)    Disposition: I expect the patient to be discharged TBD.     CODE STATUS:   Code Status and Medical Interventions:   Ordered at: 03/07/22 9437     Code Status (Patient has no pulse and is not breathing):    CPR (Attempt to Resuscitate)     Medical Interventions (Patient has pulse or is breathing):    Full Support       Karen Elena,   03/09/22

## 2022-03-09 NOTE — CASE MANAGEMENT/SOCIAL WORK
Discharge Planning Assessment  [facility]     Patient Name: Timothy Guzman  MRN: [id number]  Today's Date: 3/9/2022    Admit Date: 3/7/2022     Discharge Needs Assessment     Row Name 03/09/22 1015       Living Environment    People in Home parent(s)    Current Living Arrangements home    Primary Care Provided by self    Provides Primary Care For no one    Family Caregiver if Needed parent(s)    Quality of Family Relationships involved    Able to Return to Prior Arrangements yes    Living Arrangement Comments Lives with mother       Resource/Environmental Concerns    Resource/Environmental Concerns none    Transportation Concerns no car       Transition Planning    Patient/Family Anticipates Transition to home;home with family    Patient/Family Anticipated Services at Transition     Transportation Anticipated family or friend will provide       Discharge Needs Assessment    Readmission Within the Last 30 Days no previous admission in last 30 days    Equipment Currently Used at Home none    Concerns to be Addressed discharge planning    Anticipated Changes Related to Illness none    Equipment Needed After Discharge none               Discharge Plan     Row Name 03/09/22 1016       Plan    Plan Initial CM eval    Plan Comments Confirmed with patient she lives in [facility] with her mother. She is independent of ADLs and denies the use of DME at home. Work up/GI consult for nausea/vomiting. Plan to get bowels to move and start to advance diet as tolerated. Goal is to return home upon discharge - CM will continue to follow -shanna [phone]    Final Discharge Disposition Code 01 - home or self-care              Continued Care and Services - Admitted Since 3/7/2022    Coordination has not been started for this encounter.          Demographic Summary     Row Name 03/09/22 1015       General Information    Admission Type inpatient    Arrived From home    Referral Source admission list    Reason for  Consult discharge planning    Preferred Language English       Contact Information    Permission Granted to Share Info With                Functional Status     Row Name 03/09/22 1015       Functional Status    Usual Activity Tolerance good    Current Activity Tolerance moderate       Functional Status, IADL    Medications independent    Meal Preparation independent    Housekeeping independent    Laundry independent    Shopping independent               Psychosocial    No documentation.                Abuse/Neglect    No documentation.                Legal    No documentation.                Substance Abuse    No documentation.                Patient Forms    No documentation.                   Lisa Logan RN

## 2022-03-10 LAB
ANION GAP SERPL CALCULATED.3IONS-SCNC: 8 MMOL/L (ref 5–15)
BUN SERPL-MCNC: 6 MG/DL (ref 6–20)
BUN/CREAT SERPL: 6.7 (ref 7–25)
CALCIUM SPEC-SCNC: 8.2 MG/DL (ref 8.6–10.5)
CHLORIDE SERPL-SCNC: 111 MMOL/L (ref 98–107)
CO2 SERPL-SCNC: 22 MMOL/L (ref 22–29)
CREAT SERPL-MCNC: 0.9 MG/DL (ref 0.57–1)
DEPRECATED RDW RBC AUTO: 46.6 FL (ref 37–54)
EGFRCR SERPLBLD CKD-EPI 2021: 79.5 ML/MIN/1.73
ERYTHROCYTE [DISTWIDTH] IN BLOOD BY AUTOMATED COUNT: 13 % (ref 12.3–15.4)
GLUCOSE SERPL-MCNC: 82 MG/DL (ref 65–99)
HCT VFR BLD AUTO: 37.2 % (ref 34–46.6)
HGB BLD-MCNC: 11.5 G/DL (ref 12–15.9)
MCH RBC QN AUTO: 30.4 PG (ref 26.6–33)
MCHC RBC AUTO-ENTMCNC: 30.9 G/DL (ref 31.5–35.7)
MCV RBC AUTO: 98.4 FL (ref 79–97)
PLATELET # BLD AUTO: 90 10*3/MM3 (ref 140–450)
PMV BLD AUTO: 10.6 FL (ref 6–12)
POTASSIUM SERPL-SCNC: 4.4 MMOL/L (ref 3.5–5.2)
RBC # BLD AUTO: 3.78 10*6/MM3 (ref 3.77–5.28)
SODIUM SERPL-SCNC: 141 MMOL/L (ref 136–145)
TROPONIN T SERPL-MCNC: <0.01 NG/ML (ref 0–0.03)
WBC NRBC COR # BLD: 2.84 10*3/MM3 (ref 3.4–10.8)

## 2022-03-10 PROCEDURE — 25010000002 HYDROMORPHONE PER 4 MG: Performed by: FAMILY MEDICINE

## 2022-03-10 PROCEDURE — 84484 ASSAY OF TROPONIN QUANT: CPT | Performed by: FAMILY MEDICINE

## 2022-03-10 PROCEDURE — 99232 SBSQ HOSP IP/OBS MODERATE 35: CPT | Performed by: FAMILY MEDICINE

## 2022-03-10 PROCEDURE — 25010000002 LORAZEPAM PER 2 MG: Performed by: INTERNAL MEDICINE

## 2022-03-10 PROCEDURE — 93010 ELECTROCARDIOGRAM REPORT: CPT | Performed by: INTERNAL MEDICINE

## 2022-03-10 PROCEDURE — 80048 BASIC METABOLIC PNL TOTAL CA: CPT | Performed by: FAMILY MEDICINE

## 2022-03-10 PROCEDURE — 25010000002 METOCLOPRAMIDE PER 10 MG: Performed by: PHYSICIAN ASSISTANT

## 2022-03-10 PROCEDURE — 93005 ELECTROCARDIOGRAM TRACING: CPT | Performed by: FAMILY MEDICINE

## 2022-03-10 PROCEDURE — 85027 COMPLETE CBC AUTOMATED: CPT | Performed by: FAMILY MEDICINE

## 2022-03-10 RX ORDER — HYDROMORPHONE HYDROCHLORIDE 1 MG/ML
0.5 INJECTION, SOLUTION INTRAMUSCULAR; INTRAVENOUS; SUBCUTANEOUS EVERY 6 HOURS PRN
Status: DISCONTINUED | OUTPATIENT
Start: 2022-03-10 | End: 2022-03-11

## 2022-03-10 RX ADMIN — CLOPIDOGREL BISULFATE 75 MG: 75 TABLET ORAL at 07:46

## 2022-03-10 RX ADMIN — METOCLOPRAMIDE 5 MG: 5 INJECTION, SOLUTION INTRAMUSCULAR; INTRAVENOUS at 07:47

## 2022-03-10 RX ADMIN — LORAZEPAM 0.5 MG: 2 INJECTION INTRAMUSCULAR; INTRAVENOUS at 21:23

## 2022-03-10 RX ADMIN — ROSUVASTATIN 20 MG: 20 TABLET, FILM COATED ORAL at 21:23

## 2022-03-10 RX ADMIN — GABAPENTIN 300 MG: 300 CAPSULE ORAL at 07:46

## 2022-03-10 RX ADMIN — HYDROMORPHONE HYDROCHLORIDE 0.5 MG: 1 INJECTION, SOLUTION INTRAMUSCULAR; INTRAVENOUS; SUBCUTANEOUS at 07:47

## 2022-03-10 RX ADMIN — PANTOPRAZOLE SODIUM 40 MG: 40 INJECTION, POWDER, LYOPHILIZED, FOR SOLUTION INTRAVENOUS at 16:58

## 2022-03-10 RX ADMIN — RANOLAZINE 1000 MG: 500 TABLET, EXTENDED RELEASE ORAL at 07:46

## 2022-03-10 RX ADMIN — PANTOPRAZOLE SODIUM 40 MG: 40 INJECTION, POWDER, LYOPHILIZED, FOR SOLUTION INTRAVENOUS at 07:47

## 2022-03-10 RX ADMIN — LORAZEPAM 0.5 MG: 2 INJECTION INTRAMUSCULAR; INTRAVENOUS at 08:00

## 2022-03-10 RX ADMIN — ASPIRIN 81 MG: 81 TABLET, COATED ORAL at 07:47

## 2022-03-10 RX ADMIN — GABAPENTIN 300 MG: 300 CAPSULE ORAL at 21:23

## 2022-03-10 RX ADMIN — GABAPENTIN 300 MG: 300 CAPSULE ORAL at 15:33

## 2022-03-10 RX ADMIN — LORAZEPAM 0.5 MG: 2 INJECTION INTRAMUSCULAR; INTRAVENOUS at 13:47

## 2022-03-10 RX ADMIN — LIDOCAINE HYDROCHLORIDE: 20 SOLUTION ORAL; TOPICAL at 15:33

## 2022-03-10 RX ADMIN — METOCLOPRAMIDE 5 MG: 5 INJECTION, SOLUTION INTRAMUSCULAR; INTRAVENOUS at 11:48

## 2022-03-10 RX ADMIN — METOCLOPRAMIDE 5 MG: 5 INJECTION, SOLUTION INTRAMUSCULAR; INTRAVENOUS at 16:58

## 2022-03-10 RX ADMIN — HYDROMORPHONE HYDROCHLORIDE 0.5 MG: 1 INJECTION, SOLUTION INTRAMUSCULAR; INTRAVENOUS; SUBCUTANEOUS at 13:47

## 2022-03-10 RX ADMIN — RANOLAZINE 1000 MG: 500 TABLET, EXTENDED RELEASE ORAL at 21:23

## 2022-03-10 RX ADMIN — POLYETHYLENE GLYCOL 3350 17 G: 17 POWDER, FOR SOLUTION ORAL at 21:21

## 2022-03-10 RX ADMIN — OLANZAPINE 10 MG: 5 TABLET, FILM COATED ORAL at 21:23

## 2022-03-10 RX ADMIN — HYDROMORPHONE HYDROCHLORIDE 0.5 MG: 1 INJECTION, SOLUTION INTRAMUSCULAR; INTRAVENOUS; SUBCUTANEOUS at 21:29

## 2022-03-10 RX ADMIN — POLYETHYLENE GLYCOL 3350 17 G: 17 POWDER, FOR SOLUTION ORAL at 07:47

## 2022-03-10 NOTE — PLAN OF CARE
Goal Outcome Evaluation:   AOx4, VS WNL, NSR on the monitor and remained on room air. Up to the bathroom with assistance, voiding well but no BM. Dilaudid changed q6; pt still asking frequently. Spell of chest pain reported by the patient in which EKG was received (NSR), and VS WNL. Informed Dr. Elena and gi cocktail ordered with no other complaints. Awaiting patient to have a BM to discharge. Will continue to monitor.

## 2022-03-10 NOTE — PROGRESS NOTES
[facility] Medicine Services  PROGRESS NOTE    Patient Name: Timothy Guzman  : 1975  MRN: [id number]    Date of Admission: 3/7/2022  Primary Care Physician: Toshia Mitchell APRN    Subjective   Subjective     CC:  F/U abdominal pain    HPI:  Patient seen and examined. RN notes reviewed. No acute events overnight. Required 1x dose of Dilaudid and Ativan overnight. Tolerated GI soft diet. Patient states she still has pain. No BM.     ROS:  Gen- No fevers, chills  CV- No chest pain, palpitations  Resp- No cough, dyspnea  GI- No V/D, nausea +abd pain    Objective   Objective     Vital Signs:   Temp:  [97 °F (36.1 °C)-97.8 °F (36.6 °C)] 97.1 °F (36.2 °C)  Heart Rate:  [] 80  Resp:  [16-18] 16  BP: ()/(52-89) 120/89     Physical Exam:  Constitutional: No acute distress, awake, alert, chronically ill appearing   HENT: NCAT, mucous membranes moist  Respiratory: Clear to auscultation bilaterally, respiratory effort normal   Cardiovascular: RRR, no murmurs, rubs, or gallops  Gastrointestinal: Positive bowel sounds, soft, +TTP epigastric region and RUQ, no R/R/G  Musculoskeletal: No bilateral ankle edema  Psychiatric: Appropriate affect, cooperative  Neurologic: Oriented x 3, speech clear  Skin: No rashes    Results Reviewed:  LAB RESULTS:      Lab 03/10/22  0452 22  0418 22  0323 22  1826 22  1525   WBC 2.84* 3.48 16.97*  --  16.07*   HEMOGLOBIN 11.5* 11.2* 13.5  --  17.1*   HEMATOCRIT 37.2 34.6 40.2  --  47.7*   PLATELETS 90* 93* 180  --  233   NEUTROS ABS  --  1.41* 11.33*  --  12.87*   IMMATURE GRANS (ABS)  --  0.01 0.05  --  0.07*   LYMPHS ABS  --  1.55 3.68*  --  1.82   MONOS ABS  --  0.46 1.86*  --  1.27*   EOS ABS  --  0.03 0.01  --  0.00   MCV 98.4* 96.4 92.4  --  85.8   PROCALCITONIN  --   --   --   --  0.13   LACTATE  --  2.1* 2.5* 5.5* 6.5*   D DIMER QUANT  --   --   --   --  1.20*         Lab 03/10/22  0452 22  0418  03/08/22  0323 03/07/22  1525   SODIUM 141 140 135* 135*   POTASSIUM 4.4 4.0 3.7 4.0   CHLORIDE 111* 108* 96* 89*   CO2 22.0 24.0 25.0 22.0   ANION GAP 8.0 8.0 14.0 24.0*   BUN 6 7 19 20   CREATININE 0.90 1.06* 1.43* 1.76*   EGFR 79.5 65.3 45.6* 35.6*   GLUCOSE 82 124* 131* 163*   CALCIUM 8.2* 8.3* 8.3* 9.9   TSH  --  1.600  --   --          Lab 03/09/22  0418 03/08/22  0323 03/07/22  1525   TOTAL PROTEIN 5.5* 6.6 8.4   ALBUMIN 3.40* 4.10 5.10   GLOBULIN 2.1 2.5 3.3   ALT (SGPT) 16 18 26   AST (SGOT) 19 20 25   BILIRUBIN 0.4 0.6 0.6   ALK PHOS 69 87 118*   LIPASE  --  13 19         Lab 03/07/22  1525   TROPONIN T <0.010             Lab 03/07/22  1825   ABO TYPING A   RH TYPING Positive   ANTIBODY SCREEN Negative         Brief Urine Lab Results  (Last result in the past 365 days)      Color   Clarity   Blood   Leuk Est   Nitrite   Protein   CREAT   Urine HCG        03/07/22 1847               Negative             Microbiology Results Abnormal     Procedure Component Value - Date/Time    Blood Culture - Blood, Arm, Right [[id number]]  (Normal) Collected: 03/07/22 1645    Lab Status: Preliminary result Specimen: Blood from Arm, Right Updated: 03/09/22 1717     Blood Culture No growth at 2 days    Blood Culture - Blood, Arm, Right [[id number]]  (Normal) Collected: 03/07/22 1630    Lab Status: Preliminary result Specimen: Blood from Arm, Right Updated: 03/09/22 1716     Blood Culture No growth at 2 days    COVID PRE-OP / PRE-PROCEDURE SCREENING ORDER (NO ISOLATION) - Swab, Nasopharynx [[id number]]  (Normal) Collected: 03/07/22 1745    Lab Status: Final result Specimen: Swab from Nasopharynx Updated: 03/07/22 1824    Narrative:      The following orders were created for panel order COVID PRE-OP / PRE-PROCEDURE SCREENING ORDER (NO ISOLATION) - Swab, Nasopharynx.  Procedure                               Abnormality         Status                     ---------                               -----------         ------                      COVID-19 and FLU A/B PCR...[[id number]]  Normal              Final result                 Please view results for these tests on the individual orders.    COVID-19 and FLU A/B PCR - Swab, Nasopharynx [[id number]]  (Normal) Collected: 03/07/22 1745    Lab Status: Final result Specimen: Swab from Nasopharynx Updated: 03/07/22 1824     COVID19 Not Detected     Influenza A PCR Not Detected     Influenza B PCR Not Detected    Narrative:      Fact sheet for providers: https://www.fda.gov/media/863928/download    Fact sheet for patients: https://www.fda.gov/media/156798/download    Test performed by PCR.          No radiology results from the last 24 hrs    Results for orders placed during the hospital encounter of 06/18/21    Adult Transthoracic Echo Complete W/ Cont if Necessary Per Protocol    Interpretation Summary  · The quality of the study is limited due to patient positioning and patient being intubated.  · Left ventricular ejection fraction appears to be 51 - 55%. Left ventricular systolic function is normal.  · Left ventricular diastolic function is consistent with (grade Ia w/high LAP) impaired relaxation.  · Mildly reduced right ventricular systolic function noted.      I have reviewed the medications:  Scheduled Meds:aspirin, 81 mg, Oral, Daily  clopidogrel, 75 mg, Oral, Daily  gabapentin, 300 mg, Oral, TID  metoclopramide, 5 mg, Intravenous, TID AC  OLANZapine, 10 mg, Oral, Nightly  pantoprazole, 40 mg, Intravenous, BID AC  polyethylene glycol, 17 g, Oral, BID  ranolazine, 1,000 mg, Oral, BID  rosuvastatin, 20 mg, Oral, Nightly      Continuous Infusions:   PRN Meds:.HYDROmorphone  •  LORazepam  •  Sodium Chloride (PF)    Assessment/Plan   Assessment & Plan     Active Hospital Problems    Diagnosis  POA   • Nausea and vomiting in adult [R11.2]  Yes   • Leukocytosis [D72.829]  Yes   • Lactic acidosis [E87.2]  Yes   • Chronic pancreatitis (HCC) [K86.1]  Yes   • CAD (coronary artery disease) [I25.10]  Yes    • History of esophageal varices [Z87.19]  Not Applicable   • Alcoholic cirrhosis of liver without ascites (HCC) [K70.30]  Yes      Resolved Hospital Problems   No resolved problems to display.        Brief Hospital Course to date:  Timothy Guzman is a 46 y.o. female with HO alcoholic pancreatitis and cirrhosis, prior gastritis, cardiac arrest, gastroparesis, CAD with stent 7/21, bilateral AVN who presented to the ED with epigastric pain and several days of vomiting.     Intractable nausea, vomiting, lactic acidosis  Epigastric pain  History of reported gastroparesis   -Tolerating GI soft diet   -Anti-emetics as needed  -GI following  -Reglan 5mg TID while inpatient  -Recommend Ginger Root 500mg TID before meals at DC  -BID Miralax.  She has failed response to Amitiza as well as Linzess.  -Continue BID PPI   -Dilaudid PRN, will change dosing to q6 hours   -Lipase normal, LFTs normal   -UA negative  -Troponin negative   -Procal negative     Tachycardia  - Resolved     Elevated serum Cr  -Resolved with IVF  -Suspect pre-renal due to vomiting  -Hold home diuretics, may need to decrease dose on DC    Pancytopenia  -Secondary to cirrhosis      History of alcoholism  History of Pancreatitis  Cirrhosis  History of CAD with cardiac arrest related to contrast dye    DVT prophylaxis:  Mechanical DVT prophylaxis orders are present.   AM-PAC 6 Clicks Score (PT): 24 (03/08/22 2000)    Disposition: I expect the patient to be discharged 1-2 days.    CODE STATUS:   Code Status and Medical Interventions:   Ordered at: 03/07/22 4909     Code Status (Patient has no pulse and is not breathing):    CPR (Attempt to Resuscitate)     Medical Interventions (Patient has pulse or is breathing):    Full Support       Karen Elena,   03/10/22

## 2022-03-10 NOTE — PLAN OF CARE
Goal Outcome Evaluation:   Patient A&O, NSR on tele. On room air. PRN ativan and dilaudid given once overnight with relief of symptoms. No BM overnight. Will continue to monitor.

## 2022-03-11 LAB
ANION GAP SERPL CALCULATED.3IONS-SCNC: 9 MMOL/L (ref 5–15)
BUN SERPL-MCNC: 7 MG/DL (ref 6–20)
BUN/CREAT SERPL: 8 (ref 7–25)
CALCIUM SPEC-SCNC: 8 MG/DL (ref 8.6–10.5)
CHLORIDE SERPL-SCNC: 109 MMOL/L (ref 98–107)
CO2 SERPL-SCNC: 21 MMOL/L (ref 22–29)
CREAT SERPL-MCNC: 0.87 MG/DL (ref 0.57–1)
DEPRECATED RDW RBC AUTO: 43.8 FL (ref 37–54)
EGFRCR SERPLBLD CKD-EPI 2021: 82.8 ML/MIN/1.73
ERYTHROCYTE [DISTWIDTH] IN BLOOD BY AUTOMATED COUNT: 12.9 % (ref 12.3–15.4)
GLUCOSE SERPL-MCNC: 140 MG/DL (ref 65–99)
HCT VFR BLD AUTO: 35.7 % (ref 34–46.6)
HGB BLD-MCNC: 11.7 G/DL (ref 12–15.9)
MCH RBC QN AUTO: 31 PG (ref 26.6–33)
MCHC RBC AUTO-ENTMCNC: 32.8 G/DL (ref 31.5–35.7)
MCV RBC AUTO: 94.7 FL (ref 79–97)
PLATELET # BLD AUTO: 97 10*3/MM3 (ref 140–450)
PMV BLD AUTO: 10.7 FL (ref 6–12)
POTASSIUM SERPL-SCNC: 3.4 MMOL/L (ref 3.5–5.2)
RBC # BLD AUTO: 3.77 10*6/MM3 (ref 3.77–5.28)
SODIUM SERPL-SCNC: 139 MMOL/L (ref 136–145)
WBC NRBC COR # BLD: 2.49 10*3/MM3 (ref 3.4–10.8)

## 2022-03-11 PROCEDURE — 25010000002 HYDROMORPHONE PER 4 MG: Performed by: FAMILY MEDICINE

## 2022-03-11 PROCEDURE — 99232 SBSQ HOSP IP/OBS MODERATE 35: CPT | Performed by: FAMILY MEDICINE

## 2022-03-11 PROCEDURE — 25010000002 METOCLOPRAMIDE PER 10 MG: Performed by: PHYSICIAN ASSISTANT

## 2022-03-11 PROCEDURE — 80048 BASIC METABOLIC PNL TOTAL CA: CPT | Performed by: FAMILY MEDICINE

## 2022-03-11 PROCEDURE — 85027 COMPLETE CBC AUTOMATED: CPT | Performed by: FAMILY MEDICINE

## 2022-03-11 PROCEDURE — 25010000002 LORAZEPAM PER 2 MG: Performed by: FAMILY MEDICINE

## 2022-03-11 PROCEDURE — 25010000002 LORAZEPAM PER 2 MG: Performed by: INTERNAL MEDICINE

## 2022-03-11 RX ORDER — MAGNESIUM SULFATE HEPTAHYDRATE 40 MG/ML
2 INJECTION, SOLUTION INTRAVENOUS AS NEEDED
Status: DISCONTINUED | OUTPATIENT
Start: 2022-03-11 | End: 2022-03-13 | Stop reason: HOSPADM

## 2022-03-11 RX ORDER — HYDROMORPHONE HYDROCHLORIDE 1 MG/ML
0.5 INJECTION, SOLUTION INTRAMUSCULAR; INTRAVENOUS; SUBCUTANEOUS EVERY 8 HOURS PRN
Status: DISCONTINUED | OUTPATIENT
Start: 2022-03-11 | End: 2022-03-13 | Stop reason: HOSPADM

## 2022-03-11 RX ORDER — POTASSIUM CHLORIDE 7.45 MG/ML
10 INJECTION INTRAVENOUS
Status: DISCONTINUED | OUTPATIENT
Start: 2022-03-11 | End: 2022-03-13 | Stop reason: HOSPADM

## 2022-03-11 RX ORDER — POTASSIUM CHLORIDE 1.5 G/1.77G
40 POWDER, FOR SOLUTION ORAL AS NEEDED
Status: DISCONTINUED | OUTPATIENT
Start: 2022-03-11 | End: 2022-03-13 | Stop reason: HOSPADM

## 2022-03-11 RX ORDER — POTASSIUM CHLORIDE 750 MG/1
40 CAPSULE, EXTENDED RELEASE ORAL AS NEEDED
Status: DISCONTINUED | OUTPATIENT
Start: 2022-03-11 | End: 2022-03-13 | Stop reason: HOSPADM

## 2022-03-11 RX ORDER — MAGNESIUM SULFATE HEPTAHYDRATE 40 MG/ML
4 INJECTION, SOLUTION INTRAVENOUS AS NEEDED
Status: DISCONTINUED | OUTPATIENT
Start: 2022-03-11 | End: 2022-03-13 | Stop reason: HOSPADM

## 2022-03-11 RX ORDER — LACTULOSE 10 G/15ML
20 SOLUTION ORAL ONCE
Status: COMPLETED | OUTPATIENT
Start: 2022-03-11 | End: 2022-03-11

## 2022-03-11 RX ORDER — LORAZEPAM 2 MG/ML
0.5 INJECTION INTRAMUSCULAR EVERY 6 HOURS PRN
Status: DISCONTINUED | OUTPATIENT
Start: 2022-03-11 | End: 2022-03-13 | Stop reason: HOSPADM

## 2022-03-11 RX ADMIN — GABAPENTIN 300 MG: 300 CAPSULE ORAL at 09:08

## 2022-03-11 RX ADMIN — LORAZEPAM 0.5 MG: 2 INJECTION INTRAMUSCULAR; INTRAVENOUS at 11:43

## 2022-03-11 RX ADMIN — HYDROMORPHONE HYDROCHLORIDE 0.5 MG: 1 INJECTION, SOLUTION INTRAMUSCULAR; INTRAVENOUS; SUBCUTANEOUS at 20:35

## 2022-03-11 RX ADMIN — POLYETHYLENE GLYCOL 3350 17 G: 17 POWDER, FOR SOLUTION ORAL at 09:08

## 2022-03-11 RX ADMIN — GABAPENTIN 300 MG: 300 CAPSULE ORAL at 20:35

## 2022-03-11 RX ADMIN — GABAPENTIN 300 MG: 300 CAPSULE ORAL at 16:27

## 2022-03-11 RX ADMIN — OLANZAPINE 10 MG: 5 TABLET, FILM COATED ORAL at 20:41

## 2022-03-11 RX ADMIN — PANTOPRAZOLE SODIUM 40 MG: 40 INJECTION, POWDER, LYOPHILIZED, FOR SOLUTION INTRAVENOUS at 06:31

## 2022-03-11 RX ADMIN — LORAZEPAM 0.5 MG: 2 INJECTION INTRAMUSCULAR; INTRAVENOUS at 04:19

## 2022-03-11 RX ADMIN — CLOPIDOGREL BISULFATE 75 MG: 75 TABLET ORAL at 09:08

## 2022-03-11 RX ADMIN — RANOLAZINE 1000 MG: 500 TABLET, EXTENDED RELEASE ORAL at 09:08

## 2022-03-11 RX ADMIN — POLYETHYLENE GLYCOL 3350 17 G: 17 POWDER, FOR SOLUTION ORAL at 20:57

## 2022-03-11 RX ADMIN — PANTOPRAZOLE SODIUM 40 MG: 40 INJECTION, POWDER, LYOPHILIZED, FOR SOLUTION INTRAVENOUS at 16:29

## 2022-03-11 RX ADMIN — HYDROMORPHONE HYDROCHLORIDE 0.5 MG: 1 INJECTION, SOLUTION INTRAMUSCULAR; INTRAVENOUS; SUBCUTANEOUS at 04:19

## 2022-03-11 RX ADMIN — HYDROMORPHONE HYDROCHLORIDE 0.5 MG: 1 INJECTION, SOLUTION INTRAMUSCULAR; INTRAVENOUS; SUBCUTANEOUS at 12:47

## 2022-03-11 RX ADMIN — LACTULOSE 20 G: 20 SOLUTION ORAL at 09:28

## 2022-03-11 RX ADMIN — METOCLOPRAMIDE 5 MG: 5 INJECTION, SOLUTION INTRAMUSCULAR; INTRAVENOUS at 06:31

## 2022-03-11 RX ADMIN — ROSUVASTATIN 20 MG: 20 TABLET, FILM COATED ORAL at 20:41

## 2022-03-11 RX ADMIN — LORAZEPAM 0.5 MG: 2 INJECTION INTRAMUSCULAR; INTRAVENOUS at 20:35

## 2022-03-11 RX ADMIN — ASPIRIN 81 MG: 81 TABLET, COATED ORAL at 09:08

## 2022-03-11 RX ADMIN — HYDROMORPHONE HYDROCHLORIDE 0.5 MG: 1 INJECTION, SOLUTION INTRAMUSCULAR; INTRAVENOUS; SUBCUTANEOUS at 11:47

## 2022-03-11 RX ADMIN — RANOLAZINE 1000 MG: 500 TABLET, EXTENDED RELEASE ORAL at 20:41

## 2022-03-11 NOTE — PLAN OF CARE
Goal Outcome Evaluation:   Patient A&O, NSR on tele. Remains on room air. PRN ativan and dilaudid given twice overnight with relief of symptoms. Miralax given per orders, no BM overnight. Will continue to monitor.

## 2022-03-11 NOTE — CASE MANAGEMENT/SOCIAL WORK
Continued Stay Note  [facility]     Patient Name: Timothy Guzman  MRN: [id number]  Today's Date: 3/11/2022    Admit Date: 3/7/2022     Discharge Plan     Row Name 03/11/22 1146       Plan    Plan CM update    Plan Comments Plan is for patient to discharge home once she is able to have a BM. She remains independent of ADLs and will return home with her mother in [facility]. Pain medication being decreased as well - no dc needs identified at this time - CM will continue to follow - shanna [phone]    Final Discharge Disposition Code 01 - home or self-care               Discharge Codes    No documentation.                     Shanna Logan RN

## 2022-03-11 NOTE — PROGRESS NOTES
[facility] Medicine Services  PROGRESS NOTE    Patient Name: Timothy Guzman  : 1975  MRN: [id number]    Date of Admission: 3/7/2022  Primary Care Physician: Toshia Mitchell APRN    Subjective   Subjective     CC:  F/U abdominal pain    HPI:  Patient seen and examined. RN notes reviewed. No acute events overnight. Required 2x dose of Dilaudid and Ativan overnight. Still no BM. Asks if she has to have one prior to going home.     ROS:  Gen- No fevers, chills  CV- No chest pain, palpitations  Resp- No cough, dyspnea  GI- No V/D, nausea +abd pain    Objective   Objective     Vital Signs:   Temp:  [97.1 °F (36.2 °C)-98.2 °F (36.8 °C)] 98.2 °F (36.8 °C)  Heart Rate:  [77-94] 81  Resp:  [16-18] 18  BP: (109-123)/(60-78) 109/63     Physical Exam:  Constitutional: No acute distress, awake, alert, chronically ill appearing   HENT: NCAT, mucous membranes moist  Respiratory: Clear to auscultation bilaterally, respiratory effort normal   Cardiovascular: RRR, no murmurs, rubs, or gallops  Gastrointestinal: Positive bowel sounds, soft, non-tender, non-distended   Musculoskeletal: No bilateral ankle edema  Psychiatric: Appropriate affect, cooperative  Neurologic: Oriented x 3, speech clear  Skin: No rashes    Results Reviewed:  LAB RESULTS:      Lab 22  0440 03/10/22  0452 22  0418 22  0323 22  1826 22  1525   WBC 2.49* 2.84* 3.48 16.97*  --  16.07*   HEMOGLOBIN 11.7* 11.5* 11.2* 13.5  --  17.1*   HEMATOCRIT 35.7 37.2 34.6 40.2  --  47.7*   PLATELETS 97* 90* 93* 180  --  233   NEUTROS ABS  --   --  1.41* 11.33*  --  12.87*   IMMATURE GRANS (ABS)  --   --  0.01 0.05  --  0.07*   LYMPHS ABS  --   --  1.55 3.68*  --  1.82   MONOS ABS  --   --  0.46 1.86*  --  1.27*   EOS ABS  --   --  0.03 0.01  --  0.00   MCV 94.7 98.4* 96.4 92.4  --  85.8   PROCALCITONIN  --   --   --   --   --  0.13   LACTATE  --   --  2.1* 2.5* 5.5* 6.5*   D DIMER QUANT  --   --   --   --    --  1.20*         Lab 03/11/22  0440 03/10/22  0452 03/09/22  0418 03/08/22  0323 03/07/22  1525   SODIUM 139 141 140 135* 135*   POTASSIUM 3.4* 4.4 4.0 3.7 4.0   CHLORIDE 109* 111* 108* 96* 89*   CO2 21.0* 22.0 24.0 25.0 22.0   ANION GAP 9.0 8.0 8.0 14.0 24.0*   BUN 7 6 7 19 20   CREATININE 0.87 0.90 1.06* 1.43* 1.76*   EGFR 82.8 79.5 65.3 45.6* 35.6*   GLUCOSE 140* 82 124* 131* 163*   CALCIUM 8.0* 8.2* 8.3* 8.3* 9.9   TSH  --   --  1.600  --   --          Lab 03/09/22  0418 03/08/22  0323 03/07/22  1525   TOTAL PROTEIN 5.5* 6.6 8.4   ALBUMIN 3.40* 4.10 5.10   GLOBULIN 2.1 2.5 3.3   ALT (SGPT) 16 18 26   AST (SGOT) 19 20 25   BILIRUBIN 0.4 0.6 0.6   ALK PHOS 69 87 118*   LIPASE  --  13 19         Lab 03/10/22  1533 03/07/22  1525   TROPONIN T <0.010 <0.010             Lab 03/07/22  1825   ABO TYPING A   RH TYPING Positive   ANTIBODY SCREEN Negative         Brief Urine Lab Results  (Last result in the past 365 days)      Color   Clarity   Blood   Leuk Est   Nitrite   Protein   CREAT   Urine HCG        03/07/22 1847               Negative             Microbiology Results Abnormal     Procedure Component Value - Date/Time    Blood Culture - Blood, Arm, Right [[id number]]  (Normal) Collected: 03/07/22 1645    Lab Status: Preliminary result Specimen: Blood from Arm, Right Updated: 03/10/22 1718     Blood Culture No growth at 3 days    Blood Culture - Blood, Arm, Right [[id number]]  (Normal) Collected: 03/07/22 1630    Lab Status: Preliminary result Specimen: Blood from Arm, Right Updated: 03/10/22 1718     Blood Culture No growth at 3 days    COVID PRE-OP / PRE-PROCEDURE SCREENING ORDER (NO ISOLATION) - Swab, Nasopharynx [[id number]]  (Normal) Collected: 03/07/22 0074    Lab Status: Final result Specimen: Swab from Nasopharynx Updated: 03/07/22 2597    Narrative:      The following orders were created for panel order COVID PRE-OP / PRE-PROCEDURE SCREENING ORDER (NO ISOLATION) - Swab, Nasopharynx.  Procedure                                Abnormality         Status                     ---------                               -----------         ------                     COVID-19 and FLU A/B PCR...[[id number]]  Normal              Final result                 Please view results for these tests on the individual orders.    COVID-19 and FLU A/B PCR - Swab, Nasopharynx [[id number]]  (Normal) Collected: 03/07/22 1745    Lab Status: Final result Specimen: Swab from Nasopharynx Updated: 03/07/22 1824     COVID19 Not Detected     Influenza A PCR Not Detected     Influenza B PCR Not Detected    Narrative:      Fact sheet for providers: https://www.fda.gov/media/614929/download    Fact sheet for patients: https://www.fda.gov/media/271262/download    Test performed by PCR.          No radiology results from the last 24 hrs    Results for orders placed during the hospital encounter of 06/18/21    Adult Transthoracic Echo Complete W/ Cont if Necessary Per Protocol    Interpretation Summary  · The quality of the study is limited due to patient positioning and patient being intubated.  · Left ventricular ejection fraction appears to be 51 - 55%. Left ventricular systolic function is normal.  · Left ventricular diastolic function is consistent with (grade Ia w/high LAP) impaired relaxation.  · Mildly reduced right ventricular systolic function noted.      I have reviewed the medications:  Scheduled Meds:aspirin, 81 mg, Oral, Daily  clopidogrel, 75 mg, Oral, Daily  gabapentin, 300 mg, Oral, TID  OLANZapine, 10 mg, Oral, Nightly  pantoprazole, 40 mg, Intravenous, BID AC  polyethylene glycol, 17 g, Oral, BID  ranolazine, 1,000 mg, Oral, BID  rosuvastatin, 20 mg, Oral, Nightly      Continuous Infusions:   PRN Meds:.HYDROmorphone  •  LORazepam  •  magnesium sulfate **OR** magnesium sulfate **OR** magnesium sulfate  •  potassium chloride **OR** potassium chloride **OR** potassium chloride  •  Sodium Chloride (PF)    Assessment/Plan   Assessment & Plan      Active Hospital Problems    Diagnosis  POA   • Nausea and vomiting in adult [R11.2]  Yes   • Leukocytosis [D72.829]  Yes   • Lactic acidosis [E87.2]  Yes   • Chronic pancreatitis (HCC) [K86.1]  Yes   • CAD (coronary artery disease) [I25.10]  Yes   • History of esophageal varices [Z87.19]  Not Applicable   • Alcoholic cirrhosis of liver without ascites (HCC) [K70.30]  Yes      Resolved Hospital Problems   No resolved problems to display.        Brief Hospital Course to date:  Timothy Guzman is a 46 y.o. female with HO alcoholic pancreatitis and cirrhosis, prior gastritis, cardiac arrest, gastroparesis, CAD with stent 7/21, bilateral AVN who presented to the ED with epigastric pain and several days of vomiting.     Intractable nausea, vomiting, lactic acidosis  Epigastric pain  History of reported gastroparesis   -Tolerating GI soft diet   -Anti-emetics as needed  -GI following  -S/P short course Reglan 5mg TID  -Recommend Ginger Root 500mg TID before meals at DC  -BID Miralax.  She has failed response to Amitiza as well as Linzess.  -Continue BID PPI   -Dilaudid PRN, will change dosing to q8 hours   -Lipase normal, LFTs normal   -UA negative  -Troponin negative   -Procal negative   -Add Lactulose     Tachycardia  - Resolved     Elevated serum Cr  -Resolved with IVF  -Suspect pre-renal due to vomiting  -Hold home diuretics, may need to decrease dose on DC    Pancytopenia  -Secondary to cirrhosis     Hypokalemia  -replace per protocol      History of alcoholism  History of Pancreatitis  Cirrhosis  History of CAD with cardiac arrest related to contrast dye    DVT prophylaxis:  Mechanical DVT prophylaxis orders are present.   AM-PAC 6 Clicks Score (PT): 24 (03/08/22 2000)    Disposition: I expect the patient to be discharged tomorrow, discussed with patient.     CODE STATUS:   Code Status and Medical Interventions:   Ordered at: 03/07/22 6520     Code Status (Patient has no pulse and is not breathing):    CPR  (Attempt to Resuscitate)     Medical Interventions (Patient has pulse or is breathing):    Full Support       Karen Elena, DO  03/11/22

## 2022-03-12 ENCOUNTER — APPOINTMENT (OUTPATIENT)
Dept: GENERAL RADIOLOGY | Facility: HOSPITAL | Age: 47
End: 2022-03-12

## 2022-03-12 VITALS
SYSTOLIC BLOOD PRESSURE: 126 MMHG | TEMPERATURE: 97.9 F | RESPIRATION RATE: 18 BRPM | WEIGHT: 180 LBS | HEART RATE: 105 BPM | OXYGEN SATURATION: 94 % | HEIGHT: 62 IN | DIASTOLIC BLOOD PRESSURE: 65 MMHG | BODY MASS INDEX: 33.13 KG/M2

## 2022-03-12 PROBLEM — D72.829 LEUKOCYTOSIS: Status: RESOLVED | Noted: 2022-01-29 | Resolved: 2022-03-12

## 2022-03-12 PROBLEM — E87.20 LACTIC ACIDOSIS: Status: RESOLVED | Noted: 2021-11-23 | Resolved: 2022-03-12

## 2022-03-12 PROBLEM — R11.2 NAUSEA AND VOMITING IN ADULT: Status: RESOLVED | Noted: 2022-03-07 | Resolved: 2022-03-12

## 2022-03-12 LAB
BACTERIA SPEC AEROBE CULT: NORMAL
BACTERIA SPEC AEROBE CULT: NORMAL
POTASSIUM SERPL-SCNC: 4.7 MMOL/L (ref 3.5–5.2)

## 2022-03-12 PROCEDURE — 25010000002 LORAZEPAM PER 2 MG: Performed by: FAMILY MEDICINE

## 2022-03-12 PROCEDURE — 74018 RADEX ABDOMEN 1 VIEW: CPT

## 2022-03-12 PROCEDURE — 99239 HOSP IP/OBS DSCHRG MGMT >30: CPT | Performed by: FAMILY MEDICINE

## 2022-03-12 PROCEDURE — 25010000002 METHYLNALTREXONE 12 MG/0.6ML SOLUTION: Performed by: FAMILY MEDICINE

## 2022-03-12 PROCEDURE — 84132 ASSAY OF SERUM POTASSIUM: CPT | Performed by: FAMILY MEDICINE

## 2022-03-12 PROCEDURE — 25010000002 HYDROMORPHONE PER 4 MG: Performed by: FAMILY MEDICINE

## 2022-03-12 RX ORDER — LACTULOSE 10 G/15ML
20 SOLUTION ORAL DAILY
Status: DISCONTINUED | OUTPATIENT
Start: 2022-03-12 | End: 2022-03-13 | Stop reason: HOSPADM

## 2022-03-12 RX ORDER — ONDANSETRON 4 MG/1
4 TABLET, FILM COATED ORAL EVERY 8 HOURS PRN
Qty: 15 TABLET | Refills: 0 | Status: SHIPPED | OUTPATIENT
Start: 2022-03-12

## 2022-03-12 RX ORDER — BUMETANIDE 1 MG/1
1 TABLET ORAL DAILY
Qty: 30 TABLET | Refills: 0
Start: 2022-03-12 | End: 2022-04-09 | Stop reason: HOSPADM

## 2022-03-12 RX ORDER — ASCORBIC ACID 1000 MG
1 TABLET ORAL
Qty: 90 CAPSULE | Refills: 0 | Status: ON HOLD | OUTPATIENT
Start: 2022-03-12 | End: 2022-04-07

## 2022-03-12 RX ORDER — POLYETHYLENE GLYCOL 3350 17 G/17G
17 POWDER, FOR SOLUTION ORAL 2 TIMES DAILY
Qty: 60 PACKET | Refills: 0 | Status: SHIPPED | OUTPATIENT
Start: 2022-03-12

## 2022-03-12 RX ORDER — SPIRONOLACTONE 100 MG/1
100 TABLET, FILM COATED ORAL DAILY
Qty: 30 TABLET | Refills: 0
Start: 2022-03-12 | End: 2022-05-11 | Stop reason: HOSPADM

## 2022-03-12 RX ORDER — PYRIDOSTIGMINE BROMIDE 60 MG/1
60 TABLET ORAL 3 TIMES DAILY
Qty: 42 TABLET | Refills: 0 | Status: ON HOLD | OUTPATIENT
Start: 2022-03-12 | End: 2022-04-07

## 2022-03-12 RX ADMIN — POTASSIUM CHLORIDE 40 MEQ: 750 CAPSULE, EXTENDED RELEASE ORAL at 10:09

## 2022-03-12 RX ADMIN — METHYLNALTREXONE BROMIDE 8 MG: 12 INJECTION, SOLUTION SUBCUTANEOUS at 14:15

## 2022-03-12 RX ADMIN — PANTOPRAZOLE SODIUM 40 MG: 40 INJECTION, POWDER, LYOPHILIZED, FOR SOLUTION INTRAVENOUS at 15:47

## 2022-03-12 RX ADMIN — GABAPENTIN 300 MG: 300 CAPSULE ORAL at 20:43

## 2022-03-12 RX ADMIN — ROSUVASTATIN 20 MG: 20 TABLET, FILM COATED ORAL at 20:43

## 2022-03-12 RX ADMIN — LORAZEPAM 0.5 MG: 2 INJECTION INTRAMUSCULAR; INTRAVENOUS at 12:44

## 2022-03-12 RX ADMIN — LORAZEPAM 0.5 MG: 2 INJECTION INTRAMUSCULAR; INTRAVENOUS at 20:42

## 2022-03-12 RX ADMIN — POLYETHYLENE GLYCOL 3350 17 G: 17 POWDER, FOR SOLUTION ORAL at 08:06

## 2022-03-12 RX ADMIN — CLOPIDOGREL BISULFATE 75 MG: 75 TABLET ORAL at 08:06

## 2022-03-12 RX ADMIN — ASPIRIN 81 MG: 81 TABLET, COATED ORAL at 08:06

## 2022-03-12 RX ADMIN — OLANZAPINE 10 MG: 5 TABLET, FILM COATED ORAL at 20:42

## 2022-03-12 RX ADMIN — RANOLAZINE 1000 MG: 500 TABLET, EXTENDED RELEASE ORAL at 08:06

## 2022-03-12 RX ADMIN — HYDROMORPHONE HYDROCHLORIDE 0.5 MG: 1 INJECTION, SOLUTION INTRAMUSCULAR; INTRAVENOUS; SUBCUTANEOUS at 05:03

## 2022-03-12 RX ADMIN — RANOLAZINE 1000 MG: 500 TABLET, EXTENDED RELEASE ORAL at 20:42

## 2022-03-12 RX ADMIN — HYDROMORPHONE HYDROCHLORIDE 0.5 MG: 1 INJECTION, SOLUTION INTRAMUSCULAR; INTRAVENOUS; SUBCUTANEOUS at 20:42

## 2022-03-12 RX ADMIN — GABAPENTIN 300 MG: 300 CAPSULE ORAL at 15:46

## 2022-03-12 RX ADMIN — LORAZEPAM 0.5 MG: 2 INJECTION INTRAMUSCULAR; INTRAVENOUS at 05:03

## 2022-03-12 RX ADMIN — PANTOPRAZOLE SODIUM 40 MG: 40 INJECTION, POWDER, LYOPHILIZED, FOR SOLUTION INTRAVENOUS at 08:05

## 2022-03-12 RX ADMIN — HYDROMORPHONE HYDROCHLORIDE 0.5 MG: 1 INJECTION, SOLUTION INTRAMUSCULAR; INTRAVENOUS; SUBCUTANEOUS at 12:44

## 2022-03-12 RX ADMIN — LACTULOSE 20 G: 20 SOLUTION ORAL at 14:15

## 2022-03-12 RX ADMIN — POTASSIUM CHLORIDE 40 MEQ: 750 CAPSULE, EXTENDED RELEASE ORAL at 14:15

## 2022-03-12 RX ADMIN — POLYETHYLENE GLYCOL 3350 17 G: 17 POWDER, FOR SOLUTION ORAL at 20:42

## 2022-03-12 RX ADMIN — GABAPENTIN 300 MG: 300 CAPSULE ORAL at 08:06

## 2022-03-12 NOTE — NURSING NOTE
Discharge paperwork done with patient.  Monitors and IV left on patient.  She states her ride won't be here til tonight.  Will continue to monitor patient.

## 2022-03-12 NOTE — PLAN OF CARE
Pt vss, on RA, AxO. Dilaudid given x2 this shift for pain. Ativan also given x2 this shift. Pt did vomit once at start of shift. No BM this shift. Pt is currently resting with no complaints at this time.

## 2022-03-12 NOTE — DISCHARGE SUMMARY
[facility] Medicine Services  DISCHARGE SUMMARY    Patient Name: Timothy Guzman  : 1975  MRN: [id number]    Date of Admission: 3/7/2022  4:12 PM  Date of Discharge:  3/12/2022  Primary Care Physician: Toshia Mitchell APRN    Consults     Date and Time Order Name Status Description    3/9/2022 12:34 AM Inpatient Gastroenterology Consult Completed           Hospital Course     Presenting Problem:   Nausea and vomiting in adult [R11.2]    Active Hospital Problems    Diagnosis  POA   • Chronic pancreatitis (HCC) [K86.1]  Yes   • CAD (coronary artery disease) [I25.10]  Yes   • History of esophageal varices [Z87.19]  Not Applicable   • Alcoholic cirrhosis of liver without ascites (HCC) [K70.30]  Yes      Resolved Hospital Problems    Diagnosis Date Resolved POA   • Nausea and vomiting in adult [R11.2] 2022 Yes   • Leukocytosis [D72.829] 2022 Yes   • Lactic acidosis [E87.2] 2022 Yes          Hospital Course:  Timothy Guzman is a 46 y.o. female with HO alcoholic pancreatitis and cirrhosis, prior gastritis, cardiac arrest, gastroparesis, CAD with stent , bilateral AVN who presented to the ED with epigastric pain and several days of vomiting.     Intractable nausea, vomiting, lactic acidosis  Epigastric pain  History of reported gastroparesis   -Tolerating diet   -Anti-emetics as needed  -GI following  -S/P short course Reglan 5mg TID  -Recommend Ginger Root 500mg TID before meals at DC  -BID Miralax.  She has failed response to Amitiza as well as Linzess.  -Continue BID PPI   -Lipase normal, LFTs normal   -UA negative  -Troponin negative   -Procal negative   -Enema this AM  -KUB with mild fecal stasis   -Discussed with GI, add Mestinon 60mg TID and have patient follow-up with her primary GI in 2 weeks      Tachycardia  - Resolved      Elevated serum Cr  -Resolved with IVF  -Suspect pre-renal due to vomiting  -Resume home diuretics at DC at lower doses       Pancytopenia  -Secondary to cirrhosis      Hypokalemia  -replace per protocol      History of alcoholism  History of Pancreatitis  Cirrhosis  History of CAD with cardiac arrest related to contrast dye      Discharge Follow Up Recommendations for outpatient labs/diagnostics:  -PCP 1 week  -Primary GI 2 weeks     Day of Discharge     HPI:   Patient seen and examined. RN notes reviewed. No acute events overnight.     Review of Systems  Gen- No fevers, chills  CV- No chest pain, palpitations  Resp- No cough, dyspnea  GI- No N/V/D, abd pain    Vital Signs:   Temp:  [97 °F (36.1 °C)-98.8 °F (37.1 °C)] 98.8 °F (37.1 °C)  Heart Rate:  [82-91] 90  Resp:  [18-21] 18  BP: (107-128)/(57-81) 116/69      Physical Exam:  Constitutional: No acute distress, awake, alert  HENT: NCAT, mucous membranes moist  Respiratory: Clear to auscultation bilaterally, respiratory effort normal   Cardiovascular: RRR, no murmurs, rubs, or gallops  Gastrointestinal: Positive bowel sounds, soft, nontender, nondistended  Musculoskeletal: No bilateral ankle edema  Psychiatric: Appropriate affect, cooperative  Neurologic: Oriented x 3, speech clear  Skin: No rashes    Pertinent  and/or Most Recent Results     LAB RESULTS:      Lab 03/11/22  0440 03/10/22  0452 03/09/22  0418 03/08/22  0323 03/07/22  1826 03/07/22  1525   WBC 2.49* 2.84* 3.48 16.97*  --  16.07*   HEMOGLOBIN 11.7* 11.5* 11.2* 13.5  --  17.1*   HEMATOCRIT 35.7 37.2 34.6 40.2  --  47.7*   PLATELETS 97* 90* 93* 180  --  233   NEUTROS ABS  --   --  1.41* 11.33*  --  12.87*   IMMATURE GRANS (ABS)  --   --  0.01 0.05  --  0.07*   LYMPHS ABS  --   --  1.55 3.68*  --  1.82   MONOS ABS  --   --  0.46 1.86*  --  1.27*   EOS ABS  --   --  0.03 0.01  --  0.00   MCV 94.7 98.4* 96.4 92.4  --  85.8   PROCALCITONIN  --   --   --   --   --  0.13   LACTATE  --   --  2.1* 2.5* 5.5* 6.5*   D DIMER QUANT  --   --   --   --   --  1.20*         Lab 03/11/22  0440 03/10/22  0452 03/09/22  0418 03/08/22  0323  03/07/22  1525   SODIUM 139 141 140 135* 135*   POTASSIUM 3.4* 4.4 4.0 3.7 4.0   CHLORIDE 109* 111* 108* 96* 89*   CO2 21.0* 22.0 24.0 25.0 22.0   ANION GAP 9.0 8.0 8.0 14.0 24.0*   BUN 7 6 7 19 20   CREATININE 0.87 0.90 1.06* 1.43* 1.76*   EGFR 82.8 79.5 65.3 45.6* 35.6*   GLUCOSE 140* 82 124* 131* 163*   CALCIUM 8.0* 8.2* 8.3* 8.3* 9.9   TSH  --   --  1.600  --   --          Lab 03/09/22  0418 03/08/22  0323 03/07/22  1525   TOTAL PROTEIN 5.5* 6.6 8.4   ALBUMIN 3.40* 4.10 5.10   GLOBULIN 2.1 2.5 3.3   ALT (SGPT) 16 18 26   AST (SGOT) 19 20 25   BILIRUBIN 0.4 0.6 0.6   ALK PHOS 69 87 118*   LIPASE  --  13 19         Lab 03/10/22  1533 03/07/22  1525   TROPONIN T <0.010 <0.010             Lab 03/07/22  1825   ABO TYPING A   RH TYPING Positive   ANTIBODY SCREEN Negative         Brief Urine Lab Results  (Last result in the past 365 days)      Color   Clarity   Blood   Leuk Est   Nitrite   Protein   CREAT   Urine HCG        03/07/22 1847               Negative           Microbiology Results (last 10 days)     Procedure Component Value - Date/Time    COVID PRE-OP / PRE-PROCEDURE SCREENING ORDER (NO ISOLATION) - Swab, Nasopharynx [[id number]]  (Normal) Collected: 03/07/22 1745    Lab Status: Final result Specimen: Swab from Nasopharynx Updated: 03/07/22 1824    Narrative:      The following orders were created for panel order COVID PRE-OP / PRE-PROCEDURE SCREENING ORDER (NO ISOLATION) - Swab, Nasopharynx.  Procedure                               Abnormality         Status                     ---------                               -----------         ------                     COVID-19 and FLU A/B PCR...[[id number]]  Normal              Final result                 Please view results for these tests on the individual orders.    COVID-19 and FLU A/B PCR - Swab, Nasopharynx [[id number]]  (Normal) Collected: 03/07/22 3425    Lab Status: Final result Specimen: Swab from Nasopharynx Updated: 03/07/22 5533     COVID19 Not  Detected     Influenza A PCR Not Detected     Influenza B PCR Not Detected    Narrative:      Fact sheet for providers: https://www.fda.gov/media/743691/download    Fact sheet for patients: https://www.fda.gov/media/715131/download    Test performed by PCR.    Blood Culture - Blood, Arm, Right [[id number]]  (Normal) Collected: 03/07/22 1645    Lab Status: Preliminary result Specimen: Blood from Arm, Right Updated: 03/11/22 1718     Blood Culture No growth at 4 days    Blood Culture - Blood, Arm, Right [[id number]]  (Normal) Collected: 03/07/22 1630    Lab Status: Preliminary result Specimen: Blood from Arm, Right Updated: 03/11/22 1718     Blood Culture No growth at 4 days          CT Abdomen Pelvis Without Contrast    Result Date: 3/7/2022  DATE OF EXAM: 3/7/2022 5:30 PM  PROCEDURE: CT ABDOMEN PELVIS WO CONTRAST-  INDICATIONS: abdominal pain/cirrhosis/history of pancreatitis  COMPARISON: CT abdomen and pelvis 1/29/2022  TECHNIQUE: Routine transaxial slices were obtained through the abdomen and pelvis without the administration of intravenous contrast. Reconstructed coronal and sagittal images were also obtained. Automated exposure control and iterative construction methods were used.  The radiation dose reduction device was turned on for each scan per the ALARA (As Low as Reasonably Achievable) protocol.  FINDINGS: The lung bases are clear. Redemonstration of subtle liver nodularity compatible with provided history of cirrhosis. No focal hepatic lesion on this noncontrast exam. The gallbladder is surgically absent. Unremarkable appearance of the bile ducts. No splenomegaly. Allowing for some respiratory motion artifact, there is normal appearance of the pancreas without definite peripancreatic fat stranding. No evidence of peripancreatic fluid collection. No pancreatic ductal dilatation. Unremarkable appearance of the adrenal glands, kidneys, ureters, and bladder. Unremarkable appearance of the uterus and adnexa with  bilateral adnexal clips. Evaluation of the lower pelvis is somewhat limited owing to metallic streak artifact from bilateral hip arthroplasties. Redundant sigmoid colon. No bowel obstruction. The appendix is not visualized, possibly surgically absent. Unremarkable appearance of the terminal ileum. No free intra-abdominal fluid or pneumoperitoneum. No conspicuous intra-abdominal fat stranding. No lymphadenopathy. Unremarkable appearance of partially imaged bilateral hip arthroplasties. No acute osseous findings.      No acute abdominopelvic findings. Specifically, unremarkable noncontrast appearance of the pancreas allowing for some respiratory motion artifact limiting assessment in this region.  Redemonstration of subtle liver nodularity suggestive of cirrhosis, without findings to suggest decompensated cirrhosis/portal hypertension.  This report was finalized on 3/7/2022 5:58 PM by Fahad Sandoval MD.      XR Abdomen KUB    Result Date: 3/12/2022  DATE OF EXAM: 3/12/2022 10:51 AM  PROCEDURE: XR ABDOMEN KUB-  INDICATIONS: abd pain; R11.2-Nausea with vomiting, unspecified; E86.0-Dehydration; E87.2-Acidosis  COMPARISON: 2/2/2022  TECHNIQUE: Single radiographic view of the abdomen was obtained.  FINDINGS: Bowel gas pattern is nonobstructive. There may be mild fecal stasis but appearance is similar to the previous exam. No abnormally dilated bowel loops are seen. Bony structures appear to be intact.      Nonobstructive bowel gas pattern, again with a suggestion of mild fecal stasis.  This report was finalized on 3/12/2022 11:12 AM by Dr. Bo Gardner MD.        Results for orders placed during the hospital encounter of 11/23/21    Duplex Mesenteric Complete CAR    Interpretation Summary  EXAMINATION: DUPLEX SMA COMPLETE CAR-12/01/2021:    INDICATION: Generalized abdominal pain.    COMPARISON: Unenhanced CT scan of 11/30/2021.    FINDINGS: The study is considered technically adequate, although the  patient is unable to  suspend respiration during the exam. All portions  of the celiac axis and SMA are visualized. Peak systolic celiac axis  velocity is 150 cm/s at its origin. Peak systolic SMA velocity is 207  cm/s proximally, both considered within normal limits. Review of the CT  study, on the sagittal images shows no obvious celiac axis stenosis, and  only a suggestion of mild-to-moderate celiac axis origin narrowing.    Impression  No evidence of hemodynamically significant celiac axis or  SMA stenosis.    D:  12/03/2021  E:  12/03/2021        This report was finalized on 12/4/2021 3:34 PM by Dr. Bo Gardner MD.      Results for orders placed during the hospital encounter of 11/23/21    Duplex Mesenteric Complete CAR    Interpretation Summary  EXAMINATION: DUPLEX SMA COMPLETE CAR-12/01/2021:    INDICATION: Generalized abdominal pain.    COMPARISON: Unenhanced CT scan of 11/30/2021.    FINDINGS: The study is considered technically adequate, although the  patient is unable to suspend respiration during the exam. All portions  of the celiac axis and SMA are visualized. Peak systolic celiac axis  velocity is 150 cm/s at its origin. Peak systolic SMA velocity is 207  cm/s proximally, both considered within normal limits. Review of the CT  study, on the sagittal images shows no obvious celiac axis stenosis, and  only a suggestion of mild-to-moderate celiac axis origin narrowing.    Impression  No evidence of hemodynamically significant celiac axis or  SMA stenosis.    D:  12/03/2021  E:  12/03/2021        This report was finalized on 12/4/2021 3:34 PM by Dr. Bo Gardner MD.      Results for orders placed during the hospital encounter of 06/18/21    Adult Transthoracic Echo Complete W/ Cont if Necessary Per Protocol    Interpretation Summary  · The quality of the study is limited due to patient positioning and patient being intubated.  · Left ventricular ejection fraction appears to be 51 - 55%. Left ventricular systolic function is  normal.  · Left ventricular diastolic function is consistent with (grade Ia w/high LAP) impaired relaxation.  · Mildly reduced right ventricular systolic function noted.      Plan for Follow-up of Pending Labs/Results: Inbox   Pending Labs     Order Current Status    Blood Culture - Blood, Arm, Right Preliminary result    Blood Culture - Blood, Arm, Right Preliminary result        Discharge Details        Discharge Medications      New Medications      Instructions Start Date   Mallory Root 500 MG capsule   500 mg, Oral, 3 Times Daily Before Meals      polyethylene glycol 17 g packet  Commonly known as: MIRALAX   17 g, Oral, 2 Times Daily      pyridostigmine 60 MG tablet  Commonly known as: Mestinon   60 mg, Oral, 3 Times Daily         Changes to Medications      Instructions Start Date   bumetanide 1 MG tablet  Commonly known as: BUMEX  What changed: how much to take   1 mg, Oral, Daily      spironolactone 100 MG tablet  Commonly known as: ALDACTONE  What changed: when to take this   100 mg, Oral, Daily         Continue These Medications      Instructions Start Date   aspirin 81 MG EC tablet   81 mg, Oral, Daily      bisacodyl 10 MG suppository  Commonly known as: DULCOLAX   10 mg, Rectal, Daily PRN      clopidogrel 75 MG tablet  Commonly known as: PLAVIX   75 mg, Oral, Daily      docusate sodium 100 MG capsule  Commonly known as: Colace   100 mg, Oral, 2 Times Daily      FLUoxetine 40 MG capsule  Commonly known as: PROzac   40 mg, Oral, Daily      gabapentin 300 MG capsule  Commonly known as: NEURONTIN   300 mg, Oral, 3 Times Daily      lactulose 10 GM/15ML solution  Commonly known as: CHRONULAC   30 g, Oral, 3 Times Daily      magnesium oxide 400 (241.3 Mg) MG tablet tablet  Commonly known as: MAGOX   400 mg, Oral, Every Evening      Melatonin 10 MG tablet   1 tablet, Oral, Nightly      Multivitamin tablet tablet  Generic drug: multivitamin   No dose, route, or frequency recorded.      OLANZapine 10 MG  tablet  Commonly known as: zyPREXA   10 mg, Oral, Nightly      ondansetron 4 MG tablet  Commonly known as: Zofran   4 mg, Oral, Every 8 Hours PRN      pantoprazole 40 MG EC tablet  Commonly known as: PROTONIX   40 mg, Oral, 2 Times Daily Before Meals      potassium chloride 20 MEQ CR tablet  Commonly known as: K-DUR,KLOR-CON   1 tablet, Oral, Daily      ranolazine 1000 MG 12 hr tablet  Commonly known as: RANEXA   1,000 mg, Oral, 2 Times Daily      rosuvastatin 20 MG tablet  Commonly known as: CRESTOR   20 mg, Oral, Nightly      tamsulosin 0.4 MG capsule 24 hr capsule  Commonly known as: FLOMAX   0.4 mg, Oral, Daily      traZODone 50 MG tablet  Commonly known as: DESYREL   50 mg, Oral, Nightly      vitamin b complex capsule capsule   1 capsule, Oral, Daily      vitamin B-6 25 MG tablet  Commonly known as: PYRIDOXINE   25 mg, Oral, Daily      Xifaxan 550 MG tablet  Generic drug: riFAXIMin   550 mg, Oral, Every 12 Hours Scheduled         Stop These Medications    lubiprostone 24 MCG capsule  Commonly known as: Amitiza            Allergies   Allergen Reactions   • Contrast Dye Anaphylaxis     6/18 Pt coded after receiving contrast for a CT scan. Was premedicated. Was having an MI at the same time. Immediately underwent heart cath with contrast without additional allergic reaction  7/15 Pt premedicated with prednisone/Benadryl for heart cath. Still with anaphylactic-like reaction with drop in BP and hypoxia. Treated 95% stenosis with minimal dye and supported with epinephrine, solumedrol, NRB   • Haloperidol Hallucinations   • Nsaids GI Bleeding     Other reaction(s): Bleeding, VOMITING   • Tylenol [Acetaminophen] Other (See Comments)     CIRRHOSIS         Discharge Disposition:  Home or Self Care    Diet:  Hospital:  Diet Order   Procedures   • Diet Regular; GI Soft       Activity:      Restrictions or Other Recommendations:       CODE STATUS:    Code Status and Medical Interventions:   Ordered at: 03/07/22 7805      Code Status (Patient has no pulse and is not breathing):    CPR (Attempt to Resuscitate)     Medical Interventions (Patient has pulse or is breathing):    Full Support       Future Appointments   Date Time Provider Department Center   4/4/2022  9:15 AM Vikram Gonzales MD [facility]   4/13/2022 10:00 AM Sapphire Ambriz PA-C [facility]   5/12/2022  8:30 AM Nelson Yip APRN [facility] [address] [facility]       Additional Instructions for the Follow-ups that You Need to Schedule     Discharge Follow-up with PCP   As directed       Currently Documented PCP:    Toshia Mitchell APRN    PCP Phone Number:    [phone]     Follow Up Details: 1 week         Discharge Follow-up with Specified Provider: Primary Gastroenterologist; 2 Weeks   As directed      To: Primary Gastroenterologist    Follow Up: 2 Weeks                     Karen Elena DO  03/12/22      Time Spent on Discharge:  I spent  45  minutes on this discharge activity which included: face-to-face encounter with the patient, reviewing the data in the system, coordination of the care with the nursing staff as well as consultants, documentation, and entering orders.

## 2022-03-13 ENCOUNTER — READMISSION MANAGEMENT (OUTPATIENT)
Dept: CALL CENTER | Facility: HOSPITAL | Age: 47
End: 2022-03-13

## 2022-03-13 NOTE — PLAN OF CARE
"Pt paperwork completed. IV removed at 2226. Telemetry removed. Pt was discharged and waiting on family to arrive and was instructed to notify this nurse by using the call light when they got here. Pt was found by another nurse in the hallway in front of her room laid out on her back. Pt told this nurse that she was coming to tell me her family was here and then her legs gave out. Pt was helped back into bed while the APRN was notified. Pt stated \"This is why I should not have been discharged\". APRN came to see the patient and informed her where she was discharged that we could take her to the ED if she wanted, pt refused this stating she had been here too long and stating she wanted to be wheeled downstairs to go home. Pt had no visible injuries and was able to help get herself back into bed and then into the wheelchair with all movement in extremities still intact and functioning. Of note, pt did state at the start of the shift that she wanted to stay until she had a BM, APRN notified, this nurse educated pt that she was ready for d/c and could treat her chronic constipation at home with the Relistor she was being prescribed upon d/c. Pt then agreed to go home and was fine with being discharged.   Also of note, pt mobility was completely fine, even up until the point this nurse took out the patient's IV. Pt was AxOx4.  "

## 2022-03-13 NOTE — SIGNIFICANT NOTE
"Rn called reporting patient did not want to be discharged due to not having a BM today and she needed her ammonia checked.  Discussed with RN patient has a prescription for Relistor and KUB obtained today showed mild fecal stasis.  Ammonia level order placed.      RN called back and reported patient was ready to leave.  She went to check on her and found her in the floor on her back.  Went to check on the patient, she is lying in bed.  She reports she \"was tired and fell on her back.\"  I examined the patient and could not appreciate any neurological deficits.  Recommended going to the ED for further evaluation but patient declined stating \"I'm ready to go home, I've been here too long.\"  Explained if she were to have any BLE weakness or further symptoms would go to the ED for further evaluation.  Patient understood.    "

## 2022-03-13 NOTE — OUTREACH NOTE
Prep Survey    Flowsheet Row Responses   [facility]? [place]   Is LACE score < 7 ? No   Emergency Room discharge w/ pulse ox? No   Eligibility Readm Mgmt   Discharge diagnosis Chronic pancreatitis,   cirrhosis   Does the patient have one of the following disease processes/diagnoses(primary or secondary)? Other   Does the patient have Home health ordered? No   Is there a DME ordered? No   Comments regarding appointments PCP one week, GI 2 weeks   Prep survey completed? Yes          KIA LOPEZ - Registered Nurse

## 2022-03-14 LAB
QT INTERVAL: 372 MS
QTC INTERVAL: 445 MS

## 2022-03-16 ENCOUNTER — READMISSION MANAGEMENT (OUTPATIENT)
Dept: CALL CENTER | Facility: HOSPITAL | Age: 47
End: 2022-03-16

## 2022-03-16 NOTE — OUTREACH NOTE
Medical Week 1 Survey    Flowsheet Row Responses   [facility] patient discharged from? [place]   Does the patient have one of the following disease processes/diagnoses(primary or secondary)? Other   Week 1 attempt successful? Yes   Call start time 0948   Call end time 1003   Discharge diagnosis Chronic pancreatitis,   cirrhosis   Meds reviewed with patient/caregiver? Yes   Is the patient having any side effects they believe may be caused by any medication additions or changes? No   Does the patient have all medications ordered at discharge? No   What is keeping the patient from filling the prescriptions? --  [pt plans to  meds today, 3/16/22]   Is the patient taking all medications as directed (includes completed medication regime)? Yes   Does the patient have a primary care provider?  Yes   Does the patient have an appointment with their PCP within 7 days of discharge? Yes  [3/21/22 with PCP]   Has the patient kept scheduled appointments due by today? N/A   Has home health visited the patient within 72 hours of discharge? N/A   Psychosocial issues? No   Did the patient receive a copy of their discharge instructions? Yes   Nursing interventions Reviewed instructions with patient   What is the patient's perception of their health status since discharge? Improving   Is the patient/caregiver able to teach back signs and symptoms related to disease process for when to call PCP? Yes   Is the patient/caregiver able to teach back signs and symptoms related to disease process for when to call 911? Yes   Is the patient/caregiver able to teach back the hierarchy of who to call/visit for symptoms/problems? PCP, Specialist, Home health nurse, Urgent Care, ED, 911 Yes   Additional teach back comments pt states had weak legs 1-2 days after d/c, requiring 911 call, paramedics came and evaluated pt at home, had fallen without injuries, has since been more stable on legs, improving daily   Week 1 call completed? Yes    Wrap up additional comments States initially had leg weakness resulting in a collapse and subsequent ambulance visit to home, did not need ED treatment, states has steadily increased in strength. Has not picked up meds at [facility], was encouraged to get meds today. Pt agreed with plan.          ANT MILTON - Registered Nurse

## 2022-03-24 ENCOUNTER — READMISSION MANAGEMENT (OUTPATIENT)
Dept: CALL CENTER | Facility: HOSPITAL | Age: 47
End: 2022-03-24

## 2022-03-24 NOTE — OUTREACH NOTE
Medical Week 2 Survey    Flowsheet Row Responses   [facility] patient discharged from? [place]   Does the patient have one of the following disease processes/diagnoses(primary or secondary)? Other   Week 2 attempt successful? No   Unsuccessful attempts Attempt 1          HEATHER SHAH - Registered Nurse

## 2022-03-28 ENCOUNTER — READMISSION MANAGEMENT (OUTPATIENT)
Dept: CALL CENTER | Facility: HOSPITAL | Age: 47
End: 2022-03-28

## 2022-03-28 NOTE — OUTREACH NOTE
Medical Week 2 Survey    Flowsheet Row Responses   [facility] patient discharged from? [place]   Does the patient have one of the following disease processes/diagnoses(primary or secondary)? Other   Week 2 attempt successful? No   Unsuccessful attempts Attempt 2          ANT MILTON - Registered Nurse

## 2022-04-04 ENCOUNTER — OFFICE VISIT (OUTPATIENT)
Dept: CARDIOLOGY | Facility: CLINIC | Age: 47
End: 2022-04-04

## 2022-04-04 VITALS
HEART RATE: 106 BPM | OXYGEN SATURATION: 94 % | SYSTOLIC BLOOD PRESSURE: 118 MMHG | DIASTOLIC BLOOD PRESSURE: 84 MMHG | BODY MASS INDEX: 38.09 KG/M2 | WEIGHT: 215 LBS | HEIGHT: 63 IN

## 2022-04-04 DIAGNOSIS — I25.118 CORONARY ARTERY DISEASE OF NATIVE ARTERY OF NATIVE HEART WITH STABLE ANGINA PECTORIS: Primary | ICD-10-CM

## 2022-04-04 DIAGNOSIS — E78.2 MIXED HYPERLIPIDEMIA: ICD-10-CM

## 2022-04-04 PROCEDURE — 99214 OFFICE O/P EST MOD 30 MIN: CPT | Performed by: INTERNAL MEDICINE

## 2022-04-04 RX ORDER — URSODIOL 300 MG/1
300 CAPSULE ORAL 2 TIMES DAILY
COMMUNITY

## 2022-04-04 RX ORDER — PRAZOSIN HYDROCHLORIDE 1 MG/1
1 CAPSULE ORAL DAILY
COMMUNITY
Start: 2022-03-28 | End: 2022-05-11 | Stop reason: HOSPADM

## 2022-04-04 RX ORDER — LUBIPROSTONE 24 UG/1
1 CAPSULE ORAL 2 TIMES DAILY
COMMUNITY
Start: 2022-04-01

## 2022-04-04 RX ORDER — METHION/INOS/CHOL BT/B COM/LIV 110MG-86MG
CAPSULE ORAL DAILY
Status: ON HOLD | COMMUNITY
Start: 2022-02-19 | End: 2022-04-07

## 2022-04-04 NOTE — PROGRESS NOTES
[facility] Cardiology   [address], [address]  [place], [place], [address] [phone]  [email or web address]           OUTPATIENT CLINIC FOLLOW UP NOTE    Patient Care Team:  Patient Care Team:  Toshia Mitchell APRN as PCP - General (Family Medicine)  Nelson Yip APRN as Nurse Practitioner (Nurse Practitioner)    Subjective:      Chief Complaint   Patient presents with   • Coronary artery disease of native artery of native heart wi       HPI:    Timothy Guzman is a 46 y.o. female.  Problem list:  1. CAD  a. Inferior STEMI, 6/2021, 100% AV groove stenosis status post ANGELICA x1  i. Concomitant A-V dissociation on presentation, conversion  b. Recurrent chest pain syndrome, 95% stenosis just distal to previously placed stent status post ANGELICA x1  i. Despite premedication, had intraprocedural anaphylactic-like reaction quickly after administration of contrast dye.  Supported with intraprocedural IV Solu-Medrol, epinephrine, nonrebreather  2. SVT  a. Definitive SVT in ER 6/2021, converted briefly to sinus rhythm with 12 mg of IV adenosine  3. Subsequent PEA due to contrast dye allergy cardiac arrest  4. Liver cirrhosis  5. Esophageal varices  6. Pancreatitis  7. Hypertension  8. Depression  9. Previous rib fractures    Today the patient presents for follow-up    Intermittent rare chest pains that are not functionally limiting.  Completed cardiac rehab without difficulty.  Hospitalized for volume overload and BRIANNA recently.  Ongoing adjustments to her diuretics with follow-up of her creatinine and potassium with her PCP and GI teams.    Has noticed weight gain.  Has not exercised as much as she did in cardiac rehab since completing cardiac rehab.    Previously seen allergist due to contrast dye allergy    Review of Systems:  As noted in the HPI    PFSH:  Patient Active Problem List   Diagnosis   • Hepatic encephalopathy (HCC)   • Alcoholic cirrhosis of liver without ascites (HCC)    • Generalized abdominal pain   • History of esophageal varices   • GERD (gastroesophageal reflux disease)   • STEMI (ST elevation myocardial infarction) ([facility])   • Depression   • CAD (coronary artery disease)   • Chronic pancreatitis ([facility])   • Anxiety and depression   • Alcoholism ([facility])   • BRIANNA (acute kidney injury) ([facility])   • Hyponatremia         Current Outpatient Medications:   •  aspirin 81 MG EC tablet, Take 81 mg by mouth Daily., Disp: , Rfl:   •  B Complex Vitamins (vitamin b complex) capsule capsule, Take 1 capsule by mouth Daily., Disp: , Rfl:   •  bisacodyl (DULCOLAX) 10 MG suppository, Insert 1 suppository into the rectum Daily As Needed for Constipation., Disp: 30 suppository, Rfl: 0  •  bumetanide (BUMEX) 1 MG tablet, Take 1 tablet by mouth Daily. (Patient taking differently: Take 2 mg by mouth 3 (Three) Times a Day.), Disp: 30 tablet, Rfl: 0  •  clopidogrel (PLAVIX) 75 MG tablet, Take 1 tablet by mouth Daily., Disp: 90 tablet, Rfl: 3  •  docusate sodium (Colace) 100 MG capsule, Take 1 capsule by mouth 2 (Two) Times a Day., Disp: 60 capsule, Rfl: 0  •  FLUoxetine (PROzac) 40 MG capsule, Take 1 capsule by mouth Daily for 30 days., Disp: 30 capsule, Rfl: 0  •  gabapentin (NEURONTIN) 300 MG capsule, Take 1 capsule by mouth 3 (Three) Times a Day., Disp: 90 capsule, Rfl: 5  •  lactulose (CHRONULAC) 10 GM/15ML solution, Take 45 mL by mouth 3 (Three) Times a Day., Disp: 1892 mL, Rfl: 0  •  lubiprostone (AMITIZA) 24 MCG capsule, Take 1 capsule by mouth 2 (Two) Times a Day., Disp: , Rfl:   •  magnesium oxide (MAGOX) 400 (241.3 Mg) MG tablet tablet, Take 400 mg by mouth Every Evening., Disp: , Rfl:   •  Melatonin 10 MG tablet, Take 1 tablet by mouth Every Night., Disp: , Rfl:   •  Multivitamin tablet tablet, Daily., Disp: , Rfl:   •  OLANZapine (zyPREXA) 10 MG tablet, Take 1 tablet by mouth Every Night., Disp: , Rfl:   •  ondansetron (Zofran) 4 MG tablet, Take 1 tablet by mouth Every 8 (Eight) Hours As Needed for  Nausea or Vomiting for up to 30 doses., Disp: 15 tablet, Rfl: 0  •  pantoprazole (PROTONIX) 40 MG EC tablet, Take 1 tablet by mouth 2 (Two) Times a Day Before Meals., Disp: 28 tablet, Rfl: 0  •  polyethylene glycol (MIRALAX) 17 g packet, Take 17 g by mouth 2 (Two) Times a Day. (Patient taking differently: Take 17 g by mouth Daily As Needed.), Disp: 60 packet, Rfl: 0  •  potassium chloride (K-DUR,KLOR-CON) 20 MEQ CR tablet, Take 1 tablet by mouth Daily., Disp: , Rfl:   •  prazosin (MINIPRESS) 1 MG capsule, Take 1 capsule by mouth Daily., Disp: , Rfl:   •  pyridostigmine (Mestinon) 60 MG tablet, Take 1 tablet by mouth 3 (Three) Times a Day for 14 days., Disp: 42 tablet, Rfl: 0  •  ranolazine (RANEXA) 1000 MG 12 hr tablet, Take 1 tablet by mouth 2 (Two) Times a Day for 30 days., Disp: 60 tablet, Rfl: 0  •  rosuvastatin (CRESTOR) 20 MG tablet, Take 1 tablet by mouth Every Night., Disp: 90 tablet, Rfl: 3  •  spironolactone (ALDACTONE) 100 MG tablet, Take 1 tablet by mouth Daily. (Patient taking differently: Take 100 mg by mouth 3 (Three) Times a Day.), Disp: 30 tablet, Rfl: 0  •  tamsulosin (FLOMAX) 0.4 MG capsule 24 hr capsule, Take 1 capsule by mouth Daily., Disp: 30 capsule, Rfl: 0  •  thiamine (VITAMIN B-1) 100 MG tablet tablet, Daily., Disp: , Rfl:   •  traZODone (DESYREL) 50 MG tablet, Take 1 tablet by mouth Every Night for 30 days., Disp: 30 tablet, Rfl: 0  •  ursodiol (ACTIGALL) 300 MG capsule, Take 300 mg by mouth 2 (Two) Times a Day., Disp: , Rfl:   •  vitamin B-6 (PYRIDOXINE) 25 MG tablet, Take 25 mg by mouth Daily., Disp: , Rfl:   •  Xifaxan 550 MG tablet, Take 1 tablet by mouth Every 12 (Twelve) Hours., Disp: 180 tablet, Rfl: 3  •  Ginger, Zingiber officinalis, (Ginger Root) 500 MG capsule, Take 500 mg by mouth 3 (Three) Times a Day Before Meals., Disp: 90 capsule, Rfl: 0    Allergies   Allergen Reactions   • Contrast Dye Anaphylaxis     6/18 Pt coded after receiving contrast for a CT scan. Was premedicated.  "Was having an MI at the same time. Immediately underwent heart cath with contrast without additional allergic reaction  7/15 Pt premedicated with prednisone/Benadryl for heart cath. Still with anaphylactic-like reaction with drop in BP and hypoxia. Treated 95% stenosis with minimal dye and supported with epinephrine, solumedrol, NRB   • Haloperidol Hallucinations   • Nsaids GI Bleeding     Other reaction(s): Bleeding, VOMITING   • Tylenol [Acetaminophen] Other (See Comments)     CIRRHOSIS        reports that she quit smoking about 9 months ago. Her smoking use included electronic cigarette and cigarettes. She smoked 0.50 packs per day. She has never used smokeless tobacco.      Objective:   Physical exam:  /84 (BP Location: Right arm, Patient Position: Sitting, Cuff Size: Adult)   Pulse 106   Ht 158.8 cm (62.5\")   Wt 97.5 kg (215 lb)   SpO2 94%   BMI 38.70 kg/m²   CONSTITUTIONAL: No acute distress  CARDIOVASCULAR:  Regular rate and rhythm with normal S1 and S2. Without murmur, gallop or rub. Normal radial pulse.  Mild nonpitting lower extremity swelling bilaterally    Labs:    BUN   Date Value Ref Range Status   03/11/2022 7 6 - 20 mg/dL Final   02/19/2021 11 6 - 20 mg/dL Final     Creatinine   Date Value Ref Range Status   03/11/2022 0.87 0.57 - 1.00 mg/dL Final   02/19/2021 1.1 (H) 0.4 - 1.0 mg/dL Final     Potassium   Date Value Ref Range Status   03/12/2022 4.7 3.5 - 5.2 mmol/L Final     Comment:     Slight hemolysis detected by analyzer. Results may be affected.   02/19/2021 4.2 3.6 - 5.0 mmol/L Final     ALT (SGPT)   Date Value Ref Range Status   03/09/2022 16 1 - 33 U/L Final   02/19/2021 33 10 - 60 [iU]/L Final     AST (SGOT)   Date Value Ref Range Status   03/09/2022 19 1 - 32 U/L Final   02/19/2021 27 10 - 42 [iU]/L Final       Lab Results   Component Value Date    CHOL 136 06/28/2021     Lab Results   Component Value Date    TRIG 130 06/28/2021     Lab Results   Component Value Date    HDL 30 " (L) 06/19/2021     Lab Results   Component Value Date     (H) 06/19/2021     No components found for: LDLDIRECTC    Diagnostic Data:    Procedures    Results for orders placed during the hospital encounter of 06/18/21    Adult Transthoracic Echo Complete W/ Cont if Necessary Per Protocol    Interpretation Summary  · The quality of the study is limited due to patient positioning and patient being intubated.  · Left ventricular ejection fraction appears to be 51 - 55%. Left ventricular systolic function is normal.  · Left ventricular diastolic function is consistent with (grade Ia w/high LAP) impaired relaxation.  · Mildly reduced right ventricular systolic function noted.      Assessment and Plan:     Coronary artery disease of native artery of native heart with stable angina pectoris   Prior cardiac arrest  Mixed hyperlipidemia  -Stable from a cardiac standpoint.  Continue current medications.   -Previously discussed that in the future, if she warrants contrast dye allergy for cardiac reasons, we will need to carefully weigh the risk versus benefit of such a procedure.  If for example she is having a high risk non-STEMI or STEMI, would consider elective intubation, premedication for contrast dye allergy, and then proceeding with a heart catheterization.  Or, if for example, she is having a low risk non-STEMI, would favor medical treatment given her life-threatening reactions to contrast dye    Contrast dye allergy  -Seen by an allergist in [place], Dr Hernandez    Chronic cirrhosis  Occasional BRIANNA  -Renal function and potassium levels as well as diuretic dosing managed by PCP and GI teams    - Return in about 1 year (around 4/4/2023) for Next scheduled follow-up with an ECG.    Vikram Gonzales MD, MSc, FACC, [facility]  Interventional Cardiology  [facility]

## 2022-04-06 ENCOUNTER — READMISSION MANAGEMENT (OUTPATIENT)
Dept: CALL CENTER | Facility: HOSPITAL | Age: 47
End: 2022-04-06

## 2022-04-06 ENCOUNTER — APPOINTMENT (OUTPATIENT)
Dept: GENERAL RADIOLOGY | Facility: HOSPITAL | Age: 47
End: 2022-04-06

## 2022-04-06 ENCOUNTER — HOSPITAL ENCOUNTER (INPATIENT)
Facility: HOSPITAL | Age: 47
LOS: 2 days | Discharge: HOME | End: 2022-04-09
Attending: EMERGENCY MEDICINE | Admitting: INTERNAL MEDICINE

## 2022-04-06 DIAGNOSIS — Z87.19 HISTORY OF CIRRHOSIS: ICD-10-CM

## 2022-04-06 DIAGNOSIS — K92.2 GASTROINTESTINAL HEMORRHAGE, UNSPECIFIED GASTROINTESTINAL HEMORRHAGE TYPE: ICD-10-CM

## 2022-04-06 DIAGNOSIS — K70.30 ALCOHOLIC CIRRHOSIS OF LIVER WITHOUT ASCITES: ICD-10-CM

## 2022-04-06 DIAGNOSIS — Z87.11 HISTORY OF PEPTIC ULCER DISEASE: ICD-10-CM

## 2022-04-06 DIAGNOSIS — R10.10 ACUTE UPPER ABDOMINAL PAIN: ICD-10-CM

## 2022-04-06 DIAGNOSIS — K92.2 ACUTE GI BLEEDING: Primary | ICD-10-CM

## 2022-04-06 DIAGNOSIS — R11.2 NAUSEA & VOMITING: ICD-10-CM

## 2022-04-06 LAB
ALBUMIN SERPL-MCNC: 4.2 G/DL (ref 3.5–5.2)
ALBUMIN/GLOB SERPL: 1.6 G/DL
ALP SERPL-CCNC: 101 U/L (ref 39–117)
ALT SERPL W P-5'-P-CCNC: 18 U/L (ref 1–33)
ANION GAP SERPL CALCULATED.3IONS-SCNC: 12 MMOL/L (ref 5–15)
AST SERPL-CCNC: 22 U/L (ref 1–32)
BASOPHILS # BLD AUTO: 0.03 10*3/MM3 (ref 0–0.2)
BASOPHILS NFR BLD AUTO: 0.3 % (ref 0–1.5)
BILIRUB SERPL-MCNC: 0.3 MG/DL (ref 0–1.2)
BUN SERPL-MCNC: 18 MG/DL (ref 6–20)
BUN/CREAT SERPL: 12.5 (ref 7–25)
CALCIUM SPEC-SCNC: 9.2 MG/DL (ref 8.6–10.5)
CHLORIDE SERPL-SCNC: 97 MMOL/L (ref 98–107)
CO2 SERPL-SCNC: 25 MMOL/L (ref 22–29)
CREAT SERPL-MCNC: 1.44 MG/DL (ref 0.57–1)
DEPRECATED RDW RBC AUTO: 41.5 FL (ref 37–54)
EGFRCR SERPLBLD CKD-EPI 2021: 45.2 ML/MIN/1.73
EOSINOPHIL # BLD AUTO: 0.1 10*3/MM3 (ref 0–0.4)
EOSINOPHIL NFR BLD AUTO: 1 % (ref 0.3–6.2)
ERYTHROCYTE [DISTWIDTH] IN BLOOD BY AUTOMATED COUNT: 12.9 % (ref 12.3–15.4)
GLOBULIN UR ELPH-MCNC: 2.7 GM/DL
GLUCOSE SERPL-MCNC: 218 MG/DL (ref 65–99)
HCT VFR BLD AUTO: 41.2 % (ref 34–46.6)
HGB BLD-MCNC: 14.1 G/DL (ref 12–15.9)
HOLD SPECIMEN: NORMAL
HOLD SPECIMEN: NORMAL
IMM GRANULOCYTES # BLD AUTO: 0.06 10*3/MM3 (ref 0–0.05)
IMM GRANULOCYTES NFR BLD AUTO: 0.6 % (ref 0–0.5)
LYMPHOCYTES # BLD AUTO: 2.56 10*3/MM3 (ref 0.7–3.1)
LYMPHOCYTES NFR BLD AUTO: 25.8 % (ref 19.6–45.3)
MCH RBC QN AUTO: 30.4 PG (ref 26.6–33)
MCHC RBC AUTO-ENTMCNC: 34.2 G/DL (ref 31.5–35.7)
MCV RBC AUTO: 88.8 FL (ref 79–97)
MONOCYTES # BLD AUTO: 0.99 10*3/MM3 (ref 0.1–0.9)
MONOCYTES NFR BLD AUTO: 10 % (ref 5–12)
NEUTROPHILS NFR BLD AUTO: 6.17 10*3/MM3 (ref 1.7–7)
NEUTROPHILS NFR BLD AUTO: 62.3 % (ref 42.7–76)
NRBC BLD AUTO-RTO: 0 /100 WBC (ref 0–0.2)
NT-PROBNP SERPL-MCNC: 80.9 PG/ML (ref 0–450)
PLATELET # BLD AUTO: 146 10*3/MM3 (ref 140–450)
PMV BLD AUTO: 10.6 FL (ref 6–12)
POTASSIUM SERPL-SCNC: 4.4 MMOL/L (ref 3.5–5.2)
PROT SERPL-MCNC: 6.9 G/DL (ref 6–8.5)
RBC # BLD AUTO: 4.64 10*6/MM3 (ref 3.77–5.28)
SODIUM SERPL-SCNC: 134 MMOL/L (ref 136–145)
TROPONIN T SERPL-MCNC: <0.01 NG/ML (ref 0–0.03)
WBC NRBC COR # BLD: 9.91 10*3/MM3 (ref 3.4–10.8)
WHOLE BLOOD HOLD SPECIMEN: NORMAL
WHOLE BLOOD HOLD SPECIMEN: NORMAL

## 2022-04-06 PROCEDURE — 85025 COMPLETE CBC W/AUTO DIFF WBC: CPT

## 2022-04-06 PROCEDURE — 82077 ASSAY SPEC XCP UR&BREATH IA: CPT | Performed by: INTERNAL MEDICINE

## 2022-04-06 PROCEDURE — 83605 ASSAY OF LACTIC ACID: CPT | Performed by: NURSE PRACTITIONER

## 2022-04-06 PROCEDURE — 71045 X-RAY EXAM CHEST 1 VIEW: CPT

## 2022-04-06 PROCEDURE — 83880 ASSAY OF NATRIURETIC PEPTIDE: CPT

## 2022-04-06 PROCEDURE — 99285 EMERGENCY DEPT VISIT HI MDM: CPT

## 2022-04-06 PROCEDURE — 84484 ASSAY OF TROPONIN QUANT: CPT

## 2022-04-06 PROCEDURE — 80053 COMPREHEN METABOLIC PANEL: CPT

## 2022-04-06 PROCEDURE — 93005 ELECTROCARDIOGRAM TRACING: CPT | Performed by: EMERGENCY MEDICINE

## 2022-04-06 RX ORDER — SODIUM CHLORIDE 0.9 % (FLUSH) 0.9 %
10 SYRINGE (ML) INJECTION AS NEEDED
Status: DISCONTINUED | OUTPATIENT
Start: 2022-04-06 | End: 2022-04-09

## 2022-04-06 RX ORDER — OCTREOTIDE ACETATE 50 UG/ML
50 INJECTION, SOLUTION INTRAVENOUS; SUBCUTANEOUS ONCE
Status: DISCONTINUED | OUTPATIENT
Start: 2022-04-06 | End: 2022-04-06 | Stop reason: RX

## 2022-04-06 RX ORDER — DROPERIDOL 2.5 MG/ML
2.5 INJECTION, SOLUTION INTRAMUSCULAR; INTRAVENOUS ONCE
Status: COMPLETED | OUTPATIENT
Start: 2022-04-06 | End: 2022-04-07

## 2022-04-06 RX ORDER — PANTOPRAZOLE SODIUM 40 MG/10ML
80 INJECTION, POWDER, LYOPHILIZED, FOR SOLUTION INTRAVENOUS ONCE
Status: COMPLETED | OUTPATIENT
Start: 2022-04-06 | End: 2022-04-07

## 2022-04-06 NOTE — OUTREACH NOTE
Medical Week 3 Survey    Flowsheet Row Responses   List of hospitals in [place] patient discharged from? [facility]   Does the patient have one of the following disease processes/diagnoses(primary or secondary)? Other   Week 3 attempt successful? No   Unsuccessful attempts Attempt 1   Revoke Decline to participate          ANU MILTON - Registered Nurse

## 2022-04-07 ENCOUNTER — ANESTHESIA EVENT (OUTPATIENT)
Dept: GASTROENTEROLOGY | Facility: HOSPITAL | Age: 47
End: 2022-04-07

## 2022-04-07 ENCOUNTER — ANESTHESIA (OUTPATIENT)
Dept: GASTROENTEROLOGY | Facility: HOSPITAL | Age: 47
End: 2022-04-07

## 2022-04-07 ENCOUNTER — APPOINTMENT (OUTPATIENT)
Dept: CT IMAGING | Facility: HOSPITAL | Age: 47
End: 2022-04-07

## 2022-04-07 PROBLEM — K92.2 GI BLEED: Status: ACTIVE | Noted: 2022-04-07

## 2022-04-07 PROBLEM — R79.89 ELEVATED SERUM CREATININE: Status: ACTIVE | Noted: 2022-04-07

## 2022-04-07 LAB
ABO GROUP BLD: NORMAL
ANION GAP SERPL CALCULATED.3IONS-SCNC: 9 MMOL/L (ref 5–15)
APTT PPP: 29.4 SECONDS (ref 22–39)
BACTERIA UR QL AUTO: ABNORMAL /HPF
BASOPHILS # BLD AUTO: 0.02 10*3/MM3 (ref 0–0.2)
BASOPHILS NFR BLD AUTO: 0.2 % (ref 0–1.5)
BILIRUB UR QL STRIP: NEGATIVE
BLD GP AB SCN SERPL QL: NEGATIVE
BUN SERPL-MCNC: 19 MG/DL (ref 6–20)
BUN/CREAT SERPL: 16 (ref 7–25)
CALCIUM SPEC-SCNC: 8.8 MG/DL (ref 8.6–10.5)
CHLORIDE SERPL-SCNC: 97 MMOL/L (ref 98–107)
CLARITY UR: CLEAR
CO2 SERPL-SCNC: 29 MMOL/L (ref 22–29)
COLOR UR: YELLOW
CREAT SERPL-MCNC: 1.19 MG/DL (ref 0.57–1)
D-LACTATE SERPL-SCNC: 1.2 MMOL/L (ref 0.5–2)
D-LACTATE SERPL-SCNC: 2.5 MMOL/L (ref 0.5–2)
DEPRECATED RDW RBC AUTO: 42.5 FL (ref 37–54)
EGFRCR SERPLBLD CKD-EPI 2021: 56.9 ML/MIN/1.73
EOSINOPHIL # BLD AUTO: 0.13 10*3/MM3 (ref 0–0.4)
EOSINOPHIL NFR BLD AUTO: 1.6 % (ref 0.3–6.2)
ERYTHROCYTE [DISTWIDTH] IN BLOOD BY AUTOMATED COUNT: 13 % (ref 12.3–15.4)
ETHANOL BLD-MCNC: <10 MG/DL (ref 0–10)
GLUCOSE SERPL-MCNC: 104 MG/DL (ref 65–99)
GLUCOSE UR STRIP-MCNC: NEGATIVE MG/DL
HBA1C MFR BLD: 5.7 % (ref 4.8–5.6)
HCT VFR BLD AUTO: 36 % (ref 34–46.6)
HCT VFR BLD AUTO: 38.5 % (ref 34–46.6)
HGB BLD-MCNC: 11.9 G/DL (ref 12–15.9)
HGB BLD-MCNC: 11.9 G/DL (ref 12–15.9)
HGB BLD-MCNC: 12 G/DL (ref 12–15.9)
HGB BLD-MCNC: 12.8 G/DL (ref 12–15.9)
HGB UR QL STRIP.AUTO: NEGATIVE
HOLD SPECIMEN: NORMAL
HYALINE CASTS UR QL AUTO: ABNORMAL /LPF
IMM GRANULOCYTES # BLD AUTO: 0.04 10*3/MM3 (ref 0–0.05)
IMM GRANULOCYTES NFR BLD AUTO: 0.5 % (ref 0–0.5)
INR PPP: 1.08 (ref 0.84–1.13)
KETONES UR QL STRIP: NEGATIVE
LEUKOCYTE ESTERASE UR QL STRIP.AUTO: ABNORMAL
LYMPHOCYTES # BLD AUTO: 2.58 10*3/MM3 (ref 0.7–3.1)
LYMPHOCYTES NFR BLD AUTO: 32 % (ref 19.6–45.3)
MAGNESIUM SERPL-MCNC: 2.2 MG/DL (ref 1.6–2.6)
MCH RBC QN AUTO: 29.9 PG (ref 26.6–33)
MCHC RBC AUTO-ENTMCNC: 33.2 G/DL (ref 31.5–35.7)
MCV RBC AUTO: 90 FL (ref 79–97)
MONOCYTES # BLD AUTO: 1.04 10*3/MM3 (ref 0.1–0.9)
MONOCYTES NFR BLD AUTO: 12.9 % (ref 5–12)
NEUTROPHILS NFR BLD AUTO: 4.24 10*3/MM3 (ref 1.7–7)
NEUTROPHILS NFR BLD AUTO: 52.8 % (ref 42.7–76)
NITRITE UR QL STRIP: NEGATIVE
NRBC BLD AUTO-RTO: 0 /100 WBC (ref 0–0.2)
PH UR STRIP.AUTO: 7 [PH] (ref 5–8)
PLATELET # BLD AUTO: 127 10*3/MM3 (ref 140–450)
PMV BLD AUTO: 10.2 FL (ref 6–12)
POTASSIUM SERPL-SCNC: 4.6 MMOL/L (ref 3.5–5.2)
PROT UR QL STRIP: NEGATIVE
PROTHROMBIN TIME: 13.9 SECONDS (ref 11.4–14.4)
QT INTERVAL: 354 MS
QTC INTERVAL: 459 MS
RBC # BLD AUTO: 4.28 10*6/MM3 (ref 3.77–5.28)
RBC # UR STRIP: ABNORMAL /HPF
REF LAB TEST METHOD: ABNORMAL
RH BLD: POSITIVE
SARS-COV-2 RDRP RESP QL NAA+PROBE: NORMAL
SODIUM SERPL-SCNC: 135 MMOL/L (ref 136–145)
SP GR UR STRIP: 1.02 (ref 1–1.03)
SQUAMOUS #/AREA URNS HPF: ABNORMAL /HPF
T&S EXPIRATION DATE: NORMAL
UROBILINOGEN UR QL STRIP: ABNORMAL
WBC # UR STRIP: ABNORMAL /HPF
WBC NRBC COR # BLD: 8.05 10*3/MM3 (ref 3.4–10.8)

## 2022-04-07 PROCEDURE — 83605 ASSAY OF LACTIC ACID: CPT | Performed by: NURSE PRACTITIONER

## 2022-04-07 PROCEDURE — P9047 ALBUMIN (HUMAN), 25%, 50ML: HCPCS | Performed by: INTERNAL MEDICINE

## 2022-04-07 PROCEDURE — 83036 HEMOGLOBIN GLYCOSYLATED A1C: CPT | Performed by: NURSE PRACTITIONER

## 2022-04-07 PROCEDURE — 74176 CT ABD & PELVIS W/O CONTRAST: CPT

## 2022-04-07 PROCEDURE — 85018 HEMOGLOBIN: CPT | Performed by: INTERNAL MEDICINE

## 2022-04-07 PROCEDURE — 85018 HEMOGLOBIN: CPT | Performed by: NURSE PRACTITIONER

## 2022-04-07 PROCEDURE — 87635 SARS-COV-2 COVID-19 AMP PRB: CPT | Performed by: INTERNAL MEDICINE

## 2022-04-07 PROCEDURE — 25010000002 CEFTRIAXONE PER 250 MG: Performed by: EMERGENCY MEDICINE

## 2022-04-07 PROCEDURE — 86900 BLOOD TYPING SEROLOGIC ABO: CPT | Performed by: EMERGENCY MEDICINE

## 2022-04-07 PROCEDURE — 99223 1ST HOSP IP/OBS HIGH 75: CPT | Performed by: NURSE PRACTITIONER

## 2022-04-07 PROCEDURE — 43255 EGD CONTROL BLEEDING ANY: CPT | Performed by: INTERNAL MEDICINE

## 2022-04-07 PROCEDURE — 43239 EGD BIOPSY SINGLE/MULTIPLE: CPT | Performed by: INTERNAL MEDICINE

## 2022-04-07 PROCEDURE — 86850 RBC ANTIBODY SCREEN: CPT | Performed by: EMERGENCY MEDICINE

## 2022-04-07 PROCEDURE — 88305 TISSUE EXAM BY PATHOLOGIST: CPT | Performed by: INTERNAL MEDICINE

## 2022-04-07 PROCEDURE — 99223 1ST HOSP IP/OBS HIGH 75: CPT | Performed by: INTERNAL MEDICINE

## 2022-04-07 PROCEDURE — 25010000002 CEFTRIAXONE PER 250 MG: Performed by: INTERNAL MEDICINE

## 2022-04-07 PROCEDURE — 85730 THROMBOPLASTIN TIME PARTIAL: CPT | Performed by: NURSE PRACTITIONER

## 2022-04-07 PROCEDURE — 0W3P8ZZ CONTROL BLEEDING IN GASTROINTESTINAL TRACT, VIA NATURAL OR ARTIFICIAL OPENING ENDOSCOPIC: ICD-10-PCS | Performed by: INTERNAL MEDICINE

## 2022-04-07 PROCEDURE — 80048 BASIC METABOLIC PNL TOTAL CA: CPT | Performed by: NURSE PRACTITIONER

## 2022-04-07 PROCEDURE — 25010000002 METOCLOPRAMIDE PER 10 MG: Performed by: NURSE PRACTITIONER

## 2022-04-07 PROCEDURE — 25010000002 HYDROMORPHONE PER 4 MG: Performed by: INTERNAL MEDICINE

## 2022-04-07 PROCEDURE — 85014 HEMATOCRIT: CPT | Performed by: INTERNAL MEDICINE

## 2022-04-07 PROCEDURE — 25010000002 OCTREOTIDE PER 25 MCG: Performed by: EMERGENCY MEDICINE

## 2022-04-07 PROCEDURE — 25010000002 DROPERIDOL PER 5 MG: Performed by: EMERGENCY MEDICINE

## 2022-04-07 PROCEDURE — 86901 BLOOD TYPING SEROLOGIC RH(D): CPT | Performed by: EMERGENCY MEDICINE

## 2022-04-07 PROCEDURE — 25010000002 PROPOFOL 10 MG/ML EMULSION

## 2022-04-07 PROCEDURE — 25010000002 ALBUMIN HUMAN 25% PER 50 ML: Performed by: INTERNAL MEDICINE

## 2022-04-07 PROCEDURE — 85610 PROTHROMBIN TIME: CPT | Performed by: NURSE PRACTITIONER

## 2022-04-07 PROCEDURE — 85025 COMPLETE CBC W/AUTO DIFF WBC: CPT | Performed by: NURSE PRACTITIONER

## 2022-04-07 PROCEDURE — C1889 IMPLANT/INSERT DEVICE, NOC: HCPCS | Performed by: INTERNAL MEDICINE

## 2022-04-07 PROCEDURE — 83735 ASSAY OF MAGNESIUM: CPT | Performed by: NURSE PRACTITIONER

## 2022-04-07 PROCEDURE — 81001 URINALYSIS AUTO W/SCOPE: CPT | Performed by: NURSE PRACTITIONER

## 2022-04-07 PROCEDURE — 0DB68ZX EXCISION OF STOMACH, VIA NATURAL OR ARTIFICIAL OPENING ENDOSCOPIC, DIAGNOSTIC: ICD-10-PCS | Performed by: INTERNAL MEDICINE

## 2022-04-07 DEVICE — DEV CLIP ENDO RESOLUTION360 CONTRL ROT 235CM: Type: IMPLANTABLE DEVICE | Status: FUNCTIONAL

## 2022-04-07 RX ORDER — HYDROMORPHONE HYDROCHLORIDE 1 MG/ML
0.25 INJECTION, SOLUTION INTRAMUSCULAR; INTRAVENOUS; SUBCUTANEOUS
Status: DISCONTINUED | OUTPATIENT
Start: 2022-04-07 | End: 2022-04-08

## 2022-04-07 RX ORDER — HYDROCORTISONE ACETATE 25 MG/1
25 SUPPOSITORY RECTAL 2 TIMES DAILY
Status: DISCONTINUED | OUTPATIENT
Start: 2022-04-07 | End: 2022-04-09 | Stop reason: HOSPADM

## 2022-04-07 RX ORDER — HYDROMORPHONE HYDROCHLORIDE 1 MG/ML
0.5 INJECTION, SOLUTION INTRAMUSCULAR; INTRAVENOUS; SUBCUTANEOUS ONCE
Status: DISCONTINUED | OUTPATIENT
Start: 2022-04-07 | End: 2022-04-07

## 2022-04-07 RX ORDER — METOCLOPRAMIDE HYDROCHLORIDE 5 MG/ML
5 INJECTION INTRAMUSCULAR; INTRAVENOUS ONCE
Status: COMPLETED | OUTPATIENT
Start: 2022-04-07 | End: 2022-04-07

## 2022-04-07 RX ORDER — ONDANSETRON 4 MG/1
4 TABLET, FILM COATED ORAL EVERY 6 HOURS PRN
Status: DISCONTINUED | OUTPATIENT
Start: 2022-04-07 | End: 2022-04-09 | Stop reason: HOSPADM

## 2022-04-07 RX ORDER — OLANZAPINE 5 MG/1
10 TABLET ORAL NIGHTLY
Status: DISCONTINUED | OUTPATIENT
Start: 2022-04-07 | End: 2022-04-09 | Stop reason: HOSPADM

## 2022-04-07 RX ORDER — GABAPENTIN 300 MG/1
300 CAPSULE ORAL 3 TIMES DAILY
Status: DISCONTINUED | OUTPATIENT
Start: 2022-04-07 | End: 2022-04-09 | Stop reason: HOSPADM

## 2022-04-07 RX ORDER — IPRATROPIUM BROMIDE AND ALBUTEROL SULFATE 2.5; .5 MG/3ML; MG/3ML
3 SOLUTION RESPIRATORY (INHALATION) ONCE AS NEEDED
Status: DISCONTINUED | OUTPATIENT
Start: 2022-04-07 | End: 2022-04-07

## 2022-04-07 RX ORDER — URSODIOL 300 MG/1
300 CAPSULE ORAL 2 TIMES DAILY
Status: DISCONTINUED | OUTPATIENT
Start: 2022-04-07 | End: 2022-04-09 | Stop reason: HOSPADM

## 2022-04-07 RX ORDER — ROSUVASTATIN CALCIUM 20 MG/1
20 TABLET, COATED ORAL NIGHTLY
Status: DISCONTINUED | OUTPATIENT
Start: 2022-04-07 | End: 2022-04-09 | Stop reason: HOSPADM

## 2022-04-07 RX ORDER — SODIUM CHLORIDE 0.9 % (FLUSH) 0.9 %
10 SYRINGE (ML) INJECTION AS NEEDED
Status: DISCONTINUED | OUTPATIENT
Start: 2022-04-07 | End: 2022-04-09

## 2022-04-07 RX ORDER — HYDROMORPHONE HYDROCHLORIDE 1 MG/ML
0.25 INJECTION, SOLUTION INTRAMUSCULAR; INTRAVENOUS; SUBCUTANEOUS
Status: DISCONTINUED | OUTPATIENT
Start: 2022-04-07 | End: 2022-04-07 | Stop reason: SDUPTHER

## 2022-04-07 RX ORDER — SPIRONOLACTONE 25 MG/1
100 TABLET ORAL DAILY
Status: DISCONTINUED | OUTPATIENT
Start: 2022-04-08 | End: 2022-04-09 | Stop reason: HOSPADM

## 2022-04-07 RX ORDER — MAGNESIUM SULFATE HEPTAHYDRATE 40 MG/ML
4 INJECTION, SOLUTION INTRAVENOUS AS NEEDED
Status: DISCONTINUED | OUTPATIENT
Start: 2022-04-07 | End: 2022-04-09 | Stop reason: HOSPADM

## 2022-04-07 RX ORDER — HYDROMORPHONE HYDROCHLORIDE 1 MG/ML
0.25 INJECTION, SOLUTION INTRAMUSCULAR; INTRAVENOUS; SUBCUTANEOUS
Status: DISCONTINUED | OUTPATIENT
Start: 2022-04-07 | End: 2022-04-07

## 2022-04-07 RX ORDER — PROPOFOL 10 MG/ML
VIAL (ML) INTRAVENOUS AS NEEDED
Status: DISCONTINUED | OUTPATIENT
Start: 2022-04-07 | End: 2022-04-07 | Stop reason: SURG

## 2022-04-07 RX ORDER — LIDOCAINE HYDROCHLORIDE 10 MG/ML
INJECTION, SOLUTION EPIDURAL; INFILTRATION; INTRACAUDAL; PERINEURAL AS NEEDED
Status: DISCONTINUED | OUTPATIENT
Start: 2022-04-07 | End: 2022-04-07 | Stop reason: SURG

## 2022-04-07 RX ORDER — MAGNESIUM SULFATE HEPTAHYDRATE 40 MG/ML
2 INJECTION, SOLUTION INTRAVENOUS AS NEEDED
Status: DISCONTINUED | OUTPATIENT
Start: 2022-04-07 | End: 2022-04-09 | Stop reason: HOSPADM

## 2022-04-07 RX ORDER — CLOPIDOGREL BISULFATE 75 MG/1
75 TABLET ORAL DAILY
Status: DISCONTINUED | OUTPATIENT
Start: 2022-04-07 | End: 2022-04-09 | Stop reason: HOSPADM

## 2022-04-07 RX ORDER — SODIUM CHLORIDE, SODIUM LACTATE, POTASSIUM CHLORIDE, CALCIUM CHLORIDE 600; 310; 30; 20 MG/100ML; MG/100ML; MG/100ML; MG/100ML
75 INJECTION, SOLUTION INTRAVENOUS CONTINUOUS
Status: DISCONTINUED | OUTPATIENT
Start: 2022-04-07 | End: 2022-04-07

## 2022-04-07 RX ORDER — MAGNESIUM SULFATE 1 G/100ML
1 INJECTION INTRAVENOUS AS NEEDED
Status: DISCONTINUED | OUTPATIENT
Start: 2022-04-07 | End: 2022-04-09 | Stop reason: HOSPADM

## 2022-04-07 RX ORDER — TAMSULOSIN HYDROCHLORIDE 0.4 MG/1
0.4 CAPSULE ORAL DAILY
Status: DISCONTINUED | OUTPATIENT
Start: 2022-04-07 | End: 2022-04-09 | Stop reason: HOSPADM

## 2022-04-07 RX ORDER — LACTULOSE 10 G/15ML
45 SOLUTION ORAL 3 TIMES DAILY
Status: DISCONTINUED | OUTPATIENT
Start: 2022-04-07 | End: 2022-04-09 | Stop reason: HOSPADM

## 2022-04-07 RX ORDER — LIDOCAINE 50 MG/G
1 PATCH TOPICAL
Status: DISCONTINUED | OUTPATIENT
Start: 2022-04-07 | End: 2022-04-09 | Stop reason: HOSPADM

## 2022-04-07 RX ORDER — HYDROMORPHONE HYDROCHLORIDE 1 MG/ML
0.5 INJECTION, SOLUTION INTRAMUSCULAR; INTRAVENOUS; SUBCUTANEOUS
Status: DISCONTINUED | OUTPATIENT
Start: 2022-04-07 | End: 2022-04-07

## 2022-04-07 RX ORDER — SODIUM CHLORIDE 0.9 % (FLUSH) 0.9 %
10 SYRINGE (ML) INJECTION EVERY 12 HOURS SCHEDULED
Status: DISCONTINUED | OUTPATIENT
Start: 2022-04-07 | End: 2022-04-09 | Stop reason: HOSPADM

## 2022-04-07 RX ORDER — ONDANSETRON 2 MG/ML
4 INJECTION INTRAMUSCULAR; INTRAVENOUS EVERY 6 HOURS PRN
Status: DISCONTINUED | OUTPATIENT
Start: 2022-04-07 | End: 2022-04-09 | Stop reason: HOSPADM

## 2022-04-07 RX ORDER — BUMETANIDE 1 MG/1
1 TABLET ORAL DAILY
Status: DISCONTINUED | OUTPATIENT
Start: 2022-04-08 | End: 2022-04-08

## 2022-04-07 RX ORDER — ASPIRIN 81 MG/1
81 TABLET ORAL DAILY
Status: DISCONTINUED | OUTPATIENT
Start: 2022-04-07 | End: 2022-04-09 | Stop reason: HOSPADM

## 2022-04-07 RX ORDER — PANTOPRAZOLE SODIUM 40 MG/10ML
40 INJECTION, POWDER, LYOPHILIZED, FOR SOLUTION INTRAVENOUS EVERY 12 HOURS
Status: DISCONTINUED | OUTPATIENT
Start: 2022-04-07 | End: 2022-04-08

## 2022-04-07 RX ORDER — ONDANSETRON 2 MG/ML
4 INJECTION INTRAMUSCULAR; INTRAVENOUS ONCE AS NEEDED
Status: DISCONTINUED | OUTPATIENT
Start: 2022-04-07 | End: 2022-04-07

## 2022-04-07 RX ORDER — SODIUM CHLORIDE 9 MG/ML
INJECTION, SOLUTION INTRAVENOUS CONTINUOUS PRN
Status: DISCONTINUED | OUTPATIENT
Start: 2022-04-07 | End: 2022-04-07 | Stop reason: SURG

## 2022-04-07 RX ORDER — SODIUM CHLORIDE 9 MG/ML
30 INJECTION, SOLUTION INTRAVENOUS CONTINUOUS PRN
Status: DISCONTINUED | OUTPATIENT
Start: 2022-04-07 | End: 2022-04-09

## 2022-04-07 RX ORDER — ALBUMIN (HUMAN) 12.5 G/50ML
25 SOLUTION INTRAVENOUS ONCE
Status: COMPLETED | OUTPATIENT
Start: 2022-04-07 | End: 2022-04-07

## 2022-04-07 RX ADMIN — HYDROMORPHONE HYDROCHLORIDE 0.25 MG: 1 INJECTION, SOLUTION INTRAMUSCULAR; INTRAVENOUS; SUBCUTANEOUS at 21:10

## 2022-04-07 RX ADMIN — HYDROMORPHONE HYDROCHLORIDE 0.25 MG: 1 INJECTION, SOLUTION INTRAMUSCULAR; INTRAVENOUS; SUBCUTANEOUS at 08:38

## 2022-04-07 RX ADMIN — GABAPENTIN 300 MG: 300 CAPSULE ORAL at 21:37

## 2022-04-07 RX ADMIN — HYDROMORPHONE HYDROCHLORIDE 0.25 MG: 1 INJECTION, SOLUTION INTRAMUSCULAR; INTRAVENOUS; SUBCUTANEOUS at 11:37

## 2022-04-07 RX ADMIN — Medication 10 ML: at 08:38

## 2022-04-07 RX ADMIN — URSODIOL 300 MG: 300 CAPSULE ORAL at 08:40

## 2022-04-07 RX ADMIN — URSODIOL 300 MG: 300 CAPSULE ORAL at 21:37

## 2022-04-07 RX ADMIN — PANTOPRAZOLE SODIUM 80 MG: 40 INJECTION, POWDER, FOR SOLUTION INTRAVENOUS at 00:45

## 2022-04-07 RX ADMIN — SODIUM CHLORIDE, POTASSIUM CHLORIDE, SODIUM LACTATE AND CALCIUM CHLORIDE 500 ML: 600; 310; 30; 20 INJECTION, SOLUTION INTRAVENOUS at 03:49

## 2022-04-07 RX ADMIN — LIDOCAINE 1 PATCH: 50 PATCH CUTANEOUS at 11:37

## 2022-04-07 RX ADMIN — OLANZAPINE 10 MG: 5 TABLET, FILM COATED ORAL at 21:37

## 2022-04-07 RX ADMIN — ASPIRIN 81 MG: 81 TABLET, COATED ORAL at 08:38

## 2022-04-07 RX ADMIN — METOCLOPRAMIDE 5 MG: 5 INJECTION, SOLUTION INTRAMUSCULAR; INTRAVENOUS at 11:37

## 2022-04-07 RX ADMIN — HYDROMORPHONE HYDROCHLORIDE 0.25 MG: 1 INJECTION, SOLUTION INTRAMUSCULAR; INTRAVENOUS; SUBCUTANEOUS at 15:02

## 2022-04-07 RX ADMIN — HYDROCORTISONE ACETATE 25 MG: 25 SUPPOSITORY RECTAL at 11:38

## 2022-04-07 RX ADMIN — SODIUM CHLORIDE: 9 INJECTION, SOLUTION INTRAVENOUS at 13:42

## 2022-04-07 RX ADMIN — SODIUM CHLORIDE 1 G: 900 INJECTION INTRAVENOUS at 00:50

## 2022-04-07 RX ADMIN — GABAPENTIN 300 MG: 300 CAPSULE ORAL at 15:01

## 2022-04-07 RX ADMIN — ALBUMIN HUMAN 25 G: 0.25 SOLUTION INTRAVENOUS at 04:58

## 2022-04-07 RX ADMIN — DROPERIDOL 2.5 MG: 2.5 INJECTION, SOLUTION INTRAMUSCULAR; INTRAVENOUS at 00:46

## 2022-04-07 RX ADMIN — LACTULOSE 45 ML: 20 SOLUTION ORAL at 21:39

## 2022-04-07 RX ADMIN — HYDROMORPHONE HYDROCHLORIDE 0.25 MG: 1 INJECTION, SOLUTION INTRAMUSCULAR; INTRAVENOUS; SUBCUTANEOUS at 18:20

## 2022-04-07 RX ADMIN — GABAPENTIN 300 MG: 300 CAPSULE ORAL at 08:38

## 2022-04-07 RX ADMIN — SODIUM CHLORIDE 1 G: 900 INJECTION INTRAVENOUS at 21:37

## 2022-04-07 RX ADMIN — HYDROCORTISONE ACETATE 25 MG: 25 SUPPOSITORY RECTAL at 21:40

## 2022-04-07 RX ADMIN — RIFAXIMIN 550 MG: 550 TABLET ORAL at 08:38

## 2022-04-07 RX ADMIN — LACTULOSE 45 ML: 20 SOLUTION ORAL at 15:02

## 2022-04-07 RX ADMIN — PROPOFOL 50 MG: 10 INJECTION, EMULSION INTRAVENOUS at 13:47

## 2022-04-07 RX ADMIN — LIDOCAINE HYDROCHLORIDE 50 MG: 10 INJECTION, SOLUTION EPIDURAL; INFILTRATION; INTRACAUDAL; PERINEURAL at 13:47

## 2022-04-07 RX ADMIN — Medication 10 ML: at 21:38

## 2022-04-07 RX ADMIN — HYDROMORPHONE HYDROCHLORIDE 0.25 MG: 1 INJECTION, SOLUTION INTRAMUSCULAR; INTRAVENOUS; SUBCUTANEOUS at 02:59

## 2022-04-07 RX ADMIN — TAMSULOSIN HYDROCHLORIDE 0.4 MG: 0.4 CAPSULE ORAL at 08:38

## 2022-04-07 RX ADMIN — PANTOPRAZOLE SODIUM 40 MG: 40 INJECTION, POWDER, FOR SOLUTION INTRAVENOUS at 08:37

## 2022-04-07 RX ADMIN — PANTOPRAZOLE SODIUM 40 MG: 40 INJECTION, POWDER, FOR SOLUTION INTRAVENOUS at 21:37

## 2022-04-07 RX ADMIN — SODIUM CHLORIDE, POTASSIUM CHLORIDE, SODIUM LACTATE AND CALCIUM CHLORIDE 75 ML/HR: 600; 310; 30; 20 INJECTION, SOLUTION INTRAVENOUS at 03:49

## 2022-04-07 RX ADMIN — OCTREOTIDE ACETATE 25 MCG/HR: 1000 INJECTION, SOLUTION INTRAVENOUS; SUBCUTANEOUS at 03:49

## 2022-04-07 RX ADMIN — HYDROMORPHONE HYDROCHLORIDE 0.25 MG: 1 INJECTION, SOLUTION INTRAMUSCULAR; INTRAVENOUS; SUBCUTANEOUS at 05:06

## 2022-04-07 RX ADMIN — ROSUVASTATIN CALCIUM 20 MG: 20 TABLET, COATED ORAL at 21:38

## 2022-04-07 RX ADMIN — RIFAXIMIN 550 MG: 550 TABLET ORAL at 21:37

## 2022-04-07 RX ADMIN — SODIUM CHLORIDE 30 ML/HR: 9 INJECTION, SOLUTION INTRAVENOUS at 13:33

## 2022-04-07 RX ADMIN — CLOPIDOGREL BISULFATE 75 MG: 75 TABLET ORAL at 08:38

## 2022-04-07 RX ADMIN — LACTULOSE 45 ML: 20 SOLUTION ORAL at 08:40

## 2022-04-07 NOTE — CONSULTS
[facility] [place] [facility] Consult    Referring Provider: No ref. provider found    PCP: Toshia iMtchell APRN    Reason for Consultation: GI bleed    Chief complaint: Epigastric pain and gastrointestinal bleeding    History of present illness:    Timothy Guzman is a 46 y.o. female who is admitted with a several day onset of worsening epigastric pain and coffee-ground emesis.  Patient well-known to the service for similar presentations in the past; most recently last month with similar symptoms and felt to be attributed to constipation and gastroparesis.  Patient notes she had response to Reglan during that admission.  Over the past 2 or 3 days has had worsening epigastric pain, nausea, vomiting, and numerous episodes of coffee-ground emesis.  Also reports that she has been having some BRBPR for the past couple days intermittently; does not endorse any perirectal pain, itching, burning, or discomfort.  Additionally, reports a 30 pound unintentional weight gain over the past month and believes her abdomen is full of fluid to the CT abdomen pelvis does not reveal any ascites; does have bilateral upper lower extremity edema.  At time of exam does not endorse any shortness of breath, chest pain, fever/chills, or sick contacts. Past medical, surgical, social, and family histories are reviewed for accuracy.  No documented alleviating or exacerbating factors.  Does not endorse pain at time of exam.    Allergies:  Contrast dye, Haloperidol, Nsaids, and Tylenol [acetaminophen]    Scheduled Meds:  aspirin, 81 mg, Oral, Daily  cefTRIAXone, 1 g, Intravenous, Q24H  clopidogrel, 75 mg, Oral, Daily  gabapentin, 300 mg, Oral, TID  lactulose, 45 mL, Oral, TID  OLANZapine, 10 mg, Oral, Nightly  pantoprazole, 40 mg, Intravenous, Q12H  rifAXIMin, 550 mg, Oral, Q12H  rosuvastatin, 20 mg, Oral, Nightly  sodium chloride, 10 mL, Intravenous, Q12H  tamsulosin, 0.4 mg, Oral, Daily  ursodiol, 300 mg, Oral, BID         Infusions:  lactated  ringers, 75 mL/hr, Last Rate: 75 mL/hr (04/07/22 0349)  octreotide (SandoSTATIN) infusion, 25 mcg/hr, Last Rate: 25 mcg/hr (04/07/22 0349)        PRN Meds:  HYDROmorphone  •  ondansetron **OR** ondansetron  •  sodium chloride  •  sodium chloride    Home Meds:  Medications Prior to Admission   Medication Sig Dispense Refill Last Dose   • aspirin 81 MG EC tablet Take 81 mg by mouth Daily.   4/6/2022 at Unknown time   • B Complex Vitamins (vitamin b complex) capsule capsule Take 1 capsule by mouth Daily.   4/6/2022 at Unknown time   • bisacodyl (DULCOLAX) 10 MG suppository Insert 1 suppository into the rectum Daily As Needed for Constipation. 30 suppository 0 Past Month at Unknown time   • bumetanide (BUMEX) 1 MG tablet Take 1 tablet by mouth Daily. (Patient taking differently: Take 2 mg by mouth 3 (Three) Times a Day.) 30 tablet 0 4/6/2022 at Unknown time   • clopidogrel (PLAVIX) 75 MG tablet Take 1 tablet by mouth Daily. 90 tablet 3 4/6/2022 at Unknown time   • docusate sodium (Colace) 100 MG capsule Take 1 capsule by mouth 2 (Two) Times a Day. 60 capsule 0 Past Month at Unknown time   • FLUoxetine (PROzac) 40 MG capsule Take 1 capsule by mouth Daily for 30 days. 30 capsule 0 4/6/2022 at Unknown time   • gabapentin (NEURONTIN) 300 MG capsule Take 1 capsule by mouth 3 (Three) Times a Day. 90 capsule 5 4/6/2022 at Unknown time   • lactulose (CHRONULAC) 10 GM/15ML solution Take 45 mL by mouth 3 (Three) Times a Day. 1892 mL 0 4/6/2022 at Unknown time   • lubiprostone (AMITIZA) 24 MCG capsule Take 1 capsule by mouth 2 (Two) Times a Day.   4/6/2022 at Unknown time   • magnesium oxide (MAGOX) 400 (241.3 Mg) MG tablet tablet Take 400 mg by mouth Every Evening.   4/6/2022 at Unknown time   • Melatonin 10 MG tablet Take 1 tablet by mouth Every Night.   Past Week at Unknown time   • Multivitamin tablet tablet Daily.   4/6/2022 at Unknown time   • OLANZapine (zyPREXA) 10 MG tablet Take 1 tablet by mouth Every Night.   Past Week  at Unknown time   • ondansetron (Zofran) 4 MG tablet Take 1 tablet by mouth Every 8 (Eight) Hours As Needed for Nausea or Vomiting for up to 30 doses. 15 tablet 0 4/6/2022 at Unknown time   • pantoprazole (PROTONIX) 40 MG EC tablet Take 1 tablet by mouth 2 (Two) Times a Day Before Meals. 28 tablet 0 4/6/2022 at Unknown time   • polyethylene glycol (MIRALAX) 17 g packet Take 17 g by mouth 2 (Two) Times a Day. (Patient taking differently: Take 17 g by mouth Daily As Needed.) 60 packet 0 Past Month at Unknown time   • potassium chloride (K-DUR,KLOR-CON) 20 MEQ CR tablet Take 1 tablet by mouth Daily.   4/6/2022 at Unknown time   • prazosin (MINIPRESS) 1 MG capsule Take 1 capsule by mouth Daily.   4/6/2022 at Unknown time   • ranolazine (RANEXA) 1000 MG 12 hr tablet Take 1 tablet by mouth 2 (Two) Times a Day for 30 days. 60 tablet 0 4/6/2022 at Unknown time   • rosuvastatin (CRESTOR) 20 MG tablet Take 1 tablet by mouth Every Night. 90 tablet 3 Past Week at Unknown time   • spironolactone (ALDACTONE) 100 MG tablet Take 1 tablet by mouth Daily. (Patient taking differently: Take 100 mg by mouth 3 (Three) Times a Day.) 30 tablet 0 Past Week at Unknown time   • tamsulosin (FLOMAX) 0.4 MG capsule 24 hr capsule Take 1 capsule by mouth Daily. 30 capsule 0 4/6/2022 at Unknown time   • ursodiol (ACTIGALL) 300 MG capsule Take 300 mg by mouth 2 (Two) Times a Day.   4/6/2022 at Unknown time   • vitamin B-6 (PYRIDOXINE) 25 MG tablet Take 25 mg by mouth Daily.   4/6/2022 at Unknown time   • Xifaxan 550 MG tablet Take 1 tablet by mouth Every 12 (Twelve) Hours. 180 tablet 3 4/6/2022 at Unknown time   • traZODone (DESYREL) 50 MG tablet Take 1 tablet by mouth Every Night for 30 days. (Patient taking differently: Take 100 mg by mouth Every Night. Pt reports increase of trazodone to 100mg nightly) 30 tablet 0        ROS: Review of Systems   Constitutional: Positive for activity change, appetite change and fatigue. Negative for chills,  diaphoresis, fever and unexpected weight change.   HENT: Negative for sore throat, trouble swallowing and voice change.    Eyes: Negative.    Respiratory: Negative.    Cardiovascular: Negative.    Gastrointestinal: Positive for abdominal distention, abdominal pain, diarrhea, nausea and vomiting. Negative for anal bleeding, blood in stool, constipation and rectal pain.   Endocrine: Negative.    Genitourinary: Negative.    Musculoskeletal: Negative.    Skin: Positive for pallor. Negative for color change, rash and wound.   Allergic/Immunologic: Negative.    Neurological: Negative.    Hematological: Negative for adenopathy. Does not bruise/bleed easily.   Psychiatric/Behavioral: Negative.    All other systems reviewed and are negative.      PAST MED HX:  Past Medical History:   Diagnosis Date   • Acute pancreatitis    • Alcoholism (HCC)     sober 2.5 years   • Anaphylaxis    • Arthritis    • Bone necrosis (HCC)    • CAD (coronary artery disease) 2021   • Cardiac arrest (HCC)    • Cirrhosis (HCC)    • Cirrhosis of liver (HCC) 2021   • Gastroparesis    • GERD (gastroesophageal reflux disease)    • History of esophageal varices    • History of kidney stones    • Hypertension    • Memory loss    • Neuropathy    • Pancreatitis    • Rib fractures 2021   • SVT (supraventricular tachycardia) (HCC) 2021       PAST SURG HX:  Past Surgical History:   Procedure Laterality Date   • ANKLE SURGERY Right    • APPENDECTOMY     • CARDIAC CATHETERIZATION N/A 2021    Procedure: Left Heart Cath;  Surgeon: Vikram Gonzales MD;  Location:  [facility];  Service: Cardiovascular;  Laterality: N/A;   • CARDIAC CATHETERIZATION N/A 7/15/2021    Procedure: LEFT HEART CATH;  Surgeon: Vikram Gonzales MD;  Location:  [facility];  Service: Cardiology;  Laterality: N/A;   •  SECTION      x2   • CHOLECYSTECTOMY     • COLONOSCOPY     • COLONOSCOPY N/A 3/31/2020    Procedure:  "COLONOSCOPY;  Surgeon: Tirso Giles MD;  Location:  [facility];  Service: Gastroenterology;  Laterality: N/A;   • COLONOSCOPY N/A 2021    Procedure: COLONOSCOPY;  Surgeon: Tyrese Rasmussen MD;  Location:  [facility];  Service: Gastroenterology;  Laterality: N/A;   • CORONARY STENT PLACEMENT     • ENDOSCOPY     • ENDOSCOPY     • ENDOSCOPY N/A 2021    Procedure: ESOPHAGOGASTRODUODENOSCOPY AT BEDSIDE;  Surgeon: Tyrese Rasmussen MD;  Location:  [facility];  Service: Gastroenterology;  Laterality: N/A;   • ENDOSCOPY N/A 2021    Procedure: ESOPHAGOGASTRODUODENOSCOPY;  Surgeon: Tyrese Rasmussen MD;  Location:  [facility];  Service: Gastroenterology;  Laterality: N/A;   • ENDOSCOPY N/A 2021    Procedure: ESOPHAGOGASTRODUODENOSCOPY;  Surgeon: Tyrese Rasmussen MD;  Location:  [facility];  Service: Gastroenterology;  Laterality: N/A;   • REPLACEMENT TOTAL HIP LATERAL POSITION Left    • TOTAL HIP ARTHROPLASTY Right     x2   • TOTAL KNEE ARTHROPLASTY Left        FAM HX:  Family History   Problem Relation Age of Onset   • Diabetes type II Mother    • Breast cancer Mother    • Heart disease Father    • Hypertension Brother    • ADD / ADHD Child    • Autism Child    • Asperger's syndrome Child    • Colon cancer Neg Hx    • Colon polyps Neg Hx        SOC HX:  Social History     Socioeconomic History   • Marital status:    Tobacco Use   • Smoking status: Former Smoker     Packs/day: 0.50     Types: Electronic Cigarette, Cigarettes     Quit date: 2021     Years since quittin.7   • Smokeless tobacco: Never Used   Vaping Use   • Vaping Use: Former   Substance and Sexual Activity   • Alcohol use: Not Currently   • Drug use: No   • Sexual activity: Defer       PHYSICAL EXAM  /69 (BP Location: Left arm)   Pulse 64   Temp 97.8 °F (36.6 °C) (Oral)   Resp 18   Ht 160 cm (63\")   Wt 99.2 kg (218 lb 12.8 oz)   SpO2 97%   BMI 38.76 kg/m²   Wt Readings from Last 3 " Encounters:   04/07/22 99.2 kg (218 lb 12.8 oz)   04/04/22 97.5 kg (215 lb)   03/07/22 81.6 kg (180 lb)   ,body mass index is 38.76 kg/m².  Physical Exam  Vitals and nursing note reviewed.   Constitutional:       General: She is not in acute distress.     Appearance: Normal appearance. She is normal weight. She is not ill-appearing or toxic-appearing.   HENT:      Head: Normocephalic and atraumatic.   Eyes:      General: No scleral icterus.     Extraocular Movements: Extraocular movements intact.      Conjunctiva/sclera: Conjunctivae normal.      Pupils: Pupils are equal, round, and reactive to light.   Cardiovascular:      Rate and Rhythm: Normal rate and regular rhythm.      Pulses: Normal pulses.      Heart sounds: Normal heart sounds.   Pulmonary:      Effort: Pulmonary effort is normal. No respiratory distress.      Breath sounds: Normal breath sounds.   Abdominal:      General: Abdomen is flat. Bowel sounds are normal. There is no distension.      Palpations: Abdomen is soft. There is no mass.      Tenderness: There is abdominal tenderness. There is no guarding or rebound.      Hernia: No hernia is present.   Genitourinary:     Comments: Defer  Musculoskeletal:         General: Swelling present. Normal range of motion.      Cervical back: Normal range of motion and neck supple. No rigidity or tenderness.      Right lower leg: Edema present.      Left lower leg: Edema present.   Lymphadenopathy:      Cervical: No cervical adenopathy.   Skin:     General: Skin is warm and dry.      Capillary Refill: Capillary refill takes less than 2 seconds.      Coloration: Skin is not jaundiced or pale.   Neurological:      General: No focal deficit present.      Mental Status: She is alert and oriented to person, place, and time.   Psychiatric:         Mood and Affect: Mood normal.         Behavior: Behavior normal.         Thought Content: Thought content normal.         Judgment: Judgment normal.         Results  Review:   I reviewed the patient's new clinical results.  I reviewed the patient's new imaging results and agree with the interpretation.  I personally viewed and interpreted the patient's EKG/Telemetry data    Lab Results   Component Value Date    WBC 8.05 04/07/2022    HGB 12.0 04/07/2022    HGB 12.8 04/07/2022    HGB 14.1 04/06/2022    HCT 38.5 04/07/2022    MCV 90.0 04/07/2022     (L) 04/07/2022       Lab Results   Component Value Date    INR 1.08 04/07/2022    INR 1.12 01/29/2022    INR 0.72 (L) 11/12/2021       Lab Results   Component Value Date    GLUCOSE 104 (H) 04/07/2022    BUN 19 04/07/2022    CREATININE 1.19 (H) 04/07/2022    EGFRIFNONA 65 02/02/2022    BCR 16.0 04/07/2022     (L) 04/07/2022    K 4.6 04/07/2022    CO2 29.0 04/07/2022    CALCIUM 8.8 04/07/2022    PROTENTOTREF 7.5 09/30/2021    ALBUMIN 4.20 04/06/2022    ALKPHOS 101 04/06/2022    BILITOT 0.3 04/06/2022    BILIDIR 0.2 06/23/2021    ALT 18 04/06/2022    AST 22 04/06/2022       CT Abdomen Pelvis Without Contrast    Result Date: 4/7/2022  EXAMINATION: CT ABDOMEN AND PELVIS WITHOUT IV CONTRAST   DATE OF EXAMINATION: 4/7/2022. COMPARISON: None available. INDICATION: Upper abdominal pain and hematemesis. PROCEDURE:   Axial CT of the abdomen and pelvis was performed with sagittal and coronal reformatted images without contrast enhancement. CT dose lowering techniques were used, to include: automated exposure control, adjustment for patient size, and/or use of iterative reconstruction. The exam is limited because some types of pathology may not be adequately demonstrated due to lack of contrast enhancement. FINDINGS: LOWER CHEST :  There is a 4.6 mm left lower lobe nodule on series 2 image 26. The visualized lung bases otherwise appear clear. There are no pleural or pericardial effusions. ABDOMEN: Liver and Biliary system:  Normal. Adrenal glands:  Normal. Kidneys and ureters:  There are no renal stones or hydronephrosis.  No ureteral  stones are present.. Spleen:  Normal. Pancreas:  Normal. Gallbladder:  Surgically absent. Lymph nodes, Peritoneum and mesentery:  There is no mesenteric or retroperitoneal lymphadenopathy. Gastrointestinal tract:  There are no dilated loops of bowel or free intraperitoneal air.  . The appendix is not clearly seen. No secondary changes of appendicitis otherwise identified. Aorta/IVC:   There is mild vascular calcification throughout the abdominal aorta without evidence of aneurysmal dilation.  IVC normal. Abdominal wall:  Normal. PELVIS: Fluid: There is no free fluid in the pelvis. Lymph Nodes:  There is no pelvic or inguinal lymphadenopathy.. Urinary bladder:  Normal. BONES:  There are bilateral total hip arthroplasties. ADDITIONAL  SIGNIFICANT FINDINGS:  None.     1. No renal stones or hydronephrosis. 2. No bowel obstruction or acute inflammatory process seen within the abdomen or pelvis. Electronically signed by:  Nahid Bradford D.O.  4/6/2022 10:26 PM Mountain Time    XR Chest 1 View    Result Date: 4/6/2022  EXAM: XR CHEST 1 VW-  DATE OF EXAM: 4/6/2022 10:45 PM  INDICATION: SOA triage protocol.   COMPARISONS: 1/30/2022  FINDINGS:  No evidence of pneumonia, pulmonary edema or other acute pulmonary process. No evidence of pneumothorax or significant pleural effusion. No evidence of acute process of the heart or other mediastinal structures.       Negative chest x-ray. No evidence of acute cardiopulmonary disease.  This report was finalized on 4/6/2022 11:01 PM by Akhil Petersen.      XR Abdomen KUB    Result Date: 3/12/2022  DATE OF EXAM: 3/12/2022 10:51 AM  PROCEDURE: XR ABDOMEN KUB-  INDICATIONS: abd pain; R11.2-Nausea with vomiting, unspecified; E86.0-Dehydration; E87.2-Acidosis  COMPARISON: 2/2/2022  TECHNIQUE: Single radiographic view of the abdomen was obtained.  FINDINGS: Bowel gas pattern is nonobstructive. There may be mild fecal stasis but appearance is similar to the previous exam. No abnormally dilated  bowel loops are seen. Bony structures appear to be intact.      Nonobstructive bowel gas pattern, again with a suggestion of mild fecal stasis.  This report was finalized on 3/12/2022 11:12 AM by Dr. Bo Gardner MD.        COVID19   Date Value Ref Range Status   04/07/2022 Presumptive Negative Presumptive Negative - Ref. Range Final     ASSESSMENTS/PLANS  1.  Gastrointestinal bleeding with coffee-ground emesis  2.  Non-intractable nausea and vomiting  3.  Epigastric pain, related to above  4.  Gastroparesis  5.  Liver cirrhosis secondary to alcohol use and PBC.   -MELD-Na: 13 points   -Child-Fan: A  6.  Unintentional weight gain, no ascites on imaging, bilateral upper and lower extremity edema noted on exam.    Timothy Guzman is a 46 y.o. female presenting to hospital with coffee-ground emesis and intermittent BRBPR.  At time of exam, patient with marked epigastric abdominal pain reporting several episodes of coffee-ground emesis.  Last EGD was in November with gastritis; patient symptoms more severe than prior exams.  Given worsening of symptoms, recommend EGD today for further evaluation.  Suspect BRBPR secondary to known internal hemorrhoids in setting of coagulopathy secondary to liver cirrhosis and DAPT; will treat with Anusol suppositories.    >>> EGD today   -N.p.o.   -Obtain informed consent for esophagogastroduodenoscopy  >>> IV pantoprazole 40 mg twice daily  >>> Anusol suppositories 25 mg rectally twice daily x5 days  >>> We will give one-time dose of IV Reglan prior to EGD.  >>> Patient has chronic history of constipation; bowel regimen while inpatient.  >>> Hold diuretics given elevated creatinine; once stabilize, plan to resume home dose.   >>> continue lactulose and xifaxan at home dose    I discussed the patient's findings and my recommendations with patient, family and nursing staff    ISSAC Mckee  04/07/22  10:19 EDT

## 2022-04-07 NOTE — PAYOR COMM NOTE
"Bipin Osorio (46 y.o. Female)     April RN [phone], fax [phone]            Date of Birth   1975    Social Security Number       Address   [address] [facility] [address]    Home Phone   [phone]    MRN   [id number]       Latter-day   Sikhism    Marital Status                               Admission Date   22    Admission Type   Emergency    Admitting Provider   Filiberto Ly MD    Attending Provider   Filiberto Ly MD    Department, Room/Bed   [facility] 2F, S210/1       Discharge Date       Discharge Disposition       Discharge Destination                               Attending Provider: Filiberto Ly MD    Allergies: Contrast Dye, Haloperidol, Nsaids, Tylenol [Acetaminophen]    Isolation: None   Infection: None   Code Status: CPR   Advance Care Planning Activity    Ht: 160 cm (63\")   Wt: 99.2 kg (218 lb 12.8 oz)    Admission Cmt: None   Principal Problem: GI bleed [K92.2]                 Active Insurance as of 2022     Primary Coverage     Payor Plan Insurance Group Employer/Plan Group    [facility] ER JACQUES [id number]     Payor Plan Address Payor Plan Phone Number Payor Plan Fax Number Effective Dates    [address]   2021 - None Entered    [place] [address]       Subscriber Name Subscriber Birth Date Member ID       BIPIN OSORIO 1975 [id number]                 Emergency Contacts      (Rel.) Home Phone Work Phone Mobile Phone    yanci osorio (Mother) [phone] -- [phone]    GREGORIOFREDO MORRISON (Father) [phone] -- [phone]               History & Physical      Rob Newman DO at [address] [facility] [place]              [facility] Medicine Services  HISTORY AND PHYSICAL    Patient Name: Bipin Osorio  : 1975  MRN: [id number]  Primary Care Physician: Toshia Mitchell APRN  Date of admission: 2022      Subjective   Subjective " "    Chief Complaint:  GI bleed    HPI:  Timothy Guzman is a 46 y.o. female 46-year-old female with past medical history of STEMI with cardiac arrest with stent placement in July 2021 currently on Plavix, alcoholic cirrhosis, GERD, history of peptic ulcer disease with GI bleed, anxiety/depression, who presents to the ER with complaints of abdominal pain, coffee-ground emesis, and bright red blood per rectum.    Patient reports that over the past 2 days she has had epigastric abdominal pain with multiple episodes of coffee-ground emesis.  She also reports bright red blood per rectum.  She does have history of hemorrhoids but reports she has not been \"straining\".  No perirectal/rectal pain.  She denies any fever or chills.  Does report that she has had 30 pound weight gain over the past month.  She feels that her abdomen is \"filling up with fluid\".  CT of the abdomen pelvis performed tonight reveals no ascites.    Unfortunately, patient's history is complicated by the fact she had a STEMI with cardiac arrest.  2 stents placed in June and July 2021.      Review of Systems   Gen- No fevers, chills  CV- No chest pain, palpitations  Resp- No cough, dyspnea  GI-orts nausea/vomiting, abd pain      All other systems reviewed and are negative.     Personal History     Past Medical History:   Diagnosis Date   • Acute pancreatitis    • Alcoholism (HCC)     sober 2.5 years   • Anaphylaxis    • Arthritis    • Bone necrosis (HCC)    • CAD (coronary artery disease) 07/13/2021   • Cardiac arrest (HCC)    • Cirrhosis (HCC)    • Cirrhosis of liver (HCC) 07/13/2021   • Gastroparesis    • GERD (gastroesophageal reflux disease)    • History of esophageal varices    • History of kidney stones    • Hypertension    • Memory loss    • Neuropathy    • Pancreatitis    • Rib fractures 07/13/2021   • SVT (supraventricular tachycardia) (HCC) 06/18/2021             Past Surgical History:   Procedure Laterality Date   • ANKLE SURGERY Right    • " APPENDECTOMY     • CARDIAC CATHETERIZATION N/A 2021    Procedure: Left Heart Cath;  Surgeon: Vikram Gonzales MD;  Location:  [facility];  Service: Cardiovascular;  Laterality: N/A;   • CARDIAC CATHETERIZATION N/A 7/15/2021    Procedure: LEFT HEART CATH;  Surgeon: Vikram Gonzales MD;  Location:  [facility];  Service: Cardiology;  Laterality: N/A;   •  SECTION      x2   • CHOLECYSTECTOMY     • COLONOSCOPY     • COLONOSCOPY N/A 3/31/2020    Procedure: COLONOSCOPY;  Surgeon: Tirso Giles MD;  Location:  [facility];  Service: Gastroenterology;  Laterality: N/A;   • COLONOSCOPY N/A 2021    Procedure: COLONOSCOPY;  Surgeon: Tyrese Rasmussen MD;  Location:  [facility];  Service: Gastroenterology;  Laterality: N/A;   • CORONARY STENT PLACEMENT     • ENDOSCOPY     • ENDOSCOPY     • ENDOSCOPY N/A 2021    Procedure: ESOPHAGOGASTRODUODENOSCOPY AT BEDSIDE;  Surgeon: Tyrese Rasmussen MD;  Location:  [facility];  Service: Gastroenterology;  Laterality: N/A;   • ENDOSCOPY N/A 2021    Procedure: ESOPHAGOGASTRODUODENOSCOPY;  Surgeon: Tyrese Rasmussen MD;  Location:  [facility];  Service: Gastroenterology;  Laterality: N/A;   • ENDOSCOPY N/A 2021    Procedure: ESOPHAGOGASTRODUODENOSCOPY;  Surgeon: Tyrese Rasmussen MD;  Location:  [facility];  Service: Gastroenterology;  Laterality: N/A;   • REPLACEMENT TOTAL HIP LATERAL POSITION Left    • TOTAL HIP ARTHROPLASTY Right     x2   • TOTAL KNEE ARTHROPLASTY Left        Family History:  family history includes ADD / ADHD in her child; Asperger's syndrome in her child; Autism in her child; Breast cancer in her mother; Diabetes type II in her mother; Heart disease in her father; Hypertension in her brother. Otherwise pertinent FHx was reviewed and unremarkable.     Social History:  reports that she quit smoking about 9 months ago. Her smoking use included electronic cigarette and cigarettes. She  smoked 0.50 packs per day. She has never used smokeless tobacco. She reports previous alcohol use. She reports that she does not use drugs.  Social History     Social History Narrative    Lives in [place] with parents       Medications:  Available home medication information reviewed.  (Not in a hospital admission)      Allergies   Allergen Reactions   • Contrast Dye Anaphylaxis     6/18 Pt coded after receiving contrast for a CT scan. Was premedicated. Was having an MI at the same time. Immediately underwent heart cath with contrast without additional allergic reaction  7/15 Pt premedicated with prednisone/Benadryl for heart cath. Still with anaphylactic-like reaction with drop in BP and hypoxia. Treated 95% stenosis with minimal dye and supported with epinephrine, solumedrol, NRB   • Haloperidol Hallucinations   • Nsaids GI Bleeding     Other reaction(s): Bleeding, VOMITING   • Tylenol [Acetaminophen] Other (See Comments)     CIRRHOSIS       Objective   Objective     Vital Signs:   Temp:  [98.5 °F (36.9 °C)] 98.5 °F (36.9 °C)  Heart Rate:  [] 86  Resp:  [18] 18  BP: (117-118)/(73-81) 117/73       Physical Exam   Constitutional: Awake, alert, nontoxic  Eyes: PERRLA, sclerae anicteric, no conjunctival injection  HENT: NCAT, mucous membranes moist  Neck: Supple, no thyromegaly, no lymphadenopathy, trachea midline  Respiratory: Clear to auscultation bilaterally, nonlabored respirations   Cardiovascular: RRR, no murmurs, rubs, or gallops, palpable pedal pulses bilaterally  Gastrointestinal: Positive bowel sounds, soft, nontender, nondistended  Musculoskeletal: No bilateral ankle edema, no clubbing or cyanosis to extremities  Psychiatric: Appropriate affect, cooperative  Neurologic: Oriented x 3, strength symmetric in all extremities, Cranial Nerves grossly intact to confrontation, speech clear  Skin: No rashes    Result Review:  I have personally reviewed the results from the time of this admission to 4/7/2022  01:22 EDT and agree with these findings:  [x]  Laboratory  []  Microbiology  [x]  Radiology  []  EKG/Telemetry   []  Cardiology/Vascular   []  Pathology  []  Old records  []  Other:  Most notable findings include: CT abdomen pelvis without any acute findings, H&H of 14/41.2      LAB RESULTS:      Lab 04/06/22 2138   WBC 9.91   HEMOGLOBIN 14.1   HEMATOCRIT 41.2   PLATELETS 146   NEUTROS ABS 6.17   IMMATURE GRANS (ABS) 0.06*   LYMPHS ABS 2.56   MONOS ABS 0.99*   EOS ABS 0.10   MCV 88.8         Lab 04/06/22 2138   SODIUM 134*   POTASSIUM 4.4   CHLORIDE 97*   CO2 25.0   ANION GAP 12.0   BUN 18   CREATININE 1.44*   EGFR 45.2*   GLUCOSE 218*   CALCIUM 9.2         Lab 04/06/22 2138   TOTAL PROTEIN 6.9   ALBUMIN 4.20   GLOBULIN 2.7   ALT (SGPT) 18   AST (SGOT) 22   BILIRUBIN 0.3   ALK PHOS 101         Lab 04/06/22 2138   PROBNP 80.9   TROPONIN T <0.010                 UA    Urinalysis 1/29/22 1/29/22 1/30/22 3/7/22    1717 1717     Squamous Epithelial Cells, UA  3-6 (A)     Specific Gravity, UA 1.020  1.017 1.012   Ketones, UA Trace (A)  Negative Negative   Blood, UA Negative  Negative Negative   Leukocytes, UA Trace (A)  Negative Negative   Nitrite, UA Negative  Negative Negative   RBC, UA  3-6 (A)     WBC, UA  0-2     Bacteria, UA  Trace     (A) Abnormal value              Microbiology Results (last 10 days)     ** No results found for the last 240 hours. **          CT Abdomen Pelvis Without Contrast    Result Date: 4/7/2022  EXAMINATION: CT ABDOMEN AND PELVIS WITHOUT IV CONTRAST   DATE OF EXAMINATION: 4/7/2022. COMPARISON: None available. INDICATION: Upper abdominal pain and hematemesis. PROCEDURE:   Axial CT of the abdomen and pelvis was performed with sagittal and coronal reformatted images without contrast enhancement. CT dose lowering techniques were used, to include: automated exposure control, adjustment for patient size, and/or use of iterative reconstruction. The exam is limited because some types of  pathology may not be adequately demonstrated due to lack of contrast enhancement. FINDINGS: LOWER CHEST :  There is a 4.6 mm left lower lobe nodule on series 2 image 26. The visualized lung bases otherwise appear clear. There are no pleural or pericardial effusions. ABDOMEN: Liver and Biliary system:  Normal. Adrenal glands:  Normal. Kidneys and ureters:  There are no renal stones or hydronephrosis.  No ureteral stones are present.. Spleen:  Normal. Pancreas:  Normal. Gallbladder:  Surgically absent. Lymph nodes, Peritoneum and mesentery:  There is no mesenteric or retroperitoneal lymphadenopathy. Gastrointestinal tract:  There are no dilated loops of bowel or free intraperitoneal air.  . The appendix is not clearly seen. No secondary changes of appendicitis otherwise identified. Aorta/IVC:   There is mild vascular calcification throughout the abdominal aorta without evidence of aneurysmal dilation.  IVC normal. Abdominal wall:  Normal. PELVIS: Fluid: There is no free fluid in the pelvis. Lymph Nodes:  There is no pelvic or inguinal lymphadenopathy.. Urinary bladder:  Normal. BONES:  There are bilateral total hip arthroplasties. ADDITIONAL  SIGNIFICANT FINDINGS:  None.     Impression: 1. No renal stones or hydronephrosis. 2. No bowel obstruction or acute inflammatory process seen within the abdomen or pelvis. Electronically signed by:  Nahid Bradford D.O.  4/6/2022 10:26 PM Mountain Time    XR Chest 1 View    Result Date: 4/6/2022  EXAM: XR CHEST 1 VW-  DATE OF EXAM: 4/6/2022 10:45 PM  INDICATION: SOA triage protocol.   COMPARISONS: 1/30/2022  FINDINGS:  No evidence of pneumonia, pulmonary edema or other acute pulmonary process. No evidence of pneumothorax or significant pleural effusion. No evidence of acute process of the heart or other mediastinal structures.      Impression:  Negative chest x-ray. No evidence of acute cardiopulmonary disease.  This report was finalized on 4/6/2022 11:01 PM by Akhil Petersen.         Results for orders placed during the hospital encounter of 06/18/21    Adult Transthoracic Echo Complete W/ Cont if Necessary Per Protocol    Interpretation Summary  · The quality of the study is limited due to patient positioning and patient being intubated.  · Left ventricular ejection fraction appears to be 51 - 55%. Left ventricular systolic function is normal.  · Left ventricular diastolic function is consistent with (grade Ia w/high LAP) impaired relaxation.  · Mildly reduced right ventricular systolic function noted.      Assessment/Plan   Assessment & Plan     Active Hospital Problems    Diagnosis  POA   • **GI bleed [K92.2]  Yes   • Elevated serum creatinine [R79.89]  Yes       Patient is a 46-year-old female with past medical history of STEMI with cardiac arrest with stent placement in July 2021 currently on Plavix, alcoholic cirrhosis, GERD, history of peptic ulcer disease with GI bleed, anxiety/depression, who presents to the ER with complaints of abdominal pain, coffee-ground emesis, and bright red blood per rectum.    Coffee-ground emesis  --History of peptic ulcer disease  --EGD in September 2021 without any evidence of esophageal varices  --Protonix  --Octreotide  --Unfortunately we will need to continue aspirin and Plavix given her significant cardiac history and stent within the year unless she is having large volume bleeding.  --Consult.  --Type and screen  --ppx Rocephin      Bright red blood per rectum  --History of hemorrhoids  --Bright red blood does not coincide with upper GI bleed and therefore likely separate process.  --GI consult.    Elevated creatinine  --IV fluids, albumin  --hold bumex and Aldactone    Alcoholic cirrhosis/alcoholism  --Reportedly with cessation  --GI consult  --CIWA  --Ethanol level  --Continue lactulose/Xifaxan    Coronary artery disease with history of STEMI/cardiac arrest  --Patient within the year of stent placement, continue aspirin/Plavix for now unless has  large volume bleeding    Depression/anxiety  --cont home meds    4.6 LLL nodule  --need to discuss with pt further in am  --will need f/u at DC with pulm nodule clinic    DVT prophylaxis:  scds      CODE STATUS: full code  There are no questions and answers to display.         Rob Newman DO  04/07/22      Electronically signed by Rob Newman DO at 04/07/22 0139

## 2022-04-07 NOTE — ANESTHESIA POSTPROCEDURE EVALUATION
Patient: Timothy Guzman    Procedure Summary     Date: 04/07/22 Room / Location:  [facility]    Anesthesia Start: 1342 Anesthesia Stop:     Procedure: ESOPHAGOGASTRODUODENOSCOPY (N/A ) Diagnosis:     Surgeons: Tyrese Rasmussen MD Provider:     Anesthesia Type: MAC, general ASA Status: 3          Anesthesia Type: MAC, general    Vitals  Vitals Value Taken Time   BP 79/42 04/07/22 1402   Temp 98 °F (36.7 °C) 04/07/22 1402   Pulse 66 04/07/22 1402   Resp 12 04/07/22 1402   SpO2 96 % 04/07/22 1402           Post Anesthesia Care and Evaluation    Patient location during evaluation: PACU  Patient participation: complete - patient participated  Level of consciousness: awake  Pain management: adequate  Airway patency: patent  Anesthetic complications: No anesthetic complications  PONV Status: none  Cardiovascular status: hemodynamically stable and acceptable  Respiratory status: nonlabored ventilation, acceptable and nasal cannula  Hydration status: acceptable

## 2022-04-07 NOTE — PROGRESS NOTES
"2nd visit today. Admitted by partner earlier this morning    Timothy Guzman is a 45 yo f w/ hx cad (prior stent summer 2021), svt, etoh cirrhosis w/ varices, previous pancreatitis, gastroparesis, ckd 3 (baseline cr ~1.2-1.4) who is known to our service. History includes inferior stemi 6/2021 w/ stent placement, subsequent presentation in 7/2021 with chest pain syndrome having intraprocedural anaphylactic-like reaction immediately after administration of contrast dye and received pci (baloon) of 95% circumflex lesion just distal to previous stent, required icu admisison post-procedure.  Is on asa & plavix. Most recently discharged (3/7-3/12/22) after presenting w/ intractable nausea/vomiting felt due to gastroparesis, saw GI that hospitalization and received short course reglan 5mg tid during that stay, and short course mestinon 60mg tid w/ outpatient f/u w/ GI.   On this occasion presented w/ abdomina distension, discomfort/pain, coffee emesis, and some intermittent brbpr x ~2 days. Admitted to hospitalist service, Initiated on empiric rocephin, protonix, octreotide and continued on asa & plavix (due to previous cardiac history) and gi consulted. cxr was negative. Ct a/p revealed previous ccy, normal appearing liver and biliary system, no acute bowel pathology or inflammation, incidental subcentimeter 4.7mm tiny left lower lung field nodularity. Lactate 2.5, normal wbc count, troponin negative     *Coffee ground emesis & brbpr  *hx etoh cirrhosis/varices  *hx gastroparesis (recent admission w/ reglan, mestinon, kayla root)  -gi consulted, planning EGD (to take a \"look\", as on asa & plavxi), continue octreotide drip, iv bid ppi for now; hgb stable, recheck hgb tonight 8pm  -gi feels the brbpr couple day prior to admisison likely hemorrhoids (has history), started anusol-hc suppositories  -empiric rocephin (sbp prophylaxis), d/c if/when ok w/ gi  -continue lactulose and rifaxamin  -currently holding aldactone/bumex; " restart bumex 1mg daily and aldactone 100mg daily tomorrow 4/8 as bp & renal fxn tolerate  *Hx CAD  -on asa, plavix (stent 6/2022, subsequent 95% circ lesion distal to previous stent s/p balloon pci)  -plan to give courtesy call to Dr. Gonzales  *anaphylactic allergy to iv contrast  *CKD 3 (baseline cr ~1.2-1.4)  -stable, monitor  *chest wall pain/atypical  -likely musculoskeletal from retching; lidoderm patch, slightly wean dilaudid today (wean to po ultram soon as able)    -am cbc,cmp,mag,inr    dvt prophy: asa, plavix    Disp: tbd

## 2022-04-07 NOTE — H&P
"    [facility] Medicine Services  HISTORY AND PHYSICAL    Patient Name: Timothy Guzman  : 1975  MRN: [id number]  Primary Care Physician: Toshia Mitchell APRN  Date of admission: 2022      Subjective   Subjective     Chief Complaint:  GI bleed    HPI:  Timothy Guzman is a 46 y.o. female 46-year-old female with past medical history of STEMI with cardiac arrest with stent placement in 2021 currently on Plavix, alcoholic cirrhosis, GERD, history of peptic ulcer disease with GI bleed, anxiety/depression, who presents to the ER with complaints of abdominal pain, coffee-ground emesis, and bright red blood per rectum.    Patient reports that over the past 2 days she has had epigastric abdominal pain with multiple episodes of coffee-ground emesis.  She also reports bright red blood per rectum.  She does have history of hemorrhoids but reports she has not been \"straining\".  No perirectal/rectal pain.  She denies any fever or chills.  Does report that she has had 30 pound weight gain over the past month.  She feels that her abdomen is \"filling up with fluid\".  CT of the abdomen pelvis performed tonight reveals no ascites.    Unfortunately, patient's history is complicated by the fact she had a STEMI with cardiac arrest.  2 stents placed in  and 2021.      Review of Systems   Gen- No fevers, chills  CV- No chest pain, palpitations  Resp- No cough, dyspnea  GI-orts nausea/vomiting, abd pain      All other systems reviewed and are negative.     Personal History     Past Medical History:   Diagnosis Date   • Acute pancreatitis    • Alcoholism (HCC)     sober 2.5 years   • Anaphylaxis    • Arthritis    • Bone necrosis (HCC)    • CAD (coronary artery disease) 2021   • Cardiac arrest (HCC)    • Cirrhosis (HCC)    • Cirrhosis of liver (HCC) 2021   • Gastroparesis    • GERD (gastroesophageal reflux disease)    • History of esophageal varices    • History of kidney " stones    • Hypertension    • Memory loss    • Neuropathy    • Pancreatitis    • Rib fractures 2021   • SVT [facility]supraventricular tachycardia) ([facility]) 2021             Past Surgical History:   Procedure Laterality Date   • ANKLE SURGERY Right    • APPENDECTOMY     • CARDIAC CATHETERIZATION N/A 2021    Procedure: Left Heart Cath;  Surgeon: Vikram Gonzales MD;  Location:  [facility];  Service: Cardiovascular;  Laterality: N/A;   • CARDIAC CATHETERIZATION N/A 7/15/2021    Procedure: LEFT HEART CATH;  Surgeon: Vikram Gonzales MD;  Location:  [facility];  Service: Cardiology;  Laterality: N/A;   •  SECTION      x2   • CHOLECYSTECTOMY     • COLONOSCOPY     • COLONOSCOPY N/A 3/31/2020    Procedure: COLONOSCOPY;  Surgeon: Tirso Giles MD;  Location:  [facility];  Service: Gastroenterology;  Laterality: N/A;   • COLONOSCOPY N/A 2021    Procedure: COLONOSCOPY;  Surgeon: Tyrese Rasmussen MD;  Location:  [facility];  Service: Gastroenterology;  Laterality: N/A;   • CORONARY STENT PLACEMENT     • ENDOSCOPY     • ENDOSCOPY     • ENDOSCOPY N/A 2021    Procedure: ESOPHAGOGASTRODUODENOSCOPY AT BEDSIDE;  Surgeon: Tyrese Rasmussen MD;  Location:  [facility];  Service: Gastroenterology;  Laterality: N/A;   • ENDOSCOPY N/A 2021    Procedure: ESOPHAGOGASTRODUODENOSCOPY;  Surgeon: Tyrese Rasmussen MD;  Location:  [facility];  Service: Gastroenterology;  Laterality: N/A;   • ENDOSCOPY N/A 2021    Procedure: ESOPHAGOGASTRODUODENOSCOPY;  Surgeon: Tyrese Rasmussen MD;  Location:  [facility];  Service: Gastroenterology;  Laterality: N/A;   • REPLACEMENT TOTAL HIP LATERAL POSITION Left    • TOTAL HIP ARTHROPLASTY Right     x2   • TOTAL KNEE ARTHROPLASTY Left        Family History:  family history includes ADD / ADHD in her child; Asperger's syndrome in her child; Autism in her child; Breast cancer in her mother; Diabetes type II in her  mother; Heart disease in her father; Hypertension in her brother. Otherwise pertinent FHx was reviewed and unremarkable.     Social History:  reports that she quit smoking about 9 months ago. Her smoking use included electronic cigarette and cigarettes. She smoked 0.50 packs per day. She has never used smokeless tobacco. She reports previous alcohol use. She reports that she does not use drugs.  Social History     Social History Narrative    Lives in [place] with parents       Medications:  Available home medication information reviewed.  (Not in a hospital admission)      Allergies   Allergen Reactions   • Contrast Dye Anaphylaxis     6/18 Pt coded after receiving contrast for a CT scan. Was premedicated. Was having an MI at the same time. Immediately underwent heart cath with contrast without additional allergic reaction  7/15 Pt premedicated with prednisone/Benadryl for heart cath. Still with anaphylactic-like reaction with drop in BP and hypoxia. Treated 95% stenosis with minimal dye and supported with epinephrine, solumedrol, NRB   • Haloperidol Hallucinations   • Nsaids GI Bleeding     Other reaction(s): Bleeding, VOMITING   • Tylenol [Acetaminophen] Other (See Comments)     CIRRHOSIS       Objective   Objective     Vital Signs:   Temp:  [98.5 °F (36.9 °C)] 98.5 °F (36.9 °C)  Heart Rate:  [] 86  Resp:  [18] 18  BP: (117-118)/(73-81) 117/73       Physical Exam   Constitutional: Awake, alert, nontoxic  Eyes: PERRLA, sclerae anicteric, no conjunctival injection  HENT: NCAT, mucous membranes moist  Neck: Supple, no thyromegaly, no lymphadenopathy, trachea midline  Respiratory: Clear to auscultation bilaterally, nonlabored respirations   Cardiovascular: RRR, no murmurs, rubs, or gallops, palpable pedal pulses bilaterally  Gastrointestinal: Positive bowel sounds, soft, nontender, nondistended  Musculoskeletal: No bilateral ankle edema, no clubbing or cyanosis to extremities  Psychiatric: Appropriate affect,  cooperative  Neurologic: Oriented x 3, strength symmetric in all extremities, Cranial Nerves grossly intact to confrontation, speech clear  Skin: No rashes    Result Review:  I have personally reviewed the results from the time of this admission to 4/7/2022 01:22 EDT and agree with these findings:  [x]  Laboratory  []  Microbiology  [x]  Radiology  []  EKG/Telemetry   []  Cardiology/Vascular   []  Pathology  []  Old records  []  Other:  Most notable findings include: CT abdomen pelvis without any acute findings, H&H of 14/41.2      LAB RESULTS:      Lab 04/06/22 2138   WBC 9.91   HEMOGLOBIN 14.1   HEMATOCRIT 41.2   PLATELETS 146   NEUTROS ABS 6.17   IMMATURE GRANS (ABS) 0.06*   LYMPHS ABS 2.56   MONOS ABS 0.99*   EOS ABS 0.10   MCV 88.8         Lab 04/06/22 2138   SODIUM 134*   POTASSIUM 4.4   CHLORIDE 97*   CO2 25.0   ANION GAP 12.0   BUN 18   CREATININE 1.44*   EGFR 45.2*   GLUCOSE 218*   CALCIUM 9.2         Lab 04/06/22 2138   TOTAL PROTEIN 6.9   ALBUMIN 4.20   GLOBULIN 2.7   ALT (SGPT) 18   AST (SGOT) 22   BILIRUBIN 0.3   ALK PHOS 101         Lab 04/06/22 2138   PROBNP 80.9   TROPONIN T <0.010                 UA    Urinalysis 1/29/22 1/29/22 1/30/22 3/7/22    1717 1717     Squamous Epithelial Cells, UA  3-6 (A)     Specific Gravity, UA 1.020  1.017 1.012   Ketones, UA Trace (A)  Negative Negative   Blood, UA Negative  Negative Negative   Leukocytes, UA Trace (A)  Negative Negative   Nitrite, UA Negative  Negative Negative   RBC, UA  3-6 (A)     WBC, UA  0-2     Bacteria, UA  Trace     (A) Abnormal value              Microbiology Results (last 10 days)     ** No results found for the last 240 hours. **          CT Abdomen Pelvis Without Contrast    Result Date: 4/7/2022  EXAMINATION: CT ABDOMEN AND PELVIS WITHOUT IV CONTRAST   DATE OF EXAMINATION: 4/7/2022. COMPARISON: None available. INDICATION: Upper abdominal pain and hematemesis. PROCEDURE:   Axial CT of the abdomen and pelvis was performed with sagittal  and coronal reformatted images without contrast enhancement. CT dose lowering techniques were used, to include: automated exposure control, adjustment for patient size, and/or use of iterative reconstruction. The exam is limited because some types of pathology may not be adequately demonstrated due to lack of contrast enhancement. FINDINGS: LOWER CHEST :  There is a 4.6 mm left lower lobe nodule on series 2 image 26. The visualized lung bases otherwise appear clear. There are no pleural or pericardial effusions. ABDOMEN: Liver and Biliary system:  Normal. Adrenal glands:  Normal. Kidneys and ureters:  There are no renal stones or hydronephrosis.  No ureteral stones are present.. Spleen:  Normal. Pancreas:  Normal. Gallbladder:  Surgically absent. Lymph nodes, Peritoneum and mesentery:  There is no mesenteric or retroperitoneal lymphadenopathy. Gastrointestinal tract:  There are no dilated loops of bowel or free intraperitoneal air.  . The appendix is not clearly seen. No secondary changes of appendicitis otherwise identified. Aorta/IVC:   There is mild vascular calcification throughout the abdominal aorta without evidence of aneurysmal dilation.  IVC normal. Abdominal wall:  Normal. PELVIS: Fluid: There is no free fluid in the pelvis. Lymph Nodes:  There is no pelvic or inguinal lymphadenopathy.. Urinary bladder:  Normal. BONES:  There are bilateral total hip arthroplasties. ADDITIONAL  SIGNIFICANT FINDINGS:  None.     Impression: 1. No renal stones or hydronephrosis. 2. No bowel obstruction or acute inflammatory process seen within the abdomen or pelvis. Electronically signed by:  Nahid Bradford D.O.  4/6/2022 10:26 PM Mountain Time    XR Chest 1 View    Result Date: 4/6/2022  EXAM: XR CHEST 1 VW-  DATE OF EXAM: 4/6/2022 10:45 PM  INDICATION: SOA triage protocol.   COMPARISONS: 1/30/2022  FINDINGS:  No evidence of pneumonia, pulmonary edema or other acute pulmonary process. No evidence of pneumothorax or  significant pleural effusion. No evidence of acute process of the heart or other mediastinal structures.      Impression:  Negative chest x-ray. No evidence of acute cardiopulmonary disease.  This report was finalized on 4/6/2022 11:01 PM by Akhil Petersen.        Results for orders placed during the hospital encounter of 06/18/21    Adult Transthoracic Echo Complete W/ Cont if Necessary Per Protocol    Interpretation Summary  · The quality of the study is limited due to patient positioning and patient being intubated.  · Left ventricular ejection fraction appears to be 51 - 55%. Left ventricular systolic function is normal.  · Left ventricular diastolic function is consistent with (grade Ia w/high LAP) impaired relaxation.  · Mildly reduced right ventricular systolic function noted.      Assessment/Plan   Assessment & Plan     Active Hospital Problems    Diagnosis  POA   • **GI bleed [K92.2]  Yes   • Elevated serum creatinine [R79.89]  Yes       Patient is a 46-year-old female with past medical history of STEMI with cardiac arrest with stent placement in July 2021 currently on Plavix, alcoholic cirrhosis, GERD, history of peptic ulcer disease with GI bleed, anxiety/depression, who presents to the ER with complaints of abdominal pain, coffee-ground emesis, and bright red blood per rectum.    Coffee-ground emesis  --History of peptic ulcer disease  --EGD in September 2021 without any evidence of esophageal varices  --Protonix  --Octreotide  --Unfortunately we will need to continue aspirin and Plavix given her significant cardiac history and stent within the year unless she is having large volume bleeding.  --Consult.  --Type and screen  --ppx Rocephin      Bright red blood per rectum  --History of hemorrhoids  --Bright red blood does not coincide with upper GI bleed and therefore likely separate process.  --GI consult.    Elevated creatinine  --IV fluids, albumin  --hold bumex and Aldactone    Alcoholic  cirrhosis/alcoholism  --Reportedly with cessation  --GI consult  --CIWA  --Ethanol level  --Continue lactulose/Xifaxan    Coronary artery disease with history of STEMI/cardiac arrest  --Patient within the year of stent placement, continue aspirin/Plavix for now unless has large volume bleeding    Depression/anxiety  --cont home meds    4.6 LLL nodule  --need to discuss with pt further in am  --will need f/u at DC with pulm nodule clinic    DVT prophylaxis:  scds      CODE STATUS: full code  There are no questions and answers to display.         Rob Newman, DO  04/07/22

## 2022-04-07 NOTE — CASE MANAGEMENT/SOCIAL WORK
Discharge Planning Assessment  [facility]     Patient Name: Timothy Guzman  MRN: [id number]  Today's Date: 4/7/2022    Admit Date: 4/6/2022     Discharge Needs Assessment     Row Name 04/07/22 1228       Living Environment    People in Home parent(s)  pt resides in [facility]    Name(s) of People in Home motherRory Warner    Current Living Arrangements home    Primary Care Provided by self    Provides Primary Care For no one    Family Caregiver if Needed parent(s)    Family Caregiver Names earnest Guzman    Quality of Family Relationships helpful;involved;supportive    Able to Return to Prior Arrangements yes       Resource/Environmental Concerns    Resource/Environmental Concerns none       Transition Planning    Patient/Family Anticipates Transition to home with family    Patient/Family Anticipated Services at Transition none    Transportation Anticipated family or friend will provide       Discharge Needs Assessment    Readmission Within the Last 30 Days no previous admission in last 30 days    Equipment Currently Used at Home none    Concerns to be Addressed discharge planning    Anticipated Changes Related to Illness none    Equipment Needed After Discharge none    Provided Post Acute Provider List? N/A    Provided Post Acute Provider Quality & Resource List? N/A               Discharge Plan     Row Name 04/07/22 1234       Plan    Plan home    Patient/Family in Agreement with Plan yes    Plan Comments CM spoke with pt at bedside. Pt resides in [facility] with her mother and reports she is independent of adls. Pt denies use of DME and is not current with home health or outpatient medical services. Pt has a living will on file and has received her covid vaccinations and booster. Pt reports she has followed up with her PCP since her previous admission 3/7-3/12.Pt has [employer] health insurance, denies concerns or disruption in coverage. Pt has prescription drug coverage and denies issues  obtaining or affording current medications. Pt plans to return home and denies discharge needs at this time, CM will continue to follow. Pt reports she will have private transportation home.    Final Discharge Disposition Code 01 - home or self-care              Continued Care and Services - Admitted Since 4/6/2022    Coordination has not been started for this encounter.          Demographic Summary     Row Name 04/07/22 1222       General Information    Referral Source admission list    Reason for Consult discharge planning    Preferred Language English    General Information Comments PCP- Toshia Mitchell       Contact Information    Permission Granted to Share Info With                Functional Status     Row Name 04/07/22 1226       Functional Status    Usual Activity Tolerance good    Current Activity Tolerance moderate       Functional Status, IADL    Medications independent    Meal Preparation independent    Housekeeping independent    Laundry independent    Shopping independent       Mental Status Summary    Recent Changes in Mental Status/Cognitive Functioning no changes       Employment/    Employment/ Comments Pt has [employer] health insurance, denies concerns or disruption in coverage. Pt has prescription drug coverage and denies issues obtaining or affording current medications.               Psychosocial    No documentation.                Abuse/Neglect    No documentation.                Legal    No documentation.                Substance Abuse    No documentation.                Patient Forms    No documentation.                   Tracy He RN

## 2022-04-07 NOTE — ED PROVIDER NOTES
[place]    EMERGENCY DEPARTMENT ENCOUNTER      Pt Name: Timothy Guzman  MRN: [id number]  YOB: 1975  Date of evaluation: 4/6/2022  Provider: Mike Clark MD    CHIEF COMPLAINT       Chief Complaint   Patient presents with   • Vomiting         HISTORY OF PRESENT ILLNESS  (Location/Symptom, Timing/Onset, Context/Setting, Quality, Duration, Modifying Factors, Severity.)   Timothy Guzman is a 46 y.o. female who presents to the emergency department with complaint of coffee-ground emesis and bright red blood per rectum that began yesterday.  She states that she has had 2-3 episodes of each.  She has also had 1 day of moderate aching epigastric pain without any obvious inciting or modifying factors.  She does have history of esophageal varices as well as peptic ulcer disease but does not take any anticoagulant medications.      Nursing notes were reviewed.    REVIEW OF SYSTEMS    (2-9 systems for level 4, 10 or more for level 5)   ROS:  General:  No fevers, no chills, no weakness  Cardiovascular:  No chest pain, no palpitations  Respiratory:  No shortness of breath, no cough, no wheezing  Gastrointestinal: + Abdominal pain  Musculoskeletal:  No muscle pain, no joint pain  Skin:  No rash  Neurologic:  No speech problems, no headache, no extremity numbness, no extremity tingling, no extremity weakness  Psychiatric:  No anxiety  Genitourinary:  No dysuria, no hematuria    Except as noted above the remainder of the review of systems was reviewed and negative.       PAST MEDICAL HISTORY     Past Medical History:   Diagnosis Date   • Acute pancreatitis    • Alcoholism (HCC)     sober 2.5 years   • Anaphylaxis    • Arthritis    • Bone necrosis (HCC)    • CAD (coronary artery disease) 07/13/2021   • Cardiac arrest (HCC)    • Cirrhosis (HCC)    • Cirrhosis of liver (HCC) 07/13/2021   • Gastroparesis    • GERD (gastroesophageal reflux disease)    • History of esophageal varices    • History of kidney stones    •  Hypertension    • Memory loss    • Neuropathy    • Pancreatitis    • Rib fractures 2021   • SVT (supraventricular tachycardia) ([facility]) 2021         SURGICAL HISTORY       Past Surgical History:   Procedure Laterality Date   • ANKLE SURGERY Right    • APPENDECTOMY     • CARDIAC CATHETERIZATION N/A 2021    Procedure: Left Heart Cath;  Surgeon: Vikram Gonzales MD;  Location:  [facility];  Service: Cardiovascular;  Laterality: N/A;   • CARDIAC CATHETERIZATION N/A 7/15/2021    Procedure: LEFT HEART CATH;  Surgeon: Vikram Gonzales MD;  Location:  [facility];  Service: Cardiology;  Laterality: N/A;   •  SECTION      x2   • CHOLECYSTECTOMY     • COLONOSCOPY     • COLONOSCOPY N/A 3/31/2020    Procedure: COLONOSCOPY;  Surgeon: Tirso Giles MD;  Location:  [facility];  Service: Gastroenterology;  Laterality: N/A;   • COLONOSCOPY N/A 2021    Procedure: COLONOSCOPY;  Surgeon: Tyrese Rasmussen MD;  Location:  [facility];  Service: Gastroenterology;  Laterality: N/A;   • CORONARY STENT PLACEMENT     • ENDOSCOPY     • ENDOSCOPY     • ENDOSCOPY N/A 2021    Procedure: ESOPHAGOGASTRODUODENOSCOPY AT BEDSIDE;  Surgeon: Tyrese Rasmussen MD;  Location:  [facility];  Service: Gastroenterology;  Laterality: N/A;   • ENDOSCOPY N/A 2021    Procedure: ESOPHAGOGASTRODUODENOSCOPY;  Surgeon: Tyrese Rasmussen MD;  Location:  [facility];  Service: Gastroenterology;  Laterality: N/A;   • ENDOSCOPY N/A 2021    Procedure: ESOPHAGOGASTRODUODENOSCOPY;  Surgeon: Tyrese Rasmussen MD;  Location:  [facility];  Service: Gastroenterology;  Laterality: N/A;   • REPLACEMENT TOTAL HIP LATERAL POSITION Left    • TOTAL HIP ARTHROPLASTY Right     x2   • TOTAL KNEE ARTHROPLASTY Left          CURRENT MEDICATIONS       Current Facility-Administered Medications:   •  cefTRIAXone (ROCEPHIN) 1 g/100 mL 0.9% NS (MBP), 1 g, Intravenous, Once, Mike Clark MD,  1 g at 04/07/22 0050  •  octreotide (sandoSTATIN) 500 mcg in sodium chloride 0.9 % 100 mL (5 mcg/mL) infusion, 25 mcg/hr, Intravenous, Continuous, Mike Clark MD  •  octreotide (SANDOSTATIN) bolus from bag 5 mcg/mL 50 mcg, 50 mcg, Intravenous, Once, Mike Clark MD  •  pantoprazole (PROTONIX) injection 40 mg, 40 mg, Intravenous, Q12H, Rob Newman DO  •  sodium chloride 0.9 % flush 10 mL, 10 mL, Intravenous, PRN, Mike Clark MD    Current Outpatient Medications:   •  aspirin 81 MG EC tablet, Take 81 mg by mouth Daily., Disp: , Rfl:   •  B Complex Vitamins (vitamin b complex) capsule capsule, Take 1 capsule by mouth Daily., Disp: , Rfl:   •  bisacodyl (DULCOLAX) 10 MG suppository, Insert 1 suppository into the rectum Daily As Needed for Constipation., Disp: 30 suppository, Rfl: 0  •  bumetanide (BUMEX) 1 MG tablet, Take 1 tablet by mouth Daily. (Patient taking differently: Take 2 mg by mouth 3 (Three) Times a Day.), Disp: 30 tablet, Rfl: 0  •  clopidogrel (PLAVIX) 75 MG tablet, Take 1 tablet by mouth Daily., Disp: 90 tablet, Rfl: 3  •  docusate sodium (Colace) 100 MG capsule, Take 1 capsule by mouth 2 (Two) Times a Day., Disp: 60 capsule, Rfl: 0  •  FLUoxetine (PROzac) 40 MG capsule, Take 1 capsule by mouth Daily for 30 days., Disp: 30 capsule, Rfl: 0  •  gabapentin (NEURONTIN) 300 MG capsule, Take 1 capsule by mouth 3 (Three) Times a Day., Disp: 90 capsule, Rfl: 5  •  Ginger, Zingiber officinalis, (Ginger Root) 500 MG capsule, Take 500 mg by mouth 3 (Three) Times a Day Before Meals., Disp: 90 capsule, Rfl: 0  •  lactulose (CHRONULAC) 10 GM/15ML solution, Take 45 mL by mouth 3 (Three) Times a Day., Disp: 1892 mL, Rfl: 0  •  lubiprostone (AMITIZA) 24 MCG capsule, Take 1 capsule by mouth 2 (Two) Times a Day., Disp: , Rfl:   •  magnesium oxide (MAGOX) 400 (241.3 Mg) MG tablet tablet, Take 400 mg by mouth Every Evening., Disp: , Rfl:   •  Melatonin 10 MG tablet, Take 1 tablet by mouth Every Night.,  Disp: , Rfl:   •  Multivitamin tablet tablet, Daily., Disp: , Rfl:   •  OLANZapine (zyPREXA) 10 MG tablet, Take 1 tablet by mouth Every Night., Disp: , Rfl:   •  ondansetron (Zofran) 4 MG tablet, Take 1 tablet by mouth Every 8 (Eight) Hours As Needed for Nausea or Vomiting for up to 30 doses., Disp: 15 tablet, Rfl: 0  •  pantoprazole (PROTONIX) 40 MG EC tablet, Take 1 tablet by mouth 2 (Two) Times a Day Before Meals., Disp: 28 tablet, Rfl: 0  •  polyethylene glycol (MIRALAX) 17 g packet, Take 17 g by mouth 2 (Two) Times a Day. (Patient taking differently: Take 17 g by mouth Daily As Needed.), Disp: 60 packet, Rfl: 0  •  potassium chloride (K-DUR,KLOR-CON) 20 MEQ CR tablet, Take 1 tablet by mouth Daily., Disp: , Rfl:   •  prazosin (MINIPRESS) 1 MG capsule, Take 1 capsule by mouth Daily., Disp: , Rfl:   •  pyridostigmine (Mestinon) 60 MG tablet, Take 1 tablet by mouth 3 (Three) Times a Day for 14 days., Disp: 42 tablet, Rfl: 0  •  ranolazine (RANEXA) 1000 MG 12 hr tablet, Take 1 tablet by mouth 2 (Two) Times a Day for 30 days., Disp: 60 tablet, Rfl: 0  •  rosuvastatin (CRESTOR) 20 MG tablet, Take 1 tablet by mouth Every Night., Disp: 90 tablet, Rfl: 3  •  spironolactone (ALDACTONE) 100 MG tablet, Take 1 tablet by mouth Daily. (Patient taking differently: Take 100 mg by mouth 3 (Three) Times a Day.), Disp: 30 tablet, Rfl: 0  •  tamsulosin (FLOMAX) 0.4 MG capsule 24 hr capsule, Take 1 capsule by mouth Daily., Disp: 30 capsule, Rfl: 0  •  thiamine (VITAMIN B-1) 100 MG tablet tablet, Daily., Disp: , Rfl:   •  traZODone (DESYREL) 50 MG tablet, Take 1 tablet by mouth Every Night for 30 days., Disp: 30 tablet, Rfl: 0  •  ursodiol (ACTIGALL) 300 MG capsule, Take 300 mg by mouth 2 (Two) Times a Day., Disp: , Rfl:   •  vitamin B-6 (PYRIDOXINE) 25 MG tablet, Take 25 mg by mouth Daily., Disp: , Rfl:   •  Xifaxan 550 MG tablet, Take 1 tablet by mouth Every 12 (Twelve) Hours., Disp: 180 tablet, Rfl: 3    ALLERGIES     Contrast dye,  "Haloperidol, Nsaids, and Tylenol [acetaminophen]    FAMILY HISTORY       Family History   Problem Relation Age of Onset   • Diabetes type II Mother    • Breast cancer Mother    • Heart disease Father    • Hypertension Brother    • ADD / ADHD Child    • Autism Child    • Asperger's syndrome Child    • Colon cancer Neg Hx    • Colon polyps Neg Hx           SOCIAL HISTORY       Social History     Socioeconomic History   • Marital status:    Tobacco Use   • Smoking status: Former Smoker     Packs/day: 0.50     Types: Electronic Cigarette, Cigarettes     Quit date: 2021     Years since quittin.7   • Smokeless tobacco: Never Used   Vaping Use   • Vaping Use: Former   Substance and Sexual Activity   • Alcohol use: Not Currently   • Drug use: No   • Sexual activity: Defer         PHYSICAL EXAM    (up to 7 for level 4, 8 or more for level 5)     Vitals:    22 2128 22 0033   BP: 118/81 117/73   BP Location: Left arm Left arm   Patient Position: Sitting Lying   Pulse: 108 86   Resp: 18 18   Temp: 98.5 °F (36.9 °C)    TempSrc: Oral    SpO2: 96% 95%   Weight: 98.4 kg (217 lb)    Height: 160 cm (63\")        Physical Exam  General: Awake, alert, no acute distress.  HEENT: Conjunctivae normal.  Neck: Trachea midline.  Cardiac: Heart regular rate, rhythm, no murmurs, rubs, or gallops  Lungs: Lungs are clear to auscultation, there is no wheezing, rhonchi, or rales. There is no use of accessory muscles.  Chest wall: There is no tenderness to palpation over the chest wall or over ribs  Abdomen: Mild epigastric tenderness without any distention, rebound, or guarding  Musculoskeletal: No deformity.  Neuro: Alert and oriented x 4.  Dermatology: Skin is warm and dry  Psych: Mentation is grossly normal, cognition is grossly normal. Affect is appropriate.        DIAGNOSTIC RESULTS     EKG: All EKGs are interpreted by the Emergency Department Physician who either signs or Co-signs this chart in the absence of a " cardiologist.    ECG 12 Lead   Final Result   Test Reason : SOA Protocol   Blood Pressure :   */*   mmHG   Vent. Rate : 101 BPM     Atrial Rate : 101 BPM      P-R Int : 142 ms          QRS Dur :  78 ms       QT Int : 354 ms       P-R-T Axes :  19  -8  25 degrees      QTc Int : 459 ms      Sinus tachycardia   Low voltage QRS   Cannot rule out Inferior infarct , age undetermined   Cannot rule out Anterior infarct , age undetermined   Abnormal ECG   When compared with ECG of 10-MAR-2022 15:07,   Minimal criteria for Anterior infarct are now present   No significant change was found   Confirmed by DAMIAN FALCON ([id number]) on 4/7/2022 1:12:43 AM      Referred By:            Confirmed By: DAMIAN FALCON          RADIOLOGY:   Non-plain film images such as CT, Ultrasound and MRI are read by the radiologist. Plain radiographic images are visualized and preliminarily interpreted by the emergency physician with the below findings:      [x] Radiologist's Report Reviewed:  CT Abdomen Pelvis Without Contrast   Final Result   1. No renal stones or hydronephrosis.   2. No bowel obstruction or acute inflammatory process seen within the abdomen or pelvis.            Electronically signed by:  Nahid Bradford D.O.     4/6/2022 10:26 PM Mountain Time      XR Chest 1 View   Final Result       Negative chest x-ray. No evidence of acute cardiopulmonary disease.       This report was finalized on 4/6/2022 11:01 PM by Akhil Petersen.                ED BEDSIDE ULTRASOUND:   Performed by ED Physician - none    LABS:    I have reviewed and interpreted all of the currently available lab results from this visit (if applicable):  Results for orders placed or performed during the hospital encounter of 04/06/22   Comprehensive Metabolic Panel    Specimen: Blood   Result Value Ref Range    Glucose 218 (H) 65 - 99 mg/dL    BUN 18 6 - 20 mg/dL    Creatinine 1.44 (H) 0.57 - 1.00 mg/dL    Sodium 134 (L) 136 - 145 mmol/L    Potassium 4.4 3.5 - 5.2 mmol/L     Chloride 97 (L) 98 - 107 mmol/L    CO2 25.0 22.0 - 29.0 mmol/L    Calcium 9.2 8.6 - 10.5 mg/dL    Total Protein 6.9 6.0 - 8.5 g/dL    Albumin 4.20 3.50 - 5.20 g/dL    ALT (SGPT) 18 1 - 33 U/L    AST (SGOT) 22 1 - 32 U/L    Alkaline Phosphatase 101 39 - 117 U/L    Total Bilirubin 0.3 0.0 - 1.2 mg/dL    Globulin 2.7 gm/dL    A/G Ratio 1.6 g/dL    BUN/Creatinine Ratio 12.5 7.0 - 25.0    Anion Gap 12.0 5.0 - 15.0 mmol/L    eGFR 45.2 (L) >60.0 mL/min/1.73   BNP    Specimen: Blood   Result Value Ref Range    proBNP 80.9 0.0 - 450.0 pg/mL   Troponin    Specimen: Blood   Result Value Ref Range    Troponin T <0.010 0.000 - 0.030 ng/mL   CBC Auto Differential    Specimen: Blood   Result Value Ref Range    WBC 9.91 3.40 - 10.80 10*3/mm3    RBC 4.64 3.77 - 5.28 10*6/mm3    Hemoglobin 14.1 12.0 - 15.9 g/dL    Hematocrit 41.2 34.0 - 46.6 %    MCV 88.8 79.0 - 97.0 fL    MCH 30.4 26.6 - 33.0 pg    MCHC 34.2 31.5 - 35.7 g/dL    RDW 12.9 12.3 - 15.4 %    RDW-SD 41.5 37.0 - 54.0 fl    MPV 10.6 6.0 - 12.0 fL    Platelets 146 140 - 450 10*3/mm3    Neutrophil % 62.3 42.7 - 76.0 %    Lymphocyte % 25.8 19.6 - 45.3 %    Monocyte % 10.0 5.0 - 12.0 %    Eosinophil % 1.0 0.3 - 6.2 %    Basophil % 0.3 0.0 - 1.5 %    Immature Grans % 0.6 (H) 0.0 - 0.5 %    Neutrophils, Absolute 6.17 1.70 - 7.00 10*3/mm3    Lymphocytes, Absolute 2.56 0.70 - 3.10 10*3/mm3    Monocytes, Absolute 0.99 (H) 0.10 - 0.90 10*3/mm3    Eosinophils, Absolute 0.10 0.00 - 0.40 10*3/mm3    Basophils, Absolute 0.03 0.00 - 0.20 10*3/mm3    Immature Grans, Absolute 0.06 (H) 0.00 - 0.05 10*3/mm3    nRBC 0.0 0.0 - 0.2 /100 WBC   ECG 12 Lead   Result Value Ref Range    QT Interval 354 ms    QTC Interval 459 ms   Green Top (Gel)   Result Value Ref Range    Extra Tube Hold for add-ons.    Lavender Top   Result Value Ref Range    Extra Tube hold for add-on    Gold Top - SST   Result Value Ref Range    Extra Tube Hold for add-ons.    Light Blue Top   Result Value Ref Range    Extra Tube  "hold for add-on         All other labs were within normal range or not returned as of this dictation.      EMERGENCY DEPARTMENT COURSE and DIFFERENTIAL DIAGNOSIS/MDM:   Vitals:    Vitals:    04/06/22 2128 04/07/22 0033   BP: 118/81 117/73   BP Location: Left arm Left arm   Patient Position: Sitting Lying   Pulse: 108 86   Resp: 18 18   Temp: 98.5 °F (36.9 °C)    TempSrc: Oral    SpO2: 96% 95%   Weight: 98.4 kg (217 lb)    Height: 160 cm (63\")        ED Course as of 04/07/22 0113   u Apr 07, 2022   0107 Spoke w/ Dr. Newman who accepts the pt for admission. [NS]   0108 Patient stable and well-appearing during the course of her stay in the emergency department.  She has no ongoing bleeding and is hemodynamically stable.  Hemoglobin is within normal limits.  I reviewed her previous endoscopy performed in November 2021 and there is no evidence of varices, however patient does have history of peptic ulcer disease.  Abdomen is relatively benign apart from some mild epigastric tenderness.  Noncontrast CT scan demonstrates no evidence of perforation or acute intra-abdominal surgical process.  Labs otherwise demonstrate no evidence of significant electrolyte derangement, leukocytosis, or myocardial injury.  Patient does have mild acute kidney injury.  She was started on Protonix, Rocephin, octreotide in the ED and will be admitted to the hospitalist service. [NS]      ED Course User Index  [NS] Mike Clark MD         MEDICATIONS ADMINISTERED IN ED:  Medications   sodium chloride 0.9 % flush 10 mL (has no administration in time range)   octreotide (sandoSTATIN) 500 mcg in sodium chloride 0.9 % 100 mL (5 mcg/mL) infusion (has no administration in time range)   cefTRIAXone (ROCEPHIN) 1 g/100 mL 0.9% NS (MBP) (1 g Intravenous New Bag 4/7/22 0050)   octreotide (SANDOSTATIN) bolus from bag 5 mcg/mL 50 mcg (has no administration in time range)   pantoprazole (PROTONIX) injection 40 mg (has no administration in time range) "   pantoprazole (PROTONIX) injection 80 mg (80 mg Intravenous Given 4/7/22 0045)   droperidol (INAPSINE) injection 2.5 mg (2.5 mg Intravenous Given 4/7/22 0046)         FINAL IMPRESSION      1. Acute GI bleeding    2. Acute upper abdominal pain    3. History of cirrhosis    4. History of peptic ulcer disease          DISPOSITION/PLAN     ED Disposition     ED Disposition   Decision to Admit    Condition   --    Comment   Level of Care: Telemetry [5]   Diagnosis: GI bleed [[id number]]   Admitting Physician: MAME GUZMAN [[id number]]   Attending Physician: MAME GUZMAN [[id number]]                 Mike Clark MD  Attending Emergency Physician               Mike Clark MD  04/07/22 0113

## 2022-04-07 NOTE — PLAN OF CARE
Problem: Adult Inpatient Plan of Care  Goal: Plan of Care Review  Outcome: Ongoing, Progressing  Flowsheets (Taken 4/7/2022 1621)  Outcome Evaluation: VSS. RA. No indication of bleeding on EGD. No vomiting or bloody stool this shift. PRN pain meds q3h. No other complaints. Will continue to monitor. (Pended)   Goal Outcome Evaluation:              Outcome Evaluation: (P) VSS. RA. No indication of bleeding on EGD. No vomiting or bloody stool this shift. PRN pain meds q3h. No other complaints. Will continue to monitor.

## 2022-04-07 NOTE — ANESTHESIA PREPROCEDURE EVALUATION
Anesthesia Evaluation     Patient summary reviewed and Nursing notes reviewed   NPO Solid Status: > 8 hours  NPO Liquid Status: > 8 hours           Airway   Mallampati: I  TM distance: >3 FB  Neck ROM: full  No difficulty expected  Dental      Pulmonary    (+) a smoker (2021) Former,   (-) pneumonia, COPD, asthma, shortness of breath, recent URI, sleep apnea  Cardiovascular     ECG reviewed    (+) hypertension, past MI (c Arrest)  >12 months, CAD, cardiac stents (2021) more than 12 months ago   (-) dysrhythmias, angina    ROS comment: ECG Sinus tachycardia  Cannot rule out IMI Ant MI     ECHO EF 51%LVDD ia   CATH 2021· 95% stenosis just distal to a previously placed stent.  The lesion is status post intervention with a Xience Melissa 3.0 x 18 mm drug-eluting stent.  Anaphylactic-like reaction to contrast dye despite premedication.  She was supported with intraprocedural IV Solu-Medrol, epinephrine, and a nonrebreather.        Neuro/Psych  (-) seizures, CVA    ROS Comment: Neuro memory loss after arrest  Diffuse non focal BI  GI/Hepatic/Renal/Endo    (+)  GERD, GI bleeding , hepatitis, liver disease fatty liver disease cirrhosis, renal disease (creat 1.2) CRI,   (-) diabetes (A1C <6)    Musculoskeletal     Abdominal    Substance History      OB/GYN          Other   arthritis,      ROS/Med Hx Other: Eso varices EGD hct adequate at 38  PL:ts 127K   PT PTT normal                 Anesthesia Plan    ASA 3     MAC and general   (PFL   anaphyllaxis to Contrast dye in cath lab )  intravenous induction     Anesthetic plan, all risks, benefits, and alternatives have been provided, discussed and informed consent has been obtained with: patient.    Plan discussed with CRNA.        CODE STATUS:    Code Status (Patient has no pulse and is not breathing): CPR (Attempt to Resuscitate)  Medical Interventions (Patient has pulse or is breathing): Full Support

## 2022-04-08 LAB
ALBUMIN SERPL-MCNC: 3.8 G/DL (ref 3.5–5.2)
ALBUMIN/GLOB SERPL: 2.2 G/DL
ALP SERPL-CCNC: 77 U/L (ref 39–117)
ALT SERPL W P-5'-P-CCNC: 15 U/L (ref 1–33)
ANION GAP SERPL CALCULATED.3IONS-SCNC: 6 MMOL/L (ref 5–15)
AST SERPL-CCNC: 18 U/L (ref 1–32)
BILIRUB SERPL-MCNC: 0.2 MG/DL (ref 0–1.2)
BUN SERPL-MCNC: 12 MG/DL (ref 6–20)
BUN/CREAT SERPL: 13.6 (ref 7–25)
CALCIUM SPEC-SCNC: 8.3 MG/DL (ref 8.6–10.5)
CHLORIDE SERPL-SCNC: 105 MMOL/L (ref 98–107)
CO2 SERPL-SCNC: 27 MMOL/L (ref 22–29)
CREAT SERPL-MCNC: 0.88 MG/DL (ref 0.57–1)
CYTO UR: NORMAL
DEPRECATED RDW RBC AUTO: 44.9 FL (ref 37–54)
EGFRCR SERPLBLD CKD-EPI 2021: 81.7 ML/MIN/1.73
ERYTHROCYTE [DISTWIDTH] IN BLOOD BY AUTOMATED COUNT: 12.7 % (ref 12.3–15.4)
GLOBULIN UR ELPH-MCNC: 1.7 GM/DL
GLUCOSE SERPL-MCNC: 100 MG/DL (ref 65–99)
HCT VFR BLD AUTO: 37.2 % (ref 34–46.6)
HGB BLD-MCNC: 11.8 G/DL (ref 12–15.9)
INR PPP: 1.08 (ref 0.84–1.13)
LAB AP CASE REPORT: NORMAL
LAB AP CLINICAL INFORMATION: NORMAL
MAGNESIUM SERPL-MCNC: 2.2 MG/DL (ref 1.6–2.6)
MCH RBC QN AUTO: 30.6 PG (ref 26.6–33)
MCHC RBC AUTO-ENTMCNC: 31.7 G/DL (ref 31.5–35.7)
MCV RBC AUTO: 96.4 FL (ref 79–97)
PATH REPORT.FINAL DX SPEC: NORMAL
PATH REPORT.GROSS SPEC: NORMAL
PLATELET # BLD AUTO: 98 10*3/MM3 (ref 140–450)
PMV BLD AUTO: 10.4 FL (ref 6–12)
POTASSIUM SERPL-SCNC: 4.6 MMOL/L (ref 3.5–5.2)
PROT SERPL-MCNC: 5.5 G/DL (ref 6–8.5)
PROTHROMBIN TIME: 13.9 SECONDS (ref 11.4–14.4)
RBC # BLD AUTO: 3.86 10*6/MM3 (ref 3.77–5.28)
SODIUM SERPL-SCNC: 138 MMOL/L (ref 136–145)
WBC NRBC COR # BLD: 4.49 10*3/MM3 (ref 3.4–10.8)

## 2022-04-08 PROCEDURE — 80053 COMPREHEN METABOLIC PANEL: CPT | Performed by: INTERNAL MEDICINE

## 2022-04-08 PROCEDURE — 85610 PROTHROMBIN TIME: CPT | Performed by: INTERNAL MEDICINE

## 2022-04-08 PROCEDURE — 99232 SBSQ HOSP IP/OBS MODERATE 35: CPT | Performed by: INTERNAL MEDICINE

## 2022-04-08 PROCEDURE — 25010000002 HYDROMORPHONE PER 4 MG: Performed by: INTERNAL MEDICINE

## 2022-04-08 PROCEDURE — 99233 SBSQ HOSP IP/OBS HIGH 50: CPT | Performed by: INTERNAL MEDICINE

## 2022-04-08 PROCEDURE — 85027 COMPLETE CBC AUTOMATED: CPT | Performed by: INTERNAL MEDICINE

## 2022-04-08 PROCEDURE — 83735 ASSAY OF MAGNESIUM: CPT | Performed by: INTERNAL MEDICINE

## 2022-04-08 RX ORDER — HYDROCODONE BITARTRATE AND ACETAMINOPHEN 5; 325 MG/1; MG/1
1 TABLET ORAL EVERY 6 HOURS PRN
Status: DISCONTINUED | OUTPATIENT
Start: 2022-04-08 | End: 2022-04-09 | Stop reason: HOSPADM

## 2022-04-08 RX ORDER — HYDROMORPHONE HYDROCHLORIDE 1 MG/ML
0.25 INJECTION, SOLUTION INTRAMUSCULAR; INTRAVENOUS; SUBCUTANEOUS EVERY 6 HOURS PRN
Status: DISCONTINUED | OUTPATIENT
Start: 2022-04-08 | End: 2022-04-09 | Stop reason: HOSPADM

## 2022-04-08 RX ORDER — PANTOPRAZOLE SODIUM 40 MG/1
40 TABLET, DELAYED RELEASE ORAL
Status: DISCONTINUED | OUTPATIENT
Start: 2022-04-08 | End: 2022-04-09 | Stop reason: HOSPADM

## 2022-04-08 RX ADMIN — OLANZAPINE 10 MG: 5 TABLET, FILM COATED ORAL at 21:34

## 2022-04-08 RX ADMIN — LACTULOSE 45 ML: 20 SOLUTION ORAL at 16:07

## 2022-04-08 RX ADMIN — BUMETANIDE 1.5 MG: 0.5 TABLET ORAL at 16:06

## 2022-04-08 RX ADMIN — LACTULOSE 45 ML: 20 SOLUTION ORAL at 09:19

## 2022-04-08 RX ADMIN — LIDOCAINE 1 PATCH: 50 PATCH CUTANEOUS at 09:21

## 2022-04-08 RX ADMIN — URSODIOL 300 MG: 300 CAPSULE ORAL at 21:33

## 2022-04-08 RX ADMIN — HYDROMORPHONE HYDROCHLORIDE 0.25 MG: 1 INJECTION, SOLUTION INTRAMUSCULAR; INTRAVENOUS; SUBCUTANEOUS at 00:09

## 2022-04-08 RX ADMIN — RIFAXIMIN 550 MG: 550 TABLET ORAL at 09:20

## 2022-04-08 RX ADMIN — HYDROCODONE BITARTRATE AND ACETAMINOPHEN 1 TABLET: 5; 325 TABLET ORAL at 22:03

## 2022-04-08 RX ADMIN — PANTOPRAZOLE SODIUM 40 MG: 40 INJECTION, POWDER, FOR SOLUTION INTRAVENOUS at 09:19

## 2022-04-08 RX ADMIN — HYDROCORTISONE ACETATE 25 MG: 25 SUPPOSITORY RECTAL at 21:33

## 2022-04-08 RX ADMIN — BUMETANIDE 1 MG: 1 TABLET ORAL at 09:20

## 2022-04-08 RX ADMIN — PANTOPRAZOLE SODIUM 40 MG: 40 TABLET, DELAYED RELEASE ORAL at 16:11

## 2022-04-08 RX ADMIN — CLOPIDOGREL BISULFATE 75 MG: 75 TABLET ORAL at 09:21

## 2022-04-08 RX ADMIN — Medication 10 ML: at 09:19

## 2022-04-08 RX ADMIN — GABAPENTIN 300 MG: 300 CAPSULE ORAL at 16:03

## 2022-04-08 RX ADMIN — LACTULOSE 45 ML: 20 SOLUTION ORAL at 21:34

## 2022-04-08 RX ADMIN — RIFAXIMIN 550 MG: 550 TABLET ORAL at 21:33

## 2022-04-08 RX ADMIN — HYDROMORPHONE HYDROCHLORIDE 0.25 MG: 1 INJECTION, SOLUTION INTRAMUSCULAR; INTRAVENOUS; SUBCUTANEOUS at 13:03

## 2022-04-08 RX ADMIN — URSODIOL 300 MG: 300 CAPSULE ORAL at 09:20

## 2022-04-08 RX ADMIN — GABAPENTIN 300 MG: 300 CAPSULE ORAL at 09:20

## 2022-04-08 RX ADMIN — GABAPENTIN 300 MG: 300 CAPSULE ORAL at 21:33

## 2022-04-08 RX ADMIN — HYDROMORPHONE HYDROCHLORIDE 0.25 MG: 1 INJECTION, SOLUTION INTRAMUSCULAR; INTRAVENOUS; SUBCUTANEOUS at 09:21

## 2022-04-08 RX ADMIN — TAMSULOSIN HYDROCHLORIDE 0.4 MG: 0.4 CAPSULE ORAL at 09:21

## 2022-04-08 RX ADMIN — SPIRONOLACTONE 100 MG: 25 TABLET ORAL at 09:21

## 2022-04-08 RX ADMIN — HYDROMORPHONE HYDROCHLORIDE 0.25 MG: 1 INJECTION, SOLUTION INTRAMUSCULAR; INTRAVENOUS; SUBCUTANEOUS at 06:14

## 2022-04-08 RX ADMIN — HYDROMORPHONE HYDROCHLORIDE 0.25 MG: 1 INJECTION, SOLUTION INTRAMUSCULAR; INTRAVENOUS; SUBCUTANEOUS at 03:11

## 2022-04-08 RX ADMIN — ROSUVASTATIN CALCIUM 20 MG: 20 TABLET, COATED ORAL at 21:33

## 2022-04-08 RX ADMIN — ASPIRIN 81 MG: 81 TABLET, COATED ORAL at 09:21

## 2022-04-08 RX ADMIN — HYDROCORTISONE ACETATE 25 MG: 25 SUPPOSITORY RECTAL at 09:22

## 2022-04-08 RX ADMIN — Medication 10 ML: at 21:34

## 2022-04-08 RX ADMIN — HYDROCODONE BITARTRATE AND ACETAMINOPHEN 1 TABLET: 5; 325 TABLET ORAL at 16:06

## 2022-04-08 NOTE — CASE MANAGEMENT/SOCIAL WORK
Continued Stay Note  [facility]     Patient Name: Timothy Guzman  MRN: [id number]  Today's Date: 4/8/2022    Admit Date: 4/6/2022     Discharge Plan     Row Name 04/08/22 1232       Plan    Plan Comments CM spoke with pt at bedside. Pt resting, discussed discharge needs and pt denies needs at this time. Pt plans to retunr home with her mother at time of discharge and will have private transportation. CM will continue to follow.    Final Discharge Disposition Code 01 - home or self-care               Discharge Codes    No documentation.                     Tracy He RN

## 2022-04-08 NOTE — PROGRESS NOTES
[facility] Medicine Services  PROGRESS NOTE    Patient Name: Timothy Guzman  : 1975  MRN: [id number]    Date of Admission: 2022  Primary Care Physician: Toshia Mitchell APRN    Subjective   Subjective     CC:  Coffee emesis/gastritis, abd pain, cirrhosis, cad    HPI:  Episodic abd pain, tolerating po. No chest pain today. No dyspnea. No coffee emesis or brbpr.     ROS:  Gen- No fevers, chills  CV- No chest pain, palpitations  Resp- No cough, dyspnea  GI- No N/V/D, +abd pain as above        Objective   Objective     Vital Signs:   Temp:  [98.1 °F (36.7 °C)-98.7 °F (37.1 °C)] 98.1 °F (36.7 °C)  Heart Rate:  [72-81] 79  Resp:  [18] 18  BP: (105-111)/(58-66) 111/59     Physical Exam:  Constitutional:Alert, oriented x 3, nontoxic appearing, sitting up in bed no distress  Psych:Normal/appropriate affect  HEENT:NCAT, oropharynx clear  Neck: neck supple, full range of motion  Neuro: Face symmetric, speech clear, equal , moves all extremities  Cardiac: RRR; No pretibial pitting edema  Resp: CTAB, normal effort  GI: abd soft, mild epigastric tenderness, no rebound or guarding  Skin: No extremity rash  Musculoskeletal/extremities: no cyanosis of extremities; no significant ankle edema      Results Reviewed:  LAB RESULTS:      Lab 22  0914 22  0951 22  0606 22  0345 22  2138   WBC 4.49  --   --  8.05 9.91   HEMOGLOBIN 11.8* 11.9*  11.9* 12.0 12.8 14.1   HEMATOCRIT 37.2 36.0  --  38.5 41.2   PLATELETS 98*  --   --  127* 146   NEUTROS ABS  --   --   --  4.24 6.17   IMMATURE GRANS (ABS)  --   --   --  0.04 0.06*   LYMPHS ABS  --   --   --  2.58 2.56   MONOS ABS  --   --   --  1.04* 0.99*   EOS ABS  --   --   --  0.13 0.10   MCV 96.4  --   --  90.0 88.8   LACTATE  --   --   --  1.2 2.5*   PROTIME 13.9  --   --  13.9  --    APTT  --   --   --  29.4  --          Lab 22  0914 22  0345 22  2138   SODIUM 138 135* 134*   POTASSIUM 4.6  4.6 4.4   CHLORIDE 105 97* 97*   CO2 27.0 29.0 25.0   ANION GAP 6.0 9.0 12.0   BUN 12 19 18   CREATININE 0.88 1.19* 1.44*   EGFR 81.7 56.9* 45.2*   GLUCOSE 100* 104* 218*   CALCIUM 8.3* 8.8 9.2   MAGNESIUM 2.2 2.2  --    HEMOGLOBIN A1C  --  5.70*  --          Lab 04/08/22  0914 04/06/22  2138   TOTAL PROTEIN 5.5* 6.9   ALBUMIN 3.80 4.20   GLOBULIN 1.7 2.7   ALT (SGPT) 15 18   AST (SGOT) 18 22   BILIRUBIN 0.2 0.3   ALK PHOS 77 101         Lab 04/08/22  0914 04/07/22  0345 04/06/22  2138   PROBNP  --   --  80.9   TROPONIN T  --   --  <0.010   PROTIME 13.9 13.9  --    INR 1.08 1.08  --              Lab 04/07/22  0047   ABO TYPING A   RH TYPING Positive   ANTIBODY SCREEN Negative         Brief Urine Lab Results  (Last result in the past 365 days)      Color   Clarity   Blood   Leuk Est   Nitrite   Protein   CREAT   Urine HCG        04/07/22 0342 Yellow   Clear   Negative   Trace   Negative   Negative                 Microbiology Results Abnormal     Procedure Component Value - Date/Time    COVID PRE-OP / PRE-PROCEDURE SCREENING ORDER (NO ISOLATION) - Swab, Nasopharynx [[id number]]  (Normal) Collected: 04/07/22 0155    Lab Status: Final result Specimen: Swab from Nasopharynx Updated: 04/07/22 0224    Narrative:      The following orders were created for panel order COVID PRE-OP / PRE-PROCEDURE SCREENING ORDER (NO ISOLATION) - Swab, Nasopharynx.  Procedure                               Abnormality         Status                     ---------                               -----------         ------                     COVID-19, ABBOTT IN-HOUS...[[id number]]  Normal              Final result                 Please view results for these tests on the individual orders.    COVID-19, ABBOTT IN-HOUSE,NASAL Swab (NO TRANSPORT MEDIA) 2 HR TAT - Swab, Nasopharynx [[id number]]  (Normal) Collected: 04/07/22 0155    Lab Status: Final result Specimen: Swab from Nasopharynx Updated: 04/07/22 0224     COVID19 Presumptive Negative     Narrative:      Fact sheet for providers: https://www.fda.gov/media/519568/download     Fact sheet for patients: https://www.fda.gov/media/713076/download    Test performed by PCR.  If inconsistent with clinical signs and symptoms patient should be tested with different authorized molecular test.          CT Abdomen Pelvis Without Contrast    Result Date: 4/7/2022  EXAMINATION: CT ABDOMEN AND PELVIS WITHOUT IV CONTRAST   DATE OF EXAMINATION: 4/7/2022. COMPARISON: None available. INDICATION: Upper abdominal pain and hematemesis. PROCEDURE:   Axial CT of the abdomen and pelvis was performed with sagittal and coronal reformatted images without contrast enhancement. CT dose lowering techniques were used, to include: automated exposure control, adjustment for patient size, and/or use of iterative reconstruction. The exam is limited because some types of pathology may not be adequately demonstrated due to lack of contrast enhancement. FINDINGS: LOWER CHEST :  There is a 4.6 mm left lower lobe nodule on series 2 image 26. The visualized lung bases otherwise appear clear. There are no pleural or pericardial effusions. ABDOMEN: Liver and Biliary system:  Normal. Adrenal glands:  Normal. Kidneys and ureters:  There are no renal stones or hydronephrosis.  No ureteral stones are present.. Spleen:  Normal. Pancreas:  Normal. Gallbladder:  Surgically absent. Lymph nodes, Peritoneum and mesentery:  There is no mesenteric or retroperitoneal lymphadenopathy. Gastrointestinal tract:  There are no dilated loops of bowel or free intraperitoneal air.  . The appendix is not clearly seen. No secondary changes of appendicitis otherwise identified. Aorta/IVC:   There is mild vascular calcification throughout the abdominal aorta without evidence of aneurysmal dilation.  IVC normal. Abdominal wall:  Normal. PELVIS: Fluid: There is no free fluid in the pelvis. Lymph Nodes:  There is no pelvic or inguinal lymphadenopathy.. Urinary bladder:   Normal. BONES:  There are bilateral total hip arthroplasties. ADDITIONAL  SIGNIFICANT FINDINGS:  None.     Impression: 1. No renal stones or hydronephrosis. 2. No bowel obstruction or acute inflammatory process seen within the abdomen or pelvis. Electronically signed by:  Nahid Bradford D.O.  4/6/2022 10:26 PM Mountain Time    XR Chest 1 View    Result Date: 4/6/2022  EXAM: XR CHEST 1 VW-  DATE OF EXAM: 4/6/2022 10:45 PM  INDICATION: SOA triage protocol.   COMPARISONS: 1/30/2022  FINDINGS:  No evidence of pneumonia, pulmonary edema or other acute pulmonary process. No evidence of pneumothorax or significant pleural effusion. No evidence of acute process of the heart or other mediastinal structures.      Impression:  Negative chest x-ray. No evidence of acute cardiopulmonary disease.  This report was finalized on 4/6/2022 11:01 PM by Akhil Petersen.        Results for orders placed during the hospital encounter of 06/18/21    Adult Transthoracic Echo Complete W/ Cont if Necessary Per Protocol    Interpretation Summary  · The quality of the study is limited due to patient positioning and patient being intubated.  · Left ventricular ejection fraction appears to be 51 - 55%. Left ventricular systolic function is normal.  · Left ventricular diastolic function is consistent with (grade Ia w/high LAP) impaired relaxation.  · Mildly reduced right ventricular systolic function noted.      I have reviewed the medications:  Scheduled Meds:aspirin, 81 mg, Oral, Daily  bumetanide, 1.5 mg, Oral, BID  clopidogrel, 75 mg, Oral, Daily  gabapentin, 300 mg, Oral, TID  hydrocortisone, 25 mg, Rectal, BID  lactulose, 45 mL, Oral, TID  lidocaine, 1 patch, Transdermal, Q24H  OLANZapine, 10 mg, Oral, Nightly  pantoprazole, 40 mg, Oral, BID AC  rifAXIMin, 550 mg, Oral, Q12H  rosuvastatin, 20 mg, Oral, Nightly  sodium chloride, 10 mL, Intravenous, Q12H  spironolactone, 100 mg, Oral, Daily  tamsulosin, 0.4 mg, Oral, Daily  ursodiol, 300 mg,  Oral, BID      Continuous Infusions:sodium chloride, 30 mL/hr, Last Rate: 30 mL/hr (04/07/22 1333)      PRN Meds:.HYDROcodone-acetaminophen  •  HYDROmorphone  •  magnesium sulfate **OR** magnesium sulfate in D5W 1g/100mL (PREMIX) **OR** magnesium sulfate  •  ondansetron **OR** ondansetron  •  sodium chloride  •  sodium chloride  •  sodium chloride    Assessment/Plan   Assessment & Plan     Active Hospital Problems    Diagnosis  POA   • **GI bleed [K92.2]  Yes   • Elevated serum creatinine [R79.89]  Yes   • BRIANNA (acute kidney injury) (HCC) [N17.9]  Yes   • Anxiety and depression [F41.9, F32.A]  Yes   • Alcoholism (HCC) [F10.20]  Yes   • CAD (coronary artery disease) [I25.10]  Yes   • GERD (gastroesophageal reflux disease) [K21.9]  Yes   • Alcoholic cirrhosis of liver without ascites (HCC) [K70.30]  Yes      Resolved Hospital Problems   No resolved problems to display.        Brief Hospital Course to date:  Timothy Guzman is a 46 y.o. female  w/ hx cad (prior stent summer 2021), svt, etoh cirrhosis w/ varices, previous pancreatitis, gastroparesis, ckd 3 (baseline cr ~1.2-1.4) who is known to our service. History includes inferior stemi 6/2021 w/ stent placement, subsequent presentation in 7/2021 with chest pain syndrome having intraprocedural anaphylactic-like reaction immediately after administration of contrast dye and received pci (baloon) of 95% circumflex lesion just distal to previous stent, required icu admisison post-procedure.  Is on asa & plavix. Most recently discharged (3/7-3/12/22) after presenting w/ intractable nausea/vomiting felt due to gastroparesis, saw GI that hospitalization and received short course reglan 5mg tid during that stay, and short course mestinon 60mg tid w/ outpatient f/u w/ GI.   On this occasion presented w/ abdomina distension, discomfort/pain, coffee emesis, and some intermittent brbpr x ~2 days. Admitted to hospitalist service, Initiated on empiric rocephin, protonix, octreotide  and continued on asa & plavix (due to previous cardiac history) and gi consulted. cxr was negative. Ct a/p revealed previous ccy, normal appearing liver and biliary system, no acute bowel pathology or inflammation, incidental subcentimeter 4.7mm tiny left lower lung field nodularity. Lactate 2.5, normal wbc count, troponin negative. Underwent egd on 4/7/22 showing gastritis w/ some oozing, biopsies/pathology negative. GI recommended bid ppi x 1 month then once daily, restart loop diuretics and aldactone, strict 2g sodium diet and weight loss to minimize fatty liver injury/dz, which is likely contributing to her episodic abdominal pain.      *Coffee ground emesis, due to gastritis & component of portal gastropathy  *hx etoh cirrhosis  *hx gastroparesis (recent admission w/ reglan, mestinon, kayla root)  -s/p egd 4/7/22, showed gastritis and oozing, biopsy negative; bid ppi x 1 month, then once daily; resuming bumex at 1.5mg bid (formerly on 2mg 3 x daily) and aldactone at 100mg daily (dietition to see & educate strict 2g Na diet, recommend weight loss of 10% current weight)  -hgb stable >11  -stopping rocephin (no varices seen) and octreotide  -anusol hc suppositories for hemorrhoids  -continue rifaxamin and lactulose  *A/C abd pain, due to liver dz  -long discussion 4/8 w/ Dr. Brunner and myself both present, explained patient's pain will not be cured or treated effectively by opioids; regardless, patient quite adamant she requires another day to ensure eating ok, pain controlled, etc. I am weaning dilaudid, try low dose lortab, instructed pain to ambulate and as long as clinically stable the plan is d/c home tomorrow and patient voices understanding  *Hx CAD  -on asa, plavix both continued (stent 6/2022, subsequent 95% circ lesion distal to previous stent s/p balloon pci)  -f/u Dr. storm as previously scheduled  *anaphylactic allergy to iv contrast  *CKD 3 (baseline cr ~1.2-1.4)  -stable, monitor  *chest wall  pain/atypical, improved  -likely musculoskeletal from retching; lidoderm patch,other pain meds as above, it is improved    Am labs: hgb, bmp      DVT prophylaxis:  Mechanical DVT prophylaxis orders are present. asa & plavix      AM-PAC 6 Clicks Score (PT): 22 (04/08/22 0800)    Disposition: I expect the patient to be discharged home tomorrow 4/9/22    CODE STATUS:   Code Status and Medical Interventions:   Ordered at: 04/07/22 0123     Code Status (Patient has no pulse and is not breathing):    CPR (Attempt to Resuscitate)     Medical Interventions (Patient has pulse or is breathing):    Full Support       Filiberto Ly MD  04/08/22                Timothy Guzman is a 47 yo f w/ hx cad (prior stent summer 2021), svt, etoh cirrhosis w/ varices, previous pancreatitis, gastroparesis, ckd 3 (baseline cr ~1.2-1.4) who is known to our service. History includes inferior stemi 6/2021 w/ stent placement, subsequent presentation in 7/2021 with chest pain syndrome having intraprocedural anaphylactic-like reaction immediately after administration of contrast dye and received pci (baloon) of 95% circumflex lesion just distal to previous stent, required icu admisison post-procedure.  Is on asa & plavix. Most recently discharged (3/7-3/12/22) after presenting w/ intractable nausea/vomiting felt due to gastroparesis, saw GI that hospitalization and received short course reglan 5mg tid during that stay, and short course mestinon 60mg tid w/ outpatient f/u w/ GI.   On this occasion presented w/ abdomina distension, discomfort/pain, coffee emesis, and some intermittent brbpr x ~2 days. Admitted to hospitalist service, Initiated on empiric rocephin, protonix, octreotide and continued on asa & plavix (due to previous cardiac history) and gi consulted. cxr was negative. Ct a/p revealed previous ccy, normal appearing liver and biliary system, no acute bowel pathology or inflammation, incidental subcentimeter 4.7mm tiny left lower  lung field nodularity. Lactate 2.5, normal wbc count, troponin negative     *Coffee ground emesis, due to gastritis & component of portal gastropathy  *hx etoh cirrhosis  *hx gastroparesis (recent admission w/ reglan, mestinon, kayla root)  -s/p egd 4/7/22, showed gastritis and oozing, biopsy negative; bid ppi x 1 month, then once daily; resuming bumex at 1.5mg bid (formerly on 2mg 3 x daily) and aldactone at 100mg daily (dietition to see & educate strict 2g Na diet, recommend weight loss of 10% current weight)  -hgb stable >11  -stopping rocephin (no varices seen) and octreotide  -anusol hc suppositories for hemorrhoids  -continue rifaxamin and lactulose  *A/C abd pain, due to liver dz  -long discussion 4/8 w/ Dr. Brunner and myself both present, explained patient's pain will not be cured or treated effectively by opioids; regardless, patient quite adamant she requires another day to ensure eating ok, pain controlled, etc. I am weaning dilaudid, try low dose lortab, instructed pain to ambulate and as long as clinically stable the plan is d/c home tomorrow and patient voices understanding  *Hx CAD  -on asa, plavix both continued (stent 6/2022, subsequent 95% circ lesion distal to previous stent s/p balloon pci)  -f/u Dr. storm as previously scheduled  *anaphylactic allergy to iv contrast  *CKD 3 (baseline cr ~1.2-1.4)  -stable, monitor  *chest wall pain/atypical, improved  -likely musculoskeletal from retching; lidoderm patch,other pain meds as above, it is improved    Am labs: hgb, bmp    dvt prophy: asa, plavix

## 2022-04-08 NOTE — PLAN OF CARE
Goal Outcome Evaluation:           Progress: improving  Outcome Evaluation: Patient has had a decent shift, sleeping on and off tonight. VSS, and patient has been alert and oriented times four. Patient has asked for pain medications three times tonight thus far for abdominal pain. Nursing staff will continue to monitor and assess the patient.

## 2022-04-08 NOTE — PROGRESS NOTES
"GI Daily Progress Note  Subjective:    Chief Complaint: Follow-up coffee-ground emesis.    Patient's nausea and vomiting are improved.  No signs of GI bleeding.  Complains of left upper quadrant pain, radiating to her left flank.    Objective:    /59 (BP Location: Left arm, Patient Position: Lying)   Pulse 79   Temp 98.1 °F (36.7 °C) (Oral)   Resp (!) 70   Ht 160 cm (63\")   Wt 99.2 kg (218 lb 12.8 oz)   SpO2 93%   BMI 38.76 kg/m²     Physical Exam  Constitutional:       General: She is not in acute distress.     Appearance: She is obese. She is not ill-appearing.   HENT:      Head: Normocephalic.      Nose: Nose normal. No congestion.      Mouth/Throat:      Mouth: Mucous membranes are moist.      Pharynx: No oropharyngeal exudate.   Eyes:      General: No scleral icterus.     Conjunctiva/sclera: Conjunctivae normal.   Cardiovascular:      Rate and Rhythm: Normal rate.      Pulses: Normal pulses.   Pulmonary:      Effort: Pulmonary effort is normal. No respiratory distress.      Breath sounds: Normal breath sounds. No wheezing or rales.   Abdominal:      General: Bowel sounds are normal. There is no distension.      Palpations: Abdomen is soft.      Tenderness: There is abdominal tenderness. There is no guarding.      Comments: Mildly tender in the left epigastrium.   Genitourinary:     Comments: Deferred  Musculoskeletal:      Cervical back: Normal range of motion.      Right lower leg: No edema.      Left lower leg: No edema.   Skin:     General: Skin is warm and dry.      Capillary Refill: Capillary refill takes less than 2 seconds.      Coloration: Skin is not jaundiced.      Findings: No rash.   Neurological:      General: No focal deficit present.      Mental Status: She is alert and oriented to person, place, and time.   Psychiatric:         Mood and Affect: Mood normal.         Behavior: Behavior normal.         Lab  Lab Results   Component Value Date    WBC 4.49 04/08/2022    HGB 11.8 (L) " 04/08/2022    HGB 11.9 (L) 04/07/2022    HGB 11.9 (L) 04/07/2022    MCV 96.4 04/08/2022    PLT 98 (L) 04/08/2022    INR 1.08 04/08/2022    INR 1.08 04/07/2022    INR 1.12 01/29/2022    INR 0.72 (L) 11/12/2021    INR 1.06 08/24/2021       Lab Results   Component Value Date    GLUCOSE 100 (H) 04/08/2022    BUN 12 04/08/2022    CREATININE 0.88 04/08/2022    EGFRIFNONA 65 02/02/2022    BCR 13.6 04/08/2022     04/08/2022    K 4.6 04/08/2022    CO2 27.0 04/08/2022    CALCIUM 8.3 (L) 04/08/2022    PROTENTOTREF 7.5 09/30/2021    ALBUMIN 3.80 04/08/2022    ALKPHOS 77 04/08/2022    BILITOT 0.2 04/08/2022    BILIDIR 0.2 06/23/2021    ALT 15 04/08/2022    AST 18 04/08/2022       Assessment:    1.  Hematemesis of coffee grounds, with nausea.  Improved.  Hemoglobin is stable.  2.  Gastritis with hemorrhage, with likely contribution of portal gastropathy.  Endo Clip applied to bleeding site on 4/7/2022.  3.  Epigastric and left upper quadrant pain, intermittent.  Likely contribution of fatty liver from recent weight gain, and gastroparesis.  CT abdomen pelvis shows no acute abnormality.  4.  Abnormal weight gain.  Review of medical record shows she has gained 19 pounds in the past 6 months.  5.  History of ascites.  Currently on very high dose diuretic regimen (6 mg Bumex daily, and 300 mg Aldactone daily).  Noncompliant with sodium restriction.  6.  Acute kidney injury secondary to overdiuresis, resolved.  7.  Alcohol cirrhosis without ascites.  Has not drank alcohol in 2-1/2 years.  Currently not a transplant candidate due to MI last year.  Prior to MI, patient was followed by [facility] transplant team.  8.  Reported history of gastroparesis.    Plan:    >> Discontinue octreotide and Rocephin.  >> Resume diuretics at lower dose.  Suggest Aldactone 100 mg daily and Lasix 40 mg (or Bumex 3 mg) daily.  >> Recommend patient lose 10% of her current weight via a 1200-calorie low carbohydrate diet.  >> Reeducated patient regarding 2 g  sodium diet (note patient had greater than 10 packs of Ketchup on her lunch tray).  Will consult dietitian for education regarding 2 g sodium and calorie-conscious diet.  >> Twice daily PPI for 1 month, then daily.    The patient is stable for discharge from GI standpoint.  Instructed to keep 5/12/2022 8:30 AM follow-up appointment with SYED Leonardo    Will sign off.  Please call with questions or concerns.    Mark I. Brunner, MD  04/08/22  12:06 EDT

## 2022-04-08 NOTE — PLAN OF CARE
Goal Outcome Evaluation:  Plan of Care Reviewed With: patient            Patient encouraged to ambulate in oakes. IV dilaudid changed to po Lortab (except in extreme pain). VSS, RA, SR. Possible home tomorrow

## 2022-04-08 NOTE — CONSULTS
Clinical Nutrition     Nutrition Education   Reason for Visit:   Physician Consult   Low sodium; calorie restricted diet education.       Patient Name: Timothy Guzman  YOB: 1975  MRN: [id number]  Date of Encounter: 22 15:56 EDT  Admission date: 2022    Applicable Diagnoses       GI bleed    Alcoholic cirrhosis of liver without ascites (HCC)    GERD (gastroesophageal reflux disease)    CAD (coronary artery disease)    Anxiety and depression    Alcoholism (HCC)    BRIANNA (acute kidney injury) (HCC)    Elevated serum creatinine      Diet/Nutrition Related History:     Patient provided with education material regarding following a low sodium, calorie restricted diet.     Advised patient on the followinmg sodium   1800kcals/day   90-100grm protein    Labs reviewed   Yes       Nutrition Diagnosis     Problem Food and nutrition knowledge deficit   Etiology Cirrhosis   Signs/Symptoms      Goal:     Increase knowledge on diet/nutrition effects on condition/status     Nutrition Intervention     Provided nutrition education regarding    Education provided regarding nutrition therapy for: Diet rationale, Key food habit change, Sodium Restriction and Cirrhosis    Education provided regarding food habits/behavior related to:Food choices, Eating pattern, Appropriate portions, Label reading, Seasoning food, Food preparation, Food shopping and Meal planning     RD provided printed material via [employer]-Nutrition Care Manual     Pt acknowledged understanding of material covered      Monitoring/Evaluation:     RD to follow up PRN   Patient interested in outpatient education; referral sent to outpatient clinic.         Giovanna Negrete RD  Time Spent: 30min

## 2022-04-09 ENCOUNTER — READMISSION MANAGEMENT (OUTPATIENT)
Dept: CALL CENTER | Facility: HOSPITAL | Age: 47
End: 2022-04-09

## 2022-04-09 VITALS
SYSTOLIC BLOOD PRESSURE: 114 MMHG | TEMPERATURE: 98.3 F | HEART RATE: 79 BPM | OXYGEN SATURATION: 93 % | RESPIRATION RATE: 18 BRPM | HEIGHT: 63 IN | DIASTOLIC BLOOD PRESSURE: 68 MMHG | BODY MASS INDEX: 39.81 KG/M2 | WEIGHT: 224.7 LBS

## 2022-04-09 PROBLEM — K92.2 GI BLEED: Status: RESOLVED | Noted: 2022-04-07 | Resolved: 2022-04-09

## 2022-04-09 LAB
HCT VFR BLD AUTO: 35.3 % (ref 34–46.6)
HGB BLD-MCNC: 12.3 G/DL (ref 12–15.9)

## 2022-04-09 PROCEDURE — 85018 HEMOGLOBIN: CPT | Performed by: INTERNAL MEDICINE

## 2022-04-09 PROCEDURE — 85014 HEMATOCRIT: CPT | Performed by: INTERNAL MEDICINE

## 2022-04-09 PROCEDURE — 99239 HOSP IP/OBS DSCHRG MGMT >30: CPT | Performed by: INTERNAL MEDICINE

## 2022-04-09 RX ORDER — HYDROCORTISONE ACETATE 25 MG/1
25 SUPPOSITORY RECTAL 2 TIMES DAILY
Qty: 6 SUPPOSITORY | Refills: 0 | Status: SHIPPED | OUTPATIENT
Start: 2022-04-09 | End: 2022-04-12

## 2022-04-09 RX ORDER — BUMETANIDE 0.5 MG/1
1.5 TABLET ORAL 2 TIMES DAILY
Qty: 180 TABLET | Refills: 0 | Status: SHIPPED | OUTPATIENT
Start: 2022-04-09 | End: 2022-05-11 | Stop reason: HOSPADM

## 2022-04-09 RX ORDER — HYDROCODONE BITARTRATE AND ACETAMINOPHEN 5; 325 MG/1; MG/1
1 TABLET ORAL EVERY 6 HOURS PRN
Qty: 20 TABLET | Refills: 0 | Status: SHIPPED | OUTPATIENT
Start: 2022-04-09 | End: 2022-04-15

## 2022-04-09 RX ADMIN — ASPIRIN 81 MG: 81 TABLET, COATED ORAL at 09:46

## 2022-04-09 RX ADMIN — PANTOPRAZOLE SODIUM 40 MG: 40 TABLET, DELAYED RELEASE ORAL at 05:56

## 2022-04-09 RX ADMIN — URSODIOL 300 MG: 300 CAPSULE ORAL at 09:45

## 2022-04-09 RX ADMIN — TAMSULOSIN HYDROCHLORIDE 0.4 MG: 0.4 CAPSULE ORAL at 09:45

## 2022-04-09 RX ADMIN — RIFAXIMIN 550 MG: 550 TABLET ORAL at 09:45

## 2022-04-09 RX ADMIN — BUMETANIDE 1.5 MG: 0.5 TABLET ORAL at 09:45

## 2022-04-09 RX ADMIN — Medication 10 ML: at 09:46

## 2022-04-09 RX ADMIN — LACTULOSE 45 ML: 20 SOLUTION ORAL at 09:45

## 2022-04-09 RX ADMIN — LIDOCAINE 1 PATCH: 50 PATCH CUTANEOUS at 09:45

## 2022-04-09 RX ADMIN — HYDROCODONE BITARTRATE AND ACETAMINOPHEN 1 TABLET: 5; 325 TABLET ORAL at 05:56

## 2022-04-09 RX ADMIN — CLOPIDOGREL BISULFATE 75 MG: 75 TABLET ORAL at 09:46

## 2022-04-09 RX ADMIN — GABAPENTIN 300 MG: 300 CAPSULE ORAL at 09:46

## 2022-04-09 NOTE — DISCHARGE SUMMARY
[facility] Medicine Services  DISCHARGE SUMMARY    Patient Name: Timothy Guzman  : 1975  MRN: [id number]    Date of Admission: 2022 11:31 PM  Date of Discharge:  2022  Primary Care Physician: Toshia Mitchell APRN    Consults     Date and Time Order Name Status Description    2022  2:44 AM Gastroenterology Consult Completed     2022 12:57 AM Inpatient Gastroenterology Consult Completed     3/9/2022 12:34 AM Inpatient Gastroenterology Consult Completed           Hospital Course     Presenting Problem:   GI bleed [K92.2]    Active Hospital Problems    Diagnosis  POA   • Elevated serum creatinine [R79.89]  Yes   • BRINANA (acute kidney injury) (HCC) [N17.9]  Yes   • Anxiety and depression [F41.9, F32.A]  Yes   • Alcoholism (HCC) [F10.20]  Yes   • CAD (coronary artery disease) [I25.10]  Yes   • GERD (gastroesophageal reflux disease) [K21.9]  Yes   • Alcoholic cirrhosis of liver without ascites (HCC) [K70.30]  Yes      Resolved Hospital Problems    Diagnosis Date Resolved POA   • **GI bleed [K92.2] 2022 Yes     Priority: High          Hospital Course:  Timothy Guzman is a 46 y.o. female w/ hx cad (prior stent summer 2021 with subsequent balloon angioplasty) on ASA/Plavix,  Anaphylaxis to contrast dye (during [facility] Summer 2021), svt, etoh cirrhosis w/ varices, previous pancreatitis, gastroparesis (recently admitted in 2022), ckd 3 (baseline cr ~1.2-1.4) who is known to our service and presented with coffee ground emesis and BRBPR x 2.  Admitted to hospitalist service, initiated on empiric rocephin, protonix, octreotide and continued on asa & plavix (due to previous cardiac history) and gi consulted.. She underwent egd on 22 showing gastritis w/ some oozing, biopsies/pathology negative. GI recommended bid ppi x 1 month then once daily, restart loop diuretics and aldactone, strict 2g sodium diet and weight loss to minimize fatty liver injury/dz, which is  likely contributing to her episodic abdominal pain.                 Coffee ground emesis, due to gastritis & component of portal gastropathy  hx etoh cirrhosis  hx gastroparesis (recent admission w/ reglan, mestinon, kayla root)  -s/p egd 4/7/22, showed gastritis and oozing, biopsy negative; bid ppi x 1 month, then once daily; resuming bumex at 1.5mg bid (formerly on 2mg 3 x daily) and aldactone at 100mg daily (dietition has seen & educated strict 2g Na diet, recommend weight loss of 10% current weight)  -hgb stable >11  -stopped rocephin (no varices seen) and octreotide  -anusol hc suppositories for hemorrhoids  -continue rifaxamin and lactulose    A/C abd pain, due to liver dz  -long discussion 4/8 w/ Dr. Brunner and my partner, Dr. Ly, both present, explained patient's pain will not be cured nor treated effectively by opioids; regardless, patient quite adamant she required another day to ensure her pain was controlled and she was able to tolerate PO intake.    -- She has been well controlled with current PO pain medications- will prescribe for a very short course given her gastroparesis    Hx CAD  -on asa, plavix both continued (stent 6/2022, subsequent 95% circ lesion distal to previous stent s/p balloon pci)  -f/u Dr. storm as previously scheduled    anaphylactic allergy to iv contrast    CKD 3 (baseline cr ~1.2-1.4)  -stable, monitor    chest wall pain/atypical, improved  -likely musculoskeletal from retching,pain meds as above, it is improved      Discharge Follow Up Recommendations for outpatient labs/diagnostics:  Follow up with PCP within one week  Follow up with GI as previously arranged    Day of Discharge     HPI:   Pt doing well this am, sleeping initially but wakes easily. She wants to know what time she will be discharged so she can call her parents. No other issues overnight- abdominal pain is well controlled.     Review of Systems  Gen- No fevers, chills  CV- No chest pain, palpitations  Resp- No  cough, dyspnea  GI- No N/V/D, abd pain        Vital Signs:   Temp:  [98.1 °F (36.7 °C)-99 °F (37.2 °C)] 98.3 °F (36.8 °C)  Heart Rate:  [67-95] 79  Resp:  [18] 18  BP: (109-119)/(59-73) 114/68      Physical Exam:  Constitutional: No acute distress, sleeping initially but wakes easily  HENT: NCAT, mucous membranes moist  Respiratory: Clear to auscultation bilaterally, respiratory effort normal   Cardiovascular: RRR, no murmurs, rubs, or gallops  Gastrointestinal: Positive bowel sounds, soft, nontender, nondistended  Musculoskeletal: No bilateral ankle edema  Psychiatric: Appropriate affect, cooperative  Neurologic: Oriented x 3, strength symmetric in all extremities, Cranial Nerves grossly intact to confrontation, speech clear  Skin: No rashes    Pertinent  and/or Most Recent Results     LAB RESULTS:      Lab 04/09/22  0747 04/08/22  0914 04/07/22  0951 04/07/22  0606 04/07/22  0345 04/06/22  2138   WBC  --  4.49  --   --  8.05 9.91   HEMOGLOBIN 12.3 11.8* 11.9*  11.9* 12.0 12.8 14.1   HEMATOCRIT 35.3 37.2 36.0  --  38.5 41.2   PLATELETS  --  98*  --   --  127* 146   NEUTROS ABS  --   --   --   --  4.24 6.17   IMMATURE GRANS (ABS)  --   --   --   --  0.04 0.06*   LYMPHS ABS  --   --   --   --  2.58 2.56   MONOS ABS  --   --   --   --  1.04* 0.99*   EOS ABS  --   --   --   --  0.13 0.10   MCV  --  96.4  --   --  90.0 88.8   LACTATE  --   --   --   --  1.2 2.5*   PROTIME  --  13.9  --   --  13.9  --    APTT  --   --   --   --  29.4  --          Lab 04/08/22  0914 04/07/22  0345 04/06/22 2138   SODIUM 138 135* 134*   POTASSIUM 4.6 4.6 4.4   CHLORIDE 105 97* 97*   CO2 27.0 29.0 25.0   ANION GAP 6.0 9.0 12.0   BUN 12 19 18   CREATININE 0.88 1.19* 1.44*   EGFR 81.7 56.9* 45.2*   GLUCOSE 100* 104* 218*   CALCIUM 8.3* 8.8 9.2   MAGNESIUM 2.2 2.2  --    HEMOGLOBIN A1C  --  5.70*  --          Lab 04/08/22  0914 04/06/22  2138   TOTAL PROTEIN 5.5* 6.9   ALBUMIN 3.80 4.20   GLOBULIN 1.7 2.7   ALT (SGPT) 15 18   AST (SGOT) 18  22   BILIRUBIN 0.2 0.3   ALK PHOS 77 101         Lab 04/08/22  0914 04/07/22  0345 04/06/22  2138   PROBNP  --   --  80.9   TROPONIN T  --   --  <0.010   PROTIME 13.9 13.9  --    INR 1.08 1.08  --              Lab 04/07/22  0047   ABO TYPING A   RH TYPING Positive   ANTIBODY SCREEN Negative         Brief Urine Lab Results  (Last result in the past 365 days)      Color   Clarity   Blood   Leuk Est   Nitrite   Protein   CREAT   Urine HCG        04/07/22 0342 Yellow   Clear   Negative   Trace   Negative   Negative               Microbiology Results (last 10 days)     Procedure Component Value - Date/Time    COVID PRE-OP / PRE-PROCEDURE SCREENING ORDER (NO ISOLATION) - Swab, Nasopharynx [[id number]]  (Normal) Collected: 04/07/22 0155    Lab Status: Final result Specimen: Swab from Nasopharynx Updated: 04/07/22 0224    Narrative:      The following orders were created for panel order COVID PRE-OP / PRE-PROCEDURE SCREENING ORDER (NO ISOLATION) - Swab, Nasopharynx.  Procedure                               Abnormality         Status                     ---------                               -----------         ------                     COVID-19, ABBOTT IN-HOUS...[[id number]]  Normal              Final result                 Please view results for these tests on the individual orders.    COVID-19, ABBOTT IN-HOUSE,NASAL Swab (NO TRANSPORT MEDIA) 2 HR TAT - Swab, Nasopharynx [[id number]]  (Normal) Collected: 04/07/22 0155    Lab Status: Final result Specimen: Swab from Nasopharynx Updated: 04/07/22 0224     COVID19 Presumptive Negative    Narrative:      Fact sheet for providers: https://www.fda.gov/media/829679/download     Fact sheet for patients: https://www.fda.gov/media/480740/download    Test performed by PCR.  If inconsistent with clinical signs and symptoms patient should be tested with different authorized molecular test.          CT Abdomen Pelvis Without Contrast    Result Date: 4/7/2022  EXAMINATION: CT ABDOMEN AND  PELVIS WITHOUT IV CONTRAST   DATE OF EXAMINATION: 4/7/2022. COMPARISON: None available. INDICATION: Upper abdominal pain and hematemesis. PROCEDURE:   Axial CT of the abdomen and pelvis was performed with sagittal and coronal reformatted images without contrast enhancement. CT dose lowering techniques were used, to include: automated exposure control, adjustment for patient size, and/or use of iterative reconstruction. The exam is limited because some types of pathology may not be adequately demonstrated due to lack of contrast enhancement. FINDINGS: LOWER CHEST :  There is a 4.6 mm left lower lobe nodule on series 2 image 26. The visualized lung bases otherwise appear clear. There are no pleural or pericardial effusions. ABDOMEN: Liver and Biliary system:  Normal. Adrenal glands:  Normal. Kidneys and ureters:  There are no renal stones or hydronephrosis.  No ureteral stones are present.. Spleen:  Normal. Pancreas:  Normal. Gallbladder:  Surgically absent. Lymph nodes, Peritoneum and mesentery:  There is no mesenteric or retroperitoneal lymphadenopathy. Gastrointestinal tract:  There are no dilated loops of bowel or free intraperitoneal air.  . The appendix is not clearly seen. No secondary changes of appendicitis otherwise identified. Aorta/IVC:   There is mild vascular calcification throughout the abdominal aorta without evidence of aneurysmal dilation.  IVC normal. Abdominal wall:  Normal. PELVIS: Fluid: There is no free fluid in the pelvis. Lymph Nodes:  There is no pelvic or inguinal lymphadenopathy.. Urinary bladder:  Normal. BONES:  There are bilateral total hip arthroplasties. ADDITIONAL  SIGNIFICANT FINDINGS:  None.     1. No renal stones or hydronephrosis. 2. No bowel obstruction or acute inflammatory process seen within the abdomen or pelvis. Electronically signed by:  Nahid Bradford D.O.  4/6/2022 10:26 PM Mountain Time    XR Chest 1 View    Result Date: 4/6/2022  EXAM: XR CHEST 1 VW-  DATE OF EXAM:  4/6/2022 10:45 PM  INDICATION: SOA triage protocol.   COMPARISONS: 1/30/2022  FINDINGS:  No evidence of pneumonia, pulmonary edema or other acute pulmonary process. No evidence of pneumothorax or significant pleural effusion. No evidence of acute process of the heart or other mediastinal structures.       Negative chest x-ray. No evidence of acute cardiopulmonary disease.  This report was finalized on 4/6/2022 11:01 PM by Akhil Petersen.        Results for orders placed during the hospital encounter of 11/23/21    Duplex Mesenteric Complete CAR    Interpretation Summary  EXAMINATION: DUPLEX SMA COMPLETE CAR-12/01/2021:    INDICATION: Generalized abdominal pain.    COMPARISON: Unenhanced CT scan of 11/30/2021.    FINDINGS: The study is considered technically adequate, although the  patient is unable to suspend respiration during the exam. All portions  of the celiac axis and SMA are visualized. Peak systolic celiac axis  velocity is 150 cm/s at its origin. Peak systolic SMA velocity is 207  cm/s proximally, both considered within normal limits. Review of the CT  study, on the sagittal images shows no obvious celiac axis stenosis, and  only a suggestion of mild-to-moderate celiac axis origin narrowing.    Impression  No evidence of hemodynamically significant celiac axis or  SMA stenosis.    D:  12/03/2021  E:  12/03/2021        This report was finalized on 12/4/2021 3:34 PM by Dr. Bo Gardner MD.      Results for orders placed during the hospital encounter of 11/23/21    Duplex Mesenteric Complete CAR    Interpretation Summary  EXAMINATION: DUPLEX SMA COMPLETE CAR-12/01/2021:    INDICATION: Generalized abdominal pain.    COMPARISON: Unenhanced CT scan of 11/30/2021.    FINDINGS: The study is considered technically adequate, although the  patient is unable to suspend respiration during the exam. All portions  of the celiac axis and SMA are visualized. Peak systolic celiac axis  velocity is 150 cm/s at its origin. Peak  systolic SMA velocity is 207  cm/s proximally, both considered within normal limits. Review of the CT  study, on the sagittal images shows no obvious celiac axis stenosis, and  only a suggestion of mild-to-moderate celiac axis origin narrowing.    Impression  No evidence of hemodynamically significant celiac axis or  SMA stenosis.    D:  12/03/2021  E:  12/03/2021        This report was finalized on 12/4/2021 3:34 PM by Dr. Bo Gardner MD.      Results for orders placed during the hospital encounter of 06/18/21    Adult Transthoracic Echo Complete W/ Cont if Necessary Per Protocol    Interpretation Summary  · The quality of the study is limited due to patient positioning and patient being intubated.  · Left ventricular ejection fraction appears to be 51 - 55%. Left ventricular systolic function is normal.  · Left ventricular diastolic function is consistent with (grade Ia w/high LAP) impaired relaxation.  · Mildly reduced right ventricular systolic function noted.      Plan for Follow-up of Pending Labs/Results:     Discharge Details        Discharge Medications      New Medications      Instructions Start Date   HYDROcodone-acetaminophen 5-325 MG per tablet  Commonly known as: NORCO   1 tablet, Oral, Every 6 Hours PRN      hydrocortisone 25 MG suppository  Commonly known as: ANUSOL-HC   25 mg, Rectal, 2 Times Daily         Changes to Medications      Instructions Start Date   bumetanide 0.5 MG tablet  Commonly known as: BUMEX  What changed:   · medication strength  · how much to take  · when to take this   1.5 mg, Oral, 2 Times Daily      polyethylene glycol 17 g packet  Commonly known as: MIRALAX  What changed:   · when to take this  · reasons to take this   17 g, Oral, 2 Times Daily      spironolactone 100 MG tablet  Commonly known as: ALDACTONE  What changed: when to take this   100 mg, Oral, Daily      traZODone 50 MG tablet  Commonly known as: DESYREL  What changed:   · how much to take  · additional  instructions   50 mg, Oral, Nightly         Continue These Medications      Instructions Start Date   aspirin 81 MG EC tablet   81 mg, Oral, Daily      bisacodyl 10 MG suppository  Commonly known as: DULCOLAX   10 mg, Rectal, Daily PRN      clopidogrel 75 MG tablet  Commonly known as: PLAVIX   75 mg, Oral, Daily      docusate sodium 100 MG capsule  Commonly known as: Colace   100 mg, Oral, 2 Times Daily      FLUoxetine 40 MG capsule  Commonly known as: PROzac   40 mg, Oral, Daily      gabapentin 300 MG capsule  Commonly known as: NEURONTIN   300 mg, Oral, 3 Times Daily      lactulose 10 GM/15ML solution  Commonly known as: CHRONULAC   30 g, Oral, 3 Times Daily      lubiprostone 24 MCG capsule  Commonly known as: AMITIZA   1 capsule, Oral, 2 Times Daily      magnesium oxide 400 (241.3 Mg) MG tablet tablet  Commonly known as: MAGOX   400 mg, Oral, Every Evening      Melatonin 10 MG tablet   1 tablet, Oral, Nightly      Multivitamin tablet tablet  Generic drug: multivitamin   Daily      OLANZapine 10 MG tablet  Commonly known as: zyPREXA   10 mg, Oral, Nightly      ondansetron 4 MG tablet  Commonly known as: Zofran   4 mg, Oral, Every 8 Hours PRN      pantoprazole 40 MG EC tablet  Commonly known as: PROTONIX   40 mg, Oral, 2 Times Daily Before Meals      potassium chloride 20 MEQ CR tablet  Commonly known as: K-DUR,KLOR-CON   1 tablet, Oral, Daily      prazosin 1 MG capsule  Commonly known as: MINIPRESS   1 capsule, Oral, Daily      ranolazine 1000 MG 12 hr tablet  Commonly known as: RANEXA   1,000 mg, Oral, 2 Times Daily      rosuvastatin 20 MG tablet  Commonly known as: CRESTOR   20 mg, Oral, Nightly      tamsulosin 0.4 MG capsule 24 hr capsule  Commonly known as: FLOMAX   0.4 mg, Oral, Daily      ursodiol 300 MG capsule  Commonly known as: ACTIGALL   300 mg, Oral, 2 Times Daily      vitamin b complex capsule capsule   1 capsule, Oral, Daily      vitamin B-6 25 MG tablet  Commonly known as: PYRIDOXINE   25 mg, Oral,  Daily      Xifaxan 550 MG tablet  Generic drug: riFAXIMin   550 mg, Oral, Every 12 Hours Scheduled             Allergies   Allergen Reactions   • Contrast Dye Anaphylaxis     6/18 Pt coded after receiving contrast for a CT scan. Was premedicated. Was having an MI at the same time. Immediately underwent heart cath with contrast without additional allergic reaction  7/15 Pt premedicated with prednisone/Benadryl for heart cath. Still with anaphylactic-like reaction with drop in BP and hypoxia. Treated 95% stenosis with minimal dye and supported with epinephrine, solumedrol, NRB   • Haloperidol Hallucinations   • Nsaids GI Bleeding     Other reaction(s): Bleeding, VOMITING   • Tylenol [Acetaminophen] Other (See Comments)     CIRRHOSIS         Discharge Disposition:  Home or Self Care    Diet:  Hospital:  Diet Order   Procedures   • Diet Regular; Low Sodium; 2,000 mg Na       Activity:      Restrictions or Other Recommendations:         CODE STATUS:    Code Status and Medical Interventions:   Ordered at: 04/07/22 0123     Code Status (Patient has no pulse and is not breathing):    CPR (Attempt to Resuscitate)     Medical Interventions (Patient has pulse or is breathing):    Full Support       Future Appointments   Date Time Provider Department Center   4/13/2022 10:00 AM Sapphire Ambriz PA-C [facility]   5/12/2022  8:30 AM Nelson Yip APRN [id number] [facility]   7/3/2023  9:15 AM Vikram Gonzales MD [facility]       Additional Instructions for the Follow-ups that You Need to Schedule     Discharge Follow-up with PCP   As directed       Currently Documented PCP:    Toshia Mitchell APRN    PCP Phone Number:    [phone]     Follow Up Details: in one week with PCP         Discharge Follow-up with Specified Provider: Gastroenterology; 1 Month   As directed      To: Gastroenterology    Follow Up: 1 Month                     Courtney Sequeira MD  04/09/22      Time Spent on Discharge:  I spent  35  minutes on this discharge activity which included: face-to-face encounter with the patient, reviewing the data in the system, coordination of the care with the nursing staff as well as consultants, documentation, and entering orders.

## 2022-04-09 NOTE — PLAN OF CARE
Goal Outcome Evaluation:  Plan of Care Reviewed With: patient            VSS, RA, SR. Discharged to home

## 2022-04-09 NOTE — CASE MANAGEMENT/SOCIAL WORK
Case Management Discharge Note      Final Note: Spoke with pt. Her plan is to dc to home. Voiced no needs at this time. Family to provide transport.    Provided Post Acute Provider List?: N/A  Provided Post Acute Provider Quality & Resource List?: N/A    Selected Continued Care - Admitted Since 4/6/2022     Destination    No services have been selected for the patient.              Durable Medical Equipment    No services have been selected for the patient.              Dialysis/Infusion    No services have been selected for the patient.              Home Medical Care    No services have been selected for the patient.              Therapy    No services have been selected for the patient.              Community Resources    No services have been selected for the patient.              Community & DME    No services have been selected for the patient.                       Final Discharge Disposition Code: 01 - home or self-care

## 2022-04-09 NOTE — PLAN OF CARE
"Goal Outcome Evaluation:  Plan of Care Reviewed With: patient           Outcome Evaluation: VSS on room air, complaints of pain, PO prn administered, pt resting most of shift, pt reported having a \"more normal BM\" tonight.  "

## 2022-04-10 NOTE — OUTREACH NOTE
Prep Survey    Flowsheet Row Responses   [facility] facility patient discharged from? [facility]   Is LACE score < 7 ? No   Emergency Room discharge w/ pulse ox? No   Eligibility Readm Mgmt   Discharge diagnosis GI bleed   Does the patient have one of the following disease processes/diagnoses(primary or secondary)? Other   Does the patient have Home health ordered? No   Is there a DME ordered? No   Prep survey completed? Yes          MARCI LOPEZ - Registered Nurse

## 2022-04-13 ENCOUNTER — READMISSION MANAGEMENT (OUTPATIENT)
Dept: CALL CENTER | Facility: HOSPITAL | Age: 47
End: 2022-04-13

## 2022-04-13 ENCOUNTER — OFFICE VISIT (OUTPATIENT)
Dept: NEUROLOGY | Facility: CLINIC | Age: 47
End: 2022-04-13

## 2022-04-13 VITALS — HEIGHT: 62 IN | HEART RATE: 93 BPM | WEIGHT: 224 LBS | BODY MASS INDEX: 41.22 KG/M2 | OXYGEN SATURATION: 99 %

## 2022-04-13 DIAGNOSIS — G62.9 PERIPHERAL POLYNEUROPATHY: ICD-10-CM

## 2022-04-13 DIAGNOSIS — R41.3 MEMORY LOSS: Primary | ICD-10-CM

## 2022-04-13 PROCEDURE — 99214 OFFICE O/P EST MOD 30 MIN: CPT | Performed by: PHYSICIAN ASSISTANT

## 2022-04-13 RX ORDER — GABAPENTIN 400 MG/1
400 CAPSULE ORAL 3 TIMES DAILY
Qty: 90 CAPSULE | Refills: 5 | Status: SHIPPED | OUTPATIENT
Start: 2022-04-13

## 2022-04-13 NOTE — PROGRESS NOTES
Subjective     Chief Complaint: memory loss      History of Present Illness   Timothy Guzman is a 46 y.o. female with past medical history significant for liver cirrhosis, esophageal varices, pancreatitis, and hypertension, who comes to clinic today for evaluation of memory loss. She initially noted symptoms since in 6/21 when she was hospitalized at [facility] for a STEMI. While receiving IV contrast in the ED, she developed PEA arrest for several minutes. Since this time, she has noted forgetfulness  and word-finding difficulties. This has remained static over time. Additional symptoms have included impairments in short term memory. There have been associated  symptoms of depression . She denies impairments in ADL's. She manages her medications and finances. She is currently residing with family.      She also reports significant back pain as well as constant burning, numbness, and tingling in her feet bilaterally. This has worsened over time.      She has a history of alcohol abuse, though notes that she has been sober since 2019.     She has worked SLP for cognitive rehabilitation and PT/OT for her back pain.     Prior evaluation has included screening blood work  and an MRI of the brain as well as an EEG which were unremarkable . An MRI of the lumbar spine showed a small synovial cyst posteriorly on the right creating moderate central spinal canal stenosis and mass effect posteriorly on the rightward aspect of the thecal sac. A subsequent EMG showed a mild axonal sensorimotor neuropathy without any evidence of a radiculopathy.    Today: Since her last visit in 1/22, she feels essentially unchanged cognitively. She now reports paresthesias along all of her fingertips bilaterally (L>R). GBP 300mg TID has not been significantly beneficial. She was hospitalized at [facility] earlier this month for upper GI bleeding.      I have reviewed and confirmed the past family, social and medical history as accurate on 4/13/22.     Review  "of Systems   Constitutional: Negative.    HENT: Negative.    Eyes: Negative.    Respiratory: Negative.    Cardiovascular: Negative.    Gastrointestinal: Negative.    Endocrine: Negative.    Genitourinary: Negative.    Musculoskeletal: Negative.    Skin: Negative.    Allergic/Immunologic: Negative.    Hematological: Negative.        Objective     Pulse 93   Ht 157.5 cm (62\")   Wt 102 kg (224 lb)   SpO2 99%   BMI 40.97 kg/m²     General appearance today is normal.       Physical Exam  Neurological:      Gait: Gait is intact.      Deep Tendon Reflexes: Strength normal.   Psychiatric:         Speech: Speech normal.          Neurologic Exam     Mental Status   Speech: speech is normal   Level of consciousness: alert  Normal comprehension.     Cranial Nerves   Cranial nerves II through XII intact.     Motor Exam   Muscle bulk: normal  Overall muscle tone: normal    Strength   Strength 5/5 throughout.     Sensory Exam   Decreased sensation in upper and lower extremities (L>R)     Gait, Coordination, and Reflexes     Gait  Gait: normal    Tremor   Resting tremor: absent          Assessment/Plan   Diagnoses and all orders for this visit:    1. Memory loss (Primary)  -     Vitamin B12    2. Peripheral polyneuropathy  -     Vitamin B12  -     Vitamin B6  -     gabapentin (NEURONTIN) 400 MG capsule; Take 1 capsule by mouth 3 (Three) Times a Day.  Dispense: 90 capsule; Refill: 5          Discussion/Summary   Timothy Guzman  returns to clinic today for evaluation of peripheral neuropathy and cognitive impairment, likely related to her previous hypoxic injury and her history of alcoholic cirrhosis. I again reviewed her current status and treatment options. It was elected to repeat lab work. After discussing potential treatment options, it was elected to increase her GBP to 400mg TID. She will then follow up in 5-6 months to discuss her upcoming neuropsychological testing, or sooner if needed.   Total time of visit today: 30 " minutes. As part of this visit I reviewed prior lab results and reviewed records from prior hospitalizations which is incorporated in the HPI. I also discussed diagnosis, prognosis, diagnostic testing, evaluation, current status, treatment options and management as discussed above.       Current outpatient and discharge medications have been reconciled for the patient.  Reviewed by: SONIA Brooks PA-C

## 2022-04-13 NOTE — OUTREACH NOTE
Medical Week 1 Survey    Flowsheet Row Responses   [facility] patient discharged from? [place]   Does the patient have one of the following disease processes/diagnoses(primary or secondary)? Other   Week 1 attempt successful? No   Unsuccessful attempts Attempt 1          GLENDY SHAH - Registered Nurse

## 2022-04-21 ENCOUNTER — READMISSION MANAGEMENT (OUTPATIENT)
Dept: CALL CENTER | Facility: HOSPITAL | Age: 47
End: 2022-04-21

## 2022-04-21 NOTE — OUTREACH NOTE
Medical Week 2 Survey    Flowsheet Row Responses   [facility] patient discharged from? [place]   Does the patient have one of the following disease processes/diagnoses(primary or secondary)? Other   Week 2 attempt successful? Yes   Call start time 1419   Discharge diagnosis GI bleed   Call end time 1422   Meds reviewed with patient/caregiver? Yes   Is the patient having any side effects they believe may be caused by any medication additions or changes? No   Does the patient have all medications ordered at discharge? Yes   Is the patient taking all medications as directed (includes completed medication regime)? Yes   Does the patient have a primary care provider?  Yes   Does the patient have an appointment with their PCP within 7 days of discharge? Greater than 7 days   Comments regarding PCP 4/27/22   What is preventing the patient from scheduling follow up appointments within 7 days of discharge? Earlier appointment not available   Nursing Interventions Verified appointment date/time/provider   Has the patient kept scheduled appointments due by today? N/A   Has home health visited the patient within 72 hours of discharge? N/A   Psychosocial issues? No   Did the patient receive a copy of their discharge instructions? Yes   Nursing interventions Reviewed instructions with patient   What is the patient's perception of their health status since discharge? Same   Is the patient/caregiver able to teach back the hierarchy of who to call/visit for symptoms/problems? PCP, Specialist, Home health nurse, Urgent Care, ED, 911 Yes   Week 2 Call Completed? Yes          ANU MILTON - Registered Nurse

## 2022-05-02 ENCOUNTER — READMISSION MANAGEMENT (OUTPATIENT)
Dept: CALL CENTER | Facility: HOSPITAL | Age: 47
End: 2022-05-02

## 2022-05-02 NOTE — OUTREACH NOTE
Medical Week 3 Survey    Flowsheet Row Responses   [facility] patient discharged from? [place]   Does the patient have one of the following disease processes/diagnoses(primary or secondary)? Other   Week 3 attempt successful? No   Unsuccessful attempts Attempt 1          SHERRI ARGUELLES - Registered Nurse

## 2022-05-05 ENCOUNTER — READMISSION MANAGEMENT (OUTPATIENT)
Dept: CALL CENTER | Facility: HOSPITAL | Age: 47
End: 2022-05-05

## 2022-05-05 NOTE — OUTREACH NOTE
Medical Week 3 Survey    Flowsheet Row Responses   [facility] patient discharged from? [place]   Does the patient have one of the following disease processes/diagnoses(primary or secondary)? Other   Week 3 attempt successful? Yes   Call start time 1311   Call end time 1313   Discharge diagnosis GI bleed   Meds reviewed with patient/caregiver? Yes   Is the patient having any side effects they believe may be caused by any medication additions or changes? No   Is the patient taking all medications as directed (includes completed medication regime)? Yes   Does the patient have a primary care provider?  Yes   Has the patient kept scheduled appointments due by today? Yes   Has home health visited the patient within 72 hours of discharge? N/A   Psychosocial issues? No   What is the patient's perception of their health status since discharge? Improving   Is the patient/caregiver able to teach back the hierarchy of who to call/visit for symptoms/problems? PCP, Specialist, Home health nurse, Urgent Care, ED, 911 Yes   If the patient is a current smoker, are they able to teach back resources for cessation? Not a smoker   Week 3 Call Completed? Yes   Wrap up additional comments Pt states she has not felt real well the last few days, c/o N/V and has some stomach bloating. Enc pt to f/u with ED or PCP if symptoms do not improve          ANU MILTON - Registered Nurse

## 2022-05-08 ENCOUNTER — APPOINTMENT (OUTPATIENT)
Dept: GENERAL RADIOLOGY | Facility: HOSPITAL | Age: 47
End: 2022-05-08

## 2022-05-08 ENCOUNTER — HOSPITAL ENCOUNTER (OUTPATIENT)
Facility: HOSPITAL | Age: 47
Setting detail: OBSERVATION
Discharge: HOME | End: 2022-05-11
Attending: EMERGENCY MEDICINE | Admitting: INTERNAL MEDICINE

## 2022-05-08 ENCOUNTER — APPOINTMENT (OUTPATIENT)
Dept: CT IMAGING | Facility: HOSPITAL | Age: 47
End: 2022-05-08

## 2022-05-08 ENCOUNTER — READMISSION MANAGEMENT (OUTPATIENT)
Dept: CALL CENTER | Facility: HOSPITAL | Age: 47
End: 2022-05-08

## 2022-05-08 DIAGNOSIS — Z87.19 HISTORY OF ESOPHAGEAL VARICES: ICD-10-CM

## 2022-05-08 DIAGNOSIS — R10.10 UPPER ABDOMINAL PAIN: ICD-10-CM

## 2022-05-08 DIAGNOSIS — K92.2 UPPER GI BLEED: Primary | ICD-10-CM

## 2022-05-08 DIAGNOSIS — Z87.19 HISTORY OF CIRRHOSIS: ICD-10-CM

## 2022-05-08 LAB
ABO GROUP BLD: NORMAL
ALBUMIN SERPL-MCNC: 4.4 G/DL (ref 3.5–5.2)
ALBUMIN/GLOB SERPL: 1.6 G/DL
ALP SERPL-CCNC: 102 U/L (ref 39–117)
ALT SERPL W P-5'-P-CCNC: 29 U/L (ref 1–33)
AMMONIA BLD-SCNC: 23 UMOL/L (ref 11–51)
ANION GAP SERPL CALCULATED.3IONS-SCNC: 10 MMOL/L (ref 5–15)
AST SERPL-CCNC: 26 U/L (ref 1–32)
BASOPHILS # BLD AUTO: 0.03 10*3/MM3 (ref 0–0.2)
BASOPHILS NFR BLD AUTO: 0.4 % (ref 0–1.5)
BILIRUB SERPL-MCNC: 0.5 MG/DL (ref 0–1.2)
BLD GP AB SCN SERPL QL: NEGATIVE
BUN SERPL-MCNC: 10 MG/DL (ref 6–20)
BUN/CREAT SERPL: 11 (ref 7–25)
CALCIUM SPEC-SCNC: 9.3 MG/DL (ref 8.6–10.5)
CHLORIDE SERPL-SCNC: 104 MMOL/L (ref 98–107)
CO2 SERPL-SCNC: 24 MMOL/L (ref 22–29)
CREAT SERPL-MCNC: 0.91 MG/DL (ref 0.57–1)
D-LACTATE SERPL-SCNC: 1.5 MMOL/L (ref 0.5–2)
DEPRECATED RDW RBC AUTO: 42 FL (ref 37–54)
EGFRCR SERPLBLD CKD-EPI 2021: 78.5 ML/MIN/1.73
EOSINOPHIL # BLD AUTO: 0.05 10*3/MM3 (ref 0–0.4)
EOSINOPHIL NFR BLD AUTO: 0.7 % (ref 0.3–6.2)
ERYTHROCYTE [DISTWIDTH] IN BLOOD BY AUTOMATED COUNT: 13 % (ref 12.3–15.4)
FLUAV SUBTYP SPEC NAA+PROBE: NOT DETECTED
FLUBV RNA ISLT QL NAA+PROBE: NOT DETECTED
GLOBULIN UR ELPH-MCNC: 2.7 GM/DL
GLUCOSE SERPL-MCNC: 96 MG/DL (ref 65–99)
HCT VFR BLD AUTO: 38.8 % (ref 34–46.6)
HCT VFR BLD AUTO: 42.1 % (ref 34–46.6)
HGB BLD-MCNC: 13.5 G/DL (ref 12–15.9)
HGB BLD-MCNC: 14.5 G/DL (ref 12–15.9)
HOLD SPECIMEN: NORMAL
IMM GRANULOCYTES # BLD AUTO: 0.03 10*3/MM3 (ref 0–0.05)
IMM GRANULOCYTES NFR BLD AUTO: 0.4 % (ref 0–0.5)
INR PPP: 1.09 (ref 0.84–1.13)
LIPASE SERPL-CCNC: 20 U/L (ref 13–60)
LYMPHOCYTES # BLD AUTO: 1.78 10*3/MM3 (ref 0.7–3.1)
LYMPHOCYTES NFR BLD AUTO: 25.7 % (ref 19.6–45.3)
MAGNESIUM SERPL-MCNC: 1.7 MG/DL (ref 1.6–2.6)
MCH RBC QN AUTO: 30.5 PG (ref 26.6–33)
MCHC RBC AUTO-ENTMCNC: 34.4 G/DL (ref 31.5–35.7)
MCV RBC AUTO: 88.6 FL (ref 79–97)
MONOCYTES # BLD AUTO: 0.8 10*3/MM3 (ref 0.1–0.9)
MONOCYTES NFR BLD AUTO: 11.6 % (ref 5–12)
NEUTROPHILS NFR BLD AUTO: 4.23 10*3/MM3 (ref 1.7–7)
NEUTROPHILS NFR BLD AUTO: 61.2 % (ref 42.7–76)
NRBC BLD AUTO-RTO: 0 /100 WBC (ref 0–0.2)
PLATELET # BLD AUTO: 105 10*3/MM3 (ref 140–450)
PMV BLD AUTO: 11.2 FL (ref 6–12)
POTASSIUM SERPL-SCNC: 3.8 MMOL/L (ref 3.5–5.2)
PROCALCITONIN SERPL-MCNC: 0.1 NG/ML (ref 0–0.25)
PROT SERPL-MCNC: 7.1 G/DL (ref 6–8.5)
PROTHROMBIN TIME: 14 SECONDS (ref 11.4–14.4)
RBC # BLD AUTO: 4.75 10*6/MM3 (ref 3.77–5.28)
RH BLD: POSITIVE
SARS-COV-2 RNA PNL SPEC NAA+PROBE: NOT DETECTED
SODIUM SERPL-SCNC: 138 MMOL/L (ref 136–145)
T&S EXPIRATION DATE: NORMAL
TROPONIN T SERPL-MCNC: <0.01 NG/ML (ref 0–0.03)
WBC NRBC COR # BLD: 6.92 10*3/MM3 (ref 3.4–10.8)
WHOLE BLOOD HOLD SPECIMEN: NORMAL
WHOLE BLOOD HOLD SPECIMEN: NORMAL

## 2022-05-08 PROCEDURE — 84484 ASSAY OF TROPONIN QUANT: CPT | Performed by: EMERGENCY MEDICINE

## 2022-05-08 PROCEDURE — G0378 HOSPITAL OBSERVATION PER HR: HCPCS

## 2022-05-08 PROCEDURE — 96368 THER/DIAG CONCURRENT INF: CPT

## 2022-05-08 PROCEDURE — 84145 PROCALCITONIN (PCT): CPT | Performed by: EMERGENCY MEDICINE

## 2022-05-08 PROCEDURE — 25010000002 OCTREOTIDE PER 25 MCG: Performed by: EMERGENCY MEDICINE

## 2022-05-08 PROCEDURE — 25010000002 METHYLPREDNISOLONE PER 40 MG: Performed by: EMERGENCY MEDICINE

## 2022-05-08 PROCEDURE — 25010000002 HYDROMORPHONE PER 4 MG: Performed by: NURSE PRACTITIONER

## 2022-05-08 PROCEDURE — 87040 BLOOD CULTURE FOR BACTERIA: CPT | Performed by: EMERGENCY MEDICINE

## 2022-05-08 PROCEDURE — 25010000002 CEFTRIAXONE PER 250 MG: Performed by: NURSE PRACTITIONER

## 2022-05-08 PROCEDURE — 96376 TX/PRO/DX INJ SAME DRUG ADON: CPT

## 2022-05-08 PROCEDURE — 96365 THER/PROPH/DIAG IV INF INIT: CPT

## 2022-05-08 PROCEDURE — 86900 BLOOD TYPING SEROLOGIC ABO: CPT | Performed by: EMERGENCY MEDICINE

## 2022-05-08 PROCEDURE — 82140 ASSAY OF AMMONIA: CPT | Performed by: EMERGENCY MEDICINE

## 2022-05-08 PROCEDURE — 96375 TX/PRO/DX INJ NEW DRUG ADDON: CPT

## 2022-05-08 PROCEDURE — 96367 TX/PROPH/DG ADDL SEQ IV INF: CPT

## 2022-05-08 PROCEDURE — 83690 ASSAY OF LIPASE: CPT | Performed by: EMERGENCY MEDICINE

## 2022-05-08 PROCEDURE — 85018 HEMOGLOBIN: CPT | Performed by: NURSE PRACTITIONER

## 2022-05-08 PROCEDURE — 86850 RBC ANTIBODY SCREEN: CPT | Performed by: EMERGENCY MEDICINE

## 2022-05-08 PROCEDURE — 80053 COMPREHEN METABOLIC PANEL: CPT | Performed by: EMERGENCY MEDICINE

## 2022-05-08 PROCEDURE — 85014 HEMATOCRIT: CPT | Performed by: NURSE PRACTITIONER

## 2022-05-08 PROCEDURE — 74176 CT ABD & PELVIS W/O CONTRAST: CPT

## 2022-05-08 PROCEDURE — 86901 BLOOD TYPING SEROLOGIC RH(D): CPT | Performed by: EMERGENCY MEDICINE

## 2022-05-08 PROCEDURE — 25010000002 ONDANSETRON PER 1 MG: Performed by: EMERGENCY MEDICINE

## 2022-05-08 PROCEDURE — 99220 PR INITIAL OBSERVATION CARE/DAY 70 MINUTES: CPT | Performed by: INTERNAL MEDICINE

## 2022-05-08 PROCEDURE — 96366 THER/PROPH/DIAG IV INF ADDON: CPT

## 2022-05-08 PROCEDURE — 85610 PROTHROMBIN TIME: CPT | Performed by: EMERGENCY MEDICINE

## 2022-05-08 PROCEDURE — 71045 X-RAY EXAM CHEST 1 VIEW: CPT

## 2022-05-08 PROCEDURE — 85025 COMPLETE CBC W/AUTO DIFF WBC: CPT | Performed by: EMERGENCY MEDICINE

## 2022-05-08 PROCEDURE — 83735 ASSAY OF MAGNESIUM: CPT | Performed by: EMERGENCY MEDICINE

## 2022-05-08 PROCEDURE — C9803 HOPD COVID-19 SPEC COLLECT: HCPCS

## 2022-05-08 PROCEDURE — 99285 EMERGENCY DEPT VISIT HI MDM: CPT

## 2022-05-08 PROCEDURE — 87636 SARSCOV2 & INF A&B AMP PRB: CPT | Performed by: EMERGENCY MEDICINE

## 2022-05-08 PROCEDURE — 25010000002 HYDROMORPHONE PER 4 MG: Performed by: EMERGENCY MEDICINE

## 2022-05-08 PROCEDURE — 25010000002 DIPHENHYDRAMINE PER 50 MG: Performed by: EMERGENCY MEDICINE

## 2022-05-08 PROCEDURE — 83605 ASSAY OF LACTIC ACID: CPT | Performed by: EMERGENCY MEDICINE

## 2022-05-08 RX ORDER — CLOPIDOGREL BISULFATE 75 MG/1
75 TABLET ORAL DAILY
Status: DISCONTINUED | OUTPATIENT
Start: 2022-05-08 | End: 2022-05-08

## 2022-05-08 RX ORDER — POTASSIUM CHLORIDE 1.5 G/1.77G
40 POWDER, FOR SOLUTION ORAL AS NEEDED
Status: DISCONTINUED | OUTPATIENT
Start: 2022-05-08 | End: 2022-05-11 | Stop reason: HOSPADM

## 2022-05-08 RX ORDER — TAMSULOSIN HYDROCHLORIDE 0.4 MG/1
0.4 CAPSULE ORAL DAILY
Status: DISCONTINUED | OUTPATIENT
Start: 2022-05-08 | End: 2022-05-09

## 2022-05-08 RX ORDER — TERAZOSIN 1 MG/1
1 CAPSULE ORAL NIGHTLY
Status: DISCONTINUED | OUTPATIENT
Start: 2022-05-08 | End: 2022-05-09

## 2022-05-08 RX ORDER — LUBIPROSTONE 24 UG/1
24 CAPSULE ORAL 2 TIMES DAILY
Status: DISCONTINUED | OUTPATIENT
Start: 2022-05-08 | End: 2022-05-11 | Stop reason: HOSPADM

## 2022-05-08 RX ORDER — TRAZODONE HYDROCHLORIDE 100 MG/1
100 TABLET ORAL NIGHTLY
Status: DISCONTINUED | OUTPATIENT
Start: 2022-05-08 | End: 2022-05-11 | Stop reason: HOSPADM

## 2022-05-08 RX ORDER — POTASSIUM CHLORIDE 750 MG/1
40 CAPSULE, EXTENDED RELEASE ORAL AS NEEDED
Status: DISCONTINUED | OUTPATIENT
Start: 2022-05-08 | End: 2022-05-11 | Stop reason: HOSPADM

## 2022-05-08 RX ORDER — CHOLECALCIFEROL (VITAMIN D3) 125 MCG
10 CAPSULE ORAL NIGHTLY
Status: DISCONTINUED | OUTPATIENT
Start: 2022-05-08 | End: 2022-05-11 | Stop reason: HOSPADM

## 2022-05-08 RX ORDER — GABAPENTIN 400 MG/1
400 CAPSULE ORAL 3 TIMES DAILY
Status: DISCONTINUED | OUTPATIENT
Start: 2022-05-08 | End: 2022-05-11 | Stop reason: HOSPADM

## 2022-05-08 RX ORDER — OLANZAPINE 5 MG/1
10 TABLET ORAL NIGHTLY
Status: DISCONTINUED | OUTPATIENT
Start: 2022-05-08 | End: 2022-05-11 | Stop reason: HOSPADM

## 2022-05-08 RX ORDER — FLUOXETINE HYDROCHLORIDE 20 MG/1
40 CAPSULE ORAL DAILY
Status: DISCONTINUED | OUTPATIENT
Start: 2022-05-08 | End: 2022-05-11 | Stop reason: HOSPADM

## 2022-05-08 RX ORDER — SODIUM CHLORIDE 0.9 % (FLUSH) 0.9 %
10 SYRINGE (ML) INJECTION AS NEEDED
Status: DISCONTINUED | OUTPATIENT
Start: 2022-05-08 | End: 2022-05-11 | Stop reason: HOSPADM

## 2022-05-08 RX ORDER — METHYLPREDNISOLONE SODIUM SUCCINATE 40 MG/ML
40 INJECTION, POWDER, LYOPHILIZED, FOR SOLUTION INTRAMUSCULAR; INTRAVENOUS
Status: COMPLETED | OUTPATIENT
Start: 2022-05-08 | End: 2022-05-08

## 2022-05-08 RX ORDER — POTASSIUM CHLORIDE 7.45 MG/ML
10 INJECTION INTRAVENOUS
Status: DISCONTINUED | OUTPATIENT
Start: 2022-05-08 | End: 2022-05-11 | Stop reason: HOSPADM

## 2022-05-08 RX ORDER — OCTREOTIDE ACETATE 50 UG/ML
50 INJECTION, SOLUTION INTRAVENOUS; SUBCUTANEOUS ONCE
Status: DISCONTINUED | OUTPATIENT
Start: 2022-05-08 | End: 2022-05-08

## 2022-05-08 RX ORDER — POLYETHYLENE GLYCOL 3350 17 G/17G
17 POWDER, FOR SOLUTION ORAL DAILY PRN
Status: DISCONTINUED | OUTPATIENT
Start: 2022-05-08 | End: 2022-05-11 | Stop reason: HOSPADM

## 2022-05-08 RX ORDER — RANOLAZINE 500 MG/1
1000 TABLET, EXTENDED RELEASE ORAL 2 TIMES DAILY
Status: DISCONTINUED | OUTPATIENT
Start: 2022-05-08 | End: 2022-05-11 | Stop reason: HOSPADM

## 2022-05-08 RX ORDER — URSODIOL 300 MG/1
300 CAPSULE ORAL 2 TIMES DAILY
Status: DISCONTINUED | OUTPATIENT
Start: 2022-05-08 | End: 2022-05-11 | Stop reason: HOSPADM

## 2022-05-08 RX ORDER — SODIUM CHLORIDE 0.9 % (FLUSH) 0.9 %
10 SYRINGE (ML) INJECTION EVERY 12 HOURS SCHEDULED
Status: DISCONTINUED | OUTPATIENT
Start: 2022-05-08 | End: 2022-05-11 | Stop reason: HOSPADM

## 2022-05-08 RX ORDER — ROSUVASTATIN CALCIUM 20 MG/1
20 TABLET, COATED ORAL NIGHTLY
Status: DISCONTINUED | OUTPATIENT
Start: 2022-05-08 | End: 2022-05-11 | Stop reason: HOSPADM

## 2022-05-08 RX ORDER — HYDROMORPHONE HYDROCHLORIDE 1 MG/ML
0.25 INJECTION, SOLUTION INTRAMUSCULAR; INTRAVENOUS; SUBCUTANEOUS
Status: DISCONTINUED | OUTPATIENT
Start: 2022-05-08 | End: 2022-05-08

## 2022-05-08 RX ORDER — HYDROMORPHONE HYDROCHLORIDE 1 MG/ML
0.25 INJECTION, SOLUTION INTRAMUSCULAR; INTRAVENOUS; SUBCUTANEOUS EVERY 4 HOURS PRN
Status: DISCONTINUED | OUTPATIENT
Start: 2022-05-08 | End: 2022-05-09

## 2022-05-08 RX ORDER — POTASSIUM CHLORIDE 1.5 G/1.77G
20 POWDER, FOR SOLUTION ORAL DAILY
Status: DISCONTINUED | OUTPATIENT
Start: 2022-05-08 | End: 2022-05-11 | Stop reason: HOSPADM

## 2022-05-08 RX ORDER — SPIRONOLACTONE 25 MG/1
100 TABLET ORAL DAILY
Status: DISCONTINUED | OUTPATIENT
Start: 2022-05-08 | End: 2022-05-09

## 2022-05-08 RX ORDER — PANTOPRAZOLE SODIUM 40 MG/10ML
40 INJECTION, POWDER, LYOPHILIZED, FOR SOLUTION INTRAVENOUS ONCE
Status: COMPLETED | OUTPATIENT
Start: 2022-05-08 | End: 2022-05-08

## 2022-05-08 RX ORDER — ASPIRIN 81 MG/1
81 TABLET ORAL DAILY
Status: DISCONTINUED | OUTPATIENT
Start: 2022-05-08 | End: 2022-05-08

## 2022-05-08 RX ORDER — DIPHENHYDRAMINE HYDROCHLORIDE 50 MG/ML
50 INJECTION INTRAMUSCULAR; INTRAVENOUS ONCE
Status: COMPLETED | OUTPATIENT
Start: 2022-05-08 | End: 2022-05-08

## 2022-05-08 RX ORDER — ONDANSETRON 4 MG/1
4 TABLET, FILM COATED ORAL EVERY 8 HOURS PRN
Status: DISCONTINUED | OUTPATIENT
Start: 2022-05-08 | End: 2022-05-11 | Stop reason: HOSPADM

## 2022-05-08 RX ORDER — LACTULOSE 10 G/15ML
30 SOLUTION ORAL 3 TIMES DAILY
Status: DISCONTINUED | OUTPATIENT
Start: 2022-05-08 | End: 2022-05-11 | Stop reason: HOSPADM

## 2022-05-08 RX ORDER — HYDROMORPHONE HYDROCHLORIDE 1 MG/ML
0.5 INJECTION, SOLUTION INTRAMUSCULAR; INTRAVENOUS; SUBCUTANEOUS ONCE
Status: COMPLETED | OUTPATIENT
Start: 2022-05-08 | End: 2022-05-08

## 2022-05-08 RX ORDER — ONDANSETRON 2 MG/ML
4 INJECTION INTRAMUSCULAR; INTRAVENOUS ONCE
Status: COMPLETED | OUTPATIENT
Start: 2022-05-08 | End: 2022-05-08

## 2022-05-08 RX ADMIN — FLUOXETINE HYDROCHLORIDE 40 MG: 20 CAPSULE ORAL at 18:19

## 2022-05-08 RX ADMIN — METHYLPREDNISOLONE SODIUM SUCCINATE 40 MG: 40 INJECTION, POWDER, FOR SOLUTION INTRAMUSCULAR; INTRAVENOUS at 15:23

## 2022-05-08 RX ADMIN — Medication 10 ML: at 20:09

## 2022-05-08 RX ADMIN — GABAPENTIN 400 MG: 400 CAPSULE ORAL at 20:10

## 2022-05-08 RX ADMIN — PANTOPRAZOLE SODIUM 8 MG/HR: 40 INJECTION, POWDER, FOR SOLUTION INTRAVENOUS at 22:35

## 2022-05-08 RX ADMIN — TRAZODONE HYDROCHLORIDE 100 MG: 100 TABLET ORAL at 20:09

## 2022-05-08 RX ADMIN — PANTOPRAZOLE SODIUM 8 MG/HR: 40 INJECTION, POWDER, FOR SOLUTION INTRAVENOUS at 18:19

## 2022-05-08 RX ADMIN — ONDANSETRON 4 MG: 2 INJECTION INTRAMUSCULAR; INTRAVENOUS at 15:43

## 2022-05-08 RX ADMIN — LACTULOSE 30 G: 20 SOLUTION ORAL at 20:11

## 2022-05-08 RX ADMIN — ROSUVASTATIN CALCIUM 20 MG: 20 TABLET, COATED ORAL at 20:10

## 2022-05-08 RX ADMIN — BUMETANIDE 1.5 MG: 0.5 TABLET ORAL at 20:09

## 2022-05-08 RX ADMIN — HYDROMORPHONE HYDROCHLORIDE 0.5 MG: 1 INJECTION, SOLUTION INTRAMUSCULAR; INTRAVENOUS; SUBCUTANEOUS at 15:45

## 2022-05-08 RX ADMIN — TAMSULOSIN HYDROCHLORIDE 0.4 MG: 0.4 CAPSULE ORAL at 18:18

## 2022-05-08 RX ADMIN — RANOLAZINE 1000 MG: 500 TABLET, FILM COATED, EXTENDED RELEASE ORAL at 20:09

## 2022-05-08 RX ADMIN — Medication 400 MG: at 18:18

## 2022-05-08 RX ADMIN — URSODIOL 300 MG: 300 CAPSULE ORAL at 20:09

## 2022-05-08 RX ADMIN — METHYLPREDNISOLONE SODIUM SUCCINATE 40 MG: 40 INJECTION, POWDER, FOR SOLUTION INTRAMUSCULAR; INTRAVENOUS at 23:50

## 2022-05-08 RX ADMIN — OLANZAPINE 10 MG: 5 TABLET, FILM COATED ORAL at 20:10

## 2022-05-08 RX ADMIN — Medication 10 MG: at 20:10

## 2022-05-08 RX ADMIN — SPIRONOLACTONE 100 MG: 25 TABLET ORAL at 18:18

## 2022-05-08 RX ADMIN — SODIUM CHLORIDE 1 G: 900 INJECTION INTRAVENOUS at 18:19

## 2022-05-08 RX ADMIN — POTASSIUM CHLORIDE 20 MEQ: 1.5 FOR SOLUTION ORAL at 18:22

## 2022-05-08 RX ADMIN — SODIUM CHLORIDE 500 ML: 9 INJECTION, SOLUTION INTRAVENOUS at 15:23

## 2022-05-08 RX ADMIN — HYDROMORPHONE HYDROCHLORIDE 0.25 MG: 1 INJECTION, SOLUTION INTRAMUSCULAR; INTRAVENOUS; SUBCUTANEOUS at 17:09

## 2022-05-08 RX ADMIN — METHYLPREDNISOLONE SODIUM SUCCINATE 40 MG: 40 INJECTION, POWDER, FOR SOLUTION INTRAMUSCULAR; INTRAVENOUS at 20:10

## 2022-05-08 RX ADMIN — RIFAXIMIN 550 MG: 550 TABLET ORAL at 20:09

## 2022-05-08 RX ADMIN — HYDROMORPHONE HYDROCHLORIDE 0.25 MG: 1 INJECTION, SOLUTION INTRAMUSCULAR; INTRAVENOUS; SUBCUTANEOUS at 21:03

## 2022-05-08 RX ADMIN — OCTREOTIDE ACETATE 25 MCG/HR: 500 INJECTION, SOLUTION INTRAVENOUS; SUBCUTANEOUS at 15:59

## 2022-05-08 RX ADMIN — DIPHENHYDRAMINE HYDROCHLORIDE 50 MG: 50 INJECTION, SOLUTION INTRAMUSCULAR; INTRAVENOUS at 15:22

## 2022-05-08 RX ADMIN — LUBIPROSTONE 24 MCG: 24 CAPSULE, GELATIN COATED ORAL at 20:10

## 2022-05-08 RX ADMIN — TERAZOSIN HYDROCHLORIDE 1 MG: 1 CAPSULE ORAL at 20:08

## 2022-05-08 RX ADMIN — PANTOPRAZOLE SODIUM 40 MG: 40 INJECTION, POWDER, FOR SOLUTION INTRAVENOUS at 15:23

## 2022-05-08 NOTE — H&P
[facility] Medicine Services  HISTORY AND PHYSICAL    Patient Name: Timothy Guzman  : 1975  MRN: [id number]  Primary Care Physician: Toshia Mitchell APRN  Date of admission: 2022    Subjective   Subjective     Chief Complaint:  Melena/coffee ground emesis    HPI:  Timothy Guzman is a 46 y.o. female with PMH etoh cirrhosis, esophageal varices, CAD and pancreatitis who presented to the ED with chief complaint of abdominal pain since Thursday and it worsened this am with coffee ground emesis and melena.  She was admitted to same issues in early April and was seen by GI, was scoped and placed on BID PPI.  She is being admitted to hospitalist service      Review of Systems   Constitutional: Negative for appetite change and fever.   HENT: Negative for congestion and hearing loss.    Eyes: Negative for visual disturbance.   Respiratory: Negative for cough and shortness of breath.    Cardiovascular: Negative for chest pain.   Gastrointestinal: Positive for abdominal pain, blood in stool, nausea and vomiting. Negative for constipation and diarrhea.   Genitourinary: Negative for dysuria and hematuria.   Musculoskeletal: Negative for arthralgias.   Skin: Negative for wound.   Psychiatric/Behavioral: Negative for agitation and confusion.        All other systems reviewed and are negative.     Personal History     Past Medical History:   Diagnosis Date   • Alcoholism (HCC)     sober 2.5 years   • Anaphylaxis    • Arthritis    • Bone necrosis (HCC)    • CAD (coronary artery disease) 2021   • Cardiac arrest (HCC)    • Cirrhosis of liver (HCC) 2021   • Gastroparesis    • GERD (gastroesophageal reflux disease)    • History of esophageal varices    • History of kidney stones    • Hypertension    • Memory loss    • Neuropathy    • Pancreatitis    • Rib fractures 2021   • SVT (supraventricular tachycardia) (HCC) 2021       Past Surgical History:   Procedure  Laterality Date   • ANKLE SURGERY Right    • APPENDECTOMY     • CARDIAC CATHETERIZATION N/A 2021    Procedure: Left Heart Cath;  Surgeon: Vikram Gonzales MD;  Location:  [facility];  Service: Cardiovascular;  Laterality: N/A;   • CARDIAC CATHETERIZATION N/A 7/15/2021    Procedure: LEFT HEART CATH;  Surgeon: Vikram Gonzales MD;  Location:  [facility];  Service: Cardiology;  Laterality: N/A;   •  SECTION      x2   • CHOLECYSTECTOMY     • COLONOSCOPY     • COLONOSCOPY N/A 3/31/2020    Procedure: COLONOSCOPY;  Surgeon: Tirso Giles MD;  Location:  [facility];  Service: Gastroenterology;  Laterality: N/A;   • COLONOSCOPY N/A 2021    Procedure: COLONOSCOPY;  Surgeon: Tyrese Rasmussen MD;  Location:  [facility];  Service: Gastroenterology;  Laterality: N/A;   • CORONARY STENT PLACEMENT     • ENDOSCOPY     • ENDOSCOPY     • ENDOSCOPY N/A 2021    Procedure: ESOPHAGOGASTRODUODENOSCOPY AT BEDSIDE;  Surgeon: Tyrese Rasmussen MD;  Location:  [facility];  Service: Gastroenterology;  Laterality: N/A;   • ENDOSCOPY N/A 2021    Procedure: ESOPHAGOGASTRODUODENOSCOPY;  Surgeon: Tyrese Rasmussen MD;  Location:  [facility];  Service: Gastroenterology;  Laterality: N/A;   • ENDOSCOPY N/A 2021    Procedure: ESOPHAGOGASTRODUODENOSCOPY;  Surgeon: Tyrese Rasmussen MD;  Location:  [facility];  Service: Gastroenterology;  Laterality: N/A;   • ENDOSCOPY N/A 2022    Procedure: ESOPHAGOGASTRODUODENOSCOPY;  Surgeon: Tyrese Rasmussen MD;  Location:  [facility];  Service: Gastroenterology;  Laterality: N/A;   • REPLACEMENT TOTAL HIP LATERAL POSITION Left    • TOTAL HIP ARTHROPLASTY Right     x2   • TOTAL KNEE ARTHROPLASTY Left        Family History: family history includes ADD / ADHD in her child; Asperger's syndrome in her child; Autism in her child; Breast cancer in her mother; Diabetes type II in her mother; Heart disease in her father;  Hypertension in her brother; Liver disease in her brother. Otherwise pertinent FHx was reviewed and unremarkable.     Social History:  reports that she quit smoking about 10 months ago. Her smoking use included electronic cigarette and cigarettes. She smoked 0.50 packs per day. She has never used smokeless tobacco. She reports previous alcohol use. She reports that she does not use drugs.  Social History     Social History Narrative    Lives in [place] with parents    Disabled       Medications:  FLUoxetine, Melatonin, OLANZapine, aspirin, bisacodyl, bumetanide, clopidogrel, docusate sodium, gabapentin, lactulose, lubiprostone, magnesium oxide, multivitamin, ondansetron, pantoprazole, polyethylene glycol, potassium chloride, prazosin, ranolazine, riFAXIMin, rosuvastatin, spironolactone, tamsulosin, traZODone, ursodiol, vitamin B-6, and vitamin b complex    Allergies   Allergen Reactions   • Contrast Dye Anaphylaxis     6/18 Pt coded after receiving contrast for a CT scan. Was premedicated. Was having an MI at the same time. Immediately underwent heart cath with contrast without additional allergic reaction  7/15 Pt premedicated with prednisone/Benadryl for heart cath. Still with anaphylactic-like reaction with drop in BP and hypoxia. Treated 95% stenosis with minimal dye and supported with epinephrine, solumedrol, NRB   • Haloperidol Hallucinations   • Nsaids GI Bleeding     Other reaction(s): Bleeding, VOMITING   • Tylenol [Acetaminophen] Other (See Comments)     CIRRHOSIS       Objective   Objective     Vital Signs:   Temp:  [98.4 °F (36.9 °C)] 98.4 °F (36.9 °C)  Heart Rate:  [71-99] 76  Resp:  [20] 20  BP: (110-135)/(72-96) 110/81    Physical Exam   Constitutional: Awake, alert  Eyes: PERRLA, sclerae anicteric, no conjunctival injection  HENT: NCAT, mucous membranes moist  Neck: Supple, no thyromegaly, no lymphadenopathy, trachea midline  Respiratory: Clear to auscultation bilaterally, nonlabored respirations    Cardiovascular: RRR, no murmurs, rubs, or gallops, palpable pedal pulses bilaterally  Gastrointestinal: Positive bowel sounds, soft, diffuse tenderness, nondistended  Musculoskeletal: Trace bilateral ankle edema, no clubbing or cyanosis to extremities  Psychiatric: Flat affect, cooperative  Neurologic: Oriented x 3, BLANCO, speech clear  Skin: No rashes noted      Results Reviewed:  I have personally reviewed most recent indicated data and agree with findings including:  [x]  Laboratory  [x]  Radiology  []  EKG/Telemetry  []  Pathology  []  Cardiac/Vascular Studies  [x]  Old records  []  Other:  Most pertinent findings include:      LAB RESULTS:      Lab 05/08/22  1451   WBC 6.92   HEMOGLOBIN 14.5   HEMATOCRIT 42.1   PLATELETS 105*   NEUTROS ABS 4.23   IMMATURE GRANS (ABS) 0.03   LYMPHS ABS 1.78   MONOS ABS 0.80   EOS ABS 0.05   MCV 88.6   PROCALCITONIN 0.10   LACTATE 1.5   PROTIME 14.0         Lab 05/08/22  1451   SODIUM 138   POTASSIUM 3.8   CHLORIDE 104   CO2 24.0   ANION GAP 10.0   BUN 10   CREATININE 0.91   EGFR 78.5   GLUCOSE 96   CALCIUM 9.3   MAGNESIUM 1.7         Lab 05/08/22  1451   TOTAL PROTEIN 7.1   ALBUMIN 4.40   GLOBULIN 2.7   ALT (SGPT) 29   AST (SGOT) 26   BILIRUBIN 0.5   ALK PHOS 102   LIPASE 20         Lab 05/08/22  1451   TROPONIN T <0.010   PROTIME 14.0   INR 1.09             Lab 05/08/22  1452   ABO TYPING A   RH TYPING Positive   ANTIBODY SCREEN Negative         Brief Urine Lab Results  (Last result in the past 365 days)      Color   Clarity   Blood   Leuk Est   Nitrite   Protein   CREAT   Urine HCG        04/07/22 0342 Yellow   Clear   Negative   Trace   Negative   Negative               Microbiology Results (last 10 days)     Procedure Component Value - Date/Time    COVID-19 and FLU A/B PCR - Swab, Nasopharynx [[id number]]  (Normal) Collected: 05/08/22 1525    Lab Status: Final result Specimen: Swab from Nasopharynx Updated: 05/08/22 1628     COVID19 Not Detected     Influenza A PCR Not  Detected     Influenza B PCR Not Detected    Narrative:      Fact sheet for providers: https://www.fda.gov/media/508450/download    Fact sheet for patients: https://www.fda.gov/media/426614/download    Test performed by PCR.          CT Abdomen Pelvis Without Contrast    Result Date: 5/8/2022  CT ABDOMEN PELVIS WO CONTRAST-  Date of Exam: 5/8/2022 4:11 PM  Indication: GI bleed, lower.   Comparison Exams: None available.  Technique: Axial images from the base of the chest through the abdomen and pelvis were obtained without intravenous contrast. Sagittal and coronal reformatted images also performed. Automated exposure control and iterative reconstruction methods were used.  Radiation audit for number of CT and nuclear cardiology exams performed in the last year: 0.   FINDINGS:  No evidence of pneumonia or other acute process in the lung bases. No pleural effusion. Left lower lobe subcentimeter nodule again noted, also present on a remote CT from March 2020  No ureteral stone or hydronephrosis of either kidney. No evidence of pancreatitis or other acute noncontrast findings of the organs throughout the abdomen. Cholecystomy again noted.  No bowel obstruction. No evidence of colitis or appendicitis. No diverticulitis.  No abscess or free air. No significant free fluid.  No evidence of acute processes in the pelvis. Bladder appears within normal limits.  No fracture or other acute osseous findings. No acute superficial soft tissue abnormality.       Impression:  Negative CT examination as above. No evidence of acute process in the abdomen/pelvis.  This report was finalized on 5/8/2022 5:03 PM by Akhil Petersen.      XR Chest 1 View    Result Date: 5/8/2022  EXAM: XR CHEST 1 VW-  DATE OF EXAM: 5/8/2022 3:32 PM  INDICATION: GI bleed.   COMPARISONS: 4/6/2022  FINDINGS:  No evidence of pneumonia, pulmonary edema or other acute pulmonary process. No evidence of pneumothorax or significant pleural effusion. No evidence of acute  process of the heart or other mediastinal structures.      Impression:  Negative chest x-ray. No evidence of acute cardiopulmonary disease.  This report was finalized on 5/8/2022 4:26 PM by Akhil Petersen.        Results for orders placed during the hospital encounter of 06/18/21    Adult Transthoracic Echo Complete W/ Cont if Necessary Per Protocol    Interpretation Summary  · The quality of the study is limited due to patient positioning and patient being intubated.  · Left ventricular ejection fraction appears to be 51 - 55%. Left ventricular systolic function is normal.  · Left ventricular diastolic function is consistent with (grade Ia w/high LAP) impaired relaxation.  · Mildly reduced right ventricular systolic function noted.      Assessment/Plan   Assessment & Plan       Alcoholic cirrhosis of liver without ascites (HCC)    History of esophageal varices    GI bleed    Coffee-ground emesis/Melena  --started on octreotide and IV PPI  --trend H&H, currently stable  --GI consult in am  --empiric rocephin    Alcoholic cirrhosis  Esophageal varices  --continue bumex, aldactone, xifaxin and lactulose    CAD  --continue plavix and asa for now    DVT prophylaxis:  Mechanical only due to melena    CODE STATUS:    Code Status (Patient has no pulse and is not breathing): CPR (Attempt to Resuscitate)  Medical Interventions (Patient has pulse or is breathing): Full Support      This note has been completed as part of a split-shared workflow.     Signature: Electronically signed by ISSAC Murphy, 05/08/22, 5:24 PM EDT  Patient seen and examined at the bedside.  Patient is a 46-year-old  female with past medical history significant for cirrhosis of the liver, esophageal varices, previous GI bleeding requiring cauterization.  Patient also has history of coronary artery disease and is s/p stent placement.  The last stent was placed in June 2021.  Currently patient is on aspirin and Plavix.  Patient gives history of  a few days of abdominal pain and discomfort.  Today patient also had an episode of vomiting with coffee-ground vomitus.  Patient also has had melena for the past 2 to 3 days.  Denies any fever or chills.  No chest pain or palpitation or shortness of breath.    Physical exam:Constitutional: No acute distress  HENT: NCAT, mucous membranes moist  Respiratory: Clear to auscultation bilaterally, respiratory effort normal   Cardiovascular: RRR, no murmurs, rubs, or gallops  Gastrointestinal: Abdomen is obese positive bowel sounds, soft, mild epigastric and right upper quadrant tenderness, nondistended  Musculoskeletal: Trace bilateral ankle edema, morbidly obese  Psychiatric: Appropriate affect, cooperative  Neurologic: Oriented x 3, strength symmetric in all extremities, Cranial Nerves grossly intact to confrontation, speech clear  Skin: No rashes    Assessment and plan:  Patient is a 46-year-old  female with past medical history of liver cirrhosis and esophageal varices.  Patient also has history of GI bleed requiring cauterization.  Patient comes in with history of melena and coffee-ground vomitus.  Patient was started on IV Protonix and also octreotide in the ER which will be continued.  We will keep the patient n.p.o. and will ask GI to see the patient in a.m.  Please see the above for more details.  Patient will be admitted for inpatient.

## 2022-05-08 NOTE — PLAN OF CARE
Goal Outcome Evaluation:  Plan of Care Reviewed With: patient        Progress: no change   Pt admitted from ED.

## 2022-05-08 NOTE — ED PROVIDER NOTES
"Subjective   46-year-old female presents for evaluation of \"GI bleed.\"  Of note, the patient is a former alcoholic but is sober.  She states that she has been sober for several years.  She has a history of cirrhosis and varices.  She states that approximately 3 to 4 days ago she began experiencing upper abdominal pain that has continued since that time.  She presents today for coffee-ground emesis this morning.  She also notes dark stools over the past 2 days.  She was concerned about a GI bleed and subsequently came to the ED to be evaluated.  She is not anticoagulated.  No fevers.  She currently rates her abdominal pain at 6 out of 10 in severity.  She notes that she has had bleeding varices before in the past and follows with Dr. Giles of gastroenterology.          Review of Systems   Gastrointestinal: Positive for abdominal pain, blood in stool and vomiting.   All other systems reviewed and are negative.      Past Medical History:   Diagnosis Date   • Alcoholism (HCC)     sober 2.5 years   • Anaphylaxis    • Arthritis    • Bone necrosis (HCC)    • CAD (coronary artery disease) 07/13/2021   • Cardiac arrest (HCC)    • Cirrhosis of liver (HCC) 07/13/2021   • Gastroparesis    • GERD (gastroesophageal reflux disease)    • History of esophageal varices    • History of kidney stones    • Hypertension    • Memory loss    • Neuropathy    • Pancreatitis    • Rib fractures 07/13/2021   • SVT (supraventricular tachycardia) (HCC) 06/18/2021       Allergies   Allergen Reactions   • Contrast Dye Anaphylaxis     6/18 Pt coded after receiving contrast for a CT scan. Was premedicated. Was having an MI at the same time. Immediately underwent heart cath with contrast without additional allergic reaction  7/15 Pt premedicated with prednisone/Benadryl for heart cath. Still with anaphylactic-like reaction with drop in BP and hypoxia. Treated 95% stenosis with minimal dye and supported with epinephrine, solumedrol, NRB   • Haloperidol " Hallucinations   • Nsaids GI Bleeding     Other reaction(s): Bleeding, VOMITING   • Tylenol [Acetaminophen] Other (See Comments)     CIRRHOSIS       Past Surgical History:   Procedure Laterality Date   • ANKLE SURGERY Right    • APPENDECTOMY     • CARDIAC CATHETERIZATION N/A 2021    Procedure: Left Heart Cath;  Surgeon: Vikram Gonzales MD;  Location:  [facility];  Service: Cardiovascular;  Laterality: N/A;   • CARDIAC CATHETERIZATION N/A 7/15/2021    Procedure: LEFT HEART CATH;  Surgeon: Vikram Gonzales MD;  Location:  [facility];  Service: Cardiology;  Laterality: N/A;   •  SECTION      x2   • CHOLECYSTECTOMY     • COLONOSCOPY     • COLONOSCOPY N/A 3/31/2020    Procedure: COLONOSCOPY;  Surgeon: Tirso Giles MD;  Location:  [facility];  Service: Gastroenterology;  Laterality: N/A;   • COLONOSCOPY N/A 2021    Procedure: COLONOSCOPY;  Surgeon: Tyrese Rasmussen MD;  Location:  [facility];  Service: Gastroenterology;  Laterality: N/A;   • CORONARY STENT PLACEMENT     • ENDOSCOPY     • ENDOSCOPY     • ENDOSCOPY N/A 2021    Procedure: ESOPHAGOGASTRODUODENOSCOPY AT BEDSIDE;  Surgeon: Tyrese Rasmussen MD;  Location:  [facility];  Service: Gastroenterology;  Laterality: N/A;   • ENDOSCOPY N/A 2021    Procedure: ESOPHAGOGASTRODUODENOSCOPY;  Surgeon: Tyrese Rasmussen MD;  Location:  [facility];  Service: Gastroenterology;  Laterality: N/A;   • ENDOSCOPY N/A 2021    Procedure: ESOPHAGOGASTRODUODENOSCOPY;  Surgeon: Tyrese Rasmussen MD;  Location:  [facility];  Service: Gastroenterology;  Laterality: N/A;   • ENDOSCOPY N/A 2022    Procedure: ESOPHAGOGASTRODUODENOSCOPY;  Surgeon: Tyrese Rasmussen MD;  Location:  [facility];  Service: Gastroenterology;  Laterality: N/A;   • REPLACEMENT TOTAL HIP LATERAL POSITION Left    • TOTAL HIP ARTHROPLASTY Right     x2   • TOTAL KNEE ARTHROPLASTY Left        Family History   Problem  Relation Age of Onset   • Diabetes type II Mother    • Breast cancer Mother    • Heart disease Father    • Liver disease Brother    • Hypertension Brother    • ADD / ADHD Child    • Autism Child    • Asperger's syndrome Child    • Colon cancer Neg Hx    • Colon polyps Neg Hx        Social History     Socioeconomic History   • Marital status:    Tobacco Use   • Smoking status: Former Smoker     Packs/day: 0.50     Types: Electronic Cigarette, Cigarettes     Quit date: 2021     Years since quittin.8   • Smokeless tobacco: Never Used   Vaping Use   • Vaping Use: Former   Substance and Sexual Activity   • Alcohol use: Not Currently     Comment: SOBER 3 YEARS   • Drug use: No   • Sexual activity: Defer           Objective   Physical Exam  Vitals and nursing note reviewed.   Constitutional:       Appearance: She is well-developed. She is not diaphoretic.      Comments: Nontoxic-appearing female   HENT:      Head: Normocephalic and atraumatic.      Mouth/Throat:      Comments: Oropharynx is clear and devoid of blood  Cardiovascular:      Rate and Rhythm: Normal rate and regular rhythm.      Heart sounds: Normal heart sounds. No murmur heard.    No friction rub. No gallop.   Pulmonary:      Effort: Pulmonary effort is normal. No respiratory distress.      Breath sounds: Normal breath sounds. No wheezing or rales.   Abdominal:      General: Bowel sounds are normal. There is no distension.      Palpations: Abdomen is soft. There is no mass.      Tenderness: There is abdominal tenderness. There is no guarding or rebound.      Comments: Mild epigastric abdominal tenderness, no peritoneal signs, no pain out of proportion to exam, negative Patel's sign   Musculoskeletal:         General: Normal range of motion.      Cervical back: Neck supple.   Skin:     General: Skin is warm and dry.      Findings: No erythema or rash.   Neurological:      General: No focal deficit present.      Mental Status: She is alert and  oriented to person, place, and time.      Comments: No focal neurological deficits noted, no asterixis noted   Psychiatric:         Mood and Affect: Mood normal.         Thought Content: Thought content normal.         Judgment: Judgment normal.         Critical Care  Performed by: Paras Sahu MD  Authorized by: Paras Sahu MD     Critical care provider statement:     Critical care time (minutes):  35    Critical care was necessary to treat or prevent imminent or life-threatening deterioration of the following conditions: GI bleed requiring pantoprazole and octreotide drips.    Critical care was time spent personally by me on the following activities:  Development of treatment plan with patient or surrogate, evaluation of patient's response to treatment, examination of patient, obtaining history from patient or surrogate, ordering and performing treatments and interventions, ordering and review of laboratory studies, ordering and review of radiographic studies, pulse oximetry, re-evaluation of patient's condition and review of old charts               ED Course  ED Course as of 05/08/22 1843   Sun May 08, 2022   1626 46-year-old female with a history of cirrhosis and varices presents for evaluation of coffee-ground emesis and melena as well as upper abdominal pain for the past 2 to 3 days.  On arrival to the ED, the patient is nontoxic-appearing.  Exam remarkable for mild upper abdominal tenderness without peritoneal signs or pain out of proportion to exam.  No active hematemesis noted at this time.  Labs are bland and remarkable only for mild thrombocytopenia.  Pantoprazole and octreotide initiated.  Rocephin given.  Imaging obtained.  I discussed the patient's case with Dr. Gardner, and the patient will be admitted under his care for further evaluation and treatment.  The patient is aware/agreeable with the plan at this time. [DD]   6545 I personally viewed the patient's x-ray images myself, and  I am in agreement with the radiologist's reading for final interpretation.     [DD]      ED Course User Index  [DD] Paras Sahu MD                                               Recent Results (from the past 24 hour(s))   Comprehensive Metabolic Panel    Collection Time: 05/08/22  2:51 PM    Specimen: Blood   Result Value Ref Range    Glucose 96 65 - 99 mg/dL    BUN 10 6 - 20 mg/dL    Creatinine 0.91 0.57 - 1.00 mg/dL    Sodium 138 136 - 145 mmol/L    Potassium 3.8 3.5 - 5.2 mmol/L    Chloride 104 98 - 107 mmol/L    CO2 24.0 22.0 - 29.0 mmol/L    Calcium 9.3 8.6 - 10.5 mg/dL    Total Protein 7.1 6.0 - 8.5 g/dL    Albumin 4.40 3.50 - 5.20 g/dL    ALT (SGPT) 29 1 - 33 U/L    AST (SGOT) 26 1 - 32 U/L    Alkaline Phosphatase 102 39 - 117 U/L    Total Bilirubin 0.5 0.0 - 1.2 mg/dL    Globulin 2.7 gm/dL    A/G Ratio 1.6 g/dL    BUN/Creatinine Ratio 11.0 7.0 - 25.0    Anion Gap 10.0 5.0 - 15.0 mmol/L    eGFR 78.5 >60.0 mL/min/1.73   Green Top (Gel)    Collection Time: 05/08/22  2:51 PM   Result Value Ref Range    Extra Tube Hold for add-ons.    Lavender Top    Collection Time: 05/08/22  2:51 PM   Result Value Ref Range    Extra Tube hold for add-on    Gold Top - SST    Collection Time: 05/08/22  2:51 PM   Result Value Ref Range    Extra Tube Hold for add-ons.    Light Blue Top    Collection Time: 05/08/22  2:51 PM   Result Value Ref Range    Extra Tube hold for add-on    CBC Auto Differential    Collection Time: 05/08/22  2:51 PM    Specimen: Blood   Result Value Ref Range    WBC 6.92 3.40 - 10.80 10*3/mm3    RBC 4.75 3.77 - 5.28 10*6/mm3    Hemoglobin 14.5 12.0 - 15.9 g/dL    Hematocrit 42.1 34.0 - 46.6 %    MCV 88.6 79.0 - 97.0 fL    MCH 30.5 26.6 - 33.0 pg    MCHC 34.4 31.5 - 35.7 g/dL    RDW 13.0 12.3 - 15.4 %    RDW-SD 42.0 37.0 - 54.0 fl    MPV 11.2 6.0 - 12.0 fL    Platelets 105 (L) 140 - 450 10*3/mm3    Neutrophil % 61.2 42.7 - 76.0 %    Lymphocyte % 25.7 19.6 - 45.3 %    Monocyte % 11.6 5.0 - 12.0 %     Eosinophil % 0.7 0.3 - 6.2 %    Basophil % 0.4 0.0 - 1.5 %    Immature Grans % 0.4 0.0 - 0.5 %    Neutrophils, Absolute 4.23 1.70 - 7.00 10*3/mm3    Lymphocytes, Absolute 1.78 0.70 - 3.10 10*3/mm3    Monocytes, Absolute 0.80 0.10 - 0.90 10*3/mm3    Eosinophils, Absolute 0.05 0.00 - 0.40 10*3/mm3    Basophils, Absolute 0.03 0.00 - 0.20 10*3/mm3    Immature Grans, Absolute 0.03 0.00 - 0.05 10*3/mm3    nRBC 0.0 0.0 - 0.2 /100 WBC   Protime-INR    Collection Time: 05/08/22  2:51 PM    Specimen: Blood   Result Value Ref Range    Protime 14.0 11.4 - 14.4 Seconds    INR 1.09 0.84 - 1.13   Lipase    Collection Time: 05/08/22  2:51 PM    Specimen: Blood   Result Value Ref Range    Lipase 20 13 - 60 U/L   Lactic Acid, Plasma    Collection Time: 05/08/22  2:51 PM    Specimen: Blood   Result Value Ref Range    Lactate 1.5 0.5 - 2.0 mmol/L   Procalcitonin    Collection Time: 05/08/22  2:51 PM    Specimen: Blood   Result Value Ref Range    Procalcitonin 0.10 0.00 - 0.25 ng/mL   Troponin    Collection Time: 05/08/22  2:51 PM    Specimen: Blood   Result Value Ref Range    Troponin T <0.010 0.000 - 0.030 ng/mL   Magnesium    Collection Time: 05/08/22  2:51 PM    Specimen: Blood   Result Value Ref Range    Magnesium 1.7 1.6 - 2.6 mg/dL   Type & Screen    Collection Time: 05/08/22  2:52 PM    Specimen: Blood   Result Value Ref Range    ABO Type A     RH type Positive     Antibody Screen Negative     T&S Expiration Date 5/11/2022 11:59:59 PM    COVID-19 and FLU A/B PCR - Swab, Nasopharynx    Collection Time: 05/08/22  3:25 PM    Specimen: Nasopharynx; Swab   Result Value Ref Range    COVID19 Not Detected Not Detected - Ref. Range    Influenza A PCR Not Detected Not Detected    Influenza B PCR Not Detected Not Detected   Ammonia    Collection Time: 05/08/22  3:44 PM    Specimen: Blood   Result Value Ref Range    Ammonia 23 11 - 51 umol/L   Hemoglobin & Hematocrit, Blood    Collection Time: 05/08/22  6:08 PM    Specimen: Blood   Result  Value Ref Range    Hemoglobin 13.5 12.0 - 15.9 g/dL    Hematocrit 38.8 34.0 - 46.6 %     Note: In addition to lab results from this visit, the labs listed above may include labs taken at another facility or during a different encounter within the last 24 hours. Please correlate lab times with ED admission and discharge times for further clarification of the services performed during this visit.    CT Abdomen Pelvis Without Contrast   Final Result       Negative CT examination as above. No evidence of acute process in the   abdomen/pelvis.       This report was finalized on 5/8/2022 5:03 PM by Akhil Petersen.          XR Chest 1 View   Final Result       Negative chest x-ray. No evidence of acute cardiopulmonary disease.       This report was finalized on 5/8/2022 4:26 PM by Akhil Petersen.            Vitals:    05/08/22 1600 05/08/22 1631 05/08/22 1700 05/08/22 1723   BP: 135/87 119/72 110/81 119/63   BP Location:    Right arm   Patient Position:    Lying   Pulse: 77 71 76 76   Resp:    18   Temp:    98.4 °F (36.9 °C)   TempSrc:    Oral   SpO2: 98% 99% 98% 97%   Weight:       Height:         Medications   sodium chloride 0.9 % flush 10 mL (has no administration in time range)   methylPREDNISolone sodium succinate (SOLU-Medrol) injection 40 mg (40 mg Intravenous Given 5/8/22 1523)   pantoprazole (PROTONIX) 40 mg in sodium chloride 0.9 % 100 mL (0.4 mg/mL) infusion (8 mg/hr Intravenous New Bag 5/8/22 1819)   octreotide (sandoSTATIN) 500 mcg in sodium chloride 0.9 % 100 mL (5 mcg/mL) infusion (25 mcg/hr Intravenous New Bag 5/8/22 1559)   cefTRIAXone (ROCEPHIN) 1 g/100 mL 0.9% NS (MBP) (1 g Intravenous New Bag 5/8/22 1819)   FLUoxetine (PROzac) capsule 40 mg (40 mg Oral Given 5/8/22 1819)   polyethylene glycol (MIRALAX) packet 17 g (has no administration in time range)   ranolazine (RANEXA) 12 hr tablet 1,000 mg (has no administration in time range)   rosuvastatin (CRESTOR) tablet 20 mg (has no administration in time range)    traZODone (DESYREL) tablet 100 mg (has no administration in time range)   riFAXIMin (XIFAXAN) tablet 550 mg (has no administration in time range)   ursodiol (ACTIGALL) capsule 300 mg (has no administration in time range)   terazosin (HYTRIN) capsule 1 mg (has no administration in time range)   ondansetron (ZOFRAN) tablet 4 mg (has no administration in time range)   OLANZapine (zyPREXA) tablet 10 mg (has no administration in time range)   melatonin tablet 10 mg (has no administration in time range)   magnesium oxide (MAG-OX) tablet 400 mg (400 mg Oral Given 5/8/22 1818)   lactulose (CHRONULAC) 10 GM/15ML solution 30 g (has no administration in time range)   gabapentin (NEURONTIN) capsule 400 mg (has no administration in time range)   bumetanide (BUMEX) tablet 1.5 mg (has no administration in time range)   lubiprostone (AMITIZA) capsule 24 mcg (has no administration in time range)   potassium chloride (KLOR-CON) packet 20 mEq (20 mEq Oral Given 5/8/22 1822)   tamsulosin (FLOMAX) 24 hr capsule 0.4 mg (0.4 mg Oral Given 5/8/22 1818)   sodium chloride 0.9 % flush 10 mL (has no administration in time range)   sodium chloride 0.9 % flush 10 mL (has no administration in time range)   spironolactone (ALDACTONE) tablet 100 mg (100 mg Oral Given 5/8/22 1818)   potassium chloride (MICRO-K) CR capsule 40 mEq (has no administration in time range)     Or   potassium chloride (KLOR-CON) packet 40 mEq (has no administration in time range)     Or   potassium chloride 10 mEq in 100 mL IVPB (has no administration in time range)   HYDROmorphone (DILAUDID) injection 0.25 mg (has no administration in time range)   diphenhydrAMINE (BENADRYL) injection 50 mg (50 mg Intravenous Given 5/8/22 1522)   pantoprazole (PROTONIX) injection 40 mg (40 mg Intravenous Given 5/8/22 1523)   sodium chloride 0.9 % bolus 500 mL (500 mL Intravenous New Bag 5/8/22 1523)   ondansetron (ZOFRAN) injection 4 mg (4 mg Intravenous Given 5/8/22 5996)    HYDROmorphone (DILAUDID) injection 0.5 mg (0.5 mg Intravenous Given 5/8/22 6969)     ECG/EMG Results (last 24 hours)     ** No results found for the last 24 hours. **        No orders to display           MDM    Final diagnoses:   Upper GI bleed   History of cirrhosis   History of esophageal varices   Upper abdominal pain       ED Disposition  ED Disposition     ED Disposition   Decision to Admit    Condition   --    Comment   Level of Care: Telemetry [5]   Diagnosis: GI bleed [339353]   Admitting Physician: ALEX LINK [[id number]]   Certification: I Certify That Inpatient Hospital Services Are Medically Necessary For Greater Than 2 Midnights               No follow-up provider specified.       Medication List      No changes were made to your prescriptions during this visit.          Paras Sahu MD  05/08/22 1053

## 2022-05-09 PROBLEM — K92.2 UPPER GI BLEED: Status: ACTIVE | Noted: 2022-05-09

## 2022-05-09 LAB
ANION GAP SERPL CALCULATED.3IONS-SCNC: 12 MMOL/L (ref 5–15)
BUN SERPL-MCNC: 13 MG/DL (ref 6–20)
BUN/CREAT SERPL: 13.8 (ref 7–25)
CALCIUM SPEC-SCNC: 8.4 MG/DL (ref 8.6–10.5)
CHLORIDE SERPL-SCNC: 101 MMOL/L (ref 98–107)
CO2 SERPL-SCNC: 19 MMOL/L (ref 22–29)
CREAT SERPL-MCNC: 0.94 MG/DL (ref 0.57–1)
EGFRCR SERPLBLD CKD-EPI 2021: 75.5 ML/MIN/1.73
GLUCOSE SERPL-MCNC: 173 MG/DL (ref 65–99)
HCT VFR BLD AUTO: 35 % (ref 34–46.6)
HCT VFR BLD AUTO: 36.2 % (ref 34–46.6)
HCT VFR BLD AUTO: 37.1 % (ref 34–46.6)
HCT VFR BLD AUTO: 38.1 % (ref 34–46.6)
HGB BLD-MCNC: 12.1 G/DL (ref 12–15.9)
HGB BLD-MCNC: 12.3 G/DL (ref 12–15.9)
HGB BLD-MCNC: 13 G/DL (ref 12–15.9)
HGB BLD-MCNC: 13.4 G/DL (ref 12–15.9)
POTASSIUM SERPL-SCNC: 4.6 MMOL/L (ref 3.5–5.2)
SODIUM SERPL-SCNC: 132 MMOL/L (ref 136–145)

## 2022-05-09 PROCEDURE — 85018 HEMOGLOBIN: CPT | Performed by: NURSE PRACTITIONER

## 2022-05-09 PROCEDURE — 99214 OFFICE O/P EST MOD 30 MIN: CPT | Performed by: PHYSICIAN ASSISTANT

## 2022-05-09 PROCEDURE — 85014 HEMATOCRIT: CPT | Performed by: NURSE PRACTITIONER

## 2022-05-09 PROCEDURE — 25010000002 OCTREOTIDE PER 25 MCG: Performed by: NURSE PRACTITIONER

## 2022-05-09 PROCEDURE — 63710000001 ONDANSETRON PER 8 MG: Performed by: NURSE PRACTITIONER

## 2022-05-09 PROCEDURE — 25010000002 HYDROMORPHONE PER 4 MG: Performed by: NURSE PRACTITIONER

## 2022-05-09 PROCEDURE — 96366 THER/PROPH/DIAG IV INF ADDON: CPT

## 2022-05-09 PROCEDURE — 80048 BASIC METABOLIC PNL TOTAL CA: CPT | Performed by: NURSE PRACTITIONER

## 2022-05-09 PROCEDURE — 96376 TX/PRO/DX INJ SAME DRUG ADON: CPT

## 2022-05-09 PROCEDURE — 99225 PR SBSQ OBSERVATION CARE/DAY 25 MINUTES: CPT | Performed by: INTERNAL MEDICINE

## 2022-05-09 PROCEDURE — G0378 HOSPITAL OBSERVATION PER HR: HCPCS

## 2022-05-09 RX ORDER — PANTOPRAZOLE SODIUM 40 MG/1
40 TABLET, DELAYED RELEASE ORAL
Status: DISCONTINUED | OUTPATIENT
Start: 2022-05-09 | End: 2022-05-11 | Stop reason: HOSPADM

## 2022-05-09 RX ORDER — ACETAMINOPHEN 500 MG
500 TABLET ORAL EVERY 6 HOURS PRN
Status: DISCONTINUED | OUTPATIENT
Start: 2022-05-09 | End: 2022-05-11 | Stop reason: HOSPADM

## 2022-05-09 RX ADMIN — POTASSIUM CHLORIDE 20 MEQ: 1.5 FOR SOLUTION ORAL at 08:29

## 2022-05-09 RX ADMIN — GABAPENTIN 400 MG: 400 CAPSULE ORAL at 21:51

## 2022-05-09 RX ADMIN — URSODIOL 300 MG: 300 CAPSULE ORAL at 21:51

## 2022-05-09 RX ADMIN — ACETAMINOPHEN 500 MG: 500 TABLET ORAL at 16:49

## 2022-05-09 RX ADMIN — Medication 400 MG: at 17:15

## 2022-05-09 RX ADMIN — RANOLAZINE 1000 MG: 500 TABLET, FILM COATED, EXTENDED RELEASE ORAL at 08:28

## 2022-05-09 RX ADMIN — LUBIPROSTONE 24 MCG: 24 CAPSULE, GELATIN COATED ORAL at 08:29

## 2022-05-09 RX ADMIN — HYDROMORPHONE HYDROCHLORIDE 0.25 MG: 1 INJECTION, SOLUTION INTRAMUSCULAR; INTRAVENOUS; SUBCUTANEOUS at 04:58

## 2022-05-09 RX ADMIN — PANTOPRAZOLE SODIUM 40 MG: 40 TABLET, DELAYED RELEASE ORAL at 17:15

## 2022-05-09 RX ADMIN — ROSUVASTATIN CALCIUM 20 MG: 20 TABLET, COATED ORAL at 21:51

## 2022-05-09 RX ADMIN — OCTREOTIDE ACETATE 25 MCG/HR: 500 INJECTION, SOLUTION INTRAVENOUS; SUBCUTANEOUS at 08:27

## 2022-05-09 RX ADMIN — HYDROMORPHONE HYDROCHLORIDE 0.25 MG: 1 INJECTION, SOLUTION INTRAMUSCULAR; INTRAVENOUS; SUBCUTANEOUS at 00:52

## 2022-05-09 RX ADMIN — TRAZODONE HYDROCHLORIDE 100 MG: 100 TABLET ORAL at 21:52

## 2022-05-09 RX ADMIN — URSODIOL 300 MG: 300 CAPSULE ORAL at 08:29

## 2022-05-09 RX ADMIN — FLUOXETINE HYDROCHLORIDE 40 MG: 20 CAPSULE ORAL at 08:28

## 2022-05-09 RX ADMIN — LUBIPROSTONE 24 MCG: 24 CAPSULE, GELATIN COATED ORAL at 21:52

## 2022-05-09 RX ADMIN — LACTULOSE 30 G: 20 SOLUTION ORAL at 21:52

## 2022-05-09 RX ADMIN — GABAPENTIN 400 MG: 400 CAPSULE ORAL at 16:49

## 2022-05-09 RX ADMIN — LACTULOSE 30 G: 20 SOLUTION ORAL at 08:28

## 2022-05-09 RX ADMIN — Medication 10 MG: at 21:52

## 2022-05-09 RX ADMIN — OLANZAPINE 10 MG: 5 TABLET, FILM COATED ORAL at 21:51

## 2022-05-09 RX ADMIN — RANOLAZINE 1000 MG: 500 TABLET, FILM COATED, EXTENDED RELEASE ORAL at 21:51

## 2022-05-09 RX ADMIN — PANTOPRAZOLE SODIUM 8 MG/HR: 40 INJECTION, POWDER, FOR SOLUTION INTRAVENOUS at 03:32

## 2022-05-09 RX ADMIN — Medication 10 ML: at 21:52

## 2022-05-09 RX ADMIN — ONDANSETRON HYDROCHLORIDE 4 MG: 4 TABLET, FILM COATED ORAL at 00:56

## 2022-05-09 RX ADMIN — ACETAMINOPHEN 500 MG: 500 TABLET ORAL at 10:33

## 2022-05-09 RX ADMIN — RIFAXIMIN 550 MG: 550 TABLET ORAL at 08:28

## 2022-05-09 RX ADMIN — TAMSULOSIN HYDROCHLORIDE 0.4 MG: 0.4 CAPSULE ORAL at 08:28

## 2022-05-09 RX ADMIN — GABAPENTIN 400 MG: 400 CAPSULE ORAL at 08:29

## 2022-05-09 RX ADMIN — PANTOPRAZOLE SODIUM 8 MG/HR: 40 INJECTION, POWDER, FOR SOLUTION INTRAVENOUS at 08:39

## 2022-05-09 RX ADMIN — LACTULOSE 30 G: 20 SOLUTION ORAL at 16:49

## 2022-05-09 RX ADMIN — RIFAXIMIN 550 MG: 550 TABLET ORAL at 21:51

## 2022-05-09 NOTE — PLAN OF CARE
Goal Outcome Evaluation:           Progress: no change  Outcome Evaluation: Patient has had a decent shift, sleeping on and off tonight. VSS, and patient has been alert and oriented times four. Patient has asked for pain medications twice thus far tonight for abdominal pain. Nursing staff will continue to monitor and assess the patient.

## 2022-05-09 NOTE — PLAN OF CARE
"Goal Outcome Evaluation:  Plan of Care Reviewed With: patient        Progress: no change  Outcome Evaluation: Palliative consult for support to pt and family. Pt spoke openly about chronic abdominal pain and neuropathy, has seen pain clinic in the past with methadone, but stopped going there \"several years ago.\" Reported her neurologist has discussed rotation from gabapentin to pregabalin for her neuropathy. Stated that she started drinking as a way to cope with her PTSD that resulted from a traumatic experience in the early 2000s. She said she has been sober for 2 1/2 years, and worries that her cirrhosis has worsened. Pt reported that due to the MI she had last summer, she was removed from the liver transplant list; she moved in with her parents after that admission, and says that when her ammonia rises and she becomes more encephalopathic, her parents have difficulty coping with her and she comes to the hospital. Her 14 year-old son also lives with her, her 22 year-old daugher does not. Her parents are named HCS in her LW, on file; encouraged her to discuss with them the parameters on interventions in the context of progressive terminal process. Pt reported she also has a bone disease, and has had bilateral hip and knee replacements. Pain medication regimen adjusted prior to Palliative consult by hospitalist per GI recommendations. Offered pt heat/cold pack, but she declined, saying that would not help. Pt stated that she wants to be comfortable as she faces the end of her life; advised that opioids are not the optimal management of chronic pain, but that when she reaches the stage of EOL care, hospice would be appropriate to assist with management of her pain and symptoms. Palliative APRN to see pt. Spiritual Care consult and have requested [facility] for Palliative to see pt; for support and outpatient support resources.    1330 Palliative IDT meeting: JES De La Cruz RN, CHPN; MINA Saavedra, APRN; ANURADHA Trujillo RN, CHPN; " ALBERTO Benson, RN, PN; NAOMIE Dial, CSW; DAVID Lipscomb DO; NAOMIE Burgos, RN    Problem: Palliative Care  Goal: Enhanced Quality of Life  Outcome: Ongoing, Progressing  Intervention: Promote Advance Care Planning  Flowsheets (Taken 5/9/2022 1640)  Life Transition/Adjustment: (encourage to consider and discuss with HCS (parents) parameters for interventions in context of terminal disease)   palliative care discussed   palliative care initiated   other (see comments)  Intervention: Maximize Comfort  Flowsheets (Taken 5/9/2022 1640)  Pain Management Interventions: (opioids discontinued, Tylenol ordered by Hospitalist per GI recommendation; Offered cold/heat; pt declined, saying it would not help.)   pain management plan reviewed with patient/caregiver   other (see comments)  Intervention: Optimize Function  Flowsheets (Taken 5/9/2022 1640)  Fatigue Management: paced activity encouraged  Sleep/Rest Enhancement: natural light exposure provided  Intervention: Optimize Psychosocial Wellbeing  Flowsheets (Taken 5/9/2022 1640)  Supportive Measures: (request sent to  covering for Palliative Care to see pt for psychosocial support and outpatient support options)   active listening utilized   positive reinforcement provided   relaxation techniques promoted   self-care encouraged   self-reflection promoted   self-responsibility promoted   verbalization of feelings encouraged   other (see comments)  Grieving Process Facilitation: reaction to loss explored  Spiritual Activities Assistance: (Spiritual Care consult) other (see comments)  Family/Support System Care: (No family or caregiver present at time of Palliative RN encounter) other (see comments)

## 2022-05-09 NOTE — OUTREACH NOTE
Medical Week 4 Survey    Flowsheet Row Responses   [facility] patient discharged from? [facility]   Does the patient have one of the following disease processes/diagnoses(primary or secondary)? Other   Week 4 attempt successful? No          BELLA MUSA - Registered Nurse

## 2022-05-09 NOTE — CASE MANAGEMENT/SOCIAL WORK
Continued Stay Note  [facility]     Patient Name: Timothy Guzman  MRN: [id number]  Today's Date: 5/9/2022    Admit Date: 5/8/2022     Discharge Plan     Row Name 05/09/22 1322       Plan    Plan Spoke with Timothy Guzman about the Medicaid [place] [facility] and explained that the patients information would be sent to the [facility] on Aging but she will be required to be out of the hospital to be assessed for the Medicaid [place] program.    Row Name 05/09/22 1226       Plan    Plan home    Patient/Family in Agreement with Plan yes    Plan Comments CM spoke with pt at bedside, Pt resides in [place] with her parents and is independent of adls. Pt denies use of DME and is not current with home health or outpatient medical services.Pt has [facility], denies concerns or disruption in coverage. Pt has prescription drug coverage and denies issues obtaining or affording current medications. CM discussed discharge needs as she reports her parents have a hard time caring for her if she becomes confused related to elevated labs due to Cirrhosis. Pt asked about Palliative care, MELBA explained some services as pt is wanting someone available in her home to assist with her care if her parents are unable to assist. CM explained that palliative care and home health services do not provide 24/7 care and a private caregiver is not covered by her medicaid insurance coverage unless she qualifies for a waiver program. CM has spoke with LAURA Frank to provide pt with waiver program service information, pt is appreciative. Pt denies additional needs at this time, CM will continue to follow.    Final Discharge Disposition Code 01 - home or self-care               Discharge Codes    No documentation.                     ELISE Driver

## 2022-05-09 NOTE — PAYOR COMM NOTE
"Bipin Osorio (46 y.o. Female)             Date of Birth   1975    Social Security Number       Address   [address] [facility] [address]    Home Phone   [phone]    MRN   [id number]       Confucianism   Restorationism    Marital Status                               Admission Date   5/8/22    Admission Type   Emergency    Admitting Provider   Hira Gardner MD    Attending Provider   Hira Gardner MD    Department, Room/Bed   [address], S217/1       Discharge Date       Discharge Disposition       Discharge Destination                               Attending Provider: Hira Gardner MD    Allergies: Contrast Dye, Haloperidol, Nsaids, Tylenol [Acetaminophen]    Isolation: None   Infection: COVID (rule out) (05/08/22)   Code Status: CPR   Advance Care Planning Activity    Ht: 160 cm (63\")   Wt: 88.9 kg (196 lb)    Admission Cmt: None   Principal Problem: None                Active Insurance as of 5/8/2022     Primary Coverage     Payor Plan Insurance Group Employer/Plan Group    [facility] [employer] [facility] BY [place] [id number]     Payor Plan Address Payor Plan Phone Number Payor Plan Fax Number Effective Dates    [address]   1/1/2021 - None Entered    [facility] [address]       Subscriber Name Subscriber Birth Date Member ID       BIPIN OSORIO 1975 [id number]                 Emergency Contacts      (Rel.) Home Phone Work Phone Mobile Phone    yanci osorio (Mother) [phone] -- [phone]    FREDO OSORIO (Father) [phone] -- [phone]            Treatment Team  Chat With All Active Members    Provider Relationship Specialty Contact    Hira Gardner MD  Attending, Consulting Physician Hospitalist  [phone]    Filiberto Díaz, RN  Registered Nurse --  [phone]    Precious Louis  Patient Care Technician --  [phone]    Guillermo Arthur PCT  Patient Care Technician --     Brigitte Sullivan RN  Registered Nurse --  " [phone]          Problem List           Codes Noted - Resolved       Hospital    GI bleed ICD-10-CM: K92.2  ICD-9-CM: 578.9 5/8/2022 - Present    History of esophageal varices ICD-10-CM: Z87.19  ICD-9-CM: V12.79 Unknown - Present    Alcoholic cirrhosis of liver without ascites (HCC) ICD-10-CM: K70.30  ICD-9-CM: 571.2 11/8/2019 - Present       Non-Hospital    Elevated serum creatinine ICD-10-CM: R79.89  ICD-9-CM: 790.99 4/7/2022 - Present    BRIANNA (acute kidney injury) (HCC) ICD-10-CM: N17.9  ICD-9-CM: 584.9 1/29/2022 - Present    Hyponatremia ICD-10-CM: E87.1  ICD-9-CM: 276.1 1/29/2022 - Present    Chronic pancreatitis (HCC) ICD-10-CM: K86.1  ICD-9-CM: 577.1 9/4/2021 - Present    Anxiety and depression ICD-10-CM: F41.9, F32.A  ICD-9-CM: 300.00, 311 9/4/2021 - Present    Alcoholism (HCC) ICD-10-CM: F10.20  ICD-9-CM: 303.90 9/4/2021 - Present    CAD (coronary artery disease) ICD-10-CM: I25.10  ICD-9-CM: 414.00 7/13/2021 - Present    GERD (gastroesophageal reflux disease) ICD-10-CM: K21.9  ICD-9-CM: 530.81 Unknown - Present    STEMI (ST elevation myocardial infarction) (HCC) ICD-10-CM: I21.3  ICD-9-CM: 410.90 Unknown - Present    Depression ICD-10-CM: F32.A  ICD-9-CM: 311 6/18/2021 - Present    Generalized abdominal pain ICD-10-CM: R10.84  ICD-9-CM: 789.07 3/30/2020 - Present    Hepatic encephalopathy (HCC) ICD-10-CM: K72.90  ICD-9-CM: 572.2 11/8/2019 - Present          History & Physical    No notes of this type exist for this encounter.         Vital Signs (last day)     Date/Time Temp Temp src Pulse Resp BP Patient Position SpO2    05/08/22 1929 98.3 (36.8) Oral 78 18 122/68 Lying 95    05/08/22 1900 -- -- 78 -- -- -- --    05/08/22 1723 98.4 (36.9) Oral 76 18 119/63 Lying 97    05/08/22 1700 -- -- 76 -- 110/81 -- 98    05/08/22 1631 -- -- 71 -- 119/72 -- 99    05/08/22 1600 -- -- 77 -- 135/87 -- 98    05/08/22 1531 -- -- 85 -- 119/96 -- 97    05/08/22 1506 -- -- 86 -- 129/84 -- 98    05/08/22 1434 98.4 (36.9)  Oral 99 20 118/95 Sitting 98          Prior to Admission Medications     Prescriptions Last Dose Informant Patient Reported? Taking?    aspirin 81 MG EC tablet   Yes No    Take 81 mg by mouth Daily.    B Complex Vitamins (vitamin b complex) capsule capsule   Yes No    Take 1 capsule by mouth Daily.    bisacodyl (DULCOLAX) 10 MG suppository   No No    Insert 1 suppository into the rectum Daily As Needed for Constipation.    bumetanide (BUMEX) 0.5 MG tablet Patient Taking Differently  No Yes    Take 3 tablets by mouth 2 (Two) Times a Day for 30 days.    Patient taking differently:  Take 2 mg by mouth 3 (Three) Times a Day.    clopidogrel (PLAVIX) 75 MG tablet   No No    Take 1 tablet by mouth Daily.    docusate sodium (Colace) 100 MG capsule   No No    Take 1 capsule by mouth 2 (Two) Times a Day.    FLUoxetine (PROzac) 40 MG capsule   No No    Take 1 capsule by mouth Daily for 30 days.    gabapentin (NEURONTIN) 400 MG capsule   No No    Take 1 capsule by mouth 3 (Three) Times a Day.    lactulose (CHRONULAC) 10 GM/15ML solution   No No    Take 45 mL by mouth 3 (Three) Times a Day.    lubiprostone (AMITIZA) 24 MCG capsule   Yes No    Take 1 capsule by mouth 2 (Two) Times a Day.    magnesium oxide (MAGOX) 400 (241.3 Mg) MG tablet tablet   Yes No    Take 400 mg by mouth Every Evening.    Melatonin 10 MG tablet   Yes No    Take 1 tablet by mouth Every Night.    Multivitamin tablet tablet   Yes No    Daily.    OLANZapine (zyPREXA) 10 MG tablet   No No    Take 1 tablet by mouth Every Night.    ondansetron (Zofran) 4 MG tablet   No No    Take 1 tablet by mouth Every 8 (Eight) Hours As Needed for Nausea or Vomiting for up to 30 doses.    pantoprazole (PROTONIX) 40 MG EC tablet   No No    Take 1 tablet by mouth 2 (Two) Times a Day Before Meals.    polyethylene glycol (MIRALAX) 17 g packet   No No    Take 17 g by mouth 2 (Two) Times a Day.    Patient taking differently:  Take 17 g by mouth Daily As Needed.    potassium chloride  (K-DUR,KLOR-CON) 20 MEQ CR tablet  Pharmacy Yes No    Take 1 tablet by mouth Daily.    prazosin (MINIPRESS) 1 MG capsule   Yes No    Take 1 capsule by mouth Daily.    ranolazine (RANEXA) 1000 MG 12 hr tablet   No No    Take 1 tablet by mouth 2 (Two) Times a Day for 30 days.    rosuvastatin (CRESTOR) 20 MG tablet   No No    Take 1 tablet by mouth Every Night.    spironolactone (ALDACTONE) 100 MG tablet   No No    Take 1 tablet by mouth Daily.    Patient taking differently:  Take 100 mg by mouth 3 (Three) Times a Day.    tamsulosin (FLOMAX) 0.4 MG capsule 24 hr capsule   No No    Take 1 capsule by mouth Daily.    traZODone (DESYREL) 50 MG tablet  Self No No    Take 1 tablet by mouth Every Night for 30 days.    Patient taking differently:  Take 100 mg by mouth Every Night. Pt reports increase of trazodone to 100mg nightly    ursodiol (ACTIGALL) 300 MG capsule   Yes No    Take 300 mg by mouth 2 (Two) Times a Day.    vitamin B-6 (PYRIDOXINE) 25 MG tablet   Yes No    Take 25 mg by mouth Daily.    Xifaxan 550 MG tablet   No No    Take 1 tablet by mouth Every 12 (Twelve) Hours.          Blood Administration Record (From admission, onward)    None          Lab Results (last 24 hours)     Procedure Component Value Units Date/Time    Leonard Draw [[id number]] Collected: 05/08/22 1451    Specimen: Blood Updated: 05/08/22 1903    Narrative:      The following orders were created for panel order Leonard Draw.  Procedure                               Abnormality         Status                     ---------                               -----------         ------                     Green Top (Gel)[[id number]]                                  Final result               Lavender Top[[id number]]                                     Final result               Gold Top - SST[[id number]]                                   Final result               Hernandes Top[[id number]]                                         Final result               Light  "Blue Top[[id number]]                                   Final result                 Please view results for these tests on the individual orders.    Hernandes Top [[id number]] Collected: 05/08/22 1451    Specimen: Blood Updated: 05/08/22 1903     Extra Tube Hold for add-ons.     Comment: Auto resulted.       Hemoglobin & Hematocrit, Blood [[id number]]  (Normal) Collected: 05/08/22 1808    Specimen: Blood Updated: 05/08/22 1814     Hemoglobin 13.5 g/dL      Hematocrit 38.8 %     Procalcitonin [[id number]]  (Normal) Collected: 05/08/22 1451    Specimen: Blood Updated: 05/08/22 1630     Procalcitonin 0.10 ng/mL     Narrative:      As a Marker for Sepsis (Non-Neonates):    1. <0.5 ng/mL represents a low risk of severe sepsis and/or septic shock.  2. >2 ng/mL represents a high risk of severe sepsis and/or septic shock.    As a Marker for Lower Respiratory Tract Infections that require antibiotic therapy:    PCT on Admission    Antibiotic Therapy       6-12 Hrs later    >0.5                Strongly Recommended  >0.25 - <0.5        Recommended   0.1 - 0.25          Discouraged              Remeasure/reassess PCT  <0.1                Strongly Discouraged     Remeasure/reassess PCT    As 28 day mortality risk marker: \"Change in Procalcitonin Result\" (>80% or <=80%) if Day 0 (or Day 1) and Day 4 values are available. Refer to http://www.Paradigms-pct-calculator.com    Change in PCT <=80%  A decrease of PCT levels below or equal to 80% defines a positive change in PCT test result representing a higher risk for 28-day all-cause mortality of patients diagnosed with severe sepsis for septic shock.    Change in PCT >80%  A decrease of PCT levels of more than 80% defines a negative change in PCT result representing a lower risk for 28-day all-cause mortality of patients diagnosed with severe sepsis or septic shock.       COVID-19 and FLU A/B PCR - Swab, Nasopharynx [[id number]]  (Normal) Collected: 05/08/22 1525    Specimen: Swab from " Nasopharynx Updated: 05/08/22 1628     COVID19 Not Detected     Influenza A PCR Not Detected     Influenza B PCR Not Detected    Narrative:      Fact sheet for providers: https://www.fda.gov/media/045189/download    Fact sheet for patients: https://www.fda.gov/media/654198/download    Test performed by PCR.    Lavender Top [[id number]] Collected: 05/08/22 1451    Specimen: Blood Updated: 05/08/22 1604     Extra Tube hold for add-on     Comment: Auto resulted       Light Blue Top [[id number]] Collected: 05/08/22 1451    Specimen: Blood Updated: 05/08/22 1604     Extra Tube hold for add-on     Comment: Auto resulted       Green Top (Gel) [[id number]] Collected: 05/08/22 1451    Specimen: Blood Updated: 05/08/22 1604     Extra Tube Hold for add-ons.     Comment: Auto resulted.       Gold Top - SST [[id number]] Collected: 05/08/22 1451    Specimen: Blood Updated: 05/08/22 1604     Extra Tube Hold for add-ons.     Comment: Auto resulted.       Ammonia [[id number]]  (Normal) Collected: 05/08/22 1544    Specimen: Blood Updated: 05/08/22 1603     Ammonia 23 umol/L     Blood Culture - Blood, Arm, Left [[id number]] Collected: 05/08/22 1528    Specimen: Blood from Arm, Left Updated: 05/08/22 1542    Blood Culture - Blood, Arm, Right [[id number]] Collected: 05/08/22 1515    Specimen: Blood from Arm, Right Updated: 05/08/22 1542    Protime-INR [[id number]]  (Normal) Collected: 05/08/22 1451    Specimen: Blood Updated: 05/08/22 1539     Protime 14.0 Seconds      INR 1.09    Troponin [[id number]]  (Normal) Collected: 05/08/22 1451    Specimen: Blood Updated: 05/08/22 1532     Troponin T <0.010 ng/mL     Narrative:      Troponin T Reference Range:  <= 0.03 ng/mL-   Negative for AMI  >0.03 ng/mL-     Abnormal for myocardial necrosis.  Clinicians would have to utilize clinical acumen, EKG, Troponin and serial changes to determine if it is an Acute Myocardial Infarction or myocardial injury due to an underlying chronic condition.       Results  may be falsely decreased if patient taking Biotin.      Lipase [[id number]]  (Normal) Collected: 05/08/22 1451    Specimen: Blood Updated: 05/08/22 1528     Lipase 20 U/L     Magnesium [[id number]]  (Normal) Collected: 05/08/22 1451    Specimen: Blood Updated: 05/08/22 1528     Magnesium 1.7 mg/dL     Lactic Acid, Plasma [[id number]]  (Normal) Collected: 05/08/22 1451    Specimen: Blood Updated: 05/08/22 1524     Lactate 1.5 mmol/L      Comment: Falsely depressed results may occur on samples drawn from patients receiving N-Acetylcysteine (NAC) or Metamizole.       Comprehensive Metabolic Panel [[id number]] Collected: 05/08/22 1451    Specimen: Blood Updated: 05/08/22 1521     Glucose 96 mg/dL      BUN 10 mg/dL      Creatinine 0.91 mg/dL      Sodium 138 mmol/L      Potassium 3.8 mmol/L      Chloride 104 mmol/L      CO2 24.0 mmol/L      Calcium 9.3 mg/dL      Total Protein 7.1 g/dL      Albumin 4.40 g/dL      ALT (SGPT) 29 U/L      AST (SGOT) 26 U/L      Alkaline Phosphatase 102 U/L      Total Bilirubin 0.5 mg/dL      Globulin 2.7 gm/dL      Comment: Calculated Result        A/G Ratio 1.6 g/dL      BUN/Creatinine Ratio 11.0     Anion Gap 10.0 mmol/L      eGFR 78.5 mL/min/1.73      Comment: National Kidney Foundation and American Society of Nephrology (ASN) Task Force recommended calculation based on the Chronic Kidney Disease Epidemiology Collaboration (CKD-EPI) equation refit without adjustment for race.       Narrative:      GFR Normal >60  Chronic Kidney Disease <60  Kidney Failure <15      CBC & Differential [[id number]]  (Abnormal) Collected: 05/08/22 1451    Specimen: Blood Updated: 05/08/22 1506    Narrative:      The following orders were created for panel order CBC & Differential.  Procedure                               Abnormality         Status                     ---------                               -----------         ------                     CBC Auto Differential[[id number]]        Abnormal             Final result               Scan Slide[[id number]]                                                                    Please view results for these tests on the individual orders.    CBC Auto Differential [[id number]]  (Abnormal) Collected: 05/08/22 1451    Specimen: Blood Updated: 05/08/22 1505     WBC 6.92 10*3/mm3      RBC 4.75 10*6/mm3      Hemoglobin 14.5 g/dL      Hematocrit 42.1 %      MCV 88.6 fL      MCH 30.5 pg      MCHC 34.4 g/dL      RDW 13.0 %      RDW-SD 42.0 fl      MPV 11.2 fL      Platelets 105 10*3/mm3      Neutrophil % 61.2 %      Lymphocyte % 25.7 %      Monocyte % 11.6 %      Eosinophil % 0.7 %      Basophil % 0.4 %      Immature Grans % 0.4 %      Neutrophils, Absolute 4.23 10*3/mm3      Lymphocytes, Absolute 1.78 10*3/mm3      Monocytes, Absolute 0.80 10*3/mm3      Eosinophils, Absolute 0.05 10*3/mm3      Basophils, Absolute 0.03 10*3/mm3      Immature Grans, Absolute 0.03 10*3/mm3      nRBC 0.0 /100 WBC         Imaging Results (Last 24 Hours)     Procedure Component Value Units Date/Time    CT Abdomen Pelvis Without Contrast [[id number]] Collected: 05/08/22 1655     Updated: 05/08/22 1706    Narrative:      CT ABDOMEN PELVIS WO CONTRAST-     Date of Exam: 5/8/2022 4:11 PM     Indication: GI bleed, lower.       Comparison Exams: None available.     Technique: Axial images from the base of the chest through the abdomen  and pelvis were obtained without intravenous contrast. Sagittal and  coronal reformatted images also performed. Automated exposure control  and iterative reconstruction methods were used.  Radiation audit for  number of CT and nuclear cardiology exams performed in the last year:   0.       FINDINGS:     No evidence of pneumonia or other acute process in the lung bases. No  pleural effusion. Left lower lobe subcentimeter nodule again noted, also  present on a remote CT from March 2020     No ureteral stone or hydronephrosis of either kidney. No evidence of  pancreatitis or other  acute noncontrast findings of the organs  throughout the abdomen. Cholecystomy again noted.     No bowel obstruction. No evidence of colitis or appendicitis. No  diverticulitis.     No abscess or free air. No significant free fluid.     No evidence of acute processes in the pelvis. Bladder appears within  normal limits.     No fracture or other acute osseous findings. No acute superficial soft  tissue abnormality.          Impression:         Negative CT examination as above. No evidence of acute process in the  abdomen/pelvis.     This report was finalized on 5/8/2022 5:03 PM by Akhil Petersen.       XR Chest 1 View [[id number]] Collected: 05/08/22 1625     Updated: 05/08/22 1629    Narrative:      EXAM: XR CHEST 1 VW-     DATE OF EXAM: 5/8/2022 3:32 PM     INDICATION: GI bleed.       COMPARISONS: 4/6/2022      FINDINGS:     No evidence of pneumonia, pulmonary edema or other acute pulmonary  process. No evidence of pneumothorax or significant pleural effusion. No  evidence of acute process of the heart or other mediastinal structures.       Impression:         Negative chest x-ray. No evidence of acute cardiopulmonary disease.     This report was finalized on 5/8/2022 4:26 PM by Akhil Petersen.           Orders (last 24 hrs)      Start     Ordered    05/09/22 0702  Inpatient Gastroenterology Consult  IN AM        Specialty:  Gastroenterology  Provider:  (Not yet assigned)    05/08/22 1724 05/09/22 0600  Basic Metabolic Panel  Morning Draw         05/08/22 1724 05/08/22 2100  ranolazine (RANEXA) 12 hr tablet 1,000 mg  2 Times Daily         05/08/22 1724 05/08/22 2100  rosuvastatin (CRESTOR) tablet 20 mg  Nightly         05/08/22 1724    05/08/22 2100  traZODone (DESYREL) tablet 100 mg  Nightly         05/08/22 1724    05/08/22 2100  riFAXIMin (XIFAXAN) tablet 550 mg  Every 12 Hours Scheduled         05/08/22 1724 05/08/22 2100  ursodiol (ACTIGALL) capsule 300 mg  2 Times Daily         05/08/22 1724 05/08/22  2100  terazosin (HYTRIN) capsule 1 mg  Nightly         05/08/22 1724    05/08/22 2100  OLANZapine (zyPREXA) tablet 10 mg  Nightly         05/08/22 1724    05/08/22 2100  melatonin tablet 10 mg  Nightly         05/08/22 1724    05/08/22 2100  lactulose (CHRONULAC) 10 GM/15ML solution 30 g  3 Times Daily         05/08/22 1724 05/08/22 2100  gabapentin (NEURONTIN) capsule 400 mg  3 Times Daily         05/08/22 1724 05/08/22 2100  bumetanide (BUMEX) tablet 1.5 mg  2 Times Daily         05/08/22 1724 05/08/22 2100  lubiprostone (AMITIZA) capsule 24 mcg  2 Times Daily         05/08/22 1724 05/08/22 2100  sodium chloride 0.9 % flush 10 mL  Every 12 Hours Scheduled         05/08/22 1724    05/08/22 2000  Vital Signs  Every 4 Hours       05/08/22 1724 05/08/22 1847  Critical Care  Once        Comments: This order was created via procedure documentation    05/08/22 1846 05/08/22 1815  magnesium oxide (MAG-OX) tablet 400 mg  Every Evening         05/08/22 1724    05/08/22 1800  FLUoxetine (PROzac) capsule 40 mg  Daily         05/08/22 1724    05/08/22 1800  clopidogrel (PLAVIX) tablet 75 mg  Daily,   Status:  Discontinued         05/08/22 1724    05/08/22 1800  aspirin EC tablet 81 mg  Daily,   Status:  Discontinued         05/08/22 1724    05/08/22 1800  potassium chloride (KLOR-CON) packet 20 mEq  Daily         05/08/22 1724    05/08/22 1800  tamsulosin (FLOMAX) 24 hr capsule 0.4 mg  Daily         05/08/22 1724    05/08/22 1800  spironolactone (ALDACTONE) tablet 100 mg  Daily         05/08/22 1724    05/08/22 1800  Hemoglobin & Hematocrit, Blood  Every 6 Hours       05/08/22 1724    05/08/22 1725  Intake & Output  Every Shift       05/08/22 1724    05/08/22 1725  Weigh Patient  Once         05/08/22 1724    05/08/22 1725  Oxygen Therapy- Nasal Cannula; Titrate for SPO2: 90% - 95%  Continuous         05/08/22 1724    05/08/22 1725  Insert Peripheral IV  Once         05/08/22 1724    05/08/22 1725  Saline  "Lock & Maintain IV Access  Continuous         05/08/22 1724    05/08/22 1725  Initiate Electrolyte Replacement Protocol  Until Discontinued         05/08/22 1724    05/08/22 1725  Patient Currently On Electrolyte Replacement Protocol - Please Refer to MAR for Protocol Details  Misc Nursing Order (Specify)  Daily      Comments: Patient Currently On Electrolyte Replacement Protocol - Please Refer to MAR for Protocol Details    05/08/22 1724    05/08/22 1725  Place Sequential Compression Device  Once         05/08/22 1724    05/08/22 1725  Maintain Sequential Compression Device  Continuous         05/08/22 1724    05/08/22 1725  Pulse Oximetry, Continuous  Continuous         05/08/22 1724    05/08/22 1725  Cardiac Monitoring  Continuous         05/08/22 1724    05/08/22 1724  sodium chloride 0.9 % flush 10 mL  As Needed         05/08/22 1724    05/08/22 1724  potassium chloride (MICRO-K) CR capsule 40 mEq  As Needed        \"Or\" Linked Group Details    05/08/22 1724    05/08/22 1724  potassium chloride (KLOR-CON) packet 40 mEq  As Needed        \"Or\" Linked Group Details    05/08/22 1724    05/08/22 1724  potassium chloride 10 mEq in 100 mL IVPB  Every 1 Hour PRN        \"Or\" Linked Group Details    05/08/22 1724    05/08/22 1724  HYDROmorphone (DILAUDID) injection 0.25 mg  Every 4 Hours PRN         05/08/22 1724    05/08/22 1724  polyethylene glycol (MIRALAX) packet 17 g  Daily PRN         05/08/22 1724    05/08/22 1724  ondansetron (ZOFRAN) tablet 4 mg  Every 8 Hours PRN         05/08/22 1724    05/08/22 1710  Code Status and Medical Interventions:  Continuous         05/08/22 1716    05/08/22 1636  HYDROmorphone (DILAUDID) injection 0.25 mg  Every 3 Hours PRN,   Status:  Discontinued         05/08/22 1636    05/08/22 1617  Inpatient Admission  Once         05/08/22 1617    05/08/22 1600  methylPREDNISolone sodium succinate (SOLU-Medrol) injection 40 mg  Every 4 Hours Scheduled         05/08/22 1507    05/08/22 1600  " pantoprazole (PROTONIX) 40 mg in sodium chloride 0.9 % 100 mL (0.4 mg/mL) infusion  Continuous         05/08/22 1508    05/08/22 1600  octreotide (sandoSTATIN) 500 mcg in sodium chloride 0.9 % 100 mL (5 mcg/mL) infusion  Continuous         05/08/22 1508    05/08/22 1551  CT Abdomen Pelvis Without Contrast  1 Time Imaging         05/08/22 1551    05/08/22 1538  ondansetron (ZOFRAN) injection 4 mg  Once         05/08/22 1536    05/08/22 1538  HYDROmorphone (DILAUDID) injection 0.5 mg  Once         05/08/22 1536    05/08/22 1510  pantoprazole (PROTONIX) injection 40 mg  Once         05/08/22 1508    05/08/22 1510  octreotide (sandoSTATIN) injection 50 mcg  Once,   Status:  Discontinued         05/08/22 1508    05/08/22 1510  sodium chloride 0.9 % bolus 500 mL  Once         05/08/22 1508    05/08/22 1510  cefTRIAXone (ROCEPHIN) 1 g/100 mL 0.9% NS (MBP)  Once         05/08/22 1508    05/08/22 1509  diphenhydrAMINE (BENADRYL) injection 50 mg  Once         05/08/22 1507    05/08/22 1509  Ammonia  Once         05/08/22 1508    05/08/22 1509  XR Chest 1 View  1 Time Imaging         05/08/22 1508    05/08/22 1508  CT Abdomen Pelvis With & Without Contrast  1 Time Imaging,   Status:  Canceled         05/08/22 1507    05/08/22 1507  Protime-INR  Once         05/08/22 1507    05/08/22 1507  Lipase  Once         05/08/22 1507    05/08/22 1507  Blood Culture - Blood, Arm, Left  Once         05/08/22 1507    05/08/22 1507  Blood Culture - Blood, Arm, Right  Once         05/08/22 1507    05/08/22 1507  Lactic Acid, Plasma  Once         05/08/22 1507    05/08/22 1507  Procalcitonin  Once         05/08/22 1507    05/08/22 1507  Troponin  Once         05/08/22 1507    05/08/22 1507  Magnesium  Once         05/08/22 1507    05/08/22 1507  COVID-19 and FLU A/B PCR - Swab, Nasopharynx  Once         05/08/22 1507    05/08/22 1504  Scan Slide  Once,   Status:  Canceled         05/08/22 1503    05/08/22 1439  NPO Diet NPO Type: Strict NPO   Diet Effective Now,   Status:  Canceled         05/08/22 1438    05/08/22 1439  Undress and Gown  Once         05/08/22 1438    05/08/22 1439  Cardiac Monitoring  Continuous,   Status:  Canceled         05/08/22 1438    05/08/22 1439  Pulse Oximetry  Per Hospital Policy         05/08/22 1438    05/08/22 1439  Measure Blood Pressure  Per Hospital Policy         05/08/22 1438    05/08/22 1439  Vital Signs  Per Hospital Policy         05/08/22 1438    05/08/22 1439  Orthostatic Blood Pressure  Once,   Status:  Canceled         05/08/22 1438    05/08/22 1439  Insert Peripheral IV  Once         05/08/22 1438    05/08/22 1439  [place] Draw  Once         05/08/22 1438    05/08/22 1439  CBC & Differential  Once         05/08/22 1438    05/08/22 1439  Comprehensive Metabolic Panel  Once         05/08/22 1438    05/08/22 1439  Type & Screen  Once         05/08/22 1438    05/08/22 1439  POCT Urine Pregnancy  Once,   Status:  Canceled         05/08/22 1438    05/08/22 1439  Green Top (Gel)  PROCEDURE ONCE         05/08/22 1438    05/08/22 1439  Lavender Top  PROCEDURE ONCE         05/08/22 1438    05/08/22 1439  Gold Top - SST  PROCEDURE ONCE         05/08/22 1438    05/08/22 1439  [place] Top  PROCEDURE ONCE         05/08/22 1438    05/08/22 1439  Light Blue Top  PROCEDURE ONCE         05/08/22 1438    05/08/22 1439  CBC Auto Differential  PROCEDURE ONCE         05/08/22 1438    05/08/22 1438  Oxygen Therapy- Nasal Cannula; 2 LPM; Titrate for SPO2: equal to or greater than, 92%  Continuous PRN,   Status:  Canceled       05/08/22 1438    05/08/22 1438  sodium chloride 0.9 % flush 10 mL  As Needed         05/08/22 1438    Unscheduled  Magnesium  As Needed       05/08/22 1724    Unscheduled  Potassium  As Needed       05/08/22 1724    Unscheduled  Up With Assistance  As Needed       05/08/22 1724                Operative/Procedure Notes (last 24 hours)  Notes from 05/07/22 2011 through 05/08/22 2011   No notes of this type exist for  this encounter.         Physician Progress Notes (last 24 hours)  Notes from 05/07/22 2012 through 05/08/22 2012   No notes of this type exist for this encounter.

## 2022-05-09 NOTE — CONSULTS
" visited with patient at the bedside. Patient is concerned about an abnormal mammogram and also states \"my health is declining and I won't be around much longer.\" Her principal goal is to arrange for physical in-home care for the end of her life. She has a Living Will and has made additional arrangements in anticipation of her life ending. Patient states, \"I'm sober now and I'm a [employer] and I know where I'm going when I die.\"  provided emotional and spiritual support.  "

## 2022-05-09 NOTE — PROGRESS NOTES
[facility] Medicine Services  PROGRESS NOTE    Patient Name: Timothy Guzman  : 1975  MRN: [id number]    Date of Admission: 2022  Primary Care Physician: Toshia Mitchell APRN    Subjective   Subjective     CC:  GIB concern     HPI:  Doing fine. Asking for IV pain medications multiple times. Denies bleeding while here.  Hypotensive this AM to 80/50s     ROS:  Gen- No fevers, chills  CV- No chest pain, palpitations  Resp- No cough, dyspnea  GI- No N/V/D, abd pain        Objective   Objective     Vital Signs:   Temp:  [97.7 °F (36.5 °C)-98.4 °F (36.9 °C)] 97.7 °F (36.5 °C)  Heart Rate:  [66-99] 68  Resp:  [18-20] 18  BP: ()/(53-96) 88/53  Flow (L/min):  [2] 2     Physical Exam:  GEN: NAD, resting in bed, awake  HEENT: on room air, atraumatic, normocephalic  NECK: supple, no masses  RESP: on room air, normal effort  CV: on tele, sinus rhythm  PSYCH: normal affect, appropriate  NEURO: awake, alert, no focal deficits noted  MSK: no edema noted  SKIN: no rashes noted       Results Reviewed:  LAB RESULTS:      Lab 22  0509 22  0001 22  1808 22  1451   WBC  --   --   --  6.92   HEMOGLOBIN 13.0 13.4 13.5 14.5   HEMATOCRIT 37.1 38.1 38.8 42.1   PLATELETS  --   --   --  105*   NEUTROS ABS  --   --   --  4.23   IMMATURE GRANS (ABS)  --   --   --  0.03   LYMPHS ABS  --   --   --  1.78   MONOS ABS  --   --   --  0.80   EOS ABS  --   --   --  0.05   MCV  --   --   --  88.6   PROCALCITONIN  --   --   --  0.10   LACTATE  --   --   --  1.5   PROTIME  --   --   --  14.0         Lab 22  0509 22  1451   SODIUM 132* 138   POTASSIUM 4.6 3.8   CHLORIDE 101 104   CO2 19.0* 24.0   ANION GAP 12.0 10.0   BUN 13 10   CREATININE 0.94 0.91   EGFR 75.5 78.5   GLUCOSE 173* 96   CALCIUM 8.4* 9.3   MAGNESIUM  --  1.7         Lab 22  1451   TOTAL PROTEIN 7.1   ALBUMIN 4.40   GLOBULIN 2.7   ALT (SGPT) 29   AST (SGOT) 26   BILIRUBIN 0.5   ALK PHOS 102   LIPASE  20         Lab 05/08/22  1451   TROPONIN T <0.010   PROTIME 14.0   INR 1.09             Lab 05/08/22  1452   ABO TYPING A   RH TYPING Positive   ANTIBODY SCREEN Negative         Brief Urine Lab Results  (Last result in the past 365 days)      Color   Clarity   Blood   Leuk Est   Nitrite   Protein   CREAT   Urine HCG        04/07/22 0342 Yellow   Clear   Negative   Trace   Negative   Negative                 Microbiology Results Abnormal     Procedure Component Value - Date/Time    COVID-19 and FLU A/B PCR - Swab, Nasopharynx [[id number]]  (Normal) Collected: 05/08/22 1525    Lab Status: Final result Specimen: Swab from Nasopharynx Updated: 05/08/22 1628     COVID19 Not Detected     Influenza A PCR Not Detected     Influenza B PCR Not Detected    Narrative:      Fact sheet for providers: https://www.fda.gov/media/604459/download    Fact sheet for patients: https://www.fda.gov/media/497791/download    Test performed by PCR.          CT Abdomen Pelvis Without Contrast    Result Date: 5/8/2022  CT ABDOMEN PELVIS WO CONTRAST-  Date of Exam: 5/8/2022 4:11 PM  Indication: GI bleed, lower.   Comparison Exams: None available.  Technique: Axial images from the base of the chest through the abdomen and pelvis were obtained without intravenous contrast. Sagittal and coronal reformatted images also performed. Automated exposure control and iterative reconstruction methods were used.  Radiation audit for number of CT and nuclear cardiology exams performed in the last year: 0.   FINDINGS:  No evidence of pneumonia or other acute process in the lung bases. No pleural effusion. Left lower lobe subcentimeter nodule again noted, also present on a remote CT from March 2020  No ureteral stone or hydronephrosis of either kidney. No evidence of pancreatitis or other acute noncontrast findings of the organs throughout the abdomen. Cholecystomy again noted.  No bowel obstruction. No evidence of colitis or appendicitis. No diverticulitis.  No  abscess or free air. No significant free fluid.  No evidence of acute processes in the pelvis. Bladder appears within normal limits.  No fracture or other acute osseous findings. No acute superficial soft tissue abnormality.       Impression:  Negative CT examination as above. No evidence of acute process in the abdomen/pelvis.  This report was finalized on 5/8/2022 5:03 PM by Akhil Petersen.      XR Chest 1 View    Result Date: 5/8/2022  EXAM: XR CHEST 1 VW-  DATE OF EXAM: 5/8/2022 3:32 PM  INDICATION: GI bleed.   COMPARISONS: 4/6/2022  FINDINGS:  No evidence of pneumonia, pulmonary edema or other acute pulmonary process. No evidence of pneumothorax or significant pleural effusion. No evidence of acute process of the heart or other mediastinal structures.      Impression:  Negative chest x-ray. No evidence of acute cardiopulmonary disease.  This report was finalized on 5/8/2022 4:26 PM by Akhil Petersen.        Results for orders placed during the hospital encounter of 06/18/21    Adult Transthoracic Echo Complete W/ Cont if Necessary Per Protocol    Interpretation Summary  · The quality of the study is limited due to patient positioning and patient being intubated.  · Left ventricular ejection fraction appears to be 51 - 55%. Left ventricular systolic function is normal.  · Left ventricular diastolic function is consistent with (grade Ia w/high LAP) impaired relaxation.  · Mildly reduced right ventricular systolic function noted.      I have reviewed the medications:  Scheduled Meds:FLUoxetine, 40 mg, Oral, Daily  gabapentin, 400 mg, Oral, TID  lactulose, 30 g, Oral, TID  lubiprostone, 24 mcg, Oral, BID  magnesium oxide, 400 mg, Oral, Q PM  melatonin, 10 mg, Oral, Nightly  OLANZapine, 10 mg, Oral, Nightly  pantoprazole, 40 mg, Oral, BID AC  potassium chloride, 20 mEq, Oral, Daily  ranolazine, 1,000 mg, Oral, BID  riFAXIMin, 550 mg, Oral, Q12H  rosuvastatin, 20 mg, Oral, Nightly  sodium chloride, 10 mL, Intravenous,  Q12H  tamsulosin, 0.4 mg, Oral, Daily  terazosin, 1 mg, Oral, Nightly  traZODone, 100 mg, Oral, Nightly  ursodiol, 300 mg, Oral, BID      Continuous Infusions:   PRN Meds:.•  acetaminophen  •  HYDROmorphone  •  ondansetron  •  polyethylene glycol  •  potassium chloride **OR** potassium chloride **OR** potassium chloride  •  sodium chloride  •  sodium chloride    Assessment/Plan   Assessment & Plan     Active Hospital Problems    Diagnosis  POA   • Upper GI bleed [K92.2]  Yes   • GI bleed [K92.2]  Yes   • History of esophageal varices [Z87.19]  Not Applicable   • Alcoholic cirrhosis of liver without ascites (HCC) [K70.30]  Yes      Resolved Hospital Problems   No resolved problems to display.        Brief Hospital Course to date:  Timothy Guzman is a 46 y.o. female with PMH etoh cirrhosis, esophageal varices, CAD and pancreatitis who presented to the ED with chief complaint of abdominal pain since Thursday and it worsened this am with coffee ground emesis and melena.  She was admitted to same issues in early April and was seen by GI, was scoped and placed on BID PPI.  She is being admitted to hospitalist service    This patient's problems and plans were partially entered by my partner and updated as appropriate by me 05/09/22.        Coffee-ground emesis/Melena reported at home  --started on octreotide and IV PPI- as well as rocephin- will discontinue based on GI recs and place on 40 mg BID PO PPI  --trend H&H, currently stable at 13. Patient also had EGD last month findings of mild gastritis  --GI following appreciate their assistance  --CLD  --labs in AM    Hypotension  --ongoing concern for overdiuresis, will hold diuresis and will also discontinue pain medications as per GI recommendations  --patient can take tylenol up to 2gm daily for pain safely in cirrhosis, 500 q6hprn added     Alcoholic cirrhosis  Esophageal varices  --as above, hold diuretics for now  --continue lactulose/rifaximin  --GI recommending  weight loss for fatty liver, counseled on this, will place nutrition consult as well     CAD  --continue plavix and asa for now       DVT prophylaxis:  Mechanical DVT prophylaxis orders are present.       AM-PAC 6 Clicks Score (PT): 22 (05/09/22 0800)    Disposition: I expect the patient to be discharged pending improvement in BP- hopefully soon    CODE STATUS:   Code Status and Medical Interventions:   Ordered at: 05/08/22 0934     Code Status (Patient has no pulse and is not breathing):    CPR (Attempt to Resuscitate)     Medical Interventions (Patient has pulse or is breathing):    Full Support       Precious Izquierdo MD  05/09/22

## 2022-05-09 NOTE — CASE MANAGEMENT/SOCIAL WORK
Discharge Planning Assessment  [facility]     Patient Name: Timothy Guzman  MRN: [id number]  Today's Date: 5/9/2022    Admit Date: 5/8/2022     Discharge Needs Assessment     Row Name 05/09/22 1224       Living Environment    People in Home parent(s)  pt resides in [facility]    Name(s) of People in Home mother Alana and father Tirso    Current Living Arrangements home    Primary Care Provided by self    Provides Primary Care For no one    Family Caregiver if Needed parent(s)    Family Caregiver Names mother Alana Guzman    Quality of Family Relationships helpful;involved;supportive    Able to Return to Prior Arrangements yes       Resource/Environmental Concerns    Resource/Environmental Concerns none    Transportation Concerns none       Transition Planning    Patient/Family Anticipates Transition to home with family    Patient/Family Anticipated Services at Transition none       Discharge Needs Assessment    Readmission Within the Last 30 Days previous discharge plan unsuccessful    Equipment Currently Used at Home none    Concerns to be Addressed discharge planning    Anticipated Changes Related to Illness none    Equipment Needed After Discharge none    Provided Post Acute Provider List? N/A    Provided Post Acute Provider Quality & Resource List? N/A               Discharge Plan     Row Name 05/09/22 1226       Plan    Plan home    Patient/Family in Agreement with Plan yes    Plan Comments CM spoke with pt at bedside, Pt resides in [facility] with her parents and is independent of adls. Pt denies use of DME and is not current with home health or outpatient medical services.[facility], denies concerns or disruption in coverage. Pt has prescription drug coverage and denies issues obtaining or affording current medications. MELBA discussed discharge needs as she reports her parents have a hard time caring for her if she becomes confused related to elevated labs due to Cirrhosis.  Pt asked about Palliative care, CM explained some services as pt is wanting someone available in her home to assist with her care if her parents are unable to assist. CM explained that palliative care and home health services do not provide 24/7 care and a private caregiver is not covered by her medicaid insurance coverage unless she qualifies for a waiver program. CM has spoke with LAURA Frank to provide pt with waiver program service information, pt is appreciative. Pt denies additional needs at this time, CM will continue to follow.    Final Discharge Disposition Code 01 - home or self-care              Continued Care and Services - Admitted Since 5/8/2022    Coordination has not been started for this encounter.          Demographic Summary     Row Name 05/09/22 1221       General Information    Referral Source admission list    Reason for Consult discharge planning    Preferred Language English    General Information Comments PCP- Toshia Mitchell               Functional Status     Row Name 05/09/22 1221       Functional Status    Usual Activity Tolerance good    Current Activity Tolerance moderate       Functional Status, IADL    Medications independent    Meal Preparation independent    Housekeeping independent    Laundry independent    Shopping independent       Mental Status    General Appearance WDL WDL       Mental Status Summary    Recent Changes in Mental Status/Cognitive Functioning no changes    Mental Status Comments pt reports she does get confused and her parents have a hard time taking care of her when her labs become elevated due to her cirrhosis.       Employment/    Employment Status disabled    Employment/ Comments Pt has [employer] health insruance, denies concerns or disruption in coverage. Pt has prescription drug coverage and denies issues obtaining or affording current medications.               Psychosocial    No documentation.                Abuse/Neglect    No  documentation.                Legal    No documentation.                Substance Abuse    No documentation.                Patient Forms    No documentation.                   Tracy He RN

## 2022-05-09 NOTE — PLAN OF CARE
Goal Outcome Evaluation:  Plan of Care Reviewed With: patient        Progress: improving   VSS, Ra, A/Ox4. Pt continues to complain of abdominal pain. No complaints of nausea or vomiting. Pt tolerating diet well. Currently resting in bed. Flash continue plan of care.

## 2022-05-10 LAB
ALBUMIN SERPL-MCNC: 3.3 G/DL (ref 3.5–5.2)
ALBUMIN/GLOB SERPL: 1.9 G/DL
ALP SERPL-CCNC: 73 U/L (ref 39–117)
ALT SERPL W P-5'-P-CCNC: 18 U/L (ref 1–33)
ANION GAP SERPL CALCULATED.3IONS-SCNC: 8 MMOL/L (ref 5–15)
AST SERPL-CCNC: 13 U/L (ref 1–32)
BASOPHILS # BLD AUTO: 0 10*3/MM3 (ref 0–0.2)
BASOPHILS NFR BLD AUTO: 0 % (ref 0–1.5)
BILIRUB SERPL-MCNC: 0.2 MG/DL (ref 0–1.2)
BUN SERPL-MCNC: 10 MG/DL (ref 6–20)
BUN/CREAT SERPL: 11.9 (ref 7–25)
CALCIUM SPEC-SCNC: 7.7 MG/DL (ref 8.6–10.5)
CHLORIDE SERPL-SCNC: 112 MMOL/L (ref 98–107)
CO2 SERPL-SCNC: 24 MMOL/L (ref 22–29)
CREAT SERPL-MCNC: 0.84 MG/DL (ref 0.57–1)
DEPRECATED RDW RBC AUTO: 43.8 FL (ref 37–54)
EGFRCR SERPLBLD CKD-EPI 2021: 86.4 ML/MIN/1.73
EOSINOPHIL # BLD AUTO: 0.01 10*3/MM3 (ref 0–0.4)
EOSINOPHIL NFR BLD AUTO: 0.1 % (ref 0.3–6.2)
ERYTHROCYTE [DISTWIDTH] IN BLOOD BY AUTOMATED COUNT: 13.5 % (ref 12.3–15.4)
GLOBULIN UR ELPH-MCNC: 1.7 GM/DL
GLUCOSE SERPL-MCNC: 88 MG/DL (ref 65–99)
HCT VFR BLD AUTO: 31.7 % (ref 34–46.6)
HCT VFR BLD AUTO: 32.6 % (ref 34–46.6)
HCT VFR BLD AUTO: 33.4 % (ref 34–46.6)
HCT VFR BLD AUTO: 35.1 % (ref 34–46.6)
HGB BLD-MCNC: 10.7 G/DL (ref 12–15.9)
HGB BLD-MCNC: 11.1 G/DL (ref 12–15.9)
HGB BLD-MCNC: 11.4 G/DL (ref 12–15.9)
HGB BLD-MCNC: 11.9 G/DL (ref 12–15.9)
IMM GRANULOCYTES # BLD AUTO: 0.03 10*3/MM3 (ref 0–0.05)
IMM GRANULOCYTES NFR BLD AUTO: 0.4 % (ref 0–0.5)
LYMPHOCYTES # BLD AUTO: 1.47 10*3/MM3 (ref 0.7–3.1)
LYMPHOCYTES NFR BLD AUTO: 18 % (ref 19.6–45.3)
MCH RBC QN AUTO: 30 PG (ref 26.6–33)
MCHC RBC AUTO-ENTMCNC: 33.9 G/DL (ref 31.5–35.7)
MCV RBC AUTO: 88.4 FL (ref 79–97)
MONOCYTES # BLD AUTO: 0.65 10*3/MM3 (ref 0.1–0.9)
MONOCYTES NFR BLD AUTO: 8 % (ref 5–12)
NEUTROPHILS NFR BLD AUTO: 5.99 10*3/MM3 (ref 1.7–7)
NEUTROPHILS NFR BLD AUTO: 73.5 % (ref 42.7–76)
NRBC BLD AUTO-RTO: 0 /100 WBC (ref 0–0.2)
PLATELET # BLD AUTO: 89 10*3/MM3 (ref 140–450)
PMV BLD AUTO: 11.7 FL (ref 6–12)
POTASSIUM SERPL-SCNC: 4 MMOL/L (ref 3.5–5.2)
PROT SERPL-MCNC: 5 G/DL (ref 6–8.5)
RBC # BLD AUTO: 3.97 10*6/MM3 (ref 3.77–5.28)
SODIUM SERPL-SCNC: 144 MMOL/L (ref 136–145)
WBC NRBC COR # BLD: 8.15 10*3/MM3 (ref 3.4–10.8)

## 2022-05-10 PROCEDURE — 85018 HEMOGLOBIN: CPT | Performed by: NURSE PRACTITIONER

## 2022-05-10 PROCEDURE — 80053 COMPREHEN METABOLIC PANEL: CPT | Performed by: INTERNAL MEDICINE

## 2022-05-10 PROCEDURE — 85014 HEMATOCRIT: CPT | Performed by: NURSE PRACTITIONER

## 2022-05-10 PROCEDURE — 99225 PR SBSQ OBSERVATION CARE/DAY 25 MINUTES: CPT | Performed by: NURSE PRACTITIONER

## 2022-05-10 PROCEDURE — G0378 HOSPITAL OBSERVATION PER HR: HCPCS

## 2022-05-10 PROCEDURE — 99213 OFFICE O/P EST LOW 20 MIN: CPT | Performed by: PHYSICIAN ASSISTANT

## 2022-05-10 PROCEDURE — 85025 COMPLETE CBC W/AUTO DIFF WBC: CPT | Performed by: INTERNAL MEDICINE

## 2022-05-10 RX ORDER — OXYCODONE HYDROCHLORIDE 5 MG/1
5 TABLET ORAL EVERY 6 HOURS PRN
Status: DISCONTINUED | OUTPATIENT
Start: 2022-05-10 | End: 2022-05-11 | Stop reason: HOSPADM

## 2022-05-10 RX ADMIN — URSODIOL 300 MG: 300 CAPSULE ORAL at 21:56

## 2022-05-10 RX ADMIN — TRAZODONE HYDROCHLORIDE 100 MG: 100 TABLET ORAL at 21:57

## 2022-05-10 RX ADMIN — GABAPENTIN 400 MG: 400 CAPSULE ORAL at 16:56

## 2022-05-10 RX ADMIN — LACTULOSE 30 G: 20 SOLUTION ORAL at 08:12

## 2022-05-10 RX ADMIN — Medication 10 ML: at 08:15

## 2022-05-10 RX ADMIN — LACTULOSE 30 G: 20 SOLUTION ORAL at 21:57

## 2022-05-10 RX ADMIN — ROSUVASTATIN CALCIUM 20 MG: 20 TABLET, COATED ORAL at 21:56

## 2022-05-10 RX ADMIN — PANTOPRAZOLE SODIUM 40 MG: 40 TABLET, DELAYED RELEASE ORAL at 16:56

## 2022-05-10 RX ADMIN — LUBIPROSTONE 24 MCG: 24 CAPSULE, GELATIN COATED ORAL at 21:57

## 2022-05-10 RX ADMIN — RIFAXIMIN 550 MG: 550 TABLET ORAL at 21:56

## 2022-05-10 RX ADMIN — POTASSIUM CHLORIDE 20 MEQ: 1.5 FOR SOLUTION ORAL at 08:13

## 2022-05-10 RX ADMIN — PANTOPRAZOLE SODIUM 40 MG: 40 TABLET, DELAYED RELEASE ORAL at 06:38

## 2022-05-10 RX ADMIN — RANOLAZINE 1000 MG: 500 TABLET, FILM COATED, EXTENDED RELEASE ORAL at 08:13

## 2022-05-10 RX ADMIN — OLANZAPINE 10 MG: 5 TABLET, FILM COATED ORAL at 21:56

## 2022-05-10 RX ADMIN — RANOLAZINE 1000 MG: 500 TABLET, FILM COATED, EXTENDED RELEASE ORAL at 21:57

## 2022-05-10 RX ADMIN — LUBIPROSTONE 24 MCG: 24 CAPSULE, GELATIN COATED ORAL at 08:12

## 2022-05-10 RX ADMIN — GABAPENTIN 400 MG: 400 CAPSULE ORAL at 21:56

## 2022-05-10 RX ADMIN — RIFAXIMIN 550 MG: 550 TABLET ORAL at 08:13

## 2022-05-10 RX ADMIN — LACTULOSE 30 G: 20 SOLUTION ORAL at 16:56

## 2022-05-10 RX ADMIN — GABAPENTIN 400 MG: 400 CAPSULE ORAL at 08:13

## 2022-05-10 RX ADMIN — Medication 10 ML: at 21:57

## 2022-05-10 RX ADMIN — URSODIOL 300 MG: 300 CAPSULE ORAL at 08:12

## 2022-05-10 RX ADMIN — FLUOXETINE HYDROCHLORIDE 40 MG: 20 CAPSULE ORAL at 08:13

## 2022-05-10 RX ADMIN — Medication 400 MG: at 16:56

## 2022-05-10 RX ADMIN — Medication 10 MG: at 21:57

## 2022-05-10 RX ADMIN — OXYCODONE 5 MG: 5 TABLET ORAL at 22:45

## 2022-05-10 NOTE — PLAN OF CARE
Goal Outcome Evaluation:  Plan of Care Reviewed With: patient        Progress: improving  Outcome Evaluation: Pt seen by GORDO DAVENPORT. SW requested to provide resources to pt in [place] for pain management and counseling. Hospice consulted for information; pt likely not currently elligible for home services with MELD 7 (per GI). Palliative following for continued support; pt with medical comorbidities and significant existential issues.    1330 Palliative IDT meeting: JES De La Cruz RN, PHILIPPEPN; ISSAC Choi; EPHRAIM Fletcher; DAVID Lipscomb DO; NAOMIE Burgos, RN; TRINA Zazueta, RN, CHPN; DAVON Mclaughlin, DAISY, CHVERONIKA; GORDO Banks APRN

## 2022-05-10 NOTE — PROGRESS NOTES
[facility] Medicine Services  PROGRESS NOTE    Patient Name: Timothy Guzman  : 1975  MRN: [id number]    Date of Admission: 2022  Primary Care Physician: Toshia Mitchell APRN    Subjective   Subjective     CC:  GIB concern      HPI:  Resting in bed in a darkened room.  Complains of ongoing abdominal pain. Upset that she is unable to have any IV pain meds.  Remains somewhat hypotensive but asymptomatic.     ROS:  Gen- No fevers, chills  CV- No chest pain, palpitations  Resp- No cough, dyspnea  GI- No N/V/D, abd pain        Objective   Objective     Vital Signs:   Temp:  [97.7 °F (36.5 °C)-98.5 °F (36.9 °C)] 98.5 °F (36.9 °C)  Heart Rate:  [71-85] 75  Resp:  [16-18] 18  BP: ()/(52-61) 93/58     Physical Exam:  Constitutional: No acute distress, awake, alert, resting in bed in darkened room  HENT: NCAT, mucous membranes moist  Respiratory: Clear to auscultation bilaterally, respiratory effort normal on room air  Cardiovascular: RRR, no murmurs, rubs, or gallops  Gastrointestinal: Positive bowel sounds, soft, nontender, nondistended  Musculoskeletal: No bilateral ankle edema  Psychiatric: Appropriate affect, cooperative  Neurologic: Oriented x 3, strength symmetric in all extremities, Cranial Nerves grossly intact to confrontation, speech clear  Skin: No rashes noted to exposed skin         Results Reviewed:  LAB RESULTS:      Lab 05/10/22  1211 05/10/22  0504 22  2326 22  1748 22  1114 22  1808 22  1451   WBC  --  8.15  --   --   --   --  6.92   HEMOGLOBIN 11.4* 11.9* 11.1* 12.1 12.3   < > 14.5   HEMATOCRIT 33.4* 35.1 32.6* 35.0 36.2   < > 42.1   PLATELETS  --  89*  --   --   --   --  105*   NEUTROS ABS  --  5.99  --   --   --   --  4.23   IMMATURE GRANS (ABS)  --  0.03  --   --   --   --  0.03   LYMPHS ABS  --  1.47  --   --   --   --  1.78   MONOS ABS  --  0.65  --   --   --   --  0.80   EOS ABS  --  0.01  --   --   --   --  0.05    MCV  --  88.4  --   --   --   --  88.6   PROCALCITONIN  --   --   --   --   --   --  0.10   LACTATE  --   --   --   --   --   --  1.5   PROTIME  --   --   --   --   --   --  14.0    < > = values in this interval not displayed.         Lab 05/10/22  0504 05/09/22  0509 05/08/22  1451   SODIUM 144 132* 138   POTASSIUM 4.0 4.6 3.8   CHLORIDE 112* 101 104   CO2 24.0 19.0* 24.0   ANION GAP 8.0 12.0 10.0   BUN 10 13 10   CREATININE 0.84 0.94 0.91   EGFR 86.4 75.5 78.5   GLUCOSE 88 173* 96   CALCIUM 7.7* 8.4* 9.3   MAGNESIUM  --   --  1.7         Lab 05/10/22  0504 05/08/22  1451   TOTAL PROTEIN 5.0* 7.1   ALBUMIN 3.30* 4.40   GLOBULIN 1.7 2.7   ALT (SGPT) 18 29   AST (SGOT) 13 26   BILIRUBIN 0.2 0.5   ALK PHOS 73 102   LIPASE  --  20         Lab 05/08/22  1451   TROPONIN T <0.010   PROTIME 14.0   INR 1.09             Lab 05/08/22  1452   ABO TYPING A   RH TYPING Positive   ANTIBODY SCREEN Negative         Brief Urine Lab Results  (Last result in the past 365 days)      Color   Clarity   Blood   Leuk Est   Nitrite   Protein   CREAT   Urine HCG        04/07/22 0342 Yellow   Clear   Negative   Trace   Negative   Negative                 Microbiology Results Abnormal     Procedure Component Value - Date/Time    Blood Culture - Blood, Arm, Left [[id number]]  (Normal) Collected: 05/08/22 1528    Lab Status: Preliminary result Specimen: Blood from Arm, Left Updated: 05/09/22 1548     Blood Culture No growth at 24 hours    Blood Culture - Blood, Arm, Right [[id number]]  (Normal) Collected: 05/08/22 1515    Lab Status: Preliminary result Specimen: Blood from Arm, Right Updated: 05/09/22 1548     Blood Culture No growth at 24 hours    COVID-19 and FLU A/B PCR - Swab, Nasopharynx [[id number]]  (Normal) Collected: 05/08/22 1525    Lab Status: Final result Specimen: Swab from Nasopharynx Updated: 05/08/22 1628     COVID19 Not Detected     Influenza A PCR Not Detected     Influenza B PCR Not Detected    Narrative:      Fact sheet for  providers: https://www.fda.gov/media/002206/download    Fact sheet for patients: https://www.fda.gov/media/740767/download    Test performed by [facility].          CT Abdomen Pelvis Without Contrast    Result Date: 5/8/2022  CT ABDOMEN PELVIS WO CONTRAST-  Date of Exam: 5/8/2022 4:11 PM  Indication: GI bleed, lower.   Comparison Exams: None available.  Technique: Axial images from the base of the chest through the abdomen and pelvis were obtained without intravenous contrast. Sagittal and coronal reformatted images also performed. Automated exposure control and iterative reconstruction methods were used.  Radiation audit for number of CT and nuclear cardiology exams performed in the last year: 0.   FINDINGS:  No evidence of pneumonia or other acute process in the lung bases. No pleural effusion. Left lower lobe subcentimeter nodule again noted, also present on a remote CT from March 2020  No ureteral stone or hydronephrosis of either kidney. No evidence of pancreatitis or other acute noncontrast findings of the organs throughout the abdomen. Cholecystomy again noted.  No bowel obstruction. No evidence of colitis or appendicitis. No diverticulitis.  No abscess or free air. No significant free fluid.  No evidence of acute processes in the pelvis. Bladder appears within normal limits.  No fracture or other acute osseous findings. No acute superficial soft tissue abnormality.       Impression:  Negative CT examination as above. No evidence of acute process in the abdomen/pelvis.  This report was finalized on 5/8/2022 5:03 PM by Akhil Petersen.      XR Chest 1 View    Result Date: 5/8/2022  EXAM: XR CHEST 1 VW-  DATE OF EXAM: 5/8/2022 3:32 PM  INDICATION: GI bleed.   COMPARISONS: 4/6/2022  FINDINGS:  No evidence of pneumonia, pulmonary edema or other acute pulmonary process. No evidence of pneumothorax or significant pleural effusion. No evidence of acute process of the heart or other mediastinal structures.      Impression:   Negative chest x-ray. No evidence of acute cardiopulmonary disease.  This report was finalized on 5/8/2022 4:26 PM by Akhil Petersen.        Results for orders placed during the hospital encounter of 06/18/21    Adult Transthoracic Echo Complete W/ Cont if Necessary Per Protocol    Interpretation Summary  · The quality of the study is limited due to patient positioning and patient being intubated.  · Left ventricular ejection fraction appears to be 51 - 55%. Left ventricular systolic function is normal.  · Left ventricular diastolic function is consistent with (grade Ia w/high LAP) impaired relaxation.  · Mildly reduced right ventricular systolic function noted.      I have reviewed the medications:  Scheduled Meds:FLUoxetine, 40 mg, Oral, Daily  gabapentin, 400 mg, Oral, TID  lactulose, 30 g, Oral, TID  lubiprostone, 24 mcg, Oral, BID  magnesium oxide, 400 mg, Oral, Q PM  melatonin, 10 mg, Oral, Nightly  OLANZapine, 10 mg, Oral, Nightly  pantoprazole, 40 mg, Oral, BID AC  potassium chloride, 20 mEq, Oral, Daily  ranolazine, 1,000 mg, Oral, BID  riFAXIMin, 550 mg, Oral, Q12H  rosuvastatin, 20 mg, Oral, Nightly  sodium chloride, 10 mL, Intravenous, Q12H  traZODone, 100 mg, Oral, Nightly  ursodiol, 300 mg, Oral, BID      Continuous Infusions:   PRN Meds:.•  acetaminophen  •  ondansetron  •  polyethylene glycol  •  potassium chloride **OR** potassium chloride **OR** potassium chloride  •  sodium chloride  •  sodium chloride    Assessment/Plan   Assessment & Plan     Active Hospital Problems    Diagnosis  POA   • Upper GI bleed [K92.2]  Yes   • GI bleed [K92.2]  Yes   • History of esophageal varices [Z87.19]  Not Applicable   • Alcoholic cirrhosis of liver without ascites (HCC) [K70.30]  Yes      Resolved Hospital Problems   No resolved problems to display.        Brief Hospital Course to date:  Timothy Guzman is a 46 y.o. female with PMH etoh cirrhosis, esophageal varices, CAD and pancreatitis who presented to the ED with  chief complaint of abdominal pain since Thursday and it worsened this am with coffee ground emesis and melena.  She was admitted to same issues in early April and was seen by GI, was scoped and placed on BID PPI.  She is being admitted to hospitalist service    This patient's problems and plans were partially entered by my partner and updated as appropriate by me 05/10/22.        Coffee-ground emesis/Melena reported at home  --started on octreotide and IV PPI- as well as rocephin- will discontinue based on GI recs and place on 40 mg BID PO PPI  --trend H&H, currently stable at 13. Patient also had EGD last month findings of mild gastritis  --GI following appreciate their assistance  --CLD  --labs in AM    Hypotension  --ongoing concern for overdiuresis, will hold diuresis and will also discontinue pain medications as per GI recommendations  --patient can take tylenol up to 2gm daily for pain safely in cirrhosis, 500 q6hprn added     Alcoholic cirrhosis  Esophageal varices  --as above, hold diuretics for now. Per GI, resume tomorrow as hypotension allows.   --continue lactulose/rifaximin  --GI recommending weight loss for fatty liver, counseled on this, will place nutrition consult as well  --palliative medicine has been consulted for ongoing pain issues. Patient will be referred to pain management at discharge. The patient has also requested a hospice consult which has been placed.      CAD  --continue plavix and asa for now       DVT prophylaxis:  Mechanical DVT prophylaxis orders are present.       AM-PAC 6 Clicks Score (PT): 22 (05/10/22 0800)    Disposition: I expect the patient to be discharged home tomorrow if all agree and BP improved.     CODE STATUS:   Code Status and Medical Interventions:   Ordered at: 05/08/22 1716     Code Status (Patient has no pulse and is not breathing):    CPR (Attempt to Resuscitate)     Medical Interventions (Patient has pulse or is breathing):    Full Support       Elin Augustine  APRN  05/10/22

## 2022-05-10 NOTE — CASE MANAGEMENT/SOCIAL WORK
Continued Stay Note  [facility]     Patient Name: Timothy Guzman  MRN: [id number]  Today's Date: 5/10/2022    Admit Date: 5/8/2022     Discharge Plan     Row Name 05/10/22 0217       Plan    Plan Social work met with the patient and provided the patient with counseling information in the [place] area that accepts the patients Passport Medicaid insurance coverage.    Row Name 05/10/22 1430       Plan    Plan TBD    Plan Comments Hospice consult received. Chart reviewed. Visit made to the patient's room. Patient lying in the hospital bed. Visit made to provide the patient with information regarding hospice care at home. Patient did express that she did not want further hospitalizations. Patient currently is living with her parents. Per chart review, plan is to look into waiver through medicaid to help with caregivers at home when needed. Educated patient on hospice services and conflict with medicaid waiver. Educated that hospice can provide additional support at home but cannot provide 24/7 care. Currently, patient is able to care for self until she becomes encephalopathic and then cannot care for herself at that time. Hospice information was provided and will follow up on discharge plans. Please call [phone] for any questions or concerns.    Row Name 05/10/22 1346       Plan    Plan TBD    Plan Comments Hospice consult received. Chart reviewed. Visit made to the patient's room. Patient lying in the hospital bed. Patient alert and oriented. Patient states that she is uncomfortable due to only taking tylenol at this time. Provided patient with hospice information               Discharge Codes    No documentation.                     ELISE Driver

## 2022-05-10 NOTE — CONSULTS
Clinical Nutrition     Nutrition Education   Reason for Visit:   Physician Consult       Patient Name: Timothy Guzman  YOB: 1975  MRN: [id number]  Date of Encounter: 05/09/22 20:33 EDT  Admission date: 5/8/2022      Comments: Pt w prior ed for incr PRO, lower Na and Wt loss prior. Allows she is aware of what to do. Will review material and request to see RD for any questions.       Applicable Diagnoses   EtOH Cirrhosis, Esophageal Varices, CAD; Hypotension & GIB on adm  Hx gastroparesis, GERD, pancreatitis, bone necrosis, rib fx's   Note note now w abn mammogram.     Wt 196 lbs BMI 34.72  Diet/Nutrition Related History:     Allows she is aware of what to do. Will review material and request to see RD for any questions.     Labs reviewed   Yes  Low serum Na    Nutrition Diagnosis     5/9  Problem Food and nutrition knowledge deficit potential   R/T Nutrition therapy for cirrhosis    Signs/Symptoms Recurring symptoms   Status: material provided to pt for review    Goal:     Increase knowledge on diet/nutrition effects on condition/status     Nutrition Intervention       Education material provided regarding nutrition therapy for: Diet rationale, Key food habit change, Cirrhosis and Weight Management     Education material provided regarding food habits/behavior related to:Food choices, Eating pattern, Appropriate portions and Seasoning food     RD provided printed material via [employer]-Nutrition Care Manual  and [facility] RDN created education material        Monitoring/Evaluation:     Pt allowed would review material, ask to see RD if questions.        Hemalatha Acharya RD  Time Spent: 20 min

## 2022-05-10 NOTE — PROGRESS NOTES
"GI Daily Progress Note  Subjective:    Chief Complaint:  Follow up GI bleeding    No nausea nor vomiting.   No melena or signs of GI bleeding.  Complains of chronic right upper quadrant pain that is worsened by movement.       Objective:    BP 93/58 (BP Location: Right arm, Patient Position: Lying)   Pulse 75   Temp 98.5 °F (36.9 °C) (Oral)   Resp 18   Ht 160 cm (63\")   Wt 88.9 kg (196 lb)   SpO2 94%   BMI 34.72 kg/m²     Physical Exam  Constitutional:       General: She is not in acute distress.  HENT:      Head: Normocephalic and atraumatic.      Mouth/Throat:      Mouth: Mucous membranes are moist.   Eyes:      General: No scleral icterus.  Cardiovascular:      Rate and Rhythm: Normal rate and regular rhythm.   Pulmonary:      Effort: Pulmonary effort is normal. No respiratory distress.   Abdominal:      General: Bowel sounds are normal. There is no distension.      Palpations: Abdomen is soft.      Tenderness: There is abdominal tenderness in the right upper quadrant.   Neurological:      Mental Status: She is alert and oriented to person, place, and time.      Comments: No asterixis   Psychiatric:      Comments: Depressed mood         Lab  Lab Results   Component Value Date    WBC 8.15 05/10/2022    HGB 11.4 (L) 05/10/2022    HGB 11.9 (L) 05/10/2022    HGB 11.1 (L) 05/09/2022    MCV 88.4 05/10/2022    PLT 89 (L) 05/10/2022    INR 1.09 05/08/2022    INR 1.08 04/08/2022    INR 1.08 04/07/2022    INR 1.12 01/29/2022    INR 0.72 (L) 11/12/2021       Lab Results   Component Value Date    GLUCOSE 88 05/10/2022    BUN 10 05/10/2022    CREATININE 0.84 05/10/2022    EGFRIFNONA 65 02/02/2022    BCR 11.9 05/10/2022     05/10/2022    K 4.0 05/10/2022    CO2 24.0 05/10/2022    CALCIUM 7.7 (L) 05/10/2022    PROTENTOTREF 7.5 09/30/2021    ALBUMIN 3.30 (L) 05/10/2022    ALKPHOS 73 05/10/2022    BILITOT 0.2 05/10/2022    BILIDIR 0.2 06/23/2021    ALT 18 05/10/2022    AST 13 05/10/2022       Assessment:     1.  " Hematemesis of coffee grounds, resolved.     2.  History of mild gastritis with hemorrhage (4/7/2022)  3.  Chronic abdominal pain, likely secondary to fatty liver with recent weight gain.     4.  Alcohol cirrhosis without ascites.  Compensated.  MELD-NA is 7.   She has not drank alcohol in 2 -1/2 years.   5. Abnormal mammogram.  Awaiting follow up evaluation later this month.      Plan:    No GI bleeding overnight.  Tolerating liquids.   She has chronic right upper quadrant pain secondary to fatty liver.  Treatment of this pain is with weight loss.   A 10% reduction in body weight is recommended (19 lbs).      Her current MELD-Na is 7 and associated with a less than 2 % rate of mortality in 90 days.  Her cirrhosis is compensated at this time.       >>> Advance diet to regular.   Recommend a high protein diet.    >>> Weight loss (goal of 19 lbs)  >>> 40 mg Protonix BID for 1 month  >>> Avoid NSAIDs.   Okay to use Tylenol as needed up to 2,000 mg per day.    >>> Restart diuretics at lower dose tomorrow if hypotension allows     SEYD Ryan  05/10/22  13:47 EDT

## 2022-05-10 NOTE — CASE MANAGEMENT/SOCIAL WORK
Continued Stay Note  [facility]     Patient Name: Timothy Guzman  MRN: [id number]  Today's Date: 5/10/2022    Admit Date: 5/8/2022     Discharge Plan     Row Name 05/10/22 1206       Plan    Plan Comments CM received consult from [facility] regarding pain clinic options for pt in [place]. CM was able to find one pain clinic with practicing MD's in [place] and will provide contact number and address to patient. CM will continue to follow.               Discharge Codes    No documentation.                     Tracy He RN

## 2022-05-10 NOTE — DISCHARGE PLACEMENT REQUEST
"Bipin Guzman (46 y.o. Female)     DX: ETOH  Cirrhosis of the liver  Referring provider: Sapphire Banks NP   COVID - 05/08/2022          Date of Birth   1975    Social Security Number       Address   [address] [facility] [address]    Home Phone   [phone]    MRN   [id number]       Ep[employer]   Congregation    Marital Status                               Admission Date   5/8/22    Admission Type   Emergency    Admitting Provider   Hira Gardner MD    Attending Provider   Precious Izquierdo MD    Department, Room/Bed   [address], [address]/1       Discharge Date       Discharge Disposition       Discharge Destination                               Attending Provider: Precious Izquierdo MD    Allergies: Contrast Dye, Haloperidol, Nsaids, Tylenol [Acetaminophen]    Isolation: None   Infection: None   Code Status: CPR   Advance Care Planning Activity    Ht: 160 cm (63\")   Wt: 88.9 kg (196 lb)    Admission Cmt: None   Principal Problem: None                Active Insurance as of 5/8/2022     Primary Coverage     Payor Plan Insurance Group Employer/Plan Group    [employer] DEREJE JACQUES [id number]     Payor Plan Address Payor Plan Phone Number Payor Plan Fax Number Effective Dates    [address]   1/1/2021 - None Entered    [facility] [id number]       Subscriber Name Subscriber Birth Date Member ID       BIPIN GUZMAN 1975 [id number]                 Emergency Contacts      (Rel.) Home Phone Work Phone Mobile Phone    devonyanci (Mother) [phone] -- [phone]    DEVONFREDO (Father) [phone] -- [phone]            Emergency Contact Information     Name Relation Home Work Mobile    devonrome gageie Mother [phone]    DEVONFREDO Father [phone]          Insurance Information                [employer] Phone: --    Subscriber: Bipin Guzman Subscriber#: " [id number]    Group#: [id number] Precert#: --             History & Physical      Hira Gardner MD at 22 1630              [facility] Medicine Services  HISTORY AND PHYSICAL    Patient Name: Timothy Guzman  : 1975  MRN: [id number]  Primary Care Physician: Toshia Mitchell APRN  Date of admission: 2022    Subjective   Subjective     Chief Complaint:  Melena/coffee ground emesis    HPI:  Timothy Guzman is a 46 y.o. female with PMH etoh cirrhosis, esophageal varices, CAD and pancreatitis who presented to the ED with chief complaint of abdominal pain since Thursday and it worsened this am with coffee ground emesis and melena.  She was admitted to same issues in early April and was seen by GI, was scoped and placed on BID PPI.  She is being admitted to hospitalist service      Review of Systems   Constitutional: Negative for appetite change and fever.   HENT: Negative for congestion and hearing loss.    Eyes: Negative for visual disturbance.   Respiratory: Negative for cough and shortness of breath.    Cardiovascular: Negative for chest pain.   Gastrointestinal: Positive for abdominal pain, blood in stool, nausea and vomiting. Negative for constipation and diarrhea.   Genitourinary: Negative for dysuria and hematuria.   Musculoskeletal: Negative for arthralgias.   Skin: Negative for wound.   Psychiatric/Behavioral: Negative for agitation and confusion.        All other systems reviewed and are negative.     Personal History     Past Medical History:   Diagnosis Date   • Alcoholism (HCC)     sober 2.5 years   • Anaphylaxis    • Arthritis    • Bone necrosis (HCC)    • CAD (coronary artery disease) 2021   • Cardiac arrest (HCC)    • Cirrhosis of liver (HCC) 2021   • Gastroparesis    • GERD (gastroesophageal reflux disease)    • History of esophageal varices    • History of kidney stones    • Hypertension    • Memory loss    • Neuropathy    • Pancreatitis     • Rib fractures 2021   • SVT (supraventricular tachycardia) ([facility]) 2021       Past Surgical History:   Procedure Laterality Date   • ANKLE SURGERY Right    • APPENDECTOMY     • CARDIAC CATHETERIZATION N/A 2021    Procedure: Left Heart Cath;  Surgeon: Vikram Gonzales MD;  Location:  [facility];  Service: Cardiovascular;  Laterality: N/A;   • CARDIAC CATHETERIZATION N/A 7/15/2021    Procedure: LEFT HEART CATH;  Surgeon: Vikram Gonzales MD;  Location:  [facility];  Service: Cardiology;  Laterality: N/A;   •  SECTION      x2   • CHOLECYSTECTOMY     • COLONOSCOPY     • COLONOSCOPY N/A 3/31/2020    Procedure: COLONOSCOPY;  Surgeon: Tirso Giles MD;  Location:  [facility];  Service: Gastroenterology;  Laterality: N/A;   • COLONOSCOPY N/A 2021    Procedure: COLONOSCOPY;  Surgeon: Tyrese Rasmussen MD;  Location:  [facility];  Service: Gastroenterology;  Laterality: N/A;   • CORONARY STENT PLACEMENT     • ENDOSCOPY     • ENDOSCOPY     • ENDOSCOPY N/A 2021    Procedure: ESOPHAGOGASTRODUODENOSCOPY AT BEDSIDE;  Surgeon: Tyrese Rasmussen MD;  Location:  [facility];  Service: Gastroenterology;  Laterality: N/A;   • ENDOSCOPY N/A 2021    Procedure: ESOPHAGOGASTRODUODENOSCOPY;  Surgeon: Tyrese Rasmussen MD;  Location:  [facility];  Service: Gastroenterology;  Laterality: N/A;   • ENDOSCOPY N/A 2021    Procedure: ESOPHAGOGASTRODUODENOSCOPY;  Surgeon: Tyrese Rasmussen MD;  Location:  [facility];  Service: Gastroenterology;  Laterality: N/A;   • ENDOSCOPY N/A 2022    Procedure: ESOPHAGOGASTRODUODENOSCOPY;  Surgeon: Tyrese Rasmussen MD;  Location:  [facility];  Service: Gastroenterology;  Laterality: N/A;   • REPLACEMENT TOTAL HIP LATERAL POSITION Left    • TOTAL HIP ARTHROPLASTY Right     x2   • TOTAL KNEE ARTHROPLASTY Left        Family History: family history includes ADD / ADHD in her child; Asperger's syndrome  in her child; Autism in her child; Breast cancer in her mother; Diabetes type II in her mother; Heart disease in her father; Hypertension in her brother; Liver disease in her brother. Otherwise pertinent FHx was reviewed and unremarkable.     Social History:  reports that she quit smoking about 10 months ago. Her smoking use included electronic cigarette and cigarettes. She smoked 0.50 packs per day. She has never used smokeless tobacco. She reports previous alcohol use. She reports that she does not use drugs.  Social History     Social History Narrative    [place] in [place] with parents    Disabled       Medications:  FLUoxetine, Melatonin, OLANZapine, aspirin, bisacodyl, bumetanide, clopidogrel, docusate sodium, gabapentin, lactulose, lubiprostone, magnesium oxide, multivitamin, ondansetron, pantoprazole, polyethylene glycol, potassium chloride, prazosin, ranolazine, riFAXIMin, rosuvastatin, spironolactone, tamsulosin, traZODone, ursodiol, vitamin B-6, and vitamin b complex    Allergies   Allergen Reactions   • Contrast Dye Anaphylaxis     6/18 Pt coded after receiving contrast for a CT scan. Was premedicated. Was having an MI at the same time. Immediately underwent heart cath with contrast without additional allergic reaction  7/15 Pt premedicated with prednisone/Benadryl for heart cath. Still with anaphylactic-like reaction with drop in BP and hypoxia. Treated 95% stenosis with minimal dye and supported with epinephrine, solumedrol, NRB   • Haloperidol Hallucinations   • Nsaids GI Bleeding     Other reaction(s): Bleeding, VOMITING   • Tylenol [Acetaminophen] Other (See Comments)     CIRRHOSIS       Objective   Objective     Vital Signs:   Temp:  [98.4 °F (36.9 °C)] 98.4 °F (36.9 °C)  Heart Rate:  [71-99] 76  Resp:  [20] 20  BP: (110-135)/(72-96) 110/81    Physical Exam   Constitutional: Awake, alert  Eyes: PERRLA, sclerae anicteric, no conjunctival injection  HENT: NCAT, mucous membranes moist  Neck: Supple,  no thyromegaly, no lymphadenopathy, trachea midline  Respiratory: Clear to auscultation bilaterally, nonlabored respirations   Cardiovascular: RRR, no murmurs, rubs, or gallops, palpable pedal pulses bilaterally  Gastrointestinal: Positive bowel sounds, soft, diffuse tenderness, nondistended  Musculoskeletal: Trace bilateral ankle edema, no clubbing or cyanosis to extremities  Psychiatric: Flat affect, cooperative  Neurologic: Oriented x 3, BLANCO, speech clear  Skin: No rashes noted      Results Reviewed:  I have personally reviewed most recent indicated data and agree with findings including:  [x]  Laboratory  [x]  Radiology  []  EKG/Telemetry  []  Pathology  []  Cardiac/Vascular Studies  [x]  Old records  []  Other:  Most pertinent findings include:      LAB RESULTS:      Lab 05/08/22  1451   WBC 6.92   HEMOGLOBIN 14.5   HEMATOCRIT 42.1   PLATELETS 105*   NEUTROS ABS 4.23   IMMATURE GRANS (ABS) 0.03   LYMPHS ABS 1.78   MONOS ABS 0.80   EOS ABS 0.05   MCV 88.6   PROCALCITONIN 0.10   LACTATE 1.5   PROTIME 14.0         Lab 05/08/22  1451   SODIUM 138   POTASSIUM 3.8   CHLORIDE 104   CO2 24.0   ANION GAP 10.0   BUN 10   CREATININE 0.91   EGFR 78.5   GLUCOSE 96   CALCIUM 9.3   MAGNESIUM 1.7         Lab 05/08/22  1451   TOTAL PROTEIN 7.1   ALBUMIN 4.40   GLOBULIN 2.7   ALT (SGPT) 29   AST (SGOT) 26   BILIRUBIN 0.5   ALK PHOS 102   LIPASE 20         Lab 05/08/22  1451   TROPONIN T <0.010   PROTIME 14.0   INR 1.09             Lab 05/08/22  1452   ABO TYPING A   RH TYPING Positive   ANTIBODY SCREEN Negative         Brief Urine Lab Results  (Last result in the past 365 days)      Color   Clarity   Blood   Leuk Est   Nitrite   Protein   CREAT   Urine HCG        04/07/22 0342 Yellow   Clear   Negative   Trace   Negative   Negative               Microbiology Results (last 10 days)     Procedure Component Value - Date/Time    COVID-19 and FLU A/B PCR - Swab, Nasopharynx [[id number]]  (Normal) Collected: 05/08/22 1525    Lab  Status: Final result Specimen: Swab from Nasopharynx Updated: 05/08/22 1628     COVID19 Not Detected     Influenza A PCR Not Detected     Influenza B PCR Not Detected    Narrative:      Fact sheet for providers: https://www.fda.gov/media/825899/download    Fact sheet for patients: https://www.fda.gov/media/270625/download    Test performed by PCR.          CT Abdomen Pelvis Without Contrast    Result Date: 5/8/2022  CT ABDOMEN PELVIS WO CONTRAST-  Date of Exam: 5/8/2022 4:11 PM  Indication: GI bleed, lower.   Comparison Exams: None available.  Technique: Axial images from the base of the chest through the abdomen and pelvis were obtained without intravenous contrast. Sagittal and coronal reformatted images also performed. Automated exposure control and iterative reconstruction methods were used.  Radiation audit for number of CT and nuclear cardiology exams performed in the last year: 0.   FINDINGS:  No evidence of pneumonia or other acute process in the lung bases. No pleural effusion. Left lower lobe subcentimeter nodule again noted, also present on a remote CT from March 2020  No ureteral stone or hydronephrosis of either kidney. No evidence of pancreatitis or other acute noncontrast findings of the organs throughout the abdomen. Cholecystomy again noted.  No bowel obstruction. No evidence of colitis or appendicitis. No diverticulitis.  No abscess or free air. No significant free fluid.  No evidence of acute processes in the pelvis. Bladder appears within normal limits.  No fracture or other acute osseous findings. No acute superficial soft tissue abnormality.       Impression:  Negative CT examination as above. No evidence of acute process in the abdomen/pelvis.  This report was finalized on 5/8/2022 5:03 PM by Akhil Petersen.      XR Chest 1 View    Result Date: 5/8/2022  EXAM: XR CHEST 1 VW-  DATE OF EXAM: 5/8/2022 3:32 PM  INDICATION: GI bleed.   COMPARISONS: 4/6/2022  FINDINGS:  No evidence of pneumonia,  pulmonary edema or other acute pulmonary process. No evidence of pneumothorax or significant pleural effusion. No evidence of acute process of the heart or other mediastinal structures.      Impression:  Negative chest x-ray. No evidence of acute cardiopulmonary disease.  This report was finalized on 5/8/2022 4:26 PM by Akhil Petersen.        Results for orders placed during the hospital encounter of 06/18/21    Adult Transthoracic Echo Complete W/ Cont if Necessary Per Protocol    Interpretation Summary  · The quality of the study is limited due to patient positioning and patient being intubated.  · Left ventricular ejection fraction appears to be 51 - 55%. Left ventricular systolic function is normal.  · Left ventricular diastolic function is consistent with (grade Ia w/high LAP) impaired relaxation.  · Mildly reduced right ventricular systolic function noted.      Assessment/Plan   Assessment & Plan       Alcoholic cirrhosis of liver without ascites (HCC)    History of esophageal varices    GI bleed    Coffee-ground emesis/Melena  --started on octreotide and IV PPI  --trend H&H, currently stable  --GI consult in am  --empiric rocephin    Alcoholic cirrhosis  Esophageal varices  --continue bumex, aldactone, xifaxin and lactulose    CAD  --continue plavix and asa for now    DVT prophylaxis:  Mechanical only due to melena    CODE STATUS:    Code Status (Patient has no pulse and is not breathing): CPR (Attempt to Resuscitate)  Medical Interventions (Patient has pulse or is breathing): Full Support      This note has been completed as part of a split-shared workflow.     Signature: Electronically signed by ISSAC Murphy, 05/08/22, 5:24 PM EDT  Patient seen and examined at the bedside.  Patient is a 46-year-old  female with past medical history significant for cirrhosis of the liver, esophageal varices, previous GI bleeding requiring cauterization.  Patient also has history of coronary artery disease and is  s/p stent placement.  The last stent was placed in June 2021.  Currently patient is on aspirin and Plavix.  Patient gives history of a few days of abdominal pain and discomfort.  Today patient also had an episode of vomiting with coffee-ground vomitus.  Patient also has had melena for the past 2 to 3 days.  Denies any fever or chills.  No chest pain or palpitation or shortness of breath.    Physical exam:Constitutional: No acute distress  HENT: NCAT, mucous membranes moist  Respiratory: Clear to auscultation bilaterally, respiratory effort normal   Cardiovascular: RRR, no murmurs, rubs, or gallops  Gastrointestinal: Abdomen is obese positive bowel sounds, soft, mild epigastric and right upper quadrant tenderness, nondistended  Musculoskeletal: Trace bilateral ankle edema, morbidly obese  Psychiatric: Appropriate affect, cooperative  Neurologic: Oriented x 3, strength symmetric in all extremities, Cranial Nerves grossly intact to confrontation, speech clear  Skin: No rashes    Assessment and plan:  Patient is a 46-year-old  female with past medical history of liver cirrhosis and esophageal varices.  Patient also has history of GI bleed requiring cauterization.  Patient comes in with history of melena and coffee-ground vomitus.  Patient was started on IV Protonix and also octreotide in the ER which will be continued.  We will keep the patient n.p.o. and will ask GI to see the patient in a.m.  Please see the above for more details.  Patient will be admitted for inpatient.                Electronically signed by Hira Gardner MD at 05/08/22 2205         Current Facility-Administered Medications   Medication Dose Route Frequency Provider Last Rate Last Admin   • acetaminophen (TYLENOL) tablet 500 mg  500 mg Oral Q6H PRN Ivana Koch PA   500 mg at 05/09/22 1649   • FLUoxetine (PROzac) capsule 40 mg  40 mg Oral Daily Rosanna Lawrence APRN   40 mg at 05/10/22 0813   • gabapentin (NEURONTIN) capsule 400 mg  400  mg Oral TID Rosanna Lawrence APRN   400 mg at 05/10/22 0813   • lactulose (CHRONULAC) 10 GM/15ML solution 30 g  30 g Oral TID Rosanna Lawrence APRN   30 g at 05/10/22 0812   • lubiprostone (AMITIZA) capsule 24 mcg  24 mcg Oral BID Rosanna Lawrence APRN   24 mcg at 05/10/22 0812   • magnesium oxide (MAG-OX) tablet 400 mg  400 mg Oral Q PM Rosanna Lawrence APRN   400 mg at 05/09/22 1715   • melatonin tablet 10 mg  10 mg Oral Nightly PortlandRosanna valenzuela APRN   10 mg at 05/09/22 2152   • OLANZapine (zyPREXA) tablet 10 mg  10 mg Oral Nightly Rosanna Lawrence APRN   10 mg at 05/09/22 2151   • ondansetron (ZOFRAN) tablet 4 mg  4 mg Oral Q8H PRN Rosanna Lawrence APRN   4 mg at 05/09/22 0056   • pantoprazole (PROTONIX) EC tablet 40 mg  40 mg Oral BID Ivana Wilson PA   40 mg at 05/10/22 0638   • polyethylene glycol (MIRALAX) packet 17 g  17 g Oral Daily PRN Rosanna Lawrence APRN       • potassium chloride (KLOR-CON) packet 20 mEq  20 mEq Oral Daily Rosanna Lawrence APRN   20 mEq at 05/10/22 0813   • potassium chloride (MICRO-K) CR capsule 40 mEq  40 mEq Oral PRN Rosanna Lawrence APRN        Or   • potassium chloride (KLOR-CON) packet 40 mEq  40 mEq Oral PRN Rosanna Lawrence APRN        Or   • potassium chloride 10 mEq in 100 mL IVPB  10 mEq Intravenous Q1H PRN Rosanna Lawrence APRN       • ranolazine (RANEXA) 12 hr tablet 1,000 mg  1,000 mg Oral BID Rosanna Lawrence APRN   1,000 mg at 05/10/22 0813   • riFAXIMin (XIFAXAN) tablet 550 mg  550 mg Oral Q12H Rosanna Lawrence APRN   550 mg at 05/10/22 0813   • rosuvastatin (CRESTOR) tablet 20 mg  20 mg Oral Nightly Rosanna Lawrence APRN   20 mg at 05/09/22 2151   • sodium chloride 0.9 % flush 10 mL  10 mL Intravenous PRN Rosanna Lawrence APRN       • sodium chloride 0.9 % flush 10 mL  10 mL Intravenous Q12H Rosanna Lawrence APRN   10 mL at 05/10/22 0815   • sodium chloride 0.9 % flush 10 mL  10 mL Intravenous PRN Rosanna Lawrence APRN       • traZODone (DESYREL) tablet 100 mg  100 mg Oral Nightly Rosanna Lawrence APRN   100 mg at  222   • ursodiol (ACTIGALL) capsule 300 mg  300 mg Oral BID Rosanna Lawrence APRN   300 mg at 05/10/22 0812        Physician Progress Notes (last 72 hours)      Precious Izquierdo MD at 22 1003              [facility] Medicine Services  PROGRESS NOTE    Patient Name: Timothy Guzman  : 1975  MRN: [id number]    Date of Admission: 2022  Primary Care Physician: Toshia Mitchell APRN    Subjective   Subjective     CC:  GIB concern     HPI:  Doing fine. Asking for IV pain medications multiple times. Denies bleeding while here.  Hypotensive this AM to 80/50s     ROS:  Gen- No fevers, chills  CV- No chest pain, palpitations  Resp- No cough, dyspnea  GI- No N/V/D, abd pain        Objective   Objective     Vital Signs:   Temp:  [97.7 °F (36.5 °C)-98.4 °F (36.9 °C)] 97.7 °F (36.5 °C)  Heart Rate:  [66-99] 68  Resp:  [18-20] 18  BP: ()/(53-96) 88/53  Flow (L/min):  [2] 2     Physical Exam:  GEN: NAD, resting in bed, awake  HEENT: on room air, atraumatic, normocephalic  NECK: supple, no masses  RESP: on room air, normal effort  CV: on tele, sinus rhythm  PSYCH: normal affect, appropriate  NEURO: awake, alert, no focal deficits noted  MSK: no edema noted  SKIN: no rashes noted       Results Reviewed:  LAB RESULTS:      Lab 22  0509 22  0001 22  1808 22  1451   WBC  --   --   --  6.92   HEMOGLOBIN 13.0 13.4 13.5 14.5   HEMATOCRIT 37.1 38.1 38.8 42.1   PLATELETS  --   --   --  105*   NEUTROS ABS  --   --   --  4.23   IMMATURE GRANS (ABS)  --   --   --  0.03   LYMPHS ABS  --   --   --  1.78   MONOS ABS  --   --   --  0.80   EOS ABS  --   --   --  0.05   MCV  --   --   --  88.6   PROCALCITONIN  --   --   --  0.10   LACTATE  --   --   --  1.5   PROTIME  --   --   --  14.0         Lab 22  0509 22  1451   SODIUM 132* 138   POTASSIUM 4.6 3.8   CHLORIDE 101 104   CO2 19.0* 24.0   ANION GAP 12.0 10.0   BUN 13 10   CREATININE 0.94 0.91   EGFR  75.5 78.5   GLUCOSE 173* 96   CALCIUM 8.4* 9.3   MAGNESIUM  --  1.7         Lab 05/08/22  1451   TOTAL PROTEIN 7.1   ALBUMIN 4.40   GLOBULIN 2.7   ALT (SGPT) 29   AST (SGOT) 26   BILIRUBIN 0.5   ALK PHOS 102   LIPASE 20         Lab 05/08/22  1451   TROPONIN T <0.010   PROTIME 14.0   INR 1.09             Lab 05/08/22  1452   ABO TYPING A   RH TYPING Positive   ANTIBODY SCREEN Negative         Brief Urine Lab Results  (Last result in the past 365 days)      Color   Clarity   Blood   Leuk Est   Nitrite   Protein   CREAT   Urine HCG        04/07/22 0342 Yellow   Clear   Negative   Trace   Negative   Negative                 Microbiology Results Abnormal     Procedure Component Value - Date/Time    COVID-19 and FLU A/B PCR - Swab, Nasopharynx [[id number]]  (Normal) Collected: 05/08/22 1525    Lab Status: Final result Specimen: Swab from Nasopharynx Updated: 05/08/22 1628     COVID19 Not Detected     Influenza A PCR Not Detected     Influenza B PCR Not Detected    Narrative:      Fact sheet for providers: https://www.fda.gov/media/813379/download    Fact sheet for patients: https://www.fda.gov/media/944058/download    Test performed by PCR.          CT Abdomen Pelvis Without Contrast    Result Date: 5/8/2022  CT ABDOMEN PELVIS WO CONTRAST-  Date of Exam: 5/8/2022 4:11 PM  Indication: GI bleed, lower.   Comparison Exams: None available.  Technique: Axial images from the base of the chest through the abdomen and pelvis were obtained without intravenous contrast. Sagittal and coronal reformatted images also performed. Automated exposure control and iterative reconstruction methods were used.  Radiation audit for number of CT and nuclear cardiology exams performed in the last year: 0.   FINDINGS:  No evidence of pneumonia or other acute process in the lung bases. No pleural effusion. Left lower lobe subcentimeter nodule again noted, also present on a remote CT from March 2020  No ureteral stone or hydronephrosis of either  kidney. No evidence of pancreatitis or other acute noncontrast findings of the organs throughout the abdomen. Cholecystomy again noted.  No bowel obstruction. No evidence of colitis or appendicitis. No diverticulitis.  No abscess or free air. No significant free fluid.  No evidence of acute processes in the pelvis. Bladder appears within normal limits.  No fracture or other acute osseous findings. No acute superficial soft tissue abnormality.       Impression:  Negative CT examination as above. No evidence of acute process in the abdomen/pelvis.  This report was finalized on 5/8/2022 5:03 PM by Akhil Petersen.      XR Chest 1 View    Result Date: 5/8/2022  EXAM: XR CHEST 1 VW-  DATE OF EXAM: 5/8/2022 3:32 PM  INDICATION: GI bleed.   COMPARISONS: 4/6/2022  FINDINGS:  No evidence of pneumonia, pulmonary edema or other acute pulmonary process. No evidence of pneumothorax or significant pleural effusion. No evidence of acute process of the heart or other mediastinal structures.      Impression:  Negative chest x-ray. No evidence of acute cardiopulmonary disease.  This report was finalized on 5/8/2022 4:26 PM by Akhil Petersen.        Results for orders placed during the hospital encounter of 06/18/21    Adult Transthoracic Echo Complete W/ Cont if Necessary Per Protocol    Interpretation Summary  · The quality of the study is limited due to patient positioning and patient being intubated.  · Left ventricular ejection fraction appears to be 51 - 55%. Left ventricular systolic function is normal.  · Left ventricular diastolic function is consistent with (grade Ia w/high LAP) impaired relaxation.  · Mildly reduced right ventricular systolic function noted.      I have reviewed the medications:  Scheduled Meds:FLUoxetine, 40 mg, Oral, Daily  gabapentin, 400 mg, Oral, TID  lactulose, 30 g, Oral, TID  lubiprostone, 24 mcg, Oral, BID  magnesium oxide, 400 mg, Oral, Q PM  melatonin, 10 mg, Oral, Nightly  OLANZapine, 10 mg, Oral,  Nightly  pantoprazole, 40 mg, Oral, BID AC  potassium chloride, 20 mEq, Oral, Daily  ranolazine, 1,000 mg, Oral, BID  riFAXIMin, 550 mg, Oral, Q12H  rosuvastatin, 20 mg, Oral, Nightly  sodium chloride, 10 mL, Intravenous, Q12H  tamsulosin, 0.4 mg, Oral, Daily  terazosin, 1 mg, Oral, Nightly  traZODone, 100 mg, Oral, Nightly  ursodiol, 300 mg, Oral, BID      Continuous Infusions:   PRN Meds:.•  acetaminophen  •  HYDROmorphone  •  ondansetron  •  polyethylene glycol  •  potassium chloride **OR** potassium chloride **OR** potassium chloride  •  sodium chloride  •  sodium chloride    Assessment/Plan   Assessment & Plan     Active Hospital Problems    Diagnosis  POA   • Upper GI bleed [K92.2]  Yes   • GI bleed [K92.2]  Yes   • History of esophageal varices [Z87.19]  Not Applicable   • Alcoholic cirrhosis of liver without ascites (HCC) [K70.30]  Yes      Resolved Hospital Problems   No resolved problems to display.        Brief Hospital Course to date:  Timothy Guzman is a 46 y.o. female with PMH etoh cirrhosis, esophageal varices, CAD and pancreatitis who presented to the ED with chief complaint of abdominal pain since Thursday and it worsened this am with coffee ground emesis and melena.  She was admitted to same issues in early April and was seen by GI, was scoped and placed on BID PPI.  She is being admitted to hospitalist service    This patient's problems and plans were partially entered by my partner and updated as appropriate by me 05/09/22.        Coffee-ground emesis/Melena reported at home  --started on octreotide and IV PPI- as well as rocephin- will discontinue based on GI recs and place on 40 mg BID PO PPI  --trend H&H, currently stable at 13. Patient also had EGD last month findings of mild gastritis  --GI following appreciate their assistance  --CLD  --labs in AM    Hypotension  --ongoing concern for overdiuresis, will hold diuresis and will also discontinue pain medications as per GI  "recommendations  --patient can take tylenol up to 2gm daily for pain safely in cirrhosis, 500 q6hprn added     Alcoholic cirrhosis  Esophageal varices  --as above, hold diuretics for now  --continue lactulose/rifaximin  --GI recommending weight loss for fatty liver, counseled on this, will place nutrition consult as well     CAD  --continue plavix and asa for now       DVT prophylaxis:  Mechanical DVT prophylaxis orders are present.       AM-PAC 6 Clicks Score (PT): 22 (05/09/22 0800)    Disposition: I expect the patient to be discharged pending improvement in BP- hopefully soon    CODE STATUS:   Code Status and Medical Interventions:   Ordered at: 05/08/22 1716     Code Status (Patient has no pulse and is not breathing):    CPR (Attempt to Resuscitate)     Medical Interventions (Patient has pulse or is breathing):    Full Support       Precious Izquierdo MD  05/09/22                Electronically signed by Precious Izquierdo MD at 05/09/22 1020          Consult Notes (last 72 hours)      Jeanette Sullivan at 05/09/22 1323      Consult Orders    1. Spiritual Care Consult [[id number]] ordered by Issac Lipscomb DO at 05/09/22 1211              visited with patient at the bedside. Patient is concerned about an abnormal mammogram and also states \"my health is declining and I won't be around much longer.\" Her principal goal is to arrange for physical in-home care for the end of her life. She has a Living Will and has made additional arrangements in anticipation of her life ending. Patient states, \"I'm sober now and I'm a [employer] and I know where I'm going when I die.\"  provided emotional and spiritual support.    Electronically signed by Jeanette Sullivan at 05/09/22 1328     Ivana Koch PA at 05/09/22 0833      Consult Orders    1. Inpatient Gastroenterology Consult [[id number]] ordered by Rosanna Lawrence APRN at 05/08/22 1716          Attestation signed by Brunner, Mark I, MD at 05/09/22 1922    I have " reviewed this documentation and agree.                  [facility] Gastroenterology Consult    Referring Provider: Precious Izquierdo MD   PCP: Toshia Mitchell APRN    Reason for Consultation: Coffee ground emesis     Chief complaint: Vomiting     History of present illness:    Timothy Guzman is a 46 y.o. female who is admitted with coffee ground emesis.  She describes nausea and vomiting for three days.   She has chronic right upper quadrant pain that is worsened by movement.  This is thought to be myofascial pain or pain secondary to fatty liver.  She is well known to our service for alcohol cirrhosis and is followed outpatient with my colleague, Nelson Yip.    She was admitted last month for similar presentation and underwent an EGD on 4/7/22 that showed mild gastritis.  She had acute kidney injury at that time secondary to overdiuresis.   It was recommended her diuretic regimen be decreased from 300 mg Spironolactone daily to 100 mg daily and 6 mg Bumex daily to 2 mg daily.   She however states these were increased by her primary care physician after discharge.    Her H&H is within normal limits at 13 and 37.   A CT abdomen/pelvis is unremarkable for acute process.  No ascites present.       She states she recently had an abnormal mammogram and is awaiting outpatient follow up breast ultrasound later this month.   She expresses significant worry about this as her mother had breast cancer.      Allergies:  Contrast dye, Haloperidol, Nsaids, and Tylenol [acetaminophen]    Scheduled Meds:  bumetanide, 1.5 mg, Oral, BID  FLUoxetine, 40 mg, Oral, Daily  gabapentin, 400 mg, Oral, TID  lactulose, 30 g, Oral, TID  lubiprostone, 24 mcg, Oral, BID  magnesium oxide, 400 mg, Oral, Q PM  melatonin, 10 mg, Oral, Nightly  OLANZapine, 10 mg, Oral, Nightly  potassium chloride, 20 mEq, Oral, Daily  ranolazine, 1,000 mg, Oral, BID  riFAXIMin, 550 mg, Oral, Q12H  rosuvastatin, 20 mg, Oral, Nightly  sodium chloride, 10 mL,  Intravenous, Q12H  spironolactone, 100 mg, Oral, Daily  tamsulosin, 0.4 mg, Oral, Daily  terazosin, 1 mg, Oral, Nightly  traZODone, 100 mg, Oral, Nightly  ursodiol, 300 mg, Oral, BID         Infusions:  octreotide (SandoSTATIN) infusion, 25 mcg/hr, Last Rate: 25 mcg/hr (05/09/22 0827)  pantoprazole, 8 mg/hr, Last Rate: 8 mg/hr (05/09/22 0332)        PRN Meds:  HYDROmorphone  •  ondansetron  •  polyethylene glycol  •  potassium chloride **OR** potassium chloride **OR** potassium chloride  •  sodium chloride  •  sodium chloride    Home Meds:  Medications Prior to Admission   Medication Sig Dispense Refill Last Dose   • bumetanide (BUMEX) 0.5 MG tablet Take 3 tablets by mouth 2 (Two) Times a Day for 30 days. (Patient taking differently: Take 2 mg by mouth 3 (Three) Times a Day.) 180 tablet 0 Patient Taking Differently at Unknown time   • aspirin 81 MG EC tablet Take 81 mg by mouth Daily.      • B Complex Vitamins (vitamin b complex) capsule capsule Take 1 capsule by mouth Daily.      • bisacodyl (DULCOLAX) 10 MG suppository Insert 1 suppository into the rectum Daily As Needed for Constipation. 30 suppository 0    • clopidogrel (PLAVIX) 75 MG tablet Take 1 tablet by mouth Daily. 90 tablet 3    • docusate sodium (Colace) 100 MG capsule Take 1 capsule by mouth 2 (Two) Times a Day. 60 capsule 0    • FLUoxetine (PROzac) 40 MG capsule Take 1 capsule by mouth Daily for 30 days. 30 capsule 0    • gabapentin (NEURONTIN) 400 MG capsule Take 1 capsule by mouth 3 (Three) Times a Day. 90 capsule 5    • lactulose (CHRONULAC) 10 GM/15ML solution Take 45 mL by mouth 3 (Three) Times a Day. 1892 mL 0    • lubiprostone (AMITIZA) 24 MCG capsule Take 1 capsule by mouth 2 (Two) Times a Day.      • magnesium oxide (MAGOX) 400 (241.3 Mg) MG tablet tablet Take 400 mg by mouth Every Evening.      • Melatonin 10 MG tablet Take 1 tablet by mouth Every Night.      • Multivitamin tablet tablet Daily.      • OLANZapine (zyPREXA) 10 MG tablet Take 1  tablet by mouth Every Night.      • ondansetron (Zofran) 4 MG tablet Take 1 tablet by mouth Every 8 (Eight) Hours As Needed for Nausea or Vomiting for up to 30 doses. 15 tablet 0    • pantoprazole (PROTONIX) 40 MG EC tablet Take 1 tablet by mouth 2 (Two) Times a Day Before Meals. 28 tablet 0    • polyethylene glycol (MIRALAX) 17 g packet Take 17 g by mouth 2 (Two) Times a Day. (Patient taking differently: Take 17 g by mouth Daily As Needed.) 60 packet 0    • potassium chloride (K-DUR,KLOR-CON) 20 MEQ CR tablet Take 1 tablet by mouth Daily.      • prazosin (MINIPRESS) 1 MG capsule Take 1 capsule by mouth Daily.      • ranolazine (RANEXA) 1000 MG 12 hr tablet Take 1 tablet by mouth 2 (Two) Times a Day for 30 days. 60 tablet 0    • rosuvastatin (CRESTOR) 20 MG tablet Take 1 tablet by mouth Every Night. 90 tablet 3    • spironolactone (ALDACTONE) 100 MG tablet Take 1 tablet by mouth Daily. (Patient taking differently: Take 100 mg by mouth 3 (Three) Times a Day.) 30 tablet 0    • tamsulosin (FLOMAX) 0.4 MG capsule 24 hr capsule Take 1 capsule by mouth Daily. 30 capsule 0    • traZODone (DESYREL) 50 MG tablet Take 1 tablet by mouth Every Night for 30 days. (Patient taking differently: Take 100 mg by mouth Every Night. Pt reports increase of trazodone to 100mg nightly) 30 tablet 0    • ursodiol (ACTIGALL) 300 MG capsule Take 300 mg by mouth 2 (Two) Times a Day.      • vitamin B-6 (PYRIDOXINE) 25 MG tablet Take 25 mg by mouth Daily.      • Xifaxan 550 MG tablet Take 1 tablet by mouth Every 12 (Twelve) Hours. 180 tablet 3        ROS: Review of Systems   Constitutional: Positive for fatigue.   HENT: Negative.    Eyes: Negative.    Respiratory: Negative.    Cardiovascular: Negative.    Gastrointestinal: Positive for abdominal pain, nausea and vomiting.   Endocrine: Negative.    Genitourinary: Negative.    Musculoskeletal: Negative.    Skin: Negative.    Allergic/Immunologic: Negative.    Neurological: Negative.     Hematological: Negative.    Psychiatric/Behavioral: Negative.        PAST MED HX:  Past Medical History:   Diagnosis Date   • Alcoholism (HCC)     sober 2.5 years   • Anaphylaxis    • Arthritis    • Bone necrosis (HCC)    • CAD (coronary artery disease) 2021   • Cardiac arrest (HCC)    • Cirrhosis of liver (HCC) 2021   • Gastroparesis    • GERD (gastroesophageal reflux disease)    • History of esophageal varices    • History of kidney stones    • Hypertension    • Memory loss    • Neuropathy    • Pancreatitis    • Rib fractures 2021   • SVT (supraventricular tachycardia) (HCC) 2021       PAST SURG HX:  Past Surgical History:   Procedure Laterality Date   • ANKLE SURGERY Right    • APPENDECTOMY     • CARDIAC CATHETERIZATION N/A 2021    Procedure: Left Heart Cath;  Surgeon: Vikram Gonzales MD;  Location:  [facility];  Service: Cardiovascular;  Laterality: N/A;   • CARDIAC CATHETERIZATION N/A 7/15/2021    Procedure: LEFT HEART CATH;  Surgeon: Vikram Gonzales MD;  Location:  [facility];  Service: Cardiology;  Laterality: N/A;   •  SECTION      x2   • CHOLECYSTECTOMY     • COLONOSCOPY     • COLONOSCOPY N/A 3/31/2020    Procedure: COLONOSCOPY;  Surgeon: Tirso Giles MD;  Location:  [facility];  Service: Gastroenterology;  Laterality: N/A;   • COLONOSCOPY N/A 2021    Procedure: COLONOSCOPY;  Surgeon: Tyrese Rasmussen MD;  Location:  [facility];  Service: Gastroenterology;  Laterality: N/A;   • CORONARY STENT PLACEMENT     • ENDOSCOPY     • ENDOSCOPY     • ENDOSCOPY N/A 2021    Procedure: ESOPHAGOGASTRODUODENOSCOPY AT BEDSIDE;  Surgeon: Tyrese Rasmussen MD;  Location:  [facility];  Service: Gastroenterology;  Laterality: N/A;   • ENDOSCOPY N/A 2021    Procedure: ESOPHAGOGASTRODUODENOSCOPY;  Surgeon: Tyrese Rasmussen MD;  Location:  [facility];  Service: Gastroenterology;  Laterality: N/A;   • ENDOSCOPY N/A  "2021    Procedure: ESOPHAGOGASTRODUODENOSCOPY;  Surgeon: Tyrese Rasmussen MD;  Location:  [facility];  Service: Gastroenterology;  Laterality: N/A;   • ENDOSCOPY N/A 2022    Procedure: ESOPHAGOGASTRODUODENOSCOPY;  Surgeon: Tyrese Rasmussen MD;  Location:  [facility];  Service: Gastroenterology;  Laterality: N/A;   • REPLACEMENT TOTAL HIP LATERAL POSITION Left    • TOTAL HIP ARTHROPLASTY Right     x2   • TOTAL KNEE ARTHROPLASTY Left        FAM HX:  Family History   Problem Relation Age of Onset   • Diabetes type II Mother    • Breast cancer Mother    • Heart disease Father    • Liver disease Brother    • Hypertension Brother    • ADD / ADHD Child    • Autism Child    • Asperger's syndrome Child    • Colon cancer Neg Hx    • Colon polyps Neg Hx        SOC HX:  Social History     Socioeconomic History   • Marital status:    Tobacco Use   • Smoking status: Former Smoker     Packs/day: 0.50     Types: Electronic Cigarette, Cigarettes     Quit date: 2021     Years since quittin.8   • Smokeless tobacco: Never Used   Vaping Use   • Vaping Use: Former   Substance and Sexual Activity   • Alcohol use: Not Currently     Comment: SOBER 3 YEARS   • Drug use: No   • Sexual activity: Defer       PHYSICAL EXAM  BP (!) 88/53 (BP Location: Right arm, Patient Position: Lying)   Pulse 68   Temp 97.7 °F (36.5 °C) (Oral)   Resp 18   Ht 160 cm (63\")   Wt 88.9 kg (196 lb)   SpO2 94%   BMI 34.72 kg/m²   Wt Readings from Last 3 Encounters:   22 88.9 kg (196 lb)   22 102 kg (224 lb)   22 102 kg (224 lb 11.2 oz)   ,body mass index is 34.72 kg/m².  Physical Exam  Constitutional:       General: She is not in acute distress.     Appearance: She is obese. She is not ill-appearing or toxic-appearing.   HENT:      Head: Normocephalic and atraumatic.      Mouth/Throat:      Mouth: Mucous membranes are moist.   Eyes:      General: No scleral icterus.  Cardiovascular:      Rate and Rhythm: " Normal rate and regular rhythm.   Pulmonary:      Effort: Pulmonary effort is normal.      Breath sounds: Normal breath sounds.   Abdominal:      General: Bowel sounds are normal. There is no distension.      Palpations: Abdomen is soft.      Tenderness: There is abdominal tenderness in the right upper quadrant.   Musculoskeletal:      Right lower leg: No edema.      Left lower leg: No edema.   Skin:     General: Skin is warm and dry.   Neurological:      Mental Status: She is alert and oriented to person, place, and time.   Psychiatric:         Behavior: Behavior normal.       Results Review:   I reviewed the patient's new clinical results.    Lab Results   Component Value Date    WBC 6.92 05/08/2022    HGB 13.0 05/09/2022    HGB 13.4 05/09/2022    HGB 13.5 05/08/2022    HCT 37.1 05/09/2022    MCV 88.6 05/08/2022     (L) 05/08/2022       Lab Results   Component Value Date    INR 1.09 05/08/2022    INR 1.08 04/08/2022    INR 1.08 04/07/2022       Lab Results   Component Value Date    GLUCOSE 173 (H) 05/09/2022    BUN 13 05/09/2022    CREATININE 0.94 05/09/2022    EGFRIFNONA 65 02/02/2022    BCR 13.8 05/09/2022     (L) 05/09/2022    K 4.6 05/09/2022    CO2 19.0 (L) 05/09/2022    CALCIUM 8.4 (L) 05/09/2022    PROTENTOTREF 7.5 09/30/2021    ALBUMIN 4.40 05/08/2022    ALKPHOS 102 05/08/2022    BILITOT 0.5 05/08/2022    BILIDIR 0.2 06/23/2021    ALT 29 05/08/2022    AST 26 05/08/2022     CT Abdomen/Pelvis (as interpreted by radiologist)  FINDINGS:   No evidence of pneumonia or other acute process in the lung bases. No  pleural effusion. Left lower lobe subcentimeter nodule again noted, also  present on a remote CT from March 2020   No ureteral stone or hydronephrosis of either kidney. No evidence of  pancreatitis or other acute noncontrast findings of the organs  throughout the abdomen. Cholecystomy again noted.   No bowel obstruction. No evidence of colitis or appendicitis. No  diverticulitis.   No abscess  or free air. No significant free fluid.   No evidence of acute processes in the pelvis. Bladder appears within  normal limits.   No fracture or other acute osseous findings. No acute superficial soft  tissue abnormality.    IMPRESSION:   Negative CT examination as above. No evidence of acute process in the  Abdomen/pelvis.    I reviewed her pertinent previous medical records including most recent EGD on 4/7/2022 that showed mild gastritis with hemorrhage, likely contribution of portal gastropathy. Antrum biopsy was negative for H. Pylori.    I also reviewed her most recent hospitalization to our facility last month where she had acute kidney injury secondary to overdiuresis.   Her diuretic regimen was decreased to Aldactone 100 mg daily and Lasix 40 mg daily.     ASSESSMENTS/PLANS    1.  Hematemesis of coffee grounds  2.  History of mild gastritis with hemorrhage (4/7/2022)  3.  Chronic abdominal pain, likely secondary to fatty liver with recent weight gain.     4.  Alcohol cirrhosis without ascites.  MELD-NA is 14.   She has not drank alcohol in 2 -1/2 years.   5. Abnormal mammogram    Ms. Guzman is a 46 year old female known to our service for alcohol cirrhosis that presents with coffee ground emesis.  She has not drank alcohol in 2 1/2 years.  Her H&H is within normal limits and recent EGD last month showed mild gastritis.   No varices were seen.  She has chronic abdominal pain and is requesting IV pain medications multiple times in our encounter.    >>> Discontinue IV Octreotide drip  >>> Discontinue IV Protonix drip.  Change to 40 mg BID  >>> Hold diuretics as she is hypotensive this morning.  Again discussed concern of overdiuresis as she states she is still taking Spironolactone 300 mg daily and 6 mg Bumex daily.     >>> Add Acetaminophen 500 mg q6h prn.   She has Acetaminophen listed as an allergy as she states she was told previously not to take this due to her underlying liver disease.  I however informed  her that it is okay to take Acetaminophen as needed up to 2,000 mg daily in cirrhosis.  >>> Recommend discontinuing opiate pain medication   >>> We discussed weight loss of 10 % of body weight for fatty liver (~ 19lbs)  >>> Clear liquid diet.   >>> Outpatient follow up has been arranged for her abnormal mammogram.  Expressed empathy and support as patient has great worry for breast cancer.      I discussed the patient's findings and my recommendations with patient    SYED Ryan  05/09/22  08:33 EDT      Electronically signed by Brunner, Mark I, MD at 05/09/22 5741

## 2022-05-10 NOTE — CONSULTS
Palliative Care Initial Consult Note    Patient Name:  Timothy Guzman   : 1975   Sex: female    Patient Care Team:  Toshia Mitchell APRN as PCP - General (Family Medicine)  Nelson Yip APRN as Nurse Practitioner (Nurse Practitioner)    Code Status: Full Code  Advance Directive: Yes  Surrogate decision maker: Tirso Guzman, father    Referring Provider: Dr. Izquierdo  Reason for Consult: Support    Subjective     46yoF with alcohol cirrhosis (MELD-NA 14), esophageal varices, CAD with ANGELICA x2 ( and ), and pancreatitis who was admitted on  for coffee-ground emesis and melena. Pt is no longer a candidate for liver transplant  in .     Previous Admissions  : coffee ground emesis, melena - current admission  -22: abd pain, gastritis  3/7-3/12/22: abd pain, N/V  -22: abd pain, N/V  -21: abd pain; constipation  9/3-21: abd pain; suspected GIB    Pt lives at home with her mom and dad; she has two children (13yo son, 21yo dtr).    Palliative Care was consulted for support.     Review of Systems  Review of Systems   Constitutional: Positive for fatigue.   Gastrointestinal: Positive for abdominal pain.        Endorses abd pain, RUQ, that wraps around right side to under shoulder blades. Intermittent. Acute on chronic.    Musculoskeletal: Positive for back pain.   Psychiatric/Behavioral: Positive for sleep disturbance.        History  Past Medical History:   Diagnosis Date   • Alcoholism (HCC)     sober 2.5 years   • Anaphylaxis    • Arthritis    • Bone necrosis (HCC)    • CAD (coronary artery disease) 2021   • Cardiac arrest (HCC)    • Cirrhosis of liver (HCC) 2021   • Gastroparesis    • GERD (gastroesophageal reflux disease)    • History of esophageal varices    • History of kidney stones    • Hypertension    • Memory loss    • Neuropathy    • Pancreatitis    • Rib fractures 2021   • SVT (supraventricular tachycardia) (HCC) 2021      Past Surgical History:   Procedure Laterality Date   • ANKLE SURGERY Right    • APPENDECTOMY     • CARDIAC CATHETERIZATION N/A 2021    Procedure: Left Heart Cath;  Surgeon: Vikram Gonzales MD;  Location:  [facility];  Service: Cardiovascular;  Laterality: N/A;   • CARDIAC CATHETERIZATION N/A 7/15/2021    Procedure: LEFT HEART CATH;  Surgeon: Vikram Gonzales MD;  Location:  [facility];  Service: Cardiology;  Laterality: N/A;   •  SECTION      x2   • CHOLECYSTECTOMY     • COLONOSCOPY     • COLONOSCOPY N/A 3/31/2020    Procedure: COLONOSCOPY;  Surgeon: Tirso Giles MD;  Location:  [facility];  Service: Gastroenterology;  Laterality: N/A;   • COLONOSCOPY N/A 2021    Procedure: COLONOSCOPY;  Surgeon: Tyrese Rasmussen MD;  Location:  [facility];  Service: Gastroenterology;  Laterality: N/A;   • CORONARY STENT PLACEMENT     • ENDOSCOPY     • ENDOSCOPY     • ENDOSCOPY N/A 2021    Procedure: ESOPHAGOGASTRODUODENOSCOPY AT BEDSIDE;  Surgeon: Tyrese Rasmussen MD;  Location:  [facility];  Service: Gastroenterology;  Laterality: N/A;   • ENDOSCOPY N/A 2021    Procedure: ESOPHAGOGASTRODUODENOSCOPY;  Surgeon: Tyrese Rasmussen MD;  Location:  [facility];  Service: Gastroenterology;  Laterality: N/A;   • ENDOSCOPY N/A 2021    Procedure: ESOPHAGOGASTRODUODENOSCOPY;  Surgeon: Tyrese Rasmussen MD;  Location:  [facility];  Service: Gastroenterology;  Laterality: N/A;   • ENDOSCOPY N/A 2022    Procedure: ESOPHAGOGASTRODUODENOSCOPY;  Surgeon: Tyrese Rasmussen MD;  Location:  [facility];  Service: Gastroenterology;  Laterality: N/A;   • REPLACEMENT TOTAL HIP LATERAL POSITION Left    • TOTAL HIP ARTHROPLASTY Right     x2   • TOTAL KNEE ARTHROPLASTY Left      Current Facility-Administered Medications   Medication Dose Route Frequency Provider Last Rate Last Admin   • acetaminophen (TYLENOL) tablet 500 mg  500 mg Oral Q6H PRN August,  SYED Heaht   500 mg at 05/09/22 1649   • FLUoxetine (PROzac) capsule 40 mg  40 mg Oral Daily Rosanna Lawrence APRN   40 mg at 05/10/22 0813   • gabapentin (NEURONTIN) capsule 400 mg  400 mg Oral TID Rosanna Lawrence APRN   400 mg at 05/10/22 0813   • lactulose (CHRONULAC) 10 GM/15ML solution 30 g  30 g Oral TID Rosanna Lawrence APRN   30 g at 05/10/22 0812   • lubiprostone (AMITIZA) capsule 24 mcg  24 mcg Oral BID Rosanna Lawrence APRN   24 mcg at 05/10/22 0812   • magnesium oxide (MAG-OX) tablet 400 mg  400 mg Oral Q PM Rosanna Lawrence APRN   400 mg at 05/09/22 1715   • melatonin tablet 10 mg  10 mg Oral Nightly Rosanna Lawrence APRN   10 mg at 05/09/22 2152   • OLANZapine (zyPREXA) tablet 10 mg  10 mg Oral Nightly Rosanna Lawrence APRN   10 mg at 05/09/22 2151   • ondansetron (ZOFRAN) tablet 4 mg  4 mg Oral Q8H PRN Rosanna Lawrence APRN   4 mg at 05/09/22 0056   • pantoprazole (PROTONIX) EC tablet 40 mg  40 mg Oral BID AC Ivana Koch PA   40 mg at 05/10/22 0638   • polyethylene glycol (MIRALAX) packet 17 g  17 g Oral Daily PRN Rosanna Lawrence APRN       • potassium chloride (KLOR-CON) packet 20 mEq  20 mEq Oral Daily Rosanna Lawrence APRN   20 mEq at 05/10/22 0813   • potassium chloride (MICRO-K) CR capsule 40 mEq  40 mEq Oral PRN Rosanna Lawrence APRN        Or   • potassium chloride (KLOR-CON) packet 40 mEq  40 mEq Oral PRN Rosanna Lawrence APRN        Or   • potassium chloride 10 mEq in 100 mL IVPB  10 mEq Intravenous Q1H PRN Rosanna Lawrence APRN       • ranolazine (RANEXA) 12 hr tablet 1,000 mg  1,000 mg Oral BID Rosanna Lawrence APRN   1,000 mg at 05/10/22 0813   • riFAXIMin (XIFAXAN) tablet 550 mg  550 mg Oral Q12H Rosanna Lawrence APRN   550 mg at 05/10/22 0813   • rosuvastatin (CRESTOR) tablet 20 mg  20 mg Oral Nightly Rosanna Lawrence APRN   20 mg at 05/09/22 2151   • sodium chloride 0.9 % flush 10 mL  10 mL Intravenous PRN Rosanna Lawrence APRN       • sodium chloride 0.9 % flush 10 mL  10 mL Intravenous Q12H Rosanna Lawrence APRN   10 mL at  05/10/22 0815   • sodium chloride 0.9 % flush 10 mL  10 mL Intravenous PRN Amanda Lawrencea, APRN       • traZODone (DESYREL) tablet 100 mg  100 mg Oral Nightly Rosanna Lawrence APRN   100 mg at 22   • ursodiol (ACTIGALL) capsule 300 mg  300 mg Oral BID Rosanna Lawrence APRN   300 mg at 05/10/22 0812        •  acetaminophen  •  ondansetron  •  polyethylene glycol  •  potassium chloride **OR** potassium chloride **OR** potassium chloride  •  sodium chloride  •  sodium chloride  Allergies   Allergen Reactions   • Contrast Dye Anaphylaxis      Pt coded after receiving contrast for a CT scan. Was premedicated. Was having an MI at the same time. Immediately underwent heart cath with contrast without additional allergic reaction  7/15 Pt premedicated with prednisone/Benadryl for heart cath. Still with anaphylactic-like reaction with drop in BP and hypoxia. Treated 95% stenosis with minimal dye and supported with epinephrine, solumedrol, NRB   • Haloperidol Hallucinations   • Nsaids GI Bleeding     Other reaction(s): Bleeding, VOMITING   • Tylenol [Acetaminophen] Other (See Comments)     CIRRHOSIS     Family History   Problem Relation Age of Onset   • Diabetes type II Mother    • Breast cancer Mother    • Heart disease Father    • Liver disease Brother    • Hypertension Brother    • ADD / ADHD Child    • Autism Child    • Asperger's syndrome Child    • Colon cancer Neg Hx    • Colon polyps Neg Hx      Social History     Socioeconomic History   • Marital status:    Tobacco Use   • Smoking status: Former Smoker     Packs/day: 0.50     Types: Electronic Cigarette, Cigarettes     Quit date: 2021     Years since quittin.8   • Smokeless tobacco: Never Used   Vaping Use   • Vaping Use: Former   Substance and Sexual Activity   • Alcohol use: Not Currently     Comment: SOBER 3 YEARS   • Drug use: No   • Sexual activity: Defer       Objective     Vital Signs  Temp:  [97.7 °F (36.5 °C)-98.5 °F (36.9 °C)] 98.5 °F  (36.9 °C)  Heart Rate:  [71-85] 75  Resp:  [16-18] 18  BP: ()/(52-61) 93/58      PPS: Palliative Performance Scale score as of 5/10/2022, 12:08 EDT is 60% based on the following measures:   Ambulation: Reduced  Activity and Evidence of Disease: Unable to do hobby or some housework, significant evidence of disease  Self-Care: Occasional assist necessary   Intake: Normal or reduced   LOC: Full or confusion    Physical Exam:  Physical Exam  Constitutional:       General: She is in acute distress.      Comments: Room is dark; pt does not want the lights on or the shades open to allow the sun. States she is tired and wants to sleep. She is pleasant, cooperative.    HENT:      Head: Normocephalic and atraumatic.   Eyes:      Extraocular Movements: Extraocular movements intact.   Neck:      Comments: Increase neck circumference  Cardiovascular:      Rate and Rhythm: Normal rate and regular rhythm.      Comments: Hypotensive 93/58  Pulmonary:      Effort: Pulmonary effort is normal.      Breath sounds: Normal breath sounds.   Abdominal:      General: Bowel sounds are normal.      Palpations: Abdomen is soft.      Comments: Mild discomfort RUQ with palpation   Musculoskeletal:      Right lower leg: No edema.      Left lower leg: No edema.   Skin:     Comments: C/D/I   Neurological:      General: No focal deficit present.      Mental Status: She is oriented to person, place, and time.   Psychiatric:         Attention and Perception: Attention and perception normal.         Mood and Affect: Affect is flat.         Speech: Speech normal.         Behavior: Behavior is cooperative.         Thought Content: Thought content normal.         Cognition and Memory: Cognition and memory normal.      Comments: Maintains eye contact         Results Review:   Lab Results   Component Value Date    HGBA1C 5.70 (H) 04/07/2022       Lab Results   Component Value Date    GLUCOSE 88 05/10/2022    BUN 10 05/10/2022    CREATININE 0.84  05/10/2022    EGFRIFNONA 65 02/02/2022    BCR 11.9 05/10/2022    K 4.0 05/10/2022    CO2 24.0 05/10/2022    CALCIUM 7.7 (L) 05/10/2022    PROTENTOTREF 7.5 09/30/2021    ALBUMIN 3.30 (L) 05/10/2022    LABIL2 1.1 09/30/2021    AST 13 05/10/2022    ALT 18 05/10/2022       WBC   Date Value Ref Range Status   05/10/2022 8.15 3.40 - 10.80 10*3/mm3 Final     RBC   Date Value Ref Range Status   05/10/2022 3.97 3.77 - 5.28 10*6/mm3 Final     Hemoglobin   Date Value Ref Range Status   05/10/2022 11.9 (L) 12.0 - 15.9 g/dL Final     Hematocrit   Date Value Ref Range Status   05/10/2022 35.1 34.0 - 46.6 % Final     MCV   Date Value Ref Range Status   05/10/2022 88.4 79.0 - 97.0 fL Final     MCH   Date Value Ref Range Status   05/10/2022 30.0 26.6 - 33.0 pg Final     MCHC   Date Value Ref Range Status   05/10/2022 33.9 31.5 - 35.7 g/dL Final     RDW   Date Value Ref Range Status   05/10/2022 13.5 12.3 - 15.4 % Final     RDW-SD   Date Value Ref Range Status   05/10/2022 43.8 37.0 - 54.0 fl Final     MPV   Date Value Ref Range Status   05/10/2022 11.7 6.0 - 12.0 fL Final     Platelets   Date Value Ref Range Status   05/10/2022 89 (L) 140 - 450 10*3/mm3 Final     Neutrophil %   Date Value Ref Range Status   05/10/2022 73.5 42.7 - 76.0 % Final     Lymphocyte %   Date Value Ref Range Status   05/10/2022 18.0 (L) 19.6 - 45.3 % Final     Monocyte %   Date Value Ref Range Status   05/10/2022 8.0 5.0 - 12.0 % Final     Eosinophil %   Date Value Ref Range Status   05/10/2022 0.1 (L) 0.3 - 6.2 % Final     Basophil %   Date Value Ref Range Status   05/10/2022 0.0 0.0 - 1.5 % Final     Immature Grans %   Date Value Ref Range Status   05/10/2022 0.4 0.0 - 0.5 % Final     Neutrophils, Absolute   Date Value Ref Range Status   05/10/2022 5.99 1.70 - 7.00 10*3/mm3 Final     Lymphocytes, Absolute   Date Value Ref Range Status   05/10/2022 1.47 0.70 - 3.10 10*3/mm3 Final     Monocytes, Absolute   Date Value Ref Range Status   05/10/2022 0.65 0.10 -  "0.90 10*3/mm3 Final     Eosinophils, Absolute   Date Value Ref Range Status   05/10/2022 0.01 0.00 - 0.40 10*3/mm3 Final     Basophils, Absolute   Date Value Ref Range Status   05/10/2022 0.00 0.00 - 0.20 10*3/mm3 Final     Immature Grans, Absolute   Date Value Ref Range Status   05/10/2022 0.03 0.00 - 0.05 10*3/mm3 Final     nRBC   Date Value Ref Range Status   05/10/2022 0.0 0.0 - 0.2 /100 WBC Final         Alcoholic cirrhosis of liver without ascites (HCC)    History of esophageal varices    GI bleed    Upper GI bleed      Assessment/Plan:     46yoF with alcohol cirrhosis (MELD-NA 14), esophageal varices, CAD, and pancreatitis who was admitted on 5/8 for coffee-ground emesis and melena. Pt is no longer a candidate for liver transplant 2/2 MI summer 2021.     Pain, abd; all over, acute on chronic - msk, neuropathic; existential  Anxiety  Goals of Care    Discussion today with pt regarding goals and plan of care. She is \"tired of hurting all the time\". Has not followed with pain mgmt program in years; previously on Methadone. Understandably numerous reasons for existential suffering as well. Discussed Palliative Care vs Hospice. Unfortunately there are not any Palliative Care options in [place]. Pt would like information on Hospice as she discussed she is no longer interested in returning back to the hospital. Encouraged pt to discuss wishes with her parents and children.    - Continue to monitor for daily BMs    - Spoke with Case Mgmt to assist pt with Pain Mgmt options prior to discharge - appreciate the assistance.     - Hospice consult placed for information on home hospice    Palliative Care will continue to follow for support.    Total Visit Time: 70min  Face to Face Time: 30min    Sapphire Banks, LUIS ALBERTO, MHA, APRN  [facility] Nav[facility]ators  Hospice and Palliative Care Nurse Practitioner  05/10/22  11:44 EDT    "

## 2022-05-10 NOTE — PLAN OF CARE
Goal Outcome Evaluation:  Plan of Care Reviewed With: patient        Progress: improving   VSS, RA, A/Ox4. Pt currently resting in bed, hospice consulted. Will continue plan of care.

## 2022-05-10 NOTE — PROGRESS NOTES
Continued Stay Note   [facility]     Patient Name: Timothy Guzman  MRN: [id number]  Today's Date: 5/10/2022    Admit Date: 5/8/2022     Discharge Plan     Row Name 05/10/22 1430       Plan    Plan TBD    Plan Comments Hospice consult received. Chart reviewed. Visit made to the patient's room. Patient lying in the hospital bed. Visit made to provide the patient with information regarding hospice care at home. Patient did express that she did not want further hospitalizations. Patient currently is living with her parents. Per chart review, plan is to look into waiver through medicaid to help with caregivers at home when needed. Educated patient on hospice services and conflict with medicaid waiver. Educated that hospice can provide additional support at home but cannot provide 24/7 care. Currently, patient is able to care for self until she becomes encephalopathic and then cannot care for herself at that time. Hospice information was provided to the patient. Will continue to follow peripherally while discharge plans are being sought. Please call [phone] for any questions or concerns.                  Discharge Codes    No documentation.                     KOKO DÍAZ

## 2022-05-10 NOTE — PLAN OF CARE
Goal Outcome Evaluation:           Progress: improving  Outcome Evaluation: Patient has had a decent shift, sleeping on and off tonight. VSS, and patient has been alert and oriented times four. No complaints of pain tonight. Nursing staff will continue to monitor and assess the patient.

## 2022-05-11 ENCOUNTER — READMISSION MANAGEMENT (OUTPATIENT)
Dept: CALL CENTER | Facility: HOSPITAL | Age: 47
End: 2022-05-11

## 2022-05-11 VITALS
HEART RATE: 72 BPM | RESPIRATION RATE: 18 BRPM | OXYGEN SATURATION: 94 % | SYSTOLIC BLOOD PRESSURE: 96 MMHG | HEIGHT: 63 IN | WEIGHT: 196 LBS | BODY MASS INDEX: 34.73 KG/M2 | DIASTOLIC BLOOD PRESSURE: 53 MMHG | TEMPERATURE: 98.1 F

## 2022-05-11 LAB
HCT VFR BLD AUTO: 31.9 % (ref 34–46.6)
HCT VFR BLD AUTO: 32.9 % (ref 34–46.6)
HGB BLD-MCNC: 10.8 G/DL (ref 12–15.9)
HGB BLD-MCNC: 11.2 G/DL (ref 12–15.9)

## 2022-05-11 PROCEDURE — 85014 HEMATOCRIT: CPT | Performed by: NURSE PRACTITIONER

## 2022-05-11 PROCEDURE — 99217 PR OBSERVATION CARE DISCHARGE MANAGEMENT: CPT | Performed by: NURSE PRACTITIONER

## 2022-05-11 PROCEDURE — 85018 HEMOGLOBIN: CPT | Performed by: NURSE PRACTITIONER

## 2022-05-11 PROCEDURE — G0378 HOSPITAL OBSERVATION PER HR: HCPCS

## 2022-05-11 RX ORDER — ACETAMINOPHEN 500 MG
500 TABLET ORAL EVERY 6 HOURS PRN
Start: 2022-05-11

## 2022-05-11 RX ORDER — TRAZODONE HYDROCHLORIDE 50 MG/1
100 TABLET ORAL NIGHTLY
Qty: 60 TABLET | Refills: 0
Start: 2022-05-11 | End: 2022-06-10

## 2022-05-11 RX ADMIN — OXYCODONE 5 MG: 5 TABLET ORAL at 04:59

## 2022-05-11 RX ADMIN — LACTULOSE 30 G: 20 SOLUTION ORAL at 09:50

## 2022-05-11 RX ADMIN — LUBIPROSTONE 24 MCG: 24 CAPSULE, GELATIN COATED ORAL at 09:50

## 2022-05-11 RX ADMIN — GABAPENTIN 400 MG: 400 CAPSULE ORAL at 09:50

## 2022-05-11 RX ADMIN — URSODIOL 300 MG: 300 CAPSULE ORAL at 09:50

## 2022-05-11 RX ADMIN — POTASSIUM CHLORIDE 20 MEQ: 1.5 FOR SOLUTION ORAL at 09:49

## 2022-05-11 RX ADMIN — FLUOXETINE HYDROCHLORIDE 40 MG: 20 CAPSULE ORAL at 09:49

## 2022-05-11 RX ADMIN — PANTOPRAZOLE SODIUM 40 MG: 40 TABLET, DELAYED RELEASE ORAL at 09:57

## 2022-05-11 RX ADMIN — OXYCODONE 5 MG: 5 TABLET ORAL at 10:56

## 2022-05-11 RX ADMIN — RANOLAZINE 1000 MG: 500 TABLET, FILM COATED, EXTENDED RELEASE ORAL at 09:49

## 2022-05-11 RX ADMIN — RIFAXIMIN 550 MG: 550 TABLET ORAL at 09:50

## 2022-05-11 NOTE — DISCHARGE SUMMARY
[facility] Medicine Services  DISCHARGE SUMMARY    Patient Name: Timothy Guzman  : 1975  MRN: [id number]    Date of Admission: 2022  2:53 PM  Date of Discharge:  2022  Primary Care Physician: Toshia Mitchell APRN    Consults     Date and Time Order Name Status Description    2022 11:20 AM Inpatient Palliative Care MD Consult Completed     2022 12:33 AM Inpatient Gastroenterology Consult Completed     2022  2:44 AM Gastroenterology Consult Completed     2022 12:57 AM Inpatient Gastroenterology Consult Completed           Hospital Course     Presenting Problem:   GI bleed [K92.2]  Upper GI bleed [K92.2]    Active Hospital Problems    Diagnosis  POA   • Upper GI bleed [K92.2]  Yes   • GI bleed [K92.2]  Yes   • History of esophageal varices [Z87.19]  Not Applicable   • Alcoholic cirrhosis of liver without ascites (HCC) [K70.30]  Yes      Resolved Hospital Problems   No resolved problems to display.          Hospital Course:  Timothy Guzman is a 46 y.o. female with PMH etoh cirrhosis, esophageal varices, CAD and pancreatitis who presented to the ED with chief complaint of abdominal pain since Thursday and it worsened this am with coffee ground emesis and melena.  She was admitted to same issues in early April and was seen by GI, was scoped and placed on BID PPI.  She was admitted to hospitalist service     Coffee-ground emesis/Melena reported at home  --started on octreotide and IV PPI- as well as rocephin- this was discontinued based on GI recs and place on 40 mg protonix BID PO PPI.  --hemoglobin stable at 13. Patient also had EGD last month findings of mild gastritis  --GI following appreciate their assistance. Follow up with GI as scheduled at discharge.   --continue low fat high protein diet at home.   --okay to resume plavix and aspirin at discharge per my conversation with GI today.      Hypotension  --ongoing concern for overdiuresis, will  continue to hold diuresis at discharge.  I have requested that she check her bp at home and keep a list to take with her to her PCP office. She can discuss resuming diuresis at that visit.    --- discontinued pain medications as per GI recommendations  --patient can take tylenol up to 2gm daily for pain safely in cirrhosis, 500 q6hprn added. Okay to continue at home.      Alcoholic cirrhosis  Esophageal varices  --as above, hold diuretics for now.  --continue lactulose/rifaximin  --GI recommending weight loss for fatty liver, counseled on this. MELD-NA= 7  --palliative medicine has been consulted for ongoing pain issues. Patient will be referred to pain management at discharge. She has been given a list of providers in her area and will be responsible for setting up an appointment. The patient has also requested a hospice consult. She was seen by hospice and provided information about home hospice.      CAD  --continue plavix and asa at discharge.       Discharge Follow Up Recommendations for outpatient labs/diagnostics:   Follow up with PCP, first available hospital follow up appt.   Follow up with GI as scheduled.   Pain management-patient will make new patient appointment     Day of Discharge     HPI:   Resting in bed this morning with no complaints.  Feels a bit better today.  Wants to go home.     Review of Systems  Gen- No fevers, chills  CV- No chest pain, palpitations  Resp- No cough, dyspnea  GI- No N/V/D, abd pain        Vital Signs:   Temp:  [98 °F (36.7 °C)-98.8 °F (37.1 °C)] 98.1 °F (36.7 °C)  Heart Rate:  [72-86] 72  Resp:  [16-18] 18  BP: ()/(53-62) 96/53      Physical Exam:  Constitutional: No acute distress, awake, alert, resting in bed  HENT: NCAT, mucous membranes moist  Respiratory: Clear to auscultation bilaterally, respiratory effort normal on room air  Cardiovascular: RRR, no murmurs, rubs, or gallops  Gastrointestinal: Positive bowel sounds, soft, nontender,  nondistended  Musculoskeletal: No bilateral ankle edema  Psychiatric: Appropriate affect, cooperative  Neurologic: Oriented x 3, strength symmetric in all extremities, Cranial Nerves grossly intact to confrontation, speech clear  Skin: No rashes noted to exposed skin       Pertinent  and/or Most Recent Results     LAB RESULTS:      Lab 05/11/22  0545 05/11/22  0011 05/10/22  1833 05/10/22  1211 05/10/22  0504 05/08/22  1808 05/08/22  1451   WBC  --   --   --   --  8.15  --  6.92   HEMOGLOBIN 11.2* 10.8* 10.7* 11.4* 11.9*   < > 14.5   HEMATOCRIT 32.9* 31.9* 31.7* 33.4* 35.1   < > 42.1   PLATELETS  --   --   --   --  89*  --  105*   NEUTROS ABS  --   --   --   --  5.99  --  4.23   IMMATURE GRANS (ABS)  --   --   --   --  0.03  --  0.03   LYMPHS ABS  --   --   --   --  1.47  --  1.78   MONOS ABS  --   --   --   --  0.65  --  0.80   EOS ABS  --   --   --   --  0.01  --  0.05   MCV  --   --   --   --  88.4  --  88.6   PROCALCITONIN  --   --   --   --   --   --  0.10   LACTATE  --   --   --   --   --   --  1.5   PROTIME  --   --   --   --   --   --  14.0    < > = values in this interval not displayed.         Lab 05/10/22  0504 05/09/22  0509 05/08/22  1451   SODIUM 144 132* 138   POTASSIUM 4.0 4.6 3.8   CHLORIDE 112* 101 104   CO2 24.0 19.0* 24.0   ANION GAP 8.0 12.0 10.0   BUN 10 13 10   CREATININE 0.84 0.94 0.91   EGFR 86.4 75.5 78.5   GLUCOSE 88 173* 96   CALCIUM 7.7* 8.4* 9.3   MAGNESIUM  --   --  1.7         Lab 05/10/22  0504 05/08/22  1451   TOTAL PROTEIN 5.0* 7.1   ALBUMIN 3.30* 4.40   GLOBULIN 1.7 2.7   ALT (SGPT) 18 29   AST (SGOT) 13 26   BILIRUBIN 0.2 0.5   ALK PHOS 73 102   LIPASE  --  20         Lab 05/08/22  1451   TROPONIN T <0.010   PROTIME 14.0   INR 1.09             Lab 05/08/22  1452   ABO TYPING A   RH TYPING Positive   ANTIBODY SCREEN Negative         Brief Urine Lab Results  (Last result in the past 365 days)      Color   Clarity   Blood   Leuk Est   Nitrite   Protein   CREAT   Urine HCG         04/07/22 0342 Yellow   Clear   Negative   Trace   Negative   Negative               Microbiology Results (last 10 days)     Procedure Component Value - Date/Time    Blood Culture - Blood, Arm, Left [[id number]]  (Normal) Collected: 05/08/22 1528    Lab Status: Preliminary result Specimen: Blood from Arm, Left Updated: 05/10/22 1546     Blood Culture No growth at 2 days    COVID-19 and FLU A/B PCR - Swab, Nasopharynx [[id number]]  (Normal) Collected: 05/08/22 1525    Lab Status: Final result Specimen: Swab from Nasopharynx Updated: 05/08/22 1628     COVID19 Not Detected     Influenza A PCR Not Detected     Influenza B PCR Not Detected    Narrative:      Fact sheet for providers: https://www.fda.gov/media/115232/download    Fact sheet for patients: https://www.fda.gov/media/280463/download    Test performed by PCR.    Blood Culture - Blood, Arm, Right [[id number]]  (Normal) Collected: 05/08/22 1515    Lab Status: Preliminary result Specimen: Blood from Arm, Right Updated: 05/10/22 1545     Blood Culture No growth at 2 days          CT Abdomen Pelvis Without Contrast    Result Date: 5/8/2022  CT ABDOMEN PELVIS WO CONTRAST-  Date of Exam: 5/8/2022 4:11 PM  Indication: GI bleed, lower.   Comparison Exams: None available.  Technique: Axial images from the base of the chest through the abdomen and pelvis were obtained without intravenous contrast. Sagittal and coronal reformatted images also performed. Automated exposure control and iterative reconstruction methods were used.  Radiation audit for number of CT and nuclear cardiology exams performed in the last year: 0.   FINDINGS:  No evidence of pneumonia or other acute process in the lung bases. No pleural effusion. Left lower lobe subcentimeter nodule again noted, also present on a remote CT from March 2020  No ureteral stone or hydronephrosis of either kidney. No evidence of pancreatitis or other acute noncontrast findings of the organs throughout the abdomen. Cholecystomy  again noted.  No bowel obstruction. No evidence of colitis or appendicitis. No diverticulitis.  No abscess or free air. No significant free fluid.  No evidence of acute processes in the pelvis. Bladder appears within normal limits.  No fracture or other acute osseous findings. No acute superficial soft tissue abnormality.        Negative CT examination as above. No evidence of acute process in the abdomen/pelvis.  This report was finalized on 5/8/2022 5:03 PM by Akhil Petersen.      XR Chest 1 View    Result Date: 5/8/2022  EXAM: XR CHEST 1 VW-  DATE OF EXAM: 5/8/2022 3:32 PM  INDICATION: GI bleed.   COMPARISONS: 4/6/2022  FINDINGS:  No evidence of pneumonia, pulmonary edema or other acute pulmonary process. No evidence of pneumothorax or significant pleural effusion. No evidence of acute process of the heart or other mediastinal structures.       Negative chest x-ray. No evidence of acute cardiopulmonary disease.  This report was finalized on 5/8/2022 4:26 PM by Akhil Petersen.        Results for orders placed during the hospital encounter of 11/23/21    Duplex Mesenteric Complete CAR    Interpretation Summary  EXAMINATION: DUPLEX SMA COMPLETE CAR-12/01/2021:    INDICATION: Generalized abdominal pain.    COMPARISON: Unenhanced CT scan of 11/30/2021.    FINDINGS: The study is considered technically adequate, although the  patient is unable to suspend respiration during the exam. All portions  of the celiac axis and SMA are visualized. Peak systolic celiac axis  velocity is 150 cm/s at its origin. Peak systolic SMA velocity is 207  cm/s proximally, both considered within normal limits. Review of the CT  study, on the sagittal images shows no obvious celiac axis stenosis, and  only a suggestion of mild-to-moderate celiac axis origin narrowing.    Impression  No evidence of hemodynamically significant celiac axis or  SMA stenosis.    D:  12/03/2021  E:  12/03/2021        This report was finalized on 12/4/2021 3:34 PM by   Bo Gardner MD.      Results for orders placed during the hospital encounter of 11/23/21    Duplex Mesenteric Complete CAR    Interpretation Summary  EXAMINATION: DUPLEX SMA COMPLETE CAR-12/01/2021:    INDICATION: Generalized abdominal pain.    COMPARISON: Unenhanced CT scan of 11/30/2021.    FINDINGS: The study is considered technically adequate, although the  patient is unable to suspend respiration during the exam. All portions  of the celiac axis and SMA are visualized. Peak systolic celiac axis  velocity is 150 cm/s at its origin. Peak systolic SMA velocity is 207  cm/s proximally, both considered within normal limits. Review of the CT  study, on the sagittal images shows no obvious celiac axis stenosis, and  only a suggestion of mild-to-moderate celiac axis origin narrowing.    Impression  No evidence of hemodynamically significant celiac axis or  SMA stenosis.    D:  12/03/2021  E:  12/03/2021        This report was finalized on 12/4/2021 3:34 PM by Dr. Bo Gardner MD.      Results for orders placed during the hospital encounter of 06/18/21    Adult Transthoracic Echo Complete W/ Cont if Necessary Per Protocol    Interpretation Summary  · The quality of the study is limited due to patient positioning and patient being intubated.  · Left ventricular ejection fraction appears to be 51 - 55%. Left ventricular systolic function is normal.  · Left ventricular diastolic function is consistent with (grade Ia w/high LAP) impaired relaxation.  · Mildly reduced right ventricular systolic function noted.      Plan for Follow-up of Pending Labs/Results:   Pending Labs     Order Current Status    Blood Culture - Blood, Arm, Left Preliminary result    Blood Culture - Blood, Arm, Right Preliminary result        Discharge Details        Discharge Medications      New Medications      Instructions Start Date   acetaminophen 500 MG tablet  Commonly known as: TYLENOL   500 mg, Oral, Every 6 Hours PRN         Changes to  Medications      Instructions Start Date   polyethylene glycol 17 g packet  Commonly known as: MIRALAX  What changed:   · when to take this  · reasons to take this   17 g, Oral, 2 Times Daily         Continue These Medications      Instructions Start Date   aspirin 81 MG EC tablet   81 mg, Oral, Daily      bisacodyl 10 MG suppository  Commonly known as: DULCOLAX   10 mg, Rectal, Daily PRN      clopidogrel 75 MG tablet  Commonly known as: PLAVIX   75 mg, Oral, Daily      docusate sodium 100 MG capsule  Commonly known as: Colace   100 mg, Oral, 2 Times Daily      FLUoxetine 40 MG capsule  Commonly known as: PROzac   40 mg, Oral, Daily      gabapentin 400 MG capsule  Commonly known as: NEURONTIN   400 mg, Oral, 3 Times Daily      lactulose 10 GM/15ML solution  Commonly known as: CHRONULAC   30 g, Oral, 3 Times Daily      lubiprostone 24 MCG capsule  Commonly known as: AMITIZA   1 capsule, Oral, 2 Times Daily      magnesium oxide 400 (241.3 Mg) MG tablet tablet  Commonly known as: MAGOX   400 mg, Oral, Every Evening      Melatonin 10 MG tablet   1 tablet, Oral, Nightly      Multivitamin tablet tablet  Generic drug: multivitamin   Daily      OLANZapine 10 MG tablet  Commonly known as: zyPREXA   10 mg, Oral, Nightly      ondansetron 4 MG tablet  Commonly known as: Zofran   4 mg, Oral, Every 8 Hours PRN      pantoprazole 40 MG EC tablet  Commonly known as: PROTONIX   40 mg, Oral, 2 Times Daily Before Meals      potassium chloride 20 MEQ CR tablet  Commonly known as: K-DUR,KLOR-CON   1 tablet, Oral, Daily      ranolazine 1000 MG 12 hr tablet  Commonly known as: RANEXA   1,000 mg, Oral, 2 Times Daily      rosuvastatin 20 MG tablet  Commonly known as: CRESTOR   20 mg, Oral, Nightly      traZODone 50 MG tablet  Commonly known as: DESYREL   100 mg, Oral, Nightly, Pt reports increase of trazodone to 100mg nightly      ursodiol 300 MG capsule  Commonly known as: ACTIGALL   300 mg, Oral, 2 Times Daily      vitamin b complex  capsule capsule   1 capsule, Oral, Daily      vitamin B-6 25 MG tablet  Commonly known as: PYRIDOXINE   25 mg, Oral, Daily      Xifaxan 550 MG tablet  Generic drug: riFAXIMin   550 mg, Oral, Every 12 Hours Scheduled         Stop These Medications    bumetanide 0.5 MG tablet  Commonly known as: BUMEX     prazosin 1 MG capsule  Commonly known as: MINIPRESS     spironolactone 100 MG tablet  Commonly known as: ALDACTONE     tamsulosin 0.4 MG capsule 24 hr capsule  Commonly known as: FLOMAX            Allergies   Allergen Reactions   • Contrast Dye Anaphylaxis     6/18 Pt coded after receiving contrast for a CT scan. Was premedicated. Was having an MI at the same time. Immediately underwent heart cath with contrast without additional allergic reaction  7/15 Pt premedicated with prednisone/Benadryl for heart cath. Still with anaphylactic-like reaction with drop in BP and hypoxia. Treated 95% stenosis with minimal dye and supported with epinephrine, solumedrol, NRB   • Haloperidol Hallucinations   • Nsaids GI Bleeding     Other reaction(s): Bleeding, VOMITING         Discharge Disposition:  Home or Self Care    Diet:  Hospital:  Diet Order   Procedures   • Diet Regular; Low Fat       Activity:  Activity Instructions     Activity as Tolerated      Activity as Tolerated                 CODE STATUS:    Code Status and Medical Interventions:   Ordered at: 05/08/22 1716     Code Status (Patient has no pulse and is not breathing):    CPR (Attempt to Resuscitate)     Medical Interventions (Patient has pulse or is breathing):    Full Support       Future Appointments   Date Time Provider Department Center   5/12/2022  8:30 AM Nelson Yip APRN [facility] [address] [facility]   7/3/2023  9:15 AM Vikram Gonzales MD [facility]       Additional Instructions for the Follow-ups that You Need to Schedule     Discharge Follow-up with PCP   As directed       Currently Documented PCP:    Toshia Mitchell APRN    PCP Phone Number:     [phone]     Follow Up Details: first available hospital follow up appt\         Discharge Follow-up with PCP   As directed       Currently Documented PCP:    Toshia Mitchell APRN    PCP Phone Number:    [phone]     Follow Up Details: first available hospital follow up appt.         Discharge Follow-up with Specified Provider: [facility] as scheduled tomorrow.   As directed      To: [facility] as scheduled tomorrow.         Discharge Follow-up with Specified Provider: keep scheduled GI follow up   As directed      To: keep scheduled GI follow up         Discharge Follow-up with Specified Provider: pain management-patient has been given resources to make appointment.   As directed      To: pain management-patient has been given resources to make appointment.         Discharge Follow-up with Specified Provider: patient has been given a list of pain management specialists to establish care from.   As directed      To: patient has been given a list of pain management specialists to establish care from.                     Elin Augustine, ISSAC  05/11/22      Time Spent on Discharge:  I spent  40  minutes on this discharge activity which included: face-to-face encounter with the patient, reviewing the data in the system, coordination of the care with the nursing staff as well as consultants, documentation, and entering orders.

## 2022-05-11 NOTE — DISCHARGE PLACEMENT REQUEST
"Bipin Guzman (46 y.o. Female)             Date of Birth   1975    Social Security Number       Address   [address] [facility] [address]    Home Phone   [phone]    MRN   [id number]       Sabianism   Congregation    Marital Status                               Admission Date   22    Admission Type   Emergency    Admitting Provider   Hira Gardner MD    Attending Provider   Precious Izquierdo MD    Department, Room/Bed   [address], [address]/1       Discharge Date       Discharge Disposition   Home or Self Care    Discharge Destination                               Attending Provider: Precious Izquierdo MD    Allergies: Contrast Dye, Haloperidol, Nsaids    Isolation: None   Infection: None   Code Status: CPR   Advance Care Planning Activity    Ht: 160 cm (63\")   Wt: 88.9 kg (196 lb)    Admission Cmt: None   Principal Problem: None                Active Insurance as of 2022     Primary Coverage     Payor Plan Insurance Group Employer/Plan Group    [employer] [id number]     Payor Plan Address Payor Plan Phone Number Payor Plan Fax Number Effective Dates    [address]   2021 - None Entered    [facility] [address]       Subscriber Name Subscriber Birth Date Member ID       BIPIN GUZMAN 1975 [id number]                 Emergency Contacts      (Rel.) Home Phone Work Phone Mobile Phone    devonyanci hermosillo (Mother) [phone] -- [phone]    FREDO GUZMAN (Father) [phone] -- [phone]                 Discharge Summary      Elin Augustine APRN at [address] [place] [address] Medicine Services  DISCHARGE SUMMARY    Patient Name: Bipin Guzman  : 1975  MRN: [id number]    Date of Admission: 2022  2:53 PM  Date of Discharge:  2022  Primary Care Physician: Toshia Mitchell APRN    Consults     Date and Time Order Name Status Description    2022 11:20 AM " Inpatient Palliative Care MD Consult Completed     5/9/2022 12:33 AM Inpatient Gastroenterology Consult Completed     4/7/2022  2:44 AM Gastroenterology Consult Completed     4/7/2022 12:57 AM Inpatient Gastroenterology Consult Completed           Hospital Course     Presenting Problem:   GI bleed [K92.2]  Upper GI bleed [K92.2]    Active Hospital Problems    Diagnosis  POA   • Upper GI bleed [K92.2]  Yes   • GI bleed [K92.2]  Yes   • History of esophageal varices [Z87.19]  Not Applicable   • Alcoholic cirrhosis of liver without ascites (HCC) [K70.30]  Yes      Resolved Hospital Problems   No resolved problems to display.          Hospital Course:  Timothy Guzman is a 46 y.o. female with PMH etoh cirrhosis, esophageal varices, CAD and pancreatitis who presented to the ED with chief complaint of abdominal pain since Thursday and it worsened this am with coffee ground emesis and melena.  She was admitted to same issues in early April and was seen by GI, was scoped and placed on BID PPI.  She was admitted to hospitalist service     Coffee-ground emesis/Melena reported at home  --started on octreotide and IV PPI- as well as rocephin- this was discontinued based on GI recs and place on 40 mg protonix BID PO PPI.  --hemoglobin stable at 13. Patient also had EGD last month findings of mild gastritis  --GI following appreciate their assistance. Follow up with GI as scheduled at discharge.   --continue low fat high protein diet at home.   --okay to resume plavix and aspirin at discharge per my conversation with GI today.      Hypotension  --ongoing concern for overdiuresis, will continue to hold diuresis at discharge.  I have requested that she check her bp at home and keep a list to take with her to her PCP office. She can discuss resuming diuresis at that visit.    --- discontinued pain medications as per GI recommendations  --patient can take tylenol up to 2gm daily for pain safely in cirrhosis, 500 q6hprn added. Okay to  continue at home.      Alcoholic cirrhosis  Esophageal varices  --as above, hold diuretics for now.  --continue lactulose/rifaximin  --GI recommending weight loss for fatty liver, counseled on this. MELD-NA= 7  --palliative medicine has been consulted for ongoing pain issues. Patient will be referred to pain management at discharge. She has been given a list of providers in her area and will be responsible for setting up an appointment. The patient has also requested a hospice consult. She was seen by hospice and provided information about home hospice.      CAD  --continue plavix and asa at discharge.       Discharge Follow Up Recommendations for outpatient labs/diagnostics:   Follow up with PCP, first available hospital follow up appt.   Follow up with GI as scheduled.   Pain management-patient will make new patient appointment     Day of Discharge     HPI:   Resting in bed this morning with no complaints.  Feels a bit better today.  Wants to go home.     Review of Systems  Gen- No fevers, chills  CV- No chest pain, palpitations  Resp- No cough, dyspnea  GI- No N/V/D, abd pain        Vital Signs:   Temp:  [98 °F (36.7 °C)-98.8 °F (37.1 °C)] 98.1 °F (36.7 °C)  Heart Rate:  [72-86] 72  Resp:  [16-18] 18  BP: ()/(53-62) 96/53      Physical Exam:  Constitutional: No acute distress, awake, alert, resting in bed  HENT: NCAT, mucous membranes moist  Respiratory: Clear to auscultation bilaterally, respiratory effort normal on room air  Cardiovascular: RRR, no murmurs, rubs, or gallops  Gastrointestinal: Positive bowel sounds, soft, nontender, nondistended  Musculoskeletal: No bilateral ankle edema  Psychiatric: Appropriate affect, cooperative  Neurologic: Oriented x 3, strength symmetric in all extremities, Cranial Nerves grossly intact to confrontation, speech clear  Skin: No rashes noted to exposed skin       Pertinent  and/or Most Recent Results     LAB RESULTS:      Lab 05/11/22  0545 05/11/22  0011  05/10/22  1833 05/10/22  1211 05/10/22  0504 05/08/22  1808 05/08/22  1451   WBC  --   --   --   --  8.15  --  6.92   HEMOGLOBIN 11.2* 10.8* 10.7* 11.4* 11.9*   < > 14.5   HEMATOCRIT 32.9* 31.9* 31.7* 33.4* 35.1   < > 42.1   PLATELETS  --   --   --   --  89*  --  105*   NEUTROS ABS  --   --   --   --  5.99  --  4.23   IMMATURE GRANS (ABS)  --   --   --   --  0.03  --  0.03   LYMPHS ABS  --   --   --   --  1.47  --  1.78   MONOS ABS  --   --   --   --  0.65  --  0.80   EOS ABS  --   --   --   --  0.01  --  0.05   MCV  --   --   --   --  88.4  --  88.6   PROCALCITONIN  --   --   --   --   --   --  0.10   LACTATE  --   --   --   --   --   --  1.5   PROTIME  --   --   --   --   --   --  14.0    < > = values in this interval not displayed.         Lab 05/10/22  0504 05/09/22  0509 05/08/22  1451   SODIUM 144 132* 138   POTASSIUM 4.0 4.6 3.8   CHLORIDE 112* 101 104   CO2 24.0 19.0* 24.0   ANION GAP 8.0 12.0 10.0   BUN 10 13 10   CREATININE 0.84 0.94 0.91   EGFR 86.4 75.5 78.5   GLUCOSE 88 173* 96   CALCIUM 7.7* 8.4* 9.3   MAGNESIUM  --   --  1.7         Lab 05/10/22  0504 05/08/22  1451   TOTAL PROTEIN 5.0* 7.1   ALBUMIN 3.30* 4.40   GLOBULIN 1.7 2.7   ALT (SGPT) 18 29   AST (SGOT) 13 26   BILIRUBIN 0.2 0.5   ALK PHOS 73 102   LIPASE  --  20         Lab 05/08/22  1451   TROPONIN T <0.010   PROTIME 14.0   INR 1.09             Lab 05/08/22  1452   ABO TYPING A   RH TYPING Positive   ANTIBODY SCREEN Negative         Brief Urine Lab Results  (Last result in the past 365 days)      Color   Clarity   Blood   Leuk Est   Nitrite   Protein   CREAT   Urine HCG        04/07/22 0342 Yellow   Clear   Negative   Trace   Negative   Negative               Microbiology Results (last 10 days)     Procedure Component Value - Date/Time    Blood Culture - Blood, Arm, Left [[id number]]  (Normal) Collected: 05/08/22 1528    Lab Status: Preliminary result Specimen: Blood from Arm, Left Updated: 05/10/22 1546     Blood Culture No growth at 2 days     COVID-19 and FLU A/B PCR - Swab, Nasopharynx [[id number]]  (Normal) Collected: 05/08/22 1525    Lab Status: Final result Specimen: Swab from Nasopharynx Updated: 05/08/22 1628     COVID19 Not Detected     Influenza A PCR Not Detected     Influenza B PCR Not Detected    Narrative:      Fact sheet for providers: https://www.fda.gov/media/578058/download    Fact sheet for patients: https://www.fda.gov/media/792292/download    Test performed by PCR.    Blood Culture - Blood, Arm, Right [[id number]]  (Normal) Collected: 05/08/22 1515    Lab Status: Preliminary result Specimen: Blood from Arm, Right Updated: 05/10/22 1545     Blood Culture No growth at 2 days          CT Abdomen Pelvis Without Contrast    Result Date: 5/8/2022  CT ABDOMEN PELVIS WO CONTRAST-  Date of Exam: 5/8/2022 4:11 PM  Indication: GI bleed, lower.   Comparison Exams: None available.  Technique: Axial images from the base of the chest through the abdomen and pelvis were obtained without intravenous contrast. Sagittal and coronal reformatted images also performed. Automated exposure control and iterative reconstruction methods were used.  Radiation audit for number of CT and nuclear cardiology exams performed in the last year: 0.   FINDINGS:  No evidence of pneumonia or other acute process in the lung bases. No pleural effusion. Left lower lobe subcentimeter nodule again noted, also present on a remote CT from March 2020  No ureteral stone or hydronephrosis of either kidney. No evidence of pancreatitis or other acute noncontrast findings of the organs throughout the abdomen. Cholecystomy again noted.  No bowel obstruction. No evidence of colitis or appendicitis. No diverticulitis.  No abscess or free air. No significant free fluid.  No evidence of acute processes in the pelvis. Bladder appears within normal limits.  No fracture or other acute osseous findings. No acute superficial soft tissue abnormality.        Negative CT examination as above. No  evidence of acute process in the abdomen/pelvis.  This report was finalized on 5/8/2022 5:03 PM by Akhil Petersen.      XR Chest 1 View    Result Date: 5/8/2022  EXAM: XR CHEST 1 VW-  DATE OF EXAM: 5/8/2022 3:32 PM  INDICATION: GI bleed.   COMPARISONS: 4/6/2022  FINDINGS:  No evidence of pneumonia, pulmonary edema or other acute pulmonary process. No evidence of pneumothorax or significant pleural effusion. No evidence of acute process of the heart or other mediastinal structures.       Negative chest x-ray. No evidence of acute cardiopulmonary disease.  This report was finalized on 5/8/2022 4:26 PM by Akhil Petersen.        Results for orders placed during the hospital encounter of 11/23/21    Duplex Mesenteric Complete CAR    Interpretation Summary  EXAMINATION: DUPLEX SMA COMPLETE CAR-12/01/2021:    INDICATION: Generalized abdominal pain.    COMPARISON: Unenhanced CT scan of 11/30/2021.    FINDINGS: The study is considered technically adequate, although the  patient is unable to suspend respiration during the exam. All portions  of the celiac axis and SMA are visualized. Peak systolic celiac axis  velocity is 150 cm/s at its origin. Peak systolic SMA velocity is 207  cm/s proximally, both considered within normal limits. Review of the CT  study, on the sagittal images shows no obvious celiac axis stenosis, and  only a suggestion of mild-to-moderate celiac axis origin narrowing.    Impression  No evidence of hemodynamically significant celiac axis or  SMA stenosis.    D:  12/03/2021  E:  12/03/2021        This report was finalized on 12/4/2021 3:34 PM by Dr. Bo Gardner MD.      Results for orders placed during the hospital encounter of 11/23/21    Duplex Mesenteric Complete CAR    Interpretation Summary  EXAMINATION: DUPLEX SMA COMPLETE CAR-12/01/2021:    INDICATION: Generalized abdominal pain.    COMPARISON: Unenhanced CT scan of 11/30/2021.    FINDINGS: The study is considered technically adequate, although  the  patient is unable to suspend respiration during the exam. All portions  of the celiac axis and SMA are visualized. Peak systolic celiac axis  velocity is 150 cm/s at its origin. Peak systolic SMA velocity is 207  cm/s proximally, both considered within normal limits. Review of the CT  study, on the sagittal images shows no obvious celiac axis stenosis, and  only a suggestion of mild-to-moderate celiac axis origin narrowing.    Impression  No evidence of hemodynamically significant celiac axis or  SMA stenosis.    D:  12/03/2021  E:  12/03/2021        This report was finalized on 12/4/2021 3:34 PM by Dr. Bo Gardner MD.      Results for orders placed during the hospital encounter of 06/18/21    Adult Transthoracic Echo Complete W/ Cont if Necessary Per Protocol    Interpretation Summary  · The quality of the study is limited due to patient positioning and patient being intubated.  · Left ventricular ejection fraction appears to be 51 - 55%. Left ventricular systolic function is normal.  · Left ventricular diastolic function is consistent with (grade Ia w/high LAP) impaired relaxation.  · Mildly reduced right ventricular systolic function noted.      Plan for Follow-up of Pending Labs/Results:   Pending Labs     Order Current Status    Blood Culture - Blood, Arm, Left Preliminary result    Blood Culture - Blood, Arm, Right Preliminary result        Discharge Details        Discharge Medications      New Medications      Instructions Start Date   acetaminophen 500 MG tablet  Commonly known as: TYLENOL   500 mg, Oral, Every 6 Hours PRN         Changes to Medications      Instructions Start Date   polyethylene glycol 17 g packet  Commonly known as: MIRALAX  What changed:   · when to take this  · reasons to take this   17 g, Oral, 2 Times Daily         Continue These Medications      Instructions Start Date   aspirin 81 MG EC tablet   81 mg, Oral, Daily      bisacodyl 10 MG suppository  Commonly known as: DULCOLAX    10 mg, Rectal, Daily PRN      clopidogrel 75 MG tablet  Commonly known as: PLAVIX   75 mg, Oral, Daily      docusate sodium 100 MG capsule  Commonly known as: Colace   100 mg, Oral, 2 Times Daily      FLUoxetine 40 MG capsule  Commonly known as: PROzac   40 mg, Oral, Daily      gabapentin 400 MG capsule  Commonly known as: NEURONTIN   400 mg, Oral, 3 Times Daily      lactulose 10 GM/15ML solution  Commonly known as: CHRONULAC   30 g, Oral, 3 Times Daily      lubiprostone 24 MCG capsule  Commonly known as: AMITIZA   1 capsule, Oral, 2 Times Daily      magnesium oxide 400 (241.3 Mg) MG tablet tablet  Commonly known as: MAGOX   400 mg, Oral, Every Evening      Melatonin 10 MG tablet   1 tablet, Oral, Nightly      Multivitamin tablet tablet  Generic drug: multivitamin   Daily      OLANZapine 10 MG tablet  Commonly known as: zyPREXA   10 mg, Oral, Nightly      ondansetron 4 MG tablet  Commonly known as: Zofran   4 mg, Oral, Every 8 Hours PRN      pantoprazole 40 MG EC tablet  Commonly known as: PROTONIX   40 mg, Oral, 2 Times Daily Before Meals      potassium chloride 20 MEQ CR tablet  Commonly known as: K-DUR,KLOR-CON   1 tablet, Oral, Daily      ranolazine 1000 MG 12 hr tablet  Commonly known as: RANEXA   1,000 mg, Oral, 2 Times Daily      rosuvastatin 20 MG tablet  Commonly known as: CRESTOR   20 mg, Oral, Nightly      traZODone 50 MG tablet  Commonly known as: DESYREL   100 mg, Oral, Nightly, Pt reports increase of trazodone to 100mg nightly      ursodiol 300 MG capsule  Commonly known as: ACTIGALL   300 mg, Oral, 2 Times Daily      vitamin b complex capsule capsule   1 capsule, Oral, Daily      vitamin B-6 25 MG tablet  Commonly known as: PYRIDOXINE   25 mg, Oral, Daily      Xifaxan 550 MG tablet  Generic drug: riFAXIMin   550 mg, Oral, Every 12 Hours Scheduled         Stop These Medications    bumetanide 0.5 MG tablet  Commonly known as: BUMEX     prazosin 1 MG capsule  Commonly known as: MINIPRESS      spironolactone 100 MG tablet  Commonly known as: ALDACTONE     tamsulosin 0.4 MG capsule 24 hr capsule  Commonly known as: FLOMAX            Allergies   Allergen Reactions   • Contrast Dye Anaphylaxis     6/18 Pt coded after receiving contrast for a CT scan. Was premedicated. Was having an MI at the same time. Immediately underwent heart cath with contrast without additional allergic reaction  7/15 Pt premedicated with prednisone/Benadryl for heart cath. Still with anaphylactic-like reaction with drop in BP and hypoxia. Treated 95% stenosis with minimal dye and supported with epinephrine, solumedrol, NRB   • Haloperidol Hallucinations   • Nsaids GI Bleeding     Other reaction(s): Bleeding, VOMITING         Discharge Disposition:  Home or Self Care    Diet:  Hospital:  Diet Order   Procedures   • Diet Regular; Low Fat       Activity:  Activity Instructions     Activity as Tolerated      Activity as Tolerated                 CODE STATUS:    Code Status and Medical Interventions:   Ordered at: 05/08/22 1716     Code Status (Patient has no pulse and is not breathing):    CPR (Attempt to Resuscitate)     Medical Interventions (Patient has pulse or is breathing):    Full Support       Future Appointments   Date Time Provider Department Center   5/12/2022  8:30 AM Nelson Yip APRN [facility] [facility]   7/3/2023  9:15 AM Vikram Gonzales MD [facility]       Additional Instructions for the Follow-ups that You Need to Schedule     Discharge Follow-up with PCP   As directed       Currently Documented PCP:    Toshia Mitchell APRN    PCP Phone Number:    [phone]     Follow Up Details: first available hospital follow up appt\         Discharge Follow-up with PCP   As directed       Currently Documented PCP:    Toshia Mitchell APRN    PCP Phone Number:    [phone]     Follow Up Details: first available hospital follow up appt.         Discharge Follow-up with Specified Provider: [facility] as  scheduled tomorrow.   As directed      To: [facility] as scheduled tomorrow.         Discharge Follow-up with Specified Provider: keep scheduled GI follow up   As directed      To: keep scheduled GI follow up         Discharge Follow-up with Specified Provider: pain management-patient has been given resources to make appointment.   As directed      To: pain management-patient has been given resources to make appointment.         Discharge Follow-up with Specified Provider: patient has been given a list of pain management specialists to establish care from.   As directed      To: patient has been given a list of pain management specialists to establish care from.                     ISSAC Pruett  05/11/22      Time Spent on Discharge:  I spent  40  minutes on this discharge activity which included: face-to-face encounter with the patient, reviewing the data in the system, coordination of the care with the nursing staff as well as consultants, documentation, and entering orders.            Electronically signed by Elin Augustine APRN at 05/11/22 7268

## 2022-05-11 NOTE — PROGRESS NOTES
Continued Stay Note  [facility]     Patient Name: Timothy Guzman  MRN: [id number]  Today's Date: 5/11/2022    Admit Date: 5/8/2022     Discharge Plan     Row Name 05/11/22 1200       Plan    Plan home, no hospice    Plan Comments F/u visit made to pt bedside.  Hospice plan of care and goals of care discussed.  Questions and concerns addressed.  Overview of hospice services provided. Pt reports that she is currently undergoing further testing for abnormal mammogram and would like to know results prior to making decision about hospice.  Will arrange home hospice f/u on June 1 per pt request.  Please call [phone] if I can be of further assistance.               Discharge Codes    No documentation.               Expected Discharge Date and Time     Expected Discharge Date Expected Discharge Time    May 11, 2022             Chelsea Marquez RN

## 2022-05-11 NOTE — PLAN OF CARE
Goal Outcome Evaluation:           Progress: improving  Outcome Evaluation: Patient has had a decent shift, sleeping on and off tonight. VSS, and patient has been alert and oriented times four. Patient has asked for pain medications once tonight thus far for abdominal pain. Nursing staff will continue to monitor and assess the patient.

## 2022-05-12 NOTE — OUTREACH NOTE
Prep Survey    Flowsheet Row Responses   [facility] facility patient discharged from? [place]   Is LACE score < 7 ? No   Emergency Room discharge w/ pulse ox? No   Eligibility Readm Mgmt   Discharge diagnosis Upper GI bleed    Does the patient have one of the following disease processes/diagnoses(primary or secondary)? Other   Does the patient have Home health ordered? No   Is there a DME ordered? No   Prep survey completed? Yes          [address] - Registered Nurse

## 2022-05-13 LAB
BACTERIA SPEC AEROBE CULT: NORMAL
BACTERIA SPEC AEROBE CULT: NORMAL

## 2022-05-16 ENCOUNTER — READMISSION MANAGEMENT (OUTPATIENT)
Dept: CALL CENTER | Facility: HOSPITAL | Age: 47
End: 2022-05-16

## 2022-05-16 NOTE — OUTREACH NOTE
"Medical Week 1 Survey    Flowsheet Row Responses   [facility] patient discharged from? [facility]   Does the patient have one of the following disease processes/diagnoses(primary or secondary)? Other   Week 1 attempt successful? Yes   Call start time 0943   Call end time 0948   Discharge diagnosis Upper GI bleed    Is patient permission given to speak with other caregiver? Yes   List who call center can speak with Mother, Alana Ayalas reviewed with patient/caregiver? Yes   Is the patient having any side effects they believe may be caused by any medication additions or changes? No   Does the patient have all medications ordered at discharge? Yes   Is the patient taking all medications as directed (includes completed medication regime)? Yes   Does the patient have a primary care provider?  Yes   Does the patient have an appointment with their PCP within 7 days of discharge? Greater than 7 days   Comments regarding PCP 05/31/2022 05/23/2022 with GI   What is preventing the patient from scheduling follow up appointments within 7 days of discharge? Earlier appointment not available   Nursing Interventions Verified appointment date/time/provider   Has home health visited the patient within 72 hours of discharge? N/A   Did the patient receive a copy of their discharge instructions? Yes   Nursing interventions Reviewed instructions with patient   What is the patient's perception of their health status since discharge? Same  [\"just really been sick\" at her stomach since discharge.]   Is the patient/caregiver able to teach back the hierarchy of who to call/visit for symptoms/problems? PCP, Specialist, Home health nurse, Urgent Care, ED, 911 Yes   If the patient is a current smoker, are they able to teach back resources for cessation? Not a smoker   Wrap up additional comments Pt states she has not felt well since she left the hospital. States she is having stomach pain. Advised to notify her GI doctors of " continuing or increasing discomfort. States she will call.           DUTCH EDWARDS - Registered Nurse

## 2022-05-23 ENCOUNTER — OFFICE VISIT (OUTPATIENT)
Dept: GASTROENTEROLOGY | Facility: CLINIC | Age: 47
End: 2022-05-23

## 2022-05-23 VITALS
TEMPERATURE: 98 F | DIASTOLIC BLOOD PRESSURE: 103 MMHG | WEIGHT: 223.6 LBS | SYSTOLIC BLOOD PRESSURE: 171 MMHG | HEIGHT: 63 IN | BODY MASS INDEX: 39.62 KG/M2 | HEART RATE: 98 BPM

## 2022-05-23 DIAGNOSIS — K76.82 HEPATIC ENCEPHALOPATHY: ICD-10-CM

## 2022-05-23 DIAGNOSIS — K74.3 PRIMARY BILIARY CHOLANGITIS: ICD-10-CM

## 2022-05-23 DIAGNOSIS — K70.31 ALCOHOLIC CIRRHOSIS OF LIVER WITH ASCITES: Primary | ICD-10-CM

## 2022-05-23 PROCEDURE — 99214 OFFICE O/P EST MOD 30 MIN: CPT | Performed by: NURSE PRACTITIONER

## 2022-05-23 NOTE — PROGRESS NOTES
GASTROENTEROLOGY OFFICE NOTE  Timothy Guzman  [id number]  1975    CARE TEAM  Patient Care Team:  Toshia Mitchell APRN as PCP - General (Family Medicine)  Nelson Yip APRN as Nurse Practitioner (Nurse Practitioner)    Referring Provider: Toshia Mitchell APRN     Chief Complaint   Patient presents with   • Hepatic Disease        HISTORY OF PRESENT ILLNESS:  Patient is a 46-year-old female seen in follow-up with EtOH cirrhosis decompensated by hepatic encephalopathy and ascites.  Since she was last seen she was hospitalized with an upper GI bleed from a localized area of gastritis.  A clip was placed for hemostasis.  She was started on pantoprazole 40 mg twice daily which she continues today.    While hospitalized she had hypotension.  Her diuretics were held and she was instructed to discontinue these on discharge.  She reports that shortly after leaving the hospital she began to swell up and she resumed diuretics; Bumex 6 mg daily and spironolactone 300 mg daily and lost 37 pounds of fluid.  She is hypertensive today.  She has a follow-up appointment with her primary care physician in 1 week and we will follow-up on her blood pressure at that time.    She continues on lactulose and Xifaxan.  She has not had any episodes of confusion or signs of hepatic encephalopathy.  She is having diarrhea on some days and other days she is not having a bowel movement at all.  She also has IBS-C, her insurance does not cover the [employer] which helps with her constipation.    PAST MEDICAL HISTORY  Past Medical History:   Diagnosis Date   • Alcoholism (HCC)     sober 2.5 years   • Anaphylaxis    • Arthritis    • Bone necrosis (HCC)    • CAD (coronary artery disease) 07/13/2021   • Cardiac arrest (HCC)    • Cirrhosis of liver (HCC) 07/13/2021   • Gastroparesis    • GERD (gastroesophageal reflux disease)    • History of esophageal varices    • History of kidney stones    • Hypertension    • Memory loss     • Neuropathy    • Pancreatitis    • Rib fractures 2021   • SVT (supraventricular tachycardia) ([facility]) 2021        PAST SURGICAL HISTORY  Past Surgical History:   Procedure Laterality Date   • ANKLE SURGERY Right    • APPENDECTOMY     • CARDIAC CATHETERIZATION N/A 2021    Procedure: Left Heart Cath;  Surgeon: Vikram Gonzales MD;  Location:  [facility];  Service: Cardiovascular;  Laterality: N/A;   • CARDIAC CATHETERIZATION N/A 7/15/2021    Procedure: LEFT HEART CATH;  Surgeon: Vikram Gonzales MD;  Location:  [facility];  Service: Cardiology;  Laterality: N/A;   •  SECTION      x2   • CHOLECYSTECTOMY     • COLONOSCOPY     • COLONOSCOPY N/A 3/31/2020    Procedure: COLONOSCOPY;  Surgeon: Tirso Giles MD;  Location:  [facility];  Service: Gastroenterology;  Laterality: N/A;   • COLONOSCOPY N/A 2021    Procedure: COLONOSCOPY;  Surgeon: Tyrese Rasmussen MD;  Location:  [facility];  Service: Gastroenterology;  Laterality: N/A;   • CORONARY STENT PLACEMENT     • ENDOSCOPY     • ENDOSCOPY     • ENDOSCOPY N/A 2021    Procedure: ESOPHAGOGASTRODUODENOSCOPY AT BEDSIDE;  Surgeon: Tyrese Rasmussen MD;  Location:  [facility];  Service: Gastroenterology;  Laterality: N/A;   • ENDOSCOPY N/A 2021    Procedure: ESOPHAGOGASTRODUODENOSCOPY;  Surgeon: Tyrese Rasmussen MD;  Location:  [facility];  Service: Gastroenterology;  Laterality: N/A;   • ENDOSCOPY N/A 2021    Procedure: ESOPHAGOGASTRODUODENOSCOPY;  Surgeon: Tyrese Rasmussen MD;  Location:  [facility];  Service: Gastroenterology;  Laterality: N/A;   • ENDOSCOPY N/A 2022    Procedure: ESOPHAGOGASTRODUODENOSCOPY;  Surgeon: Tyrese Rasmussen MD;  Location:  [facility];  Service: Gastroenterology;  Laterality: N/A;   • REPLACEMENT TOTAL HIP LATERAL POSITION Left    • TOTAL HIP ARTHROPLASTY Right     x2   • TOTAL KNEE ARTHROPLASTY Left         MEDICATIONS:    Current  Outpatient Medications:   •  acetaminophen (TYLENOL) 500 MG tablet, Take 1 tablet by mouth Every 6 (Six) Hours As Needed for Mild Pain ., Disp: , Rfl:   •  aspirin 81 MG EC tablet, Take 81 mg by mouth Daily., Disp: , Rfl:   •  B Complex Vitamins (vitamin b complex) capsule capsule, Take 1 capsule by mouth Daily., Disp: , Rfl:   •  bisacodyl (DULCOLAX) 10 MG suppository, Insert 1 suppository into the rectum Daily As Needed for Constipation., Disp: 30 suppository, Rfl: 0  •  clopidogrel (PLAVIX) 75 MG tablet, Take 1 tablet by mouth Daily., Disp: 90 tablet, Rfl: 3  •  docusate sodium (Colace) 100 MG capsule, Take 1 capsule by mouth 2 (Two) Times a Day., Disp: 60 capsule, Rfl: 0  •  gabapentin (NEURONTIN) 400 MG capsule, Take 1 capsule by mouth 3 (Three) Times a Day., Disp: 90 capsule, Rfl: 5  •  lactulose (CHRONULAC) 10 GM/15ML solution, Take 45 mL by mouth 3 (Three) Times a Day., Disp: 1892 mL, Rfl: 0  •  lubiprostone (AMITIZA) 24 MCG capsule, Take 1 capsule by mouth 2 (Two) Times a Day., Disp: , Rfl:   •  magnesium oxide (MAGOX) 400 (241.3 Mg) MG tablet tablet, Take 400 mg by mouth Every Evening., Disp: , Rfl:   •  Melatonin 10 MG tablet, Take 1 tablet by mouth Every Night., Disp: , Rfl:   •  Multivitamin tablet tablet, Daily., Disp: , Rfl:   •  OLANZapine (zyPREXA) 10 MG tablet, Take 1 tablet by mouth Every Night., Disp: , Rfl:   •  ondansetron (Zofran) 4 MG tablet, Take 1 tablet by mouth Every 8 (Eight) Hours As Needed for Nausea or Vomiting for up to 30 doses., Disp: 15 tablet, Rfl: 0  •  pantoprazole (PROTONIX) 40 MG EC tablet, Take 1 tablet by mouth 2 (Two) Times a Day Before Meals., Disp: 28 tablet, Rfl: 0  •  polyethylene glycol (MIRALAX) 17 g packet, Take 17 g by mouth 2 (Two) Times a Day. (Patient taking differently: Take 17 g by mouth Daily As Needed.), Disp: 60 packet, Rfl: 0  •  potassium chloride (K-DUR,KLOR-CON) 20 MEQ CR tablet, Take 1 tablet by mouth Daily., Disp: , Rfl:   •  rosuvastatin (CRESTOR) 20  MG tablet, Take 1 tablet by mouth Every Night., Disp: 90 tablet, Rfl: 3  •  traZODone (DESYREL) 50 MG tablet, Take 2 tablets by mouth Every Night for 30 days. Pt reports increase of trazodone to 100mg nightly, Disp: 60 tablet, Rfl: 0  •  ursodiol (ACTIGALL) 300 MG capsule, Take 300 mg by mouth 2 (Two) Times a Day., Disp: , Rfl:   •  vitamin B-6 (PYRIDOXINE) 25 MG tablet, Take 25 mg by mouth Daily., Disp: , Rfl:   •  Xifaxan 550 MG tablet, Take 1 tablet by mouth Every 12 (Twelve) Hours., Disp: 180 tablet, Rfl: 3  •  FLUoxetine (PROzac) 40 MG capsule, Take 1 capsule by mouth Daily for 30 days., Disp: 30 capsule, Rfl: 0  •  ranolazine (RANEXA) 1000 MG 12 hr tablet, Take 1 tablet by mouth 2 (Two) Times a Day for 30 days., Disp: 60 tablet, Rfl: 0    ALLERGIES  Allergies   Allergen Reactions   • Contrast Dye Anaphylaxis      Pt coded after receiving contrast for a CT scan. Was premedicated. Was having an MI at the same time. Immediately underwent heart cath with contrast without additional allergic reaction  7/15 Pt premedicated with prednisone/Benadryl for heart cath. Still with anaphylactic-like reaction with drop in BP and hypoxia. Treated 95% stenosis with minimal dye and supported with epinephrine, solumedrol, NRB   • Haloperidol Hallucinations   • Nsaids GI Bleeding     Other reaction(s): Bleeding, VOMITING       FAMILY HISTORY:  Family History   Problem Relation Age of Onset   • Diabetes type II Mother    • Breast cancer Mother    • Heart disease Father    • Liver disease Brother    • Hypertension Brother    • ADD / ADHD Child    • Autism Child    • Asperger's syndrome Child    • Colon cancer Neg Hx    • Colon polyps Neg Hx        SOCIAL HISTORY  Social History     Socioeconomic History   • Marital status:    Tobacco Use   • Smoking status: Former Smoker     Packs/day: 0.50     Types: Electronic Cigarette, Cigarettes     Quit date: 2021     Years since quittin.8   • Smokeless tobacco: Never  "Used   Vaping Use   • Vaping Use: Former   Substance and Sexual Activity   • Alcohol use: Not Currently     Comment: SOBER 3 YEARS   • Drug use: No   • Sexual activity: Defer         PHYSICAL EXAM   BP (!) 171/103 (BP Location: Left arm, Patient Position: Sitting, Cuff Size: Adult)   Pulse 98   Temp 98 °F (36.7 °C) (Temporal)   Ht 160 cm (63\")   Wt 101 kg (223 lb 9.6 oz)   BMI 39.61 kg/m²   Physical Exam  Vitals and nursing note reviewed.   Constitutional:       Appearance: Normal appearance. She is well-developed.   HENT:      Head: Normocephalic and atraumatic.      Nose: Nose normal.      Mouth/Throat:      Mouth: Mucous membranes are moist.      Pharynx: Oropharynx is clear.   Eyes:      Pupils: Pupils are equal, round, and reactive to light.   Cardiovascular:      Rate and Rhythm: Normal rate and regular rhythm.   Pulmonary:      Effort: Pulmonary effort is normal.      Breath sounds: Normal breath sounds. No wheezing or rales.   Abdominal:      General: Bowel sounds are normal.      Palpations: Abdomen is soft. There is no mass.      Tenderness: There is no abdominal tenderness. There is no guarding or rebound.      Hernia: No hernia is present.   Musculoskeletal:         General: Normal range of motion.      Cervical back: Normal range of motion and neck supple.   Skin:     General: Skin is warm and dry.   Neurological:      Mental Status: She is alert and oriented to person, place, and time.      Cranial Nerves: No cranial nerve deficit.   Psychiatric:         Behavior: Behavior normal.         Judgment: Judgment normal.       Results Review:  CT ABDOMEN PELVIS WO CONTRAST-     Date of Exam: 5/8/2022 4:11 PM     Indication: GI bleed, lower.       Comparison Exams: None available.     Technique: Axial images from the base of the chest through the abdomen  and pelvis were obtained without intravenous contrast. Sagittal and  coronal reformatted images also performed. Automated exposure control  and " iterative reconstruction methods were used.  Radiation audit for  number of CT and nuclear cardiology exams performed in the last year:   0.       FINDINGS:     No evidence of pneumonia or other acute process in the lung bases. No  pleural effusion. Left lower lobe subcentimeter nodule again noted, also  present on a remote CT from March 2020     No ureteral stone or hydronephrosis of either kidney. No evidence of  pancreatitis or other acute noncontrast findings of the organs  throughout the abdomen. Cholecystomy again noted.     No bowel obstruction. No evidence of colitis or appendicitis. No  diverticulitis.     No abscess or free air. No significant free fluid.     No evidence of acute processes in the pelvis. Bladder appears within  normal limits.     No fracture or other acute osseous findings. No acute superficial soft  tissue abnormality.        IMPRESSION:     Negative CT examination as above. No evidence of acute process in the  abdomen/pelvis.     This report was finalized on 5/8/2022 5:03 PM by Akhil Petersen.    ASSESSMENT / PLAN  1.  EtOH cirrhosis  2.  Ascites  3.  Hepatic encephalopathy  4.  Primary biliary cholangitis  - CBC, CMP, PT/INR, alpha-fetoprotein marker (patient request to have blood work done at [facility])  - Bumex 6 mg daily  - Spironolactone 300 mg daily  - Ursodiol 300 mg twice daily    Return in about 6 months (around 11/23/2022).    I discussed the patients findings and my recommendations with patient    ISSAC Garcia

## 2022-05-24 LAB
AFP-TM SERPL-MCNC: 4.6 NG/ML (ref 0–6.4)
ALBUMIN SERPL-MCNC: 4.1 G/DL (ref 3.8–4.8)
ALBUMIN/GLOB SERPL: 2 {RATIO} (ref 1.2–2.2)
ALP SERPL-CCNC: 89 IU/L (ref 44–121)
ALT SERPL-CCNC: 24 IU/L (ref 0–32)
AMBIG ABBREV CMP14 DEFAULT: NORMAL
AST SERPL-CCNC: 26 IU/L (ref 0–40)
BASOPHILS # BLD AUTO: 0 X10E3/UL (ref 0–0.2)
BASOPHILS NFR BLD AUTO: 1 %
BILIRUB SERPL-MCNC: 0.2 MG/DL (ref 0–1.2)
BUN SERPL-MCNC: 9 MG/DL (ref 6–24)
BUN/CREAT SERPL: 11 (ref 9–23)
CALCIUM SERPL-MCNC: 9.3 MG/DL (ref 8.7–10.2)
CHLORIDE SERPL-SCNC: 106 MMOL/L (ref 96–106)
CO2 SERPL-SCNC: 20 MMOL/L (ref 20–29)
CREAT SERPL-MCNC: 0.8 MG/DL (ref 0.57–1)
EGFRCR SERPLBLD CKD-EPI 2021: 91 ML/MIN/1.73
EOSINOPHIL # BLD AUTO: 0.1 X10E3/UL (ref 0–0.4)
EOSINOPHIL NFR BLD AUTO: 2 %
ERYTHROCYTE [DISTWIDTH] IN BLOOD BY AUTOMATED COUNT: 13.8 % (ref 11.7–15.4)
GLOBULIN SER CALC-MCNC: 2.1 G/DL (ref 1.5–4.5)
GLUCOSE SERPL-MCNC: 97 MG/DL (ref 65–99)
HCT VFR BLD AUTO: 39.6 % (ref 34–46.6)
HGB BLD-MCNC: 13.1 G/DL (ref 11.1–15.9)
IMM GRANULOCYTES # BLD AUTO: 0 X10E3/UL (ref 0–0.1)
IMM GRANULOCYTES NFR BLD AUTO: 0 %
INR PPP: 0.9 (ref 0.9–1.2)
LYMPHOCYTES # BLD AUTO: 1.2 X10E3/UL (ref 0.7–3.1)
LYMPHOCYTES NFR BLD AUTO: 29 %
MCH RBC QN AUTO: 29.3 PG (ref 26.6–33)
MCHC RBC AUTO-ENTMCNC: 33.1 G/DL (ref 31.5–35.7)
MCV RBC AUTO: 89 FL (ref 79–97)
MONOCYTES # BLD AUTO: 0.5 X10E3/UL (ref 0.1–0.9)
MONOCYTES NFR BLD AUTO: 11 %
NEUTROPHILS # BLD AUTO: 2.4 X10E3/UL (ref 1.4–7)
NEUTROPHILS NFR BLD AUTO: 57 %
PLATELET # BLD AUTO: 152 X10E3/UL (ref 150–450)
POTASSIUM SERPL-SCNC: 3.4 MMOL/L (ref 3.5–5.2)
PROT SERPL-MCNC: 6.2 G/DL (ref 6–8.5)
PROTHROMBIN TIME: 10.3 SEC (ref 9.1–12)
RBC # BLD AUTO: 4.47 X10E6/UL (ref 3.77–5.28)
SODIUM SERPL-SCNC: 141 MMOL/L (ref 134–144)
WBC # BLD AUTO: 4.1 X10E3/UL (ref 3.4–10.8)

## 2022-05-25 ENCOUNTER — READMISSION MANAGEMENT (OUTPATIENT)
Dept: CALL CENTER | Facility: HOSPITAL | Age: 47
End: 2022-05-25

## 2022-05-25 NOTE — OUTREACH NOTE
Medical Week 2 Survey    Flowsheet Row Responses   [facility] patient discharged from? [place]   Does the patient have one of the following disease processes/diagnoses(primary or secondary)? Other   Week 2 attempt successful? No   Unsuccessful attempts Attempt 1          ANU Valadez Registered Nurse

## 2022-05-31 ENCOUNTER — READMISSION MANAGEMENT (OUTPATIENT)
Dept: CALL CENTER | Facility: HOSPITAL | Age: 47
End: 2022-05-31

## 2022-05-31 NOTE — OUTREACH NOTE
Medical Week 2 Survey    Flowsheet Row Responses   [facility] [place] patient discharged from? [place]   Does the patient have one of the following disease processes/diagnoses(primary or secondary)? Other   Week 2 attempt successful? No   Unsuccessful attempts Attempt 2          GLENDY SHAH - Registered Nurse

## 2022-06-06 ENCOUNTER — TELEPHONE (OUTPATIENT)
Dept: NEUROLOGY | Facility: CLINIC | Age: 47
End: 2022-06-06

## 2022-06-06 DIAGNOSIS — G62.9 PERIPHERAL POLYNEUROPATHY: ICD-10-CM

## 2022-06-06 DIAGNOSIS — G62.9 PERIPHERAL POLYNEUROPATHY: Primary | ICD-10-CM

## 2022-06-06 RX ORDER — PREGABALIN 50 MG/1
50 CAPSULE ORAL 3 TIMES DAILY
Qty: 90 CAPSULE | Refills: 5 | Status: SHIPPED | OUTPATIENT
Start: 2022-06-06 | End: 2023-06-06

## 2022-06-06 NOTE — TELEPHONE ENCOUNTER
Provider: SALLY HIRSCH   Caller: BIPIN   Relationship to Patient: PT   Pharmacy: [facility]   Phone Number: [phone]  Reason for Call: PT STATES THAT THE GABAPENTIN IS NOT WORKING FOR HER. SHE STATES THAT SHE'S STILL HAVING HORRIBLE PAIN IN HER HANDS AND FEET. PT MENTIONED THAT SALLY HAD STATED IN LAST VISIT THAT IF IT DIDN'T WORK TO CALL BACK AND IT MIGHT BE ABLE TO BE SWITCHED TO LYRICA.

## 2022-06-07 ENCOUNTER — PRIOR AUTHORIZATION (OUTPATIENT)
Dept: NEUROLOGY | Facility: CLINIC | Age: 47
End: 2022-06-07

## 2022-06-07 NOTE — TELEPHONE ENCOUNTER
Provider: TORREY  Caller: PATIENT  Relationship to Patient: SELF  Pharmacy: [id number]  Phone Number: [phone]  Reason for Call: PATIENT TELEPHONED RE: EARLIER MESSAGE CONCERNING A PRIOR AUTHORIZATION FOR LYRICA RX.    WHAT IS THE STATUS?    PLEASE CALL & ADVISE.    THANK YOU.

## 2022-06-08 ENCOUNTER — PRIOR AUTHORIZATION (OUTPATIENT)
Dept: NEUROLOGY | Facility: CLINIC | Age: 47
End: 2022-06-08

## 2022-06-08 ENCOUNTER — READMISSION MANAGEMENT (OUTPATIENT)
Dept: CALL CENTER | Facility: HOSPITAL | Age: 47
End: 2022-06-08

## 2022-06-08 RX ORDER — PREGABALIN 50 MG/1
50 CAPSULE ORAL 3 TIMES DAILY
Qty: 90 CAPSULE | Refills: 0 | Status: CANCELLED | OUTPATIENT
Start: 2022-06-08 | End: 2023-06-08

## 2022-06-08 NOTE — OUTREACH NOTE
Medical Week 3 Survey    Flowsheet Row Responses   [facility] patient discharged from? [place]   Does the patient have one of the following disease processes/diagnoses(primary or secondary)? Other   Week 3 attempt successful? Yes   Call start time 1339   Call end time 1342   Discharge diagnosis Upper GI bleed    Meds reviewed with patient/caregiver? Yes   Is the patient having any side effects they believe may be caused by any medication additions or changes? No   Does the patient have all medications ordered at discharge? Yes   Is the patient taking all medications as directed (includes completed medication regime)? Yes   Does the patient have a primary care provider?  Yes   Has the patient kept scheduled appointments due by today? Yes   Has home health visited the patient within 72 hours of discharge? N/A   Psychosocial issues? No   Did the patient receive a copy of their discharge instructions? Yes   Nursing interventions Reviewed instructions with patient   What is the patient's perception of their health status since discharge? Improving   Is the patient/caregiver able to teach back signs and symptoms related to disease process for when to call PCP? Yes   Is the patient/caregiver able to teach back signs and symptoms related to disease process for when to call 911? Yes   Is the patient/caregiver able to teach back the hierarchy of who to call/visit for symptoms/problems? PCP, Specialist, Home health nurse, Urgent Care, ED, 911 Yes   Week 3 Call Completed? Yes   Wrap up additional comments call brief, pt on another line to insurance company, states is OK, no questions          ANT MILTON - Registered Nurse

## 2022-06-08 NOTE — TELEPHONE ENCOUNTER
PT RETURNING JUICE PHONE CALL, PLEASE CALL PT BACK AT  [phone]    PT HAS BEEN SUFFERING FOR ABOUT 3 DAYS NOW AND SHE NEEDS THIS MEDICATION ASAP. PLEASE SEND THE PRESCRIPTION TO [facility] SO SHE CAN USE THE GOOD RX INSTEAD OF USING HER MEDICAL INSURANCE.

## 2022-06-08 NOTE — TELEPHONE ENCOUNTER
PT CALLED TO CHECK ON STATUS OF PRIOR AUTH , ADVISED IT HAS BEEN INITIATED, SHE STATED UNDERSTANDING

## 2022-06-08 NOTE — TELEPHONE ENCOUNTER
PT HAS CALLED BACK AGAIN TO SPEAK WITH JUICE.    PLEASE CALL PT BACK [phone].    CALLED OFFICE AND WARM TRANSFERRED PT TO JUICE.

## 2022-06-08 NOTE — TELEPHONE ENCOUNTER
Provider: TORREY  Caller: YOLANDA  Relationship to Patient: N/A  Pharmacy: [id number]  Phone Number: [phone]  Reason for Call: PHARMACY TELEPHONED; SAID THEY ARE UNABLE TO FILL RX FOR LYRICA AS PATIENT IS 'LOCKED IN' (MEANING THAT SHE MUST USE ORIGINAL PHARMACY PER INSURANCE RESTRICTIONS). HOWEVER, WE DO NOT KNOW WHICH LOCATION TO USE FROM PREVIOUS FILLS.    PLEASE CALL & ADVISE.    THANK YOU.

## 2022-06-08 NOTE — TELEPHONE ENCOUNTER
30 day supply called into [facility] in [place] for this month so she can use GoodRx until we get urgent appeal approved.

## 2022-06-08 NOTE — TELEPHONE ENCOUNTER
Key: [id number]  PA came back stating:  A previously denied or partially denied PA request has been located for this member. To initiate an appeal or reconsideration please call [phone]. Thank you.    Called [employer] and they stated it was automatically denied d/t Dx being polyneuropathy  so notified I would like to appeal using an approved Dx of neuropathic pain associated with spinal cord injury which is what I was attempting to do and did not see that someone else in our office had already submitted this.    Rimma with [employer] provided appeal info however states Toshia Mitchell, pt's PCP is LOCKED IN as patient's only prescribing provider so before doing the appeal would have to call her [employer] insurance and get provider override to add specialist, SYED Brooks to the list.     Key: [id number]    Called [employer] @ [phone] to get TASHI added under her able to Rx list. Orestes states this does happen but would need to be done through their provider services so attempted to transfer me @ ph:[phone]  On provider line finally got through to the correct person, Klaus who states approval of providers on lock ins have to be done by the patient and their  so Timothy would need to call them herself.       Updated patient on my process to get this approved. Whole foot completely numb and aching felt like she was standing on an electric fence sending shock waves to her extreme ties on Sunday. Notified patient in the meantime while waiting on Appeal she can use [facility] however it is still quite expensive @ her [facility] pharmacy falling @ $180  However [facility] (there is one in [place] down the street from her) it would cost $14 so while waiting on appeal we can send in 30 day supply to [facility]    Left additional vm for patient letting her know she will need to call her  with passport @ [phone] and have [facility] added on to her prescribing providers list. They will  not allow me to.     Pending to provider for 30 day approval for doing just this month @ [facility] so she can use GoodRx until PA approval

## 2022-06-09 NOTE — TELEPHONE ENCOUNTER
Left vm-  Working on Appeal letter now to submit as urgent. If patient has already reached out to her  with passport to get the prescriber lock lifted and Sarah with [facility] is not working, she can either reach out to her PCP for a temporary prescription OR hopefully the appeal is approved quickly so she can fill at [facility] using her insurance. Office # given.

## 2022-06-09 NOTE — TELEPHONE ENCOUNTER
Caller: Timothy Guzman    Relationship: Self    Best call back number: [phone]    What is the best time to reach you: ANYTIME    Who are you requesting to speak with (clinical staff, provider,  specific staff member): JUICE    Do you know the name of the person who called: JUICE    What was the call regarding: MEDICATION pregabalin, SHE DID BUY 5 DAYS WORTH  AT [facility],CAUSE [facility] WOULDN'T DO IT.  THE PREAUTH NEEDS TO BE BACK DATED 6-        Do you require a callback: YES

## 2022-06-21 NOTE — TELEPHONE ENCOUNTER
Provider: ISSAC WILLAMS  Caller: BIPIN PERKINS  Relationship to Patient: SELF    Reason for Call: PT CALLING ABOUT THE PA FOR THE LYRICA IF IT'S BEEN APPROVED TO GET HER MEDICATION. PT HASN'T HAD THE MEDICATION IN LIKE 2 WEEKS AND IS NEEDING IT.    PLEASE CALL HER BACK [phone]

## 2022-06-21 NOTE — TELEPHONE ENCOUNTER
Completed PA for Pregabalin (Lyrica) 75mg 3xs a day on Covermymeds:    Key: [id number]    Awaiting insurance ([employer]) response.       ICD code used (Neuropathic pain associated with spinal cord injury needs to be used: M79.2)   Xeljanz Counseling: I discussed with the patient the risks of Xeljanz therapy including increased risk of infection, liver issues, headache, diarrhea, or cold symptoms. Live vaccines should be avoided. They were instructed to call if they have any problems.

## 2022-06-22 NOTE — TELEPHONE ENCOUNTER
M79.2 - Neuralgia and neuritis, unspecified    S14.109A - Unspecified injury at unspecified level of cervical spinal cord, initial encounter

## 2022-06-22 NOTE — TELEPHONE ENCOUNTER
APPEAL Key: [id number]    This was submitted as Urgent. Will check on for status/response from insurance.

## 2022-07-06 NOTE — OUTREACH NOTE
Group Topic:  Group Psychotherapy    Date: 2022  Start Time:  9:00 AM  End Time: 10:15 AM  Facilitators: Evelyn Warner LCSW    Focus:  Check In   Number in attendance: 3    Pt reported her night started out okay. Pt reported she saw her dad and it was nice. 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 Pt reported she called her roommates mom to see what happened. Pt reported the mom told her the roommate was safe and she did not want to talk to pt. Pt became tearful. Pt reported she was crying because she felt like someone . Pt reported she did not know when she would see her roommate again. Pt reported she felt like she dedicated a lot of time and she enjoys being a caretaker. Pt reported she is sad and worried for her and pt is angry at herself. Writer asked how pt she handled last night. Pt reported she cried and went to her friends parents house. Writer asked pt about her SI rating. Pt reported she gives everything and gets screwed over a lot. Pt reported she does not have a plan and she is \"just tired\". Pt reported she was trying to build a family. Writer talked with pt how that can be dangerous because people come and go. Pt reported she feels better after talking about it. Writer asked pt what she can do differently when there is a person in need. Pt reported \"not put them before me\". Pt reported her overall mood is \"nothing\". Pt reported \"whenever stuff like this happens I detach\". Writer asked pt to say what she is detaching from. Pt reported she is sad, disappointed, and angry. Pt reported she is disappointed because she felt she was going it alright and it should have worked out. Writer asked pt what should have worked out. Pt reported \"being a family\". Writer asked pt why she is angry. Pt reported she feels used. Pt reported she feels bad for feeling like that because he likes helping. Writer validated pt feelings and explained that angry and feeling  Sepsis Week 1 Survey      Responses   [facility] patient discharged from? [facility]   Does the patient have one of the following disease processes/diagnoses(primary or secondary)? Sepsis   Week 1 attempt successful? Yes   Call start time 0902   Call end time 0908   Discharge diagnosis Sepsis   Is patient permission given to speak with other caregiver? Yes   List who call center can speak with yanci osorio Mother    Person spoke with today (if not patient) and relationship rome osorioie Mother    Meds reviewed with patient/caregiver? Yes   Does the patient have all medications related to this admission filled (includes all antibiotics, inhalers, nebulizers,steroids,etc.) Yes   Is the patient taking all medications as directed (includes completed medication regime)? Yes   Does the patient have a primary care provider?  Yes   Comments regarding PCP Follow up with Toshia Mitchell, APRN Wednesday Feb 9, 2022 9:55 am   Does the patient have an appointment with their PCP within 7 days of discharge? Yes   Has the patient kept scheduled appointments due by today? N/A   Has home health visited the patient within 72 hours of discharge? N/A   Psychosocial issues? No   Did the patient receive a copy of their discharge instructions? Yes   Nursing interventions Reviewed instructions with patient  [mother]   What is the patient's perception of their health status since discharge? Improving   Nursing interventions Nurse provided patient education   Is the patient/caregiver able to teach back Sepsis? S - Shivering,fever or very cold,  S - Sleepy, difficult to arouse,confused   Nursing interventions Nurse provided patient education   Is patient/caregiver able to teach back steps to recovery at home? Set small, achievable goals for return to baseline health,  Rest and regain strength,  Eat a balanced diet   Is the patient/caregiver able to teach back signs and symptoms of worsening condition: Fever,  Shortness of  breath/rapid respiratory rate,  Rapid heart rate (>90),  Edema   If the patient is a current smoker, are they able to teach back resources for cessation? Not a smoker   Is the patient/caregiver able to teach back the hierarchy of who to call/visit for symptoms/problems? PCP, Specialist, Home health nurse, Urgent Care, ED, 911 Yes   Week 1 call completed? Yes   Wrap up additional comments Mother reports patient improving,  denies patient having any vomiting and feeling better.           Ramona Lizama RN   used is okay to have for awhile to just not get stuck in it.     Evelyn Warner MSW LCSW      Method: Group  Attendance: Present  Participation: Active  Patient Response: Attentive  Mood: Sad, disappointed  Affect: Type: Sad    Range: Full (normal)   Congruency: Congruent   Stability: Stable  Behavior/Socialization: Appropriate to group and Tearful  Thought Process: Abstract  Task Performance: Follows directions  Additional Information:  Psychosocial Stressors: Interpersonal conflict  Symptom Notations: Pt rated 8 for anxiety, 8 for restlessness, 8 for disorganization, 8 for indecision, 8 for worry, 10 for sad/tearful, 10 for decreased energy. Pt rated 3 for si, 0 for urge to harm others, 8 for urge to harm self. Pt reported she has no plan or intent for her si. Pt reported she did not self harm last night. Pt reported she can be safe in group and will come to staff with concerns.   Patient Evaluation: Independent - full participation

## 2022-12-30 NOTE — PROGRESS NOTES
"    [facility] Medicine Services  PROGRESS NOTE    Patient Name: Timothy Guzman  : 1975  MRN: [id number]    Date of Admission: 2022  Primary Care Physician: Toshia Mitchell APRN    Subjective   Subjective     CC:  Abdominal pain    HPI:  Complaining of sinus congestion/drainage, ongoing sore throat this morning.  States she feels like she has a head cold.  Reports that she is not keeping much food down due to abdominal pain and nausea, however last night nursing had to put an order for a post supper snack as the patient was \"still hungry.\"  Per nursing she continues to call out for pain medications as frequently as available, intermittently refusing oral pain medicines, and sleeping in between    ROS:  Gen- No fevers, chills  CV- No chest pain, palpitations  Resp- No cough, dyspnea  GI-yes abdominal pain, nausea; no diarrhea    Objective   Objective     Vital Signs:   Temp:  [97.7 °F (36.5 °C)-99.4 °F (37.4 °C)] 98.5 °F (36.9 °C)  Heart Rate:  [76-99] 76  Resp:  [16] 16  BP: ()/(57-76) 112/76     Physical Exam:  Constitutional: Awake, lethargic, laying in bed in no acute distress  HENT: NCAT, mucous membranes moist  Respiratory: Clear to auscultation bilaterally, respiratory effort normal   Cardiovascular: RRR, no murmurs, rubs, or gallops, palpable pedal pulses  Gastrointestinal: Positive bowel sounds, soft, mild epigastric tenderness to palpation, nondistended  Musculoskeletal: No bilateral ankle edema  Psychiatric: Appropriate affect, cooperative  Neurologic: Speech clear, moving all extremities spontaneously    Results Reviewed:  LAB RESULTS:      Lab 22  0945 22  1947 22  0953 22  0423 22  2132 22  1530 22  1253   WBC  --  4.22  --  6.86  --   --   --  24.66*   HEMOGLOBIN 11.4* 11.1* 11.9* 11.4*  --   --   --  18.6*   HEMATOCRIT 34.1 34.7 36.6 34.8  --   --   --  54.1*   PLATELETS  --  104*  --  116*  -- " Ok to schedule with Dr Dennison, new patient appointment 40 min     --   --  271   NEUTROS ABS  --   --   --  4.11  --   --   --  19.88*   IMMATURE GRANS (ABS)  --   --   --  0.03  --   --   --  0.23*   LYMPHS ABS  --   --   --  1.85  --   --   --  2.76   MONOS ABS  --   --   --  0.83  --   --   --  1.72*   EOS ABS  --   --   --  0.02  --   --   --  0.01   MCV  --  98.0*  --  95.1  --   --   --  89.4   PROCALCITONIN  --   --   --   --   --   --  0.15  --    LACTATE  --   --   --  2.6* 2.2* 4.4*  --  4.0*   PROTIME  --   --   --   --   --   --   --  14.1         Lab 01/31/22  0945 01/30/22  0423 01/29/22  1530   SODIUM 137 136 133*   POTASSIUM 4.1 4.0 3.9   CHLORIDE 107 102 91*   CO2 23.0 24.0 27.0   ANION GAP 7.0 10.0 15.0   BUN 6 18 28*   CREATININE 0.88 1.44* 2.13*   GLUCOSE 133* 218* 115*   CALCIUM 7.5* 7.4* 8.9         Lab 01/31/22  0945 01/29/22  1530   TOTAL PROTEIN 5.1* 7.5   ALBUMIN 3.10* 4.50   GLOBULIN 2.0 3.0   ALT (SGPT) 11 17   AST (SGOT) 12 16   BILIRUBIN 0.2 0.4   ALK PHOS 69 103   LIPASE  --  20         Lab 01/29/22  1530 01/29/22  1253   TROPONIN T <0.010  --    PROTIME  --  14.1   INR  --  1.12             Lab 01/30/22  0423 01/29/22  1530   IRON 137  --    IRON SATURATION 37  --    TIBC 373  --    TRANSFERRIN 250  --    FERRITIN 129.20  --    ABO TYPING  --  A   RH TYPING  --  Positive   ANTIBODY SCREEN  --  Negative         Brief Urine Lab Results  (Last result in the past 365 days)      Color   Clarity   Blood   Leuk Est   Nitrite   Protein   CREAT   Urine HCG        01/30/22 1302 Yellow   Clear   Negative   Negative   Negative   Negative                 Microbiology Results Abnormal     Procedure Component Value - Date/Time    Blood Culture - Blood, Wrist, Right [[id number]]  (Normal) Collected: 01/29/22 1530    Lab Status: Preliminary result Specimen: Blood from Wrist, Right Updated: 01/31/22 1600     Blood Culture No growth at 2 days    Blood Culture - Blood, Arm, Right [[id number]]  (Normal) Collected: 01/29/22 1445    Lab Status: Preliminary result  Specimen: Blood from Arm, Right Updated: 01/31/22 1600     Blood Culture No growth at 2 days    Narrative:      Aerobic bottle only      COVID PRE-OP / PRE-PROCEDURE SCREENING ORDER (NO ISOLATION) - Swab, Nasopharynx [[id number]]  (Normal) Collected: 01/29/22 1539    Lab Status: Final result Specimen: Swab from Nasopharynx Updated: 01/29/22 1605    Narrative:      The following orders were created for panel order COVID PRE-OP / PRE-PROCEDURE SCREENING ORDER (NO ISOLATION) - Swab, Nasopharynx.  Procedure                               Abnormality         Status                     ---------                               -----------         ------                     COVID-19 and FLU A/B PCR...[[id number]]  Normal              Final result                 Please view results for these tests on the individual orders.    COVID-19 and FLU A/B PCR - Swab, Nasopharynx [[id number]]  (Normal) Collected: 01/29/22 1539    Lab Status: Final result Specimen: Swab from Nasopharynx Updated: 01/29/22 1605     COVID19 Not Detected     Influenza A PCR Not Detected     Influenza B PCR Not Detected    Narrative:      Fact sheet for providers: https://www.fda.gov/media/130688/download    Fact sheet for patients: https://www.fda.gov/media/166709/download    Test performed by PCR.          No radiology results from the last 24 hrs    Results for orders placed during the hospital encounter of 06/18/21    Adult Transthoracic Echo Complete W/ Cont if Necessary Per Protocol    Interpretation Summary  · The quality of the study is limited due to patient positioning and patient being intubated.  · Left ventricular ejection fraction appears to be 51 - 55%. Left ventricular systolic function is normal.  · Left ventricular diastolic function is consistent with (grade Ia w/high LAP) impaired relaxation.  · Mildly reduced right ventricular systolic function noted.      I have reviewed the medications:  Scheduled Meds:aspirin, 81 mg, Oral,  Daily  cefTRIAXone, 1 g, Intravenous, Daily  clopidogrel, 75 mg, Oral, Daily  docusate sodium, 100 mg, Oral, BID  FLUoxetine, 40 mg, Oral, Daily  gabapentin, 300 mg, Oral, TID  lactulose, 30 g, Oral, TID  lubiprostone, 24 mcg, Oral, BID With Meals  magnesium oxide, 400 mg, Oral, Q PM  melatonin, 10 mg, Oral, Nightly  OLANZapine, 10 mg, Oral, Nightly  pantoprazole, 40 mg, Oral, BID AC  potassium chloride, 20 mEq, Oral, Daily  ranolazine, 1,000 mg, Oral, BID  rosuvastatin, 20 mg, Oral, Nightly  sodium chloride, 10 mL, Intravenous, Q12H  tamsulosin, 0.4 mg, Oral, Nightly  traZODone, 50 mg, Oral, Nightly  ursodiol, 600 mg, Oral, BID      Continuous Infusions:   PRN Meds:.bisacodyl  •  HYDROmorphone  •  ondansetron  •  oxyCODONE  •  phenol  •  sodium chloride  •  [COMPLETED] Insert peripheral IV **AND** sodium chloride  •  sodium chloride    Assessment/Plan   Assessment & Plan     Active Hospital Problems    Diagnosis  POA   • **Hematemesis [K92.0]  Yes   • SIRS (systemic inflammatory response syndrome) (HCC) [R65.10]  Yes   • Leukocytosis [D72.829]  Yes   • BRIANNA (acute kidney injury) (HCC) [N17.9]  Yes   • Hyponatremia [E87.1]  Yes   • Lactic acidosis [E87.2]  Yes   • CAD (coronary artery disease) [I25.10]  Yes   • History of esophageal varices [Z87.19]  Not Applicable   • Alcoholic cirrhosis of liver without ascites (HCC) [K70.30]  Yes      Resolved Hospital Problems   No resolved problems to display.        Brief Hospital Course to date:  Timothy Guzman is a 46 y.o. female with CAD s/p stenting summer 2021, alcoholic cirrhosis (per chart questionable Hx PBC?),  Known esophageal varices who presents to the hospital with several days of epigastric abdominal pain, nausea, vomiting with progression to blood-tinged emesis    Assessment/plan    Acute epigastric abdominal pain with nausea/vomiting  Hematemesis  Hepatic cirrhosis (see EtOH/??PBC) with known varices  -Continue home lactulose/rifaximin  -Continue bid po  ppi  -Discontinue ceftriaxone (initially for SBP PPx but last EGD without varices)  -GI has seen, suspect Claudia-Arce tear, defer endoscopy as last EGD did not demonstrate varices (prior EGD with grade 1 varices)  -H&H remained stable  -Decrease frequency of IV narcotics, discontinue entirely tomorrow    Acute kidney injury  -Likely from volume depletion  -Fluctuating creatinine, repeat labs in a.m.    CAD s/p stenting  -[facility] with stenting of the circumflex 6/18/21 in the setting of acute MI with subsequent in-stent stenosis requiring repeat LHC and repeat stenting 7/15/21; patient had anaphylactic-like event during second stent requiring transition to the ICU  -Continue aspirin, clopidogrel, ranolazine, rosuvastatin  -If bleeding reoccurs we can request cardiology evaluation for recommendations on temporary holding DAPT    Prior Hx alcoholism    DVT prophylaxis:  Mechanical DVT prophylaxis orders are present.       AM-PAC 6 Clicks Score (PT): 22 (01/31/22 2000)    Disposition: Discharge tomorrow if no major lab aberrancies    CODE STATUS:   Code Status and Medical Interventions:   Ordered at: 01/29/22 4390     Level Of Support Discussed With:    Patient     Code Status (Patient has no pulse and is not breathing):    CPR (Attempt to Resuscitate)     Medical Interventions (Patient has pulse or is breathing):    Full Support       Haroldo Hillman,   02/01/22

## 2023-02-01 ENCOUNTER — PRIOR AUTHORIZATION (OUTPATIENT)
Dept: GASTROENTEROLOGY | Facility: CLINIC | Age: 48
End: 2023-02-01
Payer: COMMERCIAL

## 2023-02-22 NOTE — PROGRESS NOTES
[facility] Cardiology   [address], [address]  [place], [place], [address]    [phone]  WWW.[facility]           INPATIENT CONSULTATION NOTE    Patient Care Team:  Patient Care Team:  Toshia Mitchell APRN as PCP - General (Family Medicine)  Nelson Yip APRN as Nurse Practitioner (Nurse Practitioner)    Requesting Provider and Reason for consultation: The patient is being seen today at the request of Dr. Sparks for chest pain.     Chief complaint:   Chief Complaint   Patient presents with   • Chest Pain            HPI:    Timothy Guzman is a 45 y.o. female.    Partial problem list, including cardiac problems:  1. CAD  a. STEMI 6/18/2021 now status post Xience Melissa 3.0 x 28 mm ANGELICA and proximal optimization up to 4 mm AV groove stenosis with Dr. Gonzales.  During that admission also had PEA cardiac arrest, A-V dissociation, and SVT.  2. SVT  a. At time of STEMI 6/2021.   3. History of GI bleed  4. Cirrhosis  5. Gastritis with esophageal varices that are status post banding  6. Depression  7. History of opioid dependence    She to [facility] ED overnight with complaints of chest pain.  She was recently admitted to [facility] in June 2021 during which time she had a PEA arrest and STEMI.  She initially presented during that admission for hematemesis.  She has been at [facility] since discharge working with PT and OT.  She reports she has only ambulating during therapy typically and is otherwise fairly sedentary.  She reports yesterday she was at rest in her bed when she developed sudden chest pressure/heaviness.  She was given aspirin and sublingual nitroglycerin in route via EMS with some improvement in her chest heaviness.  She also reports other chest pain from rib fractures she sustained during her previous admission.  She notes that the pain from her ribs is more severe than her chest pressure/heaviness.  She has been receiving morphine and Dilaudid with some  improvement in her pain.  At times she notes that her chest pressure/heaviness is resolved.  Yesterday she also experienced right calf pain.  She has had intermittent dyspnea with speaking since being intubated during her last admission.  Also with intermittent lower extremity edema, however this is not been present over the last several days.    Review of Systems:  Positive for chest pressure/pain, dyspnea with speaking, intermittent lower extremity edema  All other systems reviewed and are negative.    PFSH:  Patient Active Problem List   Diagnosis   • Hepatic encephalopathy (CMS/HCC)   • Alcoholic cirrhosis of liver without ascites (CMS/HCC)   • Possible GI bleed   • Generalized abdominal pain   • History of esophageal varices   • GERD (gastroesophageal reflux disease)   • STEMI (ST elevation myocardial infarction) (CMS/HCC)   • Acute respiratory failure with hypoxia (CMS/HCC)   • Anaphylaxis   • Cardiac arrest (CMS/HCC)   • SVT (supraventricular tachycardia) (CMS/HCC)   • Depression   • Chest pain   • Cirrhosis of liver (CMS/HCC)   • CAD (coronary artery disease)   • Rib fractures       No current facility-administered medications on file prior to encounter.     Current Outpatient Medications on File Prior to Encounter   Medication Sig Dispense Refill   • ascorbic acid (VITAMIN C) 1000 MG tablet Take 1,000 mg by mouth Daily.     • aspirin 81 MG EC tablet Take 81 mg by mouth Daily.     • B Complex Vitamins (vitamin b complex) capsule capsule Take 1 capsule by mouth Daily.     • bumetanide (BUMEX) 1 MG tablet Take 2 tablets by mouth Daily.     • Cholecalciferol ( Vitamin D3) 100 MCG (4000 UT) capsule Take 1 capsule by mouth Daily.     • clopidogrel (PLAVIX) 75 MG tablet Take 1 tablet by mouth Daily. 30 tablet    • docusate sodium (COLACE) 100 MG capsule Take 100 mg by mouth 2 (Two) Times a Day As Needed for Constipation.     • famotidine (PEPCID) 20 MG tablet Take 1 tablet by mouth 2 (Two) Times a Day. 60 tablet  0   • FLUoxetine (PROzac) 40 MG capsule Take 40 mg by mouth Daily.     • folic acid (FOLVITE) 1 MG tablet Take 1 tablet by mouth Daily.     • ipratropium-albuterol (DUO-NEB) 0.5-2.5 mg/3 ml nebulizer Take 3 mL by nebulization Every 6 (Six) Hours As Needed for Wheezing or Shortness of Air. 360 mL    • lactulose (Constulose) 10 GM/15ML solution Take 15 mL by mouth 2 (Two) Times a Day.     • lidocaine (LIDODERM) 5 % Place 1 patch on the skin as directed by provider Daily. Remove & Discard patch within 12 hours or as directed by MD     • magnesium oxide (MAGOX) 400 (241.3 Mg) MG tablet tablet Take 400 mg by mouth Every Evening.     • Melatonin 10 MG tablet Take 1 tablet by mouth Every Night.     • methocarbamol (ROBAXIN) 750 MG tablet Take 750 mg by mouth 3 (Three) Times a Day As Needed.     • multivitamin with minerals (MULTIVITAMIN ADULT PO) Take 1 tablet by mouth Daily.     • OLANZapine (zyPREXA) 7.5 MG tablet Take 1 tablet by mouth Every Night.     • oxyCODONE (ROXICODONE) 5 MG immediate release tablet Take 1 tablet by mouth Every 6 (Six) Hours As Needed for Moderate Pain .     • pantoprazole (PROTONIX) 40 MG EC tablet Take 1 tablet by mouth 2 (Two) Times a Day Before Meals.     • potassium chloride (K-DUR,KLOR-CON) 20 MEQ CR tablet Take 1 tablet by mouth Daily As Needed.     • rifaximin (XIFAXAN) 550 MG tablet Take 1 tablet by mouth Every 12 (Twelve) Hours. 60 tablet 11   • rOPINIRole (REQUIP) 0.5 MG tablet Take 0.5 mg by mouth Every Night. Take 1 hour before bedtime.     • spironolactone (ALDACTONE) 50 MG tablet Take 2 tablets by mouth Daily.     • tamsulosin (FLOMAX) 0.4 MG capsule 24 hr capsule Take 1 capsule by mouth Daily.     • traZODone (DESYREL) 50 MG tablet Take 50 mg by mouth Every Night.     • vitamin B-6 (PYRIDOXINE) 25 MG tablet Take 25 mg by mouth Daily.     • linaclotide (LINZESS) 72 MCG capsule capsule Take 72 mcg by mouth Every Morning Before Breakfast.     • rosuvastatin (CRESTOR) 20 MG tablet  Take 1 tablet by mouth Every Night.       Allergies   Allergen Reactions   • Contrast Dye Anaphylaxis     Pt coded after receiving contrast for a CT scan. Was premedicated. Was having an MI at the same time. Immediately underwent heart cath with contrast without additional allergic reaction   • Nsaids GI Bleeding   • Tylenol [Acetaminophen] Other (See Comments)     CIRRHOSIS       Social History     Socioeconomic History   • Marital status:      Spouse name: Not on file   • Number of children: Not on file   • Years of education: Not on file   • Highest education level: Not on file   Tobacco Use   • Smoking status: Former Smoker     Packs/day: 0.50     Types: Electronic Cigarette, Cigarettes     Quit date: 2020     Years since quittin.2   • Smokeless tobacco: Never Used   • Tobacco comment:  ppd    Substance and Sexual Activity   • Alcohol use: Not Currently   • Drug use: No   • Sexual activity: Defer     Family History   Problem Relation Age of Onset   • Colon cancer Neg Hx    • Colon polyps Neg Hx             Objective:     Vital Sign Min/Max for last 24 hours  Temp  Min: 97.8 °F (36.6 °C)  Max: 99 °F (37.2 °C)   BP  Min: 95/60  Max: 127/85   Pulse  Min: 82  Max: 103   Resp  Min: 16  Max: 16   SpO2  Min: 91 %  Max: 99 %   No data recorded      Intake/Output Summary (Last 24 hours) at 2021 0859  Last data filed at 2021 0038  Gross per 24 hour   Intake 500 ml   Output --   Net 500 ml           Vitals:    21 0820   BP: 127/85   Pulse: 103   Resp: 16   Temp: 97.8 °F (36.6 °C)   SpO2: 96%       CONSTITUTIONAL: Well-nourished. In no acute distress.   SKIN: Warm and dry. No rashes noted  LUNGS: Normal effort. Clear to auscultation bilaterally without wheezing, rhonchi, or rales noted.   CARDIOVASCULAR: The heart has a regular rate and rhythm with a normal S1 and S2. There is no murmur, gallop, rub, or click appreciated.   PERIPHERAL VASCULAR: Radial pulses are 2+ bilaterally. Posterior  tibial pulses are 2+ and symmetrical. There is no significant lower extremity edema. .  PSYCHIATRIC: Alert, orientated x 3, appropriate affect and mood    Labs, results reviewed by myself:  Lab Results   Component Value Date    TROPONINT <0.010 07/13/2021       Glucose   Date Value Ref Range Status   07/13/2021 105 (H) 65 - 99 mg/dL Final     BUN   Date Value Ref Range Status   07/13/2021 11 6 - 20 mg/dL Final     Creatinine   Date Value Ref Range Status   07/13/2021 0.91 0.57 - 1.00 mg/dL Final     Sodium   Date Value Ref Range Status   07/13/2021 137 136 - 145 mmol/L Final     Potassium   Date Value Ref Range Status   07/13/2021 4.0 3.5 - 5.2 mmol/L Final     Comment:     Slight hemolysis detected by analyzer. Results may be affected.     Chloride   Date Value Ref Range Status   07/13/2021 100 98 - 107 mmol/L Final     CO2   Date Value Ref Range Status   07/13/2021 27.0 22.0 - 29.0 mmol/L Final     Calcium   Date Value Ref Range Status   07/13/2021 9.2 8.6 - 10.5 mg/dL Final     Total Protein   Date Value Ref Range Status   07/12/2021 6.4 6.0 - 8.5 g/dL Final     Albumin   Date Value Ref Range Status   07/12/2021 3.70 3.50 - 5.20 g/dL Final     ALT (SGPT)   Date Value Ref Range Status   07/12/2021 18 1 - 33 U/L Final     AST (SGOT)   Date Value Ref Range Status   07/12/2021 23 1 - 32 U/L Final     Alkaline Phosphatase   Date Value Ref Range Status   07/12/2021 200 (H) 39 - 117 U/L Final     Total Bilirubin   Date Value Ref Range Status   07/12/2021 0.4 0.0 - 1.2 mg/dL Final     eGFR Non  Amer   Date Value Ref Range Status   07/13/2021 67 >60 mL/min/1.73 Final     BUN/Creatinine Ratio   Date Value Ref Range Status   07/13/2021 12.1 7.0 - 25.0 Final     Anion Gap   Date Value Ref Range Status   07/13/2021 10.0 5.0 - 15.0 mmol/L Final       Lab Results   Component Value Date    CHOL 136 06/28/2021    CHOL 136 06/21/2021    CHOL 156 06/19/2021     Lab Results   Component Value Date    TRIG 130 06/28/2021     TRIG 165 (H) 06/21/2021    TRIG 102 06/19/2021     Lab Results   Component Value Date    HDL 30 (L) 06/19/2021     Lab Results   Component Value Date     (H) 06/19/2021     No components found for: LDLDIRECTC      WBC   Date Value Ref Range Status   07/13/2021 4.82 3.40 - 10.80 10*3/mm3 Final     RBC   Date Value Ref Range Status   07/13/2021 3.94 3.77 - 5.28 10*6/mm3 Final     Hemoglobin   Date Value Ref Range Status   07/13/2021 11.8 (L) 12.0 - 15.9 g/dL Final     Hematocrit   Date Value Ref Range Status   07/13/2021 36.1 34.0 - 46.6 % Final     MCV   Date Value Ref Range Status   07/13/2021 91.6 79.0 - 97.0 fL Final     MCH   Date Value Ref Range Status   07/13/2021 29.9 26.6 - 33.0 pg Final     MCHC   Date Value Ref Range Status   07/13/2021 32.7 31.5 - 35.7 g/dL Final     RDW   Date Value Ref Range Status   07/13/2021 15.0 12.3 - 15.4 % Final     RDW-SD   Date Value Ref Range Status   07/13/2021 50.3 37.0 - 54.0 fl Final     MPV   Date Value Ref Range Status   07/13/2021 10.6 6.0 - 12.0 fL Final     Platelets   Date Value Ref Range Status   07/13/2021 157 140 - 450 10*3/mm3 Final     Neutrophil %   Date Value Ref Range Status   07/13/2021 43.8 42.7 - 76.0 % Final     Lymphocyte %   Date Value Ref Range Status   07/13/2021 36.1 19.6 - 45.3 % Final     Monocyte %   Date Value Ref Range Status   07/13/2021 14.3 (H) 5.0 - 12.0 % Final     Eosinophil %   Date Value Ref Range Status   07/13/2021 4.8 0.3 - 6.2 % Final     Basophil %   Date Value Ref Range Status   07/13/2021 0.6 0.0 - 1.5 % Final     Immature Grans %   Date Value Ref Range Status   07/13/2021 0.4 0.0 - 0.5 % Final     Neutrophils, Absolute   Date Value Ref Range Status   07/13/2021 2.11 1.70 - 7.00 10*3/mm3 Final     Lymphocytes, Absolute   Date Value Ref Range Status   07/13/2021 1.74 0.70 - 3.10 10*3/mm3 Final     Monocytes, Absolute   Date Value Ref Range Status   07/13/2021 0.69 0.10 - 0.90 10*3/mm3 Final     Eosinophils, Absolute   Date  Value Ref Range Status   07/13/2021 0.23 0.00 - 0.40 10*3/mm3 Final     Basophils, Absolute   Date Value Ref Range Status   07/13/2021 0.03 0.00 - 0.20 10*3/mm3 Final     Immature Grans, Absolute   Date Value Ref Range Status   07/13/2021 0.02 0.00 - 0.05 10*3/mm3 Final     nRBC   Date Value Ref Range Status   07/13/2021 0.0 0.0 - 0.2 /100 WBC Final         Diagnostic Data:    EKG: Normal sinus rhythm, T wave abnormality    Results for orders placed during the hospital encounter of 06/18/21    Adult Transthoracic Echo Complete W/ Cont if Necessary Per Protocol    Interpretation Summary  · The quality of the study is limited due to patient positioning and patient being intubated.  · Left ventricular ejection fraction appears to be 51 - 55%. Left ventricular systolic function is normal.  · Left ventricular diastolic function is consistent with (grade Ia w/high LAP) impaired relaxation.  · Mildly reduced right ventricular systolic function noted.      Tele: Normal sinus rhythm         Assessment and Plan:   ASSESSMENT:  1. CAD  a. Status post ANGELICA to the circumflex 6/18/2021 at time of STEMI.  b. Troponin x3 are negative, EKG without acute ischemic changes  c. Chest pain somewhat responsive to nitroglycerin but overall do not seem entirely consistent with angina.  2. Recent right calf pain   a. In the setting of recent limited mobility, elevated D-dimer  3. Rib fractures  4. Cirrhosis of the liver    PLAN:  1. Lower extremity venous duplex to evaluate for DVT.  If abnormal will presume the patient had a PE and start anticoagulation.  If unremarkable, would consider a VQ scan versus CT per PE protocol with premedication for possible contrast dye allergy  2. Begin Ranexa 500 mg p.o. twice daily for possible angina.  3. Discontinue Nitropaste due to borderline hypotension.  4. Continue DAPT, statin  5. Holding off on a beta-blocker due to borderline hypotension  6. If with persistent pain in an arm remarkable work-up as  noted above, will consider a repeat heart catheterization    -Plan discussed with Dr. Sparks    Scribed for Dr. Gonzales by Vannessa Koch, APRN. 7/13/2021  13:09 EDT    I, Vikram Gonzales MD, personally performed the services as scribed by the above named individual. I have made any necessary edits and it is both accurate and complete.     Vikram Gonzales MD, MSc, FACC, [facility]  Interventional Cardiology  [facility]           [place] Counseling: I discussed with the patient the risks of tildrakizumab including but not limited to immunosuppression, malignancy, posterior leukoencephalopathy syndrome, and serious infections.  The patient understands that monitoring is required including a PPD at baseline and must alert us or the primary physician if symptoms of infection or other concerning signs are noted.

## 2023-08-30 ENCOUNTER — OFFICE VISIT (OUTPATIENT)
Dept: GASTROENTEROLOGY | Facility: CLINIC | Age: 48
End: 2023-08-30
Payer: MEDICARE

## 2023-08-30 ENCOUNTER — LAB (OUTPATIENT)
Dept: LAB | Facility: HOSPITAL | Age: 48
End: 2023-08-30
Payer: MEDICARE

## 2023-08-30 VITALS
WEIGHT: 242.4 LBS | TEMPERATURE: 98.2 F | HEIGHT: 63 IN | HEART RATE: 115 BPM | SYSTOLIC BLOOD PRESSURE: 126 MMHG | BODY MASS INDEX: 42.95 KG/M2 | DIASTOLIC BLOOD PRESSURE: 81 MMHG

## 2023-08-30 DIAGNOSIS — K92.1 MELENA: ICD-10-CM

## 2023-08-30 DIAGNOSIS — R10.13 EPIGASTRIC PAIN: ICD-10-CM

## 2023-08-30 DIAGNOSIS — K76.82 HEPATIC ENCEPHALOPATHY: ICD-10-CM

## 2023-08-30 DIAGNOSIS — K70.31 ALCOHOLIC CIRRHOSIS OF LIVER WITH ASCITES: Primary | ICD-10-CM

## 2023-08-30 DIAGNOSIS — K74.3 PRIMARY BILIARY CHOLANGITIS: ICD-10-CM

## 2023-08-30 DIAGNOSIS — K70.31 ALCOHOLIC CIRRHOSIS OF LIVER WITH ASCITES: ICD-10-CM

## 2023-08-30 LAB
ALBUMIN SERPL-MCNC: 3.4 G/DL (ref 3.5–5.2)
ALBUMIN/GLOB SERPL: 1 G/DL
ALP SERPL-CCNC: 266 U/L (ref 39–117)
ALT SERPL W P-5'-P-CCNC: 31 U/L (ref 1–33)
ANION GAP SERPL CALCULATED.3IONS-SCNC: 9.8 MMOL/L (ref 5–15)
AST SERPL-CCNC: 71 U/L (ref 1–32)
BILIRUB SERPL-MCNC: 1 MG/DL (ref 0–1.2)
BUN SERPL-MCNC: 4 MG/DL (ref 6–20)
BUN/CREAT SERPL: 5.5 (ref 7–25)
CALCIUM SPEC-SCNC: 8.6 MG/DL (ref 8.6–10.5)
CHLORIDE SERPL-SCNC: 107 MMOL/L (ref 98–107)
CO2 SERPL-SCNC: 22.2 MMOL/L (ref 22–29)
CREAT SERPL-MCNC: 0.73 MG/DL (ref 0.57–1)
DEPRECATED RDW RBC AUTO: 50.9 FL (ref 37–54)
EGFRCR SERPLBLD CKD-EPI 2021: 101.6 ML/MIN/1.73
ERYTHROCYTE [DISTWIDTH] IN BLOOD BY AUTOMATED COUNT: 19.8 % (ref 12.3–15.4)
GLOBULIN UR ELPH-MCNC: 3.4 GM/DL
GLUCOSE SERPL-MCNC: 144 MG/DL (ref 65–99)
HCT VFR BLD AUTO: 30.9 % (ref 34–46.6)
HGB BLD-MCNC: 9 G/DL (ref 12–15.9)
INR PPP: 1.17 (ref 0.89–1.12)
MCH RBC QN AUTO: 21.1 PG (ref 26.6–33)
MCHC RBC AUTO-ENTMCNC: 29.1 G/DL (ref 31.5–35.7)
MCV RBC AUTO: 72.5 FL (ref 79–97)
PLATELET # BLD AUTO: 264 10*3/MM3 (ref 140–450)
PMV BLD AUTO: 11.7 FL (ref 6–12)
POTASSIUM SERPL-SCNC: 2.7 MMOL/L (ref 3.5–5.2)
PROT SERPL-MCNC: 6.8 G/DL (ref 6–8.5)
PROTHROMBIN TIME: 15.1 SECONDS (ref 12.2–14.5)
RBC # BLD AUTO: 4.26 10*6/MM3 (ref 3.77–5.28)
SODIUM SERPL-SCNC: 139 MMOL/L (ref 136–145)
WBC NRBC COR # BLD: 9.54 10*3/MM3 (ref 3.4–10.8)

## 2023-08-30 PROCEDURE — 1160F RVW MEDS BY RX/DR IN RCRD: CPT | Performed by: NURSE PRACTITIONER

## 2023-08-30 PROCEDURE — 82105 ALPHA-FETOPROTEIN SERUM: CPT

## 2023-08-30 PROCEDURE — 80053 COMPREHEN METABOLIC PANEL: CPT

## 2023-08-30 PROCEDURE — 99214 OFFICE O/P EST MOD 30 MIN: CPT | Performed by: NURSE PRACTITIONER

## 2023-08-30 PROCEDURE — 1159F MED LIST DOCD IN RCRD: CPT | Performed by: NURSE PRACTITIONER

## 2023-08-30 PROCEDURE — 85007 BL SMEAR W/DIFF WBC COUNT: CPT

## 2023-08-30 PROCEDURE — 85025 COMPLETE CBC W/AUTO DIFF WBC: CPT

## 2023-08-30 PROCEDURE — 85610 PROTHROMBIN TIME: CPT

## 2023-08-30 RX ORDER — RIFAXIMIN 550 MG/1
550 TABLET ORAL EVERY 12 HOURS SCHEDULED
Qty: 180 TABLET | Refills: 3 | Status: SHIPPED | OUTPATIENT
Start: 2023-08-30

## 2023-08-30 RX ORDER — INSULIN ASPART 100 [IU]/ML
INJECTION, SOLUTION INTRAVENOUS; SUBCUTANEOUS
COMMUNITY
Start: 2023-06-16

## 2023-08-30 RX ORDER — SPIRONOLACTONE 100 MG/1
100 TABLET, FILM COATED ORAL 3 TIMES DAILY
COMMUNITY
Start: 2023-05-09 | End: 2023-08-30 | Stop reason: SDUPTHER

## 2023-08-30 RX ORDER — INSULIN GLARGINE 100 [IU]/ML
INJECTION, SOLUTION SUBCUTANEOUS
COMMUNITY
Start: 2023-05-25

## 2023-08-30 RX ORDER — BUMETANIDE 2 MG/1
2 TABLET ORAL DAILY
Qty: 90 TABLET | Refills: 1 | Status: SHIPPED | OUTPATIENT
Start: 2023-08-30

## 2023-08-30 RX ORDER — HYDROXYZINE PAMOATE 25 MG/1
CAPSULE ORAL
COMMUNITY
Start: 2023-05-18

## 2023-08-30 RX ORDER — IBUPROFEN 600 MG/1
TABLET ORAL
COMMUNITY
Start: 2023-05-09

## 2023-08-30 RX ORDER — BUMETANIDE 2 MG/1
2 TABLET ORAL 3 TIMES DAILY
COMMUNITY
Start: 2023-06-15 | End: 2023-08-30 | Stop reason: SDUPTHER

## 2023-08-30 RX ORDER — URSODIOL 300 MG/1
300 CAPSULE ORAL 2 TIMES DAILY
Qty: 180 CAPSULE | Refills: 1 | Status: SHIPPED | OUTPATIENT
Start: 2023-08-30

## 2023-08-30 RX ORDER — SPIRONOLACTONE 100 MG/1
100 TABLET, FILM COATED ORAL DAILY
Qty: 90 TABLET | Refills: 1 | Status: SHIPPED | OUTPATIENT
Start: 2023-08-30

## 2023-08-30 NOTE — PROGRESS NOTES
GASTROENTEROLOGY OFFICE NOTE  Timothy Guzman  [id number]  1975    CARE TEAM  Patient Care Team:  Toshia Mitchell APRN as PCP - General (Family Medicine)  Nelson Yip APRN as Nurse Practitioner (Nurse Practitioner)    Referring Provider: Toshia Mitchell APRN    Chief Complaint   Patient presents with    Hepatic Disease        HISTORY OF PRESENT ILLNESS:  Patient is a 47-year-old female with EtOH cirrhosis decompensated by hepatic encephalopathy and ascites.  History of primary biliary cholangitis.  She is accompanied by her mother and father today.      She has been lost to follow-up since May 2022.  Since last seen she has had 5 orthopedic surgeries.  Starting with a tib-fib fracture in June 2022 with several complications including refracturing and infection at surgical site.  She reports she had an external fixator at one point.  She has been at a nursing home for rehab.    She returns today reporting that for the past several months she has had nausea and vomiting.  She has not been able to keep much down.  She has had melanotic stool which has now resolved.  She has epigastric pain.  She is not taking any of her medicines prescribed to her.  She has had GI bleeding in the past from a localized area of gastritis, EGD in April 2022.    She denies any signs of hepatic encephalopathy although her mother states that she has had periods of forgetfulness and seems a little hazy at times.  Again, she is not on any medications for hepatic encephalopathy, she has stopped taking all medicines.  Specifically she states that she cannot take the lactulose because she is having 3-4 bowel movements daily currently.    She reports that she started drinking again and drank heavily through the month of June 2023 but has since stopped drinking once again.    PAST MEDICAL HISTORY  Past Medical History:   Diagnosis Date    Alcoholism     sober 2.5 years    Anaphylaxis     Arthritis     Bone necrosis      CAD (coronary artery disease) 2021    Cardiac arrest     Cirrhosis of liver 2021    Gastroparesis     GERD (gastroesophageal reflux disease)     History of esophageal varices     History of kidney stones     Hypertension     Memory loss     Neuropathy     Pancreatitis     Rib fractures 2021    SVT (supraventricular tachycardia) 2021        PAST SURGICAL HISTORY  Past Surgical History:   Procedure Laterality Date    ANKLE SURGERY Right     APPENDECTOMY      CARDIAC CATHETERIZATION N/A 2021    Procedure: Left Heart Cath;  Surgeon: Vikram Gonzales MD;  Location:  [facility];  Service: Cardiovascular;  Laterality: N/A;    CARDIAC CATHETERIZATION N/A 7/15/2021    Procedure: LEFT HEART CATH;  Surgeon: Vikram Gonzales MD;  Location:  [facility];  Service: Cardiology;  Laterality: N/A;     SECTION      x2    CHOLECYSTECTOMY      COLONOSCOPY      COLONOSCOPY N/A 3/31/2020    Procedure: COLONOSCOPY;  Surgeon: Tirso Giles MD;  Location:  [facility];  Service: Gastroenterology;  Laterality: N/A;    COLONOSCOPY N/A 2021    Procedure: COLONOSCOPY;  Surgeon: Tyrese Rasmussen MD;  Location:  [facility];  Service: Gastroenterology;  Laterality: N/A;    CORONARY STENT PLACEMENT      ENDOSCOPY      ENDOSCOPY      ENDOSCOPY N/A 2021    Procedure: ESOPHAGOGASTRODUODENOSCOPY AT BEDSIDE;  Surgeon: Tyrese Rasmussen MD;  Location:  [facility];  Service: Gastroenterology;  Laterality: N/A;    ENDOSCOPY N/A 2021    Procedure: ESOPHAGOGASTRODUODENOSCOPY;  Surgeon: Tyrese Rasmussen MD;  Location:  [facility];  Service: Gastroenterology;  Laterality: N/A;    ENDOSCOPY N/A 2021    Procedure: ESOPHAGOGASTRODUODENOSCOPY;  Surgeon: Tyrese Rasmussen MD;  Location:  [facility];  Service: Gastroenterology;  Laterality: N/A;    ENDOSCOPY N/A 2022    Procedure: ESOPHAGOGASTRODUODENOSCOPY;  Surgeon: Tyrese Rasmussen MD;   Location: [facility];  Service: Gastroenterology;  Laterality: N/A;    REPLACEMENT TOTAL HIP LATERAL POSITION Left     TOTAL HIP ARTHROPLASTY Right     x2    TOTAL KNEE ARTHROPLASTY Left         MEDICATIONS:    Current Outpatient Medications:     acetaminophen (TYLENOL) 500 MG tablet, Take 1 tablet by mouth Every 6 (Six) Hours As Needed for Mild Pain ., Disp: , Rfl:     aspirin 81 MG EC tablet, Take 1 tablet by mouth Daily., Disp: , Rfl:     B Complex Vitamins (vitamin b complex) capsule capsule, Take 1 capsule by mouth Daily., Disp: , Rfl:     bisacodyl (DULCOLAX) 10 MG suppository, Insert 1 suppository into the rectum Daily As Needed for Constipation., Disp: 30 suppository, Rfl: 0    bumetanide (BUMEX) 2 MG tablet, Take 1 tablet by mouth Daily., Disp: 90 tablet, Rfl: 1    clopidogrel (PLAVIX) 75 MG tablet, Take 1 tablet by mouth Daily., Disp: 90 tablet, Rfl: 3    docusate sodium (Colace) 100 MG capsule, Take 1 capsule by mouth 2 (Two) Times a Day., Disp: 60 capsule, Rfl: 0    gabapentin (NEURONTIN) 400 MG capsule, Take 1 capsule by mouth 3 (Three) Times a Day., Disp: 90 capsule, Rfl: 5    glucagon (GLUCAGEN) 1 MG injection, , Disp: , Rfl:     hydrOXYzine pamoate (VISTARIL) 25 MG capsule, , Disp: , Rfl:     lactulose (CHRONULAC) 10 GM/15ML solution, Take 45 mL by mouth 3 (Three) Times a Day., Disp: 1892 mL, Rfl: 0    Lantus 100 UNIT/ML injection, , Disp: , Rfl:     Melatonin 10 MG tablet, Take 1 tablet by mouth Every Night., Disp: , Rfl:     metFORMIN (GLUCOPHAGE) 500 MG tablet, Take 1 tablet by mouth Daily., Disp: 30 tablet, Rfl: 11    Multivitamin tablet tablet, Daily., Disp: , Rfl:     NovoLOG FlexPen 100 UNIT/ML solution pen-injector sc pen, Inject 20 units under the skin before each meal plus sliding scale-3 units for every 50mg/dl over 150, MDD 90, Disp: , Rfl:     OLANZapine (zyPREXA) 10 MG tablet, Take 1 tablet by mouth Every Night., Disp: , Rfl:     ondansetron (Zofran) 4 MG tablet, Take 1 tablet by  mouth Every 8 (Eight) Hours As Needed for Nausea or Vomiting for up to 30 doses., Disp: 15 tablet, Rfl: 0    pantoprazole (PROTONIX) 40 MG EC tablet, Take 1 tablet by mouth 2 (Two) Times a Day Before Meals., Disp: 60 tablet, Rfl: 5    polyethylene glycol (MIRALAX) 17 g packet, Take 17 g by mouth 2 (Two) Times a Day. (Patient taking differently: Take 17 g by mouth Daily As Needed.), Disp: 60 packet, Rfl: 0    rosuvastatin (CRESTOR) 20 MG tablet, Take 1 tablet by mouth Every Night., Disp: 90 tablet, Rfl: 3    spironolactone (ALDACTONE) 100 MG tablet, Take 1 tablet by mouth Daily., Disp: 90 tablet, Rfl: 1    ursodiol (ACTIGALL) 300 MG capsule, Take 1 capsule by mouth 2 (Two) Times a Day., Disp: 180 capsule, Rfl: 1    vitamin B-6 (PYRIDOXINE) 25 MG tablet, Take 1 tablet by mouth Daily., Disp: , Rfl:     Xifaxan 550 MG tablet, Take 1 tablet by mouth Every 12 (Twelve) Hours., Disp: 180 tablet, Rfl: 3    FLUoxetine (PROzac) 40 MG capsule, Take 1 capsule by mouth Daily for 30 days., Disp: 30 capsule, Rfl: 0    lubiprostone (AMITIZA) 24 MCG capsule, Take 1 capsule by mouth 2 (Two) Times a Day. (Patient not taking: Reported on 8/30/2023), Disp: , Rfl:     magnesium oxide (MAGOX) 400 (241.3 Mg) MG tablet tablet, Take 400 mg by mouth Every Evening. (Patient not taking: Reported on 8/30/2023), Disp: , Rfl:     pregabalin (Lyrica) 50 MG capsule, Take 1 capsule by mouth 3 (Three) Times a Day., Disp: 90 capsule, Rfl: 5    ranolazine (RANEXA) 1000 MG 12 hr tablet, Take 1 tablet by mouth 2 (Two) Times a Day for 30 days., Disp: 60 tablet, Rfl: 0    traZODone (DESYREL) 50 MG tablet, Take 2 tablets by mouth Every Night for 30 days. Pt reports increase of trazodone to 100mg nightly, Disp: 60 tablet, Rfl: 0    ALLERGIES  Allergies   Allergen Reactions    Contrast Dye (Echo Or Unknown Ct/Mr) Anaphylaxis     6/18 Pt coded after receiving contrast for a CT scan. Was premedicated. Was having an MI at the same time. Immediately underwent heart  "cath with contrast without additional allergic reaction  7/15 Pt premedicated with prednisone/Benadryl for heart cath. Still with anaphylactic-like reaction with drop in BP and hypoxia. Treated 95% stenosis with minimal dye and supported with epinephrine, solumedrol, NRB    Haloperidol Hallucinations    Nsaids GI Bleeding     Other reaction(s): Bleeding, VOMITING       FAMILY HISTORY:  Family History   Problem Relation Age of Onset    Diabetes type II Mother     Breast cancer Mother     Heart disease Father     Liver disease Brother     Hypertension Brother     ADD / ADHD Child     Autism Child     Asperger's syndrome Child     Colon cancer Neg Hx     Colon polyps Neg Hx        SOCIAL HISTORY  Social History     Socioeconomic History    Marital status:    Tobacco Use    Smoking status: Former     Packs/day: 0.50     Types: Electronic Cigarette, Cigarettes     Quit date: 2021     Years since quittin.1    Smokeless tobacco: Never   Vaping Use    Vaping Use: Former   Substance and Sexual Activity    Alcohol use: Not Currently     Comment: SOBER 3 YEARS    Drug use: No    Sexual activity: Defer         PHYSICAL EXAM   /81 (BP Location: Left arm, Patient Position: Sitting, Cuff Size: Adult)   Pulse 115   Temp 98.2 øF (36.8 øC) (Temporal)   Ht 160 cm (63\")   Wt 110 kg (242 lb 6.4 oz)   BMI 42.94 kg/mý   Physical Exam  Vitals and nursing note reviewed.   Constitutional:       Appearance: Normal appearance. She is well-developed.   HENT:      Head: Normocephalic and atraumatic.      Nose: Nose normal.      Mouth/Throat:      Mouth: Mucous membranes are moist.      Pharynx: Oropharynx is clear.   Eyes:      Pupils: Pupils are equal, round, and reactive to light.   Cardiovascular:      Rate and Rhythm: Normal rate and regular rhythm.   Pulmonary:      Effort: Pulmonary effort is normal.      Breath sounds: Normal breath sounds. No wheezing or rales.   Abdominal:      General: Bowel sounds are " normal. There is distension.      Palpations: Abdomen is soft. There is no mass.      Tenderness: There is no abdominal tenderness in the epigastric area. There is no guarding or rebound.      Hernia: No hernia is present.   Musculoskeletal:         General: Normal range of motion.      Cervical back: Normal range of motion and neck supple.      Right lower le+      Left lower le+   Skin:     General: Skin is warm and dry.   Neurological:      Mental Status: She is alert and oriented to person, place, and time.      Cranial Nerves: No cranial nerve deficit.   Psychiatric:         Behavior: Behavior normal.         Judgment: Judgment normal.       ASSESSMENT / PLAN  Diagnoses and all orders for this visit:    1. Alcoholic cirrhosis of liver with ascites (Primary)  -     AFP Tumor Marker; Future  -     CBC & Differential; Future  -     Comprehensive Metabolic Panel; Future  -     Protime-INR; Future  -     US Abdomen Complete; Future  -     bumetanide (BUMEX) 2 MG tablet; Take 1 tablet by mouth Daily.  Dispense: 90 tablet; Refill: 1  -     spironolactone (ALDACTONE) 100 MG tablet; Take 1 tablet by mouth Daily.  Dispense: 90 tablet; Refill: 1  -     Comprehensive Metabolic Panel    2. Primary biliary cholangitis  -     ursodiol (ACTIGALL) 300 MG capsule; Take 1 capsule by mouth 2 (Two) Times a Day.  Dispense: 180 capsule; Refill: 1    3. Hepatic encephalopathy  -     Xifaxan 550 MG tablet; Take 1 tablet by mouth Every 12 (Twelve) Hours.  Dispense: 180 tablet; Refill: 3    4. Epigastric pain  -     Ambulatory referral for Screening EGD  -     pantoprazole (PROTONIX) 40 MG EC tablet; Take 1 tablet by mouth 2 (Two) Times a Day Before Meals.  Dispense: 60 tablet; Refill: 5    5. Melena  -     Ambulatory referral for Screening EGD    -2 g sodium diet  - High-protein diet with high-protein bedtime snack; avoid fasting  - Avoid NSAIDs  - Tylenol (acetaminophen) up to 2 g/day is okay  - Reiterated need for complete  alcohol abstinence  - Advised patient of no driving  -Resume diuretics as above and recheck CMP in 4 to 5 days     Return for Follow up after procedures.    I discussed the patients findings and my recommendations with patient    ISSAC Garcia

## 2023-08-31 LAB
ALPHA-FETOPROTEIN: 5.68 NG/ML (ref 0–8.3)
ANISOCYTOSIS BLD QL: ABNORMAL
BASOPHILS # BLD MANUAL: 0.1 10*3/MM3 (ref 0–0.2)
BASOPHILS NFR BLD MANUAL: 1 % (ref 0–1.5)
EOSINOPHIL # BLD MANUAL: 0.1 10*3/MM3 (ref 0–0.4)
EOSINOPHIL NFR BLD MANUAL: 1 % (ref 0.3–6.2)
LYMPHOCYTES # BLD MANUAL: 1.34 10*3/MM3 (ref 0.7–3.1)
LYMPHOCYTES NFR BLD MANUAL: 7 % (ref 5–12)
MICROCYTES BLD QL: ABNORMAL
MONOCYTES # BLD: 0.67 10*3/MM3 (ref 0.1–0.9)
NEUTROPHILS # BLD AUTO: 7.35 10*3/MM3 (ref 1.7–7)
NEUTROPHILS NFR BLD MANUAL: 77 % (ref 42.7–76)
PLAT MORPH BLD: NORMAL
POIKILOCYTOSIS BLD QL SMEAR: ABNORMAL
POLYCHROMASIA BLD QL SMEAR: ABNORMAL
VARIANT LYMPHS NFR BLD MANUAL: 14 % (ref 19.6–45.3)
WBC MORPH BLD: NORMAL

## 2023-08-31 RX ORDER — PANTOPRAZOLE SODIUM 40 MG/1
40 TABLET, DELAYED RELEASE ORAL
Qty: 60 TABLET | Refills: 5
Start: 2023-08-31

## 2023-09-06 ENCOUNTER — OUTSIDE FACILITY SERVICE (OUTPATIENT)
Dept: GASTROENTEROLOGY | Facility: CLINIC | Age: 48
End: 2023-09-06
Payer: MEDICARE

## 2023-09-06 PROCEDURE — 43235 EGD DIAGNOSTIC BRUSH WASH: CPT | Performed by: INTERNAL MEDICINE

## 2023-09-28 ENCOUNTER — OFFICE VISIT (OUTPATIENT)
Dept: FAMILY MEDICINE CLINIC | Facility: CLINIC | Age: 48
End: 2023-09-28
Payer: MEDICARE

## 2023-09-28 VITALS
DIASTOLIC BLOOD PRESSURE: 72 MMHG | WEIGHT: 226.8 LBS | HEART RATE: 100 BPM | HEIGHT: 62 IN | OXYGEN SATURATION: 99 % | SYSTOLIC BLOOD PRESSURE: 104 MMHG | BODY MASS INDEX: 41.73 KG/M2

## 2023-09-28 DIAGNOSIS — G62.9 PERIPHERAL POLYNEUROPATHY: ICD-10-CM

## 2023-09-28 DIAGNOSIS — Z79.4 TYPE 2 DIABETES MELLITUS WITH HYPERGLYCEMIA, WITH LONG-TERM CURRENT USE OF INSULIN: Primary | ICD-10-CM

## 2023-09-28 DIAGNOSIS — K70.30 ALCOHOLIC CIRRHOSIS OF LIVER WITHOUT ASCITES: ICD-10-CM

## 2023-09-28 DIAGNOSIS — G60.9 IDIOPATHIC PERIPHERAL NEUROPATHY: ICD-10-CM

## 2023-09-28 DIAGNOSIS — F11.20 OPIOID DEPENDENCE, UNCOMPLICATED: ICD-10-CM

## 2023-09-28 DIAGNOSIS — E11.65 TYPE 2 DIABETES MELLITUS WITH HYPERGLYCEMIA, WITH LONG-TERM CURRENT USE OF INSULIN: Primary | ICD-10-CM

## 2023-09-28 DIAGNOSIS — Z51.81 THERAPEUTIC DRUG MONITORING: ICD-10-CM

## 2023-09-28 DIAGNOSIS — F33.1 MAJOR DEPRESSIVE DISORDER, RECURRENT, MODERATE: ICD-10-CM

## 2023-09-28 PROBLEM — K22.70 BARRETT'S ESOPHAGUS: Status: ACTIVE | Noted: 2023-05-18

## 2023-09-28 PROBLEM — K86.0 ALCOHOL-INDUCED CHRONIC PANCREATITIS: Status: ACTIVE | Noted: 2022-02-02

## 2023-09-28 PROBLEM — K74.3 PRIMARY BILIARY CIRRHOSIS: Status: ACTIVE | Noted: 2021-01-19

## 2023-09-28 PROBLEM — F43.10 POSTTRAUMATIC STRESS DISORDER: Status: ACTIVE | Noted: 2022-02-02

## 2023-09-28 PROBLEM — K86.1 CHRONIC PANCREATITIS: Status: ACTIVE | Noted: 2022-02-02

## 2023-09-28 PROBLEM — M87.9: Status: ACTIVE | Noted: 2022-02-02

## 2023-09-28 PROBLEM — K76.6 PORTAL HYPERTENSION: Status: ACTIVE | Noted: 2022-02-02

## 2023-09-28 PROBLEM — N20.0 RENAL STONE: Status: ACTIVE | Noted: 2023-05-18

## 2023-09-28 PROBLEM — R00.0 TACHYCARDIA: Status: ACTIVE | Noted: 2020-09-17

## 2023-09-28 PROBLEM — K74.00 HEPATIC FIBROSIS: Status: ACTIVE | Noted: 2022-02-02

## 2023-09-28 PROBLEM — K76.0 STEATOSIS OF LIVER: Status: ACTIVE | Noted: 2022-02-02

## 2023-09-28 PROBLEM — K21.9 GASTROESOPHAGEAL REFLUX DISEASE: Status: ACTIVE | Noted: 2023-05-18

## 2023-09-28 PROBLEM — F10.939 ALCOHOL WITHDRAWAL: Status: ACTIVE | Noted: 2023-09-28

## 2023-09-28 PROBLEM — G25.81 RESTLESS LEGS SYNDROME: Status: ACTIVE | Noted: 2022-02-02

## 2023-09-28 PROBLEM — K83.8 COMMON BILE DUCT DILATATION: Status: ACTIVE | Noted: 2023-09-28

## 2023-09-28 PROBLEM — M16.12 OSTEOARTHRITIS OF LEFT HIP: Status: ACTIVE | Noted: 2017-01-11

## 2023-09-28 PROBLEM — R79.89 ABNORMAL LFTS: Status: ACTIVE | Noted: 2023-09-28

## 2023-09-28 PROBLEM — R10.9 ABDOMINAL PAIN: Status: ACTIVE | Noted: 2023-09-28

## 2023-09-28 PROBLEM — F31.9 BIPOLAR DISORDER, UNSPECIFIED: Status: ACTIVE | Noted: 2022-02-02

## 2023-09-28 PROBLEM — F10.10 ALCOHOL ABUSE: Status: ACTIVE | Noted: 2020-09-17

## 2023-09-28 PROBLEM — M75.20 BICEPS TENDINITIS: Status: ACTIVE | Noted: 2017-01-11

## 2023-09-28 PROBLEM — N18.2 CHRONIC KIDNEY DISEASE, STAGE 2 (MILD): Status: ACTIVE | Noted: 2022-02-02

## 2023-09-28 PROBLEM — T42.6X1A AMBIEN ACCIDENTAL OVERDOSE: Status: ACTIVE | Noted: 2023-09-28

## 2023-09-28 PROBLEM — M35.9 SYSTEMIC INVOLVEMENT OF CONNECTIVE TISSUE, UNSPECIFIED: Status: ACTIVE | Noted: 2022-02-02

## 2023-09-28 PROBLEM — I85.00 ESOPHAGEAL VARICES WITHOUT BLEEDING: Status: ACTIVE | Noted: 2022-02-02

## 2023-09-28 PROBLEM — T50.901A OVERDOSE: Status: ACTIVE | Noted: 2023-09-28

## 2023-09-28 LAB
EXPIRATION DATE: NORMAL
Lab: NORMAL
MDMA UR QL SCN: NEGATIVE
POC AMPHETAMINES: NEGATIVE
POC BARBITURATES: NEGATIVE
POC BENZODIAZEPHINES: NEGATIVE
POC COCAINE: NEGATIVE
POC METHADONE: NEGATIVE
POC METHAMPHETAMINE SCREEN URINE: NEGATIVE
POC MICROALBUMIN URINE: 20
POC OPIATES: NEGATIVE
POC OXYCODONE: NEGATIVE
POC PHENCYCLIDINE: NEGATIVE
POC TRICYCLIC ANTIDEPRESSANTS: NEGATIVE

## 2023-09-28 RX ORDER — OLANZAPINE 10 MG/1
10 TABLET ORAL NIGHTLY
Qty: 90 TABLET | Refills: 1
Start: 2023-09-28

## 2023-09-28 RX ORDER — FLUOXETINE HYDROCHLORIDE 20 MG/1
20 CAPSULE ORAL DAILY
Qty: 90 CAPSULE | Refills: 1 | Status: SHIPPED | OUTPATIENT
Start: 2023-09-28

## 2023-09-28 RX ORDER — HYDROXYZINE PAMOATE 25 MG/1
25 CAPSULE ORAL 3 TIMES DAILY PRN
Qty: 90 CAPSULE | Refills: 3 | Status: SHIPPED | OUTPATIENT
Start: 2023-09-28

## 2023-09-28 RX ORDER — TRAZODONE HYDROCHLORIDE 50 MG/1
100 TABLET ORAL NIGHTLY
Qty: 60 TABLET | Refills: 5
Start: 2023-09-28

## 2023-09-28 RX ORDER — ONDANSETRON 4 MG/1
4 TABLET, FILM COATED ORAL EVERY 8 HOURS PRN
Qty: 20 TABLET | Refills: 0 | Status: SHIPPED | OUTPATIENT
Start: 2023-09-28

## 2023-09-28 NOTE — PROGRESS NOTES
"Chief Complaint  Establish Care    Subjective          Timothy Guzman presents to [facility]  History of Present Illness patient is here to establish care, she is moving her PCP to [facility] where her other doctors are.  She has a long history of alcohol abuse resulting in cirrhosis of the liver, chronic pancreatitis, esophageal varices, She also has type 2 dm, CAD with HX of STEMI, CKD, depression, anxiety, she has previous had opioid dependence.  She says she has been to rehab and had been sober for several months, she relapsed in June with alcohol only and ended back in the hospital.  She has not had alcohol or \"pills\" since mid June.  She is not interested in taking anything like naloxone.   She has been out os some of her medications and would like to get refills of them.      Objective   Vital Signs:   /72 (BP Location: Right arm)   Pulse 100   Ht 157.5 cm (62\")   Wt 103 kg (226 lb 12.8 oz)   SpO2 99%   BMI 41.48 kg/m²     Body mass index is 41.48 kg/m².    Review of Systems   Constitutional:  Negative for chills, fatigue and fever.   HENT: Negative.     Respiratory: Negative.  Negative for cough, shortness of breath and wheezing.    Cardiovascular: Negative.  Negative for chest pain and palpitations.   Gastrointestinal:  Positive for nausea. Negative for abdominal pain and diarrhea.   Musculoskeletal:  Negative for back pain and myalgias.   Neurological:  Negative for dizziness and headache.        Burning pain of both feet due to neuropathy   Psychiatric/Behavioral:  Negative for depressed mood. The patient is not nervous/anxious.      Past History:  Medical History: has a past medical history of Alcoholism, Anaphylaxis, Arthritis, Bone necrosis, CAD (coronary artery disease) (07/13/2021), Cardiac arrest, Cirrhosis of liver (07/13/2021), Gastroparesis, GERD (gastroesophageal reflux disease), History of esophageal varices, History of kidney stones, Hypertension, " Memory loss, Neuropathy, Pancreatitis, Rib fractures (2021), SVT (supraventricular tachycardia) (2021), and Type 2 diabetes mellitus with hyperglycemia, with long-term current use of insulin (2023).   Surgical History: has a past surgical history that includes Replacement total hip lateral position (Left); Total knee arthroplasty (Left); Appendectomy;  section; Esophagogastroduodenoscopy; Colonoscopy; Cholecystectomy; Colonoscopy (N/A, 3/31/2020); Esophagogastroduodenoscopy; Esophagogastroduodenoscopy (N/A, 2021); Cardiac catheterization (N/A, 2021); Cardiac catheterization (N/A, 7/15/2021); Coronary stent placement; Esophagogastroduodenoscopy (N/A, 2021); Colonoscopy (N/A, 2021); Esophagogastroduodenoscopy (N/A, 2021); Total hip arthroplasty (Right); Ankle surgery (Right); and Esophagogastroduodenoscopy (N/A, 2022).   Family History: family history includes ADD / ADHD in her child; Asperger's syndrome in her child; Autism in her child; Breast cancer in her mother; Diabetes type II in her mother; Heart disease in her father; Hypertension in her brother; Liver disease in her brother.   Social History: reports that she has been smoking electronic cigarette and cigarettes. She has a 12.50 pack-year smoking history. She has never used smokeless tobacco. She reports that she does not currently use alcohol. She reports that she does not use drugs.      Current Outpatient Medications:     acetaminophen (TYLENOL) 500 MG tablet, Take 1 tablet by mouth Every 6 (Six) Hours As Needed for Mild Pain ., Disp: , Rfl:     bumetanide (BUMEX) 2 MG tablet, Take 1 tablet by mouth Daily., Disp: 90 tablet, Rfl: 1    clopidogrel (PLAVIX) 75 MG tablet, Take 1 tablet by mouth Daily., Disp: 90 tablet, Rfl: 3    FLUoxetine (PROzac) 20 MG capsule, Take 1 capsule by mouth Daily., Disp: 90 capsule, Rfl: 1    hydrOXYzine pamoate (VISTARIL) 25 MG capsule, Take 1 capsule by mouth 3 (Three) Times a  Day As Needed for Itching., Disp: 90 capsule, Rfl: 3    lactulose (CHRONULAC) 10 GM/15ML solution, Take 45 mL by mouth 3 (Three) Times a Day., Disp: 1892 mL, Rfl: 0    Lantus 100 UNIT/ML injection, , Disp: , Rfl:     metFORMIN (GLUCOPHAGE) 500 MG tablet, Take 1 tablet by mouth 2 (Two) Times a Day With Meals., Disp: 180 tablet, Rfl: 1    NovoLOG FlexPen 100 UNIT/ML solution pen-injector sc pen, Inject 20 units under the skin before each meal plus sliding scale-3 units for every 50mg/dl over 150, MDD 90, Disp: , Rfl:     OLANZapine (zyPREXA) 10 MG tablet, Take 1 tablet by mouth Every Night., Disp: 90 tablet, Rfl: 1    ondansetron (Zofran) 4 MG tablet, Take 1 tablet by mouth Every 8 (Eight) Hours As Needed for Nausea or Vomiting for up to 30 doses., Disp: 20 tablet, Rfl: 0    pantoprazole (PROTONIX) 40 MG EC tablet, Take 1 tablet by mouth 2 (Two) Times a Day Before Meals., Disp: 60 tablet, Rfl: 5    spironolactone (ALDACTONE) 100 MG tablet, Take 1 tablet by mouth Daily., Disp: 90 tablet, Rfl: 1    traZODone (DESYREL) 50 MG tablet, Take 2 tablets by mouth Every Night. Pt reports increase of trazodone to 100mg nightly, Disp: 60 tablet, Rfl: 5    ursodiol (ACTIGALL) 300 MG capsule, Take 1 capsule by mouth 2 (Two) Times a Day., Disp: 180 capsule, Rfl: 1    Xifaxan 550 MG tablet, Take 1 tablet by mouth Every 12 (Twelve) Hours., Disp: 180 tablet, Rfl: 3    pregabalin (Lyrica) 50 MG capsule, Take 1 capsule by mouth 3 (Three) Times a Day., Disp: 90 capsule, Rfl: 5    Allergies: Contrast dye (echo or unknown ct/mr), Haloperidol, and Nsaids    Physical Exam  Constitutional:       Appearance: Normal appearance.   HENT:      Head: Normocephalic and atraumatic.   Eyes:      Extraocular Movements: Extraocular movements intact.      Pupils: Pupils are equal, round, and reactive to light.   Cardiovascular:      Rate and Rhythm: Normal rate and regular rhythm.      Heart sounds: Normal heart sounds.   Pulmonary:      Effort: Pulmonary  effort is normal.      Breath sounds: Normal breath sounds.   Abdominal:      Palpations: Abdomen is soft. There is no mass.      Tenderness: There is abdominal tenderness. There is no right CVA tenderness, left CVA tenderness, guarding or rebound.      Hernia: No hernia is present.   Musculoskeletal:      Cervical back: Normal range of motion.   Skin:     General: Skin is dry.   Neurological:      Mental Status: She is alert and oriented to person, place, and time.   Psychiatric:         Mood and Affect: Mood normal.         Behavior: Behavior normal.        Result Review :                   Assessment and Plan    Diagnoses and all orders for this visit:    1. Type 2 diabetes mellitus with hyperglycemia, with long-term current use of insulin (Primary)  Assessment & Plan:  Routine blood work is being done today, we will continue her current meds.  Further recommendations will depend upon lab results.     Orders:  -     Comprehensive Metabolic Panel; Future  -     Hemoglobin A1c; Future  -     POC Microalbumin    2. Alcoholic cirrhosis of liver without ascites  Assessment & Plan:  She is seeing GI for management of this.       3. Therapeutic drug monitoring  -     POC Urine Drug Screen, Triage    4. Major depressive disorder, recurrent, moderate  Assessment & Plan:  Patient's depression is recurrent and is moderate without psychosis. Their depression is currently in partial remission and the condition is improving with treatment. This will be reassessed at the next regular appointment. F/U as described:patient will continue current medication therapy.      5. Idiopathic peripheral neuropathy  Assessment & Plan:  Patient had been doing well on Lyrica, she has run out of her script.  She is requesting that we as her PCP assume writing for this, her UDS was normal and we have had a long discussion about proper use of this medication.  She understands the risks associated with use of controlled substance and she  "understands the importance of refraining from alcohol use while taking this medication.  I am recommending we refill her Lyrica, mundo MARCANO has been reviewed and she has signed a controlled substance agreement.       6. Opioid dependence, uncomplicated  Assessment & Plan:  Patient states she has been \"sober\" for several months in regards to the opioids.        Other orders  -     FLUoxetine (PROzac) 20 MG capsule; Take 1 capsule by mouth Daily.  Dispense: 90 capsule; Refill: 1  -     hydrOXYzine pamoate (VISTARIL) 25 MG capsule; Take 1 capsule by mouth 3 (Three) Times a Day As Needed for Itching.  Dispense: 90 capsule; Refill: 3  -     metFORMIN (GLUCOPHAGE) 500 MG tablet; Take 1 tablet by mouth 2 (Two) Times a Day With Meals.  Dispense: 180 tablet; Refill: 1  -     OLANZapine (zyPREXA) 10 MG tablet; Take 1 tablet by mouth Every Night.  Dispense: 90 tablet; Refill: 1  -     ondansetron (Zofran) 4 MG tablet; Take 1 tablet by mouth Every 8 (Eight) Hours As Needed for Nausea or Vomiting for up to 30 doses.  Dispense: 20 tablet; Refill: 0  -     traZODone (DESYREL) 50 MG tablet; Take 2 tablets by mouth Every Night. Pt reports increase of trazodone to 100mg nightly  Dispense: 60 tablet; Refill: 5        Follow Up   Return in about 4 weeks (around 10/26/2023) for Recheck.  Patient was given instructions and counseling regarding her condition or for health maintenance advice. Please see specific information pulled into the AVS if appropriate.     Cori Barnett PA-C  "

## 2023-09-28 NOTE — ASSESSMENT & PLAN NOTE
Routine blood work is being done today, we will continue her current meds.  Further recommendations will depend upon lab results.

## 2023-09-28 NOTE — Clinical Note
Because I already signed the chart it will not allow me to actually pend the medication to you, she needs her LYRICA sent to [facility].

## 2023-09-28 NOTE — ASSESSMENT & PLAN NOTE
Patient had been doing well on Lyrica, she has run out of her script.  She is requesting that we as her PCP assume writing for this, her UDS was normal and we have had a long discussion about proper use of this medication.  She understands the risks associated with use of controlled substance and she understands the importance of refraining from alcohol use while taking this medication.  I am recommending we refill her Lyrica, mundo MARCANO has been reviewed and she has signed a controlled substance agreement.

## 2023-09-29 ENCOUNTER — TELEPHONE (OUTPATIENT)
Dept: FAMILY MEDICINE CLINIC | Facility: CLINIC | Age: 48
End: 2023-09-29

## 2023-09-29 ENCOUNTER — TELEPHONE (OUTPATIENT)
Dept: FAMILY MEDICINE CLINIC | Facility: CLINIC | Age: 48
End: 2023-09-29
Payer: MEDICARE

## 2023-09-29 RX ORDER — PREGABALIN 50 MG/1
50 CAPSULE ORAL 3 TIMES DAILY
Qty: 90 CAPSULE | Refills: 5 | Status: SHIPPED | OUTPATIENT
Start: 2023-09-29 | End: 2024-09-28

## 2023-09-29 NOTE — TELEPHONE ENCOUNTER
Patient called in stating she was supposed to get a prescription sent in for Lyrica and her pharmacy has not received anything.

## 2023-09-30 LAB
ALBUMIN SERPL-MCNC: 3.4 G/DL (ref 3.9–4.9)
ALBUMIN/GLOB SERPL: 1.1 {RATIO} (ref 1.2–2.2)
ALP SERPL-CCNC: 227 IU/L (ref 44–121)
ALT SERPL-CCNC: 20 IU/L (ref 0–32)
AST SERPL-CCNC: 53 IU/L (ref 0–40)
BILIRUB SERPL-MCNC: 0.7 MG/DL (ref 0–1.2)
BUN SERPL-MCNC: 7 MG/DL (ref 6–24)
BUN/CREAT SERPL: 9 (ref 9–23)
CALCIUM SERPL-MCNC: 8.6 MG/DL (ref 8.7–10.2)
CHLORIDE SERPL-SCNC: 104 MMOL/L (ref 96–106)
CO2 SERPL-SCNC: 19 MMOL/L (ref 20–29)
CREAT SERPL-MCNC: 0.8 MG/DL (ref 0.57–1)
EGFRCR SERPLBLD CKD-EPI 2021: 91 ML/MIN/1.73
GLOBULIN SER CALC-MCNC: 3 G/DL (ref 1.5–4.5)
GLUCOSE SERPL-MCNC: 209 MG/DL (ref 70–99)
HBA1C MFR BLD: 6.3 % (ref 4.8–5.6)
POTASSIUM SERPL-SCNC: 2.8 MMOL/L (ref 3.5–5.2)
PROT SERPL-MCNC: 6.4 G/DL (ref 6–8.5)
SODIUM SERPL-SCNC: 141 MMOL/L (ref 134–144)

## 2023-10-02 RX ORDER — POTASSIUM CHLORIDE 1500 MG/1
20 TABLET, EXTENDED RELEASE ORAL DAILY
Qty: 60 TABLET | Refills: 5 | Status: SHIPPED | OUTPATIENT
Start: 2023-10-02

## 2023-10-16 ENCOUNTER — HOSPITAL ENCOUNTER (OUTPATIENT)
Dept: ULTRASOUND IMAGING | Facility: HOSPITAL | Age: 48
Discharge: HOME | End: 2023-10-16
Admitting: NURSE PRACTITIONER
Payer: MEDICARE

## 2023-10-16 DIAGNOSIS — K70.31 ALCOHOLIC CIRRHOSIS OF LIVER WITH ASCITES: ICD-10-CM

## 2023-10-16 PROCEDURE — 76700 US EXAM ABDOM COMPLETE: CPT

## 2023-10-26 ENCOUNTER — OFFICE VISIT (OUTPATIENT)
Dept: FAMILY MEDICINE CLINIC | Facility: CLINIC | Age: 48
End: 2023-10-26
Payer: MEDICARE

## 2023-10-26 VITALS
HEIGHT: 62 IN | OXYGEN SATURATION: 98 % | DIASTOLIC BLOOD PRESSURE: 82 MMHG | WEIGHT: 229.5 LBS | HEART RATE: 93 BPM | SYSTOLIC BLOOD PRESSURE: 123 MMHG | BODY MASS INDEX: 42.23 KG/M2

## 2023-10-26 DIAGNOSIS — G25.81 RESTLESS LEGS SYNDROME: ICD-10-CM

## 2023-10-26 DIAGNOSIS — G62.9 PERIPHERAL POLYNEUROPATHY: Primary | ICD-10-CM

## 2023-10-26 DIAGNOSIS — F31.75 BIPOLAR DISORDER, IN PARTIAL REMISSION, MOST RECENT EPISODE DEPRESSED: ICD-10-CM

## 2023-10-26 DIAGNOSIS — E87.6 HYPOKALEMIA: ICD-10-CM

## 2023-10-26 PROCEDURE — 1159F MED LIST DOCD IN RCRD: CPT | Performed by: PHYSICIAN ASSISTANT

## 2023-10-26 PROCEDURE — 3044F HG A1C LEVEL LT 7.0%: CPT | Performed by: PHYSICIAN ASSISTANT

## 2023-10-26 PROCEDURE — 99214 OFFICE O/P EST MOD 30 MIN: CPT | Performed by: PHYSICIAN ASSISTANT

## 2023-10-26 PROCEDURE — 1160F RVW MEDS BY RX/DR IN RCRD: CPT | Performed by: PHYSICIAN ASSISTANT

## 2023-10-26 RX ORDER — PREGABALIN 75 MG/1
75 CAPSULE ORAL 3 TIMES DAILY
Qty: 90 CAPSULE | Refills: 5 | Status: SHIPPED | OUTPATIENT
Start: 2023-10-26 | End: 2024-10-25

## 2023-10-26 RX ORDER — TRAZODONE HYDROCHLORIDE 50 MG/1
100 TABLET ORAL NIGHTLY
Qty: 120 TABLET | Refills: 3 | Status: SHIPPED | OUTPATIENT
Start: 2023-10-26

## 2023-10-26 RX ORDER — PREGABALIN 75 MG/1
75 CAPSULE ORAL 3 TIMES DAILY
Qty: 90 CAPSULE | Refills: 5 | Status: SHIPPED | OUTPATIENT
Start: 2023-10-26 | End: 2023-10-26 | Stop reason: SDUPTHER

## 2023-10-26 RX ORDER — OLANZAPINE 10 MG/1
10 TABLET ORAL NIGHTLY
Qty: 90 TABLET | Refills: 3 | Status: SHIPPED | OUTPATIENT
Start: 2023-10-26

## 2023-10-26 NOTE — ASSESSMENT & PLAN NOTE
Psychological condition is unchanged.  Continue current treatment regimen.  Psychological condition  will be reassessed  by a mental health specialist and a referral has been done .

## 2023-10-26 NOTE — ASSESSMENT & PLAN NOTE
>>ASSESSMENT AND PLAN FOR PERIPHERAL POLYNEUROPATHY WRITTEN ON 10/26/2023  2:08 PM BY MEANS, SONIA ERIC    She has had some improvement with the Lyrica, I am recommending we increase her dose to 75mg tid.  She has signed a controlled agreement and a JEET report has been done.     >>ASSESSMENT AND PLAN FOR IDIOPATHIC PERIPHERAL NEUROPATHY WRITTEN ON 10/26/2023  2:09 PM BY MEANS, SONIA ERIC

## 2023-10-26 NOTE — PROGRESS NOTES
"Chief Complaint  Manic Behavior, Insomnia, and Peripheral Neuropathy (Pt needs a refill on her pain med)    Subjective          Timothy Guzman presents to [facility]  Insomnia  Pertinent negatives include no chest pain, chills, coughing, fatigue, fever or myalgias.   Peripheral Neuropathy  Pertinent negatives include no chest pain, chills, coughing, fatigue, fever or myalgias.     patient is here today for a refill of her Lyrica which she takes for RLS and Peripheral neuropathy, she says the Lyrica is working better than the gabapentin and she is tolerating it better, but she has breakthrough pain in the middle of the night and is wondering is she could increase her dose.   She also would like a referral to a mental health specialist for her bipolar depression.  On her recent blood work her potassium was low and she has been taking the potassium supplement so that will need to be repeated.   She has continued to refrain from drinking any alcohol.      Objective   Vital Signs:   /82 (BP Location: Right arm)   Pulse 93   Ht 157.5 cm (62\")   Wt 104 kg (229 lb 8 oz)   SpO2 98%   BMI 41.98 kg/m²     Body mass index is 41.98 kg/m².    Review of Systems   Constitutional:  Negative for chills, fatigue and fever.   Respiratory:  Negative for cough and shortness of breath.    Cardiovascular:  Negative for chest pain and palpitations.   Musculoskeletal:  Negative for back pain and myalgias.   Neurological:  Negative for dizziness and headache.   Psychiatric/Behavioral:  Positive for sleep disturbance and depressed mood. Negative for suicidal ideas. The patient has insomnia. The patient is not nervous/anxious.        Past History:  Medical History: has a past medical history of Alcoholism, Anaphylaxis, Arthritis, Bone necrosis, CAD (coronary artery disease) (07/13/2021), Cardiac arrest, Cirrhosis of liver (07/13/2021), Gastroparesis, GERD (gastroesophageal reflux disease), History of " esophageal varices, History of kidney stones, Hypertension, Memory loss, Neuropathy, Pancreatitis, Rib fractures (2021), SVT (supraventricular tachycardia) (2021), and Type 2 diabetes mellitus with hyperglycemia, with long-term current use of insulin (2023).   Surgical History: has a past surgical history that includes Replacement total hip lateral position (Left); Total knee arthroplasty (Left); Appendectomy;  section; Esophagogastroduodenoscopy; Colonoscopy; Cholecystectomy; Colonoscopy (N/A, 3/31/2020); Esophagogastroduodenoscopy; Esophagogastroduodenoscopy (N/A, 2021); Cardiac catheterization (N/A, 2021); Cardiac catheterization (N/A, 7/15/2021); Coronary stent placement; Esophagogastroduodenoscopy (N/A, 2021); Colonoscopy (N/A, 2021); Esophagogastroduodenoscopy (N/A, 2021); Total hip arthroplasty (Right); Ankle surgery (Right); and Esophagogastroduodenoscopy (N/A, 2022).   Family History: family history includes ADD / ADHD in her child; Asperger's syndrome in her child; Autism in her child; Breast cancer in her mother; Diabetes type II in her mother; Heart disease in her father; Hypertension in her brother; Liver disease in her brother.   Social History: reports that she has been smoking electronic cigarette and cigarettes. She has a 12.50 pack-year smoking history. She has never used smokeless tobacco. She reports that she does not currently use alcohol. She reports that she does not use drugs.      Current Outpatient Medications:     acetaminophen (TYLENOL) 500 MG tablet, Take 1 tablet by mouth Every 6 (Six) Hours As Needed for Mild Pain ., Disp: , Rfl:     bumetanide (BUMEX) 2 MG tablet, Take 1 tablet by mouth Daily., Disp: 90 tablet, Rfl: 1    clopidogrel (PLAVIX) 75 MG tablet, Take 1 tablet by mouth Daily., Disp: 90 tablet, Rfl: 3    FLUoxetine (PROzac) 20 MG capsule, Take 1 capsule by mouth Daily., Disp: 90 capsule, Rfl: 1    hydrOXYzine pamoate  (VISTARIL) 25 MG capsule, Take 1 capsule by mouth 3 (Three) Times a Day As Needed for Itching., Disp: 90 capsule, Rfl: 3    lactulose (CHRONULAC) 10 GM/15ML solution, Take 45 mL by mouth 3 (Three) Times a Day., Disp: 1892 mL, Rfl: 0    Lantus 100 UNIT/ML injection, , Disp: , Rfl:     metFORMIN (GLUCOPHAGE) 500 MG tablet, Take 1 tablet by mouth 2 (Two) Times a Day With Meals., Disp: 180 tablet, Rfl: 1    NovoLOG FlexPen 100 UNIT/ML solution pen-injector sc pen, Inject 20 units under the skin before each meal plus sliding scale-3 units for every 50mg/dl over 150, MDD 90, Disp: , Rfl:     OLANZapine (zyPREXA) 10 MG tablet, Take 1 tablet by mouth Every Night., Disp: 90 tablet, Rfl: 3    ondansetron (Zofran) 4 MG tablet, Take 1 tablet by mouth Every 8 (Eight) Hours As Needed for Nausea or Vomiting for up to 30 doses., Disp: 20 tablet, Rfl: 0    pantoprazole (PROTONIX) 40 MG EC tablet, Take 1 tablet by mouth 2 (Two) Times a Day Before Meals., Disp: 60 tablet, Rfl: 5    potassium chloride ER (K-TAB) 20 MEQ tablet controlled-release ER tablet, Take 1 tablet by mouth Daily., Disp: 60 tablet, Rfl: 5    pregabalin (Lyrica) 75 MG capsule, Take 1 capsule by mouth 3 (Three) Times a Day., Disp: 90 capsule, Rfl: 5    spironolactone (ALDACTONE) 100 MG tablet, Take 1 tablet by mouth Daily., Disp: 90 tablet, Rfl: 1    traZODone (DESYREL) 50 MG tablet, Take 2 tablets by mouth Every Night. Pt reports increase of trazodone to 100mg nightly, Disp: 120 tablet, Rfl: 3    ursodiol (ACTIGALL) 300 MG capsule, Take 1 capsule by mouth 2 (Two) Times a Day., Disp: 180 capsule, Rfl: 1    Xifaxan 550 MG tablet, Take 1 tablet by mouth Every 12 (Twelve) Hours., Disp: 180 tablet, Rfl: 3    Allergies: Contrast dye (echo or unknown ct/mr), Haloperidol, and Nsaids    Physical Exam  Constitutional:       Appearance: She is obese.   Neurological:      Mental Status: She is alert and oriented to person, place, and time.   Psychiatric:         Attention and  Perception: Attention normal.         Mood and Affect: Affect is flat.         Speech: Speech normal.         Behavior: Behavior normal. Behavior is cooperative.         Thought Content: Thought content normal.         Cognition and Memory: Cognition normal.         Judgment: Judgment normal.          Result Review :                   Assessment and Plan    Diagnoses and all orders for this visit:    1. Peripheral polyneuropathy (Primary)  Assessment & Plan:  >>ASSESSMENT AND PLAN FOR PERIPHERAL POLYNEUROPATHY WRITTEN ON 10/26/2023  2:08 PM BY JEANETH ZENG PA-C    She has had some improvement with the Lyrica, I am recommending we increase her dose to 75mg tid.  She has signed a controlled agreement and a JEET report has been done.     >>ASSESSMENT AND PLAN FOR IDIOPATHIC PERIPHERAL NEUROPATHY WRITTEN ON 10/26/2023  2:09 PM BY JEANETH ZENG PA-C         Orders:  -     pregabalin (Lyrica) 75 MG capsule; Take 1 capsule by mouth 3 (Three) Times a Day.  Dispense: 90 capsule; Refill: 5    2. Restless legs syndrome  Assessment & Plan:   See plan above.       3. Bipolar disorder, in partial remission, most recent episode depressed  Assessment & Plan:  Psychological condition is unchanged.  Continue current treatment regimen.  Psychological condition  will be reassessed  by a mental health specialist and a referral has been done .    Orders:  -     Ambulatory Referral to Behavioral Health    4. Hypokalemia  Assessment & Plan:  Will check BMP, she is taking the potassium supplement daily now.     Orders:  -     Basic Metabolic Panel; Future  -     Basic Metabolic Panel    Other orders  -     OLANZapine (zyPREXA) 10 MG tablet; Take 1 tablet by mouth Every Night.  Dispense: 90 tablet; Refill: 3  -     traZODone (DESYREL) 50 MG tablet; Take 2 tablets by mouth Every Night. Pt reports increase of trazodone to 100mg nightly  Dispense: 120 tablet; Refill: 3        Follow Up   Return in about 6 months (around 4/26/2024) for  Recheck.  Patient was given instructions and counseling regarding her condition or for health maintenance advice. Please see specific information pulled into the AVS if appropriate.     Cori Barnett PA-C

## 2023-10-27 ENCOUNTER — LAB (OUTPATIENT)
Dept: LAB | Facility: HOSPITAL | Age: 48
End: 2023-10-27
Payer: MEDICARE

## 2023-10-27 ENCOUNTER — OFFICE VISIT (OUTPATIENT)
Dept: GASTROENTEROLOGY | Facility: CLINIC | Age: 48
End: 2023-10-27
Payer: MEDICARE

## 2023-10-27 VITALS
HEART RATE: 90 BPM | BODY MASS INDEX: 42.4 KG/M2 | DIASTOLIC BLOOD PRESSURE: 80 MMHG | HEIGHT: 62 IN | SYSTOLIC BLOOD PRESSURE: 140 MMHG | WEIGHT: 230.4 LBS | TEMPERATURE: 97.3 F

## 2023-10-27 DIAGNOSIS — K70.31 ALCOHOLIC CIRRHOSIS OF LIVER WITH ASCITES: ICD-10-CM

## 2023-10-27 DIAGNOSIS — K74.3 PRIMARY BILIARY CHOLANGITIS: ICD-10-CM

## 2023-10-27 DIAGNOSIS — K76.82 HEPATIC ENCEPHALOPATHY: ICD-10-CM

## 2023-10-27 DIAGNOSIS — E87.6 HYPOKALEMIA: Primary | ICD-10-CM

## 2023-10-27 DIAGNOSIS — K70.31 ALCOHOLIC CIRRHOSIS OF LIVER WITH ASCITES: Primary | ICD-10-CM

## 2023-10-27 LAB
ALBUMIN SERPL-MCNC: 3.4 G/DL (ref 3.5–5.2)
ALBUMIN/GLOB SERPL: 1 G/DL
ALP SERPL-CCNC: 211 U/L (ref 39–117)
ALPHA-FETOPROTEIN: 7.51 NG/ML (ref 0–8.3)
ALT SERPL W P-5'-P-CCNC: 18 U/L (ref 1–33)
ANION GAP SERPL CALCULATED.3IONS-SCNC: 13.3 MMOL/L (ref 5–15)
AST SERPL-CCNC: 52 U/L (ref 1–32)
BASOPHILS # BLD MANUAL: 0.05 10*3/MM3 (ref 0–0.2)
BASOPHILS NFR BLD MANUAL: 1 % (ref 0–1.5)
BILIRUB SERPL-MCNC: 0.8 MG/DL (ref 0–1.2)
BUN SERPL-MCNC: 4 MG/DL (ref 6–20)
BUN SERPL-MCNC: 6 MG/DL (ref 6–24)
BUN/CREAT SERPL: 5.1 (ref 7–25)
BUN/CREAT SERPL: 7 (ref 9–23)
CALCIUM SERPL-MCNC: 8.7 MG/DL (ref 8.7–10.2)
CALCIUM SPEC-SCNC: 8.7 MG/DL (ref 8.6–10.5)
CHLORIDE SERPL-SCNC: 104 MMOL/L (ref 98–107)
CHLORIDE SERPL-SCNC: 108 MMOL/L (ref 96–106)
CO2 SERPL-SCNC: 20 MMOL/L (ref 20–29)
CO2 SERPL-SCNC: 20.7 MMOL/L (ref 22–29)
CREAT SERPL-MCNC: 0.78 MG/DL (ref 0.57–1)
CREAT SERPL-MCNC: 0.81 MG/DL (ref 0.57–1)
DEPRECATED RDW RBC AUTO: 39.1 FL (ref 37–54)
EGFRCR SERPLBLD CKD-EPI 2021: 89 ML/MIN/1.73
EGFRCR SERPLBLD CKD-EPI 2021: 93.8 ML/MIN/1.73
EOSINOPHIL # BLD MANUAL: 0.19 10*3/MM3 (ref 0–0.4)
EOSINOPHIL NFR BLD MANUAL: 4 % (ref 0.3–6.2)
ERYTHROCYTE [DISTWIDTH] IN BLOOD BY AUTOMATED COUNT: 15.7 % (ref 12.3–15.4)
GLOBULIN UR ELPH-MCNC: 3.4 GM/DL
GLUCOSE SERPL-MCNC: 147 MG/DL (ref 70–99)
GLUCOSE SERPL-MCNC: 266 MG/DL (ref 65–99)
HCT VFR BLD AUTO: 31.2 % (ref 34–46.6)
HGB BLD-MCNC: 8.9 G/DL (ref 12–15.9)
INR PPP: 1.21 (ref 0.89–1.12)
LYMPHOCYTES # BLD MANUAL: 1.18 10*3/MM3 (ref 0.7–3.1)
LYMPHOCYTES NFR BLD MANUAL: 9 % (ref 5–12)
MCH RBC QN AUTO: 20.2 PG (ref 26.6–33)
MCHC RBC AUTO-ENTMCNC: 28.5 G/DL (ref 31.5–35.7)
MCV RBC AUTO: 70.7 FL (ref 79–97)
MONOCYTES # BLD: 0.42 10*3/MM3 (ref 0.1–0.9)
NEUTROPHILS # BLD AUTO: 2.87 10*3/MM3 (ref 1.7–7)
NEUTROPHILS NFR BLD MANUAL: 61 % (ref 42.7–76)
PLAT MORPH BLD: NORMAL
PLATELET # BLD AUTO: 194 10*3/MM3 (ref 140–450)
PMV BLD AUTO: 11.6 FL (ref 6–12)
POTASSIUM SERPL-SCNC: 2.6 MMOL/L (ref 3.5–5.2)
POTASSIUM SERPL-SCNC: 2.8 MMOL/L (ref 3.5–5.2)
PROT SERPL-MCNC: 6.8 G/DL (ref 6–8.5)
PROTHROMBIN TIME: 15.5 SECONDS (ref 12.2–14.5)
RBC # BLD AUTO: 4.41 10*6/MM3 (ref 3.77–5.28)
RBC MORPH BLD: NORMAL
SODIUM SERPL-SCNC: 138 MMOL/L (ref 136–145)
SODIUM SERPL-SCNC: 142 MMOL/L (ref 134–144)
VARIANT LYMPHS NFR BLD MANUAL: 25 % (ref 19.6–45.3)
WBC MORPH BLD: NORMAL
WBC NRBC COR # BLD: 4.71 10*3/MM3 (ref 3.4–10.8)

## 2023-10-27 PROCEDURE — 80053 COMPREHEN METABOLIC PANEL: CPT | Performed by: NURSE PRACTITIONER

## 2023-10-27 PROCEDURE — 1160F RVW MEDS BY RX/DR IN RCRD: CPT | Performed by: NURSE PRACTITIONER

## 2023-10-27 PROCEDURE — 99214 OFFICE O/P EST MOD 30 MIN: CPT | Performed by: NURSE PRACTITIONER

## 2023-10-27 PROCEDURE — 85007 BL SMEAR W/DIFF WBC COUNT: CPT

## 2023-10-27 PROCEDURE — 82105 ALPHA-FETOPROTEIN SERUM: CPT

## 2023-10-27 PROCEDURE — 1159F MED LIST DOCD IN RCRD: CPT | Performed by: NURSE PRACTITIONER

## 2023-10-27 PROCEDURE — 85610 PROTHROMBIN TIME: CPT

## 2023-10-27 PROCEDURE — 85025 COMPLETE CBC W/AUTO DIFF WBC: CPT

## 2023-10-27 NOTE — PROGRESS NOTES
GASTROENTEROLOGY OFFICE NOTE  Timothy Guzman  [id number]  1975    CARE TEAM  Patient Care Team:  Cori Barnett PA-C as PCP - General (Family Medicine)  Neslon Yip APRN as Nurse Practitioner (Nurse Practitioner)    Referring Provider: Cori Barnett PA-C    Chief Complaint   Patient presents with    Hepatic Disease        HISTORY OF PRESENT ILLNESS:  Patient returns in follow-up with primary biliary cholangitis and EtOH cirrhosis decompensated by hepatic encephalopathy and ascites status post EGD for complaints of epigastric pain and nausea and vomiting.    EGD was significant for multiple plaques in the esophagus consistent with Candida, gastritis and edema consistent with portal hypertension.  She was started on Diflucan for treatment of esophageal Candida and reports that her symptoms of epigastric pain has now resolved.    When she was last seen in August 2023 her mother who accompanied her that day and again today reported that the patient had been having periods of forgetfulness and seems a little hazy at times.  She had not been on any of her medications for hepatic encephalopathy, she had stopped taking all of her medications.  Since resuming Xifaxan 550 mg she has been much better without periods of forgetfulness.    She is currently on Bumex 2 mg daily and spironolactone 100 mg daily with no evidence of ascites or peripheral edema.  She is noted to be hypokalemic and was started on a potassium supplement by her PCP yesterday.    She continues on ursodiol 300 mg twice daily for primary biliary cholangitis.    PAST MEDICAL HISTORY  Past Medical History:   Diagnosis Date    Alcoholism     sober 2.5 years    Anaphylaxis     Arthritis     Bone necrosis     CAD (coronary artery disease) 07/13/2021    Cardiac arrest     Cirrhosis of liver 07/13/2021    Gastroparesis     GERD (gastroesophageal reflux disease)     History of esophageal varices     History of kidney stones     Hypertension      Memory loss     Neuropathy     Pancreatitis     Rib fractures 2021    SVT (supraventricular tachycardia) 2021    Type 2 diabetes mellitus with hyperglycemia, with long-term current use of insulin 2023        PAST SURGICAL HISTORY  Past Surgical History:   Procedure Laterality Date    ANKLE SURGERY Right     APPENDECTOMY      CARDIAC CATHETERIZATION N/A 2021    Procedure: Left Heart Cath;  Surgeon: Vikram Gonzales MD;  Location:  [facility];  Service: Cardiovascular;  Laterality: N/A;    CARDIAC CATHETERIZATION N/A 7/15/2021    Procedure: LEFT HEART CATH;  Surgeon: Vikram Gonzales MD;  Location:  [facility];  Service: Cardiology;  Laterality: N/A;     SECTION      x2    CHOLECYSTECTOMY      COLONOSCOPY      COLONOSCOPY N/A 3/31/2020    Procedure: COLONOSCOPY;  Surgeon: Tirso Giles MD;  Location:  [facility];  Service: Gastroenterology;  Laterality: N/A;    COLONOSCOPY N/A 2021    Procedure: COLONOSCOPY;  Surgeon: Tyrese Rasmussen MD;  Location:  [facility];  Service: Gastroenterology;  Laterality: N/A;    CORONARY STENT PLACEMENT      ENDOSCOPY      ENDOSCOPY      ENDOSCOPY N/A 2021    Procedure: ESOPHAGOGASTRODUODENOSCOPY AT BEDSIDE;  Surgeon: Tyrese Rasmussen MD;  Location:  [facility];  Service: Gastroenterology;  Laterality: N/A;    ENDOSCOPY N/A 2021    Procedure: ESOPHAGOGASTRODUODENOSCOPY;  Surgeon: Tyrese Rasmussen MD;  Location:  [facility];  Service: Gastroenterology;  Laterality: N/A;    ENDOSCOPY N/A 2021    Procedure: ESOPHAGOGASTRODUODENOSCOPY;  Surgeon: Tyrese Rasmussen MD;  Location:  [facility];  Service: Gastroenterology;  Laterality: N/A;    ENDOSCOPY N/A 2022    Procedure: ESOPHAGOGASTRODUODENOSCOPY;  Surgeon: Tyrese Rasmussen MD;  Location:  [facility];  Service: Gastroenterology;  Laterality: N/A;    REPLACEMENT TOTAL HIP LATERAL POSITION Left     TOTAL HIP ARTHROPLASTY  Right     x2    TOTAL KNEE ARTHROPLASTY Left         MEDICATIONS:    Current Outpatient Medications:     acetaminophen (TYLENOL) 500 MG tablet, Take 1 tablet by mouth Every 6 (Six) Hours As Needed for Mild Pain ., Disp: , Rfl:     bumetanide (BUMEX) 2 MG tablet, Take 1 tablet by mouth Daily., Disp: 90 tablet, Rfl: 1    FLUoxetine (PROzac) 20 MG capsule, Take 1 capsule by mouth Daily., Disp: 90 capsule, Rfl: 1    hydrOXYzine pamoate (VISTARIL) 25 MG capsule, Take 1 capsule by mouth 3 (Three) Times a Day As Needed for Itching., Disp: 90 capsule, Rfl: 3    lactulose (CHRONULAC) 10 GM/15ML solution, Take 45 mL by mouth 3 (Three) Times a Day., Disp: 1892 mL, Rfl: 0    Lantus 100 UNIT/ML injection, , Disp: , Rfl:     metFORMIN (GLUCOPHAGE) 500 MG tablet, Take 1 tablet by mouth 2 (Two) Times a Day With Meals., Disp: 180 tablet, Rfl: 1    NovoLOG FlexPen 100 UNIT/ML solution pen-injector sc pen, Inject 20 units under the skin before each meal plus sliding scale-3 units for every 50mg/dl over 150, MDD 90, Disp: , Rfl:     OLANZapine (zyPREXA) 10 MG tablet, Take 1 tablet by mouth Every Night., Disp: 90 tablet, Rfl: 3    ondansetron (Zofran) 4 MG tablet, Take 1 tablet by mouth Every 8 (Eight) Hours As Needed for Nausea or Vomiting for up to 30 doses., Disp: 20 tablet, Rfl: 0    pantoprazole (PROTONIX) 40 MG EC tablet, Take 1 tablet by mouth 2 (Two) Times a Day Before Meals., Disp: 60 tablet, Rfl: 5    potassium chloride ER (K-TAB) 20 MEQ tablet controlled-release ER tablet, Take 1 tablet by mouth Daily., Disp: 60 tablet, Rfl: 5    pregabalin (Lyrica) 75 MG capsule, Take 1 capsule by mouth 3 (Three) Times a Day., Disp: 90 capsule, Rfl: 5    spironolactone (ALDACTONE) 100 MG tablet, Take 1 tablet by mouth Daily., Disp: 90 tablet, Rfl: 1    traZODone (DESYREL) 50 MG tablet, Take 2 tablets by mouth Every Night. Pt reports increase of trazodone to 100mg nightly, Disp: 120 tablet, Rfl: 3    ursodiol (ACTIGALL) 300 MG capsule, Take 1  capsule by mouth 2 (Two) Times a Day., Disp: 180 capsule, Rfl: 1    Xifaxan 550 MG tablet, Take 1 tablet by mouth Every 12 (Twelve) Hours., Disp: 180 tablet, Rfl: 3    clopidogrel (PLAVIX) 75 MG tablet, Take 1 tablet by mouth Daily. (Patient not taking: Reported on 10/27/2023), Disp: 90 tablet, Rfl: 3    pregabalin (Lyrica) 75 MG capsule, Take 1 capsule by mouth 3 (Three) Times a Day., Disp: 90 capsule, Rfl: 5    ALLERGIES  Allergies   Allergen Reactions    Contrast Dye (Echo Or Unknown Ct/Mr) Anaphylaxis     6/18 Pt coded after receiving contrast for a CT scan. Was premedicated. Was having an MI at the same time. Immediately underwent heart cath with contrast without additional allergic reaction    7/15 Pt premedicated with prednisone/Benadryl for heart cath. Still with anaphylactic-like reaction with drop in BP and hypoxia. Treated 95% stenosis with minimal dye and supported with epinephrine, solumedrol, NRB    6/18 Pt coded after receiving contrast for a CT scan. Was premedicated. Was having an MI at the same time. Immediately underwent heart cath with contrast without additional allergic reaction   7/15 Pt premedicated with prednisone/Benadryl for heart cath. Still with anaphylactic-like reaction with drop in BP and hypoxia. Treated 95% stenosis with minimal dye and supported with epinephrine, solumedrol, NRB   6/18 Pt coded after receiving contrast for a CT scan. Was premedicated. Was having an MI at the same time. Immediately underwent heart cath with contrast without additional allergic reaction   7/15 Pt premedicated with prednisone/Benadryl for heart cath. Still with anaphylactic-like reaction with drop in BP and hypoxia. Treated 95% stenosis with minimal dye and supported with epinephrine, solumedrol, NRB    Haloperidol Hallucinations    Nsaids GI Bleeding     Other reaction(s): Bleeding, VOMITING       FAMILY HISTORY:  Family History   Problem Relation Age of Onset    Diabetes type II Mother     Breast  "cancer Mother     Heart disease Father     Liver disease Brother     Hypertension Brother     ADD / ADHD Child     Autism Child     Asperger's syndrome Child     Colon cancer Neg Hx     Colon polyps Neg Hx        SOCIAL HISTORY  Social History     Socioeconomic History    Marital status:    Tobacco Use    Smoking status: Every Day     Packs/day: 0.50     Years: 25.00     Additional pack years: 0.00     Total pack years: 12.50     Types: Electronic Cigarette, Cigarettes     Last attempt to quit: 2021     Years since quittin.3    Smokeless tobacco: Never   Vaping Use    Vaping Use: Former   Substance and Sexual Activity    Alcohol use: Not Currently     Comment: SOBER 3 YEARS    Drug use: No    Sexual activity: Defer         PHYSICAL EXAM   /80 (BP Location: Right arm, Patient Position: Sitting, Cuff Size: Adult)   Pulse 90   Temp 97.3 °F (36.3 °C) (Temporal)   Ht 157.5 cm (62\")   Wt 105 kg (230 lb 6.4 oz)   BMI 42.14 kg/m²   Physical Exam  Constitutional:       Appearance: Normal appearance.   HENT:      Head: Normocephalic and atraumatic.   Cardiovascular:      Rate and Rhythm: Regular rhythm.      Heart sounds: Normal heart sounds.   Pulmonary:      Effort: Pulmonary effort is normal.      Breath sounds: Normal breath sounds.   Abdominal:      General: There is no distension.      Palpations: Abdomen is soft.      Tenderness: There is no abdominal tenderness.   Neurological:      Mental Status: She is alert and oriented to person, place, and time.   Psychiatric:         Mood and Affect: Mood normal.         Thought Content: Thought content normal.           Results Review:  US ABDOMEN COMPLETE     Date of Exam: 10/16/2023 2:23 PM EDT     Indication: Hepatic cirrhosis, right upper quadrant abdominal pain, previous cholecystectomy.     Comparison: CT of the abdomen performed on 2022.     Technique: Routine gray scale and color Doppler imaging of the complete abdomen was performed " according to routine protocol.        Findings:  The gallbladder is surgically absent. The liver has a coarse and increased echotexture pattern and a nodular contour consistent with cirrhosis. There are no focal hepatic masses. There is a trace amount of ascitic fluid adjacent to the liver. There is   normal directional flow in the main portal vein and hepatic veins. The proximal abdominal aorta is of normal caliber. The mid-distal abdominal aorta is obscured by bowel gas. The inferior vena cava is unremarkable. The pancreatic head and body are   normal. The pancreatic tail was obscured by bowel gas. The common bile duct caliber is prominent measuring 8 mm which is considered normal in a postcholecystectomy patient. The right kidney measures 12.8 x 5.0 x 6.4 cm and is unremarkable. The left   kidney measures 12.7 x 5.0 x 5.4 cm and is considered unremarkable. The spleen is enlarged. It measures 15.5 cm in length and 6.1 cm in thickness. There are no splenic masses. There is normal color Doppler flow in the splenic hilum.     IMPRESSION:  Impression:     1. Hepatic cirrhosis.  2. Trace ascitic fluid adjacent to the liver.  3. Status post cholecystectomy.  4. Mild-moderate splenomegaly.        Electronically Signed: Tony Brown MD    10/18/2023 8:18 AM EDT     ASSESSMENT / PLAN  Diagnoses and all orders for this visit:    1. Alcoholic cirrhosis of liver with ascites (Primary)  -     AFP Tumor Marker; Future  -     CBC & Differential; Future  -     Comprehensive Metabolic Panel; Future  -     Protime-INR; Future    2. Primary biliary cholangitis    3. Hepatic encephalopathy    -Continue ursodiol 300 mg twice daily  - Continue Xifaxan 550 mg twice daily  - High-protein diet with high-protein bedtime snack, avoid fasting-157 g protein daily goal  - 2 g low-sodium diet          Return in about 6 months (around 4/27/2024).    I discussed the patients findings and my recommendations with patient    Nelson Yip,  APRN

## 2023-10-30 ENCOUNTER — TELEPHONE (OUTPATIENT)
Dept: GASTROENTEROLOGY | Facility: CLINIC | Age: 48
End: 2023-10-30
Payer: MEDICARE

## 2023-10-30 NOTE — TELEPHONE ENCOUNTER
Called patient this morning to ensure that she had received my messages from Friday.  She has been holding Bumex for 3 days now and has taken 2 additional doses of potassium.  She will repeat BMP tomorrow to follow-up on hypokalemia.

## 2023-10-31 ENCOUNTER — LAB (OUTPATIENT)
Dept: FAMILY MEDICINE CLINIC | Facility: CLINIC | Age: 48
End: 2023-10-31
Payer: MEDICARE

## 2023-11-01 DIAGNOSIS — E87.6 HYPOKALEMIA: Primary | ICD-10-CM

## 2023-11-01 LAB
BUN SERPL-MCNC: 8 MG/DL (ref 6–24)
BUN/CREAT SERPL: 11 (ref 9–23)
CALCIUM SERPL-MCNC: 8.5 MG/DL (ref 8.7–10.2)
CHLORIDE SERPL-SCNC: 104 MMOL/L (ref 96–106)
CO2 SERPL-SCNC: 21 MMOL/L (ref 20–29)
CREAT SERPL-MCNC: 0.74 MG/DL (ref 0.57–1)
EGFRCR SERPLBLD CKD-EPI 2021: 100 ML/MIN/1.73
GLUCOSE SERPL-MCNC: 289 MG/DL (ref 70–99)
POTASSIUM SERPL-SCNC: 2.7 MMOL/L (ref 3.5–5.2)
SODIUM SERPL-SCNC: 139 MMOL/L (ref 134–144)

## 2023-11-01 NOTE — PROGRESS NOTES
Results called to patient, no answer left detailed message:  Discontinue Bumex, do not resume  Continue potassium supplement  BMP in one week

## 2023-11-20 ENCOUNTER — TELEPHONE (OUTPATIENT)
Dept: FAMILY MEDICINE CLINIC | Facility: CLINIC | Age: 48
End: 2023-11-20

## 2023-11-20 NOTE — TELEPHONE ENCOUNTER
Caller: Timothy Guzman    Relationship: Self    Best call back number: [phone]     What was the call regarding: PATIENT CALLED TO ASK WHAT SHOTS SHE NEEDS TO TAKE.  PATIENT ASKED IF SHE NEEDED TO HAVE COVID, PNEUMONIA AND FLU SHOTS.

## 2024-02-28 ENCOUNTER — TELEPHONE (OUTPATIENT)
Dept: GASTROENTEROLOGY | Facility: CLINIC | Age: 49
End: 2024-02-28
Payer: MEDICARE

## 2024-02-28 NOTE — TELEPHONE ENCOUNTER
Called patient to schedule appt with li before she leaves no answer lvm to call office to schedule appt.

## 2024-03-25 ENCOUNTER — LAB (OUTPATIENT)
Dept: LAB | Facility: HOSPITAL | Age: 49
End: 2024-03-25
Payer: MEDICARE

## 2024-03-25 ENCOUNTER — OFFICE VISIT (OUTPATIENT)
Dept: GASTROENTEROLOGY | Facility: CLINIC | Age: 49
End: 2024-03-25
Payer: MEDICARE

## 2024-03-25 VITALS
HEIGHT: 62 IN | SYSTOLIC BLOOD PRESSURE: 118 MMHG | TEMPERATURE: 97.5 F | BODY MASS INDEX: 42.14 KG/M2 | HEART RATE: 81 BPM | DIASTOLIC BLOOD PRESSURE: 57 MMHG

## 2024-03-25 DIAGNOSIS — K74.3 PRIMARY BILIARY CHOLANGITIS: ICD-10-CM

## 2024-03-25 DIAGNOSIS — K70.31 ALCOHOLIC CIRRHOSIS OF LIVER WITH ASCITES: ICD-10-CM

## 2024-03-25 DIAGNOSIS — K76.82 HEPATIC ENCEPHALOPATHY: ICD-10-CM

## 2024-03-25 DIAGNOSIS — K70.31 ALCOHOLIC CIRRHOSIS OF LIVER WITH ASCITES: Primary | ICD-10-CM

## 2024-03-25 LAB
ALBUMIN SERPL-MCNC: 3.6 G/DL (ref 3.5–5.2)
ALBUMIN/GLOB SERPL: 1.2 G/DL
ALP SERPL-CCNC: 183 U/L (ref 39–117)
ALPHA-FETOPROTEIN: 5.82 NG/ML (ref 0–8.3)
ALT SERPL W P-5'-P-CCNC: 21 U/L (ref 1–33)
ANION GAP SERPL CALCULATED.3IONS-SCNC: 13 MMOL/L (ref 5–15)
AST SERPL-CCNC: 45 U/L (ref 1–32)
BASOPHILS # BLD AUTO: 0.03 10*3/MM3 (ref 0–0.2)
BASOPHILS NFR BLD AUTO: 0.6 % (ref 0–1.5)
BILIRUB SERPL-MCNC: 0.6 MG/DL (ref 0–1.2)
BUN SERPL-MCNC: 12 MG/DL (ref 6–20)
BUN/CREAT SERPL: 14.8 (ref 7–25)
CALCIUM SPEC-SCNC: 9.5 MG/DL (ref 8.6–10.5)
CHLORIDE SERPL-SCNC: 101 MMOL/L (ref 98–107)
CO2 SERPL-SCNC: 28 MMOL/L (ref 22–29)
CREAT SERPL-MCNC: 0.81 MG/DL (ref 0.57–1)
DEPRECATED RDW RBC AUTO: 60 FL (ref 37–54)
EGFRCR SERPLBLD CKD-EPI 2021: 89.1 ML/MIN/1.73
EOSINOPHIL # BLD AUTO: 0.18 10*3/MM3 (ref 0–0.4)
EOSINOPHIL NFR BLD AUTO: 3.6 % (ref 0.3–6.2)
ERYTHROCYTE [DISTWIDTH] IN BLOOD BY AUTOMATED COUNT: 18.6 % (ref 12.3–15.4)
GLOBULIN UR ELPH-MCNC: 3.1 GM/DL
GLUCOSE SERPL-MCNC: 149 MG/DL (ref 65–99)
HCT VFR BLD AUTO: 42.6 % (ref 34–46.6)
HGB BLD-MCNC: 13.6 G/DL (ref 12–15.9)
INR PPP: 1.07 (ref 0.89–1.12)
LYMPHOCYTES # BLD AUTO: 1.05 10*3/MM3 (ref 0.7–3.1)
LYMPHOCYTES NFR BLD AUTO: 21.1 % (ref 19.6–45.3)
MCH RBC QN AUTO: 28.3 PG (ref 26.6–33)
MCHC RBC AUTO-ENTMCNC: 31.9 G/DL (ref 31.5–35.7)
MCV RBC AUTO: 88.6 FL (ref 79–97)
MONOCYTES # BLD AUTO: 0.68 10*3/MM3 (ref 0.1–0.9)
MONOCYTES NFR BLD AUTO: 13.7 % (ref 5–12)
NEUTROPHILS NFR BLD AUTO: 3.01 10*3/MM3 (ref 1.7–7)
NEUTROPHILS NFR BLD AUTO: 60.4 % (ref 42.7–76)
PLATELET # BLD AUTO: 106 10*3/MM3 (ref 140–450)
POTASSIUM SERPL-SCNC: 2.9 MMOL/L (ref 3.5–5.2)
PROT SERPL-MCNC: 6.7 G/DL (ref 6–8.5)
PROTHROMBIN TIME: 14 SECONDS (ref 12.2–14.5)
RBC # BLD AUTO: 4.81 10*6/MM3 (ref 3.77–5.28)
SODIUM SERPL-SCNC: 142 MMOL/L (ref 136–145)
WBC NRBC COR # BLD AUTO: 4.98 10*3/MM3 (ref 3.4–10.8)

## 2024-03-25 PROCEDURE — 82105 ALPHA-FETOPROTEIN SERUM: CPT

## 2024-03-25 PROCEDURE — 80053 COMPREHEN METABOLIC PANEL: CPT

## 2024-03-25 PROCEDURE — 1160F RVW MEDS BY RX/DR IN RCRD: CPT | Performed by: NURSE PRACTITIONER

## 2024-03-25 PROCEDURE — 85025 COMPLETE CBC W/AUTO DIFF WBC: CPT

## 2024-03-25 PROCEDURE — 1159F MED LIST DOCD IN RCRD: CPT | Performed by: NURSE PRACTITIONER

## 2024-03-25 PROCEDURE — 85610 PROTHROMBIN TIME: CPT

## 2024-03-25 PROCEDURE — 99214 OFFICE O/P EST MOD 30 MIN: CPT | Performed by: NURSE PRACTITIONER

## 2024-03-25 RX ORDER — OXYCODONE HYDROCHLORIDE 5 MG/1
TABLET ORAL
COMMUNITY
Start: 2023-11-03

## 2024-03-25 RX ORDER — IBUPROFEN 400 MG/1
TABLET ORAL
COMMUNITY
Start: 2024-03-01

## 2024-03-25 RX ORDER — FERROUS SULFATE 325(65) MG
TABLET ORAL
COMMUNITY
Start: 2024-03-17

## 2024-03-25 RX ORDER — RANOLAZINE 1000 MG/1
TABLET, EXTENDED RELEASE ORAL
COMMUNITY
Start: 2024-02-26

## 2024-03-25 RX ORDER — ONDANSETRON 4 MG/1
TABLET, ORALLY DISINTEGRATING ORAL
COMMUNITY
Start: 2024-02-29

## 2024-03-25 RX ORDER — URSODIOL 300 MG/1
300 CAPSULE ORAL 2 TIMES DAILY
Qty: 180 CAPSULE | Refills: 3 | Status: SHIPPED | OUTPATIENT
Start: 2024-03-25

## 2024-03-25 RX ORDER — LAMOTRIGINE 100 MG/1
TABLET ORAL
COMMUNITY
Start: 2024-02-01

## 2024-03-25 RX ORDER — RIFAXIMIN 550 MG/1
550 TABLET ORAL EVERY 12 HOURS SCHEDULED
Qty: 180 TABLET | Refills: 3 | Status: SHIPPED | OUTPATIENT
Start: 2024-03-25

## 2024-03-25 RX ORDER — BUMETANIDE 2 MG/1
2 TABLET ORAL DAILY
Qty: 90 TABLET | Refills: 3 | Status: SHIPPED | OUTPATIENT
Start: 2024-03-25

## 2024-03-25 RX ORDER — SPIRONOLACTONE 100 MG/1
100 TABLET, FILM COATED ORAL DAILY
Qty: 90 TABLET | Refills: 3 | Status: SHIPPED | OUTPATIENT
Start: 2024-03-25

## 2024-03-25 RX ORDER — INSULIN GLARGINE-YFGN 100 [IU]/ML
INJECTION, SOLUTION SUBCUTANEOUS
COMMUNITY
Start: 2024-02-09

## 2024-03-25 NOTE — PROGRESS NOTES
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  She has decompensated by ascites and hepatic encephalopathy.      Since she was last seen she underwent a left ankle arthrodesis and subtalar arthrodesis, left tibia ORIF with IM nail secondary to a closed fracture of the left ankle.  Unfortunately there are no obvious signs of healing at the tibiotalar joint and therefore there is likely more surgery planned in the future and if that is not successful then amputation has been discussed.    She is currently residing in a long-term care facility, [facility].    She continues on Xifaxan 550 mg twice daily and lactulose 20 g 4 times daily.  She has been having troubles with forgetfulness and slurred speech.  She also reports that she is sleeping quite a bit and is overly tired.  Despite taking lactulose, she is having very infrequent bowel movements.    She denies signs of GI bleeding.  Last EGD was in September 2023 for hypertension was noted, no varices.    She is currently on Bumex 2 mg daily and spironolactone 300 mg daily.  She is having problems with peripheral edema.  No apparent ascites.  She is currently on potassium supplement.    She continues on ursodiol 300 mg twice daily for primary biliary cholangitis.    PAST MEDICAL HISTORY  Past Medical History:   Diagnosis Date    Alcoholism     sober 2.5 years    Anaphylaxis     Arthritis     Bone necrosis     CAD (coronary artery disease) 07/13/2021    Cardiac arrest     Cirrhosis of liver 07/13/2021    Gastroparesis     GERD (gastroesophageal reflux disease)      History of esophageal varices     History of kidney stones     Hypertension     Memory loss     Neuropathy     Pancreatitis     Rib fractures 2021    SVT (supraventricular tachycardia) 2021    Type 2 diabetes mellitus with hyperglycemia, with long-term current use of insulin 2023        PAST SURGICAL HISTORY  Past Surgical History:   Procedure Laterality Date    ANKLE SURGERY Right     APPENDECTOMY      CARDIAC CATHETERIZATION N/A 2021    Procedure: Left Heart Cath;  Surgeon: Vikram Gonzales MD;  Location:  [facility];  Service: Cardiovascular;  Laterality: N/A;    CARDIAC CATHETERIZATION N/A 7/15/2021    Procedure: LEFT HEART CATH;  Surgeon: Vikram Gonzales MD;  Location:  [facility];  Service: Cardiology;  Laterality: N/A;     SECTION      x2    CHOLECYSTECTOMY      COLONOSCOPY      COLONOSCOPY N/A 3/31/2020    Procedure: COLONOSCOPY;  Surgeon: Tirso Giles MD;  Location:  [facility];  Service: Gastroenterology;  Laterality: N/A;    COLONOSCOPY N/A 2021    Procedure: COLONOSCOPY;  Surgeon: Tyrese Rasmussen MD;  Location:  [facility];  Service: Gastroenterology;  Laterality: N/A;    CORONARY STENT PLACEMENT      ENDOSCOPY      ENDOSCOPY      ENDOSCOPY N/A 2021    Procedure: ESOPHAGOGASTRODUODENOSCOPY AT BEDSIDE;  Surgeon: Tyrese Rasmussen MD;  Location:  [facility];  Service: Gastroenterology;  Laterality: N/A;    ENDOSCOPY N/A 2021    Procedure: ESOPHAGOGASTRODUODENOSCOPY;  Surgeon: Tyrese Rasmussen MD;  Location:  [facility];  Service: Gastroenterology;  Laterality: N/A;    ENDOSCOPY N/A 2021    Procedure: ESOPHAGOGASTRODUODENOSCOPY;  Surgeon: Tyrese Rasmussen MD;  Location:  [facility];  Service: Gastroenterology;  Laterality: N/A;    ENDOSCOPY N/A 2022    Procedure: ESOPHAGOGASTRODUODENOSCOPY;  Surgeon: Tyrese Rasmussen MD;  Location:  [facility];  Service: Gastroenterology;   Laterality: N/A;    REPLACEMENT TOTAL HIP LATERAL POSITION Left     TOTAL HIP ARTHROPLASTY Right     x2    TOTAL KNEE ARTHROPLASTY Left         MEDICATIONS:    Current Outpatient Medications:     acetaminophen (TYLENOL) 500 MG tablet, Take 1 tablet by mouth Every 6 (Six) Hours As Needed for Mild Pain ., Disp: , Rfl:     bumetanide (BUMEX) 2 MG tablet, Take 1 tablet by mouth Daily., Disp: 90 tablet, Rfl: 3    cholecalciferol (VITAMIN D3) 1.25 MG (29012 UT) capsule, Take 1 capsule by mouth 1 (One) Time Per Week., Disp: , Rfl:     FeroSul 325 (65 Fe) MG tablet, , Disp: , Rfl:     FLUoxetine (PROzac) 20 MG capsule, Take 1 capsule by mouth Daily., Disp: 90 capsule, Rfl: 1    hydrOXYzine pamoate (VISTARIL) 25 MG capsule, Take 1 capsule by mouth 3 (Three) Times a Day As Needed for Itching., Disp: 90 capsule, Rfl: 3    ibuprofen (ADVIL,MOTRIN) 400 MG tablet, , Disp: , Rfl:     Insulin Glargine-yfgn 100 UNIT/ML solution pen-injector, , Disp: , Rfl:     lactulose (CHRONULAC) 10 GM/15ML solution, Take 45 mL by mouth 3 (Three) Times a Day., Disp: 1892 mL, Rfl: 0    lamoTRIgine (LaMICtal) 100 MG tablet, , Disp: , Rfl:     Lantus 100 UNIT/ML injection, , Disp: , Rfl:     metFORMIN (GLUCOPHAGE) 500 MG tablet, Take 1 tablet by mouth 2 (Two) Times a Day With Meals., Disp: 180 tablet, Rfl: 1    NovoLOG FlexPen 100 UNIT/ML solution pen-injector sc pen, Inject 20 units under the skin before each meal plus sliding scale-3 units for every 50mg/dl over 150, MDD 90, Disp: , Rfl:     OLANZapine (zyPREXA) 10 MG tablet, Take 1 tablet by mouth Every Night., Disp: 90 tablet, Rfl: 3    ondansetron (Zofran) 4 MG tablet, Take 1 tablet by mouth Every 8 (Eight) Hours As Needed for Nausea or Vomiting for up to 30 doses., Disp: 20 tablet, Rfl: 0    ondansetron ODT (ZOFRAN-ODT) 4 MG disintegrating tablet, , Disp: , Rfl:     oxyCODONE (ROXICODONE) 5 MG immediate release tablet, , Disp: , Rfl:     pantoprazole (PROTONIX) 40 MG EC tablet, Take 1 tablet by  mouth 2 (Two) Times a Day Before Meals., Disp: 60 tablet, Rfl: 5    potassium chloride ER (K-TAB) 20 MEQ tablet controlled-release ER tablet, Take 1 tablet by mouth Daily., Disp: 60 tablet, Rfl: 5    pregabalin (Lyrica) 75 MG capsule, Take 1 capsule by mouth 3 (Three) Times a Day., Disp: 90 capsule, Rfl: 5    pregabalin (Lyrica) 75 MG capsule, Take 1 capsule by mouth 3 (Three) Times a Day., Disp: 90 capsule, Rfl: 5    ranolazine (RANEXA) 1000 MG 12 hr tablet, , Disp: , Rfl:     spironolactone (ALDACTONE) 100 MG tablet, Take 1 tablet by mouth Daily. (Patient taking differently: Take 1 tablet by mouth 3 (Three) Times a Day.), Disp: 90 tablet, Rfl: 3    traZODone (DESYREL) 50 MG tablet, Take 2 tablets by mouth Every Night. Pt reports increase of trazodone to 100mg nightly, Disp: 120 tablet, Rfl: 3    ursodiol (ACTIGALL) 300 MG capsule, Take 1 capsule by mouth 2 (Two) Times a Day., Disp: 180 capsule, Rfl: 3    Xifaxan 550 MG tablet, Take 1 tablet by mouth Every 12 (Twelve) Hours., Disp: 180 tablet, Rfl: 3    clopidogrel (PLAVIX) 75 MG tablet, Take 1 tablet by mouth Daily. (Patient not taking: Reported on 10/27/2023), Disp: 90 tablet, Rfl: 3    ALLERGIES  Allergies   Allergen Reactions    Contrast Dye (Echo Or Unknown Ct/Mr) Anaphylaxis     6/18 Pt coded after receiving contrast for a CT scan. Was premedicated. Was having an MI at the same time. Immediately underwent heart cath with contrast without additional allergic reaction    7/15 Pt premedicated with prednisone/Benadryl for heart cath. Still with anaphylactic-like reaction with drop in BP and hypoxia. Treated 95% stenosis with minimal dye and supported with epinephrine, solumedrol, NRB    6/18 Pt coded after receiving contrast for a CT scan. Was premedicated. Was having an MI at the same time. Immediately underwent heart cath with contrast without additional allergic reaction   7/15 Pt premedicated with prednisone/Benadryl for heart cath. Still with  "anaphylactic-like reaction with drop in BP and hypoxia. Treated 95% stenosis with minimal dye and supported with epinephrine, solumedrol, NRB    Pt coded after receiving contrast for a CT scan. Was premedicated. Was having an MI at the same time. Immediately underwent heart cath with contrast without additional allergic reaction   7/15 Pt premedicated with prednisone/Benadryl for heart cath. Still with anaphylactic-like reaction with drop in BP and hypoxia. Treated 95% stenosis with minimal dye and supported with epinephrine, solumedrol, NRB    Haloperidol Hallucinations    Nsaids GI Bleeding     Other reaction(s): Bleeding, VOMITING       FAMILY HISTORY:  Family History   Problem Relation Age of Onset    Diabetes type II Mother     Breast cancer Mother     Heart disease Father     Liver disease Brother     Hypertension Brother     ADD / ADHD Child     Autism Child     Asperger's syndrome Child     Colon cancer Neg Hx     Colon polyps Neg Hx        SOCIAL HISTORY  Social History     Socioeconomic History    Marital status:    Tobacco Use    Smoking status: Every Day     Current packs/day: 0.00     Average packs/day: 0.5 packs/day for 25.0 years (12.5 ttl pk-yrs)     Types: Electronic Cigarette, Cigarettes     Start date: 1996     Last attempt to quit: 2021     Years since quittin.7    Smokeless tobacco: Never   Vaping Use    Vaping status: Former   Substance and Sexual Activity    Alcohol use: Not Currently     Comment: SOBER 3 YEARS    Drug use: No    Sexual activity: Defer         PHYSICAL EXAM   /57 (BP Location: Left arm, Patient Position: Sitting, Cuff Size: Adult)   Pulse 81   Temp 97.5 °F (36.4 °C) (Temporal)   Ht 157.5 cm (62\")   BMI 42.14 kg/m²   Physical Exam  Vitals and nursing note reviewed.   Constitutional:       Appearance: Normal appearance. She is well-developed. She is not toxic-appearing.   HENT:      Head: Normocephalic and atraumatic.      Nose: Nose normal. "      Mouth/Throat:      Mouth: Mucous membranes are moist.      Pharynx: Oropharynx is clear.   Eyes:      Pupils: Pupils are equal, round, and reactive to light.   Cardiovascular:      Rate and Rhythm: Normal rate and regular rhythm.      Comments: Walking boot on LLE  Pulmonary:      Effort: Pulmonary effort is normal.      Breath sounds: Normal breath sounds. No wheezing or rales.   Abdominal:      General: Bowel sounds are normal.      Palpations: Abdomen is soft. There is no mass.      Tenderness: There is no abdominal tenderness. There is no guarding or rebound.      Hernia: No hernia is present.   Musculoskeletal:         General: Normal range of motion.      Cervical back: Normal range of motion and neck supple.      Right lower le+   Skin:     General: Skin is warm and dry.   Neurological:      Mental Status: She is alert and oriented to person, place, and time.      Cranial Nerves: No cranial nerve deficit.      Comments: No asterixis  Slow and some slurred speech    Psychiatric:         Behavior: Behavior normal.         Judgment: Judgment normal.           Results Review:  US ABDOMEN COMPLETE     Date of Exam: 10/16/2023 2:23 PM EDT     Indication: Hepatic cirrhosis, right upper quadrant abdominal pain, previous cholecystectomy.     Comparison: CT of the abdomen performed on 2022.     Technique: Routine gray scale and color Doppler imaging of the complete abdomen was performed according to routine protocol.        Findings:  The gallbladder is surgically absent. The liver has a coarse and increased echotexture pattern and a nodular contour consistent with cirrhosis. There are no focal hepatic masses. There is a trace amount of ascitic fluid adjacent to the liver. There is   normal directional flow in the main portal vein and hepatic veins. The proximal abdominal aorta is of normal caliber. The mid-distal abdominal aorta is obscured by bowel gas. The inferior vena cava is unremarkable. The  pancreatic head and body are   normal. The pancreatic tail was obscured by bowel gas. The common bile duct caliber is prominent measuring 8 mm which is considered normal in a postcholecystectomy patient. The right kidney measures 12.8 x 5.0 x 6.4 cm and is unremarkable. The left   kidney measures 12.7 x 5.0 x 5.4 cm and is considered unremarkable. The spleen is enlarged. It measures 15.5 cm in length and 6.1 cm in thickness. There are no splenic masses. There is normal color Doppler flow in the splenic hilum.     IMPRESSION:  Impression:     1. Hepatic cirrhosis.  2. Trace ascitic fluid adjacent to the liver.  3. Status post cholecystectomy.  4. Mild-moderate splenomegaly.      Electronically Signed: Tony Brown MD    10/18/2023 8:18 AM EDT  ASSESSMENT / PLAN  Diagnoses and all orders for this visit:    1. Alcoholic cirrhosis of liver with ascites (Primary)  -     AFP Tumor Marker; Future  -     CBC & Differential; Future  -     Comprehensive Metabolic Panel; Future  -     Protime-INR; Future  -     US Abdomen Complete; Future  -     bumetanide (BUMEX) 2 MG tablet; Take 1 tablet by mouth Daily.  Dispense: 90 tablet; Refill: 3  -     spironolactone (ALDACTONE) 100 MG tablet; Take 1 tablet by mouth Daily. (Patient taking differently: Take 1 tablet by mouth 3 (Three) Times a Day.)  Dispense: 90 tablet; Refill: 3    2. Primary biliary cholangitis  -     ursodiol (ACTIGALL) 300 MG capsule; Take 1 capsule by mouth 2 (Two) Times a Day.  Dispense: 180 capsule; Refill: 3    3. Hepatic encephalopathy  -     Xifaxan 550 MG tablet; Take 1 tablet by mouth Every 12 (Twelve) Hours.  Dispense: 180 tablet; Refill: 3     >> Discussed risks of decompensation post operatively  >> Advised patient and family to educate the long-term care facility staff that should she become confused or somnolent that she should proceed to the emergency department  >> Will increase lactulose to 30 g 4 times daily and titrate to effectiveness of having  3-4 bowel movements daily    Return in about 6 months (around 9/25/2024).    I discussed the patients findings and my recommendations with patient    Nelson Yip, APRN

## 2024-05-05 NOTE — PLAN OF CARE
VSS, c/o pain, prn given, rested well, UOP good, no other issues/concerns, will continue to monitor        Problem: Patient Care Overview  Goal: Plan of Care Review  Outcome: Ongoing (interventions implemented as appropriate)  Flowsheets  Taken 4/2/2020 0613  Progress: improving  Taken 4/1/2020 1945  Plan of Care Reviewed With: patient     Problem: Pain, Chronic (Adult)  Goal: Acceptable Pain/Comfort Level and Functional Ability  Outcome: Ongoing (interventions implemented as appropriate)  Flowsheets (Taken 4/2/2020 0613)  Acceptable Pain/Comfort Level and Functional Ability: achieves outcome     Problem: Gastrointestinal Bleeding (Adult)  Goal: Signs and Symptoms of Listed Potential Problems Will be Absent, Minimized or Managed (Gastrointestinal Bleeding)  Outcome: Ongoing (interventions implemented as appropriate)  Flowsheets (Taken 4/2/2020 0613)  Problems Assessed (GI Bleeding): all  Problems Present (GI Bleeding): none      Urgent - Patient needs pcp follow up within 1 week for continuity of care. Please also give GI doctor within 1 week for diarrhea.

## 2024-06-05 ENCOUNTER — TELEPHONE (OUTPATIENT)
Dept: FAMILY MEDICINE CLINIC | Facility: CLINIC | Age: 49
End: 2024-06-05
Payer: MEDICARE

## 2024-06-05 NOTE — TELEPHONE ENCOUNTER
Left Message: If Pt calls back ok for Hub to relay--Recent blood work that was sent to us shows an elevated blood glucose and I really think she probably needs to schedule an appointment and come in to have complete blood work done and check for diabetes.

## 2024-06-10 ENCOUNTER — TELEPHONE (OUTPATIENT)
Dept: FAMILY MEDICINE CLINIC | Facility: CLINIC | Age: 49
End: 2024-06-10
Payer: MEDICARE

## 2024-06-10 NOTE — TELEPHONE ENCOUNTER
Left Message: If Pt calls back ok for Hub to relay--Recent blood work that was sent to us shows an elevated blood glucose and I really think she probably needs to schedule an appointment and come in to have complete blood work done and check for diabetes. Unable to reach Pt after trying for 3 days, letter has been created w this Info. And sent to be mailed out

## 2024-09-04 ENCOUNTER — OFFICE VISIT (OUTPATIENT)
Dept: GASTROENTEROLOGY | Facility: CLINIC | Age: 49
End: 2024-09-04
Payer: MEDICARE

## 2024-09-04 VITALS — BODY MASS INDEX: 42.88 KG/M2 | HEIGHT: 62 IN | WEIGHT: 233 LBS

## 2024-09-04 DIAGNOSIS — K76.82 HEPATIC ENCEPHALOPATHY: ICD-10-CM

## 2024-09-04 DIAGNOSIS — K70.31 ALCOHOLIC CIRRHOSIS OF LIVER WITH ASCITES: Primary | ICD-10-CM

## 2024-09-04 DIAGNOSIS — K74.3 PRIMARY BILIARY CHOLANGITIS: ICD-10-CM

## 2024-09-04 PROCEDURE — 99213 OFFICE O/P EST LOW 20 MIN: CPT

## 2024-09-04 PROCEDURE — 1159F MED LIST DOCD IN RCRD: CPT

## 2024-09-04 PROCEDURE — 1160F RVW MEDS BY RX/DR IN RCRD: CPT

## 2024-09-04 RX ORDER — LACTULOSE 10 G/15ML
30 SOLUTION ORAL 4 TIMES DAILY
Qty: 1892 ML | Refills: 0 | Status: SHIPPED | OUTPATIENT
Start: 2024-09-04 | End: 2024-09-04

## 2024-09-04 RX ORDER — LACTULOSE 10 G/15ML
30 SOLUTION ORAL 4 TIMES DAILY
Qty: 1892 ML | Refills: 0 | Status: SHIPPED | OUTPATIENT
Start: 2024-09-04

## 2024-09-04 RX ORDER — RIFAXIMIN 550 MG/1
550 TABLET ORAL EVERY 12 HOURS SCHEDULED
Qty: 180 TABLET | Refills: 3 | Status: SHIPPED | OUTPATIENT
Start: 2024-09-04

## 2024-09-04 RX ORDER — URSODIOL 300 MG/1
300 CAPSULE ORAL 2 TIMES DAILY
Qty: 180 CAPSULE | Refills: 3 | Status: SHIPPED | OUTPATIENT
Start: 2024-09-04

## 2024-09-04 RX ORDER — AMITRIPTYLINE HYDROCHLORIDE 100 MG/1
TABLET ORAL
COMMUNITY
Start: 2024-08-13

## 2024-09-04 RX ORDER — LISINOPRIL/HYDROCHLOROTHIAZIDE 10-12.5 MG
TABLET ORAL EVERY 24 HOURS
COMMUNITY

## 2024-09-04 NOTE — ASSESSMENT & PLAN NOTE
HE  She is already on lactulose 60 g QID with milk of mag, and still struggling to have even daily bowel movements. Reluctant to increase lactulose due to the significant bloating she is reporting. Recommend adding Miralax to bowel regimen, starting with 1 capful twice daily, and titrating up or down by 1/2 capful to achieve goal of 2-3 bowel movements daily.   Suspect multiple recent procedures requiring sedation have contributed to her increased encephalopathy.   Potassium previously quite low at 2.9, which can contribute to HE by way of increased renal ammonia production. Will recheck this today and plan to adjust potassium supplement if indicated.

## 2024-09-04 NOTE — PATIENT INSTRUCTIONS
Add Miralax to bowel regimen, starting with 1 capful twice daily, titrating up or down by 1/2 capful daily to achieve a goal of 2-3 bowel movements daily.

## 2024-09-04 NOTE — PROGRESS NOTES
Office Note     Name: Timothy Guzman    : 1975     MRN: [id number]     Chief Complaint  Liver Follow-up (Follow up cirrhosis)    Subjective     History of Present Illness:  Timothy Guzman is a 48 y.o. female who presents today for follow-up of cirrhosis, secondary to EtOH and primary biliary cholangitis.  She has had decompensation with ascites and hepatic encephalopathy.    She is on Xifaxan 550 mg twice daily and lactulose 60 g 4 times daily, but is still having difficulty achieving even 1 bowel movement per day sometimes. She is also taking milk of mag. She has recently had several surgeries/debridements on left leg, requiring sedation, and has had increase in encephalopathy. She is sleeping off and on throughout the day and has significant fatigue and memory issues.     She has not had any GIB.  Last EGD in 2023 with no varices.  She is on Bumex 1 mg twice daily, as well as spironolactone 300 mg daily, and continues to have issues with peripheral edema, without obvious ascites on exam. She's been on GLP 1 for a couple of weeks now, for weight loss and has not noted an increase in her symptoms.     She is on ursodiol 300 mg twice daily for primary biliary cholangitis.     Past Medical History:   Past Medical History:   Diagnosis Date    Alcoholism     sober 2.5 years    Anaphylaxis     Arthritis     Bone necrosis     CAD (coronary artery disease) 2021    Cardiac arrest     Cirrhosis of liver 2021    Gastroparesis     GERD (gastroesophageal reflux disease)     History of esophageal varices     History of kidney stones     Hypertension     Memory loss     Neuropathy     Pancreatitis     Rib fractures 2021    SVT (supraventricular tachycardia) 2021    Type 2 diabetes mellitus with hyperglycemia, with long-term current use of insulin 2023       Past Surgical History:   Past Surgical History:   Procedure Laterality Date    ANKLE SURGERY Right     APPENDECTOMY       CARDIAC CATHETERIZATION N/A 2021    Procedure: Left Heart Cath;  Surgeon: Vikram Gonzales MD;  Location:  [facility];  Service: Cardiovascular;  Laterality: N/A;    CARDIAC CATHETERIZATION N/A 7/15/2021    Procedure: LEFT HEART CATH;  Surgeon: Vikram Gonzales MD;  Location:  [facility];  Service: Cardiology;  Laterality: N/A;     SECTION      x2    CHOLECYSTECTOMY      COLONOSCOPY      COLONOSCOPY N/A 3/31/2020    Procedure: COLONOSCOPY;  Surgeon: Tirso Giles MD;  Location:  [facility];  Service: Gastroenterology;  Laterality: N/A;    COLONOSCOPY N/A 2021    Procedure: COLONOSCOPY;  Surgeon: Tyrese Rasmussen MD;  Location:  [facility];  Service: Gastroenterology;  Laterality: N/A;    CORONARY STENT PLACEMENT      ENDOSCOPY      ENDOSCOPY      ENDOSCOPY N/A 2021    Procedure: ESOPHAGOGASTRODUODENOSCOPY AT BEDSIDE;  Surgeon: Tyrese Rasmussen MD;  Location:  [facility];  Service: Gastroenterology;  Laterality: N/A;    ENDOSCOPY N/A 2021    Procedure: ESOPHAGOGASTRODUODENOSCOPY;  Surgeon: Tyrese Rasmussen MD;  Location:  [facility];  Service: Gastroenterology;  Laterality: N/A;    ENDOSCOPY N/A 2021    Procedure: ESOPHAGOGASTRODUODENOSCOPY;  Surgeon: Tyrese Rasmussen MD;  Location:  [facility];  Service: Gastroenterology;  Laterality: N/A;    ENDOSCOPY N/A 2022    Procedure: ESOPHAGOGASTRODUODENOSCOPY;  Surgeon: Tyrese Rasmussen MD;  Location:  [facility];  Service: Gastroenterology;  Laterality: N/A;    REPLACEMENT TOTAL HIP LATERAL POSITION Left     TOTAL HIP ARTHROPLASTY Right     x2    TOTAL KNEE ARTHROPLASTY Left        Immunizations:   Immunization History   Administered Date(s) Administered    COVID-19 (PFIZER) Purple Cap Monovalent 2021, 2021, 10/11/2021    COVID-19 F23 (PFIZER) 12YRS+ (COMIRNATY) 2023        Medications:     Current Outpatient Medications:     acetaminophen (TYLENOL)  500 MG tablet, Take 1 tablet by mouth Every 6 (Six) Hours As Needed for Mild Pain ., Disp: , Rfl:     bumetanide (BUMEX) 2 MG tablet, Take 1 tablet by mouth Daily., Disp: 90 tablet, Rfl: 3    cholecalciferol (VITAMIN D3) 1.25 MG (24883 UT) capsule, Take 1 capsule by mouth 1 (One) Time Per Week., Disp: , Rfl:     clopidogrel (PLAVIX) 75 MG tablet, Take 1 tablet by mouth Daily. (Patient not taking: Reported on 10/27/2023), Disp: 90 tablet, Rfl: 3    FeroSul 325 (65 Fe) MG tablet, , Disp: , Rfl:     FLUoxetine (PROzac) 20 MG capsule, Take 1 capsule by mouth Daily., Disp: 90 capsule, Rfl: 1    hydrOXYzine pamoate (VISTARIL) 25 MG capsule, Take 1 capsule by mouth 3 (Three) Times a Day As Needed for Itching., Disp: 90 capsule, Rfl: 3    ibuprofen (ADVIL,MOTRIN) 400 MG tablet, , Disp: , Rfl:     Insulin Glargine-yfgn 100 UNIT/ML solution pen-injector, , Disp: , Rfl:     lactulose (CHRONULAC) 10 GM/15ML solution, Take 45 mL by mouth 3 (Three) Times a Day., Disp: 1892 mL, Rfl: 0    lamoTRIgine (LaMICtal) 100 MG tablet, , Disp: , Rfl:     Lantus 100 UNIT/ML injection, , Disp: , Rfl:     metFORMIN (GLUCOPHAGE) 500 MG tablet, Take 1 tablet by mouth 2 (Two) Times a Day With Meals., Disp: 180 tablet, Rfl: 1    NovoLOG FlexPen 100 UNIT/ML solution pen-injector sc pen, Inject 20 units under the skin before each meal plus sliding scale-3 units for every 50mg/dl over 150, MDD 90, Disp: , Rfl:     OLANZapine (zyPREXA) 10 MG tablet, Take 1 tablet by mouth Every Night., Disp: 90 tablet, Rfl: 3    ondansetron (Zofran) 4 MG tablet, Take 1 tablet by mouth Every 8 (Eight) Hours As Needed for Nausea or Vomiting for up to 30 doses., Disp: 20 tablet, Rfl: 0    ondansetron ODT (ZOFRAN-ODT) 4 MG disintegrating tablet, , Disp: , Rfl:     oxyCODONE (ROXICODONE) 5 MG immediate release tablet, , Disp: , Rfl:     pantoprazole (PROTONIX) 40 MG EC tablet, Take 1 tablet by mouth 2 (Two) Times a Day Before Meals., Disp: 60 tablet, Rfl: 5    potassium  chloride ER (K-TAB) 20 MEQ tablet controlled-release ER tablet, Take 1 tablet by mouth Daily., Disp: 60 tablet, Rfl: 5    pregabalin (Lyrica) 75 MG capsule, Take 1 capsule by mouth 3 (Three) Times a Day., Disp: 90 capsule, Rfl: 5    pregabalin (Lyrica) 75 MG capsule, Take 1 capsule by mouth 3 (Three) Times a Day., Disp: 90 capsule, Rfl: 5    ranolazine (RANEXA) 1000 MG 12 hr tablet, , Disp: , Rfl:     spironolactone (ALDACTONE) 100 MG tablet, Take 1 tablet by mouth Daily. (Patient taking differently: Take 1 tablet by mouth 3 (Three) Times a Day.), Disp: 90 tablet, Rfl: 3    traZODone (DESYREL) 50 MG tablet, Take 2 tablets by mouth Every Night. Pt reports increase of trazodone to 100mg nightly, Disp: 120 tablet, Rfl: 3    ursodiol (ACTIGALL) 300 MG capsule, Take 1 capsule by mouth 2 (Two) Times a Day., Disp: 180 capsule, Rfl: 3    Xifaxan 550 MG tablet, Take 1 tablet by mouth Every 12 (Twelve) Hours., Disp: 180 tablet, Rfl: 3    Allergies:   Allergies   Allergen Reactions    Contrast Dye (Echo Or Unknown Ct/Mr) Anaphylaxis     6/18 Pt coded after receiving contrast for a CT scan. Was premedicated. Was having an MI at the same time. Immediately underwent heart cath with contrast without additional allergic reaction    7/15 Pt premedicated with prednisone/Benadryl for heart cath. Still with anaphylactic-like reaction with drop in BP and hypoxia. Treated 95% stenosis with minimal dye and supported with epinephrine, solumedrol, NRB    6/18 Pt coded after receiving contrast for a CT scan. Was premedicated. Was having an MI at the same time. Immediately underwent heart cath with contrast without additional allergic reaction   7/15 Pt premedicated with prednisone/Benadryl for heart cath. Still with anaphylactic-like reaction with drop in BP and hypoxia. Treated 95% stenosis with minimal dye and supported with epinephrine, solumedrol, NRB   6/18 Pt coded after receiving contrast for a CT scan. Was premedicated. Was having an  "MI at the same time. Immediately underwent heart cath with contrast without additional allergic reaction   7/15 Pt premedicated with prednisone/Benadryl for heart cath. Still with anaphylactic-like reaction with drop in BP and hypoxia. Treated 95% stenosis with minimal dye and supported with epinephrine, solumedrol, NRB    Haloperidol Hallucinations    Nsaids GI Bleeding     Other reaction(s): Bleeding, VOMITING       Family History:   Family History   Problem Relation Age of Onset    Diabetes type II Mother     Breast cancer Mother     Heart disease Father     Liver disease Brother     Hypertension Brother     ADD / ADHD Child     Autism Child     Asperger's syndrome Child     Colon cancer Neg Hx     Colon polyps Neg Hx        Social History:   Social History     Socioeconomic History    Marital status:    Tobacco Use    Smoking status: Every Day     Current packs/day: 0.00     Average packs/day: 0.5 packs/day for 25.0 years (12.5 ttl pk-yrs)     Types: Electronic Cigarette, Cigarettes     Start date: 6/29/1996     Last attempt to quit: 6/29/2021     Years since quitting: 3.1    Smokeless tobacco: Never   Vaping Use    Vaping status: Former   Substance and Sexual Activity    Alcohol use: Not Currently     Comment: SOBER 3 YEARS    Drug use: No    Sexual activity: Defer         Objective     Vital Signs  Ht 157.5 cm (62\")   Wt 106 kg (233 lb)   BMI 42.62 kg/m²   Estimated body mass index is 42.62 kg/m² as calculated from the following:    Height as of this encounter: 157.5 cm (62\").    Weight as of this encounter: 106 kg (233 lb).          Physical Exam  Vitals reviewed.   Constitutional:       General: She is awake.   Cardiovascular:      Rate and Rhythm: Normal rate.   Pulmonary:      Effort: Pulmonary effort is normal.   Abdominal:      Palpations: Abdomen is soft.      Tenderness: There is no abdominal tenderness.   Skin:     General: Skin is warm and dry.   Neurological:      Mental Status: She is " alert.          Assessment and Plan     Assessment & Plan  Alcoholic cirrhosis of liver with ascites  Labs:  CBC  CMP  PT/INR  AFP  HCC surveillance:  Abdominal US complete to evaluate for ascites as well.   Most recent MELD - Na score 7 points.     Primary biliary cholangitis  Continue ursodiol 300 mg twice daily. She is not a candidate for Ocaliva or Livdelzi due to decompensated cirrhosis.    Hepatic encephalopathy  HE  She is already on lactulose 60 g QID with milk of mag, and still struggling to have even daily bowel movements. Reluctant to increase lactulose due to the significant bloating she is reporting. Recommend adding Miralax to bowel regimen, starting with 1 capful twice daily, and titrating up or down by 1/2 capful to achieve goal of 2-3 bowel movements daily.   Suspect multiple recent procedures requiring sedation have contributed to her increased encephalopathy.   Potassium previously quite low at 2.9, which can contribute to HE by way of increased renal ammonia production. Will recheck this today and plan to adjust potassium supplement if indicated.              Follow Up  6 months    ISSAC Huffman  [facility] [address] [place] [address] [facility]  [address] [place] [id number]

## 2024-09-24 ENCOUNTER — TELEPHONE (OUTPATIENT)
Dept: GASTROENTEROLOGY | Facility: CLINIC | Age: 49
End: 2024-09-24
Payer: MEDICARE

## 2025-02-20 ENCOUNTER — OFFICE VISIT (OUTPATIENT)
Dept: GASTROENTEROLOGY | Facility: CLINIC | Age: 50
End: 2025-02-20
Payer: MEDICARE

## 2025-02-20 VITALS — WEIGHT: 248 LBS | BODY MASS INDEX: 45.64 KG/M2 | HEIGHT: 62 IN

## 2025-02-20 DIAGNOSIS — K74.3 PRIMARY BILIARY CHOLANGITIS: ICD-10-CM

## 2025-02-20 DIAGNOSIS — K21.9 GASTROESOPHAGEAL REFLUX DISEASE WITHOUT ESOPHAGITIS: ICD-10-CM

## 2025-02-20 DIAGNOSIS — K76.82 HEPATIC ENCEPHALOPATHY: Primary | ICD-10-CM

## 2025-02-20 DIAGNOSIS — K70.31 ALCOHOLIC CIRRHOSIS OF LIVER WITH ASCITES: ICD-10-CM

## 2025-02-20 RX ORDER — BUSPIRONE HYDROCHLORIDE 15 MG/1
TABLET ORAL
COMMUNITY
Start: 2025-02-11

## 2025-02-20 NOTE — PROGRESS NOTES
Office Note     Name: Timothy Guzman    : 1975     MRN: [id number]     Chief Complaint  Follow-up (Patient presents for follow up of her Cirrhosis.  She was seen last week at the ED in [place] and told that she had substantial fluid in her abdomen.)    Subjective     History of Present Illness:  History of Present Illness  The patient is a 49-year-old female who presents today for follow-up of decompensated alcoholic cirrhosis and primary biliary cholangitis. She has been told she is not a transplant candidate due to heart disease.     At her last office evaluation in September, she had multiple recent surgeries and debridements on her left leg requiring sedation, which had led to an increase in her encephalopathy. She feels that her encephalopathy is better controlled now, although she does exhibit characteristics of stage II encephalopathy on exam.     She has been experiencing worsening generalized edema for the past 3 weeks, prompting a visit to the emergency room last Thursday. During her 6-day hospital stay at [facility], paracentesis was not performed due to the proximity of the ascites to her liver. She was treated with intravenous Bumex and spironolactone. She is currently on Bumex 2 mg twice daily, and spironolactone 100 mg daily. She reports dyspnea due to diaphragmatic pressure from the ascites and abdominal pain.     Her bowel movements have improved, now occurring more frequently than before. She experiences lethargy, slurred speech, and word-finding difficulties. She continues to take Xifaxan and lactulose. She has noticed an improvement in her encephalopathy symptoms, although she experienced significant lethargy this past Friday. She has observed blood in her stool but denies hematemesis. She is not aware of any hemorrhoids. She reports frequent watery diarrhea, with occasional soft, formed stools. She reports no issues with hypotension. She has been monitoring her blood sugar levels, which  have been stable, although they have dropped a few times. She drinks Ensure to manage this. She reports difficulty adhering to a high-protein diet.     She continues to experience heartburn and reflux. She does not follow a low-sodium diet, with most recent sodium level on the lower end of normal. She was previously on potassium supplements twice daily but is unsure if she still takes them. She is ursodiol for PBC.     MEDICATIONS  Bumex, lactulose, spironolactone, Xifaxan, ursodiol, MiraLAX    Past Medical History:   Past Medical History:   Diagnosis Date    Alcoholism     sober 2.5 years    Anaphylaxis     Arthritis     Bone necrosis     CAD (coronary artery disease) 2021    Cardiac arrest     Cirrhosis of liver 2021    Gastroparesis     GERD (gastroesophageal reflux disease)     History of esophageal varices     History of kidney stones     Hypertension     Memory loss     Neuropathy     Pancreatitis     Rib fractures 2021    SVT (supraventricular tachycardia) 2021    Type 2 diabetes mellitus with hyperglycemia, with long-term current use of insulin 2023       Past Surgical History:   Past Surgical History:   Procedure Laterality Date    ANKLE SURGERY Right     APPENDECTOMY      CARDIAC CATHETERIZATION N/A 2021    Procedure: Left Heart Cath;  Surgeon: Vikram Gonzales MD;  Location:  [facility];  Service: Cardiovascular;  Laterality: N/A;    CARDIAC CATHETERIZATION N/A 7/15/2021    Procedure: LEFT HEART CATH;  Surgeon: Vikram Gonzales MD;  Location:  [facility];  Service: Cardiology;  Laterality: N/A;     SECTION      x2    CHOLECYSTECTOMY      COLONOSCOPY      COLONOSCOPY N/A 3/31/2020    Procedure: COLONOSCOPY;  Surgeon: Tirso Giles MD;  Location:  [facility];  Service: Gastroenterology;  Laterality: N/A;    COLONOSCOPY N/A 2021    Procedure: COLONOSCOPY;  Surgeon: Tyrese Rasmussen MD;  Location:  [facility];   Service: Gastroenterology;  Laterality: N/A;    CORONARY STENT PLACEMENT      ENDOSCOPY      ENDOSCOPY      ENDOSCOPY N/A 6/19/2021    Procedure: ESOPHAGOGASTRODUODENOSCOPY AT BEDSIDE;  Surgeon: Tyrese Rasmussen MD;  Location:  [facility];  Service: Gastroenterology;  Laterality: N/A;    ENDOSCOPY N/A 9/5/2021    Procedure: ESOPHAGOGASTRODUODENOSCOPY;  Surgeon: Tyrese Rasmussen MD;  Location:  [facility];  Service: Gastroenterology;  Laterality: N/A;    ENDOSCOPY N/A 11/26/2021    Procedure: ESOPHAGOGASTRODUODENOSCOPY;  Surgeon: Tyrese Rasmussen MD;  Location:  [facility];  Service: Gastroenterology;  Laterality: N/A;    ENDOSCOPY N/A 4/7/2022    Procedure: ESOPHAGOGASTRODUODENOSCOPY;  Surgeon: Tyrese Rasmussen MD;  Location:  [facility];  Service: Gastroenterology;  Laterality: N/A;    REPLACEMENT TOTAL HIP LATERAL POSITION Left     TOTAL HIP ARTHROPLASTY Right     x2    TOTAL KNEE ARTHROPLASTY Left        Immunizations:   Immunization History   Administered Date(s) Administered    COVID-19 (PFIZER) 12YRS+ (COMIRNATY) 11/22/2023    COVID-19 (PFIZER) Purple Cap Monovalent 07/23/2021, 08/16/2021, 10/11/2021        Medications:     Current Outpatient Medications:     busPIRone (BUSPAR) 15 MG tablet, , Disp: , Rfl:     acetaminophen (TYLENOL) 500 MG tablet, Take 1 tablet by mouth Every 6 (Six) Hours As Needed for Mild Pain ., Disp: , Rfl:     amitriptyline (ELAVIL) 100 MG tablet, , Disp: , Rfl:     bumetanide (BUMEX) 2 MG tablet, Take 1 tablet by mouth Daily., Disp: 90 tablet, Rfl: 3    cholecalciferol (VITAMIN D3) 1.25 MG (73072 UT) capsule, Take 1 capsule by mouth 1 (One) Time Per Week., Disp: , Rfl:     clopidogrel (PLAVIX) 75 MG tablet, Take 1 tablet by mouth Daily. (Patient not taking: Reported on 10/27/2023), Disp: 90 tablet, Rfl: 3    FeroSul 325 (65 Fe) MG tablet, , Disp: , Rfl:     FLUoxetine (PROzac) 20 MG capsule, Take 1 capsule by mouth Daily., Disp: 90 capsule, Rfl: 1    hydrOXYzine  pamoate (VISTARIL) 25 MG capsule, Take 1 capsule by mouth 3 (Three) Times a Day As Needed for Itching., Disp: 90 capsule, Rfl: 3    ibuprofen (ADVIL,MOTRIN) 400 MG tablet, , Disp: , Rfl:     Insulin Glargine-yfgn 100 UNIT/ML solution pen-injector, , Disp: , Rfl:     lactulose (CHRONULAC) 10 GM/15ML solution, Take 45 mL by mouth 4 (Four) Times a Day., Disp: 1892 mL, Rfl: 0    lamoTRIgine (LaMICtal) 100 MG tablet, , Disp: , Rfl:     Lantus 100 UNIT/ML injection, , Disp: , Rfl:     lisinopril-hydrochlorothiazide (PRINZIDE,ZESTORETIC) 10-12.5 MG per tablet, Daily., Disp: , Rfl:     metFORMIN (GLUCOPHAGE) 500 MG tablet, Take 1 tablet by mouth 2 (Two) Times a Day With Meals., Disp: 180 tablet, Rfl: 1    NovoLOG FlexPen 100 UNIT/ML solution pen-injector sc pen, Inject 20 units under the skin before each meal plus sliding scale-3 units for every 50mg/dl over 150, MDD 90, Disp: , Rfl:     OLANZapine (zyPREXA) 10 MG tablet, Take 1 tablet by mouth Every Night., Disp: 90 tablet, Rfl: 3    ondansetron (Zofran) 4 MG tablet, Take 1 tablet by mouth Every 8 (Eight) Hours As Needed for Nausea or Vomiting for up to 30 doses., Disp: 20 tablet, Rfl: 0    ondansetron ODT (ZOFRAN-ODT) 4 MG disintegrating tablet, , Disp: , Rfl:     oxyCODONE (ROXICODONE) 5 MG immediate release tablet, , Disp: , Rfl:     pantoprazole (PROTONIX) 40 MG EC tablet, Take 1 tablet by mouth 2 (Two) Times a Day Before Meals., Disp: 60 tablet, Rfl: 5    potassium chloride ER (K-TAB) 20 MEQ tablet controlled-release ER tablet, Take 1 tablet by mouth Daily., Disp: 60 tablet, Rfl: 5    pregabalin (Lyrica) 75 MG capsule, Take 1 capsule by mouth 3 (Three) Times a Day., Disp: 90 capsule, Rfl: 5    pregabalin (Lyrica) 75 MG capsule, Take 1 capsule by mouth 3 (Three) Times a Day., Disp: 90 capsule, Rfl: 5    ranolazine (RANEXA) 1000 MG 12 hr tablet, , Disp: , Rfl:     spironolactone (ALDACTONE) 100 MG tablet, Take 1 tablet by mouth Daily. (Patient taking differently: Take 1  tablet by mouth 3 (Three) Times a Day.), Disp: 90 tablet, Rfl: 3    traZODone (DESYREL) 50 MG tablet, Take 2 tablets by mouth Every Night. Pt reports increase of trazodone to 100mg nightly, Disp: 120 tablet, Rfl: 3    ursodiol (ACTIGALL) 300 MG capsule, Take 1 capsule by mouth 2 (Two) Times a Day., Disp: 180 capsule, Rfl: 3    Xifaxan 550 MG tablet, Take 1 tablet by mouth Every 12 (Twelve) Hours., Disp: 180 tablet, Rfl: 3    Allergies:   Allergies   Allergen Reactions    Contrast Dye (Echo Or Unknown Ct/Mr) Anaphylaxis     6/18 Pt coded after receiving contrast for a CT scan. Was premedicated. Was having an MI at the same time. Immediately underwent heart cath with contrast without additional allergic reaction    7/15 Pt premedicated with prednisone/Benadryl for heart cath. Still with anaphylactic-like reaction with drop in BP and hypoxia. Treated 95% stenosis with minimal dye and supported with epinephrine, solumedrol, NRB    6/18 Pt coded after receiving contrast for a CT scan. Was premedicated. Was having an MI at the same time. Immediately underwent heart cath with contrast without additional allergic reaction   7/15 Pt premedicated with prednisone/Benadryl for heart cath. Still with anaphylactic-like reaction with drop in BP and hypoxia. Treated 95% stenosis with minimal dye and supported with epinephrine, solumedrol, NRB   6/18 Pt coded after receiving contrast for a CT scan. Was premedicated. Was having an MI at the same time. Immediately underwent heart cath with contrast without additional allergic reaction   7/15 Pt premedicated with prednisone/Benadryl for heart cath. Still with anaphylactic-like reaction with drop in BP and hypoxia. Treated 95% stenosis with minimal dye and supported with epinephrine, solumedrol, NRB    Haloperidol Hallucinations    Nsaids GI Bleeding     Other reaction(s): Bleeding, VOMITING       Family History:   Family History   Problem Relation Age of Onset    Diabetes type II  "Mother     Breast cancer Mother     Heart disease Father     Liver disease Brother     Hypertension Brother     ADD / ADHD Child     Autism Child     Asperger's syndrome Child     Colon cancer Neg Hx     Colon polyps Neg Hx        Social History:   Social History     Socioeconomic History    Marital status:    Tobacco Use    Smoking status: Every Day     Current packs/day: 0.00     Average packs/day: 0.5 packs/day for 25.0 years (12.5 ttl pk-yrs)     Types: Electronic Cigarette, Cigarettes     Start date: 6/29/1996     Last attempt to quit: 6/29/2021     Years since quitting: 3.6    Smokeless tobacco: Never   Vaping Use    Vaping status: Former   Substance and Sexual Activity    Alcohol use: Not Currently     Comment: SOBER 3 YEARS    Drug use: No    Sexual activity: Defer         Objective     Vital Signs  Ht 157.5 cm (62\")   Wt 112 kg (248 lb)   BMI 45.36 kg/m²   Estimated body mass index is 45.36 kg/m² as calculated from the following:    Height as of this encounter: 157.5 cm (62\").    Weight as of this encounter: 112 kg (248 lb).          Physical Exam  Abdominal:      General: There is distension.      Tenderness: There is no abdominal tenderness.   Skin:     Comments: Telangiectasia on upper chest/neck   Neurological:      Mental Status: She is lethargic.      Comments: Asterixis        Physical Exam      Results  Laboratory Studies  Ammonia levels have remained slightly elevated. Sodium levels are good. Potassium levels are in a good range.    Imaging  Abdominal ultrasound in January showed significant ascites.      Assessment and Plan     Assessment & Plan  Alcoholic cirrhosis of liver with ascites    Orders:    Comprehensive Metabolic Panel    AFP Tumor Marker    CBC & Differential; Future    Protime-INR; Future    Hepatic encephalopathy         Gastroesophageal reflux disease without esophagitis         Primary biliary cholangitis            Assessment & Plan  1. Decompensated alcoholic " cirrhosis.  Decompensated by hepatic encephalopathy and ascites. We discussed that HE significantly reduces life expectancy, with a median survival around 2 years. She is having excessive bowel movements, several times daily, so recommend stopping Miralax. The goal will be 2-3 bowel movements daily. A high-protein diet is recommended to maintain muscle mass and improve overall health outcomes. She is also advised to limit her intake of processed foods high in sodium. Tylenol can be used, up to 2 grams total per day for pain management, as needed.     We spoke at length about the chronic/progressive nature of cirrhosis, with reduced life expectancy following decompensation. She has difficulty achieving pain control, given current limitations on pain medication in the setting of encephalopathy. Palliative care may be beneficial in managing her symptoms and improving her quality of life. A referral for paracentesis will be made, and an ultrasound will be conducted prior to the procedure to evaluate necessity. Laboratory tests will be ordered to assess her current condition and calculate a new MELD score.    2. Primary biliary cholangitis.  She is currently on ursodiol 300 mg twice a day for her primary biliary cholangitis.    3. Hepatic encephalopathy.  She is on lactulose 5 times daily and Xifaxan 550 mg twice daily for hepatic encephalopathy. Her ammonia levels are monitored closely at her facility and have remained slightly elevated, although ammonia does not always correlate with degree of HE, and has low value in guiding further treatment and response to therapy. She is advised to omit a dose of lactulose if she experiences more than 4 bowel movements a day to avoid excessive bowel movements.     4. Ascites.  An abdominal ultrasound in January showed ascites. She was treated with i.v. Bumex and spironolactone during her recent hospital stay. A referral for paracentesis will be made to hopefully alleviate her  dyspnea if significant ascites is noted on US. Further adjustments to diuretic therapy pending lab results.       Follow Up  3-6 months    Patient or patient representative verbalized consent for the use of Ambient Listening during the visit with  ISSAC Huffman for chart documentation. 2/20/2025  10:36 EST    ISSAC Huffman  [facility] [address] [place] [address] GASTROENTEROLOGY  [address] [place] [id number]

## 2025-02-21 ENCOUNTER — TELEPHONE (OUTPATIENT)
Dept: GASTROENTEROLOGY | Facility: CLINIC | Age: 50
End: 2025-02-21
Payer: MEDICARE

## 2025-02-21 NOTE — TELEPHONE ENCOUNTER
"Hub staff attempted to follow warm transfer process and was unsuccessful     Caller: ELLIE JOHNS    Relationship to patient: Nursing Home    Best call back number: [phone]    Patient is needing: ELLIE WITH PATIENT'S NURSING HOME CALLED AND STATED THAT SHE RECEIVED THE PATIENT'S LAST AFTER VISIT SUMMARY, BUT SHE STATED THAT THE PATIENT HAS RETURNED STATING THAT SHE HAS BEEN TOLD BY THE PROVIDER THAT SHE HAS 2 YEARS TO LIVE, AND ELLIE STATED THAT SHE WOULD LIKE TO RECEIVE THE \"PROVIDER'S NOTE\" IN REGARD TO THAT. MAY FAX OVER [phone] TO ELLIE'S ATTENTION. IF NEED CONTACT, OKAY TO DO SO BEFORE 3:30P, OKAY TO LEAVE .          "

## 2025-02-21 NOTE — TELEPHONE ENCOUNTER
Hub staff attempted to follow warm transfer process and was unsuccessful     Caller: ELLIE JOHNS    Relationship to patient: Nursing Home    Best call back number: [phone]    Patient is needing: ELLIE FROM THE PT NURSING HOME IS CALLING BECAUSE PT DID NOT COME BACK WITH ANY PAPERWORK. ELLIE IS REQUESTING THE OFFICE TO FAX OVER THE PT AFTER VISIT SUMMARY. FAX NUMBER FOR NURSING HOME [phone]

## 2025-02-24 ENCOUNTER — TELEPHONE (OUTPATIENT)
Dept: GASTROENTEROLOGY | Facility: CLINIC | Age: 50
End: 2025-02-24

## 2025-02-24 NOTE — TELEPHONE ENCOUNTER
"Caller: ELLIE    Relationship to patient: [facility]    Best call back number: [phone]    Patient is needing: THE REQUEST LAST WEEK WAS AS FOLLOWS. ELLIE RECEIVED PAGES 1-4 OF THE FAX, INCLUDING THE AFTER VISIT SUMMARY, BUT DIDN'T RECEIVE THE REST.     \"ELLIE FROM THE PT NURSING HOME IS CALLING BECAUSE PT DID NOT COME BACK WITH ANY PAPERWORK. ELLIE IS REQUESTING THE OFFICE TO FAX OVER THE PT AFTER VISIT SUMMARY. FAX NUMBER FOR NURSING HOME [phone]\"    "
Office note 2/20/25 & after summary visit faxed to [facility]-Attn:[phone].   
No

## 2025-02-27 ENCOUNTER — TELEPHONE (OUTPATIENT)
Dept: GASTROENTEROLOGY | Facility: CLINIC | Age: 50
End: 2025-02-27
Payer: MEDICARE

## 2025-02-27 NOTE — TELEPHONE ENCOUNTER
Timothy @ [facility] called to request office notes from 2/21/2025 be faxed to [phone].  Fax successful.

## 2025-03-11 ENCOUNTER — TELEPHONE (OUTPATIENT)
Dept: GASTROENTEROLOGY | Facility: CLINIC | Age: 50
End: 2025-03-11

## 2025-03-11 NOTE — TELEPHONE ENCOUNTER
Hub staff attempted to follow warm transfer process and was unsuccessful     Caller: ELLIE WITH [facility]    Relationship to patient:     Best call back number: [phone]    Patient is needing: PATIENT ASKED THAT ELLIE CALLED TO CHECK TO SEE IF THE ORDER FOR HER PARACENTESIS WAS PLACED YET. PLEASE CALL ELLIE BACK TO ADVISE

## 2025-03-14 DIAGNOSIS — K70.31 ALCOHOLIC CIRRHOSIS OF LIVER WITH ASCITES: Primary | ICD-10-CM

## 2025-03-14 RX ORDER — ALBUMIN (HUMAN) 12.5 G/50ML
25 SOLUTION INTRAVENOUS ONCE
OUTPATIENT
Start: 2025-03-14

## 2025-03-17 NOTE — TELEPHONE ENCOUNTER
Spoke with nurse at [facility], notified Paracentesis order active for 3 more occurrences if needed. Message was taken to be given to Silvia.

## 2025-03-19 ENCOUNTER — TELEPHONE (OUTPATIENT)
Dept: INFUSION THERAPY | Facility: HOSPITAL | Age: 50
End: 2025-03-19
Payer: MEDICARE

## 2025-03-19 NOTE — TELEPHONE ENCOUNTER
Pt's mother contacted as pre-procedure phone call prior to planned paracentesis for 3/21/25. Reviewed with patient arrival time, location, may have a light breakfast, okay to take morning medications the day of procedure with a small sip of water,   needed, reviewed procedure instructions and allowed time for questions, and will bring in a current list of medications.

## 2025-03-21 ENCOUNTER — HOSPITAL ENCOUNTER (OUTPATIENT)
Dept: ULTRASOUND IMAGING | Facility: HOSPITAL | Age: 50
Discharge: HOME | End: 2025-03-21
Payer: MEDICARE

## 2025-03-21 VITALS
HEIGHT: 62 IN | TEMPERATURE: 96.8 F | RESPIRATION RATE: 20 BRPM | WEIGHT: 246.6 LBS | OXYGEN SATURATION: 98 % | DIASTOLIC BLOOD PRESSURE: 63 MMHG | HEART RATE: 81 BPM | SYSTOLIC BLOOD PRESSURE: 129 MMHG | BODY MASS INDEX: 45.38 KG/M2

## 2025-03-21 DIAGNOSIS — K70.31 ALCOHOLIC CIRRHOSIS OF LIVER WITH ASCITES: ICD-10-CM

## 2025-03-21 LAB
GLUCOSE BLDC GLUCOMTR-MCNC: 11 MG/DL (ref 70–130)
GLUCOSE BLDC GLUCOMTR-MCNC: 127 MG/DL (ref 70–130)

## 2025-03-21 PROCEDURE — 76942 ECHO GUIDE FOR BIOPSY: CPT

## 2025-03-21 PROCEDURE — 82948 REAGENT STRIP/BLOOD GLUCOSE: CPT

## 2025-03-21 RX ORDER — SODIUM CHLORIDE 0.9 % (FLUSH) 0.9 %
10 SYRINGE (ML) INJECTION AS NEEDED
Status: DISCONTINUED | OUTPATIENT
Start: 2025-03-21 | End: 2025-03-21

## 2025-03-21 RX ORDER — SODIUM CHLORIDE 0.9 % (FLUSH) 0.9 %
10 SYRINGE (ML) INJECTION EVERY 12 HOURS SCHEDULED
Status: DISCONTINUED | OUTPATIENT
Start: 2025-03-21 | End: 2025-03-21

## 2025-03-21 RX ORDER — SODIUM CHLORIDE 9 MG/ML
40 INJECTION, SOLUTION INTRAVENOUS AS NEEDED
Status: DISCONTINUED | OUTPATIENT
Start: 2025-03-21 | End: 2025-03-21

## 2025-06-11 ENCOUNTER — TELEPHONE (OUTPATIENT)
Dept: GASTROENTEROLOGY | Facility: CLINIC | Age: 50
End: 2025-06-11

## 2025-06-11 DIAGNOSIS — K70.31 ALCOHOLIC CIRRHOSIS OF LIVER WITH ASCITES: Primary | ICD-10-CM

## 2025-06-11 RX ORDER — ALBUMIN (HUMAN) 12.5 G/50ML
75 SOLUTION INTRAVENOUS ONCE
OUTPATIENT
Start: 2025-06-11 | End: 2025-06-11

## 2025-06-11 RX ORDER — ALBUMIN (HUMAN) 12.5 G/50ML
25 SOLUTION INTRAVENOUS ONCE
OUTPATIENT
Start: 2025-06-11 | End: 2025-06-11

## 2025-06-11 RX ORDER — ALBUMIN (HUMAN) 12.5 G/50ML
82.5 SOLUTION INTRAVENOUS ONCE
OUTPATIENT
Start: 2025-06-11 | End: 2025-06-11

## 2025-06-11 RX ORDER — ALBUMIN (HUMAN) 12.5 G/50ML
50 SOLUTION INTRAVENOUS ONCE
OUTPATIENT
Start: 2025-06-11 | End: 2025-06-11

## 2025-06-11 RX ORDER — ALBUMIN (HUMAN) 12.5 G/50ML
62.5 SOLUTION INTRAVENOUS ONCE
OUTPATIENT
Start: 2025-06-11 | End: 2025-06-11

## 2025-06-11 RX ORDER — SODIUM CHLORIDE 9 MG/ML
20 INJECTION, SOLUTION INTRAVENOUS ONCE
OUTPATIENT
Start: 2025-06-11

## 2025-06-11 RX ORDER — ALBUMIN (HUMAN) 12.5 G/50ML
37.5 SOLUTION INTRAVENOUS ONCE
OUTPATIENT
Start: 2025-06-11 | End: 2025-06-11

## 2025-06-11 RX ORDER — ALBUMIN (HUMAN) 12.5 G/50ML
100 SOLUTION INTRAVENOUS ONCE
OUTPATIENT
Start: 2025-06-11 | End: 2025-06-11

## 2025-06-11 NOTE — TELEPHONE ENCOUNTER
Name: ELLIE    Relationship: [facility]    Best Callback Number: [phone]    Patient would like to Reschedule their appointment. Unable to schedule within the 3 week timeframe.     Patient is having symptoms of FLUID SHE FEELS NEEDS TO BE DRAINED.   Please call patient.

## 2025-06-23 ENCOUNTER — HOSPITAL ENCOUNTER (OUTPATIENT)
Dept: ULTRASOUND IMAGING | Facility: HOSPITAL | Age: 50
Discharge: HOME | End: 2025-06-23
Payer: MEDICARE

## 2025-06-23 VITALS
BODY MASS INDEX: 42.69 KG/M2 | DIASTOLIC BLOOD PRESSURE: 72 MMHG | HEIGHT: 62 IN | SYSTOLIC BLOOD PRESSURE: 130 MMHG | TEMPERATURE: 97.9 F | OXYGEN SATURATION: 99 % | WEIGHT: 232 LBS | RESPIRATION RATE: 18 BRPM | HEART RATE: 88 BPM

## 2025-06-23 DIAGNOSIS — K70.31 ALCOHOLIC CIRRHOSIS OF LIVER WITH ASCITES: ICD-10-CM

## 2025-06-23 LAB
ANISOCYTOSIS BLD QL: NORMAL
BASOPHILS # BLD AUTO: 0.02 10*3/MM3 (ref 0–0.2)
BASOPHILS NFR BLD AUTO: 0.4 % (ref 0–1.5)
DEPRECATED RDW RBC AUTO: 69.8 FL (ref 37–54)
EOSINOPHIL # BLD AUTO: 0.2 10*3/MM3 (ref 0–0.4)
EOSINOPHIL NFR BLD AUTO: 3.6 % (ref 0.3–6.2)
ERYTHROCYTE [DISTWIDTH] IN BLOOD BY AUTOMATED COUNT: 20.6 % (ref 12.3–15.4)
GLUCOSE BLDC GLUCOMTR-MCNC: 180 MG/DL (ref 70–130)
HCT VFR BLD AUTO: 28.6 % (ref 34–46.6)
HGB BLD-MCNC: 8.8 G/DL (ref 12–15.9)
IMM GRANULOCYTES # BLD AUTO: 0.02 10*3/MM3 (ref 0–0.05)
IMM GRANULOCYTES NFR BLD AUTO: 0.4 % (ref 0–0.5)
INR PPP: 1.25 (ref 0.89–1.12)
LYMPHOCYTES # BLD AUTO: 0.73 10*3/MM3 (ref 0.7–3.1)
LYMPHOCYTES NFR BLD AUTO: 13.1 % (ref 19.6–45.3)
MCH RBC QN AUTO: 28.9 PG (ref 26.6–33)
MCHC RBC AUTO-ENTMCNC: 30.8 G/DL (ref 31.5–35.7)
MCV RBC AUTO: 93.8 FL (ref 79–97)
MONOCYTES # BLD AUTO: 0.73 10*3/MM3 (ref 0.1–0.9)
MONOCYTES NFR BLD AUTO: 13.1 % (ref 5–12)
NEUTROPHILS NFR BLD AUTO: 3.88 10*3/MM3 (ref 1.7–7)
NEUTROPHILS NFR BLD AUTO: 69.4 % (ref 42.7–76)
NRBC BLD AUTO-RTO: 0 /100 WBC (ref 0–0.2)
PLAT MORPH BLD: NORMAL
PLATELET # BLD AUTO: 85 10*3/MM3 (ref 140–450)
PMV BLD AUTO: 12.5 FL (ref 6–12)
PROTHROMBIN TIME: 16.5 SECONDS (ref 12.2–15.3)
RBC # BLD AUTO: 3.05 10*6/MM3 (ref 3.77–5.28)
WBC MORPH BLD: NORMAL
WBC NRBC COR # BLD AUTO: 5.58 10*3/MM3 (ref 3.4–10.8)

## 2025-06-23 PROCEDURE — 85025 COMPLETE CBC W/AUTO DIFF WBC: CPT | Performed by: STUDENT IN AN ORGANIZED HEALTH CARE EDUCATION/TRAINING PROGRAM

## 2025-06-23 PROCEDURE — 82948 REAGENT STRIP/BLOOD GLUCOSE: CPT

## 2025-06-23 PROCEDURE — 85610 PROTHROMBIN TIME: CPT | Performed by: STUDENT IN AN ORGANIZED HEALTH CARE EDUCATION/TRAINING PROGRAM

## 2025-06-23 PROCEDURE — 76942 ECHO GUIDE FOR BIOPSY: CPT

## 2025-06-23 PROCEDURE — 49083 ABD PARACENTESIS W/IMAGING: CPT | Performed by: STUDENT IN AN ORGANIZED HEALTH CARE EDUCATION/TRAINING PROGRAM

## 2025-06-23 PROCEDURE — 85007 BL SMEAR W/DIFF WBC COUNT: CPT | Performed by: STUDENT IN AN ORGANIZED HEALTH CARE EDUCATION/TRAINING PROGRAM

## 2025-06-23 RX ORDER — SODIUM CHLORIDE 0.9 % (FLUSH) 0.9 %
10 SYRINGE (ML) INJECTION AS NEEDED
Status: DISCONTINUED | OUTPATIENT
Start: 2025-06-23 | End: 2025-06-24 | Stop reason: HOSPADM

## 2025-06-23 RX ORDER — DOXYCYCLINE 100 MG/1
100 CAPSULE ORAL 2 TIMES DAILY
COMMUNITY
Start: 2025-04-10

## 2025-06-23 RX ORDER — LIRAGLUTIDE 6 MG/ML
1.8 INJECTION SUBCUTANEOUS DAILY
COMMUNITY
Start: 2025-02-25

## 2025-06-23 RX ORDER — SODIUM CHLORIDE 0.9 % (FLUSH) 0.9 %
10 SYRINGE (ML) INJECTION EVERY 12 HOURS SCHEDULED
Status: DISCONTINUED | OUTPATIENT
Start: 2025-06-23 | End: 2025-06-24 | Stop reason: HOSPADM

## 2025-06-23 RX ORDER — CALCIUM CARBONATE 500 MG/1
1 TABLET, CHEWABLE ORAL
COMMUNITY
Start: 2025-03-04

## 2025-06-23 RX ORDER — SODIUM CHLORIDE 9 MG/ML
40 INJECTION, SOLUTION INTRAVENOUS AS NEEDED
Status: DISCONTINUED | OUTPATIENT
Start: 2025-06-23 | End: 2025-06-24 | Stop reason: HOSPADM

## 2025-06-23 RX ORDER — MINERAL OIL/UREA/PEG/WATER
LOTION (ML) TOPICAL EVERY 12 HOURS SCHEDULED
COMMUNITY
Start: 2025-03-10

## 2025-06-23 NOTE — NURSING NOTE
Patient here for paracentesis with Dr. Cagle. US confirmed no fluid visible to drain. Case aborted. Report given to BIBI

## 2025-06-23 NOTE — OP NOTE
IR POST OP NOTE    Physician:Filiberto Cagle MD      Procedure: Aborted paracentesis      Pre Op DX: No visible ascites      Post Op DX: Same      Anesthesia: None      Findings: No ascites.  Large soft tissue edema.  No drainable fluid.      Complications: None      Provider Signature: Filiberto Cagle MD    06/23/25  13:50 EDT

## 2025-06-23 NOTE — PRE-PROCEDURE NOTE
[facility]   Interventional Radiology H&P    Patient Name: Timothy Guzman  : 1975  MRN: [id number]  Primary Care Physician:  Provider, No Known  Referring Physician: ISSAC Huffman  Date of admission: 2025    Subjective   Subjective     HPI:  Timothy Guzman is a 49 y.o. female with a history of ascites.  Patient presents to interventional radiology for image guided paracentesis.    Review of Systems:   Constitutional no fever,  no weight loss       Otolaryngeal no difficulty swallowing   Cardiovascular no chest pain   Pulmonary no cough, no sputum production   Gastrointestinal no constipation, no diarrhea                         Personal History       Past Medical/Surgical History:   Past Medical History:   Diagnosis Date    Alcoholism     sober 2.5 years    Anaphylaxis     Arthritis     Bone necrosis     CAD (coronary artery disease) 2021    Cardiac arrest     Cirrhosis of liver 2021    Gastroparesis     GERD (gastroesophageal reflux disease)     History of esophageal varices     History of kidney stones     Hypertension     Memory loss     Neuropathy     Pancreatitis     Rib fractures 2021    SVT (supraventricular tachycardia) 2021    Type 2 diabetes mellitus with hyperglycemia, with long-term current use of insulin 2023     Past Surgical History:   Procedure Laterality Date    ANKLE SURGERY Right     APPENDECTOMY      CARDIAC CATHETERIZATION N/A 2021    Procedure: Left Heart Cath;  Surgeon: Vikram Gonzales MD;  Location:  [facility];  Service: Cardiovascular;  Laterality: N/A;    CARDIAC CATHETERIZATION N/A 07/15/2021    Procedure: LEFT HEART CATH;  Surgeon: Vikram Gonzales MD;  Location:  [facility];  Service: Cardiology;  Laterality: N/A;     SECTION      x2    CHOLECYSTECTOMY      COLONOSCOPY      COLONOSCOPY N/A 2020    Procedure: COLONOSCOPY;  Surgeon: Tirso Giles MD;  Location:  [facility]  ENDOSCOPY;  Service: Gastroenterology;  Laterality: N/A;    COLONOSCOPY N/A 09/05/2021    Procedure: COLONOSCOPY;  Surgeon: Tyrese Rasmussen MD;  Location:  [facility] ENDOSCOPY;  Service: Gastroenterology;  Laterality: N/A;    CORONARY STENT PLACEMENT      ENDOSCOPY      ENDOSCOPY      ENDOSCOPY N/A 06/19/2021    Procedure: ESOPHAGOGASTRODUODENOSCOPY AT BEDSIDE;  Surgeon: Tyrese Rasmussen MD;  Location:  [facility] ENDOSCOPY;  Service: Gastroenterology;  Laterality: N/A;    ENDOSCOPY N/A 09/05/2021    Procedure: ESOPHAGOGASTRODUODENOSCOPY;  Surgeon: Tyrese Rasmussen MD;  Location:  [facility] ENDOSCOPY;  Service: Gastroenterology;  Laterality: N/A;    ENDOSCOPY N/A 11/26/2021    Procedure: ESOPHAGOGASTRODUODENOSCOPY;  Surgeon: Tyrese Rasmussen MD;  Location:  [facility] ENDOSCOPY;  Service: Gastroenterology;  Laterality: N/A;    ENDOSCOPY N/A 04/07/2022    Procedure: ESOPHAGOGASTRODUODENOSCOPY;  Surgeon: Tyrese Rasmussen MD;  Location:  [facility] ENDOSCOPY;  Service: Gastroenterology;  Laterality: N/A;    POSTPARTUM TUBAL LIGATION      REPLACEMENT TOTAL HIP LATERAL POSITION Left     TOTAL HIP ARTHROPLASTY Right     x2    TOTAL KNEE ARTHROPLASTY Left        Social History:  reports that she has been smoking electronic cigarette and cigarettes. She started smoking about 29 years ago. She has a 12.5 pack-year smoking history. She has never used smokeless tobacco. She reports that she does not currently use alcohol. She reports that she does not use drugs.    Medications:  (Not in a hospital admission)    Current medications:  sodium chloride, 10 mL, Intravenous, Q12H      Current IV drips:       Allergies:  Allergies   Allergen Reactions    Contrast Dye (Echo Or Unknown Ct/Mr) Anaphylaxis     6/18 Pt coded after receiving contrast for a CT scan. Was premedicated. Was having an MI at the same time. Immediately underwent heart cath with contrast without additional allergic reaction    7/15 Pt premedicated with prednisone/Benadryl for heart  "cath. Still with anaphylactic-like reaction with drop in BP and hypoxia. Treated 95% stenosis with minimal dye and supported with epinephrine, solumedrol, NRB    6/18 Pt coded after receiving contrast for a CT scan. Was premedicated. Was having an MI at the same time. Immediately underwent heart cath with contrast without additional allergic reaction   7/15 Pt premedicated with prednisone/Benadryl for heart cath. Still with anaphylactic-like reaction with drop in BP and hypoxia. Treated 95% stenosis with minimal dye and supported with epinephrine, solumedrol, NRB   6/18 Pt coded after receiving contrast for a CT scan. Was premedicated. Was having an MI at the same time. Immediately underwent heart cath with contrast without additional allergic reaction   7/15 Pt premedicated with prednisone/Benadryl for heart cath. Still with anaphylactic-like reaction with drop in BP and hypoxia. Treated 95% stenosis with minimal dye and supported with epinephrine, solumedrol, NRB    Haloperidol Hallucinations    Nsaids GI Bleeding     Other reaction(s): Bleeding, VOMITING       Objective    Objective     Vitals:   Temp:  [97.9 °F (36.6 °C)] 97.9 °F (36.6 °C)  Heart Rate:  [88-89] 89  Resp:  [18-20] 18  BP: (121-130)/(61-69) 121/69      Physical Exam:   Constitutional: Awake, alert, No acute distress    Respiratory: nonlabored respirations         ASA SCALE ASSESSMENT:       MALLAMPATI CLASSIFICATION:          Result Review        Result Review:     No results found for: \"NA\"    No results found for: \"K\"    No results found for: \"CL\"    No results found for: \"PLASMABICARB\"    No results found for: \"BUN\"    No results found for: \"CREATININE\"    No results found for: \"CALCIUM\"        No components found for: \"GLUCOSE.*\"  Results from last 7 days   Lab Units 06/23/25  1207   WBC 10*3/mm3 5.58   HEMOGLOBIN g/dL 8.8*   HEMATOCRIT % 28.6*   PLATELETS 10*3/mm3 85*      Results from last 7 days   Lab Units 06/23/25  1207   INR  1.25*       "     Assessment / Plan     Assesment:  49-year-old female with a history of ascites.  Patient presents to interventional radiology for image guided paracentesis.      Plan:   Image guided paracentesis with local anesthesia only.    The risks and benefits of the procedure were discussed with the patient.    Electronically signed by Filiberto Cagle MD, 06/23/25, 1:50 PM EDT.

## (undated) DEVICE — ENDOGATOR HYBRID TUBING KIT FOR USE WITH ENDOGATOR IRRIGATION PUMP, OLYMPUS PUMP, GI4000 ESU, AND TORRENT IRRIGATION PUMP.: Brand: ENDOGATOR KIT

## (undated) DEVICE — TUBING, SUCTION, 1/4" X 10', STRAIGHT: Brand: MEDLINE

## (undated) DEVICE — ANGIO-SEAL VIP VASCULAR CLOSURE DEVICE: Brand: ANGIO-SEAL

## (undated) DEVICE — SINGLE-USE BIOPSY FORCEPS: Brand: RADIAL JAW 4

## (undated) DEVICE — KT ORCA ORCAPOD DISP STRL

## (undated) DEVICE — HYBRID CO2 TUBING/CAP SET FOR OLYMPUS® SCOPES & CO2 SOURCE: Brand: ERBE

## (undated) DEVICE — SPNG VERSALON 4X4 4PLY NONSTRL LF BG/200

## (undated) DEVICE — GUIDE CATHETER: Brand: MACH1™

## (undated) DEVICE — THE BITE BLOCK MAXI, LATEX FREE STRAP IS USED TO PROTECT THE ENDOSCOPE INSERTION TUBE FROM BEING BITTEN BY THE PATIENT.

## (undated) DEVICE — SYR LUERLOK 50ML

## (undated) DEVICE — SAFELINER SUCTION CANISTER 1000CC: Brand: DEROYAL

## (undated) DEVICE — "MH-438 A/W VLVE F/140 EVIS-140": Brand: AIR/WATER VALVE

## (undated) DEVICE — CONTN GRAD MEAS TRIANG 32OZ BLK

## (undated) DEVICE — LUBE GEL ENDOGLIDE 1.1OZ

## (undated) DEVICE — SUCTION CANISTER, 1000CC,SAFELINER: Brand: DEROYAL

## (undated) DEVICE — GW PERIPH GUIDERIGHT STD/EXCHNG/J/TIP SS 0.035IN 5X260CM

## (undated) DEVICE — INTRO ACCSR BLNT TP

## (undated) DEVICE — CATH DIAG EXPO M/ PK 6FR FL4/FR4 PIG 3PK

## (undated) DEVICE — DEV INFL MONARCH 25W

## (undated) DEVICE — GW PERIPH VASC ADX J/TP SS .035 150CM 3MM

## (undated) DEVICE — CATH DIAG EXPO M/ PK 5F FL4/FR4 PIG

## (undated) DEVICE — Device: Brand: ASAHI SION BLUE

## (undated) DEVICE — TREK CORONARY DILATATION CATHETER 2.50 MM X 15 MM / RAPID-EXCHANGE: Brand: TREK

## (undated) DEVICE — SOL IRR H2O BTL 1000ML STRL

## (undated) DEVICE — KT CATH IMG DRAGONFLY OPTIS 2.7F 135CM

## (undated) DEVICE — TREK CORONARY DILATATION CATHETER 3.50 MM X 12 MM / RAPID-EXCHANGE: Brand: TREK

## (undated) DEVICE — CVR TRANSD FLX 3DIMEN 14X29.2CM LF STRL

## (undated) DEVICE — Device: Brand: AIR/WATER CHANNEL CLEANING ADAPTER

## (undated) DEVICE — GLIDESHEATH SLENDER STAINLESS STEEL KIT: Brand: GLIDESHEATH SLENDER

## (undated) DEVICE — TREK CORONARY DILATATION CATHETER 4.0 MM X 12 MM / RAPID-EXCHANGE: Brand: TREK

## (undated) DEVICE — MODEL BT2000 P/N 700287-012KIT CONTENTS: MANIFOLD WITH SALINE AND CONTRAST PORTS, SALINE TUBING WITH SPIKE AND HAND SYRINGE, TRANSDUCER: Brand: BT2000 AUTOMATED MANIFOLD KIT

## (undated) DEVICE — DEV COMP RAD PRELUDESYNC 24CM

## (undated) DEVICE — PINNACLE INTRODUCER SHEATH: Brand: PINNACLE

## (undated) DEVICE — SNAR POLYP SENSATION MICRO OVL 13 240X40

## (undated) DEVICE — "MH-443 SUCTION VALVE F/EVIS140 EVIS160": Brand: SUCTION VALVE

## (undated) DEVICE — THE SINGLE USE ETRAP – POLYP TRAP IS USED FOR SUCTION RETRIEVAL OF ENDOSCOPICALLY REMOVED POLYPS.: Brand: ETRAP

## (undated) DEVICE — CATH DIAG EXPO .045 FL3.5 5F 100CM

## (undated) DEVICE — MODEL AT P65, P/N 701554-001KIT CONTENTS: HAND CONTROLLER, 3-WAY HIGH-PRESSURE STOPCOCK WITH ROTATING END AND PREMIUM HIGH-PRESSURE TUBING: Brand: ANGIOTOUCH® KIT

## (undated) DEVICE — PK CATH CARD 10